# Patient Record
Sex: FEMALE | Race: BLACK OR AFRICAN AMERICAN | Employment: UNEMPLOYED | URBAN - METROPOLITAN AREA
[De-identification: names, ages, dates, MRNs, and addresses within clinical notes are randomized per-mention and may not be internally consistent; named-entity substitution may affect disease eponyms.]

---

## 2017-03-27 PROBLEM — J45.20 MILD INTERMITTENT ASTHMA WITHOUT COMPLICATION: Status: ACTIVE | Noted: 2017-03-27

## 2017-03-27 PROBLEM — I10 ESSENTIAL HYPERTENSION: Status: ACTIVE | Noted: 2017-03-27

## 2017-03-27 PROBLEM — F41.9 ANXIETY: Status: ACTIVE | Noted: 2017-03-27

## 2017-03-27 PROBLEM — F32.9 REACTIVE DEPRESSION: Status: ACTIVE | Noted: 2017-03-27

## 2017-12-30 PROBLEM — N12 PYELONEPHRITIS: Status: ACTIVE | Noted: 2017-12-30

## 2018-01-14 PROBLEM — N17.9 AKI (ACUTE KIDNEY INJURY) (HCC): Status: ACTIVE | Noted: 2018-01-14

## 2018-01-14 PROBLEM — I10 ACCELERATED HYPERTENSION: Status: ACTIVE | Noted: 2018-01-14

## 2018-01-14 PROBLEM — R65.10 SIRS (SYSTEMIC INFLAMMATORY RESPONSE SYNDROME) (HCC): Status: ACTIVE | Noted: 2018-01-14

## 2018-01-14 PROBLEM — E66.01 MORBID OBESITY WITH BMI OF 60.0-69.9, ADULT (HCC): Status: ACTIVE | Noted: 2018-01-14

## 2018-01-14 PROBLEM — F17.200 TOBACCO DEPENDENCE: Status: ACTIVE | Noted: 2018-01-14

## 2018-01-14 PROBLEM — N20.1 URETERIC STONE: Status: ACTIVE | Noted: 2018-01-14

## 2018-02-04 PROBLEM — Z87.09 HISTORY OF ASTHMA: Status: ACTIVE | Noted: 2018-02-04

## 2018-02-04 PROBLEM — N13.2 HYDRONEPHROSIS WITH URINARY OBSTRUCTION DUE TO RENAL CALCULUS: Status: ACTIVE | Noted: 2018-02-04

## 2018-02-04 PROBLEM — K86.2 PANCREATIC CYST: Status: ACTIVE | Noted: 2018-02-04

## 2018-03-16 ENCOUNTER — OFFICE VISIT (OUTPATIENT)
Dept: FAMILY MEDICINE CLINIC | Age: 43
End: 2018-03-16

## 2018-03-16 VITALS
HEART RATE: 55 BPM | WEIGHT: 293 LBS | OXYGEN SATURATION: 98 % | TEMPERATURE: 97.8 F | SYSTOLIC BLOOD PRESSURE: 158 MMHG | HEIGHT: 69 IN | BODY MASS INDEX: 43.4 KG/M2 | DIASTOLIC BLOOD PRESSURE: 84 MMHG

## 2018-03-16 DIAGNOSIS — M54.40 ACUTE RIGHT-SIDED LOW BACK PAIN WITH SCIATICA, SCIATICA LATERALITY UNSPECIFIED: Primary | ICD-10-CM

## 2018-03-16 DIAGNOSIS — R10.9 ABDOMINAL CRAMPS: ICD-10-CM

## 2018-03-16 DIAGNOSIS — M54.50 ACUTE BILATERAL LOW BACK PAIN WITHOUT SCIATICA: ICD-10-CM

## 2018-03-16 LAB
BILIRUBIN, POC: NORMAL
BLOOD URINE, POC: NORMAL
CLARITY, POC: NORMAL
COLOR, POC: NORMAL
CONTROL: NORMAL
GLUCOSE URINE, POC: NORMAL
KETONES, POC: NORMAL
LEUKOCYTE EST, POC: NORMAL
NITRITE, POC: NORMAL
PH, POC: 6.5
PREGNANCY TEST URINE, POC: NORMAL
PROTEIN, POC: NORMAL
SPECIFIC GRAVITY, POC: 1.02
UROBILINOGEN, POC: 0.2

## 2018-03-16 PROCEDURE — 99213 OFFICE O/P EST LOW 20 MIN: CPT | Performed by: PHYSICIAN ASSISTANT

## 2018-03-16 PROCEDURE — 81002 URINALYSIS NONAUTO W/O SCOPE: CPT | Performed by: PHYSICIAN ASSISTANT

## 2018-03-16 PROCEDURE — 96372 THER/PROPH/DIAG INJ SC/IM: CPT | Performed by: PHYSICIAN ASSISTANT

## 2018-03-16 PROCEDURE — 81025 URINE PREGNANCY TEST: CPT | Performed by: PHYSICIAN ASSISTANT

## 2018-03-16 RX ORDER — TIZANIDINE 4 MG/1
4 TABLET ORAL 3 TIMES DAILY
Qty: 30 TABLET | Refills: 1 | Status: SHIPPED | OUTPATIENT
Start: 2018-03-16 | End: 2018-04-25 | Stop reason: SDUPTHER

## 2018-03-16 RX ORDER — DICLOFENAC SODIUM 75 MG/1
75 TABLET, DELAYED RELEASE ORAL 3 TIMES DAILY
Qty: 30 TABLET | Refills: 1 | Status: SHIPPED | OUTPATIENT
Start: 2018-03-16 | End: 2018-07-02 | Stop reason: SDUPTHER

## 2018-03-16 RX ORDER — DEXAMETHASONE SODIUM PHOSPHATE 4 MG/ML
4 INJECTION, SOLUTION INTRA-ARTICULAR; INTRALESIONAL; INTRAMUSCULAR; INTRAVENOUS; SOFT TISSUE ONCE
Status: COMPLETED | OUTPATIENT
Start: 2018-03-16 | End: 2018-03-16

## 2018-03-16 RX ADMIN — DEXAMETHASONE SODIUM PHOSPHATE 4 MG: 4 INJECTION, SOLUTION INTRA-ARTICULAR; INTRALESIONAL; INTRAMUSCULAR; INTRAVENOUS; SOFT TISSUE at 09:36

## 2018-03-16 NOTE — PROGRESS NOTES
[employer]  [address] [facility], [address] [place] [address] N   [phone]      Nevin Chicas  1975  3/16/18      HPI:  Patient presents for abdominal and back pain. She believes she will be starting menses and this is what the cramping is. Her cycles have been irregular. She did not f/u with urology bc she forgot. She has mild hematuria. No dsyuria. Pain is low back, above her hips.      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[facility])         Past Surgical History:   Procedure Laterality Date    CHOLECYSTECTOMY  2009    CYSTOSCOPY Left 01/14/2018    left stent placement    LITHOTRIPSY Right 02/15/2018    Cystoscopy;Stent Removal       Current Outpatient Prescriptions   Medication Sig Dispense Refill    diclofenac (VOLTAREN) 75 MG EC tablet Take 1 tablet by mouth 3 times daily 30 tablet 1    tiZANidine (ZANAFLEX) 4 MG tablet Take 1 tablet by mouth 3 times daily 30 tablet 1    albuterol sulfate HFA (PROAIR HFA) 108 (90 Base) MCG/ACT inhaler Inhale 2 puffs into the lungs every 6 hours as needed for Wheezing 1 Inhaler 3    lisinopril-hydrochlorothiazide (PRINZIDE;ZESTORETIC) 10-12.5 MG per tablet Take 1 tablet by mouth daily 30 tablet 0    Blood Pressure Monitoring (B-D ASSURE BPM/AUTO ARM CUFF) MISC 1 Device by Does not apply route 2 times daily Check BP daily, keep log  Dx: fluctuating BP 1 each 0    amLODIPine (NORVASC) 10 MG tablet Take 1 tablet by mouth daily (Patient taking differently: Take 10 mg by mouth daily Take morning of surgery with a sip of water) 30 tablet 3    ondansetron (ZOFRAN) 4 MG tablet Take 4 mg by mouth every 8 hours as needed for Nausea or Vomiting Take morning of surgery with a sip of water if needed      fluticasone (FLONASE) 50 MCG/ACT nasal spray 1 spray by Nasal route daily 1 Bottle 3    omeprazole (PRILOSEC) 20 MG delayed release capsule Take 1 capsule unspecified  -     POCT Urinalysis no Micro    Abdominal cramps  -     POCT Urinalysis no Micro  -     POCT urine pregnancy    Acute bilateral low back pain without sciatica  -     ketorolac (TORADOL) injection 60 mg; Inject 2 mLs into the muscle once  -     dexamethasone (DECADRON) injection 4 mg; Inject 1 mL into the muscle once  -     diclofenac (VOLTAREN) 75 MG EC tablet; Take 1 tablet by mouth 3 times daily  -     tiZANidine (ZANAFLEX) 4 MG tablet; Take 1 tablet by mouth 3 times daily        Return in about 4 weeks (around 4/13/2018) for schedule pap. needs to f/u with urology as they had suggested. She has had multiple rx for opiate pain meds, we will avoid that now.                               GREGORIO Paniagua

## 2018-03-20 ENCOUNTER — APPOINTMENT (OUTPATIENT)
Dept: GENERAL RADIOLOGY | Age: 43
DRG: 305 | End: 2018-03-20

## 2018-03-20 ENCOUNTER — HOSPITAL ENCOUNTER (INPATIENT)
Age: 43
LOS: 2 days | Discharge: HOME | DRG: 305 | End: 2018-03-22
Attending: EMERGENCY MEDICINE | Admitting: FAMILY MEDICINE

## 2018-03-20 DIAGNOSIS — R06.02 SOB (SHORTNESS OF BREATH): ICD-10-CM

## 2018-03-20 DIAGNOSIS — M79.10 MYALGIA: ICD-10-CM

## 2018-03-20 DIAGNOSIS — I16.0 HYPERTENSIVE URGENCY: Primary | ICD-10-CM

## 2018-03-20 DIAGNOSIS — I50.9 ACUTE CONGESTIVE HEART FAILURE, UNSPECIFIED CONGESTIVE HEART FAILURE TYPE: ICD-10-CM

## 2018-03-20 PROBLEM — E66.01 MORBID OBESITY WITH BMI OF 50.0-59.9, ADULT (HCC): Status: ACTIVE | Noted: 2018-03-20

## 2018-03-20 PROBLEM — I49.3 FREQUENT PVCS: Status: ACTIVE | Noted: 2018-03-20

## 2018-03-20 PROBLEM — M25.552 PAIN OF BOTH HIP JOINTS: Status: ACTIVE | Noted: 2018-03-20

## 2018-03-20 PROBLEM — I49.9 IRREGULAR HEARTBEAT: Status: ACTIVE | Noted: 2018-03-20

## 2018-03-20 PROBLEM — E87.5 HYPERKALEMIA: Status: ACTIVE | Noted: 2018-03-20

## 2018-03-20 PROBLEM — J45.901 ASTHMA EXACERBATION: Status: ACTIVE | Noted: 2018-03-20

## 2018-03-20 PROBLEM — J81.0 ACUTE PULMONARY EDEMA (HCC): Status: ACTIVE | Noted: 2018-03-20

## 2018-03-20 PROBLEM — M25.551 PAIN OF BOTH HIP JOINTS: Status: ACTIVE | Noted: 2018-03-20

## 2018-03-20 LAB
ALBUMIN SERPL-MCNC: 3.3 G/DL (ref 3.5–5.2)
ALP BLD-CCNC: 88 U/L (ref 35–104)
ALT SERPL-CCNC: 25 U/L (ref 0–32)
ANION GAP SERPL CALCULATED.3IONS-SCNC: 12 MMOL/L (ref 7–16)
AST SERPL-CCNC: 38 U/L (ref 0–31)
BASOPHILS ABSOLUTE: 0.05 E9/L (ref 0–0.2)
BASOPHILS RELATIVE PERCENT: 0.5 % (ref 0–2)
BILIRUB SERPL-MCNC: 0.4 MG/DL (ref 0–1.2)
BUN BLDV-MCNC: 11 MG/DL (ref 6–20)
CALCIUM SERPL-MCNC: 9.5 MG/DL (ref 8.6–10.2)
CHLORIDE BLD-SCNC: 96 MMOL/L (ref 98–107)
CO2: 27 MMOL/L (ref 22–29)
CREAT SERPL-MCNC: 0.7 MG/DL (ref 0.5–1)
EOSINOPHILS ABSOLUTE: 0.43 E9/L (ref 0.05–0.5)
EOSINOPHILS RELATIVE PERCENT: 4.3 % (ref 0–6)
GFR AFRICAN AMERICAN: >60
GFR NON-AFRICAN AMERICAN: >60 ML/MIN/1.73
GLUCOSE BLD-MCNC: 95 MG/DL (ref 74–109)
HCT VFR BLD CALC: 43.3 % (ref 34–48)
HEMOGLOBIN: 13.9 G/DL (ref 11.5–15.5)
IMMATURE GRANULOCYTES #: 0.03 E9/L
IMMATURE GRANULOCYTES %: 0.3 % (ref 0–5)
INFLUENZA A BY PCR: NOT DETECTED
INFLUENZA B BY PCR: NOT DETECTED
LACTIC ACID: 1 MMOL/L (ref 0.5–2.2)
LYMPHOCYTES ABSOLUTE: 2.6 E9/L (ref 1.5–4)
LYMPHOCYTES RELATIVE PERCENT: 25.8 % (ref 20–42)
MAGNESIUM: 1.9 MG/DL (ref 1.6–2.6)
MCH RBC QN AUTO: 28.2 PG (ref 26–35)
MCHC RBC AUTO-ENTMCNC: 32.1 % (ref 32–34.5)
MCV RBC AUTO: 87.8 FL (ref 80–99.9)
MONOCYTES ABSOLUTE: 0.47 E9/L (ref 0.1–0.95)
MONOCYTES RELATIVE PERCENT: 4.7 % (ref 2–12)
NEUTROPHILS ABSOLUTE: 6.49 E9/L (ref 1.8–7.3)
NEUTROPHILS RELATIVE PERCENT: 64.4 % (ref 43–80)
PDW BLD-RTO: 16.7 FL (ref 11.5–15)
PLATELET # BLD: 329 E9/L (ref 130–450)
PMV BLD AUTO: 10.7 FL (ref 7–12)
POTASSIUM SERPL-SCNC: 5.7 MMOL/L (ref 3.5–5)
PRO-BNP: 871 PG/ML (ref 0–125)
RBC # BLD: 4.93 E12/L (ref 3.5–5.5)
SODIUM BLD-SCNC: 135 MMOL/L (ref 132–146)
TOTAL PROTEIN: 7.9 G/DL (ref 6.4–8.3)
WBC # BLD: 10.1 E9/L (ref 4.5–11.5)

## 2018-03-20 PROCEDURE — 6370000000 HC RX 637 (ALT 250 FOR IP): Performed by: EMERGENCY MEDICINE

## 2018-03-20 PROCEDURE — 84484 ASSAY OF TROPONIN QUANT: CPT

## 2018-03-20 PROCEDURE — 6370000000 HC RX 637 (ALT 250 FOR IP): Performed by: PHYSICIAN ASSISTANT

## 2018-03-20 PROCEDURE — 83605 ASSAY OF LACTIC ACID: CPT

## 2018-03-20 PROCEDURE — 94640 AIRWAY INHALATION TREATMENT: CPT

## 2018-03-20 PROCEDURE — 94664 DEMO&/EVAL PT USE INHALER: CPT

## 2018-03-20 PROCEDURE — 6360000002 HC RX W HCPCS: Performed by: FAMILY MEDICINE

## 2018-03-20 PROCEDURE — 6360000002 HC RX W HCPCS: Performed by: EMERGENCY MEDICINE

## 2018-03-20 PROCEDURE — 84439 ASSAY OF FREE THYROXINE: CPT

## 2018-03-20 PROCEDURE — 96374 THER/PROPH/DIAG INJ IV PUSH: CPT

## 2018-03-20 PROCEDURE — 99291 CRITICAL CARE FIRST HOUR: CPT

## 2018-03-20 PROCEDURE — 83880 ASSAY OF NATRIURETIC PEPTIDE: CPT

## 2018-03-20 PROCEDURE — 85025 COMPLETE CBC W/AUTO DIFF WBC: CPT

## 2018-03-20 PROCEDURE — 87502 INFLUENZA DNA AMP PROBE: CPT

## 2018-03-20 PROCEDURE — 71046 X-RAY EXAM CHEST 2 VIEWS: CPT

## 2018-03-20 PROCEDURE — 6370000000 HC RX 637 (ALT 250 FOR IP): Performed by: FAMILY MEDICINE

## 2018-03-20 PROCEDURE — 36415 COLL VENOUS BLD VENIPUNCTURE: CPT

## 2018-03-20 PROCEDURE — 80053 COMPREHEN METABOLIC PANEL: CPT

## 2018-03-20 PROCEDURE — 2140000000 HC CCU INTERMEDIATE R&B

## 2018-03-20 PROCEDURE — 83735 ASSAY OF MAGNESIUM: CPT

## 2018-03-20 PROCEDURE — 80048 BASIC METABOLIC PNL TOTAL CA: CPT

## 2018-03-20 PROCEDURE — 85378 FIBRIN DEGRADE SEMIQUANT: CPT

## 2018-03-20 PROCEDURE — 84443 ASSAY THYROID STIM HORMONE: CPT

## 2018-03-20 PROCEDURE — 2580000003 HC RX 258: Performed by: FAMILY MEDICINE

## 2018-03-20 RX ORDER — HYDRALAZINE HYDROCHLORIDE 20 MG/ML
5 INJECTION INTRAMUSCULAR; INTRAVENOUS EVERY 6 HOURS PRN
Status: DISCONTINUED | OUTPATIENT
Start: 2018-03-20 | End: 2018-03-22 | Stop reason: HOSPADM

## 2018-03-20 RX ORDER — HYDROCODONE BITARTRATE AND ACETAMINOPHEN 5; 325 MG/1; MG/1
1 TABLET ORAL EVERY 8 HOURS PRN
Status: DISCONTINUED | OUTPATIENT
Start: 2018-03-20 | End: 2018-03-21

## 2018-03-20 RX ORDER — GUAIFENESIN/DEXTROMETHORPHAN 100-10MG/5
5 SYRUP ORAL EVERY 4 HOURS PRN
Status: DISCONTINUED | OUTPATIENT
Start: 2018-03-20 | End: 2018-03-22 | Stop reason: HOSPADM

## 2018-03-20 RX ORDER — LISINOPRIL AND HYDROCHLOROTHIAZIDE 12.5; 1 MG/1; MG/1
1 TABLET ORAL DAILY
Status: DISCONTINUED | OUTPATIENT
Start: 2018-03-20 | End: 2018-03-20 | Stop reason: CLARIF

## 2018-03-20 RX ORDER — IPRATROPIUM BROMIDE AND ALBUTEROL SULFATE 2.5; .5 MG/3ML; MG/3ML
1 SOLUTION RESPIRATORY (INHALATION) ONCE
Status: COMPLETED | OUTPATIENT
Start: 2018-03-20 | End: 2018-03-20

## 2018-03-20 RX ORDER — IPRATROPIUM BROMIDE AND ALBUTEROL SULFATE 2.5; .5 MG/3ML; MG/3ML
1 SOLUTION RESPIRATORY (INHALATION)
Status: DISCONTINUED | OUTPATIENT
Start: 2018-03-21 | End: 2018-03-22 | Stop reason: HOSPADM

## 2018-03-20 RX ORDER — IPRATROPIUM BROMIDE AND ALBUTEROL SULFATE 2.5; .5 MG/3ML; MG/3ML
1 SOLUTION RESPIRATORY (INHALATION) EVERY 4 HOURS PRN
Status: DISCONTINUED | OUTPATIENT
Start: 2018-03-20 | End: 2018-03-22 | Stop reason: HOSPADM

## 2018-03-20 RX ORDER — PANTOPRAZOLE SODIUM 20 MG/1
20 TABLET, DELAYED RELEASE ORAL
Status: DISCONTINUED | OUTPATIENT
Start: 2018-03-21 | End: 2018-03-22 | Stop reason: HOSPADM

## 2018-03-20 RX ORDER — ACETAMINOPHEN 325 MG/1
650 TABLET ORAL EVERY 4 HOURS PRN
Status: DISCONTINUED | OUTPATIENT
Start: 2018-03-20 | End: 2018-03-22 | Stop reason: HOSPADM

## 2018-03-20 RX ORDER — AMLODIPINE BESYLATE 10 MG/1
10 TABLET ORAL DAILY
Status: DISCONTINUED | OUTPATIENT
Start: 2018-03-20 | End: 2018-03-22 | Stop reason: HOSPADM

## 2018-03-20 RX ORDER — TIZANIDINE 4 MG/1
4 TABLET ORAL 3 TIMES DAILY
Status: DISCONTINUED | OUTPATIENT
Start: 2018-03-20 | End: 2018-03-22 | Stop reason: HOSPADM

## 2018-03-20 RX ORDER — FLUTICASONE PROPIONATE 50 MCG
1 SPRAY, SUSPENSION (ML) NASAL DAILY
Status: DISCONTINUED | OUTPATIENT
Start: 2018-03-21 | End: 2018-03-22 | Stop reason: HOSPADM

## 2018-03-20 RX ORDER — HYDROCHLOROTHIAZIDE 12.5 MG/1
12.5 TABLET ORAL DAILY
Status: DISCONTINUED | OUTPATIENT
Start: 2018-03-21 | End: 2018-03-22 | Stop reason: HOSPADM

## 2018-03-20 RX ORDER — METHYLPREDNISOLONE SODIUM SUCCINATE 40 MG/ML
40 INJECTION, POWDER, LYOPHILIZED, FOR SOLUTION INTRAMUSCULAR; INTRAVENOUS EVERY 12 HOURS
Status: DISCONTINUED | OUTPATIENT
Start: 2018-03-21 | End: 2018-03-21

## 2018-03-20 RX ORDER — ONDANSETRON 2 MG/ML
4 INJECTION INTRAMUSCULAR; INTRAVENOUS EVERY 6 HOURS PRN
Status: DISCONTINUED | OUTPATIENT
Start: 2018-03-20 | End: 2018-03-22 | Stop reason: HOSPADM

## 2018-03-20 RX ORDER — FUROSEMIDE 10 MG/ML
40 INJECTION INTRAMUSCULAR; INTRAVENOUS DAILY
Status: DISCONTINUED | OUTPATIENT
Start: 2018-03-21 | End: 2018-03-22 | Stop reason: HOSPADM

## 2018-03-20 RX ORDER — HYDRALAZINE HYDROCHLORIDE 20 MG/ML
5 INJECTION INTRAMUSCULAR; INTRAVENOUS ONCE
Status: COMPLETED | OUTPATIENT
Start: 2018-03-20 | End: 2018-03-20

## 2018-03-20 RX ORDER — NITROGLYCERIN 0.4 MG/1
0.4 TABLET SUBLINGUAL EVERY 5 MIN PRN
Status: DISCONTINUED | OUTPATIENT
Start: 2018-03-20 | End: 2018-03-22 | Stop reason: HOSPADM

## 2018-03-20 RX ORDER — IPRATROPIUM BROMIDE AND ALBUTEROL SULFATE 2.5; .5 MG/3ML; MG/3ML
1 SOLUTION RESPIRATORY (INHALATION)
Status: DISCONTINUED | OUTPATIENT
Start: 2018-03-20 | End: 2018-03-22 | Stop reason: HOSPADM

## 2018-03-20 RX ORDER — SODIUM CHLORIDE 0.9 % (FLUSH) 0.9 %
10 SYRINGE (ML) INJECTION PRN
Status: DISCONTINUED | OUTPATIENT
Start: 2018-03-20 | End: 2018-03-22 | Stop reason: HOSPADM

## 2018-03-20 RX ORDER — OXYCODONE HYDROCHLORIDE AND ACETAMINOPHEN 5; 325 MG/1; MG/1
2 TABLET ORAL ONCE
Status: COMPLETED | OUTPATIENT
Start: 2018-03-20 | End: 2018-03-20

## 2018-03-20 RX ORDER — LISINOPRIL 10 MG/1
10 TABLET ORAL DAILY
Status: DISCONTINUED | OUTPATIENT
Start: 2018-03-21 | End: 2018-03-22 | Stop reason: HOSPADM

## 2018-03-20 RX ORDER — SODIUM CHLORIDE 0.9 % (FLUSH) 0.9 %
10 SYRINGE (ML) INJECTION EVERY 12 HOURS SCHEDULED
Status: DISCONTINUED | OUTPATIENT
Start: 2018-03-20 | End: 2018-03-22 | Stop reason: HOSPADM

## 2018-03-20 RX ORDER — HYDROCODONE BITARTRATE AND ACETAMINOPHEN 5; 325 MG/1; MG/1
1 TABLET ORAL EVERY 8 HOURS PRN
Status: ON HOLD | COMMUNITY
End: 2018-03-22 | Stop reason: HOSPADM

## 2018-03-20 RX ORDER — FUROSEMIDE 10 MG/ML
20 INJECTION INTRAMUSCULAR; INTRAVENOUS ONCE
Status: COMPLETED | OUTPATIENT
Start: 2018-03-20 | End: 2018-03-20

## 2018-03-20 RX ORDER — IBUPROFEN 800 MG/1
800 TABLET ORAL ONCE
Status: COMPLETED | OUTPATIENT
Start: 2018-03-20 | End: 2018-03-20

## 2018-03-20 RX ORDER — CETIRIZINE HYDROCHLORIDE 10 MG/1
10 TABLET ORAL DAILY
Status: DISCONTINUED | OUTPATIENT
Start: 2018-03-21 | End: 2018-03-22 | Stop reason: HOSPADM

## 2018-03-20 RX ORDER — IPRATROPIUM BROMIDE AND ALBUTEROL SULFATE 2.5; .5 MG/3ML; MG/3ML
1 SOLUTION RESPIRATORY (INHALATION)
Status: COMPLETED | OUTPATIENT
Start: 2018-03-20 | End: 2018-03-20

## 2018-03-20 RX ADMIN — TIZANIDINE 4 MG: 4 TABLET ORAL at 23:49

## 2018-03-20 RX ADMIN — IPRATROPIUM BROMIDE AND ALBUTEROL SULFATE 1 AMPULE: 2.5; .5 SOLUTION RESPIRATORY (INHALATION) at 15:25

## 2018-03-20 RX ADMIN — Medication 10 ML: at 23:50

## 2018-03-20 RX ADMIN — OXYCODONE HYDROCHLORIDE AND ACETAMINOPHEN 2 TABLET: 5; 325 TABLET ORAL at 19:00

## 2018-03-20 RX ADMIN — IBUPROFEN 800 MG: 800 TABLET, FILM COATED ORAL at 16:44

## 2018-03-20 RX ADMIN — HYDRALAZINE HYDROCHLORIDE 5 MG: 20 INJECTION INTRAMUSCULAR; INTRAVENOUS at 19:41

## 2018-03-20 RX ADMIN — IPRATROPIUM BROMIDE AND ALBUTEROL SULFATE 1 AMPULE: 2.5; .5 SOLUTION RESPIRATORY (INHALATION) at 15:30

## 2018-03-20 RX ADMIN — IPRATROPIUM BROMIDE AND ALBUTEROL SULFATE 1 AMPULE: 2.5; .5 SOLUTION RESPIRATORY (INHALATION) at 16:45

## 2018-03-20 RX ADMIN — HYDRALAZINE HYDROCHLORIDE 5 MG: 20 INJECTION INTRAMUSCULAR; INTRAVENOUS at 23:49

## 2018-03-20 RX ADMIN — IPRATROPIUM BROMIDE AND ALBUTEROL SULFATE 1 AMPULE: 2.5; .5 SOLUTION RESPIRATORY (INHALATION) at 20:30

## 2018-03-20 RX ADMIN — HYDROCODONE BITARTRATE AND ACETAMINOPHEN 1 TABLET: 5; 325 TABLET ORAL at 23:59

## 2018-03-20 RX ADMIN — IPRATROPIUM BROMIDE AND ALBUTEROL SULFATE 1 AMPULE: 2.5; .5 SOLUTION RESPIRATORY (INHALATION) at 15:15

## 2018-03-20 RX ADMIN — METHYLPREDNISOLONE SODIUM SUCCINATE 40 MG: 40 INJECTION, POWDER, FOR SOLUTION INTRAMUSCULAR; INTRAVENOUS at 23:49

## 2018-03-20 RX ADMIN — AMLODIPINE BESYLATE 10 MG: 10 TABLET ORAL at 23:49

## 2018-03-20 RX ADMIN — FUROSEMIDE 20 MG: 10 INJECTION, SOLUTION INTRAVENOUS at 19:00

## 2018-03-20 ASSESSMENT — PAIN DESCRIPTION - PROGRESSION: CLINICAL_PROGRESSION: NOT CHANGED

## 2018-03-20 ASSESSMENT — PAIN DESCRIPTION - DESCRIPTORS: DESCRIPTORS: ACHING;DISCOMFORT

## 2018-03-20 ASSESSMENT — PAIN SCALES - GENERAL
PAINLEVEL_OUTOF10: 9
PAINLEVEL_OUTOF10: 7

## 2018-03-20 ASSESSMENT — PAIN DESCRIPTION - LOCATION
LOCATION: HEAD;HIP
LOCATION: GENERALIZED
LOCATION: GENERALIZED

## 2018-03-20 ASSESSMENT — PAIN DESCRIPTION - FREQUENCY: FREQUENCY: INTERMITTENT

## 2018-03-20 ASSESSMENT — PAIN DESCRIPTION - PAIN TYPE
TYPE: ACUTE PAIN;CHRONIC PAIN
TYPE: ACUTE PAIN

## 2018-03-20 ASSESSMENT — PAIN DESCRIPTION - ONSET: ONSET: ON-GOING

## 2018-03-20 ASSESSMENT — PAIN DESCRIPTION - ORIENTATION: ORIENTATION: RIGHT;LEFT

## 2018-03-20 NOTE — ED PROVIDER NOTES
(FLONASE) 50 MCG/ACT nasal spray 1 spray (not administered)   HYDROcodone-acetaminophen (NORCO) 5-325 MG per tablet 1 tablet (1 tablet Oral Given 3/20/18 2359)   cetirizine (ZYRTEC) tablet 10 mg (not administered)   magnesium hydroxide (MILK OF MAGNESIA) 400 MG/5ML suspension 30 mL (not administered)   pantoprazole (PROTONIX) tablet 20 mg (not administered)   tiZANidine (ZANAFLEX) tablet 4 mg (4 mg Oral Given 3/20/18 2349)   sodium chloride flush 0.9 % injection 10 mL (10 mLs Intravenous Given 3/20/18 2350)   sodium chloride flush 0.9 % injection 10 mL (not administered)   ondansetron (ZOFRAN) injection 4 mg (not administered)   enoxaparin (LOVENOX) injection 40 mg (not administered)   furosemide (LASIX) injection 40 mg (not administered)   HYDROmorphone (DILAUDID) injection 0.5 mg (not administered)   nitroGLYCERIN (NITROSTAT) SL tablet 0.4 mg (not administered)   ipratropium-albuterol (DUONEB) nebulizer solution 1 ampule (not administered)   ipratropium-albuterol (DUONEB) nebulizer solution 1 ampule (not administered)   methylPREDNISolone sodium (SOLU-MEDROL) injection 40 mg (40 mg Intravenous Given 3/20/18 2349)   guaiFENesin-dextromethorphan (ROBITUSSIN DM) 100-10 MG/5ML syrup 5 mL (not administered)   hydrALAZINE (APRESOLINE) injection 5 mg (5 mg Intravenous Given 3/20/18 2349)   lisinopril (PRINIVIL;ZESTRIL) tablet 10 mg (not administered)     And   hydrochlorothiazide (HYDRODIURIL) tablet 12.5 mg (not administered)   ipratropium-albuterol (DUONEB) nebulizer solution 1 ampule (1 ampule Inhalation Given 3/20/18 1530)   ipratropium-albuterol (DUONEB) nebulizer solution 1 ampule (1 ampule Inhalation Given 3/20/18 1645)   ibuprofen (ADVIL;MOTRIN) tablet 800 mg (800 mg Oral Given 3/20/18 1644)   furosemide (LASIX) injection 20 mg (20 mg Intravenous Given 3/20/18 1900)   oxyCODONE-acetaminophen (PERCOCET) 5-325 MG per tablet 2 tablet (2 tablets Oral Given 3/20/18 1900)   hydrALAZINE (APRESOLINE) injection 5 mg (5 mg case, including pertinent history (medical, surgical, family and social) and exam findings with the Midlevel and the Nurse assigned to [employer]. I have personally performed and/or participated in the history, exam, medical decision making, and procedures and agree with all pertinent clinical information. Please note that the withdrawal or failure to initiate urgent interventions for this patient would likely result in a life threatening deterioration or permanent disability. Accordingly this patient received 30 minutes of critical care time, excluding separately billable procedures. I have reviewed my findings and recommendations with [employer] and members of family present at the time of disposition. My findings/plan: The primary encounter diagnosis was Hypertensive urgency. Diagnoses of Acute congestive heart failure, unspecified congestive heart failure type ([address] SOB (shortness of breath), and Myalgia were also pertinent to this visit.   Current Discharge Medication List        DO Alex Hays DO  03/21/18 2859

## 2018-03-21 ENCOUNTER — APPOINTMENT (OUTPATIENT)
Dept: CT IMAGING | Age: 43
DRG: 305 | End: 2018-03-21

## 2018-03-21 ENCOUNTER — APPOINTMENT (OUTPATIENT)
Dept: GENERAL RADIOLOGY | Age: 43
DRG: 305 | End: 2018-03-21

## 2018-03-21 LAB
ANION GAP SERPL CALCULATED.3IONS-SCNC: 15 MMOL/L (ref 7–16)
ANION GAP SERPL CALCULATED.3IONS-SCNC: 16 MMOL/L (ref 7–16)
BASOPHILS ABSOLUTE: 0.04 E9/L (ref 0–0.2)
BASOPHILS RELATIVE PERCENT: 0.4 % (ref 0–2)
BUN BLDV-MCNC: 14 MG/DL (ref 6–20)
BUN BLDV-MCNC: 15 MG/DL (ref 6–20)
CALCIUM SERPL-MCNC: 8.9 MG/DL (ref 8.6–10.2)
CALCIUM SERPL-MCNC: 9.5 MG/DL (ref 8.6–10.2)
CHLORIDE BLD-SCNC: 100 MMOL/L (ref 98–107)
CHLORIDE BLD-SCNC: 99 MMOL/L (ref 98–107)
CHOLESTEROL, TOTAL: 200 MG/DL (ref 0–199)
CO2: 25 MMOL/L (ref 22–29)
CO2: 26 MMOL/L (ref 22–29)
CREAT SERPL-MCNC: 0.9 MG/DL (ref 0.5–1)
CREAT SERPL-MCNC: 1 MG/DL (ref 0.5–1)
D DIMER: 364 NG/ML DDU
EKG ATRIAL RATE: 94 BPM
EKG P AXIS: 59 DEGREES
EKG P-R INTERVAL: 154 MS
EKG Q-T INTERVAL: 436 MS
EKG QRS DURATION: 100 MS
EKG QTC CALCULATION (BAZETT): 545 MS
EKG R AXIS: 35 DEGREES
EKG T AXIS: 76 DEGREES
EKG VENTRICULAR RATE: 94 BPM
EOSINOPHILS ABSOLUTE: 0.07 E9/L (ref 0.05–0.5)
EOSINOPHILS RELATIVE PERCENT: 0.7 % (ref 0–6)
FILM ARRAY ADENOVIRUS: ABNORMAL
FILM ARRAY BORDETELLA PERTUSSIS: ABNORMAL
FILM ARRAY CHLAMYDOPHILIA PNEUMONIAE: ABNORMAL
FILM ARRAY CORONAVIRUS 229E: ABNORMAL
FILM ARRAY CORONAVIRUS HKU1: ABNORMAL
FILM ARRAY CORONAVIRUS NL63: ABNORMAL
FILM ARRAY CORONAVIRUS OC43: ABNORMAL
FILM ARRAY INFLUENZA A VIRUS 09H1: ABNORMAL
FILM ARRAY INFLUENZA A VIRUS H1: ABNORMAL
FILM ARRAY INFLUENZA A VIRUS H3: ABNORMAL
FILM ARRAY INFLUENZA A VIRUS: ABNORMAL
FILM ARRAY INFLUENZA B: ABNORMAL
FILM ARRAY METAPNEUMOVIRUS: ABNORMAL
FILM ARRAY MYCOPLASMA PNEUMONIAE: ABNORMAL
FILM ARRAY PARAINFLUENZA VIRUS 1: ABNORMAL
FILM ARRAY PARAINFLUENZA VIRUS 2: ABNORMAL
FILM ARRAY PARAINFLUENZA VIRUS 3: ABNORMAL
FILM ARRAY PARAINFLUENZA VIRUS 4: ABNORMAL
FILM ARRAY RESPIRATORY SYNCITIAL VIRUS: ABNORMAL
GFR AFRICAN AMERICAN: >60
GFR AFRICAN AMERICAN: >60
GFR NON-AFRICAN AMERICAN: >60 ML/MIN/1.73
GFR NON-AFRICAN AMERICAN: >60 ML/MIN/1.73
GLUCOSE BLD-MCNC: 141 MG/DL (ref 74–109)
GLUCOSE BLD-MCNC: 143 MG/DL (ref 74–109)
HCT VFR BLD CALC: 46.3 % (ref 34–48)
HDLC SERPL-MCNC: 54 MG/DL
HEMOGLOBIN: 14.9 G/DL (ref 11.5–15.5)
IMMATURE GRANULOCYTES #: 0.15 E9/L
IMMATURE GRANULOCYTES %: 1.5 % (ref 0–5)
LDL CHOLESTEROL CALCULATED: 127 MG/DL (ref 0–99)
LV EF: 53 %
LVEF MODALITY: NORMAL
LYMPHOCYTES ABSOLUTE: 1.29 E9/L (ref 1.5–4)
LYMPHOCYTES RELATIVE PERCENT: 13.1 % (ref 20–42)
MCH RBC QN AUTO: 28.4 PG (ref 26–35)
MCHC RBC AUTO-ENTMCNC: 32.2 % (ref 32–34.5)
MCV RBC AUTO: 88.2 FL (ref 80–99.9)
MONOCYTES ABSOLUTE: 0.22 E9/L (ref 0.1–0.95)
MONOCYTES RELATIVE PERCENT: 2.2 % (ref 2–12)
NEUTROPHILS ABSOLUTE: 8.11 E9/L (ref 1.8–7.3)
NEUTROPHILS RELATIVE PERCENT: 82.1 % (ref 43–80)
ORGANISM: ABNORMAL
PDW BLD-RTO: 17 FL (ref 11.5–15)
PLATELET # BLD: 348 E9/L (ref 130–450)
PMV BLD AUTO: 11.3 FL (ref 7–12)
POTASSIUM SERPL-SCNC: 3.2 MMOL/L (ref 3.5–5)
POTASSIUM SERPL-SCNC: 4 MMOL/L (ref 3.5–5)
RBC # BLD: 5.25 E12/L (ref 3.5–5.5)
SODIUM BLD-SCNC: 140 MMOL/L (ref 132–146)
SODIUM BLD-SCNC: 141 MMOL/L (ref 132–146)
T4 FREE: 1.04 NG/DL (ref 0.93–1.7)
TRIGL SERPL-MCNC: 95 MG/DL (ref 0–149)
TROPONIN: 0.01 NG/ML (ref 0–0.03)
TROPONIN: 0.02 NG/ML (ref 0–0.03)
TSH SERPL DL<=0.05 MIU/L-ACNC: 0.86 UIU/ML (ref 0.27–4.2)
VLDLC SERPL CALC-MCNC: 19 MG/DL
WBC # BLD: 9.9 E9/L (ref 4.5–11.5)

## 2018-03-21 PROCEDURE — 94760 N-INVAS EAR/PLS OXIMETRY 1: CPT

## 2018-03-21 PROCEDURE — 87501 INFLUENZA DNA AMP PROB 1+: CPT

## 2018-03-21 PROCEDURE — 6360000002 HC RX W HCPCS: Performed by: FAMILY MEDICINE

## 2018-03-21 PROCEDURE — 6360000004 HC RX CONTRAST MEDICATION: Performed by: RADIOLOGY

## 2018-03-21 PROCEDURE — 6370000000 HC RX 637 (ALT 250 FOR IP): Performed by: INTERNAL MEDICINE

## 2018-03-21 PROCEDURE — 87486 CHLMYD PNEUM DNA AMP PROBE: CPT

## 2018-03-21 PROCEDURE — 80061 LIPID PANEL: CPT

## 2018-03-21 PROCEDURE — 93010 ELECTROCARDIOGRAM REPORT: CPT | Performed by: INTERNAL MEDICINE

## 2018-03-21 PROCEDURE — 71275 CT ANGIOGRAPHY CHEST: CPT

## 2018-03-21 PROCEDURE — 72100 X-RAY EXAM L-S SPINE 2/3 VWS: CPT

## 2018-03-21 PROCEDURE — 87581 M.PNEUMON DNA AMP PROBE: CPT

## 2018-03-21 PROCEDURE — 6370000000 HC RX 637 (ALT 250 FOR IP): Performed by: FAMILY MEDICINE

## 2018-03-21 PROCEDURE — 73521 X-RAY EXAM HIPS BI 2 VIEWS: CPT

## 2018-03-21 PROCEDURE — 2140000000 HC CCU INTERMEDIATE R&B

## 2018-03-21 PROCEDURE — 36415 COLL VENOUS BLD VENIPUNCTURE: CPT

## 2018-03-21 PROCEDURE — 2580000003 HC RX 258: Performed by: FAMILY MEDICINE

## 2018-03-21 PROCEDURE — 80048 BASIC METABOLIC PNL TOTAL CA: CPT

## 2018-03-21 PROCEDURE — 93005 ELECTROCARDIOGRAM TRACING: CPT | Performed by: FAMILY MEDICINE

## 2018-03-21 PROCEDURE — 94640 AIRWAY INHALATION TREATMENT: CPT

## 2018-03-21 PROCEDURE — 87798 DETECT AGENT NOS DNA AMP: CPT

## 2018-03-21 PROCEDURE — 85025 COMPLETE CBC W/AUTO DIFF WBC: CPT

## 2018-03-21 PROCEDURE — 87503 INFLUENZA DNA AMP PROB ADDL: CPT

## 2018-03-21 PROCEDURE — 93306 TTE W/DOPPLER COMPLETE: CPT

## 2018-03-21 PROCEDURE — 84484 ASSAY OF TROPONIN QUANT: CPT

## 2018-03-21 RX ORDER — HYDROCHLOROTHIAZIDE 12.5 MG/1
12.5 TABLET ORAL ONCE
Status: COMPLETED | OUTPATIENT
Start: 2018-03-21 | End: 2018-03-21

## 2018-03-21 RX ORDER — POTASSIUM CHLORIDE 20 MEQ/1
20 TABLET, EXTENDED RELEASE ORAL ONCE
Status: DISCONTINUED | OUTPATIENT
Start: 2018-03-21 | End: 2018-03-21

## 2018-03-21 RX ORDER — HYDRALAZINE HYDROCHLORIDE 25 MG/1
25 TABLET, FILM COATED ORAL EVERY 8 HOURS SCHEDULED
Status: DISCONTINUED | OUTPATIENT
Start: 2018-03-21 | End: 2018-03-22 | Stop reason: HOSPADM

## 2018-03-21 RX ORDER — POTASSIUM CHLORIDE 20 MEQ/1
20 TABLET, EXTENDED RELEASE ORAL
Status: DISCONTINUED | OUTPATIENT
Start: 2018-03-21 | End: 2018-03-22 | Stop reason: HOSPADM

## 2018-03-21 RX ORDER — OXYCODONE HYDROCHLORIDE AND ACETAMINOPHEN 5; 325 MG/1; MG/1
1 TABLET ORAL EVERY 4 HOURS PRN
Status: DISCONTINUED | OUTPATIENT
Start: 2018-03-21 | End: 2018-03-22 | Stop reason: HOSPADM

## 2018-03-21 RX ORDER — LISINOPRIL 10 MG/1
10 TABLET ORAL ONCE
Status: COMPLETED | OUTPATIENT
Start: 2018-03-21 | End: 2018-03-21

## 2018-03-21 RX ADMIN — FUROSEMIDE 40 MG: 10 INJECTION, SOLUTION INTRAMUSCULAR; INTRAVENOUS at 09:01

## 2018-03-21 RX ADMIN — HYDROCODONE BITARTRATE AND ACETAMINOPHEN 1 TABLET: 5; 325 TABLET ORAL at 07:36

## 2018-03-21 RX ADMIN — LISINOPRIL 10 MG: 10 TABLET ORAL at 03:05

## 2018-03-21 RX ADMIN — IOPAMIDOL 60 ML: 755 INJECTION, SOLUTION INTRAVENOUS at 15:08

## 2018-03-21 RX ADMIN — POTASSIUM CHLORIDE 20 MEQ: 20 TABLET, EXTENDED RELEASE ORAL at 09:00

## 2018-03-21 RX ADMIN — TIZANIDINE 4 MG: 4 TABLET ORAL at 13:23

## 2018-03-21 RX ADMIN — CETIRIZINE HYDROCHLORIDE 10 MG: 10 TABLET, FILM COATED ORAL at 09:01

## 2018-03-21 RX ADMIN — HYDRALAZINE HYDROCHLORIDE 5 MG: 20 INJECTION INTRAMUSCULAR; INTRAVENOUS at 07:36

## 2018-03-21 RX ADMIN — OXYCODONE HYDROCHLORIDE AND ACETAMINOPHEN 1 TABLET: 5; 325 TABLET ORAL at 17:07

## 2018-03-21 RX ADMIN — METHYLPREDNISOLONE SODIUM SUCCINATE 40 MG: 40 INJECTION, POWDER, FOR SOLUTION INTRAMUSCULAR; INTRAVENOUS at 11:26

## 2018-03-21 RX ADMIN — Medication 10 ML: at 20:31

## 2018-03-21 RX ADMIN — IPRATROPIUM BROMIDE AND ALBUTEROL SULFATE 1 AMPULE: 2.5; .5 SOLUTION RESPIRATORY (INHALATION) at 22:05

## 2018-03-21 RX ADMIN — IPRATROPIUM BROMIDE AND ALBUTEROL SULFATE 1 AMPULE: 2.5; .5 SOLUTION RESPIRATORY (INHALATION) at 13:09

## 2018-03-21 RX ADMIN — HYDROCHLOROTHIAZIDE 12.5 MG: 12.5 TABLET ORAL at 09:01

## 2018-03-21 RX ADMIN — OXYCODONE HYDROCHLORIDE AND ACETAMINOPHEN 1 TABLET: 5; 325 TABLET ORAL at 12:02

## 2018-03-21 RX ADMIN — OXYCODONE HYDROCHLORIDE AND ACETAMINOPHEN 1 TABLET: 5; 325 TABLET ORAL at 21:08

## 2018-03-21 RX ADMIN — TIZANIDINE 4 MG: 4 TABLET ORAL at 20:31

## 2018-03-21 RX ADMIN — AMLODIPINE BESYLATE 10 MG: 10 TABLET ORAL at 09:01

## 2018-03-21 RX ADMIN — LISINOPRIL 10 MG: 10 TABLET ORAL at 09:00

## 2018-03-21 RX ADMIN — POTASSIUM CHLORIDE 20 MEQ: 20 TABLET, EXTENDED RELEASE ORAL at 03:05

## 2018-03-21 RX ADMIN — HYDROCHLOROTHIAZIDE 12.5 MG: 12.5 TABLET ORAL at 03:05

## 2018-03-21 RX ADMIN — HYDRALAZINE HYDROCHLORIDE 25 MG: 25 TABLET, FILM COATED ORAL at 20:31

## 2018-03-21 RX ADMIN — IPRATROPIUM BROMIDE AND ALBUTEROL SULFATE 1 AMPULE: 2.5; .5 SOLUTION RESPIRATORY (INHALATION) at 09:08

## 2018-03-21 RX ADMIN — ENOXAPARIN SODIUM 40 MG: 40 INJECTION SUBCUTANEOUS at 09:01

## 2018-03-21 RX ADMIN — PANTOPRAZOLE SODIUM 20 MG: 20 TABLET, DELAYED RELEASE ORAL at 07:36

## 2018-03-21 RX ADMIN — Medication 10 ML: at 09:01

## 2018-03-21 RX ADMIN — TIZANIDINE 4 MG: 4 TABLET ORAL at 09:01

## 2018-03-21 ASSESSMENT — PAIN SCALES - GENERAL
PAINLEVEL_OUTOF10: 9
PAINLEVEL_OUTOF10: 5
PAINLEVEL_OUTOF10: 6
PAINLEVEL_OUTOF10: 0
PAINLEVEL_OUTOF10: 0
PAINLEVEL_OUTOF10: 10
PAINLEVEL_OUTOF10: 9

## 2018-03-21 ASSESSMENT — PAIN DESCRIPTION - ONSET
ONSET: ON-GOING

## 2018-03-21 ASSESSMENT — PAIN DESCRIPTION - ORIENTATION
ORIENTATION: RIGHT;LEFT

## 2018-03-21 ASSESSMENT — PAIN DESCRIPTION - PROGRESSION
CLINICAL_PROGRESSION: NOT CHANGED

## 2018-03-21 ASSESSMENT — PAIN DESCRIPTION - LOCATION
LOCATION: HIP
LOCATION: HIP
LOCATION: BACK;HIP

## 2018-03-21 ASSESSMENT — PAIN DESCRIPTION - PAIN TYPE
TYPE: CHRONIC PAIN

## 2018-03-21 ASSESSMENT — PAIN DESCRIPTION - DESCRIPTORS
DESCRIPTORS: ACHING
DESCRIPTORS: ACHING;DISCOMFORT;SORE
DESCRIPTORS: ACHING;DISCOMFORT

## 2018-03-21 ASSESSMENT — PAIN DESCRIPTION - FREQUENCY
FREQUENCY: INTERMITTENT
FREQUENCY: CONTINUOUS
FREQUENCY: CONTINUOUS

## 2018-03-21 NOTE — H&P
Hospital Medicine History & Physical      PCP: [place]    Date of Admission: 3/20/2018    Date of Service: Pt seen/examined on 3/20/18 and Admitted to Inpatient with expected LOS greater than two midnights due to medical therapy. Chief Complaint:  Shortness of breath      History Of Present Illness:      38 y.o. female who presented to [facility] with past medical history of asthma, kidney stone, hypertension, tobacco dependence and obesity. Patient has not been feeling well for the past week. She has been having cough and cold symptoms in addition to chest congestion and nausea. She started having shortness of breath 2 days ago. This has been constant and getting worse since then. She has a cough productive of yellowish sputum and wheezing. She has been having leg swelling and describes symptoms of PND. No chest pain. She complains of pain in both hips which is dull, constant pain that is worse with sitting, nonradiating and severe in intensity. She received steroid shots at the PCP's office few days ago which initially relieved her pain but pain is back now. She continues to have abdominal pain and has been passing kidney stones at home. No fever. No dysuria or frequency or urgency. Vitals notable for blood pressure of 159/123 initially, was as high as 180/120 in the ER. Labs revealed potassium of 5.7 in a hemolyzed sample. , normal CBC and negative influenza screen. Chest x-ray shows cardiomegaly and vascular congestion. I do not see any EKG obtained. she states that she has had stress test several years ago. No recent cardiac workup. She is being admitted for further management.     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 Viral URI/bronchitis. PLAN:  #1. Acute pulmonary edema possibly precipitated by hypertensive urgency. Control blood pressure, diurese patient with Lasix, monitor I's and O's and daily weights. With new heart failure, obtain 2-D echo, patient would need ischemic workup. At this time, she has reactive airways. Stress test should be obtained when the lungs are better prior to discharge or soon outpatient. HOLD NSAIDs. STAT EKG. #2. Asthma exacerbation, breathing treatments and steroids. Monitor volume status especially with steroids on board. #3. Pain in both hips. No improvement with steroid shots. Multilevel DJD on recent CT scan of the abdomen and pelvis. Obtain x-rays of the hips and lumbar spine. Consider ortho or neurosurgery consult if pain becomes persistent. Pain control. Physical therapy as tolerated. #4. Irregular heartbeat. Patient with frequent PVCs. Continue his current monitoring, magnesium level is normal. Check thyroid function. 5. Hyperkalemia in a hemolyzed sample, recheck and treat as indicated. #6. Hypoxia with new oxygen requirement possibly secondary to problem #1. Check d-dimer. Consider PE workup. #7. Morbid obesity, lifestyle modification. #8. Tobacco dependence, smoking cessation. Nicotine patch.  9. . Viral URI/bronchitis. Supportive care, respiratory film array. DVT Prophylaxis: Lovenox   Diet:  cardiac, nothing by mouth after midnight  Code Status: Prior full    PT/OT Eval Status: Evaluation and treatment    Dispo - inpatient telemetry       Mily Rockwell MD    Thank you [facility] for the opportunity to be involved in this patient's care.

## 2018-03-21 NOTE — PROGRESS NOTES
Nutrition Education    Type and Reason for Visit: Consult, Patient Education    · Verbally reviewed following information with patient. · Written educational materials provided low sodium diet and Slim Down weight management program, 6 week program.  · Contact name and number provided. · Refer to Patient Education activity for more details.     Electronically signed by Dread Peterson RD, LD on 3/21/18 at 4:03 PM    Contact Number:  Ext [phone]

## 2018-03-21 NOTE — PROGRESS NOTES
auscultation, bilaterally without Rales/Wheezes/Rhonchi. Cardiovascular: Regular rate and rhythm with normal S1/S2 without murmurs, rubs or gallops. Abdomen: Soft, non-tender, non-distended with normal bowel sounds. Musculoskeletal: No clubbing, cyanosis or edema bilaterally. Full range of motion without deformity. Skin: Skin color, texture, turgor normal.  No rashes or lesions. Neurologic:  Neurovascularly intact without any focal sensory/motor deficits. Cranial nerves: II-XII intact, grossly non-focal.  Psychiatric: Alert and oriented, thought content appropriate, normal insight    Labs:   Recent Labs      03/20/18 1655 03/21/18   0420   WBC  10.1  9.9   HGB  13.9  14.9   HCT  43.3  46.3   PLT  329  348     Recent Labs      03/20/18   1655 03/20/18   2352  03/21/18   0420   NA  135  141  140   K  5.7*  3.2*  4.0   CL  96*  100  99   CO2  27  26  25   BUN  11  14  15   CREATININE  0.7  1.0  0.9   CALCIUM  9.5  8.9  9.5     Recent Labs      03/20/18   1655   AST  38*   ALT  25   BILITOT  0.4   ALKPHOS  88     No results for input(s): INR in the last 72 hours.   Recent Labs      03/20/18 2352 03/21/18   0420   TROPONINI  0.02  0.01       Assessment/Plan:    Active Hospital Problems    Diagnosis Date Noted    Hypertensive urgency [I16.0] 03/20/2018    [facility] [J81.0] 03/20/2018    Pain of both hip joints [M25.551, M25.552] 03/20/2018    Irregular heartbeat [I49.9] 03/20/2018    Frequent PVCs [I49.3] 03/20/2018    Asthma exacerbation [J45.901] 03/20/2018    Morbid obesity with BMI of 50.0-59.9, adult ([facility] [E66.01, Z68.43] 03/20/2018    Hyperkalemia [E87.5] 03/20/2018    Tobacco dependence [F17.200] 01/14/2018   RHINO/enterovirus URI    ECHO pending, stress test negative; continue diuresis  Symptomatic management for URI  MRI abdomen in am; will likely need open MRI due to abdominal girth   Added hydralazine to antihypertensive regimen    DVT Prophylaxis: lovenox  Diet: DIET CARDIAC;  Code Status: Full Code    PT/OT Eval Status: na    Dispo - inpatient     Chelsey Uribe MD

## 2018-03-21 NOTE — PROGRESS NOTES
Message sent via [employer] serve regarding patient request to have the percocet rather than the norco as it seemed to manage her pain better.

## 2018-03-21 NOTE — PROGRESS NOTES
Telephone report received from [facility]. Will await verification on telemetry. Sylvia Wilson RN

## 2018-03-22 ENCOUNTER — HOSPITAL ENCOUNTER (OUTPATIENT)
Age: 43
Discharge: HOME | End: 2018-03-22
Payer: COMMERCIAL

## 2018-03-22 ENCOUNTER — APPOINTMENT (OUTPATIENT)
Dept: MRI IMAGING | Age: 43
DRG: 305 | End: 2018-03-22

## 2018-03-22 VITALS
BODY MASS INDEX: 43.4 KG/M2 | SYSTOLIC BLOOD PRESSURE: 190 MMHG | RESPIRATION RATE: 20 BRPM | HEIGHT: 69 IN | WEIGHT: 293 LBS | OXYGEN SATURATION: 94 % | DIASTOLIC BLOOD PRESSURE: 104 MMHG | TEMPERATURE: 98.7 F | HEART RATE: 80 BPM

## 2018-03-22 LAB
ANION GAP SERPL CALCULATED.3IONS-SCNC: 17 MMOL/L (ref 7–16)
BUN BLDV-MCNC: 20 MG/DL (ref 6–20)
CALCIUM SERPL-MCNC: 9.4 MG/DL (ref 8.6–10.2)
CHLORIDE BLD-SCNC: 96 MMOL/L (ref 98–107)
CO2: 24 MMOL/L (ref 22–29)
CREAT SERPL-MCNC: 0.8 MG/DL (ref 0.5–1)
GFR AFRICAN AMERICAN: >60
GFR NON-AFRICAN AMERICAN: >60 ML/MIN/1.73
GLUCOSE BLD-MCNC: 114 MG/DL (ref 74–109)
POTASSIUM SERPL-SCNC: 4 MMOL/L (ref 3.5–5)
SODIUM BLD-SCNC: 137 MMOL/L (ref 132–146)

## 2018-03-22 PROCEDURE — 2700000000 HC OXYGEN THERAPY PER DAY

## 2018-03-22 PROCEDURE — 80048 BASIC METABOLIC PNL TOTAL CA: CPT

## 2018-03-22 PROCEDURE — 6360000004 HC RX CONTRAST MEDICATION: Performed by: RADIOLOGY

## 2018-03-22 PROCEDURE — 94640 AIRWAY INHALATION TREATMENT: CPT

## 2018-03-22 PROCEDURE — 6370000000 HC RX 637 (ALT 250 FOR IP): Performed by: INTERNAL MEDICINE

## 2018-03-22 PROCEDURE — A0425 GROUND MILEAGE: HCPCS

## 2018-03-22 PROCEDURE — A9579 GAD-BASE MR CONTRAST NOS,1ML: HCPCS | Performed by: RADIOLOGY

## 2018-03-22 PROCEDURE — 74182 MRI ABDOMEN W/CONTRAST: CPT

## 2018-03-22 PROCEDURE — 36415 COLL VENOUS BLD VENIPUNCTURE: CPT

## 2018-03-22 PROCEDURE — 6360000002 HC RX W HCPCS: Performed by: FAMILY MEDICINE

## 2018-03-22 PROCEDURE — A0428 BLS: HCPCS

## 2018-03-22 PROCEDURE — 6370000000 HC RX 637 (ALT 250 FOR IP): Performed by: FAMILY MEDICINE

## 2018-03-22 PROCEDURE — 2580000003 HC RX 258: Performed by: FAMILY MEDICINE

## 2018-03-22 RX ORDER — FUROSEMIDE 40 MG/1
40 TABLET ORAL DAILY
Qty: 60 TABLET | Refills: 3 | Status: SHIPPED | OUTPATIENT
Start: 2018-03-22 | End: 2018-10-05 | Stop reason: SDUPTHER

## 2018-03-22 RX ORDER — OXYCODONE HYDROCHLORIDE AND ACETAMINOPHEN 5; 325 MG/1; MG/1
1 TABLET ORAL EVERY 4 HOURS PRN
Qty: 10 TABLET | Refills: 0 | Status: SHIPPED | OUTPATIENT
Start: 2018-03-22 | End: 2018-03-29

## 2018-03-22 RX ORDER — POTASSIUM CHLORIDE 20 MEQ/1
20 TABLET, EXTENDED RELEASE ORAL
Qty: 60 TABLET | Refills: 3 | Status: SHIPPED | OUTPATIENT
Start: 2018-03-23 | End: 2019-12-30 | Stop reason: SDUPTHER

## 2018-03-22 RX ORDER — HYDRALAZINE HYDROCHLORIDE 25 MG/1
25 TABLET, FILM COATED ORAL EVERY 8 HOURS SCHEDULED
Qty: 90 TABLET | Refills: 3 | Status: SHIPPED | OUTPATIENT
Start: 2018-03-22 | End: 2019-09-17 | Stop reason: SDUPTHER

## 2018-03-22 RX ADMIN — OXYCODONE HYDROCHLORIDE AND ACETAMINOPHEN 1 TABLET: 5; 325 TABLET ORAL at 10:29

## 2018-03-22 RX ADMIN — IPRATROPIUM BROMIDE AND ALBUTEROL SULFATE 1 AMPULE: 2.5; .5 SOLUTION RESPIRATORY (INHALATION) at 13:42

## 2018-03-22 RX ADMIN — LISINOPRIL 10 MG: 10 TABLET ORAL at 08:09

## 2018-03-22 RX ADMIN — AMLODIPINE BESYLATE 10 MG: 10 TABLET ORAL at 08:09

## 2018-03-22 RX ADMIN — FLUTICASONE PROPIONATE 1 SPRAY: 50 SPRAY, METERED NASAL at 08:10

## 2018-03-22 RX ADMIN — ACETAMINOPHEN 650 MG: 325 TABLET, FILM COATED ORAL at 13:14

## 2018-03-22 RX ADMIN — OXYCODONE HYDROCHLORIDE AND ACETAMINOPHEN 1 TABLET: 5; 325 TABLET ORAL at 05:42

## 2018-03-22 RX ADMIN — ACETAMINOPHEN 650 MG: 325 TABLET, FILM COATED ORAL at 08:09

## 2018-03-22 RX ADMIN — PANTOPRAZOLE SODIUM 20 MG: 20 TABLET, DELAYED RELEASE ORAL at 06:21

## 2018-03-22 RX ADMIN — Medication 10 ML: at 08:10

## 2018-03-22 RX ADMIN — OXYCODONE HYDROCHLORIDE AND ACETAMINOPHEN 1 TABLET: 5; 325 TABLET ORAL at 14:40

## 2018-03-22 RX ADMIN — HYDROCHLOROTHIAZIDE 12.5 MG: 12.5 TABLET ORAL at 08:09

## 2018-03-22 RX ADMIN — TIZANIDINE 4 MG: 4 TABLET ORAL at 08:09

## 2018-03-22 RX ADMIN — POTASSIUM CHLORIDE 20 MEQ: 20 TABLET, EXTENDED RELEASE ORAL at 08:09

## 2018-03-22 RX ADMIN — ENOXAPARIN SODIUM 40 MG: 40 INJECTION SUBCUTANEOUS at 08:09

## 2018-03-22 RX ADMIN — GADOVERSETAMIDE 15 ML: 0.5 INJECTION, SOLUTION INTRAVENOUS at 15:28

## 2018-03-22 RX ADMIN — CETIRIZINE HYDROCHLORIDE 10 MG: 10 TABLET, FILM COATED ORAL at 08:09

## 2018-03-22 RX ADMIN — FUROSEMIDE 40 MG: 10 INJECTION, SOLUTION INTRAMUSCULAR; INTRAVENOUS at 08:09

## 2018-03-22 RX ADMIN — HYDRALAZINE HYDROCHLORIDE 25 MG: 25 TABLET, FILM COATED ORAL at 06:21

## 2018-03-22 RX ADMIN — HYDRALAZINE HYDROCHLORIDE 25 MG: 25 TABLET, FILM COATED ORAL at 13:14

## 2018-03-22 RX ADMIN — TIZANIDINE 4 MG: 4 TABLET ORAL at 13:14

## 2018-03-22 RX ADMIN — OXYCODONE HYDROCHLORIDE AND ACETAMINOPHEN 1 TABLET: 5; 325 TABLET ORAL at 01:08

## 2018-03-22 ASSESSMENT — PAIN DESCRIPTION - FREQUENCY
FREQUENCY: INTERMITTENT
FREQUENCY: INTERMITTENT

## 2018-03-22 ASSESSMENT — PAIN DESCRIPTION - PROGRESSION
CLINICAL_PROGRESSION: NOT CHANGED
CLINICAL_PROGRESSION: NOT CHANGED

## 2018-03-22 ASSESSMENT — PAIN DESCRIPTION - PAIN TYPE
TYPE: CHRONIC PAIN
TYPE: CHRONIC PAIN

## 2018-03-22 ASSESSMENT — PAIN SCALES - GENERAL
PAINLEVEL_OUTOF10: 10
PAINLEVEL_OUTOF10: 7
PAINLEVEL_OUTOF10: 10
PAINLEVEL_OUTOF10: 6
PAINLEVEL_OUTOF10: 10
PAINLEVEL_OUTOF10: 9

## 2018-03-22 ASSESSMENT — PAIN DESCRIPTION - ONSET
ONSET: ON-GOING
ONSET: ON-GOING

## 2018-03-22 ASSESSMENT — PAIN DESCRIPTION - LOCATION
LOCATION: BACK;HIP
LOCATION: BACK;HIP

## 2018-03-22 ASSESSMENT — PAIN DESCRIPTION - ORIENTATION
ORIENTATION: RIGHT;LEFT
ORIENTATION: RIGHT;LEFT

## 2018-03-22 ASSESSMENT — PAIN DESCRIPTION - DESCRIPTORS
DESCRIPTORS: ACHING;DISCOMFORT
DESCRIPTORS: ACHING;DISCOMFORT;SORE

## 2018-03-22 NOTE — PLAN OF CARE
Problem: Pain:  Goal: Control of acute pain  Control of acute pain   Outcome: Met This Shift
Home Instructions in her discharge instructions. Dave MCGRAWN, RN, CHFN      CONGESTIVE HEART FAILURE (CHF) GUIDELINES  (Must be completed for Primary Diagnosis CHF or History of CHF)    Type of CHF:    [] Acute   [] Chronic     [] Acute on Chronic     Ventricular Function Assessment (check):             [] HFpEF  [] HFpEF, borderline  [] HFpEF, improved  [] HFrEF  Ejection Fraction (%):       Echo Pending                                                                 Angiotensin-Converting-Enzyme (ACE) inhibitor ordered:  [] Yes  [] No (specify contraindication):  [] Renal Insufficiency  [] Cough  [] Hypotension  [] Allergy/angioedema  [] No left ventricular systolic dysfunction (LVSD)  [] Hyperkalemia  [] Moderate to severe aortic stenosis  [] Other (Specify): Angiotensin II receptor blockers (ARB) ordered:  [] Yes  [] No (specify contraindication):  [] Renal Insufficiency  [] Hypotension  [] Allergy/angioedema  [] No LVSD  [] Hyperkalemia  [] Moderate to severe aortic stenosis  [] Other (Specify):      ARNI - Angiotensin Receptor Neprilysin Inhibitor ordered:  [] Yes  [] No      ACC/AHA Guidelines Beta Blocker (Carvedilol, Metoprolol Succinate, or Bisoprolol) ordered:    [] Yes  [] No (specify contraindication):  [] Bradycardia  [] Hypotension  [] LVD  [] 2nd or 3rd degree heart block  [] Bronchospastic airway disease  [] Decompensated CHF  [] Other (Specify):      Reminder: Discharge Instructions: activity, daily weights, diet, S/S, discharge medications, follow-up visit & when to call the doctor.

## 2018-03-22 NOTE — PROGRESS NOTES
auscultation, bilaterally without Rales/Wheezes/Rhonchi. Cardiovascular: Regular rate and rhythm with normal S1/S2 without murmurs, rubs or gallops. Abdomen: Soft, non-tender, non-distended with normal bowel sounds. Musculoskeletal: No clubbing, cyanosis or edema bilaterally. Full range of motion without deformity. Skin: Skin color, texture, turgor normal.  No rashes or lesions. Neurologic:  Neurovascularly intact without any focal sensory/motor deficits. Cranial nerves: II-XII intact, grossly non-focal.  Psychiatric: Alert and oriented, thought content appropriate, normal insight    Labs:   Recent Labs      03/20/18   1655  03/21/18   0420   WBC  10.1  9.9   HGB  13.9  14.9   HCT  43.3  46.3   PLT  329  348     Recent Labs      03/20/18   2352  03/21/18   0420  03/22/18   0623   NA  141  140  137   K  3.2*  4.0  4.0   CL  100  99  96*   CO2  26  25  24   BUN  14  15  20   CREATININE  1.0  0.9  0.8   CALCIUM  8.9  9.5  9.4     Recent Labs      03/20/18   1655   AST  38*   ALT  25   BILITOT  0.4   ALKPHOS  88     No results for input(s): INR in the last 72 hours.   Recent Labs      03/20/18   2352  03/21/18   0420   TROPONINI  0.02  0.01       Assessment/Plan:    Active Hospital Problems    Diagnosis Date Noted    Hypertensive urgency [I16.0] 03/20/2018    [facility] [J81.0] 03/20/2018    Pain of both hip joints [M25.551, M25.552] 03/20/2018    Irregular heartbeat [I49.9] 03/20/2018    Frequent PVCs [I49.3] 03/20/2018    Asthma exacerbation [J45.901] 03/20/2018    Morbid obesity with BMI of 50.0-59.9, adult ([facility] [E66.01, Z68.43] 03/20/2018    Hyperkalemia [E87.5] 03/20/2018    Tobacco dependence [F17.200] 01/14/2018   RHINO/enterovirus URI    ECHO pending, stress test negative; continue diuresis  Symptomatic management for URI  MRI abdomen in am; will likely need open MRI due to abdominal girth; if reassuring, pt may discharge home today   Added hydralazine to antihypertensive

## 2018-03-22 NOTE — DISCHARGE SUMMARY
appearance: No apparent distress, appears stated age and cooperative. Obese. Appears older than stated age   [de-identified]: Pupils equal, round, and reactive to light. Conjunctivae/corneas clear. Neck: Supple, with full range of motion. No jugular venous distention. Trachea midline. Respiratory:  Normal respiratory effort. Clear to auscultation, bilaterally without Rales/Wheezes/Rhonchi. Cardiovascular: Regular rate and rhythm with normal S1/S2 without murmurs, rubs or gallops. Abdomen: Soft, non-tender, non-distended with normal bowel sounds. Musculoskeletal: No clubbing, cyanosis or edema bilaterally. Full range of motion without deformity. Skin: Skin color, texture, turgor normal.  No rashes or lesions. Neurologic:  Neurovascularly intact without any focal sensory/motor deficits. Cranial nerves: II-XII intact, grossly non-focal.  Psychiatric: Alert and oriented, thought content appropriate, normal insight      Consults:     IP CONSULT TO INTERNAL MEDICINE  IP CONSULT TO HEART FAILURE NURSE/COORDINATOR  IP CONSULT TO DIETITIAN    Significant Diagnostic Studies:     ECHo:   Normal left ventricle size and systolic function.   Ejection fraction is visually estimated at 50-55%. Frequent may affect the   evaluation of LV systolic function.   Mild concentric left ventricular hypertrophy.   Indeterminate diastolic function.   Mildly dilated right ventricle.   Right ventricle global systolic function is normal .   No significant valvular abnormalities.   Unable to estimate PA systolic pressure.   No evidence for hemodynamically significant pericardial effusion.   No previous echo for comparison. Stress test: negative      Disposition:  home     Discharge Instructions/Follow-up:  Keep scheduled follow up appointments. Take medications as prescribed     Code Status:  Full Code     Activity: activity as tolerated    Diet: cardiac diet    Labs:  For convenience and continuity at follow-up the following most recent labs are provided:      CBC:    Lab Results   Component Value Date    WBC 9.9 03/21/2018    HGB 14.9 03/21/2018    HCT 46.3 03/21/2018     03/21/2018       Renal:    Lab Results   Component Value Date     03/22/2018    K 4.0 03/22/2018    K 4.4 01/14/2018    CL 96 03/22/2018    CO2 24 03/22/2018    BUN 20 03/22/2018    CREATININE 0.8 03/22/2018    CALCIUM 9.4 03/22/2018       Discharge Medications:     Current Discharge Medication List           Details   oxyCODONE-acetaminophen (PERCOCET) 5-325 MG per tablet Take 1 tablet by mouth every 4 hours as needed for Pain for up to 7 days.   Qty: 10 tablet, Refills: 0    Associated Diagnoses: Myalgia      hydrALAZINE (APRESOLINE) 25 MG tablet Take 1 tablet by mouth every 8 hours  Qty: 90 tablet, Refills: 3      potassium chloride (KLOR-CON M) 20 MEQ extended release tablet Take 1 tablet by mouth daily (with breakfast)  Qty: 60 tablet, Refills: 3      furosemide (LASIX) 40 MG tablet Take 1 tablet by mouth daily  Qty: 60 tablet, Refills: 3              Details   diclofenac (VOLTAREN) 75 MG EC tablet Take 1 tablet by mouth 3 times daily  Qty: 30 tablet, Refills: 1    Associated Diagnoses: Acute bilateral low back pain without sciatica      albuterol sulfate HFA (PROAIR HFA) 108 (90 Base) MCG/ACT inhaler Inhale 2 puffs into the lungs every 6 hours as needed for Wheezing  Qty: 1 Inhaler, Refills: 3    Associated Diagnoses: Mild intermittent asthma without complication      lisinopril-hydrochlorothiazide (PRINZIDE;ZESTORETIC) 10-12.5 MG per tablet Take 1 tablet by mouth daily  Qty: 30 tablet, Refills: 0    Associated Diagnoses: Essential hypertension      amLODIPine (NORVASC) 10 MG tablet Take 1 tablet by mouth daily  Qty: 30 tablet, Refills: 3      ondansetron (ZOFRAN) 4 MG tablet Take 4 mg by mouth every 8 hours as needed for Nausea or Vomiting Take morning of surgery with a sip of water if needed      loratadine (CLARITIN) 10 MG capsule Take 1 capsule by mouth

## 2018-03-27 ENCOUNTER — OFFICE VISIT (OUTPATIENT)
Dept: FAMILY MEDICINE CLINIC | Age: 43
End: 2018-03-27

## 2018-03-27 ENCOUNTER — HOSPITAL ENCOUNTER (OUTPATIENT)
Age: 43
Discharge: HOME | End: 2018-03-29

## 2018-03-27 VITALS
SYSTOLIC BLOOD PRESSURE: 138 MMHG | TEMPERATURE: 99.2 F | DIASTOLIC BLOOD PRESSURE: 76 MMHG | RESPIRATION RATE: 18 BRPM | WEIGHT: 293 LBS | BODY MASS INDEX: 54.54 KG/M2 | HEART RATE: 108 BPM | OXYGEN SATURATION: 95 %

## 2018-03-27 DIAGNOSIS — M16.0 PRIMARY OSTEOARTHRITIS OF BOTH HIPS: ICD-10-CM

## 2018-03-27 DIAGNOSIS — J81.0 ACUTE PULMONARY EDEMA (HCC): Primary | ICD-10-CM

## 2018-03-27 DIAGNOSIS — J81.0 ACUTE PULMONARY EDEMA (HCC): ICD-10-CM

## 2018-03-27 DIAGNOSIS — I10 ESSENTIAL HYPERTENSION: ICD-10-CM

## 2018-03-27 DIAGNOSIS — J45.20 MILD INTERMITTENT ASTHMA WITHOUT COMPLICATION: ICD-10-CM

## 2018-03-27 DIAGNOSIS — M51.9 LUMBAR DISC DISEASE: ICD-10-CM

## 2018-03-27 PROBLEM — I16.0 HYPERTENSIVE URGENCY: Status: RESOLVED | Noted: 2018-03-20 | Resolved: 2018-03-27

## 2018-03-27 PROCEDURE — 1111F DSCHRG MED/CURRENT MED MERGE: CPT | Performed by: PHYSICIAN ASSISTANT

## 2018-03-27 PROCEDURE — 80053 COMPREHEN METABOLIC PANEL: CPT

## 2018-03-27 PROCEDURE — 99214 OFFICE O/P EST MOD 30 MIN: CPT | Performed by: PHYSICIAN ASSISTANT

## 2018-03-27 RX ORDER — GABAPENTIN 300 MG/1
300 CAPSULE ORAL 3 TIMES DAILY
Qty: 90 CAPSULE | Refills: 1 | Status: SHIPPED | OUTPATIENT
Start: 2018-03-27 | End: 2018-04-24

## 2018-03-27 NOTE — PROGRESS NOTES
99.2 °F (37.3 °C)   TempSrc: Oral   SpO2: 95%   Weight: (!) 364 lb (165.1 kg)     Body mass index is 54.54 kg/m². Wt Readings from Last 3 Encounters:   03/27/18 (!) 364 lb (165.1 kg)   03/22/18 (!) 359 lb 8 oz (163.1 kg)   03/16/18 (!) 371 lb 6.4 oz (168.5 kg)     BP Readings from Last 3 Encounters:   03/27/18 138/76   03/22/18 (!) 190/104   03/16/18 (!) 158/84        Inpatient course: Discharge summary reviewed- see chart. Chief Complaint   Patient presents with    Follow-Up from Hospital       Pt presents for hospital f/u for Acute pulmonary edema precipitated by hypertensive urgency. bp control was obtained with the addition of hydralazine and lasix. She also was having a asthma exacerbation tx with corticosteroid and nebs. As she continues to complain of back and hip pain, xrays were done revealing disc space narrowing and OA of hips. She was given percocet. She continues to have some dyspnea, but much improved. Her albuterol helps. Review of Systems   All other systems reviewed and are negative. Non face to face  following discharge, date last encounter closed (first attempt may have been earlier): *No documented post hospital discharge outreach found in the last 14 days *No documented post hospital discharge outreach found in the last 14 days    Call initiated 2 business days of discharge: *No response recorded in the last 14 days         Physical Exam   Constitutional: She is oriented to person, place, and time. She appears well-developed and well-nourished. No distress. HENT:   Head: Normocephalic and atraumatic. Right Ear: External ear normal.   Left Ear: External ear normal.   Nose: Nose normal.   Mouth/Throat: Oropharynx is clear and moist.   Eyes: Conjunctivae and EOM are normal. Pupils are equal, round, and reactive to light. No scleral icterus. Neck: Normal range of motion. Neck supple. No thyromegaly present.    Cardiovascular: Normal rate, regular rhythm, normal heart sounds

## 2018-03-28 LAB
ALBUMIN SERPL-MCNC: 3.7 G/DL (ref 3.5–5.2)
ALP BLD-CCNC: 86 U/L (ref 35–104)
ALT SERPL-CCNC: 21 U/L (ref 0–32)
ANION GAP SERPL CALCULATED.3IONS-SCNC: 18 MMOL/L (ref 7–16)
AST SERPL-CCNC: 27 U/L (ref 0–31)
BILIRUB SERPL-MCNC: 0.2 MG/DL (ref 0–1.2)
BUN BLDV-MCNC: 14 MG/DL (ref 6–20)
CALCIUM SERPL-MCNC: 9.7 MG/DL (ref 8.6–10.2)
CHLORIDE BLD-SCNC: 96 MMOL/L (ref 98–107)
CO2: 26 MMOL/L (ref 22–29)
CREAT SERPL-MCNC: 0.8 MG/DL (ref 0.5–1)
GFR AFRICAN AMERICAN: >60
GFR NON-AFRICAN AMERICAN: >60 ML/MIN/1.73
GLUCOSE BLD-MCNC: 91 MG/DL (ref 74–109)
POTASSIUM SERPL-SCNC: 4.3 MMOL/L (ref 3.5–5)
SODIUM BLD-SCNC: 140 MMOL/L (ref 132–146)
TOTAL PROTEIN: 7.7 G/DL (ref 6.4–8.3)

## 2018-04-03 ENCOUNTER — OFFICE VISIT (OUTPATIENT)
Dept: FAMILY MEDICINE CLINIC | Age: 43
End: 2018-04-03
Payer: MEDICARE

## 2018-04-03 VITALS
DIASTOLIC BLOOD PRESSURE: 84 MMHG | HEIGHT: 69 IN | HEART RATE: 93 BPM | SYSTOLIC BLOOD PRESSURE: 134 MMHG | WEIGHT: 293 LBS | TEMPERATURE: 98.5 F | OXYGEN SATURATION: 97 % | BODY MASS INDEX: 43.4 KG/M2

## 2018-04-03 DIAGNOSIS — G89.29 CHRONIC BILATERAL LOW BACK PAIN WITHOUT SCIATICA: ICD-10-CM

## 2018-04-03 DIAGNOSIS — M54.50 CHRONIC BILATERAL LOW BACK PAIN WITHOUT SCIATICA: ICD-10-CM

## 2018-04-03 DIAGNOSIS — I10 ESSENTIAL HYPERTENSION: ICD-10-CM

## 2018-04-03 DIAGNOSIS — M25.552 PAIN OF BOTH HIP JOINTS: ICD-10-CM

## 2018-04-03 DIAGNOSIS — M25.551 PAIN OF BOTH HIP JOINTS: ICD-10-CM

## 2018-04-03 DIAGNOSIS — J45.20 MILD INTERMITTENT ASTHMA WITHOUT COMPLICATION: Primary | ICD-10-CM

## 2018-04-03 DIAGNOSIS — Z23 NEED FOR PNEUMOCOCCAL VACCINATION: ICD-10-CM

## 2018-04-03 PROCEDURE — 99214 OFFICE O/P EST MOD 30 MIN: CPT | Performed by: PHYSICIAN ASSISTANT

## 2018-04-03 PROCEDURE — 90471 IMMUNIZATION ADMIN: CPT | Performed by: PHYSICIAN ASSISTANT

## 2018-04-03 PROCEDURE — 90732 PPSV23 VACC 2 YRS+ SUBQ/IM: CPT | Performed by: PHYSICIAN ASSISTANT

## 2018-04-03 RX ORDER — CLINDAMYCIN HYDROCHLORIDE 300 MG/1
1 CAPSULE ORAL DAILY
Refills: 0 | COMMUNITY
Start: 2018-03-31 | End: 2018-04-10

## 2018-04-03 RX ORDER — ACETAMINOPHEN AND CODEINE PHOSPHATE 300; 30 MG/1; MG/1
1 TABLET ORAL 3 TIMES DAILY PRN
Refills: 0 | COMMUNITY
Start: 2018-03-31 | End: 2018-06-13 | Stop reason: ALTCHOICE

## 2018-04-12 ENCOUNTER — HOSPITAL ENCOUNTER (OUTPATIENT)
Dept: MRI IMAGING | Age: 43
Discharge: HOME | End: 2018-04-14
Payer: MEDICARE

## 2018-04-12 DIAGNOSIS — M51.9 LUMBAR DISC DISEASE: ICD-10-CM

## 2018-04-12 PROCEDURE — 72148 MRI LUMBAR SPINE W/O DYE: CPT

## 2018-04-13 ENCOUNTER — HOSPITAL ENCOUNTER (OUTPATIENT)
Dept: PHYSICAL THERAPY | Age: 43
Setting detail: THERAPIES SERIES
Discharge: HOME | End: 2018-04-13
Payer: MEDICARE

## 2018-04-13 DIAGNOSIS — G83.4 CAUDA EQUINA COMPRESSION (HCC): ICD-10-CM

## 2018-04-13 DIAGNOSIS — M51.9 LUMBAR DISC DISEASE: Primary | ICD-10-CM

## 2018-04-13 DIAGNOSIS — M16.0 PRIMARY OSTEOARTHRITIS OF BOTH HIPS: ICD-10-CM

## 2018-04-13 DIAGNOSIS — M48.061 SPINAL STENOSIS OF LUMBAR REGION AT MULTIPLE LEVELS: Primary | ICD-10-CM

## 2018-04-16 ENCOUNTER — TELEPHONE (OUTPATIENT)
Dept: ADMINISTRATIVE | Age: 43
End: 2018-04-16

## 2018-04-23 ENCOUNTER — TELEPHONE (OUTPATIENT)
Dept: FAMILY MEDICINE CLINIC | Age: 43
End: 2018-04-23

## 2018-04-24 ENCOUNTER — HOSPITAL ENCOUNTER (OUTPATIENT)
Age: 43
Discharge: HOME | End: 2018-04-26
Payer: MEDICARE

## 2018-04-24 ENCOUNTER — OFFICE VISIT (OUTPATIENT)
Dept: ORTHOPEDIC SURGERY | Age: 43
End: 2018-04-24
Payer: MEDICARE

## 2018-04-24 ENCOUNTER — OFFICE VISIT (OUTPATIENT)
Dept: FAMILY MEDICINE CLINIC | Age: 43
End: 2018-04-24
Payer: MEDICARE

## 2018-04-24 VITALS
BODY MASS INDEX: 43.4 KG/M2 | RESPIRATION RATE: 20 BRPM | HEIGHT: 69 IN | TEMPERATURE: 97.6 F | HEART RATE: 90 BPM | SYSTOLIC BLOOD PRESSURE: 152 MMHG | DIASTOLIC BLOOD PRESSURE: 93 MMHG | WEIGHT: 293 LBS

## 2018-04-24 VITALS
SYSTOLIC BLOOD PRESSURE: 130 MMHG | RESPIRATION RATE: 16 BRPM | BODY MASS INDEX: 57.09 KG/M2 | OXYGEN SATURATION: 96 % | HEART RATE: 103 BPM | WEIGHT: 293 LBS | DIASTOLIC BLOOD PRESSURE: 80 MMHG | TEMPERATURE: 98 F

## 2018-04-24 DIAGNOSIS — G89.4 CHRONIC PAIN SYNDROME: ICD-10-CM

## 2018-04-24 DIAGNOSIS — M48.061 SPINAL STENOSIS OF LUMBAR REGION AT MULTIPLE LEVELS: ICD-10-CM

## 2018-04-24 DIAGNOSIS — M48.061 SPINAL STENOSIS OF LUMBAR REGION AT MULTIPLE LEVELS: Primary | ICD-10-CM

## 2018-04-24 DIAGNOSIS — M25.552 PAIN OF BOTH HIP JOINTS: ICD-10-CM

## 2018-04-24 DIAGNOSIS — E66.01 MORBID OBESITY WITH BMI OF 50.0-59.9, ADULT (HCC): Primary | ICD-10-CM

## 2018-04-24 DIAGNOSIS — M16.0 PRIMARY OSTEOARTHRITIS OF BOTH HIPS: ICD-10-CM

## 2018-04-24 DIAGNOSIS — M25.551 PAIN OF BOTH HIP JOINTS: ICD-10-CM

## 2018-04-24 LAB
AMPHETAMINE SCREEN, URINE: NOT DETECTED
BARBITURATE SCREEN URINE: NOT DETECTED
BENZODIAZEPINE SCREEN, URINE: NOT DETECTED
CANNABINOID SCREEN URINE: NOT DETECTED
COCAINE METABOLITE SCREEN URINE: NOT DETECTED
METHADONE SCREEN, URINE: NOT DETECTED
OPIATE SCREEN URINE: POSITIVE
PHENCYCLIDINE SCREEN URINE: NOT DETECTED
PROPOXYPHENE SCREEN: NOT DETECTED

## 2018-04-24 PROCEDURE — 4004F PT TOBACCO SCREEN RCVD TLK: CPT | Performed by: PHYSICIAN ASSISTANT

## 2018-04-24 PROCEDURE — 99203 OFFICE O/P NEW LOW 30 MIN: CPT

## 2018-04-24 PROCEDURE — 4004F PT TOBACCO SCREEN RCVD TLK: CPT | Performed by: ORTHOPAEDIC SURGERY

## 2018-04-24 PROCEDURE — G8427 DOCREV CUR MEDS BY ELIG CLIN: HCPCS | Performed by: ORTHOPAEDIC SURGERY

## 2018-04-24 PROCEDURE — G8427 DOCREV CUR MEDS BY ELIG CLIN: HCPCS | Performed by: PHYSICIAN ASSISTANT

## 2018-04-24 PROCEDURE — G0480 DRUG TEST DEF 1-7 CLASSES: HCPCS

## 2018-04-24 PROCEDURE — 99214 OFFICE O/P EST MOD 30 MIN: CPT | Performed by: PHYSICIAN ASSISTANT

## 2018-04-24 PROCEDURE — G8417 CALC BMI ABV UP PARAM F/U: HCPCS | Performed by: PHYSICIAN ASSISTANT

## 2018-04-24 PROCEDURE — G8417 CALC BMI ABV UP PARAM F/U: HCPCS | Performed by: ORTHOPAEDIC SURGERY

## 2018-04-24 PROCEDURE — 80307 DRUG TEST PRSMV CHEM ANLYZR: CPT

## 2018-04-24 PROCEDURE — 99203 OFFICE O/P NEW LOW 30 MIN: CPT | Performed by: ORTHOPAEDIC SURGERY

## 2018-04-24 RX ORDER — HYDROCODONE BITARTRATE AND ACETAMINOPHEN 7.5; 325 MG/1; MG/1
1 TABLET ORAL 2 TIMES DAILY
Qty: 60 TABLET | Refills: 0 | Status: SHIPPED | OUTPATIENT
Start: 2018-04-24 | End: 2018-05-07 | Stop reason: SDUPTHER

## 2018-04-24 RX ORDER — GABAPENTIN 600 MG/1
600 TABLET ORAL 3 TIMES DAILY
Qty: 90 TABLET | Refills: 3 | Status: SHIPPED | OUTPATIENT
Start: 2018-04-24 | End: 2018-05-07 | Stop reason: SDUPTHER

## 2018-04-25 DIAGNOSIS — M54.50 ACUTE BILATERAL LOW BACK PAIN WITHOUT SCIATICA: ICD-10-CM

## 2018-04-25 DIAGNOSIS — J30.2 ACUTE SEASONAL ALLERGIC RHINITIS, UNSPECIFIED TRIGGER: ICD-10-CM

## 2018-04-25 RX ORDER — LORATADINE 10 MG/1
10 CAPSULE, LIQUID FILLED ORAL DAILY
Qty: 30 CAPSULE | Refills: 3 | Status: SHIPPED | OUTPATIENT
Start: 2018-04-25 | End: 2018-07-02 | Stop reason: SDUPTHER

## 2018-04-25 RX ORDER — TIZANIDINE 4 MG/1
4 TABLET ORAL 3 TIMES DAILY
Qty: 30 TABLET | Refills: 1 | Status: ON HOLD | OUTPATIENT
Start: 2018-04-25 | End: 2019-02-17 | Stop reason: HOSPADM

## 2018-04-27 LAB
6AM URINE: <10 NG/ML
CODEINE, URINE: >4000 NG/ML
HYDROCODONE, URINE: 41 NG/ML
HYDROMORPHONE, URINE: <20 NG/ML
MORPHINE URINE: 67 NG/ML
NORHYDROCODONE, URINE: <20 NG/ML
NOROXYCODONE, URINE: <20 NG/ML
NOROXYMORPHONE, URINE: <20 NG/ML
OXYCODONE, URINE CONFIRMATION: <20 NG/ML
OXYMORPHONE, URINE: <20 NG/ML

## 2018-04-30 ENCOUNTER — HOSPITAL ENCOUNTER (OUTPATIENT)
Age: 43
Discharge: HOME | End: 2018-05-02
Payer: MEDICARE

## 2018-04-30 ENCOUNTER — OFFICE VISIT (OUTPATIENT)
Dept: FAMILY MEDICINE CLINIC | Age: 43
End: 2018-04-30
Payer: MEDICARE

## 2018-04-30 VITALS
TEMPERATURE: 97.8 F | OXYGEN SATURATION: 97 % | BODY MASS INDEX: 43.4 KG/M2 | DIASTOLIC BLOOD PRESSURE: 88 MMHG | WEIGHT: 293 LBS | HEART RATE: 91 BPM | HEIGHT: 69 IN | SYSTOLIC BLOOD PRESSURE: 136 MMHG

## 2018-04-30 DIAGNOSIS — Z12.4 CERVICAL CANCER SCREENING: Primary | ICD-10-CM

## 2018-04-30 DIAGNOSIS — Z12.31 ENCOUNTER FOR MAMMOGRAM TO ESTABLISH BASELINE MAMMOGRAM: ICD-10-CM

## 2018-04-30 DIAGNOSIS — L02.91 ABSCESS: ICD-10-CM

## 2018-04-30 PROCEDURE — 99213 OFFICE O/P EST LOW 20 MIN: CPT | Performed by: PHYSICIAN ASSISTANT

## 2018-04-30 PROCEDURE — 88175 CYTOPATH C/V AUTO FLUID REDO: CPT

## 2018-04-30 PROCEDURE — G8417 CALC BMI ABV UP PARAM F/U: HCPCS | Performed by: PHYSICIAN ASSISTANT

## 2018-04-30 PROCEDURE — 87070 CULTURE OTHR SPECIMN AEROBIC: CPT

## 2018-04-30 PROCEDURE — 4004F PT TOBACCO SCREEN RCVD TLK: CPT | Performed by: PHYSICIAN ASSISTANT

## 2018-04-30 PROCEDURE — G8427 DOCREV CUR MEDS BY ELIG CLIN: HCPCS | Performed by: PHYSICIAN ASSISTANT

## 2018-04-30 RX ORDER — SULFAMETHOXAZOLE AND TRIMETHOPRIM 800; 160 MG/1; MG/1
1 TABLET ORAL 2 TIMES DAILY
Qty: 20 TABLET | Refills: 0 | Status: SHIPPED | OUTPATIENT
Start: 2018-04-30 | End: 2018-05-10

## 2018-05-02 LAB
ORGANISM: ABNORMAL
ORGANISM: ABNORMAL
WOUND/ABSCESS: ABNORMAL

## 2018-05-09 ENCOUNTER — TELEPHONE (OUTPATIENT)
Dept: FAMILY MEDICINE CLINIC | Age: 43
End: 2018-05-09

## 2018-05-09 ENCOUNTER — HOSPITAL ENCOUNTER (OUTPATIENT)
Dept: GENERAL RADIOLOGY | Age: 43
Discharge: HOME | End: 2018-05-11
Payer: MEDICARE

## 2018-05-09 DIAGNOSIS — Z12.31 ENCOUNTER FOR MAMMOGRAM TO ESTABLISH BASELINE MAMMOGRAM: ICD-10-CM

## 2018-05-09 PROCEDURE — 77063 BREAST TOMOSYNTHESIS BI: CPT

## 2018-05-15 ENCOUNTER — HOSPITAL ENCOUNTER (OUTPATIENT)
Dept: NEUROLOGY | Age: 43
Discharge: HOME | End: 2018-05-15
Payer: MEDICARE

## 2018-05-15 PROCEDURE — 95911 NRV CNDJ TEST 9-10 STUDIES: CPT

## 2018-05-15 PROCEDURE — 95886 MUSC TEST DONE W/N TEST COMP: CPT

## 2018-06-13 ENCOUNTER — INITIAL CONSULT (OUTPATIENT)
Dept: BARIATRICS/WEIGHT MGMT | Age: 43
End: 2018-06-13
Payer: MEDICARE

## 2018-06-13 VITALS
HEIGHT: 69 IN | SYSTOLIC BLOOD PRESSURE: 146 MMHG | HEART RATE: 70 BPM | BODY MASS INDEX: 43.4 KG/M2 | DIASTOLIC BLOOD PRESSURE: 60 MMHG | TEMPERATURE: 97.1 F | RESPIRATION RATE: 20 BRPM | WEIGHT: 293 LBS

## 2018-06-13 DIAGNOSIS — K21.9 GASTROESOPHAGEAL REFLUX DISEASE WITHOUT ESOPHAGITIS: ICD-10-CM

## 2018-06-13 DIAGNOSIS — E66.01 MORBID OBESITY DUE TO EXCESS CALORIES (HCC): ICD-10-CM

## 2018-06-13 DIAGNOSIS — E46 MALNUTRITION, UNSPECIFIED TYPE (HCC): Primary | ICD-10-CM

## 2018-06-13 PROCEDURE — G8427 DOCREV CUR MEDS BY ELIG CLIN: HCPCS | Performed by: SURGERY

## 2018-06-13 PROCEDURE — 99203 OFFICE O/P NEW LOW 30 MIN: CPT

## 2018-06-13 PROCEDURE — G8417 CALC BMI ABV UP PARAM F/U: HCPCS | Performed by: SURGERY

## 2018-06-13 PROCEDURE — 99204 OFFICE O/P NEW MOD 45 MIN: CPT | Performed by: SURGERY

## 2018-06-13 PROCEDURE — 4004F PT TOBACCO SCREEN RCVD TLK: CPT | Performed by: SURGERY

## 2018-06-20 ENCOUNTER — PREP FOR PROCEDURE (OUTPATIENT)
Dept: SURGERY | Age: 43
End: 2018-06-20

## 2018-06-20 RX ORDER — SODIUM CHLORIDE, SODIUM LACTATE, POTASSIUM CHLORIDE, CALCIUM CHLORIDE 600; 310; 30; 20 MG/100ML; MG/100ML; MG/100ML; MG/100ML
INJECTION, SOLUTION INTRAVENOUS CONTINUOUS
Status: CANCELLED | OUTPATIENT
Start: 2018-06-20

## 2018-06-21 ENCOUNTER — ANESTHESIA EVENT (OUTPATIENT)
Dept: ENDOSCOPY | Age: 43
End: 2018-06-21
Payer: MEDICARE

## 2018-06-22 ENCOUNTER — ANESTHESIA (OUTPATIENT)
Dept: ENDOSCOPY | Age: 43
End: 2018-06-22
Payer: MEDICARE

## 2018-06-22 ENCOUNTER — HOSPITAL ENCOUNTER (OUTPATIENT)
Age: 43
Setting detail: OUTPATIENT SURGERY
Discharge: HOME | End: 2018-06-22
Attending: SURGERY | Admitting: SURGERY
Payer: MEDICARE

## 2018-06-22 VITALS
HEART RATE: 95 BPM | BODY MASS INDEX: 57.52 KG/M2 | TEMPERATURE: 98 F | RESPIRATION RATE: 16 BRPM | HEIGHT: 60 IN | SYSTOLIC BLOOD PRESSURE: 175 MMHG | WEIGHT: 293 LBS | OXYGEN SATURATION: 93 % | DIASTOLIC BLOOD PRESSURE: 80 MMHG

## 2018-06-22 VITALS — DIASTOLIC BLOOD PRESSURE: 85 MMHG | OXYGEN SATURATION: 99 % | SYSTOLIC BLOOD PRESSURE: 154 MMHG

## 2018-06-22 LAB
ALBUMIN SERPL-MCNC: 3.5 G/DL (ref 3.5–5.2)
ALP BLD-CCNC: 97 U/L (ref 35–104)
ALT SERPL-CCNC: 21 U/L (ref 0–32)
ANION GAP SERPL CALCULATED.3IONS-SCNC: 12 MMOL/L (ref 7–16)
AST SERPL-CCNC: 14 U/L (ref 0–31)
BILIRUB SERPL-MCNC: 0.2 MG/DL (ref 0–1.2)
BUN BLDV-MCNC: 18 MG/DL (ref 6–20)
CALCIUM SERPL-MCNC: 9.2 MG/DL (ref 8.6–10.2)
CHLORIDE BLD-SCNC: 104 MMOL/L (ref 98–107)
CHOLESTEROL, TOTAL: 213 MG/DL (ref 0–199)
CO2: 27 MMOL/L (ref 22–29)
CREAT SERPL-MCNC: 0.9 MG/DL (ref 0.5–1)
FERRITIN: 71 NG/ML
FOLATE: 11.1 NG/ML (ref 4.8–24.2)
GFR AFRICAN AMERICAN: >60
GFR NON-AFRICAN AMERICAN: >60 ML/MIN/1.73
GLUCOSE FASTING: 108 MG/DL (ref 74–109)
HBA1C MFR BLD: 6.5 % (ref 4.8–5.9)
HCG(URINE) PREGNANCY TEST: NEGATIVE
HCT VFR BLD CALC: 42.4 % (ref 34–48)
HEMOGLOBIN: 13.7 G/DL (ref 11.5–15.5)
MCH RBC QN AUTO: 29.7 PG (ref 26–35)
MCHC RBC AUTO-ENTMCNC: 32.3 % (ref 32–34.5)
MCV RBC AUTO: 92 FL (ref 80–99.9)
PDW BLD-RTO: 15.2 FL (ref 11.5–15)
PLATELET # BLD: 310 E9/L (ref 130–450)
PMV BLD AUTO: 10.8 FL (ref 7–12)
POTASSIUM SERPL-SCNC: 4 MMOL/L (ref 3.5–5)
RBC # BLD: 4.61 E12/L (ref 3.5–5.5)
SODIUM BLD-SCNC: 143 MMOL/L (ref 132–146)
TOTAL PROTEIN: 7.6 G/DL (ref 6.4–8.3)
TRIGL SERPL-MCNC: 152 MG/DL (ref 0–149)
VITAMIN B-12: 1501 PG/ML (ref 211–946)
VITAMIN D 25-HYDROXY: 11 NG/ML (ref 30–100)
WBC # BLD: 12.1 E9/L (ref 4.5–11.5)

## 2018-06-22 PROCEDURE — 99024 POSTOP FOLLOW-UP VISIT: CPT | Performed by: SURGERY

## 2018-06-22 PROCEDURE — 82746 ASSAY OF FOLIC ACID SERUM: CPT

## 2018-06-22 PROCEDURE — 82728 ASSAY OF FERRITIN: CPT

## 2018-06-22 PROCEDURE — 83036 HEMOGLOBIN GLYCOSYLATED A1C: CPT

## 2018-06-22 PROCEDURE — 80053 COMPREHEN METABOLIC PANEL: CPT

## 2018-06-22 PROCEDURE — 88305 TISSUE EXAM BY PATHOLOGIST: CPT

## 2018-06-22 PROCEDURE — 43239 EGD BIOPSY SINGLE/MULTIPLE: CPT | Performed by: SURGERY

## 2018-06-22 PROCEDURE — 84630 ASSAY OF ZINC: CPT

## 2018-06-22 PROCEDURE — 7100000010 HC PHASE II RECOVERY - FIRST 15 MIN: Performed by: SURGERY

## 2018-06-22 PROCEDURE — 84425 ASSAY OF VITAMIN B-1: CPT

## 2018-06-22 PROCEDURE — 88342 IMHCHEM/IMCYTCHM 1ST ANTB: CPT

## 2018-06-22 PROCEDURE — 82607 VITAMIN B-12: CPT

## 2018-06-22 PROCEDURE — 82465 ASSAY BLD/SERUM CHOLESTEROL: CPT

## 2018-06-22 PROCEDURE — 85027 COMPLETE CBC AUTOMATED: CPT

## 2018-06-22 PROCEDURE — 84478 ASSAY OF TRIGLYCERIDES: CPT

## 2018-06-22 PROCEDURE — 3609012400 HC EGD TRANSORAL BIOPSY SINGLE/MULTIPLE: Performed by: SURGERY

## 2018-06-22 PROCEDURE — 2709999900 HC NON-CHARGEABLE SUPPLY: Performed by: SURGERY

## 2018-06-22 PROCEDURE — 81025 URINE PREGNANCY TEST: CPT

## 2018-06-22 PROCEDURE — 6360000002 HC RX W HCPCS: Performed by: NURSE ANESTHETIST, CERTIFIED REGISTERED

## 2018-06-22 PROCEDURE — 82306 VITAMIN D 25 HYDROXY: CPT

## 2018-06-22 PROCEDURE — C1773 RET DEV, INSERTABLE: HCPCS | Performed by: SURGERY

## 2018-06-22 PROCEDURE — 7100000011 HC PHASE II RECOVERY - ADDTL 15 MIN: Performed by: SURGERY

## 2018-06-22 PROCEDURE — 2580000003 HC RX 258: Performed by: NURSE ANESTHETIST, CERTIFIED REGISTERED

## 2018-06-22 PROCEDURE — 3700000000 HC ANESTHESIA ATTENDED CARE: Performed by: SURGERY

## 2018-06-22 PROCEDURE — 36415 COLL VENOUS BLD VENIPUNCTURE: CPT

## 2018-06-22 PROCEDURE — 3700000001 HC ADD 15 MINUTES (ANESTHESIA): Performed by: SURGERY

## 2018-06-22 RX ORDER — PROPOFOL 10 MG/ML
INJECTION, EMULSION INTRAVENOUS PRN
Status: DISCONTINUED | OUTPATIENT
Start: 2018-06-22 | End: 2018-06-22 | Stop reason: SDUPTHER

## 2018-06-22 RX ORDER — SODIUM CHLORIDE 9 MG/ML
INJECTION, SOLUTION INTRAVENOUS CONTINUOUS PRN
Status: DISCONTINUED | OUTPATIENT
Start: 2018-06-22 | End: 2018-06-22 | Stop reason: SDUPTHER

## 2018-06-22 RX ADMIN — SODIUM CHLORIDE: 9 INJECTION, SOLUTION INTRAVENOUS at 13:04

## 2018-06-22 RX ADMIN — PROPOFOL 200 MG: 10 INJECTION, EMULSION INTRAVENOUS at 13:15

## 2018-06-22 ASSESSMENT — PAIN DESCRIPTION - DESCRIPTORS
DESCRIPTORS: DULL;RADIATING

## 2018-06-22 ASSESSMENT — PAIN DESCRIPTION - LOCATION
LOCATION: BACK;HIP
LOCATION: BACK;HIP

## 2018-06-22 ASSESSMENT — PAIN DESCRIPTION - FREQUENCY
FREQUENCY: CONTINUOUS
FREQUENCY: CONTINUOUS

## 2018-06-22 ASSESSMENT — PAIN DESCRIPTION - PAIN TYPE
TYPE: CHRONIC PAIN
TYPE: CHRONIC PAIN

## 2018-06-22 ASSESSMENT — PAIN - FUNCTIONAL ASSESSMENT: PAIN_FUNCTIONAL_ASSESSMENT: 0-10

## 2018-06-22 ASSESSMENT — PAIN SCALES - GENERAL
PAINLEVEL_OUTOF10: 7
PAINLEVEL_OUTOF10: 7

## 2018-06-26 LAB — ZINC: 60 UG/DL (ref 60–120)

## 2018-06-28 LAB — VITAMIN B1 WHOLE BLOOD: 162 NMOL/L (ref 70–180)

## 2018-07-02 ENCOUNTER — OFFICE VISIT (OUTPATIENT)
Dept: FAMILY MEDICINE CLINIC | Age: 43
End: 2018-07-02
Payer: MEDICARE

## 2018-07-02 VITALS
DIASTOLIC BLOOD PRESSURE: 86 MMHG | TEMPERATURE: 98.9 F | HEART RATE: 94 BPM | WEIGHT: 293 LBS | RESPIRATION RATE: 16 BRPM | BODY MASS INDEX: 76.56 KG/M2 | OXYGEN SATURATION: 99 % | SYSTOLIC BLOOD PRESSURE: 138 MMHG

## 2018-07-02 DIAGNOSIS — I10 ESSENTIAL HYPERTENSION: Primary | ICD-10-CM

## 2018-07-02 DIAGNOSIS — M54.50 ACUTE BILATERAL LOW BACK PAIN WITHOUT SCIATICA: ICD-10-CM

## 2018-07-02 DIAGNOSIS — M48.061 SPINAL STENOSIS OF LUMBAR REGION AT MULTIPLE LEVELS: ICD-10-CM

## 2018-07-02 DIAGNOSIS — J30.2 ACUTE SEASONAL ALLERGIC RHINITIS, UNSPECIFIED TRIGGER: ICD-10-CM

## 2018-07-02 DIAGNOSIS — E11.9 TYPE 2 DIABETES MELLITUS WITHOUT COMPLICATION, WITHOUT LONG-TERM CURRENT USE OF INSULIN (HCC): ICD-10-CM

## 2018-07-02 DIAGNOSIS — E55.9 VITAMIN D DEFICIENCY: ICD-10-CM

## 2018-07-02 DIAGNOSIS — R53.83 FATIGUE, UNSPECIFIED TYPE: ICD-10-CM

## 2018-07-02 DIAGNOSIS — F17.200 SMOKER: ICD-10-CM

## 2018-07-02 DIAGNOSIS — E11.69 HYPERLIPIDEMIA ASSOCIATED WITH TYPE 2 DIABETES MELLITUS (HCC): ICD-10-CM

## 2018-07-02 DIAGNOSIS — F17.200 TOBACCO DEPENDENCE: ICD-10-CM

## 2018-07-02 DIAGNOSIS — R06.83 SNORING: ICD-10-CM

## 2018-07-02 DIAGNOSIS — E78.5 HYPERLIPIDEMIA ASSOCIATED WITH TYPE 2 DIABETES MELLITUS (HCC): ICD-10-CM

## 2018-07-02 PROBLEM — I49.9 IRREGULAR HEARTBEAT: Status: RESOLVED | Noted: 2018-03-20 | Resolved: 2018-07-02

## 2018-07-02 PROBLEM — Z87.09 HISTORY OF ASTHMA: Status: RESOLVED | Noted: 2018-02-04 | Resolved: 2018-07-02

## 2018-07-02 PROBLEM — N17.9 AKI (ACUTE KIDNEY INJURY) (HCC): Status: RESOLVED | Noted: 2018-01-14 | Resolved: 2018-07-02

## 2018-07-02 PROBLEM — E66.01 MORBID OBESITY WITH BMI OF 50.0-59.9, ADULT (HCC): Status: RESOLVED | Noted: 2018-03-20 | Resolved: 2018-07-02

## 2018-07-02 PROBLEM — J81.0 ACUTE PULMONARY EDEMA (HCC): Status: RESOLVED | Noted: 2018-03-20 | Resolved: 2018-07-02

## 2018-07-02 PROBLEM — E66.01 MORBID OBESITY WITH BMI OF 60.0-69.9, ADULT (HCC): Status: RESOLVED | Noted: 2018-01-14 | Resolved: 2018-07-02

## 2018-07-02 PROBLEM — R65.10 SIRS (SYSTEMIC INFLAMMATORY RESPONSE SYNDROME) (HCC): Status: RESOLVED | Noted: 2018-01-14 | Resolved: 2018-07-02

## 2018-07-02 LAB
CREATININE URINE POCT: NORMAL
MICROALBUMIN/CREAT 24H UR: NORMAL MG/G{CREAT}
MICROALBUMIN/CREAT UR-RTO: NORMAL

## 2018-07-02 PROCEDURE — G8427 DOCREV CUR MEDS BY ELIG CLIN: HCPCS | Performed by: PHYSICIAN ASSISTANT

## 2018-07-02 PROCEDURE — 2022F DILAT RTA XM EVC RTNOPTHY: CPT | Performed by: PHYSICIAN ASSISTANT

## 2018-07-02 PROCEDURE — 3044F HG A1C LEVEL LT 7.0%: CPT | Performed by: PHYSICIAN ASSISTANT

## 2018-07-02 PROCEDURE — G8417 CALC BMI ABV UP PARAM F/U: HCPCS | Performed by: PHYSICIAN ASSISTANT

## 2018-07-02 PROCEDURE — 99214 OFFICE O/P EST MOD 30 MIN: CPT | Performed by: PHYSICIAN ASSISTANT

## 2018-07-02 PROCEDURE — 82044 UR ALBUMIN SEMIQUANTITATIVE: CPT | Performed by: PHYSICIAN ASSISTANT

## 2018-07-02 PROCEDURE — 4004F PT TOBACCO SCREEN RCVD TLK: CPT | Performed by: PHYSICIAN ASSISTANT

## 2018-07-02 RX ORDER — VARENICLINE TARTRATE 25 MG
KIT ORAL
Qty: 153 EACH | Refills: 0 | Status: SHIPPED | OUTPATIENT
Start: 2018-07-02 | End: 2018-07-24 | Stop reason: SDUPTHER

## 2018-07-02 RX ORDER — SIMVASTATIN 20 MG
20 TABLET ORAL NIGHTLY
Qty: 30 TABLET | Refills: 2 | Status: SHIPPED | OUTPATIENT
Start: 2018-07-02 | End: 2018-11-07 | Stop reason: SDUPTHER

## 2018-07-02 RX ORDER — BLOOD-GLUCOSE METER
KIT MISCELLANEOUS
Qty: 1 KIT | Refills: 0 | Status: SHIPPED | OUTPATIENT
Start: 2018-07-02 | End: 2018-10-22

## 2018-07-02 RX ORDER — ERGOCALCIFEROL 1.25 MG/1
50000 CAPSULE ORAL WEEKLY
Qty: 4 CAPSULE | Refills: 2 | Status: SHIPPED | OUTPATIENT
Start: 2018-07-02 | End: 2018-10-05 | Stop reason: SDUPTHER

## 2018-07-02 RX ORDER — LORATADINE 10 MG/1
10 CAPSULE, LIQUID FILLED ORAL DAILY
Qty: 30 CAPSULE | Refills: 3 | Status: SHIPPED | OUTPATIENT
Start: 2018-07-02 | End: 2018-12-10 | Stop reason: SDUPTHER

## 2018-07-02 RX ORDER — DICLOFENAC SODIUM 75 MG/1
75 TABLET, DELAYED RELEASE ORAL 2 TIMES DAILY
Qty: 60 TABLET | Refills: 1 | Status: SHIPPED | OUTPATIENT
Start: 2018-07-02 | End: 2019-01-25 | Stop reason: SDUPTHER

## 2018-07-02 RX ORDER — LISINOPRIL AND HYDROCHLOROTHIAZIDE 12.5; 1 MG/1; MG/1
1 TABLET ORAL DAILY
Qty: 30 TABLET | Refills: 5 | Status: SHIPPED | OUTPATIENT
Start: 2018-07-02 | End: 2019-02-26 | Stop reason: SDUPTHER

## 2018-07-02 RX ORDER — LANCETS 30 GAUGE
EACH MISCELLANEOUS
Qty: 100 EACH | Refills: 5 | Status: SHIPPED | OUTPATIENT
Start: 2018-07-02 | End: 2018-10-22

## 2018-07-02 NOTE — PROGRESS NOTES
albuterol (PROVENTIL) (2.5 MG/3ML) 0.083% nebulizer solution Take 3 mLs by nebulization every 6 hours as needed for Wheezing (Patient taking differently: Take 2.5 mg by nebulization every 6 hours as needed for Wheezing Instructed to take am of procedure if needed) 120 each 3    Blood Pressure Monitoring (B-D ASSURE BPM/AUTO ARM CUFF) MISC 1 Device by Does not apply route 2 times daily Check BP daily, keep log  Dx: fluctuating BP 1 each 0     No current facility-administered medications for this visit. Allergies   Allergen Reactions    Fish-Derived Products     Pcn [Penicillins]     Cashews [Macadamia Nut Oil] Nausea And Vomiting       Family History   Problem Relation Age of Onset   26 [facility] Arthritis Mother     Hypertension Mother     Asthma Mother     Cancer Father     Hypertension Father     Heart Attack Father     Asthma Father        Social History     Social History    Marital status: Legally      Spouse name: N/A    Number of children: N/A    Years of education: N/A     Occupational History    Not on file.      Social History Main Topics    Smoking status: Current Some Day Smoker     Packs/day: 0.25     Years: 17.00     Types: Cigarettes     Start date: 3/27/2001    Smokeless tobacco: Never Used    Alcohol use No    Drug use: No    Sexual activity: No     Other Topics Concern    Not on file     Social History Narrative    No narrative on file       Review of Systems :  Constitutional: negative for - chills, fever, weight loss, or fatigue  Psychological: negative for - anxiety, +depression, bc of pain or suicidal ideation  HEENT: negative for - vision changes, nasal congestion, ear pain or pharyngitis   Respiratory: negative for -  Chest heaviness, cough, shortness of breath, or pleuritic pain  Cardiovascular: negative for - diaphoresis, chest pain, palpitations, or edema   Gastrointestinal: negative for -abdominal pain, change in bowel habits, constipation, diarrhea, or

## 2018-07-02 NOTE — PATIENT INSTRUCTIONS
Patient Education   Patient Education        Learning About Diabetes Food Guidelines  Your Care Instructions    Meal planning is important to manage diabetes. It helps keep your blood sugar at a target level (which you set with your doctor). You don't have to eat special foods. You can eat what your family eats, including sweets once in a while. But you do have to pay attention to how often you eat and how much you eat of certain foods. You may want to work with a dietitian or a certified diabetes educator (CDE) to help you plan meals and snacks. A dietitian or CDE can also help you lose weight if that is one of your goals. What should you know about eating carbs? Managing the amount of carbohydrate (carbs) you eat is an important part of healthy meals when you have diabetes. Carbohydrate is found in many foods. · Learn which foods have carbs. And learn the amounts of carbs in different foods. ¨ Bread, cereal, pasta, and rice have about 15 grams of carbs in a serving. A serving is 1 slice of bread (1 ounce), ½ cup of cooked cereal, or 1/3 cup of cooked pasta or rice. ¨ Fruits have 15 grams of carbs in a serving. A serving is 1 small fresh fruit, such as an apple or orange; ½ of a banana; ½ cup of cooked or canned fruit; ½ cup of fruit juice; 1 cup of melon or raspberries; or 2 tablespoons of dried fruit. ¨ Milk and no-sugar-added yogurt have 15 grams of carbs in a serving. A serving is 1 cup of milk or 2/3 cup of no-sugar-added yogurt. ¨ Starchy vegetables have 15 grams of carbs in a serving. A serving is ½ cup of mashed potatoes or sweet potato; 1 cup winter squash; ½ of a small baked potato; ½ cup of cooked beans; or ½ cup cooked corn or green peas. · Learn how much carbs to eat each day and at each meal. A dietitian or CDE can teach you how to keep track of the amount of carbs you eat. This is called carbohydrate counting.   · If you are not sure how to count carbohydrate grams, use the Plate Method to

## 2018-07-06 RX ORDER — SULFAMETHOXAZOLE AND TRIMETHOPRIM 800; 160 MG/1; MG/1
1 TABLET ORAL 2 TIMES DAILY
Qty: 20 TABLET | Refills: 0 | Status: SHIPPED | OUTPATIENT
Start: 2018-07-06 | End: 2018-07-16

## 2018-07-11 ENCOUNTER — OFFICE VISIT (OUTPATIENT)
Dept: BARIATRICS/WEIGHT MGMT | Age: 43
End: 2018-07-11
Payer: MEDICARE

## 2018-07-11 VITALS
SYSTOLIC BLOOD PRESSURE: 136 MMHG | HEART RATE: 74 BPM | HEIGHT: 69 IN | TEMPERATURE: 96.1 F | RESPIRATION RATE: 20 BRPM | WEIGHT: 293 LBS | BODY MASS INDEX: 43.4 KG/M2 | DIASTOLIC BLOOD PRESSURE: 70 MMHG

## 2018-07-11 DIAGNOSIS — E55.9 VITAMIN D DEFICIENCY: Primary | ICD-10-CM

## 2018-07-11 DIAGNOSIS — R10.13 EPIGASTRIC PAIN: ICD-10-CM

## 2018-07-11 PROCEDURE — 99212 OFFICE O/P EST SF 10 MIN: CPT

## 2018-07-11 PROCEDURE — G8427 DOCREV CUR MEDS BY ELIG CLIN: HCPCS | Performed by: SURGERY

## 2018-07-11 PROCEDURE — G8417 CALC BMI ABV UP PARAM F/U: HCPCS | Performed by: SURGERY

## 2018-07-11 PROCEDURE — 99214 OFFICE O/P EST MOD 30 MIN: CPT | Performed by: SURGERY

## 2018-07-11 PROCEDURE — 4004F PT TOBACCO SCREEN RCVD TLK: CPT | Performed by: SURGERY

## 2018-07-11 NOTE — PATIENT INSTRUCTIONS
What is the next step to proceed with weight loss surgery? Please be aware that any co-pays or deductibles may be requested prior to testing and / or procedures. You will need to schedule a psychological evaluation for weight loss surgery. Patients will be required to complete all psychological testing as required by the psychologist. Patients must follow all of the psychologist's recommendations before weight loss surgery can be scheduled. The evaluation must be done a standard way for weight loss surgery. We strongly recommend that you contact one of our preferred providers listed below to arrange this:    Dr. Chika Alicea, PhD [employer] , [email or web address]        [phone]      Dr. Sheyla Soto, PhD   [facility]. [place] [address] [employer] [facility]         [phone]    You will also need to plan on attending a 2 hour nutrition class at [facility] prior to your surgery. We will schedule this for you when we schedule your surgery. Please remember to have your labs drawn prior to your scheduled appointment to review the results of your testing. Please remember, that while we will submit your case to insurance for surgery authorization, it is your responsibility to know if your plan covers weight loss surgery and keep up-to-date with changes to your insurance coverage. We will do everything possible to help you get approved for weight loss surgery, but cannot guarantee an approval.     Please note that you will not be submitted to your insurance company until all   pre-operative testing requirements are met.

## 2018-07-12 ENCOUNTER — HOSPITAL ENCOUNTER (OUTPATIENT)
Dept: SLEEP CENTER | Age: 43
Discharge: HOME | End: 2018-07-12
Payer: MEDICARE

## 2018-07-12 PROCEDURE — 95811 POLYSOM 6/>YRS CPAP 4/> PARM: CPT

## 2018-07-13 VITALS
DIASTOLIC BLOOD PRESSURE: 84 MMHG | WEIGHT: 293 LBS | HEART RATE: 85 BPM | OXYGEN SATURATION: 99 % | RESPIRATION RATE: 16 BRPM | HEIGHT: 69 IN | BODY MASS INDEX: 43.4 KG/M2 | SYSTOLIC BLOOD PRESSURE: 142 MMHG

## 2018-07-13 ASSESSMENT — SLEEP AND FATIGUE QUESTIONNAIRES
HOW LIKELY ARE YOU TO NOD OFF OR FALL ASLEEP WHILE SITTING INACTIVE IN A PUBLIC PLACE: 1
HOW LIKELY ARE YOU TO NOD OFF OR FALL ASLEEP WHEN YOU ARE A PASSENGER IN A CAR FOR AN HOUR WITHOUT A BREAK: 2
ESS TOTAL SCORE: 15
HOW LIKELY ARE YOU TO NOD OFF OR FALL ASLEEP WHILE SITTING AND READING: 2
HOW LIKELY ARE YOU TO NOD OFF OR FALL ASLEEP WHILE WATCHING TV: 2
HOW LIKELY ARE YOU TO NOD OFF OR FALL ASLEEP WHILE SITTING AND TALKING TO SOMEONE: 2
HOW LIKELY ARE YOU TO NOD OFF OR FALL ASLEEP WHILE LYING DOWN TO REST IN THE AFTERNOON WHEN CIRCUMSTANCES PERMIT: 3
HOW LIKELY ARE YOU TO NOD OFF OR FALL ASLEEP IN A CAR, WHILE STOPPED FOR A FEW MINUTES IN TRAFFIC: 0
HOW LIKELY ARE YOU TO NOD OFF OR FALL ASLEEP WHILE SITTING QUIETLY AFTER LUNCH WITHOUT ALCOHOL: 3

## 2018-07-15 NOTE — PROGRESS NOTES
[address] [address]                                SLEEP STUDY REPORT    PATIENT NAME: Albaro Forbes                    :        1975  MED REC NO:   [id number]                            ROOM:  ACCOUNT NO:   [de-identified]                           ADMIT DATE: 2018  PROVIDER:     Amelia Lehman MD    DATE OF STUDY:  2018    REFERRING PROVIDER:  Patient of GREGORIO Manriquez.    STUDY PERFORMED:  Polysomnogram.    REASON FOR POLYSOMNOGRAPHY:  Witnessed apnea, excessive napping, wakes  gasping, loud snoring, restless sleep, frequent waking to urinate. CURRENT MEDICATIONS:  Albuterol, amlodipine, Voltaren, Flonase, Lasix,  Neurontin, Apresoline, hydrocodone - acetaminophen. INTERPRETATION:  SLEEP SUMMARY:  This patient had a total time in bed of 366 minutes. Total  sleep time was 325 minutes. Sleep efficiency was 89%. Sleep latency was  15 minutes. REM latency was 52 minutes. During sleep, there were 37  arousals and 8 awakenings. The sleep disruption index was normal.    SLEEP STAGING:  The patient was awake 7% of the sleep time. Stage N1 was  6% and N2 was 59% of the sleep time. Slow wave sleep was less than 1% of  the sleep time and stage REM sleep was 27% of the sleep time. RESPIRATION SUMMARY:  APNEA:  Prior to the titration of CPAP, this patient had a total sleep time  of 129 minutes, which included 14 minutes of REM stage sleep. During that  time, there was one apneic event, which was obstructive. This event  occurred in the non-supine position during REM stage sleep and lasted 15  seconds. Lowest oxygen saturation was 72%. HYPOPNEA:  During the same time frame, there were 248 hypopneic events. All occurred in the non-supine position and 24 occurred during REM stage  sleep. Maximum duration was 45 seconds and lowest oxygen saturation was  64%.     APNEA/HYPOPNEA INDEX:  Prior to the titration of CPAP, the apnea/hypopnea  index was 116. TITRATION OF CPAP/BILEVEL:  At the midpoint of the study, it was noted that  the apnea/hypopnea index was greater than 40 and therefore, CPAP was  initiated at a level of 6 cm of water pressure and gradually increased to  14 before changing to bilevel. On a bilevel of 15/11 cm of water pressure,  the patient slept for 113 minutes including 22 minutes of REM stage sleep. The apnea/hypopnea index was less than 4. OXYGEN SATURATION:  Baseline oxygen saturation was 97%. Lowest saturation  was 62%. The patient spent 35% of the time with saturation at or greater  than 90%. 58% of the time, saturation ranged from 80% to 89% and 8% of the  time, saturation was less than 80%. EKG SUMMARY:  Baseline heart rate was 89 beats per minute. Average heart  rate was 87 beats per minute. Lowest heart rate was 70 beats per minute. There were approximately 2600 PACs present. Some were associated with  respiratory events. MISCELLANEOUS:  [place] Sleepiness Scale score is 15/24. Snoring was loud. This was graded as 8 on a 1 to 10 scale. There was no bruxism. Leg jerks  occurred independent of respiratory events. The limb movement index was  23. IMPRESSION:  1. Very severe obstructive sleep apnea. 2.  Good titration with bilevel. 3.  Loud snoring, which was eliminated with bilevel. 4.  Very frequent PACs, some associated with respiratory events. DISCUSSION:  Prior to the titration of CPAP, this patient had an  apnea/hypopnea index of 116. Normal is less than 5 and mild is 5 to 15.   15 to 30 is considered moderate and greater than 30 is considered severe  leaving this patient in the very severe category. With titration of  positive airway pressure, which the patient tolerated well, good results  were achieved with normalization of the apnea/hypopnea index and very  significant improvement of oxygen saturation.   The lowest oxygen saturation  on

## 2018-07-17 DIAGNOSIS — R06.83 SNORING: ICD-10-CM

## 2018-07-17 DIAGNOSIS — R53.83 FATIGUE, UNSPECIFIED TYPE: ICD-10-CM

## 2018-07-24 ENCOUNTER — INITIAL CONSULT (OUTPATIENT)
Dept: BARIATRICS/WEIGHT MGMT | Age: 43
End: 2018-07-24
Payer: MEDICARE

## 2018-07-24 VITALS — BODY MASS INDEX: 43.4 KG/M2 | WEIGHT: 293 LBS | HEIGHT: 69 IN

## 2018-07-24 DIAGNOSIS — Z71.3 DIETARY COUNSELING: Primary | ICD-10-CM

## 2018-07-24 PROCEDURE — 99999 PR OFFICE/OUTPT VISIT,PROCEDURE ONLY: CPT | Performed by: SURGERY

## 2018-07-24 RX ORDER — VARENICLINE TARTRATE
KIT
Qty: 53 TABLET | Refills: 0 | Status: SHIPPED | OUTPATIENT
Start: 2018-07-24 | End: 2018-08-30

## 2018-07-24 NOTE — PATIENT INSTRUCTIONS
[facility] and [facility]  Dietary Initial Appointment Patient Instructions    By: Rose Scherer RD / KONG  [phone]      At your initial dietary appointment you have received the following information packets:    Please be aware at each visit you have been instructed that in order for your insurance company to approve your surgery you must show a consistent weight loss of 2 lbs or greater at each visit. We can not guarantee an approval by your insurance company we can only provide the information given to us it is up to you the patient to show compliancy to your insurance company. If you do gain weight during your supervised weight loss counseling sessions insurance companies starting in 2018 are denying patients for not showing consistent weight loss results when part of a supervised weight loss counseling program. Pt was instructed on 7/24/18. Pt verbalized understanding. · Low-Fat Diet  - Please be sure to begin your low-fat diet from today's date until your scheduled surgery date. If you are not at goal weight by your history and physical appointment with your surgeon your surgery will be cancelled. · Goal Weight: 361#  (BMI 54.1)  · Low-Fat Diet Contract - Patient Acknowledge and Signed  · Supplement List - Please be sure to be saving to purchase your 3 month supply of supplements. If you do not bring supplements to your history and physical appointment your surgery will be cancelled. · Supplement Contract - Patient Acknowledge and Signed  · Please be sure to take a daily Multi-vitamin from now until surgery to reduce your incidence of infection. · If your insurance company requires additional dietary counseling you will be scheduled to see the dietitian the following month. ·  If your psychological evaluation is completed and all your dietary requirements for your individual insurance company have been completed you will be submitted to insurance.  Please make sure to check with us to see if we have received your psychological evaluation. · Please remember you need to schedule to attend one Support Group meeting held at the [facility] before surgery. This one meeting is mandatory. You can attend as many support group meetings before and after surgery. Support group meetings are held at the [facility] from 6:00 - 8:00pm 1st, and 3rd Tuesday of every month. See dates listed below. · Each individual person has his / her own medical requirements that need fulfilled before being able to schedule bariatric surgery . Please finalize these requirements by contacting our Bariatric Nurse at [phone]. High Vitamin D Foods:  Written By: Erika Guerra RD/KONG    What role does Vitamin D play in the body? Vitamin D is known as the sunshine vitamin. Vitamin D is actually a hormone that is produced in our bodies by ultraviolet B rays. These light rays trigger the production of vitamin D by our skin cells. The hormone that is produced is called calcitriol and once it is made it travels to the intestines to help with the absorption of dietary calcium, as well as fluoride. How does Vitamin D work? Vitamin D and Calcium work together to promote the growth of bones and development of healthy teeth. The amount of vitamin D you produce effects the amount of calcium that can be absorbed by the body. When calcium from foods you eat reaches the intestines, it waits for the presence of the vitamin D hormone, calcitriol, to be able to cross the intestinal membranes and to be transported to your bones. Without sufficient vitamin D, calcium levels in the bones and teeth will decrease leading to weak, brittle bones and increasing the possibility of osteoporosis. WHY? Calcium is primarily found in the blood and needs to remain balanced at a constant level.   The calcium that is in the blood is there to be used for quick transport to muscles and nerves when required. When calcium is sent to required organs, the calcium levels in the blood become low. Calcium is then pulled out of the bones and goes into the blood to replace what was sent to the muscles or nerves. This is the normal cycle and is perfect if you have a continuous supply of calcium from the diet to replace calcium from bones and teeth to keep blood levels steady. If dietary calcium levels are depleted, the blood will keep pulling calcium away from the bones and teeth and the structures will be weak. Where can Vitamin D be found? Vitamin D can be self-synthesized with sunlight. Vitamin D is also found in fortified milk, fortified margarine, egg yolks, liver and fatty fish. What occurs when you are deficient in Vitamin D? Bones and Teeth:  Raenelle Kuster can occur with Vitamin D deficiency, which is known as softening of the bones and teeth. Due to the softening effect we can see deformities of the limbs, spine, thorax and pelvis. Also seen is pain in the pelvis, lower back and legs, including increase risk to bone fractures. Blood:  Vitamin D deficiency can cause decreased calcium and/or phosphorus in the blood and increased alkaline phosphatase. Nervous/Muscular Systems:  Vitamin D deficiency can cause lax muscles resulting in protrusion of the abdomen (Pot Belly) and muscle spasms. Some patients complain of involuntary twitching and  muscle spasms. Excretory System:  Vitamin D deficiency can cause increased calcium in the stool and decreased calcium in the urine. Other:  Vitamin D deficiency can cause abnormally high secretions of parathormone, which is why we run lab work at 1 year to make sure the calcium supplements you are taking are being absorbed correctly. Vitamin D in Commonly Eaten Foods Ranked Richest to [employer]:      Food Serving Size IU of   Vitamin D----------        Herring, fresh, raw,  1 oz. 255 IU   [place] 1 oz.  142 IU   Milk,Cows Fortified 1 Cup 100 IU   Sardines, canned 1 oz. 85 IU   Chicken-Liver, Cooked 3oz. 45 IU   Shrimp, Canned 1oz. 30 IU   Egg Yolk 1 25 IU   Calf-Liver, Cooked 3oz. 12 IU   Cream, Light 1 T 8 IU   Cheddar Cheese 1 oz. 3 IU   Oysters 4 3 IU   Butter  1 tsp 1.4 IU            Unfortunately after RYGB surgery you will not be able to increase your Vitamin D with diet alone. A Vitamin D supplement will be needed in order to improve Vitamin D lab values.

## 2018-08-03 ENCOUNTER — OFFICE VISIT (OUTPATIENT)
Dept: FAMILY MEDICINE CLINIC | Age: 43
End: 2018-08-03
Payer: MEDICARE

## 2018-08-03 VITALS
SYSTOLIC BLOOD PRESSURE: 134 MMHG | WEIGHT: 293 LBS | TEMPERATURE: 97 F | RESPIRATION RATE: 16 BRPM | DIASTOLIC BLOOD PRESSURE: 80 MMHG | OXYGEN SATURATION: 96 % | BODY MASS INDEX: 59.19 KG/M2 | HEART RATE: 91 BPM

## 2018-08-03 DIAGNOSIS — I10 ESSENTIAL HYPERTENSION: ICD-10-CM

## 2018-08-03 DIAGNOSIS — E11.9 TYPE 2 DIABETES MELLITUS WITHOUT COMPLICATION, WITHOUT LONG-TERM CURRENT USE OF INSULIN (HCC): Primary | ICD-10-CM

## 2018-08-03 DIAGNOSIS — G47.33 OSA (OBSTRUCTIVE SLEEP APNEA): ICD-10-CM

## 2018-08-03 DIAGNOSIS — K08.89 PAIN, DENTAL: ICD-10-CM

## 2018-08-03 DIAGNOSIS — K21.9 GASTROESOPHAGEAL REFLUX DISEASE WITHOUT ESOPHAGITIS: ICD-10-CM

## 2018-08-03 PROCEDURE — G8427 DOCREV CUR MEDS BY ELIG CLIN: HCPCS | Performed by: PHYSICIAN ASSISTANT

## 2018-08-03 PROCEDURE — 3044F HG A1C LEVEL LT 7.0%: CPT | Performed by: PHYSICIAN ASSISTANT

## 2018-08-03 PROCEDURE — 2022F DILAT RTA XM EVC RTNOPTHY: CPT | Performed by: PHYSICIAN ASSISTANT

## 2018-08-03 PROCEDURE — 99214 OFFICE O/P EST MOD 30 MIN: CPT | Performed by: PHYSICIAN ASSISTANT

## 2018-08-03 PROCEDURE — 4004F PT TOBACCO SCREEN RCVD TLK: CPT | Performed by: PHYSICIAN ASSISTANT

## 2018-08-03 PROCEDURE — G8417 CALC BMI ABV UP PARAM F/U: HCPCS | Performed by: PHYSICIAN ASSISTANT

## 2018-08-03 RX ORDER — CLINDAMYCIN HYDROCHLORIDE 150 MG/1
150 CAPSULE ORAL 3 TIMES DAILY
Qty: 21 CAPSULE | Refills: 0 | Status: SHIPPED | OUTPATIENT
Start: 2018-08-03 | End: 2018-08-10

## 2018-08-03 RX ORDER — FLUCONAZOLE 150 MG/1
150 TABLET ORAL ONCE
Qty: 2 TABLET | Refills: 0 | Status: SHIPPED | OUTPATIENT
Start: 2018-08-03 | End: 2018-08-03

## 2018-08-03 RX ORDER — ONDANSETRON 4 MG/1
4 TABLET, FILM COATED ORAL EVERY 8 HOURS PRN
Qty: 30 TABLET | Refills: 1 | Status: SHIPPED | OUTPATIENT
Start: 2018-08-03 | End: 2018-10-05 | Stop reason: SDUPTHER

## 2018-08-03 NOTE — PROGRESS NOTES
[place]  [address] [facility], [address] [place] [address] [address]   [phone]      [facility]  1975  8/3/18      HPI:  Patient presents for f/u for Dm2, weight management, and to discuss sleep study. She has been skipping her metformin bc of upset stomach. She is seeing the dietician and has a goal of at least 2 lbs per month. She admits she has been cheating alittle with [employer] at night. She plans to restart water only. She has a psych apt and an apt for cardiac clearance. Her back pain is stable. She is using a Tens unit, which helps. She is awaiting delivery of her cpap to start tx of JODI after abnormal sleep study. She has a dental fracture and increased pain on top left side of her jaw. She had a dentist apt, but had to cancel bc of a mandatory work meeting.      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[facility])         Past Surgical History:   Procedure Laterality Date    CHOLECYSTECTOMY  2009    CYSTOSCOPY Left 01/14/2018    left stent placement    LITHOTRIPSY Right 02/15/2018    Cystoscopy;Stent Removal    UPPER GASTROINTESTINAL ENDOSCOPY N/A 6/22/2018    EGD BIOPSY performed by Vicente Rothman MD at [place] [id number]       Current Outpatient Prescriptions   Medication Sig Dispense Refill    ondansetron (ZOFRAN) 4 MG tablet Take 1 tablet by mouth every 8 hours as needed for Nausea or Vomiting Take morning of surgery with a sip of water if needed 30 tablet 1    metFORMIN (GLUCOPHAGE) 500 MG tablet Take 1 tablet by mouth daily (with breakfast) 60 tablet 3    clindamycin (CLEOCIN) 150 MG capsule Take 1 capsule by mouth 3 times daily for 7 days 21 capsule 0    fluconazole (DIFLUCAN) 150 MG tablet Take 1 tablet by mouth once for 1 dose May repeat in taking differently: Take 2.5 mg by nebulization every 6 hours as needed for Wheezing Instructed to take am of procedure if needed) 120 each 3    CHANTIX STARTING MONTH RUBY 0.5 MG X 11 & 1 MG X 42 tablet TAKE AS DIRECTED 53 tablet 0    blood glucose test strips (ASCENSIA AUTODISC VI;ONE TOUCH ULTRA TEST VI) strip Test qd 100 strip 5    glucose monitoring kit (FREESTYLE) monitoring kit Use once. 1 kit 0    Lancets MISC Test glu qd 100 each 5    Blood Pressure Monitoring (B-D ASSURE BPM/AUTO ARM CUFF) MISC 1 Device by Does not apply route 2 times daily Check BP daily, keep log  Dx: fluctuating BP 1 each 0    magnesium hydroxide (MILK OF MAGNESIA) 400 MG/5ML suspension Take 30 mLs by mouth daily as needed for Constipation       No current facility-administered medications for this visit. Allergies   Allergen Reactions    Food Anaphylaxis     Food allergy - Fish and tree nuts  Food Intolerance - lactose    Fish-Derived Products     Pcn [Penicillins]     Cashews [Macadamia Nut Oil] Nausea And Vomiting       Family History   Problem Relation Age of Onset    Arthritis Mother     Hypertension Mother     Asthma Mother     Cancer Father     Hypertension Father     Heart Attack Father     Asthma Father        Social History     Social History    Marital status: Legally      Spouse name: N/A    Number of children: N/A    Years of education: N/A     Occupational History    Not on file.      Social History Main Topics    Smoking status: Current Some Day Smoker     Packs/day: 0.25     Years: 17.00     Types: Cigarettes     Start date: 3/27/2001    Smokeless tobacco: Never Used    Alcohol use No    Drug use: No    Sexual activity: No     Other Topics Concern    Not on file     Social History Narrative    No narrative on file       Review of Systems :  Constitutional: negative for - chills, fever, weight loss, or fatigue  Psychological: negative for - anxiety, depression or suicidal

## 2018-08-28 ENCOUNTER — INITIAL CONSULT (OUTPATIENT)
Dept: BARIATRICS/WEIGHT MGMT | Age: 43
End: 2018-08-28
Payer: MEDICARE

## 2018-08-28 DIAGNOSIS — Z71.3 NUTRITIONAL COUNSELING: Primary | ICD-10-CM

## 2018-08-28 PROCEDURE — 99999 PR OFFICE/OUTPT VISIT,PROCEDURE ONLY: CPT | Performed by: SURGERY

## 2018-08-29 VITALS — BODY MASS INDEX: 58.06 KG/M2 | WEIGHT: 293 LBS

## 2018-08-29 NOTE — PROGRESS NOTES
WEIGHT:  Weight: (!) 387 lb 8 oz (175.8 kg)      Pt was in th office for his/her 2nd weight Loss appointment. Pt is aware he/she must meet his/her pre-op weight loss goal in order to proceed with weight loss surgery. Gokul / Bret faxed a copy of what was reviewed with the pt to her PCP. Pt verbalized understanding. Rd// Ld enc the following at today's visit for successful pre/post surgical weight loss. Education:  Pt has been educated on the importance of weighing and measuring food for adequate portion control and to avoid extra calorie consumption from eating large portions. Gokul / Ld instructed the pt on the use and the importance of using a scale and measuring cups in order to achieve adequate measurements of common portion sizes both pre-op and post-op. Gokul Queen used actual food model replica's to show what an actual portion and serving size would look like 3 month's post-surgical after weight loss sx. Gokul Queen completed an exercise in which they had to weigh and measure foods for adequate portion control. Gokul / Ld also reviewed the use of a portion controlled plate after weight loss surgery. 1. Weigh yourself daily and record it. 2. Keep documented food records daily. 3. Just be more active in day to day routine. 4. Higher protein intake and a higher fiber intake. Not a high protein or a high fiber diet     just a higher intake. 5.Eliminate empty calorie consumption. Gokul Queen addressed the following with the pt:  - Gokul Queen encouraged pt to comply with nutrition recommendations.  - Gokul / Bret encouraged participation in support group meetings.  - Gokul Queen encouraged pt to go back to maintenance of food records and weight monitoring   records. - Gokul Queen reviewed the importance of adequate sleep and stress management.  - Gokul Queen reviewed non-food strategies to cope with emotions and stress.  - Gokul Queen encouraged pt to practice the following: Mindful eating: Eating slowly:  Focusing     on the eating experience

## 2018-08-30 ENCOUNTER — OFFICE VISIT (OUTPATIENT)
Dept: CARDIOLOGY CLINIC | Age: 43
End: 2018-08-30
Payer: MEDICARE

## 2018-08-30 VITALS
HEART RATE: 97 BPM | WEIGHT: 293 LBS | DIASTOLIC BLOOD PRESSURE: 68 MMHG | SYSTOLIC BLOOD PRESSURE: 124 MMHG | BODY MASS INDEX: 44.41 KG/M2 | RESPIRATION RATE: 16 BRPM | HEIGHT: 68 IN

## 2018-08-30 DIAGNOSIS — I10 ESSENTIAL HYPERTENSION: Primary | ICD-10-CM

## 2018-08-30 PROCEDURE — 99242 OFF/OP CONSLTJ NEW/EST SF 20: CPT | Performed by: INTERNAL MEDICINE

## 2018-08-30 PROCEDURE — 93000 ELECTROCARDIOGRAM COMPLETE: CPT | Performed by: INTERNAL MEDICINE

## 2018-08-30 PROCEDURE — G8417 CALC BMI ABV UP PARAM F/U: HCPCS | Performed by: INTERNAL MEDICINE

## 2018-08-30 PROCEDURE — G8427 DOCREV CUR MEDS BY ELIG CLIN: HCPCS | Performed by: INTERNAL MEDICINE

## 2018-08-30 RX ORDER — BLOOD-GLUCOSE METER
EACH MISCELLANEOUS
Refills: 0 | COMMUNITY
Start: 2018-07-02 | End: 2018-10-22

## 2018-08-30 NOTE — PROGRESS NOTES
chloride (KLOR-CON M) 20 MEQ extended release tablet, Take 1 tablet by mouth daily (with breakfast), Disp: 60 tablet, Rfl: 3    furosemide (LASIX) 40 MG tablet, Take 1 tablet by mouth daily, Disp: 60 tablet, Rfl: 3    albuterol sulfate HFA (PROAIR HFA) 108 (90 Base) MCG/ACT inhaler, Inhale 2 puffs into the lungs every 6 hours as needed for Wheezing, Disp: 1 Inhaler, Rfl: 3    Blood Pressure Monitoring (B-D ASSURE BPM/AUTO ARM CUFF) MISC, 1 Device by Does not apply route 2 times daily Check BP daily, keep log Dx: fluctuating BP, Disp: 1 each, Rfl: 0    amLODIPine (NORVASC) 10 MG tablet, Take 1 tablet by mouth daily (Patient taking differently: Take 10 mg by mouth daily Take morning of surgery with a sip of water), Disp: 30 tablet, Rfl: 3    fluticasone (FLONASE) 50 MCG/ACT nasal spray, 1 spray by Nasal route daily, Disp: 1 Bottle, Rfl: 3    magnesium hydroxide (MILK OF MAGNESIA) 400 MG/5ML suspension, Take 30 mLs by mouth daily as needed for Constipation, Disp: , Rfl:     albuterol (PROVENTIL) (2.5 MG/3ML) 0.083% nebulizer solution, Take 3 mLs by nebulization every 6 hours as needed for Wheezing (Patient taking differently: Take 2.5 mg by nebulization every 6 hours as needed for Wheezing Instructed to take am of procedure if needed), Disp: 120 each, Rfl: 3    gabapentin (NEURONTIN) 600 MG tablet, Take 1 tablet by mouth 3 times daily for 30 days. ., Disp: 90 tablet, Rfl: 2    Cosme Johnson MD    cc: Samantha Francis MD

## 2018-09-11 ENCOUNTER — INITIAL CONSULT (OUTPATIENT)
Dept: BARIATRICS/WEIGHT MGMT | Age: 43
End: 2018-09-11
Payer: MEDICARE

## 2018-09-11 DIAGNOSIS — Z71.3 NUTRITIONAL COUNSELING: Primary | ICD-10-CM

## 2018-09-11 PROCEDURE — 99999 PR OFFICE/OUTPT VISIT,PROCEDURE ONLY: CPT | Performed by: SURGERY

## 2018-09-17 VITALS — BODY MASS INDEX: 58.39 KG/M2 | WEIGHT: 293 LBS

## 2018-09-17 NOTE — PROGRESS NOTES
contains calories      Laparoscopic Sleeve Gastectomy patients will be required to take Vitamin B12 500 mcgs 1 tablet daily in addition to the supplements listed above      Suggested Post-Operative Vitamin Supplementation Information:   A high-potency vitamin containing at least 200% of the daily value for at least 2/3 of the nutrients.  Begin with chewable or liquid vitamins for the first three months.  Caution liquid vitamins containing iron can stain teeth.  Avoid time-released supplements.  Avoid gel capsules.  Avoid enteric coating.  Choose a complete formula with at least 18 mg of iron, 234GJ of folic acid, and contains selenium and zinc in each serving.  Avoid childrens formulas that are incomplete.  Taking vitamins close to food intake may improve gastrointestinal tolerance.  Do separate your dosage of vitamins when taking throughout the day.  Do not mix multivitamin containing iron with calcium supplements, take at least 2 hours apart. Suggested Post-Operative Calcium Supplementation Information:  Calcium supplements are needed due to the great amount of malabsorption that occurs after surgery which, can lead to developing osteoporosis or osteopenia within the first 2 years post-op. A calcium supplement will help maintain bone strength, help your heart pump correctly and aid in the repair of soft tissue. Calcium supplementation is needed lifelong. It is best to always take one dose right before bedtime because calcium is absorbed better when at rest, and another dose around 10:00am and another does around 3:00pm.     ? If you have a predisposition to kidney stones, please talk with your dietitian. Calcium Citrate should block kidney stone formation so this should not be a problem after surgery. If you do not take the correct form of calcium or take the wrong calcium you are more likely to develop kidney stones.     ? Make sure your calcium supplement contains Vitamin D3 at least 800 -1000 iu daily. Spilt calcium into 500-600 mg doses; be mindful of serving size on supplement label. Space doses evenly throughout the day. ? If you are post menopausal and on hormone replacement therapy, please see your dietitian for calcium recommendations. ? If you currently have osteoporosis or osteopenia and are being treated medically (e.g. Actonel, Fosamax), please see your dietitian for calcium recommendations. ? Do not take any of your Calcium within 2 hours of other medications or iron containing multi-vitamins because it may interfere with absorption of the medication. ? Calcium supplements are not a replacement for calcium found within your food. We also recommend in addition to your calcium supplement at least 3 servings of low-fat dairy total daily to meet your calcium requirements. Combined calcium from food and calcium from calcium supplements intake should be greater than 1700 mg. total daily to prevent bone loss during rapid weight loss phase. ? Stay away from Calcium Carbonate. I can not stress this enough after surgery. It is Calcium Citrate we want all patients taking. Do not let supplement shops talk you into something that we do not recommend. It is important in your surgical outcome and overall medical care. ? We Do Not recommend the new Calcium Citrate Crystals  - These are not absorbed well after surgery because they are mainly Calcium Lactate-Gluconate. They are not Calcium Citrate. The company does market them as Calcium Citrate because Calcium Lactate- Gluconate has the same bioavailability as Calcium Citrate but can be made into a drink form. Store Location:  [employer], [employer], [employer], [employer] or by calling 1-503.900.3795 or on-line at www[employer]. Bariatric Advantage   7-581.562.7077 or on-line at www.bariatricadvantage. "[employer]  Bariatric Fusion   3-299.577.8894 or on-line at lifestyle changes. (All serving sizes are 1 scoop, 1 can or 1 packet)  Product Source Cost  (approx.) Calories Fat  (grams) Protein  (grams) Sugars  (grams)              Boris Esparza  Whey Protein         [employer] $45.00 110 0 25 0   Anca Cid  Egg Protein [employer] $25.00 120 0 24 0   Iso Pure Powder [employer] / [employer]  [employer] $ 40.00 100-105 0 25 0   Nectar Protein [employer] $ 30.00 80 0 23 0   [employer] [address] $ 42.00     case of 6 25 0 6 0   ProCel and Up[employer] D  Plus Phone order  [phone] $ 50.00  case of 6 28 0 5 0-1     Bariatric Advantage [phone]   $50.00 150 1.5  2.0 27 .5   Bariatric Fusion [phone] $35.00 138 0 27 <1   WheyBolic Extreme 60  [employer] $ 45.00 90 0.5 20 0.5   [employer] [place]. com $35.00 138 0 27 <1   When choosing a supplement: Do not consume Ensure, Glycerna, Boost or any other high-calorie nutrition supplement on the market. We recommend that the fat content of your protein drink be less than 2 grams of fat, less than 2 grams of sugar and that it be 200 or less calories per serving. We also recommend that you are able to consume enough of your supplement to get your daily protein intake of 60-80 grams. If you are having difficulty getting your protein or are not able to find a supplement, please call your dietitian at [phone].

## 2018-09-28 RX ORDER — AMLODIPINE BESYLATE 10 MG/1
10 TABLET ORAL DAILY
Qty: 30 TABLET | Refills: 3 | Status: SHIPPED | OUTPATIENT
Start: 2018-09-28 | End: 2018-10-05 | Stop reason: SDUPTHER

## 2018-09-28 NOTE — TELEPHONE ENCOUNTER
From: Modesto Govea  Sent: 9/28/2018 10:15 AM EDT  Subject: Medication Renewal Request    Modesto Govea would like a refill of the following medications:     amLODIPine (NORVASC) 10 MG tablet GREGORIO Varela]    Preferred pharmacy: [facility] [address] [place] [address] [address] [facility] [address] - P [phone] - F [phone]

## 2018-10-02 ENCOUNTER — APPOINTMENT (OUTPATIENT)
Dept: GENERAL RADIOLOGY | Age: 43
End: 2018-10-02
Payer: MEDICARE

## 2018-10-02 ENCOUNTER — HOSPITAL ENCOUNTER (EMERGENCY)
Age: 43
Discharge: HOME | End: 2018-10-02
Payer: MEDICARE

## 2018-10-02 VITALS
OXYGEN SATURATION: 97 % | WEIGHT: 293 LBS | RESPIRATION RATE: 16 BRPM | HEIGHT: 68 IN | SYSTOLIC BLOOD PRESSURE: 156 MMHG | HEART RATE: 72 BPM | TEMPERATURE: 99.1 F | BODY MASS INDEX: 44.41 KG/M2 | DIASTOLIC BLOOD PRESSURE: 80 MMHG

## 2018-10-02 DIAGNOSIS — S80.02XA CONTUSION OF LEFT KNEE, INITIAL ENCOUNTER: ICD-10-CM
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
## 2018-10-02 PROCEDURE — 73562 X-RAY EXAM OF KNEE 3: CPT

## 2018-10-02 PROCEDURE — 99284 EMERGENCY DEPT VISIT MOD MDM: CPT

## 2018-10-02 PROCEDURE — 96372 THER/PROPH/DIAG INJ SC/IM: CPT

## 2018-10-02 PROCEDURE — 6360000002 HC RX W HCPCS: Performed by: NURSE PRACTITIONER

## 2018-10-02 RX ORDER — KETOROLAC TROMETHAMINE 30 MG/ML
60 INJECTION, SOLUTION INTRAMUSCULAR; INTRAVENOUS ONCE
Status: COMPLETED | OUTPATIENT
Start: 2018-10-02 | End: 2018-10-02

## 2018-10-02 RX ORDER — NAPROXEN 500 MG/1
500 TABLET ORAL 2 TIMES DAILY PRN
Qty: 28 TABLET | Refills: 0 | Status: SHIPPED | OUTPATIENT
Start: 2018-10-02 | End: 2018-11-28

## 2018-10-02 RX ADMIN — KETOROLAC TROMETHAMINE 60 MG: 30 INJECTION, SOLUTION INTRAMUSCULAR at 20:01

## 2018-10-02 ASSESSMENT — PAIN SCALES - GENERAL
PAINLEVEL_OUTOF10: 10
PAINLEVEL_OUTOF10: 10

## 2018-10-02 ASSESSMENT — PAIN DESCRIPTION - PAIN TYPE: TYPE: ACUTE PAIN

## 2018-10-02 ASSESSMENT — PAIN DESCRIPTION - FREQUENCY: FREQUENCY: CONTINUOUS

## 2018-10-02 ASSESSMENT — PAIN DESCRIPTION - DESCRIPTORS: DESCRIPTORS: ACHING

## 2018-10-02 ASSESSMENT — PAIN DESCRIPTION - LOCATION: LOCATION: BACK;KNEE

## 2018-10-02 ASSESSMENT — PAIN DESCRIPTION - ORIENTATION: ORIENTATION: LEFT

## 2018-10-02 NOTE — ED PROVIDER NOTES
-------------------------------------------------  All laboratory and radiology results have been personally reviewed by myself   LABS:  No results found for this visit on 10/02/18. RADIOLOGY:  Interpreted by Radiologist.  XR KNEE LEFT (3 VIEWS)    (Results Pending)   This X-Ray is independently viewed and interpreted by ep:   - Study: left knee   - Number of Views: 3   - Findings: no acute abnormality noted, some chronic degenerative changes      ------------------------- NURSING NOTES AND VITALS REVIEWED ---------------------------   The nursing notes within the ED encounter and vital signs as below have been reviewed. BP (!) 156/80   Pulse 72   Temp 99.1 °F (37.3 °C) (Oral)   Resp 16   Ht 5' 8\" (1.727 m)   Wt (!) 384 lb (174.2 kg)   SpO2 97%   BMI 58.39 kg/m²   Oxygen Saturation Interpretation: Normal      ---------------------------------------------------PHYSICAL EXAM--------------------------------------      Constitutional/General: Alert and oriented x3, well appearing, non toxic in NAD  Head: NC/AT  Eyes: PERRL, EOMI  Mouth: Oropharynx clear, handling secretions, no trismus  Neck: Supple, full ROM, no meningeal signs  Pulmonary: Lungs clear to auscultation bilaterally, no wheezes, rales, or rhonchi. Not in respiratory distress  Cardiovascular:  Regular rate and rhythm, no murmurs, gallops, or rubs. 2+ distal pulses  Abdomen: Soft, non tender, non distended,   Extremities: Moves all extremities x 4. Warm and well perfused, full range of motion of left knee. No crepitus on exam. No swelling or deformity. Pedal pulses are palpable 2+ capillary refill less than 2 seconds. No joint laxity on exam.  Skin: warm and dry without rash  Neurologic: GCS 15,  Psych: Normal Affect      ------------------------------ ED COURSE/MEDICAL DECISION MAKING----------------------  Medications   ketorolac (TORADOL) injection 60 mg (60 mg Intramuscular Given 10/2/18 2001)         Medical Decision Making:     Ace bandage applied informed of negative x-ray results advised to follow-up with the primary care physician. Counseling: The emergency provider has spoken with the patient and discussed todays results, in addition to providing specific details for the plan of care and counseling regarding the diagnosis and prognosis. Questions are answered at this time and they are agreeable with the plan.      --------------------------------- IMPRESSION AND DISPOSITION ---------------------------------    IMPRESSION  1. Fall, initial encounter    2.  Contusion of left knee, initial encounter        DISPOSITION  Disposition: Discharge to home  Patient condition is good                 SANJUANA Nix CNP  10/02/18 3180

## 2018-10-05 ENCOUNTER — OFFICE VISIT (OUTPATIENT)
Dept: FAMILY MEDICINE CLINIC | Age: 43
End: 2018-10-05
Payer: MEDICARE

## 2018-10-05 VITALS
SYSTOLIC BLOOD PRESSURE: 138 MMHG | HEART RATE: 94 BPM | WEIGHT: 293 LBS | DIASTOLIC BLOOD PRESSURE: 80 MMHG | BODY MASS INDEX: 59.76 KG/M2 | OXYGEN SATURATION: 91 %

## 2018-10-05 DIAGNOSIS — K21.9 GASTROESOPHAGEAL REFLUX DISEASE WITHOUT ESOPHAGITIS: ICD-10-CM

## 2018-10-05 DIAGNOSIS — J30.2 SEASONAL ALLERGIES: ICD-10-CM

## 2018-10-05 DIAGNOSIS — E11.9 TYPE 2 DIABETES MELLITUS WITHOUT COMPLICATION, WITHOUT LONG-TERM CURRENT USE OF INSULIN (HCC): Primary | ICD-10-CM

## 2018-10-05 DIAGNOSIS — K59.00 CONSTIPATION, UNSPECIFIED CONSTIPATION TYPE: ICD-10-CM

## 2018-10-05 DIAGNOSIS — E78.5 HYPERLIPIDEMIA ASSOCIATED WITH TYPE 2 DIABETES MELLITUS (HCC): ICD-10-CM

## 2018-10-05 DIAGNOSIS — E11.69 HYPERLIPIDEMIA ASSOCIATED WITH TYPE 2 DIABETES MELLITUS (HCC): ICD-10-CM

## 2018-10-05 DIAGNOSIS — E55.9 VITAMIN D DEFICIENCY: ICD-10-CM

## 2018-10-05 DIAGNOSIS — I10 ESSENTIAL HYPERTENSION: ICD-10-CM

## 2018-10-05 PROBLEM — M25.552 PAIN OF BOTH HIP JOINTS: Status: RESOLVED | Noted: 2018-03-20 | Resolved: 2018-10-05

## 2018-10-05 PROBLEM — M25.551 PAIN OF BOTH HIP JOINTS: Status: RESOLVED | Noted: 2018-03-20 | Resolved: 2018-10-05

## 2018-10-05 LAB — HBA1C MFR BLD: 6.3 %

## 2018-10-05 PROCEDURE — 3044F HG A1C LEVEL LT 7.0%: CPT | Performed by: PHYSICIAN ASSISTANT

## 2018-10-05 PROCEDURE — G8484 FLU IMMUNIZE NO ADMIN: HCPCS | Performed by: PHYSICIAN ASSISTANT

## 2018-10-05 PROCEDURE — 99214 OFFICE O/P EST MOD 30 MIN: CPT | Performed by: PHYSICIAN ASSISTANT

## 2018-10-05 PROCEDURE — G8427 DOCREV CUR MEDS BY ELIG CLIN: HCPCS | Performed by: PHYSICIAN ASSISTANT

## 2018-10-05 PROCEDURE — 83036 HEMOGLOBIN GLYCOSYLATED A1C: CPT | Performed by: PHYSICIAN ASSISTANT

## 2018-10-05 PROCEDURE — 1036F TOBACCO NON-USER: CPT | Performed by: PHYSICIAN ASSISTANT

## 2018-10-05 PROCEDURE — 2022F DILAT RTA XM EVC RTNOPTHY: CPT | Performed by: PHYSICIAN ASSISTANT

## 2018-10-05 PROCEDURE — G8417 CALC BMI ABV UP PARAM F/U: HCPCS | Performed by: PHYSICIAN ASSISTANT

## 2018-10-05 RX ORDER — MAGNESIUM CARB/ALUMINUM HYDROX 105-160MG
TABLET,CHEWABLE ORAL
Qty: 296 ML | Refills: 0 | Status: SHIPPED | OUTPATIENT
Start: 2018-10-05 | End: 2018-10-22 | Stop reason: ALTCHOICE

## 2018-10-05 RX ORDER — ERGOCALCIFEROL 1.25 MG/1
50000 CAPSULE ORAL WEEKLY
Qty: 4 CAPSULE | Refills: 3 | Status: SHIPPED | OUTPATIENT
Start: 2018-10-05 | End: 2019-02-01 | Stop reason: SDUPTHER

## 2018-10-05 RX ORDER — ONDANSETRON 4 MG/1
4 TABLET, FILM COATED ORAL EVERY 8 HOURS PRN
Qty: 30 TABLET | Refills: 1 | Status: ON HOLD | OUTPATIENT
Start: 2018-10-05 | End: 2019-02-17 | Stop reason: HOSPADM

## 2018-10-05 RX ORDER — FLUTICASONE PROPIONATE 50 MCG
1 SPRAY, SUSPENSION (ML) NASAL DAILY
Qty: 1 BOTTLE | Refills: 3 | Status: SHIPPED | OUTPATIENT
Start: 2018-10-05 | End: 2019-02-26 | Stop reason: SDUPTHER

## 2018-10-05 RX ORDER — PANTOPRAZOLE SODIUM 20 MG/1
20 TABLET, DELAYED RELEASE ORAL DAILY
Qty: 30 TABLET | Refills: 3 | Status: SHIPPED | OUTPATIENT
Start: 2018-10-05 | End: 2019-02-26 | Stop reason: SDUPTHER

## 2018-10-05 RX ORDER — AMLODIPINE BESYLATE 10 MG/1
10 TABLET ORAL DAILY
Qty: 30 TABLET | Refills: 3 | Status: SHIPPED | OUTPATIENT
Start: 2018-10-05 | End: 2019-10-14 | Stop reason: SDUPTHER

## 2018-10-05 RX ORDER — FUROSEMIDE 40 MG/1
40 TABLET ORAL DAILY
Qty: 60 TABLET | Refills: 3 | Status: SHIPPED | OUTPATIENT
Start: 2018-10-05 | End: 2020-01-14 | Stop reason: SDUPTHER

## 2018-10-05 NOTE — PROGRESS NOTES
in bowel habits, +constipation, diarrhea, or nausea/vomiting  Genito-Urinary: negative for - dysuria, frequency, or nocturia  Neurological: negative for - bowel and bladder control changes, dizziness, or headaches   Dermatological: negative for - dry skin, rash, hair or nail symptoms    Physical Exam:   Vitals:    10/05/18 1324   BP: 138/80   Site: Right Upper Arm   Position: Sitting   Cuff Size: Large Adult   Pulse: 94   SpO2: 91%   Weight: (!) 393 lb (178.3 kg)     Physical Exam   Constitutional: She is oriented to person, place, and time. She appears well-developed and well-nourished. No distress. HENT:   Head: Normocephalic and atraumatic. Right Ear: External ear normal.   Left Ear: External ear normal.   Nose: Nose normal.   Mouth/Throat: Oropharynx is clear and moist.   Eyes: Pupils are equal, round, and reactive to light. Conjunctivae and EOM are normal. No scleral icterus. Neck: Normal range of motion. Neck supple. No thyromegaly present. Cardiovascular: Normal rate, regular rhythm, normal heart sounds and intact distal pulses. No murmur heard. Pulmonary/Chest: Effort normal and breath sounds normal. No accessory muscle usage. No respiratory distress. She has no wheezes. Abdominal: Soft. Bowel sounds are normal. She exhibits no distension. There is no tenderness. Musculoskeletal: Normal range of motion. Neurological: She is alert and oriented to person, place, and time. Skin: Skin is warm and dry. No rash noted. Psychiatric: She has a normal mood and affect. Her speech is normal and behavior is normal.         Assessment/Plan:     Yue Mcnally was seen today for diabetes. Diagnoses and all orders for this visit:    Type 2 diabetes mellitus without complication, without long-term current use of insulin (HCC)  -     POCT glycosylated hemoglobin (Hb A1C)    Hyperlipidemia associated with type 2 diabetes mellitus (HCC)  -     vitamin D (ERGOCALCIFEROL) 73733 units CAPS capsule;  Take 1 capsule

## 2018-10-09 ENCOUNTER — INITIAL CONSULT (OUTPATIENT)
Dept: BARIATRICS/WEIGHT MGMT | Age: 43
End: 2018-10-09
Payer: MEDICARE

## 2018-10-09 DIAGNOSIS — Z71.3 NUTRITIONAL COUNSELING: Primary | ICD-10-CM

## 2018-10-09 PROCEDURE — 99999 PR OFFICE/OUTPT VISIT,PROCEDURE ONLY: CPT

## 2018-10-11 VITALS — WEIGHT: 293 LBS | HEIGHT: 69 IN | BODY MASS INDEX: 43.4 KG/M2

## 2018-10-29 ENCOUNTER — TELEPHONE (OUTPATIENT)
Dept: BARIATRICS/WEIGHT MGMT | Age: 43
End: 2018-10-29

## 2018-10-29 NOTE — TELEPHONE ENCOUNTER
Tried to [facility] for patient pertaining to fax received from Dr Lynch Screen office. Appt set for pulmonary clearance for 11/5/18 a 10am. I will send letter to address on file.

## 2018-11-02 ENCOUNTER — TELEPHONE (OUTPATIENT)
Dept: BARIATRICS/WEIGHT MGMT | Age: 43
End: 2018-11-02

## 2018-11-02 NOTE — TELEPHONE ENCOUNTER
Pt called in stating dr Fredi Mendez office has a referral for her but pt is already seeing Dr. Aleks Byrnes for pulmonary. We stated to pt that she can just continue with Dr. Aleks Byrnes and we will cancel Dr. Fredi Mendez. Pt understood and states she will call Fredi Mendez and cancel the appt.  Then call Dr Brenda Reyes and ask them since she is already seeing her to do her pulmonary clearance

## 2018-11-13 ENCOUNTER — INITIAL CONSULT (OUTPATIENT)
Dept: BARIATRICS/WEIGHT MGMT | Age: 43
End: 2018-11-13
Payer: MEDICARE

## 2018-11-13 DIAGNOSIS — Z71.3 NUTRITIONAL COUNSELING: Primary | ICD-10-CM

## 2018-11-13 PROCEDURE — 99999 PR OFFICE/OUTPT VISIT,PROCEDURE ONLY: CPT

## 2018-11-14 VITALS — WEIGHT: 293 LBS | BODY MASS INDEX: 57.69 KG/M2

## 2018-11-14 NOTE — PROGRESS NOTES
to hunger and fullness  - Rd / Ld enc meal planning  - Rd / Ld  Enc pt to chose nutrient dense whole foods instead of soft, high calorie foods  - Rd / Ld enc not dr Johnson Necessary large amounts of fluids with or immediately after meals    Portion control ,meal planning and avoiding empty calorie consumption. Rd / Ld reviewed insurance company weight loss requirement on 11/13/18. Pt verbalized understanding. Please be aware at each visit you have been instructed that in order for your insurance company to approve your surgery you must show a consistent weight loss of 2 lbs or greater at each visit. We can not guarantee an approval by your insurance company we can only provide the information given to us it is up to you the patient to show compliancy to your insurance company.   If you do gain weight during your supervised weight loss counseling sessions insurance companies starting in 2018 are denying patients for not showing consistent weight loss results when part of a supervised weight loss counseling program.

## 2018-12-04 ENCOUNTER — INITIAL CONSULT (OUTPATIENT)
Dept: BARIATRICS/WEIGHT MGMT | Age: 43
End: 2018-12-04
Payer: MEDICARE

## 2018-12-04 ENCOUNTER — TELEPHONE (OUTPATIENT)
Dept: BARIATRICS/WEIGHT MGMT | Age: 43
End: 2018-12-04

## 2018-12-04 VITALS — BODY MASS INDEX: 43.4 KG/M2 | WEIGHT: 293 LBS | HEIGHT: 69 IN

## 2018-12-04 DIAGNOSIS — Z71.6 ENCOUNTER FOR SMOKING CESSATION COUNSELING: ICD-10-CM

## 2018-12-04 DIAGNOSIS — Z71.3 DIETARY COUNSELING: Primary | ICD-10-CM

## 2018-12-04 DIAGNOSIS — E55.9 VITAMIN D DEFICIENCY: ICD-10-CM

## 2018-12-04 PROCEDURE — 99999 PR OFFICE/OUTPT VISIT,PROCEDURE ONLY: CPT | Performed by: SURGERY

## 2018-12-07 DIAGNOSIS — E11.9 TYPE 2 DIABETES MELLITUS WITHOUT COMPLICATION, WITHOUT LONG-TERM CURRENT USE OF INSULIN (HCC): ICD-10-CM

## 2018-12-10 ENCOUNTER — OFFICE VISIT (OUTPATIENT)
Dept: FAMILY MEDICINE CLINIC | Age: 43
End: 2018-12-10
Payer: MEDICARE

## 2018-12-10 VITALS
WEIGHT: 293 LBS | BODY MASS INDEX: 43.4 KG/M2 | HEIGHT: 69 IN | SYSTOLIC BLOOD PRESSURE: 126 MMHG | HEART RATE: 50 BPM | DIASTOLIC BLOOD PRESSURE: 86 MMHG | RESPIRATION RATE: 16 BRPM | TEMPERATURE: 98.2 F

## 2018-12-10 DIAGNOSIS — R05.9 COUGH: ICD-10-CM

## 2018-12-10 DIAGNOSIS — I10 ESSENTIAL HYPERTENSION: ICD-10-CM

## 2018-12-10 DIAGNOSIS — B37.31 YEAST VAGINITIS: ICD-10-CM

## 2018-12-10 DIAGNOSIS — L85.3 DRY SKIN DERMATITIS: ICD-10-CM

## 2018-12-10 DIAGNOSIS — E11.9 TYPE 2 DIABETES MELLITUS WITHOUT COMPLICATION, WITHOUT LONG-TERM CURRENT USE OF INSULIN (HCC): ICD-10-CM

## 2018-12-10 DIAGNOSIS — J45.20 MILD INTERMITTENT ASTHMA WITHOUT COMPLICATION: Primary | ICD-10-CM

## 2018-12-10 PROCEDURE — 1036F TOBACCO NON-USER: CPT | Performed by: PHYSICIAN ASSISTANT

## 2018-12-10 PROCEDURE — G8427 DOCREV CUR MEDS BY ELIG CLIN: HCPCS | Performed by: PHYSICIAN ASSISTANT

## 2018-12-10 PROCEDURE — G8417 CALC BMI ABV UP PARAM F/U: HCPCS | Performed by: PHYSICIAN ASSISTANT

## 2018-12-10 PROCEDURE — G8484 FLU IMMUNIZE NO ADMIN: HCPCS | Performed by: PHYSICIAN ASSISTANT

## 2018-12-10 PROCEDURE — 2022F DILAT RTA XM EVC RTNOPTHY: CPT | Performed by: PHYSICIAN ASSISTANT

## 2018-12-10 PROCEDURE — 3044F HG A1C LEVEL LT 7.0%: CPT | Performed by: PHYSICIAN ASSISTANT

## 2018-12-10 PROCEDURE — 99214 OFFICE O/P EST MOD 30 MIN: CPT | Performed by: PHYSICIAN ASSISTANT

## 2018-12-10 RX ORDER — FLUCONAZOLE 150 MG/1
150 TABLET ORAL ONCE
Qty: 2 TABLET | Refills: 0 | Status: SHIPPED | OUTPATIENT
Start: 2018-12-10 | End: 2018-12-10

## 2018-12-10 RX ORDER — LORATADINE 10 MG/1
10 CAPSULE, LIQUID FILLED ORAL DAILY
Qty: 30 CAPSULE | Refills: 3 | Status: SHIPPED | OUTPATIENT
Start: 2018-12-10 | End: 2019-06-07

## 2018-12-10 RX ORDER — GUAIFENESIN 400 MG/1
400 TABLET ORAL 4 TIMES DAILY PRN
Qty: 30 TABLET | Refills: 1 | Status: SHIPPED | OUTPATIENT
Start: 2018-12-10 | End: 2019-02-22

## 2018-12-10 NOTE — PROGRESS NOTES
nausea/vomiting  Genito-Urinary: negative for - dysuria, frequency, or nocturia  Musculoskeletal: negative for - gait disturbance, joint pain, muscle pain or weakness  Neurological: negative for - bowel and bladder control changes, dizziness, or headaches     Physical Exam:   Vitals:    12/10/18 0822 12/10/18 0831   BP: (!) 158/102 126/86   Pulse: 50    Resp: 16    Temp: 98.2 °F (36.8 °C)    TempSrc: Oral    Weight: (!) 405 lb (183.7 kg)    Height: 5' 8.5\" (1.74 m)      Physical Exam   Constitutional: She is oriented to person, place, and time. She appears well-developed and well-nourished. No distress. HENT:   Head: Normocephalic and atraumatic. Right Ear: External ear normal.   Left Ear: External ear normal.   Nose: Nose normal.   Mouth/Throat: Oropharynx is clear and moist.   Eyes: Pupils are equal, round, and reactive to light. Conjunctivae and EOM are normal. No scleral icterus. Neck: Normal range of motion. Neck supple. No thyromegaly present. Cardiovascular: Normal rate, regular rhythm, normal heart sounds and intact distal pulses. No murmur heard. Pulmonary/Chest: Effort normal and breath sounds normal. No accessory muscle usage. No respiratory distress. She has no wheezes. Musculoskeletal: Normal range of motion. Neurological: She is alert and oriented to person, place, and time. She has normal reflexes. Skin: Skin is warm and dry. No rash noted. Psychiatric: She has a normal mood and affect. Her speech is normal and behavior is normal.         Assessment/Plan:     Jolene Shanks was seen today for annual exam.    Diagnoses and all orders for this visit:    Mild intermittent asthma without complication  -     mometasone-formoterol (DULERA) 100-5 MCG/ACT inhaler; Inhale 2 puffs into the lungs 2 times daily Rinse mouth after using.  -     loratadine (CLARITIN) 10 MG capsule;  Take 1 capsule by mouth daily    Type 2 diabetes mellitus without complication, without long-term current use of insulin

## 2018-12-13 ENCOUNTER — HOSPITAL ENCOUNTER (OUTPATIENT)
Age: 43
Discharge: HOME | End: 2018-12-13
Payer: MEDICARE

## 2018-12-13 DIAGNOSIS — Z71.6 ENCOUNTER FOR SMOKING CESSATION COUNSELING: ICD-10-CM

## 2018-12-13 DIAGNOSIS — Z71.3 DIETARY COUNSELING: ICD-10-CM

## 2018-12-13 DIAGNOSIS — E55.9 VITAMIN D DEFICIENCY: ICD-10-CM

## 2018-12-13 LAB — VITAMIN D 25-HYDROXY: 16 NG/ML (ref 30–100)

## 2018-12-13 PROCEDURE — 82306 VITAMIN D 25 HYDROXY: CPT

## 2018-12-13 PROCEDURE — 36415 COLL VENOUS BLD VENIPUNCTURE: CPT

## 2018-12-13 PROCEDURE — G0480 DRUG TEST DEF 1-7 CLASSES: HCPCS

## 2018-12-14 ENCOUNTER — HOSPITAL ENCOUNTER (EMERGENCY)
Age: 43
Discharge: HOME | End: 2018-12-15
Attending: EMERGENCY MEDICINE
Payer: MEDICARE

## 2018-12-14 ENCOUNTER — APPOINTMENT (OUTPATIENT)
Dept: GENERAL RADIOLOGY | Age: 43
End: 2018-12-14
Payer: MEDICARE

## 2018-12-14 DIAGNOSIS — J45.901 MILD ASTHMA WITH ACUTE EXACERBATION, UNSPECIFIED WHETHER PERSISTENT: ICD-10-CM

## 2018-12-14 DIAGNOSIS — L02.92 BOIL: ICD-10-CM

## 2018-12-14 DIAGNOSIS — N39.0 URINARY TRACT INFECTION WITHOUT HEMATURIA, SITE UNSPECIFIED: Primary | ICD-10-CM

## 2018-12-14 LAB
BASOPHILS ABSOLUTE: 0.05 E9/L (ref 0–0.2)
BASOPHILS RELATIVE PERCENT: 0.6 % (ref 0–2)
EOSINOPHILS ABSOLUTE: 0.33 E9/L (ref 0.05–0.5)
EOSINOPHILS RELATIVE PERCENT: 4.2 % (ref 0–6)
HCT VFR BLD CALC: 39.8 % (ref 34–48)
HEMOGLOBIN: 12.7 G/DL (ref 11.5–15.5)
IMMATURE GRANULOCYTES #: 0.05 E9/L
IMMATURE GRANULOCYTES %: 0.6 % (ref 0–5)
LYMPHOCYTES ABSOLUTE: 2.62 E9/L (ref 1.5–4)
LYMPHOCYTES RELATIVE PERCENT: 33.1 % (ref 20–42)
MCH RBC QN AUTO: 29.1 PG (ref 26–35)
MCHC RBC AUTO-ENTMCNC: 31.9 % (ref 32–34.5)
MCV RBC AUTO: 91.1 FL (ref 80–99.9)
MONOCYTES ABSOLUTE: 0.89 E9/L (ref 0.1–0.95)
MONOCYTES RELATIVE PERCENT: 11.2 % (ref 2–12)
NEUTROPHILS ABSOLUTE: 3.98 E9/L (ref 1.8–7.3)
NEUTROPHILS RELATIVE PERCENT: 50.3 % (ref 43–80)
PDW BLD-RTO: 16.1 FL (ref 11.5–15)
PLATELET # BLD: 310 E9/L (ref 130–450)
PMV BLD AUTO: 10.9 FL (ref 7–12)
RBC # BLD: 4.37 E12/L (ref 3.5–5.5)
WBC # BLD: 7.9 E9/L (ref 4.5–11.5)

## 2018-12-14 PROCEDURE — 81001 URINALYSIS AUTO W/SCOPE: CPT

## 2018-12-14 PROCEDURE — 6370000000 HC RX 637 (ALT 250 FOR IP): Performed by: NURSE PRACTITIONER

## 2018-12-14 PROCEDURE — 94664 DEMO&/EVAL PT USE INHALER: CPT

## 2018-12-14 PROCEDURE — 36415 COLL VENOUS BLD VENIPUNCTURE: CPT

## 2018-12-14 PROCEDURE — 71046 X-RAY EXAM CHEST 2 VIEWS: CPT

## 2018-12-14 PROCEDURE — 80048 BASIC METABOLIC PNL TOTAL CA: CPT

## 2018-12-14 PROCEDURE — 83605 ASSAY OF LACTIC ACID: CPT

## 2018-12-14 PROCEDURE — 99284 EMERGENCY DEPT VISIT MOD MDM: CPT

## 2018-12-14 PROCEDURE — 85025 COMPLETE CBC W/AUTO DIFF WBC: CPT

## 2018-12-14 RX ORDER — IPRATROPIUM BROMIDE AND ALBUTEROL SULFATE 2.5; .5 MG/3ML; MG/3ML
1 SOLUTION RESPIRATORY (INHALATION) ONCE
Status: COMPLETED | OUTPATIENT
Start: 2018-12-14 | End: 2018-12-14

## 2018-12-14 RX ORDER — PREDNISONE 20 MG/1
60 TABLET ORAL ONCE
Status: COMPLETED | OUTPATIENT
Start: 2018-12-14 | End: 2018-12-14

## 2018-12-14 RX ADMIN — PREDNISONE 60 MG: 20 TABLET ORAL at 23:53

## 2018-12-14 RX ADMIN — IPRATROPIUM BROMIDE AND ALBUTEROL SULFATE 1 AMPULE: .5; 3 SOLUTION RESPIRATORY (INHALATION) at 23:36

## 2018-12-15 VITALS
TEMPERATURE: 98.2 F | WEIGHT: 293 LBS | HEIGHT: 69 IN | SYSTOLIC BLOOD PRESSURE: 181 MMHG | BODY MASS INDEX: 43.4 KG/M2 | HEART RATE: 88 BPM | DIASTOLIC BLOOD PRESSURE: 100 MMHG | RESPIRATION RATE: 18 BRPM | OXYGEN SATURATION: 96 %

## 2018-12-15 LAB
ANION GAP SERPL CALCULATED.3IONS-SCNC: 13 MMOL/L (ref 7–16)
BACTERIA: ABNORMAL /HPF
BILIRUBIN URINE: NEGATIVE
BLOOD, URINE: ABNORMAL
BUN BLDV-MCNC: 12 MG/DL (ref 6–20)
CALCIUM SERPL-MCNC: 8.8 MG/DL (ref 8.6–10.2)
CHLORIDE BLD-SCNC: 103 MMOL/L (ref 98–107)
CHP ED QC CHECK: NORMAL
CLARITY: ABNORMAL
CO2: 25 MMOL/L (ref 22–29)
COLOR: YELLOW
CREAT SERPL-MCNC: 0.9 MG/DL (ref 0.5–1)
EPITHELIAL CELLS, UA: ABNORMAL /HPF
GFR AFRICAN AMERICAN: >60
GFR NON-AFRICAN AMERICAN: >60 ML/MIN/1.73
GLUCOSE BLD-MCNC: 131 MG/DL (ref 74–99)
GLUCOSE URINE: NEGATIVE MG/DL
KETONES, URINE: NEGATIVE MG/DL
LACTIC ACID: 1.9 MMOL/L (ref 0.5–2.2)
LEUKOCYTE ESTERASE, URINE: ABNORMAL
NITRITE, URINE: NEGATIVE
PH UA: 7 (ref 5–9)
POTASSIUM REFLEX MAGNESIUM: 4 MMOL/L (ref 3.5–5)
PREGNANCY TEST URINE, POC: NORMAL
PROTEIN UA: 30 MG/DL
RBC UA: ABNORMAL /HPF (ref 0–2)
SODIUM BLD-SCNC: 141 MMOL/L (ref 132–146)
SPECIFIC GRAVITY UA: 1.02 (ref 1–1.03)
TRICHOMONAS: ABNORMAL /HPF
UROBILINOGEN, URINE: 0.2 E.U./DL
WBC UA: >20 /HPF (ref 0–5)

## 2018-12-15 PROCEDURE — 94640 AIRWAY INHALATION TREATMENT: CPT

## 2018-12-15 PROCEDURE — 6370000000 HC RX 637 (ALT 250 FOR IP): Performed by: EMERGENCY MEDICINE

## 2018-12-15 RX ORDER — IPRATROPIUM BROMIDE AND ALBUTEROL SULFATE 2.5; .5 MG/3ML; MG/3ML
1 SOLUTION RESPIRATORY (INHALATION) EVERY 4 HOURS PRN
Qty: 24 VIAL | Refills: 1 | Status: SHIPPED | OUTPATIENT
Start: 2018-12-15 | End: 2020-04-28 | Stop reason: SDUPTHER

## 2018-12-15 RX ORDER — CEPHALEXIN 500 MG/1
500 CAPSULE ORAL 4 TIMES DAILY
Qty: 40 CAPSULE | Refills: 0 | Status: SHIPPED | OUTPATIENT
Start: 2018-12-15 | End: 2018-12-25

## 2018-12-15 RX ORDER — IPRATROPIUM BROMIDE AND ALBUTEROL SULFATE 2.5; .5 MG/3ML; MG/3ML
1 SOLUTION RESPIRATORY (INHALATION) ONCE
Status: COMPLETED | OUTPATIENT
Start: 2018-12-15 | End: 2018-12-15

## 2018-12-15 RX ORDER — SULFAMETHOXAZOLE AND TRIMETHOPRIM 800; 160 MG/1; MG/1
2 TABLET ORAL 2 TIMES DAILY
Qty: 20 TABLET | Refills: 0 | Status: SHIPPED | OUTPATIENT
Start: 2018-12-15 | End: 2018-12-25

## 2018-12-15 RX ADMIN — IPRATROPIUM BROMIDE AND ALBUTEROL SULFATE 1 AMPULE: .5; 3 SOLUTION RESPIRATORY (INHALATION) at 00:40

## 2018-12-15 ASSESSMENT — ENCOUNTER SYMPTOMS
DIARRHEA: 0
ABDOMINAL PAIN: 0
SHORTNESS OF BREATH: 1
HEMOPTYSIS: 0
BLOOD IN STOOL: 0
NAUSEA: 0
BACK PAIN: 0
COUGH: 1
VOMITING: 0
WHEEZING: 1
SPUTUM PRODUCTION: 0
CONSTIPATION: 0

## 2018-12-15 NOTE — ED NOTES
FIRST PROVIDER CONTACT ASSESSMENT NOTE      Department of Emergency Medicine   12/14/18  10:17 PM    Chief Complaint: Asthma (\"acting up\" x1 week) and Abscess (right axilla x2 weeks)      History of Present Illness:    Shelia Walton is a 41 y.o. female who presents to the ED by private car for Having a possible abscess under her right arm multiples ×2 weeks when has open and busted but there are others that are bothering her. She is also having worsening of her asthma with wheezing never spiked a home breathing treatment. Focused Screening Exam:  Constitutional:  Alert, appears stated age and is in no distress. *ALLERGIES*     Food; Fish-derived products; Pcn [penicillins];  Ultram [tramadol hcl]; and Cashews [macadamia nut oil]     Vitals:    12/14/18 2216   BP: (!) 181/89   Pulse: 93   Resp: 18   Temp: 98.6 °F (37 °C)   TempSrc: Oral   SpO2: 96%   Weight: (!) 405 lb (183.7 kg)   Height: 5' 8.5\" (1.74 m)            Initial Plan of Care:  Initiate Treatment-Testing, Proceed toTreatment Area When Bed Available for ED Attending/MLP to Continue Care    -----------------END OF FIRST PROVIDER CONTACT ASSESSMENT NOTE--------------  Electronically signed by SANJUANA Guillermo CNP   DD: 12/14/18       SANJUANA Guillermo CNP  12/14/18 5567

## 2018-12-18 LAB
3-OH-COTININE: <2 NG/ML
COTININE: 6 NG/ML
NICOTINE: <2 NG/ML

## 2018-12-19 ENCOUNTER — INITIAL CONSULT (OUTPATIENT)
Dept: BARIATRICS/WEIGHT MGMT | Age: 43
End: 2018-12-19
Payer: MEDICARE

## 2018-12-19 VITALS — BODY MASS INDEX: 58.14 KG/M2 | WEIGHT: 293 LBS

## 2018-12-19 DIAGNOSIS — Z71.3 DIETARY COUNSELING: Primary | ICD-10-CM

## 2018-12-19 PROCEDURE — 99999 PR OFFICE/OUTPT VISIT,PROCEDURE ONLY: CPT | Performed by: SURGERY

## 2018-12-21 ENCOUNTER — OFFICE VISIT (OUTPATIENT)
Dept: BARIATRICS/WEIGHT MGMT | Age: 43
End: 2018-12-21
Payer: MEDICARE

## 2018-12-21 ENCOUNTER — TELEPHONE (OUTPATIENT)
Dept: BARIATRICS/WEIGHT MGMT | Age: 43
End: 2018-12-21

## 2018-12-21 DIAGNOSIS — E55.9 VITAMIN D DEFICIENCY: Primary | ICD-10-CM

## 2018-12-21 PROCEDURE — G8428 CUR MEDS NOT DOCUMENT: HCPCS | Performed by: INTERNAL MEDICINE

## 2018-12-21 PROCEDURE — G8417 CALC BMI ABV UP PARAM F/U: HCPCS | Performed by: INTERNAL MEDICINE

## 2018-12-21 PROCEDURE — 99211 OFF/OP EST MAY X REQ PHY/QHP: CPT

## 2018-12-21 PROCEDURE — 99201 PR OFFICE OUTPATIENT NEW 10 MINUTES: CPT | Performed by: INTERNAL MEDICINE

## 2018-12-21 PROCEDURE — 1036F TOBACCO NON-USER: CPT | Performed by: INTERNAL MEDICINE

## 2018-12-21 PROCEDURE — G8484 FLU IMMUNIZE NO ADMIN: HCPCS | Performed by: INTERNAL MEDICINE

## 2018-12-21 RX ORDER — CHOLECALCIFEROL (VITAMIN D3) 1250 MCG
CAPSULE ORAL
Qty: 12 CAPSULE | Refills: 0 | Status: SHIPPED | OUTPATIENT
Start: 2018-12-21 | End: 2019-03-19

## 2018-12-21 NOTE — PATIENT INSTRUCTIONS
Begin taking Vitamin D3 50,000 IU, one capsule 3 each week for 4 weeks. Return to see Dr. Germán Currie in 5 weeks  Repeat Vit D 25OH level 2-3 days prior to appointment with Dr. Germán Currie  Do not have the above lab drawn on the same day as you take one of the vitamin D capsules.

## 2019-01-02 ENCOUNTER — TELEPHONE (OUTPATIENT)
Dept: BARIATRICS/WEIGHT MGMT | Age: 44
End: 2019-01-02

## 2019-01-09 ENCOUNTER — PREP FOR PROCEDURE (OUTPATIENT)
Dept: SURGERY | Age: 44
End: 2019-01-09

## 2019-01-09 RX ORDER — SODIUM CHLORIDE 0.9 % (FLUSH) 0.9 %
10 SYRINGE (ML) INJECTION PRN
Status: CANCELLED | OUTPATIENT
Start: 2019-01-09

## 2019-01-09 RX ORDER — SODIUM CHLORIDE, SODIUM LACTATE, POTASSIUM CHLORIDE, CALCIUM CHLORIDE 600; 310; 30; 20 MG/100ML; MG/100ML; MG/100ML; MG/100ML
INJECTION, SOLUTION INTRAVENOUS CONTINUOUS
Status: CANCELLED | OUTPATIENT
Start: 2019-01-09

## 2019-01-09 RX ORDER — ONDANSETRON 2 MG/ML
4 INJECTION INTRAMUSCULAR; INTRAVENOUS ONCE
Status: CANCELLED | OUTPATIENT
Start: 2019-01-09 | End: 2019-01-09

## 2019-01-09 RX ORDER — SODIUM CHLORIDE 0.9 % (FLUSH) 0.9 %
10 SYRINGE (ML) INJECTION EVERY 12 HOURS SCHEDULED
Status: CANCELLED | OUTPATIENT
Start: 2019-01-09

## 2019-01-09 RX ORDER — SCOLOPAMINE TRANSDERMAL SYSTEM 1 MG/1
1 PATCH, EXTENDED RELEASE TRANSDERMAL
Status: CANCELLED | OUTPATIENT
Start: 2019-01-09 | End: 2019-01-12

## 2019-01-14 ENCOUNTER — INITIAL CONSULT (OUTPATIENT)
Dept: BARIATRICS/WEIGHT MGMT | Age: 44
End: 2019-01-14
Payer: MEDICARE

## 2019-01-14 VITALS — WEIGHT: 293 LBS | BODY MASS INDEX: 43.4 KG/M2 | HEIGHT: 69 IN

## 2019-01-14 DIAGNOSIS — Z71.3 NUTRITIONAL COUNSELING: Primary | ICD-10-CM

## 2019-01-14 PROCEDURE — 99999 PR OFFICE/OUTPT VISIT,PROCEDURE ONLY: CPT

## 2019-01-25 DIAGNOSIS — M54.50 ACUTE BILATERAL LOW BACK PAIN WITHOUT SCIATICA: ICD-10-CM

## 2019-01-25 RX ORDER — VARENICLINE TARTRATE
KIT
Qty: 53 TABLET | Refills: 0 | Status: SHIPPED | OUTPATIENT
Start: 2019-01-25 | End: 2019-02-22

## 2019-01-25 RX ORDER — DICLOFENAC SODIUM 75 MG/1
TABLET, DELAYED RELEASE ORAL
Qty: 60 TABLET | Refills: 0 | Status: SHIPPED | OUTPATIENT
Start: 2019-01-25 | End: 2019-02-07

## 2019-01-28 ENCOUNTER — HOSPITAL ENCOUNTER (OUTPATIENT)
Age: 44
Discharge: HOME | End: 2019-01-28
Payer: MEDICARE

## 2019-01-28 DIAGNOSIS — E55.9 VITAMIN D DEFICIENCY: ICD-10-CM

## 2019-01-28 LAB — VITAMIN D 25-HYDROXY: 32 NG/ML (ref 30–100)

## 2019-01-28 PROCEDURE — 82306 VITAMIN D 25 HYDROXY: CPT

## 2019-01-28 PROCEDURE — 36415 COLL VENOUS BLD VENIPUNCTURE: CPT

## 2019-01-29 ENCOUNTER — OFFICE VISIT (OUTPATIENT)
Dept: FAMILY MEDICINE CLINIC | Age: 44
End: 2019-01-29
Payer: MEDICARE

## 2019-01-29 VITALS
BODY MASS INDEX: 44.41 KG/M2 | WEIGHT: 293 LBS | RESPIRATION RATE: 16 BRPM | TEMPERATURE: 97.9 F | DIASTOLIC BLOOD PRESSURE: 92 MMHG | HEIGHT: 68 IN | SYSTOLIC BLOOD PRESSURE: 168 MMHG | HEART RATE: 52 BPM

## 2019-01-29 DIAGNOSIS — I10 ESSENTIAL HYPERTENSION: ICD-10-CM

## 2019-01-29 DIAGNOSIS — E11.9 TYPE 2 DIABETES MELLITUS WITHOUT COMPLICATION, WITHOUT LONG-TERM CURRENT USE OF INSULIN (HCC): ICD-10-CM

## 2019-01-29 DIAGNOSIS — G47.33 OSA (OBSTRUCTIVE SLEEP APNEA): ICD-10-CM

## 2019-01-29 DIAGNOSIS — E11.69 HYPERLIPIDEMIA ASSOCIATED WITH TYPE 2 DIABETES MELLITUS (HCC): ICD-10-CM

## 2019-01-29 DIAGNOSIS — E78.5 HYPERLIPIDEMIA ASSOCIATED WITH TYPE 2 DIABETES MELLITUS (HCC): ICD-10-CM

## 2019-01-29 DIAGNOSIS — E55.9 VITAMIN D DEFICIENCY: ICD-10-CM

## 2019-01-29 DIAGNOSIS — N76.0 ACUTE VAGINITIS: ICD-10-CM

## 2019-01-29 DIAGNOSIS — Z01.818 PRE-OP EXAM: Primary | ICD-10-CM

## 2019-01-29 PROCEDURE — 93000 ELECTROCARDIOGRAM COMPLETE: CPT | Performed by: PHYSICIAN ASSISTANT

## 2019-01-29 PROCEDURE — G8484 FLU IMMUNIZE NO ADMIN: HCPCS | Performed by: PHYSICIAN ASSISTANT

## 2019-01-29 PROCEDURE — G8417 CALC BMI ABV UP PARAM F/U: HCPCS | Performed by: PHYSICIAN ASSISTANT

## 2019-01-29 PROCEDURE — G8427 DOCREV CUR MEDS BY ELIG CLIN: HCPCS | Performed by: PHYSICIAN ASSISTANT

## 2019-01-29 PROCEDURE — 99214 OFFICE O/P EST MOD 30 MIN: CPT | Performed by: PHYSICIAN ASSISTANT

## 2019-01-29 PROCEDURE — 2022F DILAT RTA XM EVC RTNOPTHY: CPT | Performed by: PHYSICIAN ASSISTANT

## 2019-01-29 PROCEDURE — 1036F TOBACCO NON-USER: CPT | Performed by: PHYSICIAN ASSISTANT

## 2019-01-29 PROCEDURE — 3046F HEMOGLOBIN A1C LEVEL >9.0%: CPT | Performed by: PHYSICIAN ASSISTANT

## 2019-01-29 RX ORDER — FLUCONAZOLE 150 MG/1
150 TABLET ORAL ONCE
Qty: 2 TABLET | Refills: 0 | Status: SHIPPED | OUTPATIENT
Start: 2019-01-29 | End: 2019-01-29

## 2019-01-29 RX ORDER — MUPIROCIN CALCIUM 20 MG/G
CREAM TOPICAL
Qty: 30 G | Refills: 0 | Status: ON HOLD | OUTPATIENT
Start: 2019-01-29 | End: 2019-02-17 | Stop reason: HOSPADM

## 2019-01-29 ASSESSMENT — PATIENT HEALTH QUESTIONNAIRE - PHQ9
SUM OF ALL RESPONSES TO PHQ9 QUESTIONS 1 & 2: 0
1. LITTLE INTEREST OR PLEASURE IN DOING THINGS: 0
SUM OF ALL RESPONSES TO PHQ QUESTIONS 1-9: 0
2. FEELING DOWN, DEPRESSED OR HOPELESS: 0
SUM OF ALL RESPONSES TO PHQ QUESTIONS 1-9: 0

## 2019-01-30 ENCOUNTER — TELEPHONE (OUTPATIENT)
Dept: FAMILY MEDICINE CLINIC | Age: 44
End: 2019-01-30

## 2019-02-01 ENCOUNTER — OFFICE VISIT (OUTPATIENT)
Dept: BARIATRICS/WEIGHT MGMT | Age: 44
End: 2019-02-01
Payer: MEDICARE

## 2019-02-01 DIAGNOSIS — E11.69 HYPERLIPIDEMIA ASSOCIATED WITH TYPE 2 DIABETES MELLITUS (HCC): ICD-10-CM

## 2019-02-01 DIAGNOSIS — E55.9 VITAMIN D DEFICIENCY: Primary | ICD-10-CM

## 2019-02-01 DIAGNOSIS — E78.5 HYPERLIPIDEMIA ASSOCIATED WITH TYPE 2 DIABETES MELLITUS (HCC): ICD-10-CM

## 2019-02-01 PROCEDURE — G8417 CALC BMI ABV UP PARAM F/U: HCPCS | Performed by: INTERNAL MEDICINE

## 2019-02-01 PROCEDURE — 1036F TOBACCO NON-USER: CPT | Performed by: INTERNAL MEDICINE

## 2019-02-01 PROCEDURE — G8428 CUR MEDS NOT DOCUMENT: HCPCS | Performed by: INTERNAL MEDICINE

## 2019-02-01 PROCEDURE — 3046F HEMOGLOBIN A1C LEVEL >9.0%: CPT | Performed by: INTERNAL MEDICINE

## 2019-02-01 PROCEDURE — 99213 OFFICE O/P EST LOW 20 MIN: CPT | Performed by: INTERNAL MEDICINE

## 2019-02-01 PROCEDURE — 2022F DILAT RTA XM EVC RTNOPTHY: CPT | Performed by: INTERNAL MEDICINE

## 2019-02-01 PROCEDURE — 99211 OFF/OP EST MAY X REQ PHY/QHP: CPT

## 2019-02-01 PROCEDURE — G8484 FLU IMMUNIZE NO ADMIN: HCPCS | Performed by: INTERNAL MEDICINE

## 2019-02-01 RX ORDER — ERGOCALCIFEROL 1.25 MG/1
CAPSULE ORAL
Qty: 9 CAPSULE | Refills: 0 | Status: ON HOLD | OUTPATIENT
Start: 2019-02-01 | End: 2019-02-17 | Stop reason: HOSPADM

## 2019-02-07 ENCOUNTER — APPOINTMENT (OUTPATIENT)
Dept: GENERAL RADIOLOGY | Age: 44
End: 2019-02-07
Payer: MEDICARE

## 2019-02-07 ENCOUNTER — TELEPHONE (OUTPATIENT)
Dept: FAMILY MEDICINE CLINIC | Age: 44
End: 2019-02-07

## 2019-02-07 ENCOUNTER — HOSPITAL ENCOUNTER (EMERGENCY)
Age: 44
Discharge: HOME | End: 2019-02-07
Attending: EMERGENCY MEDICINE
Payer: MEDICARE

## 2019-02-07 VITALS
OXYGEN SATURATION: 96 % | BODY MASS INDEX: 44.41 KG/M2 | DIASTOLIC BLOOD PRESSURE: 90 MMHG | HEIGHT: 68 IN | RESPIRATION RATE: 16 BRPM | HEART RATE: 83 BPM | WEIGHT: 293 LBS | SYSTOLIC BLOOD PRESSURE: 143 MMHG | TEMPERATURE: 98.7 F

## 2019-02-07 DIAGNOSIS — B34.9 VIRAL ILLNESS: Primary | ICD-10-CM

## 2019-02-07 DIAGNOSIS — R52 GENERALIZED BODY ACHES: ICD-10-CM

## 2019-02-07 LAB
ALBUMIN SERPL-MCNC: 3.7 G/DL (ref 3.5–5.2)
ALP BLD-CCNC: 81 U/L (ref 35–104)
ALT SERPL-CCNC: 15 U/L (ref 0–32)
ANION GAP SERPL CALCULATED.3IONS-SCNC: 12 MMOL/L (ref 7–16)
AST SERPL-CCNC: 16 U/L (ref 0–31)
BASOPHILS ABSOLUTE: 0.05 E9/L (ref 0–0.2)
BASOPHILS RELATIVE PERCENT: 0.5 % (ref 0–2)
BILIRUB SERPL-MCNC: 0.3 MG/DL (ref 0–1.2)
BILIRUBIN URINE: NEGATIVE
BLOOD, URINE: NEGATIVE
BUN BLDV-MCNC: 13 MG/DL (ref 6–20)
CALCIUM SERPL-MCNC: 9.3 MG/DL (ref 8.6–10.2)
CHLORIDE BLD-SCNC: 105 MMOL/L (ref 98–107)
CLARITY: CLEAR
CO2: 25 MMOL/L (ref 22–29)
COLOR: YELLOW
CREAT SERPL-MCNC: 0.9 MG/DL (ref 0.5–1)
EOSINOPHILS ABSOLUTE: 0.28 E9/L (ref 0.05–0.5)
EOSINOPHILS RELATIVE PERCENT: 2.6 % (ref 0–6)
GFR AFRICAN AMERICAN: >60
GFR NON-AFRICAN AMERICAN: >60 ML/MIN/1.73
GLUCOSE BLD-MCNC: 124 MG/DL (ref 74–99)
GLUCOSE URINE: NEGATIVE MG/DL
HCG, URINE, POC: NEGATIVE
HCT VFR BLD CALC: 43.2 % (ref 34–48)
HEMOGLOBIN: 13.4 G/DL (ref 11.5–15.5)
IMMATURE GRANULOCYTES #: 0.05 E9/L
IMMATURE GRANULOCYTES %: 0.5 % (ref 0–5)
INFLUENZA A BY PCR: NOT DETECTED
INFLUENZA B BY PCR: NOT DETECTED
KETONES, URINE: NEGATIVE MG/DL
LEUKOCYTE ESTERASE, URINE: NEGATIVE
LYMPHOCYTES ABSOLUTE: 3.59 E9/L (ref 1.5–4)
LYMPHOCYTES RELATIVE PERCENT: 32.9 % (ref 20–42)
Lab: NORMAL
MCH RBC QN AUTO: 28.1 PG (ref 26–35)
MCHC RBC AUTO-ENTMCNC: 31 % (ref 32–34.5)
MCV RBC AUTO: 90.6 FL (ref 80–99.9)
MONOCYTES ABSOLUTE: 0.54 E9/L (ref 0.1–0.95)
MONOCYTES RELATIVE PERCENT: 4.9 % (ref 2–12)
NEGATIVE QC PASS/FAIL: NORMAL
NEUTROPHILS ABSOLUTE: 6.41 E9/L (ref 1.8–7.3)
NEUTROPHILS RELATIVE PERCENT: 58.6 % (ref 43–80)
NITRITE, URINE: NEGATIVE
PDW BLD-RTO: 16.2 FL (ref 11.5–15)
PH UA: 6 (ref 5–9)
PLATELET # BLD: 299 E9/L (ref 130–450)
PMV BLD AUTO: 11.2 FL (ref 7–12)
POSITIVE QC PASS/FAIL: NORMAL
POTASSIUM SERPL-SCNC: 3.8 MMOL/L (ref 3.5–5)
PROTEIN UA: NEGATIVE MG/DL
RBC # BLD: 4.77 E12/L (ref 3.5–5.5)
SODIUM BLD-SCNC: 142 MMOL/L (ref 132–146)
SPECIFIC GRAVITY UA: 1.02 (ref 1–1.03)
TOTAL PROTEIN: 7.5 G/DL (ref 6.4–8.3)
UROBILINOGEN, URINE: 0.2 E.U./DL
WBC # BLD: 10.9 E9/L (ref 4.5–11.5)

## 2019-02-07 PROCEDURE — 96374 THER/PROPH/DIAG INJ IV PUSH: CPT

## 2019-02-07 PROCEDURE — 71046 X-RAY EXAM CHEST 2 VIEWS: CPT

## 2019-02-07 PROCEDURE — 87088 URINE BACTERIA CULTURE: CPT

## 2019-02-07 PROCEDURE — 87502 INFLUENZA DNA AMP PROBE: CPT

## 2019-02-07 PROCEDURE — 36415 COLL VENOUS BLD VENIPUNCTURE: CPT

## 2019-02-07 PROCEDURE — 80053 COMPREHEN METABOLIC PANEL: CPT

## 2019-02-07 PROCEDURE — 85025 COMPLETE CBC W/AUTO DIFF WBC: CPT

## 2019-02-07 PROCEDURE — 6360000002 HC RX W HCPCS: Performed by: PHYSICIAN ASSISTANT

## 2019-02-07 PROCEDURE — 99284 EMERGENCY DEPT VISIT MOD MDM: CPT

## 2019-02-07 PROCEDURE — 6370000000 HC RX 637 (ALT 250 FOR IP): Performed by: PHYSICIAN ASSISTANT

## 2019-02-07 PROCEDURE — 81003 URINALYSIS AUTO W/O SCOPE: CPT

## 2019-02-07 PROCEDURE — 2580000003 HC RX 258: Performed by: PHYSICIAN ASSISTANT

## 2019-02-07 RX ORDER — PROMETHAZINE HYDROCHLORIDE 25 MG/ML
12.5 INJECTION, SOLUTION INTRAMUSCULAR; INTRAVENOUS ONCE
Status: COMPLETED | OUTPATIENT
Start: 2019-02-07 | End: 2019-02-07

## 2019-02-07 RX ORDER — MELOXICAM 7.5 MG/1
15 TABLET ORAL DAILY
Qty: 10 TABLET | Refills: 0 | Status: ON HOLD | OUTPATIENT
Start: 2019-02-07 | End: 2019-02-17 | Stop reason: HOSPADM

## 2019-02-07 RX ORDER — METHYLPREDNISOLONE 4 MG/1
TABLET ORAL
Qty: 1 KIT | Refills: 0 | Status: ON HOLD | OUTPATIENT
Start: 2019-02-07 | End: 2019-02-17 | Stop reason: HOSPADM

## 2019-02-07 RX ORDER — DEXTROMETHORPHAN HYDROBROMIDE AND PROMETHAZINE HYDROCHLORIDE 15; 6.25 MG/5ML; MG/5ML
5 SYRUP ORAL 4 TIMES DAILY PRN
Qty: 273 ML | Refills: 0 | Status: SHIPPED | OUTPATIENT
Start: 2019-02-07 | End: 2019-02-14

## 2019-02-07 RX ORDER — OSELTAMIVIR PHOSPHATE 75 MG/1
75 CAPSULE ORAL 2 TIMES DAILY
Qty: 10 CAPSULE | Refills: 0 | Status: ON HOLD | OUTPATIENT
Start: 2019-02-07 | End: 2019-02-16 | Stop reason: HOSPADM

## 2019-02-07 RX ORDER — 0.9 % SODIUM CHLORIDE 0.9 %
1000 INTRAVENOUS SOLUTION INTRAVENOUS ONCE
Status: COMPLETED | OUTPATIENT
Start: 2019-02-07 | End: 2019-02-07

## 2019-02-07 RX ORDER — OXYCODONE HYDROCHLORIDE AND ACETAMINOPHEN 5; 325 MG/1; MG/1
1 TABLET ORAL ONCE
Status: COMPLETED | OUTPATIENT
Start: 2019-02-07 | End: 2019-02-07

## 2019-02-07 RX ADMIN — OXYCODONE AND ACETAMINOPHEN 1 TABLET: 5; 325 TABLET ORAL at 16:21

## 2019-02-07 RX ADMIN — SODIUM CHLORIDE 1000 ML: 9 INJECTION, SOLUTION INTRAVENOUS at 16:21

## 2019-02-07 RX ADMIN — PROMETHAZINE HYDROCHLORIDE 12.5 MG: 25 INJECTION INTRAMUSCULAR; INTRAVENOUS at 16:21

## 2019-02-07 ASSESSMENT — PAIN DESCRIPTION - FREQUENCY: FREQUENCY: CONTINUOUS

## 2019-02-07 ASSESSMENT — PAIN DESCRIPTION - PAIN TYPE: TYPE: ACUTE PAIN

## 2019-02-07 ASSESSMENT — PAIN DESCRIPTION - DESCRIPTORS: DESCRIPTORS: ACHING

## 2019-02-07 ASSESSMENT — PAIN DESCRIPTION - LOCATION: LOCATION: GENERALIZED

## 2019-02-07 ASSESSMENT — PAIN SCALES - GENERAL: PAINLEVEL_OUTOF10: 10

## 2019-02-09 LAB — URINE CULTURE, ROUTINE: NORMAL

## 2019-02-11 ENCOUNTER — HOSPITAL ENCOUNTER (INPATIENT)
Age: 44
LOS: 5 days | Discharge: HOME | DRG: 720 | End: 2019-02-17
Attending: INTERNAL MEDICINE | Admitting: INTERNAL MEDICINE
Payer: MEDICARE

## 2019-02-11 ENCOUNTER — APPOINTMENT (OUTPATIENT)
Dept: GENERAL RADIOLOGY | Age: 44
DRG: 720 | End: 2019-02-11
Payer: MEDICARE

## 2019-02-11 DIAGNOSIS — J18.9 COMMUNITY ACQUIRED PNEUMONIA OF RIGHT MIDDLE LOBE OF LUNG: Primary | ICD-10-CM

## 2019-02-11 DIAGNOSIS — R09.02 HYPOXEMIA: ICD-10-CM

## 2019-02-11 DIAGNOSIS — J45.20 MILD INTERMITTENT ASTHMA WITHOUT COMPLICATION: ICD-10-CM

## 2019-02-11 LAB
ALBUMIN SERPL-MCNC: 3.8 G/DL (ref 3.5–5.2)
ALP BLD-CCNC: 82 U/L (ref 35–104)
ALT SERPL-CCNC: 16 U/L (ref 0–32)
ANION GAP SERPL CALCULATED.3IONS-SCNC: 12 MMOL/L (ref 7–16)
AST SERPL-CCNC: 13 U/L (ref 0–31)
BACTERIA: ABNORMAL /HPF
BASOPHILS ABSOLUTE: 0.02 E9/L (ref 0–0.2)
BASOPHILS RELATIVE PERCENT: 0.2 % (ref 0–2)
BILIRUB SERPL-MCNC: 0.7 MG/DL (ref 0–1.2)
BILIRUBIN URINE: NEGATIVE
BLOOD, URINE: ABNORMAL
BUN BLDV-MCNC: 9 MG/DL (ref 6–20)
CALCIUM SERPL-MCNC: 8.7 MG/DL (ref 8.6–10.2)
CHLORIDE BLD-SCNC: 100 MMOL/L (ref 98–107)
CLARITY: CLEAR
CO2: 24 MMOL/L (ref 22–29)
COLOR: YELLOW
CREAT SERPL-MCNC: 1 MG/DL (ref 0.5–1)
EOSINOPHILS ABSOLUTE: 0.01 E9/L (ref 0.05–0.5)
EOSINOPHILS RELATIVE PERCENT: 0.1 % (ref 0–6)
EPITHELIAL CELLS, UA: ABNORMAL /HPF
GFR AFRICAN AMERICAN: >60
GFR NON-AFRICAN AMERICAN: >60 ML/MIN/1.73
GLUCOSE BLD-MCNC: 124 MG/DL (ref 74–99)
GLUCOSE URINE: NEGATIVE MG/DL
HCG, URINE, POC: NEGATIVE
HCT VFR BLD CALC: 39.8 % (ref 34–48)
HEMOGLOBIN: 12.7 G/DL (ref 11.5–15.5)
IMMATURE GRANULOCYTES #: 0.05 E9/L
IMMATURE GRANULOCYTES %: 0.5 % (ref 0–5)
INFLUENZA A BY PCR: NOT DETECTED
INFLUENZA B BY PCR: NOT DETECTED
KETONES, URINE: NEGATIVE MG/DL
LACTIC ACID: 1 MMOL/L (ref 0.5–2.2)
LEUKOCYTE ESTERASE, URINE: ABNORMAL
LIPASE: 9 U/L (ref 13–60)
LYMPHOCYTES ABSOLUTE: 1.25 E9/L (ref 1.5–4)
LYMPHOCYTES RELATIVE PERCENT: 11.8 % (ref 20–42)
Lab: NORMAL
MCH RBC QN AUTO: 28.9 PG (ref 26–35)
MCHC RBC AUTO-ENTMCNC: 31.9 % (ref 32–34.5)
MCV RBC AUTO: 90.5 FL (ref 80–99.9)
MONO TEST: NEGATIVE
MONOCYTES ABSOLUTE: 0.46 E9/L (ref 0.1–0.95)
MONOCYTES RELATIVE PERCENT: 4.4 % (ref 2–12)
NEGATIVE QC PASS/FAIL: NORMAL
NEUTROPHILS ABSOLUTE: 8.76 E9/L (ref 1.8–7.3)
NEUTROPHILS RELATIVE PERCENT: 83 % (ref 43–80)
NITRITE, URINE: NEGATIVE
PDW BLD-RTO: 16.3 FL (ref 11.5–15)
PH UA: 6.5 (ref 5–9)
PLATELET # BLD: 256 E9/L (ref 130–450)
PMV BLD AUTO: 11 FL (ref 7–12)
POSITIVE QC PASS/FAIL: NORMAL
POTASSIUM SERPL-SCNC: 3.8 MMOL/L (ref 3.5–5)
PROTEIN UA: ABNORMAL MG/DL
RBC # BLD: 4.4 E12/L (ref 3.5–5.5)
RBC UA: ABNORMAL /HPF (ref 0–2)
SODIUM BLD-SCNC: 136 MMOL/L (ref 132–146)
SPECIFIC GRAVITY UA: <=1.005 (ref 1–1.03)
STREP GRP A PCR: NEGATIVE
TOTAL PROTEIN: 7.6 G/DL (ref 6.4–8.3)
TROPONIN: 0.02 NG/ML (ref 0–0.03)
UROBILINOGEN, URINE: 0.2 E.U./DL
WBC # BLD: 10.6 E9/L (ref 4.5–11.5)
WBC UA: ABNORMAL /HPF (ref 0–5)

## 2019-02-11 PROCEDURE — 87088 URINE BACTERIA CULTURE: CPT

## 2019-02-11 PROCEDURE — 96375 TX/PRO/DX INJ NEW DRUG ADDON: CPT

## 2019-02-11 PROCEDURE — 36415 COLL VENOUS BLD VENIPUNCTURE: CPT

## 2019-02-11 PROCEDURE — 93005 ELECTROCARDIOGRAM TRACING: CPT | Performed by: NURSE PRACTITIONER

## 2019-02-11 PROCEDURE — 80053 COMPREHEN METABOLIC PANEL: CPT

## 2019-02-11 PROCEDURE — 87880 STREP A ASSAY W/OPTIC: CPT

## 2019-02-11 PROCEDURE — 71045 X-RAY EXAM CHEST 1 VIEW: CPT

## 2019-02-11 PROCEDURE — 87633 RESP VIRUS 12-25 TARGETS: CPT

## 2019-02-11 PROCEDURE — 84145 PROCALCITONIN (PCT): CPT

## 2019-02-11 PROCEDURE — 83690 ASSAY OF LIPASE: CPT

## 2019-02-11 PROCEDURE — 84484 ASSAY OF TROPONIN QUANT: CPT

## 2019-02-11 PROCEDURE — 87502 INFLUENZA DNA AMP PROBE: CPT

## 2019-02-11 PROCEDURE — 87581 M.PNEUMON DNA AMP PROBE: CPT

## 2019-02-11 PROCEDURE — 96374 THER/PROPH/DIAG INJ IV PUSH: CPT

## 2019-02-11 PROCEDURE — 83605 ASSAY OF LACTIC ACID: CPT

## 2019-02-11 PROCEDURE — 87486 CHLMYD PNEUM DNA AMP PROBE: CPT

## 2019-02-11 PROCEDURE — 74018 RADEX ABDOMEN 1 VIEW: CPT

## 2019-02-11 PROCEDURE — 99285 EMERGENCY DEPT VISIT HI MDM: CPT

## 2019-02-11 PROCEDURE — 6360000002 HC RX W HCPCS: Performed by: NURSE PRACTITIONER

## 2019-02-11 PROCEDURE — 87450 HC DIRECT STREP B ANTIGEN: CPT

## 2019-02-11 PROCEDURE — 87040 BLOOD CULTURE FOR BACTERIA: CPT

## 2019-02-11 PROCEDURE — 85025 COMPLETE CBC W/AUTO DIFF WBC: CPT

## 2019-02-11 PROCEDURE — 86308 HETEROPHILE ANTIBODY SCREEN: CPT

## 2019-02-11 PROCEDURE — 2580000003 HC RX 258: Performed by: NURSE PRACTITIONER

## 2019-02-11 PROCEDURE — 87798 DETECT AGENT NOS DNA AMP: CPT

## 2019-02-11 PROCEDURE — 6370000000 HC RX 637 (ALT 250 FOR IP): Performed by: NURSE PRACTITIONER

## 2019-02-11 PROCEDURE — 81001 URINALYSIS AUTO W/SCOPE: CPT

## 2019-02-11 RX ORDER — IPRATROPIUM BROMIDE AND ALBUTEROL SULFATE 2.5; .5 MG/3ML; MG/3ML
1 SOLUTION RESPIRATORY (INHALATION) ONCE
Status: COMPLETED | OUTPATIENT
Start: 2019-02-11 | End: 2019-02-11

## 2019-02-11 RX ORDER — ONDANSETRON 2 MG/ML
4 INJECTION INTRAMUSCULAR; INTRAVENOUS ONCE
Status: COMPLETED | OUTPATIENT
Start: 2019-02-11 | End: 2019-02-11

## 2019-02-11 RX ORDER — DOXYCYCLINE HYCLATE 100 MG/1
100 CAPSULE ORAL ONCE
Status: COMPLETED | OUTPATIENT
Start: 2019-02-11 | End: 2019-02-12

## 2019-02-11 RX ORDER — METHYLPREDNISOLONE SODIUM SUCCINATE 125 MG/2ML
125 INJECTION, POWDER, LYOPHILIZED, FOR SOLUTION INTRAMUSCULAR; INTRAVENOUS ONCE
Status: COMPLETED | OUTPATIENT
Start: 2019-02-11 | End: 2019-02-11

## 2019-02-11 RX ORDER — KETOROLAC TROMETHAMINE 30 MG/ML
30 INJECTION, SOLUTION INTRAMUSCULAR; INTRAVENOUS ONCE
Status: COMPLETED | OUTPATIENT
Start: 2019-02-11 | End: 2019-02-11

## 2019-02-11 RX ORDER — 0.9 % SODIUM CHLORIDE 0.9 %
1000 INTRAVENOUS SOLUTION INTRAVENOUS ONCE
Status: COMPLETED | OUTPATIENT
Start: 2019-02-11 | End: 2019-02-12

## 2019-02-11 RX ORDER — ACETAMINOPHEN 500 MG
1000 TABLET ORAL ONCE
Status: COMPLETED | OUTPATIENT
Start: 2019-02-11 | End: 2019-02-11

## 2019-02-11 RX ADMIN — KETOROLAC TROMETHAMINE 30 MG: 30 INJECTION, SOLUTION INTRAMUSCULAR; INTRAVENOUS at 23:10

## 2019-02-11 RX ADMIN — METHYLPREDNISOLONE SODIUM SUCCINATE 125 MG: 125 INJECTION, POWDER, FOR SOLUTION INTRAMUSCULAR; INTRAVENOUS at 23:10

## 2019-02-11 RX ADMIN — SODIUM CHLORIDE 1000 ML: 9 INJECTION, SOLUTION INTRAVENOUS at 23:10

## 2019-02-11 RX ADMIN — ACETAMINOPHEN 1000 MG: 500 TABLET ORAL at 22:22

## 2019-02-11 RX ADMIN — IPRATROPIUM BROMIDE AND ALBUTEROL SULFATE 1 AMPULE: .5; 3 SOLUTION RESPIRATORY (INHALATION) at 22:22

## 2019-02-11 RX ADMIN — ONDANSETRON 4 MG: 2 INJECTION INTRAMUSCULAR; INTRAVENOUS at 23:10

## 2019-02-11 ASSESSMENT — PAIN SCALES - GENERAL: PAINLEVEL_OUTOF10: 10

## 2019-02-11 ASSESSMENT — PAIN DESCRIPTION - PAIN TYPE: TYPE: ACUTE PAIN

## 2019-02-11 ASSESSMENT — PAIN DESCRIPTION - FREQUENCY: FREQUENCY: CONTINUOUS

## 2019-02-11 ASSESSMENT — PAIN DESCRIPTION - DESCRIPTORS: DESCRIPTORS: ACHING

## 2019-02-11 ASSESSMENT — PAIN DESCRIPTION - LOCATION: LOCATION: GENERALIZED

## 2019-02-12 ENCOUNTER — APPOINTMENT (OUTPATIENT)
Dept: CT IMAGING | Age: 44
DRG: 720 | End: 2019-02-12
Payer: MEDICARE

## 2019-02-12 ENCOUNTER — TELEPHONE (OUTPATIENT)
Dept: BARIATRICS/WEIGHT MGMT | Age: 44
End: 2019-02-12

## 2019-02-12 PROBLEM — J18.9 PNEUMONIA: Status: ACTIVE | Noted: 2019-02-12

## 2019-02-12 LAB
FILM ARRAY ADENOVIRUS: ABNORMAL
FILM ARRAY BORDETELLA PERTUSSIS: ABNORMAL
FILM ARRAY CHLAMYDOPHILIA PNEUMONIAE: ABNORMAL
FILM ARRAY CORONAVIRUS 229E: ABNORMAL
FILM ARRAY CORONAVIRUS HKU1: ABNORMAL
FILM ARRAY CORONAVIRUS NL63: ABNORMAL
FILM ARRAY CORONAVIRUS OC43: ABNORMAL
FILM ARRAY INFLUENZA A VIRUS 09H1: ABNORMAL
FILM ARRAY INFLUENZA A VIRUS H1: ABNORMAL
FILM ARRAY INFLUENZA A VIRUS H3: ABNORMAL
FILM ARRAY INFLUENZA A VIRUS: ABNORMAL
FILM ARRAY INFLUENZA B: ABNORMAL
FILM ARRAY MYCOPLASMA PNEUMONIAE: ABNORMAL
FILM ARRAY PARAINFLUENZA VIRUS 1: ABNORMAL
FILM ARRAY PARAINFLUENZA VIRUS 2: ABNORMAL
FILM ARRAY PARAINFLUENZA VIRUS 3: ABNORMAL
FILM ARRAY PARAINFLUENZA VIRUS 4: ABNORMAL
FILM ARRAY RESPIRATORY SYNCITIAL VIRUS: ABNORMAL
FILM ARRAY RHINOVIRUS/ENTEROVIRUS: ABNORMAL
L. PNEUMOPHILA SEROGP 1 UR AG: NORMAL
METER GLUCOSE: 160 MG/DL (ref 74–99)
METER GLUCOSE: 181 MG/DL (ref 74–99)
METER GLUCOSE: 286 MG/DL (ref 74–99)
ORGANISM: ABNORMAL
PROCALCITONIN: 0.17 NG/ML (ref 0–0.08)
STREP PNEUMONIAE ANTIGEN, URINE: NORMAL

## 2019-02-12 PROCEDURE — 2580000003 HC RX 258: Performed by: NURSE PRACTITIONER

## 2019-02-12 PROCEDURE — 2580000003 HC RX 258: Performed by: INTERNAL MEDICINE

## 2019-02-12 PROCEDURE — 94664 DEMO&/EVAL PT USE INHALER: CPT

## 2019-02-12 PROCEDURE — 94660 CPAP INITIATION&MGMT: CPT

## 2019-02-12 PROCEDURE — 6370000000 HC RX 637 (ALT 250 FOR IP): Performed by: NURSE PRACTITIONER

## 2019-02-12 PROCEDURE — 99220 PR INITIAL OBSERVATION CARE/DAY 70 MINUTES: CPT | Performed by: INTERNAL MEDICINE

## 2019-02-12 PROCEDURE — 6370000000 HC RX 637 (ALT 250 FOR IP): Performed by: INTERNAL MEDICINE

## 2019-02-12 PROCEDURE — 1200000000 HC SEMI PRIVATE

## 2019-02-12 PROCEDURE — 6360000002 HC RX W HCPCS: Performed by: INTERNAL MEDICINE

## 2019-02-12 PROCEDURE — 2700000000 HC OXYGEN THERAPY PER DAY

## 2019-02-12 PROCEDURE — 94640 AIRWAY INHALATION TREATMENT: CPT

## 2019-02-12 PROCEDURE — 82962 GLUCOSE BLOOD TEST: CPT

## 2019-02-12 PROCEDURE — 2500000003 HC RX 250 WO HCPCS: Performed by: INTERNAL MEDICINE

## 2019-02-12 PROCEDURE — 71275 CT ANGIOGRAPHY CHEST: CPT

## 2019-02-12 PROCEDURE — 6360000004 HC RX CONTRAST MEDICATION: Performed by: INTERNAL MEDICINE

## 2019-02-12 RX ORDER — ACETAMINOPHEN 500 MG
500 TABLET ORAL EVERY 6 HOURS PRN
Status: DISCONTINUED | OUTPATIENT
Start: 2019-02-12 | End: 2019-02-17 | Stop reason: HOSPADM

## 2019-02-12 RX ORDER — SODIUM CHLORIDE 0.9 % (FLUSH) 0.9 %
10 SYRINGE (ML) INJECTION EVERY 12 HOURS SCHEDULED
Status: DISCONTINUED | OUTPATIENT
Start: 2019-02-12 | End: 2019-02-12 | Stop reason: SDUPTHER

## 2019-02-12 RX ORDER — HYDROCHLOROTHIAZIDE 12.5 MG/1
12.5 TABLET ORAL DAILY
Status: DISCONTINUED | OUTPATIENT
Start: 2019-02-12 | End: 2019-02-17 | Stop reason: HOSPADM

## 2019-02-12 RX ORDER — TIZANIDINE 4 MG/1
4 TABLET ORAL EVERY 8 HOURS PRN
Status: DISCONTINUED | OUTPATIENT
Start: 2019-02-12 | End: 2019-02-17 | Stop reason: HOSPADM

## 2019-02-12 RX ORDER — SODIUM CHLORIDE 0.9 % (FLUSH) 0.9 %
10 SYRINGE (ML) INJECTION EVERY 12 HOURS SCHEDULED
Status: DISCONTINUED | OUTPATIENT
Start: 2019-02-12 | End: 2019-02-17 | Stop reason: HOSPADM

## 2019-02-12 RX ORDER — LISINOPRIL AND HYDROCHLOROTHIAZIDE 12.5; 1 MG/1; MG/1
1 TABLET ORAL DAILY
Status: DISCONTINUED | OUTPATIENT
Start: 2019-02-12 | End: 2019-02-12 | Stop reason: CLARIF

## 2019-02-12 RX ORDER — 0.9 % SODIUM CHLORIDE 0.9 %
1000 INTRAVENOUS SOLUTION INTRAVENOUS ONCE
Status: COMPLETED | OUTPATIENT
Start: 2019-02-12 | End: 2019-02-12

## 2019-02-12 RX ORDER — ACETAMINOPHEN 325 MG/1
650 TABLET ORAL EVERY 4 HOURS PRN
Status: DISCONTINUED | OUTPATIENT
Start: 2019-02-12 | End: 2019-02-12 | Stop reason: SDUPTHER

## 2019-02-12 RX ORDER — CETIRIZINE HYDROCHLORIDE 10 MG/1
5 TABLET ORAL DAILY
Status: DISCONTINUED | OUTPATIENT
Start: 2019-02-12 | End: 2019-02-17 | Stop reason: HOSPADM

## 2019-02-12 RX ORDER — DOXYCYCLINE HYCLATE 100 MG/1
100 CAPSULE ORAL EVERY 12 HOURS SCHEDULED
Status: DISCONTINUED | OUTPATIENT
Start: 2019-02-12 | End: 2019-02-12

## 2019-02-12 RX ORDER — LISINOPRIL 10 MG/1
10 TABLET ORAL DAILY
Status: DISCONTINUED | OUTPATIENT
Start: 2019-02-12 | End: 2019-02-17 | Stop reason: HOSPADM

## 2019-02-12 RX ORDER — PANTOPRAZOLE SODIUM 20 MG/1
20 TABLET, DELAYED RELEASE ORAL DAILY
Status: DISCONTINUED | OUTPATIENT
Start: 2019-02-12 | End: 2019-02-17 | Stop reason: HOSPADM

## 2019-02-12 RX ORDER — ACETAMINOPHEN 325 MG/1
650 TABLET ORAL EVERY 4 HOURS PRN
Status: DISCONTINUED | OUTPATIENT
Start: 2019-02-12 | End: 2019-02-17 | Stop reason: HOSPADM

## 2019-02-12 RX ORDER — SODIUM CHLORIDE 0.9 % (FLUSH) 0.9 %
10 SYRINGE (ML) INJECTION PRN
Status: DISCONTINUED | OUTPATIENT
Start: 2019-02-12 | End: 2019-02-17 | Stop reason: HOSPADM

## 2019-02-12 RX ORDER — KETOROLAC TROMETHAMINE 30 MG/ML
15 INJECTION, SOLUTION INTRAMUSCULAR; INTRAVENOUS EVERY 6 HOURS
Status: COMPLETED | OUTPATIENT
Start: 2019-02-12 | End: 2019-02-12

## 2019-02-12 RX ORDER — DEXTROSE MONOHYDRATE 25 G/50ML
12.5 INJECTION, SOLUTION INTRAVENOUS PRN
Status: DISCONTINUED | OUTPATIENT
Start: 2019-02-12 | End: 2019-02-17 | Stop reason: HOSPADM

## 2019-02-12 RX ORDER — SIMVASTATIN 10 MG
10 TABLET ORAL NIGHTLY
Status: DISCONTINUED | OUTPATIENT
Start: 2019-02-12 | End: 2019-02-17 | Stop reason: HOSPADM

## 2019-02-12 RX ORDER — DEXTROMETHORPHAN HYDROBROMIDE AND PROMETHAZINE HYDROCHLORIDE 15; 6.25 MG/5ML; MG/5ML
5 SYRUP ORAL 4 TIMES DAILY PRN
Status: DISCONTINUED | OUTPATIENT
Start: 2019-02-12 | End: 2019-02-17 | Stop reason: HOSPADM

## 2019-02-12 RX ORDER — HYDRALAZINE HYDROCHLORIDE 25 MG/1
25 TABLET, FILM COATED ORAL EVERY 8 HOURS SCHEDULED
Status: DISCONTINUED | OUTPATIENT
Start: 2019-02-12 | End: 2019-02-15

## 2019-02-12 RX ORDER — AMLODIPINE BESYLATE 10 MG/1
10 TABLET ORAL DAILY
Status: DISCONTINUED | OUTPATIENT
Start: 2019-02-12 | End: 2019-02-17 | Stop reason: HOSPADM

## 2019-02-12 RX ORDER — HYDROCODONE BITARTRATE AND ACETAMINOPHEN 7.5; 325 MG/1; MG/1
1 TABLET ORAL EVERY 8 HOURS PRN
Status: DISCONTINUED | OUTPATIENT
Start: 2019-02-12 | End: 2019-02-17 | Stop reason: HOSPADM

## 2019-02-12 RX ORDER — IPRATROPIUM BROMIDE AND ALBUTEROL SULFATE 2.5; .5 MG/3ML; MG/3ML
1 SOLUTION RESPIRATORY (INHALATION) EVERY 4 HOURS PRN
Status: DISCONTINUED | OUTPATIENT
Start: 2019-02-12 | End: 2019-02-17 | Stop reason: HOSPADM

## 2019-02-12 RX ORDER — METHYLPREDNISOLONE SODIUM SUCCINATE 40 MG/ML
40 INJECTION, POWDER, LYOPHILIZED, FOR SOLUTION INTRAMUSCULAR; INTRAVENOUS DAILY
Status: DISCONTINUED | OUTPATIENT
Start: 2019-02-12 | End: 2019-02-14

## 2019-02-12 RX ORDER — DEXTROSE MONOHYDRATE 50 MG/ML
100 INJECTION, SOLUTION INTRAVENOUS PRN
Status: DISCONTINUED | OUTPATIENT
Start: 2019-02-12 | End: 2019-02-17 | Stop reason: HOSPADM

## 2019-02-12 RX ORDER — ONDANSETRON 4 MG/1
4 TABLET, FILM COATED ORAL EVERY 8 HOURS PRN
Status: DISCONTINUED | OUTPATIENT
Start: 2019-02-12 | End: 2019-02-12

## 2019-02-12 RX ORDER — SODIUM CHLORIDE 0.9 % (FLUSH) 0.9 %
10 SYRINGE (ML) INJECTION 2 TIMES DAILY
Status: DISCONTINUED | OUTPATIENT
Start: 2019-02-12 | End: 2019-02-12 | Stop reason: SDUPTHER

## 2019-02-12 RX ORDER — LEVOFLOXACIN 5 MG/ML
750 INJECTION, SOLUTION INTRAVENOUS EVERY 24 HOURS
Status: DISCONTINUED | OUTPATIENT
Start: 2019-02-12 | End: 2019-02-12

## 2019-02-12 RX ORDER — NICOTINE 21 MG/24HR
1 PATCH, TRANSDERMAL 24 HOURS TRANSDERMAL DAILY
Status: DISCONTINUED | OUTPATIENT
Start: 2019-02-12 | End: 2019-02-17 | Stop reason: HOSPADM

## 2019-02-12 RX ORDER — NICOTINE POLACRILEX 4 MG
15 LOZENGE BUCCAL PRN
Status: DISCONTINUED | OUTPATIENT
Start: 2019-02-12 | End: 2019-02-17 | Stop reason: HOSPADM

## 2019-02-12 RX ORDER — SODIUM CHLORIDE 0.9 % (FLUSH) 0.9 %
10 SYRINGE (ML) INJECTION PRN
Status: DISCONTINUED | OUTPATIENT
Start: 2019-02-12 | End: 2019-02-12 | Stop reason: SDUPTHER

## 2019-02-12 RX ORDER — FLUTICASONE PROPIONATE 50 MCG
1 SPRAY, SUSPENSION (ML) NASAL DAILY
Status: DISCONTINUED | OUTPATIENT
Start: 2019-02-12 | End: 2019-02-17 | Stop reason: HOSPADM

## 2019-02-12 RX ORDER — SENNA AND DOCUSATE SODIUM 50; 8.6 MG/1; MG/1
2 TABLET, FILM COATED ORAL 2 TIMES DAILY
Status: DISCONTINUED | OUTPATIENT
Start: 2019-02-12 | End: 2019-02-17 | Stop reason: HOSPADM

## 2019-02-12 RX ADMIN — FLUTICASONE PROPIONATE 1 SPRAY: 50 SPRAY, METERED NASAL at 09:03

## 2019-02-12 RX ADMIN — KETOROLAC TROMETHAMINE 15 MG: 30 INJECTION, SOLUTION INTRAMUSCULAR; INTRAVENOUS at 13:40

## 2019-02-12 RX ADMIN — SENNOSIDES AND DOCUSATE SODIUM 2 TABLET: 8.6; 5 TABLET ORAL at 09:06

## 2019-02-12 RX ADMIN — INSULIN LISPRO 1 UNITS: 100 INJECTION, SOLUTION INTRAVENOUS; SUBCUTANEOUS at 22:00

## 2019-02-12 RX ADMIN — SODIUM CHLORIDE 1000 ML: 9 INJECTION, SOLUTION INTRAVENOUS at 03:11

## 2019-02-12 RX ADMIN — CEFTRIAXONE 1 G: 1 INJECTION, POWDER, FOR SOLUTION INTRAMUSCULAR; INTRAVENOUS at 12:22

## 2019-02-12 RX ADMIN — LISINOPRIL 10 MG: 10 TABLET ORAL at 09:05

## 2019-02-12 RX ADMIN — SODIUM CHLORIDE 1000 ML: 9 INJECTION, SOLUTION INTRAVENOUS at 01:06

## 2019-02-12 RX ADMIN — DOXYCYCLINE HYCLATE 100 MG: 100 CAPSULE ORAL at 00:22

## 2019-02-12 RX ADMIN — HYDRALAZINE HYDROCHLORIDE 25 MG: 25 TABLET, FILM COATED ORAL at 13:41

## 2019-02-12 RX ADMIN — HYDROCODONE BITARTRATE AND ACETAMINOPHEN 1 TABLET: 7.5; 325 TABLET ORAL at 09:04

## 2019-02-12 RX ADMIN — DOXYCYCLINE 100 MG: 100 INJECTION, POWDER, LYOPHILIZED, FOR SOLUTION INTRAVENOUS at 21:40

## 2019-02-12 RX ADMIN — INSULIN LISPRO 3 UNITS: 100 INJECTION, SOLUTION INTRAVENOUS; SUBCUTANEOUS at 12:23

## 2019-02-12 RX ADMIN — Medication 10 ML: at 02:06

## 2019-02-12 RX ADMIN — MAGNESIUM CITRATE 296 ML: 1.75 LIQUID ORAL at 13:40

## 2019-02-12 RX ADMIN — MOMETASONE FUROATE AND FORMOTEROL FUMARATE DIHYDRATE 2 PUFF: 100; 5 AEROSOL RESPIRATORY (INHALATION) at 19:58

## 2019-02-12 RX ADMIN — Medication 10 ML: at 12:22

## 2019-02-12 RX ADMIN — HYDRALAZINE HYDROCHLORIDE 25 MG: 25 TABLET, FILM COATED ORAL at 21:40

## 2019-02-12 RX ADMIN — KETOROLAC TROMETHAMINE 15 MG: 30 INJECTION, SOLUTION INTRAMUSCULAR; INTRAVENOUS at 09:06

## 2019-02-12 RX ADMIN — HYDRALAZINE HYDROCHLORIDE 25 MG: 25 TABLET, FILM COATED ORAL at 09:05

## 2019-02-12 RX ADMIN — HYDROCHLOROTHIAZIDE 12.5 MG: 12.5 TABLET ORAL at 09:04

## 2019-02-12 RX ADMIN — KETOROLAC TROMETHAMINE 15 MG: 30 INJECTION, SOLUTION INTRAMUSCULAR; INTRAVENOUS at 22:00

## 2019-02-12 RX ADMIN — ENOXAPARIN SODIUM 40 MG: 40 INJECTION SUBCUTANEOUS at 09:04

## 2019-02-12 RX ADMIN — DOXYCYCLINE HYCLATE 100 MG: 100 CAPSULE ORAL at 09:05

## 2019-02-12 RX ADMIN — AMLODIPINE BESYLATE 10 MG: 10 TABLET ORAL at 09:05

## 2019-02-12 RX ADMIN — IPRATROPIUM BROMIDE AND ALBUTEROL SULFATE 1 AMPULE: .5; 3 SOLUTION RESPIRATORY (INHALATION) at 03:16

## 2019-02-12 RX ADMIN — PANTOPRAZOLE SODIUM 20 MG: 20 TABLET, DELAYED RELEASE ORAL at 10:33

## 2019-02-12 RX ADMIN — INSULIN LISPRO 1 UNITS: 100 INJECTION, SOLUTION INTRAVENOUS; SUBCUTANEOUS at 17:32

## 2019-02-12 RX ADMIN — CETIRIZINE HYDROCHLORIDE 5 MG: 10 TABLET, FILM COATED ORAL at 09:05

## 2019-02-12 RX ADMIN — IOPAMIDOL 100 ML: 755 INJECTION, SOLUTION INTRAVENOUS at 02:14

## 2019-02-12 RX ADMIN — METHYLPREDNISOLONE SODIUM SUCCINATE 40 MG: 40 INJECTION, POWDER, LYOPHILIZED, FOR SOLUTION INTRAMUSCULAR; INTRAVENOUS at 09:06

## 2019-02-12 RX ADMIN — Medication 10 ML: at 13:41

## 2019-02-12 RX ADMIN — SENNOSIDES AND DOCUSATE SODIUM 2 TABLET: 8.6; 5 TABLET ORAL at 21:40

## 2019-02-12 RX ADMIN — MOMETASONE FUROATE AND FORMOTEROL FUMARATE DIHYDRATE 2 PUFF: 100; 5 AEROSOL RESPIRATORY (INHALATION) at 07:58

## 2019-02-12 RX ADMIN — SIMVASTATIN 10 MG: 10 TABLET, FILM COATED ORAL at 21:39

## 2019-02-12 RX ADMIN — HYDROCODONE BITARTRATE AND ACETAMINOPHEN 1 TABLET: 7.5; 325 TABLET ORAL at 17:31

## 2019-02-12 RX ADMIN — Medication 10 ML: at 09:07

## 2019-02-12 ASSESSMENT — PAIN DESCRIPTION - FREQUENCY
FREQUENCY: CONTINUOUS

## 2019-02-12 ASSESSMENT — PAIN DESCRIPTION - DESCRIPTORS
DESCRIPTORS: ACHING;DISCOMFORT

## 2019-02-12 ASSESSMENT — PAIN DESCRIPTION - LOCATION
LOCATION: GENERALIZED
LOCATION: BACK
LOCATION: GENERALIZED
LOCATION: GENERALIZED

## 2019-02-12 ASSESSMENT — PAIN SCALES - GENERAL
PAINLEVEL_OUTOF10: 10
PAINLEVEL_OUTOF10: 0
PAINLEVEL_OUTOF10: 0
PAINLEVEL_OUTOF10: 10
PAINLEVEL_OUTOF10: 9
PAINLEVEL_OUTOF10: 10
PAINLEVEL_OUTOF10: 0
PAINLEVEL_OUTOF10: 6

## 2019-02-12 ASSESSMENT — PAIN DESCRIPTION - PAIN TYPE
TYPE: CHRONIC PAIN

## 2019-02-12 ASSESSMENT — PAIN - FUNCTIONAL ASSESSMENT
PAIN_FUNCTIONAL_ASSESSMENT: ACTIVITIES ARE NOT PREVENTED

## 2019-02-13 LAB
EKG ATRIAL RATE: 116 BPM
EKG P AXIS: 59 DEGREES
EKG P-R INTERVAL: 136 MS
EKG Q-T INTERVAL: 372 MS
EKG QRS DURATION: 156 MS
EKG QTC CALCULATION (BAZETT): 517 MS
EKG R AXIS: -3 DEGREES
EKG T AXIS: 45 DEGREES
EKG VENTRICULAR RATE: 116 BPM
METER GLUCOSE: 117 MG/DL (ref 74–99)
METER GLUCOSE: 119 MG/DL (ref 74–99)
METER GLUCOSE: 124 MG/DL (ref 74–99)
METER GLUCOSE: 135 MG/DL (ref 74–99)
URINE CULTURE, ROUTINE: NORMAL

## 2019-02-13 PROCEDURE — 6360000002 HC RX W HCPCS: Performed by: INTERNAL MEDICINE

## 2019-02-13 PROCEDURE — 1200000000 HC SEMI PRIVATE

## 2019-02-13 PROCEDURE — 94660 CPAP INITIATION&MGMT: CPT

## 2019-02-13 PROCEDURE — 2580000003 HC RX 258: Performed by: INTERNAL MEDICINE

## 2019-02-13 PROCEDURE — 6370000000 HC RX 637 (ALT 250 FOR IP): Performed by: NURSE PRACTITIONER

## 2019-02-13 PROCEDURE — 2500000003 HC RX 250 WO HCPCS: Performed by: INTERNAL MEDICINE

## 2019-02-13 PROCEDURE — 6370000000 HC RX 637 (ALT 250 FOR IP): Performed by: INTERNAL MEDICINE

## 2019-02-13 PROCEDURE — 94640 AIRWAY INHALATION TREATMENT: CPT

## 2019-02-13 PROCEDURE — 2700000000 HC OXYGEN THERAPY PER DAY

## 2019-02-13 PROCEDURE — 99233 SBSQ HOSP IP/OBS HIGH 50: CPT | Performed by: INTERNAL MEDICINE

## 2019-02-13 PROCEDURE — 82962 GLUCOSE BLOOD TEST: CPT

## 2019-02-13 RX ORDER — BUDESONIDE 0.5 MG/2ML
500 INHALANT ORAL 2 TIMES DAILY
Status: DISCONTINUED | OUTPATIENT
Start: 2019-02-13 | End: 2019-02-17 | Stop reason: HOSPADM

## 2019-02-13 RX ORDER — BENZONATATE 100 MG/1
100 CAPSULE ORAL 3 TIMES DAILY
Status: DISCONTINUED | OUTPATIENT
Start: 2019-02-13 | End: 2019-02-17 | Stop reason: HOSPADM

## 2019-02-13 RX ORDER — FORMOTEROL FUMARATE 20 UG/2ML
20 SOLUTION RESPIRATORY (INHALATION) EVERY 12 HOURS
Status: DISCONTINUED | OUTPATIENT
Start: 2019-02-13 | End: 2019-02-17 | Stop reason: HOSPADM

## 2019-02-13 RX ORDER — GABAPENTIN 300 MG/1
600 CAPSULE ORAL 3 TIMES DAILY
Status: DISCONTINUED | OUTPATIENT
Start: 2019-02-13 | End: 2019-02-17 | Stop reason: HOSPADM

## 2019-02-13 RX ADMIN — Medication 10 ML: at 09:52

## 2019-02-13 RX ADMIN — SENNOSIDES AND DOCUSATE SODIUM 2 TABLET: 8.6; 5 TABLET ORAL at 21:29

## 2019-02-13 RX ADMIN — HYDRALAZINE HYDROCHLORIDE 25 MG: 25 TABLET, FILM COATED ORAL at 14:16

## 2019-02-13 RX ADMIN — FLUTICASONE PROPIONATE 1 SPRAY: 50 SPRAY, METERED NASAL at 10:11

## 2019-02-13 RX ADMIN — FORMOTEROL FUMARATE DIHYDRATE 20 MCG: 20 SOLUTION RESPIRATORY (INHALATION) at 19:34

## 2019-02-13 RX ADMIN — HYDROCODONE BITARTRATE AND ACETAMINOPHEN 1 TABLET: 7.5; 325 TABLET ORAL at 14:16

## 2019-02-13 RX ADMIN — CEFTRIAXONE 1 G: 1 INJECTION, POWDER, FOR SOLUTION INTRAMUSCULAR; INTRAVENOUS at 12:28

## 2019-02-13 RX ADMIN — PANTOPRAZOLE SODIUM 20 MG: 20 TABLET, DELAYED RELEASE ORAL at 09:50

## 2019-02-13 RX ADMIN — METHYLPREDNISOLONE SODIUM SUCCINATE 40 MG: 40 INJECTION, POWDER, LYOPHILIZED, FOR SOLUTION INTRAMUSCULAR; INTRAVENOUS at 09:51

## 2019-02-13 RX ADMIN — DOXYCYCLINE 100 MG: 100 INJECTION, POWDER, LYOPHILIZED, FOR SOLUTION INTRAVENOUS at 21:28

## 2019-02-13 RX ADMIN — ENOXAPARIN SODIUM 40 MG: 40 INJECTION SUBCUTANEOUS at 09:50

## 2019-02-13 RX ADMIN — IPRATROPIUM BROMIDE AND ALBUTEROL SULFATE 1 AMPULE: .5; 3 SOLUTION RESPIRATORY (INHALATION) at 06:50

## 2019-02-13 RX ADMIN — TIZANIDINE 4 MG: 4 TABLET ORAL at 10:21

## 2019-02-13 RX ADMIN — CETIRIZINE HYDROCHLORIDE 5 MG: 10 TABLET, FILM COATED ORAL at 09:51

## 2019-02-13 RX ADMIN — HYDROCHLOROTHIAZIDE 12.5 MG: 12.5 TABLET ORAL at 09:50

## 2019-02-13 RX ADMIN — BUDESONIDE 500 MCG: 0.5 SUSPENSION RESPIRATORY (INHALATION) at 19:34

## 2019-02-13 RX ADMIN — DEXTROMETHORPHAN HYDROBROMIDE AND PROMETHAZINE HYDROCHLORIDE 5 ML: 15; 6.25 SOLUTION ORAL at 10:13

## 2019-02-13 RX ADMIN — GABAPENTIN 600 MG: 300 CAPSULE ORAL at 21:29

## 2019-02-13 RX ADMIN — GABAPENTIN 600 MG: 300 CAPSULE ORAL at 17:03

## 2019-02-13 RX ADMIN — SENNOSIDES AND DOCUSATE SODIUM 2 TABLET: 8.6; 5 TABLET ORAL at 09:51

## 2019-02-13 RX ADMIN — DOXYCYCLINE 100 MG: 100 INJECTION, POWDER, LYOPHILIZED, FOR SOLUTION INTRAVENOUS at 09:50

## 2019-02-13 RX ADMIN — HYDRALAZINE HYDROCHLORIDE 25 MG: 25 TABLET, FILM COATED ORAL at 06:16

## 2019-02-13 RX ADMIN — IPRATROPIUM BROMIDE AND ALBUTEROL SULFATE 1 AMPULE: .5; 3 SOLUTION RESPIRATORY (INHALATION) at 14:32

## 2019-02-13 RX ADMIN — HYDRALAZINE HYDROCHLORIDE 25 MG: 25 TABLET, FILM COATED ORAL at 21:29

## 2019-02-13 RX ADMIN — MAGNESIUM HYDROXIDE 30 ML: 400 SUSPENSION ORAL at 12:28

## 2019-02-13 RX ADMIN — BENZONATATE 100 MG: 100 CAPSULE ORAL at 14:16

## 2019-02-13 RX ADMIN — SIMVASTATIN 10 MG: 10 TABLET, FILM COATED ORAL at 21:29

## 2019-02-13 RX ADMIN — AMLODIPINE BESYLATE 10 MG: 10 TABLET ORAL at 09:51

## 2019-02-13 RX ADMIN — MOMETASONE FUROATE AND FORMOTEROL FUMARATE DIHYDRATE 2 PUFF: 100; 5 AEROSOL RESPIRATORY (INHALATION) at 07:34

## 2019-02-13 RX ADMIN — HYDROCODONE BITARTRATE AND ACETAMINOPHEN 1 TABLET: 7.5; 325 TABLET ORAL at 06:16

## 2019-02-13 RX ADMIN — LISINOPRIL 10 MG: 10 TABLET ORAL at 09:51

## 2019-02-13 RX ADMIN — BENZONATATE 100 MG: 100 CAPSULE ORAL at 21:29

## 2019-02-13 ASSESSMENT — PAIN DESCRIPTION - PROGRESSION: CLINICAL_PROGRESSION: GRADUALLY IMPROVING

## 2019-02-13 ASSESSMENT — PAIN SCALES - GENERAL
PAINLEVEL_OUTOF10: 10
PAINLEVEL_OUTOF10: 10
PAINLEVEL_OUTOF10: 0
PAINLEVEL_OUTOF10: 3

## 2019-02-13 ASSESSMENT — PAIN - FUNCTIONAL ASSESSMENT: PAIN_FUNCTIONAL_ASSESSMENT: ACTIVITIES ARE NOT PREVENTED

## 2019-02-13 ASSESSMENT — PAIN DESCRIPTION - FREQUENCY: FREQUENCY: CONTINUOUS

## 2019-02-13 ASSESSMENT — PAIN DESCRIPTION - PAIN TYPE: TYPE: ACUTE PAIN

## 2019-02-13 ASSESSMENT — PAIN DESCRIPTION - LOCATION: LOCATION: GENERALIZED

## 2019-02-13 ASSESSMENT — PAIN DESCRIPTION - ONSET: ONSET: ON-GOING

## 2019-02-13 ASSESSMENT — PAIN DESCRIPTION - DESCRIPTORS: DESCRIPTORS: ACHING;CONSTANT;DISCOMFORT

## 2019-02-14 ENCOUNTER — APPOINTMENT (OUTPATIENT)
Dept: GENERAL RADIOLOGY | Age: 44
DRG: 720 | End: 2019-02-14
Payer: MEDICARE

## 2019-02-14 LAB
ALBUMIN SERPL-MCNC: 3.7 G/DL (ref 3.5–5.2)
ALP BLD-CCNC: 77 U/L (ref 35–104)
ALT SERPL-CCNC: 35 U/L (ref 0–32)
ANION GAP SERPL CALCULATED.3IONS-SCNC: 11 MMOL/L (ref 7–16)
AST SERPL-CCNC: 45 U/L (ref 0–31)
BASOPHILS ABSOLUTE: 0.02 E9/L (ref 0–0.2)
BASOPHILS RELATIVE PERCENT: 0.2 % (ref 0–2)
BILIRUB SERPL-MCNC: 0.4 MG/DL (ref 0–1.2)
BUN BLDV-MCNC: 19 MG/DL (ref 6–20)
CALCIUM SERPL-MCNC: 8.7 MG/DL (ref 8.6–10.2)
CHLORIDE BLD-SCNC: 99 MMOL/L (ref 98–107)
CO2: 26 MMOL/L (ref 22–29)
CREAT SERPL-MCNC: 1 MG/DL (ref 0.5–1)
EOSINOPHILS ABSOLUTE: 0 E9/L (ref 0.05–0.5)
EOSINOPHILS RELATIVE PERCENT: 0 % (ref 0–6)
GFR AFRICAN AMERICAN: >60
GFR NON-AFRICAN AMERICAN: >60 ML/MIN/1.73
GLUCOSE BLD-MCNC: 132 MG/DL (ref 74–99)
HCT VFR BLD CALC: 40.2 % (ref 34–48)
HEMOGLOBIN: 12.7 G/DL (ref 11.5–15.5)
IMMATURE GRANULOCYTES #: 0.04 E9/L
IMMATURE GRANULOCYTES %: 0.4 % (ref 0–5)
LACTIC ACID: 0.9 MMOL/L (ref 0.5–2.2)
LYMPHOCYTES ABSOLUTE: 0.98 E9/L (ref 1.5–4)
LYMPHOCYTES RELATIVE PERCENT: 9.2 % (ref 20–42)
MCH RBC QN AUTO: 28.7 PG (ref 26–35)
MCHC RBC AUTO-ENTMCNC: 31.6 % (ref 32–34.5)
MCV RBC AUTO: 91 FL (ref 80–99.9)
METER GLUCOSE: 107 MG/DL (ref 74–99)
METER GLUCOSE: 136 MG/DL (ref 74–99)
METER GLUCOSE: 152 MG/DL (ref 74–99)
METER GLUCOSE: 165 MG/DL (ref 74–99)
MONOCYTES ABSOLUTE: 0.4 E9/L (ref 0.1–0.95)
MONOCYTES RELATIVE PERCENT: 3.7 % (ref 2–12)
NEUTROPHILS ABSOLUTE: 9.24 E9/L (ref 1.8–7.3)
NEUTROPHILS RELATIVE PERCENT: 86.5 % (ref 43–80)
PDW BLD-RTO: 16.3 FL (ref 11.5–15)
PLATELET # BLD: 287 E9/L (ref 130–450)
PMV BLD AUTO: 12 FL (ref 7–12)
POTASSIUM SERPL-SCNC: 4 MMOL/L (ref 3.5–5)
RBC # BLD: 4.42 E12/L (ref 3.5–5.5)
SEDIMENTATION RATE, ERYTHROCYTE: 49 MM/HR (ref 0–20)
SODIUM BLD-SCNC: 136 MMOL/L (ref 132–146)
TOTAL PROTEIN: 7.5 G/DL (ref 6.4–8.3)
WBC # BLD: 10.7 E9/L (ref 4.5–11.5)

## 2019-02-14 PROCEDURE — 85651 RBC SED RATE NONAUTOMATED: CPT

## 2019-02-14 PROCEDURE — 6370000000 HC RX 637 (ALT 250 FOR IP): Performed by: INTERNAL MEDICINE

## 2019-02-14 PROCEDURE — APPSS45 APP SPLIT SHARED TIME 31-45 MINUTES: Performed by: NURSE PRACTITIONER

## 2019-02-14 PROCEDURE — 2500000003 HC RX 250 WO HCPCS: Performed by: INTERNAL MEDICINE

## 2019-02-14 PROCEDURE — 85025 COMPLETE CBC W/AUTO DIFF WBC: CPT

## 2019-02-14 PROCEDURE — 82962 GLUCOSE BLOOD TEST: CPT

## 2019-02-14 PROCEDURE — 2060000000 HC ICU INTERMEDIATE R&B

## 2019-02-14 PROCEDURE — 6360000002 HC RX W HCPCS: Performed by: INTERNAL MEDICINE

## 2019-02-14 PROCEDURE — 87070 CULTURE OTHR SPECIMN AEROBIC: CPT

## 2019-02-14 PROCEDURE — 2580000003 HC RX 258: Performed by: NURSE PRACTITIONER

## 2019-02-14 PROCEDURE — 2580000003 HC RX 258: Performed by: INTERNAL MEDICINE

## 2019-02-14 PROCEDURE — 99233 SBSQ HOSP IP/OBS HIGH 50: CPT | Performed by: INTERNAL MEDICINE

## 2019-02-14 PROCEDURE — 87088 URINE BACTERIA CULTURE: CPT

## 2019-02-14 PROCEDURE — 94660 CPAP INITIATION&MGMT: CPT

## 2019-02-14 PROCEDURE — 6370000000 HC RX 637 (ALT 250 FOR IP): Performed by: NURSE PRACTITIONER

## 2019-02-14 PROCEDURE — 80053 COMPREHEN METABOLIC PANEL: CPT

## 2019-02-14 PROCEDURE — 2700000000 HC OXYGEN THERAPY PER DAY

## 2019-02-14 PROCEDURE — 94640 AIRWAY INHALATION TREATMENT: CPT

## 2019-02-14 PROCEDURE — 87450 HC DIRECT STREP B ANTIGEN: CPT

## 2019-02-14 PROCEDURE — 71045 X-RAY EXAM CHEST 1 VIEW: CPT

## 2019-02-14 PROCEDURE — 83605 ASSAY OF LACTIC ACID: CPT

## 2019-02-14 PROCEDURE — 87040 BLOOD CULTURE FOR BACTERIA: CPT

## 2019-02-14 PROCEDURE — 87206 SMEAR FLUORESCENT/ACID STAI: CPT

## 2019-02-14 PROCEDURE — 36415 COLL VENOUS BLD VENIPUNCTURE: CPT

## 2019-02-14 RX ORDER — METHYLPREDNISOLONE SODIUM SUCCINATE 40 MG/ML
40 INJECTION, POWDER, LYOPHILIZED, FOR SOLUTION INTRAMUSCULAR; INTRAVENOUS EVERY 8 HOURS
Status: DISCONTINUED | OUTPATIENT
Start: 2019-02-14 | End: 2019-02-15

## 2019-02-14 RX ORDER — SODIUM CHLORIDE 9 MG/ML
INJECTION, SOLUTION INTRAVENOUS CONTINUOUS
Status: DISCONTINUED | OUTPATIENT
Start: 2019-02-14 | End: 2019-02-15

## 2019-02-14 RX ADMIN — HYDROCODONE BITARTRATE AND ACETAMINOPHEN 1 TABLET: 7.5; 325 TABLET ORAL at 06:57

## 2019-02-14 RX ADMIN — HYDRALAZINE HYDROCHLORIDE 25 MG: 25 TABLET, FILM COATED ORAL at 21:36

## 2019-02-14 RX ADMIN — BUDESONIDE 500 MCG: 0.5 SUSPENSION RESPIRATORY (INHALATION) at 12:07

## 2019-02-14 RX ADMIN — FLUTICASONE PROPIONATE 1 SPRAY: 50 SPRAY, METERED NASAL at 08:20

## 2019-02-14 RX ADMIN — HYDRALAZINE HYDROCHLORIDE 25 MG: 25 TABLET, FILM COATED ORAL at 13:13

## 2019-02-14 RX ADMIN — BENZONATATE 100 MG: 100 CAPSULE ORAL at 08:19

## 2019-02-14 RX ADMIN — METHYLPREDNISOLONE SODIUM SUCCINATE 40 MG: 40 INJECTION, POWDER, LYOPHILIZED, FOR SOLUTION INTRAMUSCULAR; INTRAVENOUS at 08:20

## 2019-02-14 RX ADMIN — Medication 10 ML: at 21:37

## 2019-02-14 RX ADMIN — FORMOTEROL FUMARATE DIHYDRATE 20 MCG: 20 SOLUTION RESPIRATORY (INHALATION) at 12:08

## 2019-02-14 RX ADMIN — SIMVASTATIN 10 MG: 10 TABLET, FILM COATED ORAL at 21:38

## 2019-02-14 RX ADMIN — DOXYCYCLINE 100 MG: 100 INJECTION, POWDER, LYOPHILIZED, FOR SOLUTION INTRAVENOUS at 08:18

## 2019-02-14 RX ADMIN — CETIRIZINE HYDROCHLORIDE 5 MG: 10 TABLET, FILM COATED ORAL at 08:19

## 2019-02-14 RX ADMIN — BUDESONIDE 500 MCG: 0.5 SUSPENSION RESPIRATORY (INHALATION) at 20:44

## 2019-02-14 RX ADMIN — AMLODIPINE BESYLATE 10 MG: 10 TABLET ORAL at 08:19

## 2019-02-14 RX ADMIN — Medication 10 ML: at 08:20

## 2019-02-14 RX ADMIN — PANTOPRAZOLE SODIUM 20 MG: 20 TABLET, DELAYED RELEASE ORAL at 08:19

## 2019-02-14 RX ADMIN — SODIUM CHLORIDE: 9 INJECTION, SOLUTION INTRAVENOUS at 12:49

## 2019-02-14 RX ADMIN — LISINOPRIL 10 MG: 10 TABLET ORAL at 08:19

## 2019-02-14 RX ADMIN — HYDROCHLOROTHIAZIDE 12.5 MG: 12.5 TABLET ORAL at 08:18

## 2019-02-14 RX ADMIN — BENZONATATE 100 MG: 100 CAPSULE ORAL at 21:36

## 2019-02-14 RX ADMIN — ENOXAPARIN SODIUM 40 MG: 40 INJECTION SUBCUTANEOUS at 08:18

## 2019-02-14 RX ADMIN — INSULIN LISPRO 1 UNITS: 100 INJECTION, SOLUTION INTRAVENOUS; SUBCUTANEOUS at 21:42

## 2019-02-14 RX ADMIN — HYDRALAZINE HYDROCHLORIDE 25 MG: 25 TABLET, FILM COATED ORAL at 06:39

## 2019-02-14 RX ADMIN — GABAPENTIN 600 MG: 300 CAPSULE ORAL at 21:36

## 2019-02-14 RX ADMIN — SENNOSIDES AND DOCUSATE SODIUM 2 TABLET: 8.6; 5 TABLET ORAL at 08:18

## 2019-02-14 RX ADMIN — FORMOTEROL FUMARATE DIHYDRATE 20 MCG: 20 SOLUTION RESPIRATORY (INHALATION) at 20:44

## 2019-02-14 RX ADMIN — GABAPENTIN 600 MG: 300 CAPSULE ORAL at 08:18

## 2019-02-14 RX ADMIN — ACETAMINOPHEN 650 MG: 325 TABLET ORAL at 08:19

## 2019-02-14 RX ADMIN — HYDROCODONE BITARTRATE AND ACETAMINOPHEN 1 TABLET: 7.5; 325 TABLET ORAL at 21:36

## 2019-02-14 RX ADMIN — IPRATROPIUM BROMIDE AND ALBUTEROL SULFATE 1 AMPULE: .5; 3 SOLUTION RESPIRATORY (INHALATION) at 06:44

## 2019-02-14 RX ADMIN — SENNOSIDES AND DOCUSATE SODIUM 2 TABLET: 8.6; 5 TABLET ORAL at 21:36

## 2019-02-14 RX ADMIN — CEFTRIAXONE 1 G: 1 INJECTION, POWDER, FOR SOLUTION INTRAMUSCULAR; INTRAVENOUS at 11:17

## 2019-02-14 RX ADMIN — INSULIN LISPRO 1 UNITS: 100 INJECTION, SOLUTION INTRAVENOUS; SUBCUTANEOUS at 16:08

## 2019-02-14 RX ADMIN — BENZONATATE 100 MG: 100 CAPSULE ORAL at 13:13

## 2019-02-14 RX ADMIN — METHYLPREDNISOLONE SODIUM SUCCINATE 40 MG: 40 INJECTION, POWDER, FOR SOLUTION INTRAMUSCULAR; INTRAVENOUS at 16:09

## 2019-02-14 RX ADMIN — GABAPENTIN 600 MG: 300 CAPSULE ORAL at 13:13

## 2019-02-14 ASSESSMENT — PAIN DESCRIPTION - LOCATION
LOCATION: BACK;HIP
LOCATION: BACK;HIP

## 2019-02-14 ASSESSMENT — PAIN DESCRIPTION - PROGRESSION
CLINICAL_PROGRESSION: NOT CHANGED
CLINICAL_PROGRESSION: NOT CHANGED

## 2019-02-14 ASSESSMENT — PAIN SCALES - GENERAL
PAINLEVEL_OUTOF10: 10
PAINLEVEL_OUTOF10: 0
PAINLEVEL_OUTOF10: 10
PAINLEVEL_OUTOF10: 8
PAINLEVEL_OUTOF10: 7
PAINLEVEL_OUTOF10: 7

## 2019-02-14 ASSESSMENT — PAIN DESCRIPTION - DESCRIPTORS
DESCRIPTORS: ACHING
DESCRIPTORS: ACHING

## 2019-02-14 ASSESSMENT — PAIN DESCRIPTION - ORIENTATION
ORIENTATION: RIGHT;LEFT
ORIENTATION: RIGHT;LEFT

## 2019-02-14 ASSESSMENT — PAIN DESCRIPTION - PAIN TYPE
TYPE: CHRONIC PAIN
TYPE: CHRONIC PAIN

## 2019-02-14 ASSESSMENT — PAIN DESCRIPTION - FREQUENCY
FREQUENCY: CONTINUOUS
FREQUENCY: CONTINUOUS

## 2019-02-14 ASSESSMENT — PAIN - FUNCTIONAL ASSESSMENT
PAIN_FUNCTIONAL_ASSESSMENT: ACTIVITIES ARE NOT PREVENTED
PAIN_FUNCTIONAL_ASSESSMENT: ACTIVITIES ARE NOT PREVENTED

## 2019-02-14 ASSESSMENT — PAIN DESCRIPTION - ONSET
ONSET: ON-GOING
ONSET: ON-GOING

## 2019-02-15 ENCOUNTER — TELEPHONE (OUTPATIENT)
Dept: BARIATRICS/WEIGHT MGMT | Age: 44
End: 2019-02-15

## 2019-02-15 LAB
ANION GAP SERPL CALCULATED.3IONS-SCNC: 10 MMOL/L (ref 7–16)
BUN BLDV-MCNC: 16 MG/DL (ref 6–20)
CALCIUM SERPL-MCNC: 9 MG/DL (ref 8.6–10.2)
CHLORIDE BLD-SCNC: 102 MMOL/L (ref 98–107)
CO2: 28 MMOL/L (ref 22–29)
CREAT SERPL-MCNC: 0.7 MG/DL (ref 0.5–1)
GFR AFRICAN AMERICAN: >60
GFR NON-AFRICAN AMERICAN: >60 ML/MIN/1.73
GLUCOSE BLD-MCNC: 141 MG/DL (ref 74–99)
HCT VFR BLD CALC: 40.5 % (ref 34–48)
HEMOGLOBIN: 12.7 G/DL (ref 11.5–15.5)
L. PNEUMOPHILA SEROGP 1 UR AG: NORMAL
MCH RBC QN AUTO: 28.3 PG (ref 26–35)
MCHC RBC AUTO-ENTMCNC: 31.4 % (ref 32–34.5)
MCV RBC AUTO: 90.4 FL (ref 80–99.9)
METER GLUCOSE: 134 MG/DL (ref 74–99)
METER GLUCOSE: 136 MG/DL (ref 74–99)
METER GLUCOSE: 141 MG/DL (ref 74–99)
METER GLUCOSE: 160 MG/DL (ref 74–99)
PDW BLD-RTO: 16.1 FL (ref 11.5–15)
PLATELET # BLD: 262 E9/L (ref 130–450)
PMV BLD AUTO: 11.7 FL (ref 7–12)
POTASSIUM SERPL-SCNC: 4.1 MMOL/L (ref 3.5–5)
RBC # BLD: 4.48 E12/L (ref 3.5–5.5)
SODIUM BLD-SCNC: 140 MMOL/L (ref 132–146)
STREP PNEUMONIAE ANTIGEN, URINE: NORMAL
WBC # BLD: 7.7 E9/L (ref 4.5–11.5)

## 2019-02-15 PROCEDURE — 82962 GLUCOSE BLOOD TEST: CPT

## 2019-02-15 PROCEDURE — 6360000002 HC RX W HCPCS: Performed by: INTERNAL MEDICINE

## 2019-02-15 PROCEDURE — 2580000003 HC RX 258: Performed by: INTERNAL MEDICINE

## 2019-02-15 PROCEDURE — 6370000000 HC RX 637 (ALT 250 FOR IP): Performed by: INTERNAL MEDICINE

## 2019-02-15 PROCEDURE — 99232 SBSQ HOSP IP/OBS MODERATE 35: CPT | Performed by: INTERNAL MEDICINE

## 2019-02-15 PROCEDURE — 36415 COLL VENOUS BLD VENIPUNCTURE: CPT

## 2019-02-15 PROCEDURE — 6370000000 HC RX 637 (ALT 250 FOR IP): Performed by: NURSE PRACTITIONER

## 2019-02-15 PROCEDURE — 2700000000 HC OXYGEN THERAPY PER DAY

## 2019-02-15 PROCEDURE — 2060000000 HC ICU INTERMEDIATE R&B

## 2019-02-15 PROCEDURE — 2580000003 HC RX 258: Performed by: NURSE PRACTITIONER

## 2019-02-15 PROCEDURE — 94660 CPAP INITIATION&MGMT: CPT

## 2019-02-15 PROCEDURE — 85027 COMPLETE CBC AUTOMATED: CPT

## 2019-02-15 PROCEDURE — 80048 BASIC METABOLIC PNL TOTAL CA: CPT

## 2019-02-15 PROCEDURE — APPSS30 APP SPLIT SHARED TIME 16-30 MINUTES: Performed by: PHYSICIAN ASSISTANT

## 2019-02-15 PROCEDURE — 94640 AIRWAY INHALATION TREATMENT: CPT

## 2019-02-15 RX ORDER — HYDRALAZINE HYDROCHLORIDE 50 MG/1
50 TABLET, FILM COATED ORAL EVERY 8 HOURS SCHEDULED
Status: DISCONTINUED | OUTPATIENT
Start: 2019-02-15 | End: 2019-02-17 | Stop reason: HOSPADM

## 2019-02-15 RX ORDER — METHYLPREDNISOLONE SODIUM SUCCINATE 40 MG/ML
40 INJECTION, POWDER, LYOPHILIZED, FOR SOLUTION INTRAMUSCULAR; INTRAVENOUS EVERY 12 HOURS
Status: DISCONTINUED | OUTPATIENT
Start: 2019-02-15 | End: 2019-02-17

## 2019-02-15 RX ADMIN — HYDROCODONE BITARTRATE AND ACETAMINOPHEN 1 TABLET: 7.5; 325 TABLET ORAL at 06:00

## 2019-02-15 RX ADMIN — BENZONATATE 100 MG: 100 CAPSULE ORAL at 13:44

## 2019-02-15 RX ADMIN — FLUTICASONE PROPIONATE 1 SPRAY: 50 SPRAY, METERED NASAL at 08:30

## 2019-02-15 RX ADMIN — SODIUM CHLORIDE: 9 INJECTION, SOLUTION INTRAVENOUS at 14:33

## 2019-02-15 RX ADMIN — GABAPENTIN 600 MG: 300 CAPSULE ORAL at 08:26

## 2019-02-15 RX ADMIN — BUDESONIDE 500 MCG: 0.5 SUSPENSION RESPIRATORY (INHALATION) at 20:23

## 2019-02-15 RX ADMIN — SIMVASTATIN 10 MG: 10 TABLET, FILM COATED ORAL at 21:58

## 2019-02-15 RX ADMIN — FORMOTEROL FUMARATE DIHYDRATE 20 MCG: 20 SOLUTION RESPIRATORY (INHALATION) at 20:23

## 2019-02-15 RX ADMIN — HYDRALAZINE HYDROCHLORIDE 50 MG: 50 TABLET ORAL at 21:57

## 2019-02-15 RX ADMIN — GABAPENTIN 600 MG: 300 CAPSULE ORAL at 21:57

## 2019-02-15 RX ADMIN — LISINOPRIL 10 MG: 10 TABLET ORAL at 08:25

## 2019-02-15 RX ADMIN — BENZONATATE 100 MG: 100 CAPSULE ORAL at 21:57

## 2019-02-15 RX ADMIN — HYDROCODONE BITARTRATE AND ACETAMINOPHEN 1 TABLET: 7.5; 325 TABLET ORAL at 22:46

## 2019-02-15 RX ADMIN — HYDROCHLOROTHIAZIDE 12.5 MG: 12.5 TABLET ORAL at 08:25

## 2019-02-15 RX ADMIN — CEFTRIAXONE 1 G: 1 INJECTION, POWDER, FOR SOLUTION INTRAMUSCULAR; INTRAVENOUS at 12:08

## 2019-02-15 RX ADMIN — GABAPENTIN 600 MG: 300 CAPSULE ORAL at 13:44

## 2019-02-15 RX ADMIN — SENNOSIDES AND DOCUSATE SODIUM 2 TABLET: 8.6; 5 TABLET ORAL at 21:57

## 2019-02-15 RX ADMIN — FORMOTEROL FUMARATE DIHYDRATE 20 MCG: 20 SOLUTION RESPIRATORY (INHALATION) at 07:49

## 2019-02-15 RX ADMIN — HYDRALAZINE HYDROCHLORIDE 25 MG: 25 TABLET, FILM COATED ORAL at 05:56

## 2019-02-15 RX ADMIN — HYDROCODONE BITARTRATE AND ACETAMINOPHEN 1 TABLET: 7.5; 325 TABLET ORAL at 14:33

## 2019-02-15 RX ADMIN — HYDRALAZINE HYDROCHLORIDE 25 MG: 25 TABLET, FILM COATED ORAL at 13:44

## 2019-02-15 RX ADMIN — CETIRIZINE HYDROCHLORIDE 5 MG: 10 TABLET, FILM COATED ORAL at 08:26

## 2019-02-15 RX ADMIN — METHYLPREDNISOLONE SODIUM SUCCINATE 40 MG: 40 INJECTION, POWDER, FOR SOLUTION INTRAMUSCULAR; INTRAVENOUS at 21:58

## 2019-02-15 RX ADMIN — Medication 10 ML: at 08:27

## 2019-02-15 RX ADMIN — METHYLPREDNISOLONE SODIUM SUCCINATE 40 MG: 40 INJECTION, POWDER, FOR SOLUTION INTRAMUSCULAR; INTRAVENOUS at 08:26

## 2019-02-15 RX ADMIN — BENZONATATE 100 MG: 100 CAPSULE ORAL at 08:30

## 2019-02-15 RX ADMIN — INSULIN LISPRO 1 UNITS: 100 INJECTION, SOLUTION INTRAVENOUS; SUBCUTANEOUS at 12:09

## 2019-02-15 RX ADMIN — Medication 10 ML: at 21:58

## 2019-02-15 RX ADMIN — PANTOPRAZOLE SODIUM 20 MG: 20 TABLET, DELAYED RELEASE ORAL at 08:30

## 2019-02-15 RX ADMIN — BUDESONIDE 500 MCG: 0.5 SUSPENSION RESPIRATORY (INHALATION) at 07:49

## 2019-02-15 RX ADMIN — AMLODIPINE BESYLATE 10 MG: 10 TABLET ORAL at 08:26

## 2019-02-15 RX ADMIN — ENOXAPARIN SODIUM 40 MG: 40 INJECTION SUBCUTANEOUS at 08:27

## 2019-02-15 RX ADMIN — METHYLPREDNISOLONE SODIUM SUCCINATE 40 MG: 40 INJECTION, POWDER, FOR SOLUTION INTRAMUSCULAR; INTRAVENOUS at 00:21

## 2019-02-15 ASSESSMENT — PAIN DESCRIPTION - LOCATION
LOCATION: BACK
LOCATION: BACK

## 2019-02-15 ASSESSMENT — PAIN DESCRIPTION - ONSET: ONSET: ON-GOING

## 2019-02-15 ASSESSMENT — PAIN - FUNCTIONAL ASSESSMENT
PAIN_FUNCTIONAL_ASSESSMENT: PREVENTS OR INTERFERES SOME ACTIVE ACTIVITIES AND ADLS
PAIN_FUNCTIONAL_ASSESSMENT: PREVENTS OR INTERFERES SOME ACTIVE ACTIVITIES AND ADLS

## 2019-02-15 ASSESSMENT — PAIN DESCRIPTION - FREQUENCY
FREQUENCY: CONTINUOUS
FREQUENCY: CONTINUOUS

## 2019-02-15 ASSESSMENT — PAIN SCALES - GENERAL
PAINLEVEL_OUTOF10: 0
PAINLEVEL_OUTOF10: 8
PAINLEVEL_OUTOF10: 10
PAINLEVEL_OUTOF10: 0
PAINLEVEL_OUTOF10: 10
PAINLEVEL_OUTOF10: 2
PAINLEVEL_OUTOF10: 10

## 2019-02-15 ASSESSMENT — PAIN DESCRIPTION - ORIENTATION
ORIENTATION: LOWER
ORIENTATION: LOWER

## 2019-02-15 ASSESSMENT — PAIN DESCRIPTION - PAIN TYPE
TYPE: CHRONIC PAIN
TYPE: CHRONIC PAIN

## 2019-02-15 ASSESSMENT — PAIN DESCRIPTION - DESCRIPTORS
DESCRIPTORS: ACHING;DISCOMFORT
DESCRIPTORS: ACHING;DISCOMFORT

## 2019-02-16 LAB
ANION GAP SERPL CALCULATED.3IONS-SCNC: 9 MMOL/L (ref 7–16)
BUN BLDV-MCNC: 20 MG/DL (ref 6–20)
CALCIUM SERPL-MCNC: 9.2 MG/DL (ref 8.6–10.2)
CHLORIDE BLD-SCNC: 96 MMOL/L (ref 98–107)
CO2: 30 MMOL/L (ref 22–29)
CREAT SERPL-MCNC: 0.7 MG/DL (ref 0.5–1)
CULTURE, RESPIRATORY: NORMAL
GFR AFRICAN AMERICAN: >60
GFR NON-AFRICAN AMERICAN: >60 ML/MIN/1.73
GLUCOSE BLD-MCNC: 160 MG/DL (ref 74–99)
METER GLUCOSE: 126 MG/DL (ref 74–99)
METER GLUCOSE: 146 MG/DL (ref 74–99)
METER GLUCOSE: 184 MG/DL (ref 74–99)
METER GLUCOSE: 195 MG/DL (ref 74–99)
POTASSIUM SERPL-SCNC: 4.2 MMOL/L (ref 3.5–5)
SMEAR, RESPIRATORY: NORMAL
SODIUM BLD-SCNC: 135 MMOL/L (ref 132–146)
URINE CULTURE, ROUTINE: NORMAL

## 2019-02-16 PROCEDURE — 94640 AIRWAY INHALATION TREATMENT: CPT

## 2019-02-16 PROCEDURE — 6370000000 HC RX 637 (ALT 250 FOR IP): Performed by: NURSE PRACTITIONER

## 2019-02-16 PROCEDURE — 6370000000 HC RX 637 (ALT 250 FOR IP): Performed by: INTERNAL MEDICINE

## 2019-02-16 PROCEDURE — 6360000002 HC RX W HCPCS: Performed by: INTERNAL MEDICINE

## 2019-02-16 PROCEDURE — 36415 COLL VENOUS BLD VENIPUNCTURE: CPT

## 2019-02-16 PROCEDURE — APPSS30 APP SPLIT SHARED TIME 16-30 MINUTES: Performed by: PHYSICIAN ASSISTANT

## 2019-02-16 PROCEDURE — 94660 CPAP INITIATION&MGMT: CPT

## 2019-02-16 PROCEDURE — 2580000003 HC RX 258: Performed by: INTERNAL MEDICINE

## 2019-02-16 PROCEDURE — 6370000000 HC RX 637 (ALT 250 FOR IP): Performed by: CLINICAL NURSE SPECIALIST

## 2019-02-16 PROCEDURE — 2060000000 HC ICU INTERMEDIATE R&B

## 2019-02-16 PROCEDURE — 80048 BASIC METABOLIC PNL TOTAL CA: CPT

## 2019-02-16 PROCEDURE — 82962 GLUCOSE BLOOD TEST: CPT

## 2019-02-16 PROCEDURE — 2700000000 HC OXYGEN THERAPY PER DAY

## 2019-02-16 RX ORDER — LEVOFLOXACIN 750 MG/1
750 TABLET ORAL DAILY
Qty: 10 TABLET | Refills: 0 | Status: SHIPPED | OUTPATIENT
Start: 2019-02-16 | End: 2019-02-26

## 2019-02-16 RX ADMIN — BUDESONIDE 500 MCG: 0.5 SUSPENSION RESPIRATORY (INHALATION) at 08:09

## 2019-02-16 RX ADMIN — FLUTICASONE PROPIONATE 1 SPRAY: 50 SPRAY, METERED NASAL at 09:50

## 2019-02-16 RX ADMIN — BENZONATATE 100 MG: 100 CAPSULE ORAL at 13:53

## 2019-02-16 RX ADMIN — METHYLPREDNISOLONE SODIUM SUCCINATE 40 MG: 40 INJECTION, POWDER, FOR SOLUTION INTRAMUSCULAR; INTRAVENOUS at 21:17

## 2019-02-16 RX ADMIN — AMLODIPINE BESYLATE 10 MG: 10 TABLET ORAL at 09:50

## 2019-02-16 RX ADMIN — HYDRALAZINE HYDROCHLORIDE 50 MG: 50 TABLET ORAL at 06:13

## 2019-02-16 RX ADMIN — BENZONATATE 100 MG: 100 CAPSULE ORAL at 21:17

## 2019-02-16 RX ADMIN — INSULIN LISPRO 1 UNITS: 100 INJECTION, SOLUTION INTRAVENOUS; SUBCUTANEOUS at 12:10

## 2019-02-16 RX ADMIN — SENNOSIDES AND DOCUSATE SODIUM 2 TABLET: 8.6; 5 TABLET ORAL at 21:17

## 2019-02-16 RX ADMIN — CETIRIZINE HYDROCHLORIDE 5 MG: 10 TABLET, FILM COATED ORAL at 09:50

## 2019-02-16 RX ADMIN — PANTOPRAZOLE SODIUM 20 MG: 20 TABLET, DELAYED RELEASE ORAL at 09:50

## 2019-02-16 RX ADMIN — LISINOPRIL 10 MG: 10 TABLET ORAL at 09:50

## 2019-02-16 RX ADMIN — HYDRALAZINE HYDROCHLORIDE 50 MG: 50 TABLET ORAL at 13:53

## 2019-02-16 RX ADMIN — ACETAMINOPHEN 650 MG: 325 TABLET ORAL at 21:17

## 2019-02-16 RX ADMIN — ACETAMINOPHEN 650 MG: 325 TABLET ORAL at 06:13

## 2019-02-16 RX ADMIN — LEVOFLOXACIN 750 MG: 500 TABLET, FILM COATED ORAL at 13:53

## 2019-02-16 RX ADMIN — FORMOTEROL FUMARATE DIHYDRATE 20 MCG: 20 SOLUTION RESPIRATORY (INHALATION) at 20:25

## 2019-02-16 RX ADMIN — Medication 10 ML: at 09:49

## 2019-02-16 RX ADMIN — ENOXAPARIN SODIUM 40 MG: 40 INJECTION SUBCUTANEOUS at 09:49

## 2019-02-16 RX ADMIN — HYDROCODONE BITARTRATE AND ACETAMINOPHEN 1 TABLET: 7.5; 325 TABLET ORAL at 16:46

## 2019-02-16 RX ADMIN — SIMVASTATIN 10 MG: 10 TABLET, FILM COATED ORAL at 21:17

## 2019-02-16 RX ADMIN — HYDROCHLOROTHIAZIDE 12.5 MG: 12.5 TABLET ORAL at 09:50

## 2019-02-16 RX ADMIN — BENZONATATE 100 MG: 100 CAPSULE ORAL at 09:50

## 2019-02-16 RX ADMIN — HYDROCODONE BITARTRATE AND ACETAMINOPHEN 1 TABLET: 7.5; 325 TABLET ORAL at 06:40

## 2019-02-16 RX ADMIN — Medication 10 ML: at 21:17

## 2019-02-16 RX ADMIN — GABAPENTIN 600 MG: 300 CAPSULE ORAL at 13:53

## 2019-02-16 RX ADMIN — CEFTRIAXONE 1 G: 1 INJECTION, POWDER, FOR SOLUTION INTRAMUSCULAR; INTRAVENOUS at 12:09

## 2019-02-16 RX ADMIN — HYDRALAZINE HYDROCHLORIDE 50 MG: 50 TABLET ORAL at 21:17

## 2019-02-16 RX ADMIN — FORMOTEROL FUMARATE DIHYDRATE 20 MCG: 20 SOLUTION RESPIRATORY (INHALATION) at 08:09

## 2019-02-16 RX ADMIN — METHYLPREDNISOLONE SODIUM SUCCINATE 40 MG: 40 INJECTION, POWDER, FOR SOLUTION INTRAMUSCULAR; INTRAVENOUS at 09:49

## 2019-02-16 RX ADMIN — GABAPENTIN 600 MG: 300 CAPSULE ORAL at 21:17

## 2019-02-16 RX ADMIN — GABAPENTIN 600 MG: 300 CAPSULE ORAL at 09:49

## 2019-02-16 RX ADMIN — BUDESONIDE 500 MCG: 0.5 SUSPENSION RESPIRATORY (INHALATION) at 20:25

## 2019-02-16 RX ADMIN — INSULIN LISPRO 1 UNITS: 100 INJECTION, SOLUTION INTRAVENOUS; SUBCUTANEOUS at 16:46

## 2019-02-16 ASSESSMENT — PAIN SCALES - GENERAL
PAINLEVEL_OUTOF10: 10
PAINLEVEL_OUTOF10: 0
PAINLEVEL_OUTOF10: 0
PAINLEVEL_OUTOF10: 10
PAINLEVEL_OUTOF10: 4
PAINLEVEL_OUTOF10: 2
PAINLEVEL_OUTOF10: 10

## 2019-02-16 ASSESSMENT — PAIN DESCRIPTION - FREQUENCY: FREQUENCY: INTERMITTENT

## 2019-02-16 ASSESSMENT — PAIN DESCRIPTION - DESCRIPTORS
DESCRIPTORS: HEADACHE
DESCRIPTORS: HEADACHE

## 2019-02-16 ASSESSMENT — PAIN DESCRIPTION - LOCATION
LOCATION: HEAD
LOCATION: HEAD

## 2019-02-16 ASSESSMENT — PAIN - FUNCTIONAL ASSESSMENT: PAIN_FUNCTIONAL_ASSESSMENT: PREVENTS OR INTERFERES SOME ACTIVE ACTIVITIES AND ADLS

## 2019-02-16 ASSESSMENT — PAIN DESCRIPTION - PAIN TYPE
TYPE: ACUTE PAIN
TYPE: ACUTE PAIN

## 2019-02-17 VITALS
RESPIRATION RATE: 20 BRPM | HEART RATE: 98 BPM | BODY MASS INDEX: 44.41 KG/M2 | OXYGEN SATURATION: 97 % | TEMPERATURE: 98.4 F | HEIGHT: 68 IN | SYSTOLIC BLOOD PRESSURE: 124 MMHG | WEIGHT: 293 LBS | DIASTOLIC BLOOD PRESSURE: 66 MMHG

## 2019-02-17 PROBLEM — J12.3 PNEUMONIA DUE TO HUMAN METAPNEUMOVIRUS: Status: ACTIVE | Noted: 2019-02-17

## 2019-02-17 LAB
ANION GAP SERPL CALCULATED.3IONS-SCNC: 11 MMOL/L (ref 7–16)
BLOOD CULTURE, ROUTINE: NORMAL
BUN BLDV-MCNC: 19 MG/DL (ref 6–20)
CALCIUM SERPL-MCNC: 9 MG/DL (ref 8.6–10.2)
CHLORIDE BLD-SCNC: 96 MMOL/L (ref 98–107)
CO2: 29 MMOL/L (ref 22–29)
CREAT SERPL-MCNC: 0.7 MG/DL (ref 0.5–1)
CULTURE, BLOOD 2: NORMAL
GFR AFRICAN AMERICAN: >60
GFR NON-AFRICAN AMERICAN: >60 ML/MIN/1.73
GLUCOSE BLD-MCNC: 238 MG/DL (ref 74–99)
METER GLUCOSE: 101 MG/DL (ref 74–99)
METER GLUCOSE: 94 MG/DL (ref 74–99)
POTASSIUM SERPL-SCNC: 4.3 MMOL/L (ref 3.5–5)
SODIUM BLD-SCNC: 136 MMOL/L (ref 132–146)

## 2019-02-17 PROCEDURE — 99238 HOSP IP/OBS DSCHRG MGMT 30/<: CPT | Performed by: INTERNAL MEDICINE

## 2019-02-17 PROCEDURE — 6370000000 HC RX 637 (ALT 250 FOR IP): Performed by: INTERNAL MEDICINE

## 2019-02-17 PROCEDURE — 80048 BASIC METABOLIC PNL TOTAL CA: CPT

## 2019-02-17 PROCEDURE — 36415 COLL VENOUS BLD VENIPUNCTURE: CPT

## 2019-02-17 PROCEDURE — 6360000002 HC RX W HCPCS: Performed by: INTERNAL MEDICINE

## 2019-02-17 PROCEDURE — 82962 GLUCOSE BLOOD TEST: CPT

## 2019-02-17 PROCEDURE — 2580000003 HC RX 258: Performed by: INTERNAL MEDICINE

## 2019-02-17 PROCEDURE — 94660 CPAP INITIATION&MGMT: CPT

## 2019-02-17 PROCEDURE — 94640 AIRWAY INHALATION TREATMENT: CPT

## 2019-02-17 PROCEDURE — 6370000000 HC RX 637 (ALT 250 FOR IP): Performed by: NURSE PRACTITIONER

## 2019-02-17 PROCEDURE — 2700000000 HC OXYGEN THERAPY PER DAY

## 2019-02-17 RX ORDER — PREDNISONE 10 MG/1
40 TABLET ORAL DAILY
Qty: 16 TABLET | Refills: 0 | Status: SHIPPED | OUTPATIENT
Start: 2019-02-18 | End: 2019-02-22

## 2019-02-17 RX ORDER — ALBUTEROL SULFATE 90 UG/1
2 AEROSOL, METERED RESPIRATORY (INHALATION) EVERY 4 HOURS PRN
Qty: 1 INHALER | Refills: 3 | Status: SHIPPED | OUTPATIENT
Start: 2019-02-17 | End: 2020-03-16 | Stop reason: SDUPTHER

## 2019-02-17 RX ORDER — PREDNISONE 20 MG/1
40 TABLET ORAL DAILY
Status: DISCONTINUED | OUTPATIENT
Start: 2019-02-17 | End: 2019-02-17 | Stop reason: HOSPADM

## 2019-02-17 RX ORDER — BENZONATATE 100 MG/1
100 CAPSULE ORAL 3 TIMES DAILY
Qty: 21 CAPSULE | Refills: 0 | Status: SHIPPED | OUTPATIENT
Start: 2019-02-17 | End: 2019-02-24

## 2019-02-17 RX ADMIN — GABAPENTIN 600 MG: 300 CAPSULE ORAL at 10:29

## 2019-02-17 RX ADMIN — HYDRALAZINE HYDROCHLORIDE 50 MG: 50 TABLET ORAL at 06:44

## 2019-02-17 RX ADMIN — PREDNISONE 40 MG: 20 TABLET ORAL at 10:29

## 2019-02-17 RX ADMIN — FORMOTEROL FUMARATE DIHYDRATE 20 MCG: 20 SOLUTION RESPIRATORY (INHALATION) at 07:54

## 2019-02-17 RX ADMIN — LISINOPRIL 10 MG: 10 TABLET ORAL at 10:29

## 2019-02-17 RX ADMIN — SENNOSIDES AND DOCUSATE SODIUM 2 TABLET: 8.6; 5 TABLET ORAL at 10:29

## 2019-02-17 RX ADMIN — HYDROCODONE BITARTRATE AND ACETAMINOPHEN 1 TABLET: 7.5; 325 TABLET ORAL at 06:44

## 2019-02-17 RX ADMIN — BUDESONIDE 500 MCG: 0.5 SUSPENSION RESPIRATORY (INHALATION) at 07:54

## 2019-02-17 RX ADMIN — HYDROCHLOROTHIAZIDE 12.5 MG: 12.5 TABLET ORAL at 10:30

## 2019-02-17 RX ADMIN — Medication 10 ML: at 10:31

## 2019-02-17 RX ADMIN — HYDRALAZINE HYDROCHLORIDE 50 MG: 50 TABLET ORAL at 14:11

## 2019-02-17 RX ADMIN — AMLODIPINE BESYLATE 10 MG: 10 TABLET ORAL at 10:29

## 2019-02-17 RX ADMIN — FLUTICASONE PROPIONATE 1 SPRAY: 50 SPRAY, METERED NASAL at 10:31

## 2019-02-17 RX ADMIN — PANTOPRAZOLE SODIUM 20 MG: 20 TABLET, DELAYED RELEASE ORAL at 10:29

## 2019-02-17 RX ADMIN — CETIRIZINE HYDROCHLORIDE 5 MG: 10 TABLET, FILM COATED ORAL at 10:30

## 2019-02-17 RX ADMIN — ENOXAPARIN SODIUM 40 MG: 40 INJECTION SUBCUTANEOUS at 10:30

## 2019-02-17 RX ADMIN — GABAPENTIN 600 MG: 300 CAPSULE ORAL at 14:11

## 2019-02-17 RX ADMIN — BENZONATATE 100 MG: 100 CAPSULE ORAL at 10:30

## 2019-02-17 ASSESSMENT — PAIN SCALES - GENERAL
PAINLEVEL_OUTOF10: 0
PAINLEVEL_OUTOF10: 10

## 2019-02-18 ENCOUNTER — TELEPHONE (OUTPATIENT)
Dept: BARIATRICS/WEIGHT MGMT | Age: 44
End: 2019-02-18

## 2019-02-18 ENCOUNTER — TELEPHONE (OUTPATIENT)
Dept: ADMINISTRATIVE | Age: 44
End: 2019-02-18

## 2019-02-19 LAB
BLOOD CULTURE, ROUTINE: NORMAL
CULTURE, BLOOD 2: NORMAL

## 2019-02-22 ENCOUNTER — OFFICE VISIT (OUTPATIENT)
Dept: FAMILY MEDICINE CLINIC | Age: 44
End: 2019-02-22
Payer: MEDICARE

## 2019-02-22 ENCOUNTER — HOSPITAL ENCOUNTER (OUTPATIENT)
Age: 44
Discharge: HOME | End: 2019-02-24
Payer: MEDICARE

## 2019-02-22 VITALS
HEIGHT: 69 IN | BODY MASS INDEX: 43.4 KG/M2 | HEART RATE: 56 BPM | RESPIRATION RATE: 18 BRPM | WEIGHT: 293 LBS | SYSTOLIC BLOOD PRESSURE: 134 MMHG | DIASTOLIC BLOOD PRESSURE: 80 MMHG | OXYGEN SATURATION: 97 %

## 2019-02-22 DIAGNOSIS — J12.3 PNEUMONIA DUE TO HUMAN METAPNEUMOVIRUS: Primary | ICD-10-CM

## 2019-02-22 DIAGNOSIS — J12.3 PNEUMONIA DUE TO HUMAN METAPNEUMOVIRUS: ICD-10-CM

## 2019-02-22 DIAGNOSIS — E66.01 MORBID OBESITY WITH BMI OF 50.0-59.9, ADULT (HCC): ICD-10-CM

## 2019-02-22 DIAGNOSIS — L85.3 DRY SKIN DERMATITIS: ICD-10-CM

## 2019-02-22 LAB
ANION GAP SERPL CALCULATED.3IONS-SCNC: 14 MMOL/L (ref 7–16)
BUN BLDV-MCNC: 24 MG/DL (ref 6–20)
CALCIUM SERPL-MCNC: 8.9 MG/DL (ref 8.6–10.2)
CHLORIDE BLD-SCNC: 101 MMOL/L (ref 98–107)
CO2: 25 MMOL/L (ref 22–29)
CREAT SERPL-MCNC: 0.8 MG/DL (ref 0.5–1)
GFR AFRICAN AMERICAN: >60
GFR NON-AFRICAN AMERICAN: >60 ML/MIN/1.73
GLUCOSE BLD-MCNC: 93 MG/DL (ref 74–99)
HCT VFR BLD CALC: 46.8 % (ref 34–48)
HEMOGLOBIN: 14.4 G/DL (ref 11.5–15.5)
MCH RBC QN AUTO: 27.9 PG (ref 26–35)
MCHC RBC AUTO-ENTMCNC: 30.8 % (ref 32–34.5)
MCV RBC AUTO: 90.5 FL (ref 80–99.9)
PDW BLD-RTO: 15.9 FL (ref 11.5–15)
PLATELET # BLD: 343 E9/L (ref 130–450)
PMV BLD AUTO: 11.8 FL (ref 7–12)
POTASSIUM SERPL-SCNC: 4.1 MMOL/L (ref 3.5–5)
RBC # BLD: 5.17 E12/L (ref 3.5–5.5)
SODIUM BLD-SCNC: 140 MMOL/L (ref 132–146)
WBC # BLD: 15.8 E9/L (ref 4.5–11.5)

## 2019-02-22 PROCEDURE — G8427 DOCREV CUR MEDS BY ELIG CLIN: HCPCS | Performed by: PHYSICIAN ASSISTANT

## 2019-02-22 PROCEDURE — 99213 OFFICE O/P EST LOW 20 MIN: CPT | Performed by: PHYSICIAN ASSISTANT

## 2019-02-22 PROCEDURE — G8484 FLU IMMUNIZE NO ADMIN: HCPCS | Performed by: PHYSICIAN ASSISTANT

## 2019-02-22 PROCEDURE — G8417 CALC BMI ABV UP PARAM F/U: HCPCS | Performed by: PHYSICIAN ASSISTANT

## 2019-02-22 PROCEDURE — 80048 BASIC METABOLIC PNL TOTAL CA: CPT

## 2019-02-22 PROCEDURE — 85027 COMPLETE CBC AUTOMATED: CPT

## 2019-02-22 PROCEDURE — 1036F TOBACCO NON-USER: CPT | Performed by: PHYSICIAN ASSISTANT

## 2019-02-22 RX ORDER — M-VIT,TX,IRON,MINS/CALC/FOLIC 27MG-0.4MG
1 TABLET ORAL DAILY
Qty: 90 TABLET | Refills: 5 | Status: SHIPPED
Start: 2019-02-22 | End: 2020-06-26

## 2019-02-22 RX ORDER — PREDNISONE 10 MG/1
40 TABLET ORAL DAILY
Qty: 16 TABLET | Refills: 0 | Status: CANCELLED | OUTPATIENT
Start: 2019-02-22 | End: 2019-02-26

## 2019-02-26 DIAGNOSIS — J30.2 SEASONAL ALLERGIES: ICD-10-CM

## 2019-02-26 DIAGNOSIS — M54.50 ACUTE BILATERAL LOW BACK PAIN WITHOUT SCIATICA: ICD-10-CM

## 2019-02-26 DIAGNOSIS — I10 ESSENTIAL HYPERTENSION: ICD-10-CM

## 2019-02-26 DIAGNOSIS — K21.9 GASTROESOPHAGEAL REFLUX DISEASE WITHOUT ESOPHAGITIS: ICD-10-CM

## 2019-02-26 RX ORDER — FLUTICASONE PROPIONATE 50 MCG
SPRAY, SUSPENSION (ML) NASAL
Qty: 1 BOTTLE | Refills: 3 | Status: SHIPPED | OUTPATIENT
Start: 2019-02-26 | End: 2019-09-17 | Stop reason: SDUPTHER

## 2019-02-26 RX ORDER — DICLOFENAC SODIUM 75 MG/1
TABLET, DELAYED RELEASE ORAL
Qty: 60 TABLET | Refills: 3 | Status: SHIPPED | OUTPATIENT
Start: 2019-02-26 | End: 2019-07-24 | Stop reason: ALTCHOICE

## 2019-02-26 RX ORDER — PANTOPRAZOLE SODIUM 20 MG/1
20 TABLET, DELAYED RELEASE ORAL DAILY
Qty: 30 TABLET | Refills: 3 | Status: SHIPPED
Start: 2019-02-26 | End: 2021-07-19 | Stop reason: SDUPTHER

## 2019-02-26 RX ORDER — LISINOPRIL AND HYDROCHLOROTHIAZIDE 12.5; 1 MG/1; MG/1
1 TABLET ORAL DAILY
Qty: 30 TABLET | Refills: 3 | Status: ON HOLD
Start: 2019-02-26 | End: 2020-05-01 | Stop reason: HOSPADM

## 2019-03-01 ENCOUNTER — HOSPITAL ENCOUNTER (OUTPATIENT)
Age: 44
Discharge: HOME | End: 2019-03-03
Payer: MEDICARE

## 2019-03-01 ENCOUNTER — HOSPITAL ENCOUNTER (OUTPATIENT)
Dept: GENERAL RADIOLOGY | Age: 44
Discharge: HOME | End: 2019-03-03
Payer: MEDICARE

## 2019-03-01 DIAGNOSIS — J12.3 PNEUMONIA DUE TO HUMAN METAPNEUMOVIRUS: ICD-10-CM

## 2019-03-01 PROCEDURE — 71046 X-RAY EXAM CHEST 2 VIEWS: CPT

## 2019-03-05 ENCOUNTER — OFFICE VISIT (OUTPATIENT)
Dept: FAMILY MEDICINE CLINIC | Age: 44
End: 2019-03-05
Payer: MEDICARE

## 2019-03-05 VITALS
SYSTOLIC BLOOD PRESSURE: 140 MMHG | HEIGHT: 69 IN | HEART RATE: 78 BPM | OXYGEN SATURATION: 98 % | WEIGHT: 293 LBS | BODY MASS INDEX: 43.4 KG/M2 | TEMPERATURE: 98.2 F | DIASTOLIC BLOOD PRESSURE: 90 MMHG

## 2019-03-05 DIAGNOSIS — I10 ESSENTIAL HYPERTENSION: ICD-10-CM

## 2019-03-05 DIAGNOSIS — Z01.818 PRE-OP EXAM: Primary | ICD-10-CM

## 2019-03-05 DIAGNOSIS — E66.01 MORBID OBESITY WITH BMI OF 50.0-59.9, ADULT (HCC): ICD-10-CM

## 2019-03-05 DIAGNOSIS — J18.9 COMMUNITY ACQUIRED PNEUMONIA OF RIGHT MIDDLE LOBE OF LUNG: ICD-10-CM

## 2019-03-05 DIAGNOSIS — E11.9 TYPE 2 DIABETES MELLITUS WITHOUT COMPLICATION, WITHOUT LONG-TERM CURRENT USE OF INSULIN (HCC): ICD-10-CM

## 2019-03-05 DIAGNOSIS — G89.4 CHRONIC PAIN SYNDROME: ICD-10-CM

## 2019-03-05 DIAGNOSIS — R30.0 DYSURIA: ICD-10-CM

## 2019-03-05 LAB
BILIRUBIN, POC: NEGATIVE
BLOOD URINE, POC: NORMAL
CLARITY, POC: CLEAR
COLOR, POC: YELLOW
CONTROL: PRESENT
GLUCOSE URINE, POC: NEGATIVE
KETONES, POC: NEGATIVE
LEUKOCYTE EST, POC: NORMAL
NITRITE, POC: NEGATIVE
PH, POC: 7
PREGNANCY TEST URINE, POC: NEGATIVE
PROTEIN, POC: NORMAL
SPECIFIC GRAVITY, POC: 1.01
UROBILINOGEN, POC: NORMAL

## 2019-03-05 PROCEDURE — 1036F TOBACCO NON-USER: CPT | Performed by: PHYSICIAN ASSISTANT

## 2019-03-05 PROCEDURE — 81025 URINE PREGNANCY TEST: CPT | Performed by: PHYSICIAN ASSISTANT

## 2019-03-05 PROCEDURE — 99214 OFFICE O/P EST MOD 30 MIN: CPT | Performed by: PHYSICIAN ASSISTANT

## 2019-03-05 PROCEDURE — G8417 CALC BMI ABV UP PARAM F/U: HCPCS | Performed by: PHYSICIAN ASSISTANT

## 2019-03-05 PROCEDURE — 3046F HEMOGLOBIN A1C LEVEL >9.0%: CPT | Performed by: PHYSICIAN ASSISTANT

## 2019-03-05 PROCEDURE — G8427 DOCREV CUR MEDS BY ELIG CLIN: HCPCS | Performed by: PHYSICIAN ASSISTANT

## 2019-03-05 PROCEDURE — 2022F DILAT RTA XM EVC RTNOPTHY: CPT | Performed by: PHYSICIAN ASSISTANT

## 2019-03-05 PROCEDURE — 1111F DSCHRG MED/CURRENT MED MERGE: CPT | Performed by: PHYSICIAN ASSISTANT

## 2019-03-05 PROCEDURE — G8484 FLU IMMUNIZE NO ADMIN: HCPCS | Performed by: PHYSICIAN ASSISTANT

## 2019-03-05 RX ORDER — KETOROLAC TROMETHAMINE 30 MG/ML
60 INJECTION, SOLUTION INTRAMUSCULAR; INTRAVENOUS ONCE
Status: COMPLETED | OUTPATIENT
Start: 2019-03-05 | End: 2019-03-05

## 2019-03-05 RX ORDER — NITROFURANTOIN 25; 75 MG/1; MG/1
100 CAPSULE ORAL 2 TIMES DAILY
Qty: 14 CAPSULE | Refills: 0 | Status: SHIPPED | OUTPATIENT
Start: 2019-03-05 | End: 2019-03-12

## 2019-03-05 RX ORDER — DEXAMETHASONE SODIUM PHOSPHATE 4 MG/ML
4 INJECTION, SOLUTION INTRA-ARTICULAR; INTRALESIONAL; INTRAMUSCULAR; INTRAVENOUS; SOFT TISSUE ONCE
Status: COMPLETED | OUTPATIENT
Start: 2019-03-05 | End: 2019-03-05

## 2019-03-05 RX ADMIN — KETOROLAC TROMETHAMINE 60 MG: 30 INJECTION, SOLUTION INTRAMUSCULAR; INTRAVENOUS at 09:58

## 2019-03-05 RX ADMIN — DEXAMETHASONE SODIUM PHOSPHATE 4 MG: 4 INJECTION, SOLUTION INTRA-ARTICULAR; INTRALESIONAL; INTRAMUSCULAR; INTRAVENOUS; SOFT TISSUE at 09:59

## 2019-03-06 ENCOUNTER — TELEPHONE (OUTPATIENT)
Dept: BARIATRICS/WEIGHT MGMT | Age: 44
End: 2019-03-06

## 2019-03-08 ENCOUNTER — TELEPHONE (OUTPATIENT)
Dept: FAMILY MEDICINE CLINIC | Age: 44
End: 2019-03-08

## 2019-03-11 ENCOUNTER — TELEPHONE (OUTPATIENT)
Dept: FAMILY MEDICINE CLINIC | Age: 44
End: 2019-03-11

## 2019-03-15 ENCOUNTER — PREP FOR PROCEDURE (OUTPATIENT)
Dept: SURGERY | Age: 44
End: 2019-03-15

## 2019-03-15 ENCOUNTER — TELEPHONE (OUTPATIENT)
Dept: BARIATRICS/WEIGHT MGMT | Age: 44
End: 2019-03-15

## 2019-03-15 ENCOUNTER — TELEPHONE (OUTPATIENT)
Dept: FAMILY MEDICINE CLINIC | Age: 44
End: 2019-03-15

## 2019-03-15 RX ORDER — SODIUM CHLORIDE 0.9 % (FLUSH) 0.9 %
10 SYRINGE (ML) INJECTION EVERY 12 HOURS SCHEDULED
Status: CANCELLED | OUTPATIENT
Start: 2019-03-15

## 2019-03-15 RX ORDER — ONDANSETRON 2 MG/ML
4 INJECTION INTRAMUSCULAR; INTRAVENOUS ONCE
Status: CANCELLED | OUTPATIENT
Start: 2019-03-15 | End: 2019-03-15

## 2019-03-15 RX ORDER — SODIUM CHLORIDE 0.9 % (FLUSH) 0.9 %
10 SYRINGE (ML) INJECTION PRN
Status: CANCELLED | OUTPATIENT
Start: 2019-03-15

## 2019-03-15 RX ORDER — SODIUM CHLORIDE, SODIUM LACTATE, POTASSIUM CHLORIDE, CALCIUM CHLORIDE 600; 310; 30; 20 MG/100ML; MG/100ML; MG/100ML; MG/100ML
INJECTION, SOLUTION INTRAVENOUS CONTINUOUS
Status: CANCELLED | OUTPATIENT
Start: 2019-03-15

## 2019-03-15 RX ORDER — SCOLOPAMINE TRANSDERMAL SYSTEM 1 MG/1
1 PATCH, EXTENDED RELEASE TRANSDERMAL
Status: CANCELLED | OUTPATIENT
Start: 2019-03-15 | End: 2019-03-18

## 2019-03-20 ENCOUNTER — HOSPITAL ENCOUNTER (OUTPATIENT)
Dept: PREADMISSION TESTING | Age: 44
Discharge: HOME | End: 2019-03-20
Payer: MEDICARE

## 2019-03-20 ENCOUNTER — OFFICE VISIT (OUTPATIENT)
Dept: BARIATRICS/WEIGHT MGMT | Age: 44
End: 2019-03-20
Payer: MEDICARE

## 2019-03-20 ENCOUNTER — INITIAL CONSULT (OUTPATIENT)
Dept: BARIATRICS/WEIGHT MGMT | Age: 44
End: 2019-03-20
Payer: MEDICARE

## 2019-03-20 VITALS
SYSTOLIC BLOOD PRESSURE: 157 MMHG | BODY MASS INDEX: 43.4 KG/M2 | OXYGEN SATURATION: 94 % | RESPIRATION RATE: 20 BRPM | HEIGHT: 69 IN | WEIGHT: 293 LBS | DIASTOLIC BLOOD PRESSURE: 96 MMHG | HEART RATE: 91 BPM | TEMPERATURE: 98.3 F

## 2019-03-20 VITALS
HEART RATE: 46 BPM | DIASTOLIC BLOOD PRESSURE: 70 MMHG | WEIGHT: 293 LBS | SYSTOLIC BLOOD PRESSURE: 151 MMHG | TEMPERATURE: 97.2 F | HEIGHT: 69 IN | BODY MASS INDEX: 43.4 KG/M2 | RESPIRATION RATE: 18 BRPM

## 2019-03-20 VITALS — BODY MASS INDEX: 43.4 KG/M2 | HEIGHT: 69 IN | WEIGHT: 293 LBS

## 2019-03-20 DIAGNOSIS — Z98.890 PONV (POSTOPERATIVE NAUSEA AND VOMITING): ICD-10-CM

## 2019-03-20 DIAGNOSIS — K91.2 MALNUTRITION FOLLOWING GASTROINTESTINAL SURGERY: Primary | ICD-10-CM

## 2019-03-20 DIAGNOSIS — Z71.3 DIETARY COUNSELING: Primary | ICD-10-CM

## 2019-03-20 DIAGNOSIS — R11.2 PONV (POSTOPERATIVE NAUSEA AND VOMITING): ICD-10-CM

## 2019-03-20 DIAGNOSIS — E66.01 MORBID OBESITY WITH BMI OF 50.0-59.9, ADULT (HCC): Primary | ICD-10-CM

## 2019-03-20 DIAGNOSIS — K21.9 GASTROESOPHAGEAL REFLUX DISEASE WITHOUT ESOPHAGITIS: ICD-10-CM

## 2019-03-20 LAB
ALBUMIN SERPL-MCNC: 3.7 G/DL (ref 3.5–5.2)
ALP BLD-CCNC: 89 U/L (ref 35–104)
ALT SERPL-CCNC: 14 U/L (ref 0–32)
ANION GAP SERPL CALCULATED.3IONS-SCNC: 11 MMOL/L (ref 7–16)
AST SERPL-CCNC: 15 U/L (ref 0–31)
BILIRUB SERPL-MCNC: 0.4 MG/DL (ref 0–1.2)
BUN BLDV-MCNC: 11 MG/DL (ref 6–20)
CALCIUM SERPL-MCNC: 9.5 MG/DL (ref 8.6–10.2)
CHLORIDE BLD-SCNC: 103 MMOL/L (ref 98–107)
CO2: 29 MMOL/L (ref 22–29)
CREAT SERPL-MCNC: 0.8 MG/DL (ref 0.5–1)
GFR AFRICAN AMERICAN: >60
GFR NON-AFRICAN AMERICAN: >60 ML/MIN/1.73
GLUCOSE BLD-MCNC: 117 MG/DL (ref 74–99)
HCT VFR BLD CALC: 43.7 % (ref 34–48)
HEMOGLOBIN: 13.8 G/DL (ref 11.5–15.5)
MCH RBC QN AUTO: 28.6 PG (ref 26–35)
MCHC RBC AUTO-ENTMCNC: 31.6 % (ref 32–34.5)
MCV RBC AUTO: 90.7 FL (ref 80–99.9)
PDW BLD-RTO: 16.5 FL (ref 11.5–15)
PLATELET # BLD: 342 E9/L (ref 130–450)
PMV BLD AUTO: 11.1 FL (ref 7–12)
POTASSIUM REFLEX MAGNESIUM: 4.2 MMOL/L (ref 3.5–5)
RBC # BLD: 4.82 E12/L (ref 3.5–5.5)
SODIUM BLD-SCNC: 143 MMOL/L (ref 132–146)
TOTAL PROTEIN: 8 G/DL (ref 6.4–8.3)
WBC # BLD: 10.7 E9/L (ref 4.5–11.5)

## 2019-03-20 PROCEDURE — G8417 CALC BMI ABV UP PARAM F/U: HCPCS | Performed by: SURGERY

## 2019-03-20 PROCEDURE — G8484 FLU IMMUNIZE NO ADMIN: HCPCS | Performed by: SURGERY

## 2019-03-20 PROCEDURE — 99999 PR OFFICE/OUTPT VISIT,PROCEDURE ONLY: CPT

## 2019-03-20 PROCEDURE — 85027 COMPLETE CBC AUTOMATED: CPT

## 2019-03-20 PROCEDURE — 99214 OFFICE O/P EST MOD 30 MIN: CPT | Performed by: SURGERY

## 2019-03-20 PROCEDURE — 99213 OFFICE O/P EST LOW 20 MIN: CPT

## 2019-03-20 PROCEDURE — 80053 COMPREHEN METABOLIC PANEL: CPT

## 2019-03-20 PROCEDURE — 1036F TOBACCO NON-USER: CPT | Performed by: SURGERY

## 2019-03-20 PROCEDURE — G8427 DOCREV CUR MEDS BY ELIG CLIN: HCPCS | Performed by: SURGERY

## 2019-03-20 PROCEDURE — 36415 COLL VENOUS BLD VENIPUNCTURE: CPT

## 2019-03-20 RX ORDER — ONDANSETRON 4 MG/1
4 TABLET, ORALLY DISINTEGRATING ORAL EVERY 8 HOURS PRN
Qty: 10 TABLET | Refills: 0 | Status: SHIPPED | OUTPATIENT
Start: 2019-03-20 | End: 2019-04-01 | Stop reason: SDUPTHER

## 2019-03-20 RX ORDER — OMEPRAZOLE 20 MG/1
20 CAPSULE, DELAYED RELEASE ORAL DAILY
Qty: 30 CAPSULE | Refills: 12 | Status: ON HOLD
Start: 2019-03-20 | End: 2020-05-01 | Stop reason: HOSPADM

## 2019-03-20 RX ORDER — GABAPENTIN 600 MG/1
600 TABLET ORAL 3 TIMES DAILY
COMMUNITY
End: 2019-04-03 | Stop reason: SDUPTHER

## 2019-03-20 ASSESSMENT — PAIN DESCRIPTION - ORIENTATION: ORIENTATION: RIGHT;LEFT

## 2019-03-20 ASSESSMENT — PAIN DESCRIPTION - ONSET: ONSET: ON-GOING

## 2019-03-20 ASSESSMENT — PAIN DESCRIPTION - LOCATION: LOCATION: BACK;HIP;SHOULDER

## 2019-03-20 ASSESSMENT — PAIN DESCRIPTION - PAIN TYPE: TYPE: CHRONIC PAIN

## 2019-03-20 ASSESSMENT — PAIN SCALES - GENERAL: PAINLEVEL_OUTOF10: 10

## 2019-03-20 ASSESSMENT — PAIN DESCRIPTION - FREQUENCY: FREQUENCY: CONTINUOUS

## 2019-03-25 ENCOUNTER — TELEPHONE (OUTPATIENT)
Dept: BARIATRICS/WEIGHT MGMT | Age: 44
End: 2019-03-25

## 2019-03-25 ENCOUNTER — INITIAL CONSULT (OUTPATIENT)
Dept: BARIATRICS/WEIGHT MGMT | Age: 44
End: 2019-03-25
Payer: MEDICARE

## 2019-03-25 VITALS — WEIGHT: 293 LBS | HEIGHT: 69 IN | BODY MASS INDEX: 43.4 KG/M2

## 2019-03-25 DIAGNOSIS — Z71.3 DIETARY COUNSELING: Primary | ICD-10-CM

## 2019-03-25 PROCEDURE — 99999 PR OFFICE/OUTPT VISIT,PROCEDURE ONLY: CPT

## 2019-03-26 ENCOUNTER — TELEPHONE (OUTPATIENT)
Dept: BARIATRICS/WEIGHT MGMT | Age: 44
End: 2019-03-26

## 2019-03-27 ENCOUNTER — HOSPITAL ENCOUNTER (EMERGENCY)
Age: 44
Discharge: HOME | End: 2019-03-27
Payer: MEDICARE

## 2019-03-27 ENCOUNTER — APPOINTMENT (OUTPATIENT)
Dept: CT IMAGING | Age: 44
End: 2019-03-27
Payer: MEDICARE

## 2019-03-27 ENCOUNTER — APPOINTMENT (OUTPATIENT)
Dept: GENERAL RADIOLOGY | Age: 44
End: 2019-03-27
Payer: MEDICARE

## 2019-03-27 VITALS
HEART RATE: 96 BPM | WEIGHT: 293 LBS | HEIGHT: 68 IN | TEMPERATURE: 98.3 F | RESPIRATION RATE: 16 BRPM | SYSTOLIC BLOOD PRESSURE: 184 MMHG | OXYGEN SATURATION: 96 % | DIASTOLIC BLOOD PRESSURE: 110 MMHG | BODY MASS INDEX: 44.41 KG/M2

## 2019-03-27 DIAGNOSIS — G89.29 CHRONIC LOW BACK PAIN WITH SCIATICA, SCIATICA LATERALITY UNSPECIFIED, UNSPECIFIED BACK PAIN LATERALITY: Primary | ICD-10-CM

## 2019-03-27 DIAGNOSIS — M54.40 CHRONIC LOW BACK PAIN WITH SCIATICA, SCIATICA LATERALITY UNSPECIFIED, UNSPECIFIED BACK PAIN LATERALITY: Primary | ICD-10-CM

## 2019-03-27 LAB
BILIRUBIN URINE: NEGATIVE
BLOOD, URINE: NEGATIVE
CLARITY: CLEAR
COLOR: YELLOW
GLUCOSE URINE: NEGATIVE MG/DL
KETONES, URINE: NEGATIVE MG/DL
LEUKOCYTE ESTERASE, URINE: NEGATIVE
NITRITE, URINE: NEGATIVE
PH UA: 6 (ref 5–9)
PROTEIN UA: NEGATIVE MG/DL
SPECIFIC GRAVITY UA: 1.02 (ref 1–1.03)
UROBILINOGEN, URINE: 0.2 E.U./DL

## 2019-03-27 PROCEDURE — 99284 EMERGENCY DEPT VISIT MOD MDM: CPT

## 2019-03-27 PROCEDURE — 72131 CT LUMBAR SPINE W/O DYE: CPT

## 2019-03-27 PROCEDURE — 72170 X-RAY EXAM OF PELVIS: CPT

## 2019-03-27 PROCEDURE — 96372 THER/PROPH/DIAG INJ SC/IM: CPT

## 2019-03-27 PROCEDURE — 81003 URINALYSIS AUTO W/O SCOPE: CPT

## 2019-03-27 PROCEDURE — 6360000002 HC RX W HCPCS: Performed by: PHYSICIAN ASSISTANT

## 2019-03-27 RX ORDER — FENTANYL CITRATE 50 UG/ML
100 INJECTION, SOLUTION INTRAMUSCULAR; INTRAVENOUS ONCE
Status: COMPLETED | OUTPATIENT
Start: 2019-03-27 | End: 2019-03-27

## 2019-03-27 RX ADMIN — FENTANYL CITRATE 100 MCG: 50 INJECTION, SOLUTION INTRAMUSCULAR; INTRAVENOUS at 18:41

## 2019-03-27 ASSESSMENT — PAIN SCALES - GENERAL: PAINLEVEL_OUTOF10: 7

## 2019-04-01 ENCOUNTER — TELEPHONE (OUTPATIENT)
Dept: BARIATRICS/WEIGHT MGMT | Age: 44
End: 2019-04-01

## 2019-04-01 ENCOUNTER — INITIAL CONSULT (OUTPATIENT)
Dept: BARIATRICS/WEIGHT MGMT | Age: 44
End: 2019-04-01
Payer: MEDICARE

## 2019-04-01 ENCOUNTER — OFFICE VISIT (OUTPATIENT)
Dept: FAMILY MEDICINE CLINIC | Age: 44
End: 2019-04-01
Payer: MEDICARE

## 2019-04-01 VITALS
SYSTOLIC BLOOD PRESSURE: 162 MMHG | TEMPERATURE: 97.8 F | BODY MASS INDEX: 43.4 KG/M2 | HEART RATE: 76 BPM | HEIGHT: 69 IN | RESPIRATION RATE: 16 BRPM | DIASTOLIC BLOOD PRESSURE: 92 MMHG | OXYGEN SATURATION: 93 % | WEIGHT: 293 LBS

## 2019-04-01 VITALS — HEIGHT: 69 IN | BODY MASS INDEX: 43.4 KG/M2 | WEIGHT: 293 LBS

## 2019-04-01 DIAGNOSIS — G89.4 CHRONIC PAIN SYNDROME: ICD-10-CM

## 2019-04-01 DIAGNOSIS — Z71.3 DIETARY COUNSELING: Primary | ICD-10-CM

## 2019-04-01 DIAGNOSIS — E66.01 MORBID OBESITY WITH BMI OF 50.0-59.9, ADULT (HCC): ICD-10-CM

## 2019-04-01 DIAGNOSIS — R60.9 EDEMA, UNSPECIFIED TYPE: ICD-10-CM

## 2019-04-01 DIAGNOSIS — L85.3 DRY SKIN DERMATITIS: ICD-10-CM

## 2019-04-01 DIAGNOSIS — R30.0 DYSURIA: ICD-10-CM

## 2019-04-01 DIAGNOSIS — I10 ESSENTIAL HYPERTENSION: Primary | ICD-10-CM

## 2019-04-01 LAB
BILIRUBIN, POC: NORMAL
BLOOD URINE, POC: NORMAL
CLARITY, POC: CLEAR
COLOR, POC: YELLOW
GLUCOSE URINE, POC: NORMAL
KETONES, POC: NORMAL
LEUKOCYTE EST, POC: NORMAL
NITRITE, POC: NEGATIVE
PH, POC: 6
PROTEIN, POC: NORMAL
SPECIFIC GRAVITY, POC: 1.02
UROBILINOGEN, POC: NORMAL

## 2019-04-01 PROCEDURE — G8417 CALC BMI ABV UP PARAM F/U: HCPCS | Performed by: PHYSICIAN ASSISTANT

## 2019-04-01 PROCEDURE — 99999 PR OFFICE/OUTPT VISIT,PROCEDURE ONLY: CPT

## 2019-04-01 PROCEDURE — G8427 DOCREV CUR MEDS BY ELIG CLIN: HCPCS | Performed by: PHYSICIAN ASSISTANT

## 2019-04-01 PROCEDURE — 1036F TOBACCO NON-USER: CPT | Performed by: PHYSICIAN ASSISTANT

## 2019-04-01 PROCEDURE — 99214 OFFICE O/P EST MOD 30 MIN: CPT | Performed by: PHYSICIAN ASSISTANT

## 2019-04-01 RX ORDER — NITROFURANTOIN 25; 75 MG/1; MG/1
100 CAPSULE ORAL 2 TIMES DAILY
Qty: 14 CAPSULE | Refills: 0 | Status: SHIPPED | OUTPATIENT
Start: 2019-04-01 | End: 2019-04-08

## 2019-04-01 RX ORDER — ONDANSETRON 4 MG/1
4 TABLET, ORALLY DISINTEGRATING ORAL EVERY 8 HOURS PRN
Qty: 10 TABLET | Refills: 0 | Status: SHIPPED | OUTPATIENT
Start: 2019-04-01 | End: 2019-10-02 | Stop reason: SDUPTHER

## 2019-04-01 RX ORDER — FUROSEMIDE 20 MG/1
20 TABLET ORAL DAILY PRN
Qty: 30 TABLET | Refills: 0 | Status: SHIPPED | OUTPATIENT
Start: 2019-04-01 | End: 2019-04-30 | Stop reason: SDUPTHER

## 2019-04-01 NOTE — PROGRESS NOTES
Pt was in office today for weight check. Per Dr Juan Luis Mcclure, pt must lose 10 lb before proceeding with weight loss surgery. Goal weight is 371 lb. Today in office pt weighed 384 lb, which is a gain of 3 pounds in past week so pt not able to be scheduled for surgery at this time. Pt was upset, says she is carefully following the liquid protein diet of 4 protein shakes daily and also a low calorie salad with lettuce and vegetables daily. Pt also reports that she is pushing fluids, drinking a lot of water daily. Pt has appointment with her PCP today, this morning. BILL Bunch spoke with pt and assured her that the issue is likely fluid retention and that pt needs to explain current issue to her doctor and that the fluid issue can be resolved. HERON, KONG encouraged pt and and told her to continue current diet plan and continue high amount of water intake daily and to work with her doctor to resolve fluid retention issues. Appointment made for one week from today for weight check on Monday, 4/8/19 at 9:00 am.  Pt told to call [facility] and speak with RN or RD regarding medication changes per her PCP today.

## 2019-04-01 NOTE — PROGRESS NOTES
(ZOFRAN ODT) 4 MG disintegrating tablet Take 1 tablet by mouth every 8 hours as needed for Nausea or Vomiting 10 tablet 0    furosemide (LASIX) 20 MG tablet Take 1 tablet by mouth daily as needed (swelling) 30 tablet 0    mineral oil-hydrophilic petrolatum (AQUAPHOR) ointment Apply topically as needed. 99 g 5    nitrofurantoin, macrocrystal-monohydrate, (MACROBID) 100 MG capsule Take 1 capsule by mouth 2 times daily for 7 days 14 capsule 0    Misc. Devices (WALKER) MISC Use walker when ambulating. 1 each 0    Misc. Devices (CANE) MISC Use cane to aid in ambulation 1 each 0    omeprazole (PRILOSEC) 20 MG delayed release capsule Take 1 capsule by mouth Daily 30 capsule 12    gabapentin (NEURONTIN) 600 MG tablet Take 600 mg by mouth 3 times daily.  lisinopril-hydrochlorothiazide (PRINZIDE;ZESTORETIC) 10-12.5 MG per tablet TAKE 1 TABLET BY MOUTH DAILY 30 tablet 3    fluticasone (FLONASE) 50 MCG/ACT nasal spray SHAKE LIQUID AND USE 1 SPRAY IN EACH NOSTRIL DAILY 1 Bottle 3    diclofenac (VOLTAREN) 75 MG EC tablet TAKE 1 TABLET BY MOUTH TWICE DAILY 60 tablet 3    pantoprazole (PROTONIX) 20 MG tablet TAKE 1 TABLET BY MOUTH DAILY (Patient taking differently: Take 20 mg by mouth as needed ) 30 tablet 3    Multiple Vitamins-Minerals (THERAPEUTIC MULTIVITAMIN-MINERALS) tablet Take 1 tablet by mouth daily 90 tablet 5    Handicap Placard MISC by Does not apply route Patient cannot walk 200 ft without stopping to rest.    Expiration 5 yrs 1 each 0    albuterol sulfate HFA (PROAIR HFA) 108 (90 Base) MCG/ACT inhaler Inhale 2 puffs into the lungs every 4 hours as needed for Wheezing or Shortness of Breath 1 Inhaler 3    metFORMIN (GLUCOPHAGE) 500 MG tablet TAKE 1 TABLET BY MOUTH TWICE DAILY WITH MEALS 60 tablet 2    HYDROcodone-acetaminophen (NORCO) 7.5-325 MG per tablet Take 1 tablet by mouth every 8 hours as needed for Pain for up to 30 days. Intended supply: 30 days.  Earliest Fill Date: 3/5/19 90 tablet 0    ipratropium-albuterol (DUONEB) 0.5-2.5 (3) MG/3ML SOLN nebulizer solution Take 3 mLs by nebulization every 4 hours as needed for Shortness of Breath 24 vial 1    mometasone-formoterol (DULERA) 100-5 MCG/ACT inhaler Inhale 2 puffs into the lungs 2 times daily Rinse mouth after using. 1 Inhaler 2    loratadine (CLARITIN) 10 MG capsule Take 1 capsule by mouth daily 30 capsule 3    amLODIPine (NORVASC) 10 MG tablet Take 1 tablet by mouth daily 30 tablet 3    furosemide (LASIX) 40 MG tablet Take 1 tablet by mouth daily 60 tablet 3    hydrALAZINE (APRESOLINE) 25 MG tablet Take 1 tablet by mouth every 8 hours 90 tablet 3    potassium chloride (KLOR-CON M) 20 MEQ extended release tablet Take 1 tablet by mouth daily (with breakfast) 60 tablet 3    simvastatin (ZOCOR) 20 MG tablet TAKE 1 TABLET BY MOUTH EVERY NIGHT 30 tablet 5     No current facility-administered medications for this visit.         Allergies   Allergen Reactions    Food Anaphylaxis     Food allergy - Fish and tree nuts    Fish-Derived Products     Nuts [Peanut-Containing Drug Products]     Pcn [Penicillins]     Ultram [Tramadol Hcl]      Per pt had a seizure    Cashews [Macadamia Nut Oil] Nausea And Vomiting       Family History   Problem Relation Age of Onset    Arthritis Mother     Hypertension Mother     Asthma Mother     Cancer Father     Hypertension Father     Heart Attack Father     Asthma Father        Social History     Socioeconomic History    Marital status: Legally      Spouse name: Not on file    Number of children: Not on file    Years of education: Not on file    Highest education level: Not on file   Occupational History    Occupation: direct care staff/counselor   Social Needs    Financial resource strain: Not on file    Food insecurity:     Worry: Not on file     Inability: Not on file    Transportation needs:     Medical: Not on file     Non-medical: Not on file   Tobacco Use    Smoking status: Former Smoker     Packs/day: 1.00     Years: 17.00     Pack years: 17.00     Types: Cigarettes     Start date: 3/27/2001     Last attempt to quit: 10/5/2018     Years since quittin.4    Smokeless tobacco: Never Used   Substance and Sexual Activity    Alcohol use: No    Drug use: No    Sexual activity: Yes   Lifestyle    Physical activity:     Days per week: Not on file     Minutes per session: Not on file    Stress: Not on file   Relationships    Social connections:     Talks on phone: Not on file     Gets together: Not on file     Attends Yazidism service: Not on file     Active member of club or organization: Not on file     Attends meetings of clubs or organizations: Not on file     Relationship status: Not on file    Intimate partner violence:     Fear of current or ex partner: Not on file     Emotionally abused: Not on file     Physically abused: Not on file     Forced sexual activity: Not on file   Other Topics Concern    Not on file   Social History Narrative    Not on file       Review of Systems :  Constitutional: negative for - chills, fever, weight loss, or fatigue  Psychological: negative for - anxiety, depression or suicidal ideation  HEENT: negative for - vision changes, nasal congestion, ear pain or pharyngitis   Respiratory: negative for -  Chest heaviness, cough, shortness of breath, or pleuritic pain  Cardiovascular: negative for - diaphoresis, chest pain, palpitations, or edema   Gastrointestinal: negative for -abdominal pain, change in bowel habits, constipation, diarrhea, or nausea/vomiting  Genito-Urinary: negative for - dysuria, frequency, or nocturia    Neurological: negative for - bowel and bladder control changes, dizziness, or headaches   Dermatological: negative for - dry skin, rash, hair or nail symptoms    Physical Exam:   Vitals:    19 1023 19 1031   BP: (!) 139/99 (!) 162/92   Pulse: 76    Resp: 16    Temp: 97.8 °F (36.6 °C)    TempSrc: Oral    SpO2: 93%    Weight: (!) 391 lb (177.4 kg)    Height: 5' 8.5\" (1.74 m)      Physical Exam   Constitutional: She is oriented to person, place, and time. She appears well-developed and well-nourished. No distress. HENT:   Head: Normocephalic and atraumatic. Right Ear: External ear normal.   Left Ear: External ear normal.   Nose: Nose normal.   Mouth/Throat: Oropharynx is clear and moist.   Eyes: Pupils are equal, round, and reactive to light. Conjunctivae and EOM are normal. No scleral icterus. Neck: Normal range of motion. Neck supple. No thyromegaly present. Cardiovascular: Normal rate, regular rhythm, normal heart sounds and intact distal pulses. No murmur heard. Trace pedal edema   Pulmonary/Chest: Effort normal and breath sounds normal. No accessory muscle usage. No respiratory distress. She has no wheezes. Musculoskeletal: Normal range of motion. Neurological: She is alert and oriented to person, place, and time. She has normal reflexes. Skin: Skin is warm and dry. No rash noted. Psychiatric: She has a normal mood and affect. Her speech is normal and behavior is normal.         Assessment/Plan:     Lalito Flowers was seen today for other and lower back pain. Diagnoses and all orders for this visit:    Essential hypertension    Edema, unspecified type  -     furosemide (LASIX) 20 MG tablet; Take 1 tablet by mouth daily as needed (swelling)    Chronic pain syndrome    Dysuria  -     POCT Urinalysis no Micro  -     nitrofurantoin, macrocrystal-monohydrate, (MACROBID) 100 MG capsule; Take 1 capsule by mouth 2 times daily for 7 days    Dry skin dermatitis  -     mineral oil-hydrophilic petrolatum (AQUAPHOR) ointment; Apply topically as needed. Morbid obesity with BMI of 50.0-59.9, adult ([address]    Other orders  -     ondansetron (ZOFRAN ODT) 4 MG disintegrating tablet; Take 1 tablet by mouth every 8 hours as needed for Nausea or Vomiting        htn is stable. unfortunately she has been unable to meet weight loss goals.  We discussed diet in detail. We can make some small changes that will help her. I dont believe this is \"water weight. \" will add a second dose of lasix for a short time. Continue potassium. Discuss increased pain with Dr Erica Talley. Reviewed ct from ED.     GREGORIO Dillon

## 2019-04-01 NOTE — TELEPHONE ENCOUNTER
Pt was in office today for weight check. Per Dr Rosa Shannon, pt must lose 10 lb before proceeding with weight loss surgery. Goal weight is 371 lb. Today in office pt weighed 384 lb, which is a gain of 3 pounds in past week so pt not able to be scheduled for surgery at this time. Pt was upset, says she is carefully following the liquid protein diet of 4 protein shakes daily and also a low calorie salad with lettuce and vegetables daily. Pt also reports that she is pushing fluids, drinking a lot of water daily. Pt has appointment with her PCP today, this morning. BILL Bunch spoke with pt and assured her that the issue is likely fluid retention and that pt needs to explain current issue to her doctor and that the fluid issue can be resolved. HERON, KONG encouraged pt and and told her to continue current diet plan and continue high amount of water intake daily and to work with her doctor to resolve fluid retention issues. Appointment made for one week from today for weight check on Monday, 4/8/19 at 9:00 am.  Pt told to call [facility] and speak with RN or RD regarding medication changes per her PCP today.

## 2019-04-08 ENCOUNTER — INITIAL CONSULT (OUTPATIENT)
Dept: BARIATRICS/WEIGHT MGMT | Age: 44
End: 2019-04-08
Payer: MEDICARE

## 2019-04-08 VITALS — HEIGHT: 69 IN | BODY MASS INDEX: 43.4 KG/M2 | WEIGHT: 293 LBS

## 2019-04-08 DIAGNOSIS — Z71.3 DIETARY COUNSELING: Primary | ICD-10-CM

## 2019-04-08 PROCEDURE — 99999 PR OFFICE/OUTPT VISIT,PROCEDURE ONLY: CPT

## 2019-04-08 NOTE — PROGRESS NOTES
Pt was in office today for weight check.  Per Dr Emanuel Al, pt must lose weight with a goal weight of 371 lb before proceeding with weight loss surgery.  Today in office pt weighed 386 lb, which is a gain of 2 pounds in past week so pt not able to be scheduled for surgery at this time. Pt reports that she is following the liquid protein diet. She does not tolerated slimfast due to the fact that it contains dairy, so she is using Nectar shakes made with water and 2 scoops of nectar and she says she is taking it 3 times on most days. She is also eating salads and eating small serving of non-starchy vegetables. Told pt to use 1.5 scoops each time she has a shake and to have four every day. Pt says she will not have the salad, though she knows she is allowed to have unlimited iceberg lettuce with vinegar and/or lemon juice for dressing. Pt says her PCP adjusted to increase her Lasix by 20 daily. Pt felt that her ankles had less swelling. Pt very discouraged that weight is actually up. Encourage pt to continue on and that she will get to her goal weight. All questions answered, pt has appointment for weight check on Friday, 4/12/19 at [facility].

## 2019-04-15 ENCOUNTER — INITIAL CONSULT (OUTPATIENT)
Dept: BARIATRICS/WEIGHT MGMT | Age: 44
End: 2019-04-15
Payer: MEDICARE

## 2019-04-15 VITALS — WEIGHT: 293 LBS | BODY MASS INDEX: 58.59 KG/M2

## 2019-04-15 DIAGNOSIS — Z71.3 DIETARY COUNSELING: Primary | ICD-10-CM

## 2019-04-15 PROCEDURE — 99999 PR OFFICE/OUTPT VISIT,PROCEDURE ONLY: CPT

## 2019-04-15 NOTE — PROGRESS NOTES
Pt was in office today for weight check.  Per Dr Joe Chow, pt must lose weight with a goal weight of 371 lb before proceeding with weight loss surgery.  Today in office pt weighed 391 lb, which is a gain of 10 pounds in 3 weeks so pt not able to be scheduled for surgery at this time. Discussed in detail the diet pt is following. She admitted to eating Jaime's cream of mushroom soup, 2 cans a day for several days. She also admitted to eating a chicken salad at a restaurant and a turkey sandwich at home. Pt is clearly not compliant with the liquid protein diet as she was instructed and agreed to follow. Reviewed the actual required diet with pt and pt is to follow the diet plan carefully, with nothing but what is allowed (4 protein shakes daily and head lettuce with only vinegar or lemon juice). She will come in to [facility] in 1 week for weight check. All questions answered, pt to call RD at [facility] at [phone] as needed for questions or diet assistance.

## 2019-04-25 ENCOUNTER — TELEPHONE (OUTPATIENT)
Dept: BARIATRICS/WEIGHT MGMT | Age: 44
End: 2019-04-25

## 2019-04-25 NOTE — TELEPHONE ENCOUNTER
Called pt due to pt being a no show for weight check today. Pt said that she had called and rescheduled for Monday. Pt was still on today's schedule. Rescheduled for Monday, 4/29/19 at 9:00 a.m.

## 2019-04-29 ENCOUNTER — INITIAL CONSULT (OUTPATIENT)
Dept: BARIATRICS/WEIGHT MGMT | Age: 44
End: 2019-04-29
Payer: MEDICARE

## 2019-04-29 VITALS — WEIGHT: 293 LBS | HEIGHT: 69 IN | BODY MASS INDEX: 43.4 KG/M2

## 2019-04-29 DIAGNOSIS — Z71.3 DIETARY COUNSELING: Primary | ICD-10-CM

## 2019-04-29 PROCEDURE — 99999 PR OFFICE/OUTPT VISIT,PROCEDURE ONLY: CPT

## 2019-05-06 ENCOUNTER — INITIAL CONSULT (OUTPATIENT)
Dept: BARIATRICS/WEIGHT MGMT | Age: 44
End: 2019-05-06
Payer: MEDICARE

## 2019-05-06 VITALS — HEIGHT: 69 IN | WEIGHT: 293 LBS | BODY MASS INDEX: 43.4 KG/M2

## 2019-05-06 DIAGNOSIS — Z71.3 DIETARY COUNSELING: Primary | ICD-10-CM

## 2019-05-06 PROCEDURE — 99999 PR OFFICE/OUTPT VISIT,PROCEDURE ONLY: CPT

## 2019-05-06 NOTE — PROGRESS NOTES
Pt was in the office today for her pre-surgical weight Loss appointment. Weight is 384 lb and is up 3 lbs since Her last appointment (4/29/19). Pt has lost 0 % of her EBW. Pt is down 0 lbs since starting. Rd / Ld discussed current daily food intake with pt and pt is taking her protein shakes - 3 daily, made with 1.5 scoops of Nectar protein mixed with water (pt does not tolerate milk). She has been eating a lot of fruit. She did not know how much fruit but says she does eat 2-3 apples a day. She eats other fruits including oranges and bananas. She is eating lettuce and vegetable salads with a vinagrete dressing. She admitted to eating a bagel. She is avoiding canned soup, which she had eaten recently. Pt is aware She must meet Her pre-op weight loss goal of 371 lb, per Dr Alyx Bolden, in order to proceed with weight loss surgery. RD told pt to strictly follow the liquid protein diet including Nectar protein shakes made with 1 scoop Nectar into water 4 times daily, and then she can eat unlimited lettuce with vinegar and lemon juice. The importance of compliance was stressed to pt. Pt verbalized understanding. Appointment made for pt to return to [facility] on 5/13/19 for her next weight check.

## 2019-05-13 ENCOUNTER — HOSPITAL ENCOUNTER (EMERGENCY)
Age: 44
Discharge: HOME | End: 2019-05-13
Attending: EMERGENCY MEDICINE
Payer: MEDICARE

## 2019-05-13 ENCOUNTER — TELEPHONE (OUTPATIENT)
Dept: BARIATRICS/WEIGHT MGMT | Age: 44
End: 2019-05-13

## 2019-05-13 VITALS
HEART RATE: 87 BPM | DIASTOLIC BLOOD PRESSURE: 102 MMHG | RESPIRATION RATE: 20 BRPM | OXYGEN SATURATION: 97 % | BODY MASS INDEX: 43.4 KG/M2 | WEIGHT: 293 LBS | TEMPERATURE: 98.7 F | SYSTOLIC BLOOD PRESSURE: 178 MMHG | HEIGHT: 69 IN

## 2019-05-13 DIAGNOSIS — R03.0 ELEVATED BLOOD PRESSURE READING: ICD-10-CM

## 2019-05-13 DIAGNOSIS — G89.29 OTHER CHRONIC PAIN: Primary | ICD-10-CM

## 2019-05-13 LAB
AMPHETAMINE SCREEN, URINE: NOT DETECTED
ANION GAP SERPL CALCULATED.3IONS-SCNC: 9 MMOL/L (ref 7–16)
BARBITURATE SCREEN URINE: NOT DETECTED
BASOPHILS ABSOLUTE: 0.03 E9/L (ref 0–0.2)
BASOPHILS RELATIVE PERCENT: 0.3 % (ref 0–2)
BENZODIAZEPINE SCREEN, URINE: NOT DETECTED
BUN BLDV-MCNC: 15 MG/DL (ref 6–20)
CALCIUM SERPL-MCNC: 9 MG/DL (ref 8.6–10.2)
CANNABINOID SCREEN URINE: NOT DETECTED
CHLORIDE BLD-SCNC: 104 MMOL/L (ref 98–107)
CO2: 27 MMOL/L (ref 22–29)
COCAINE METABOLITE SCREEN URINE: NOT DETECTED
CREAT SERPL-MCNC: 0.8 MG/DL (ref 0.5–1)
EOSINOPHILS ABSOLUTE: 0.41 E9/L (ref 0.05–0.5)
EOSINOPHILS RELATIVE PERCENT: 4.2 % (ref 0–6)
GFR AFRICAN AMERICAN: >60
GFR NON-AFRICAN AMERICAN: >60 ML/MIN/1.73
GLUCOSE BLD-MCNC: 103 MG/DL (ref 74–99)
HCT VFR BLD CALC: 38.2 % (ref 34–48)
HEMOGLOBIN: 12.1 G/DL (ref 11.5–15.5)
IMMATURE GRANULOCYTES #: 0.04 E9/L
IMMATURE GRANULOCYTES %: 0.4 % (ref 0–5)
LYMPHOCYTES ABSOLUTE: 3.56 E9/L (ref 1.5–4)
LYMPHOCYTES RELATIVE PERCENT: 36.5 % (ref 20–42)
MCH RBC QN AUTO: 28.9 PG (ref 26–35)
MCHC RBC AUTO-ENTMCNC: 31.7 % (ref 32–34.5)
MCV RBC AUTO: 91.4 FL (ref 80–99.9)
METHADONE SCREEN, URINE: NOT DETECTED
MONOCYTES ABSOLUTE: 0.6 E9/L (ref 0.1–0.95)
MONOCYTES RELATIVE PERCENT: 6.2 % (ref 2–12)
NEUTROPHILS ABSOLUTE: 5.11 E9/L (ref 1.8–7.3)
NEUTROPHILS RELATIVE PERCENT: 52.4 % (ref 43–80)
OPIATE SCREEN URINE: NOT DETECTED
PDW BLD-RTO: 16.1 FL (ref 11.5–15)
PHENCYCLIDINE SCREEN URINE: NOT DETECTED
PLATELET # BLD: 284 E9/L (ref 130–450)
PMV BLD AUTO: 11.2 FL (ref 7–12)
POTASSIUM SERPL-SCNC: 3.8 MMOL/L (ref 3.5–5)
PROPOXYPHENE SCREEN: NOT DETECTED
RBC # BLD: 4.18 E12/L (ref 3.5–5.5)
SEDIMENTATION RATE, ERYTHROCYTE: 38 MM/HR (ref 0–20)
SODIUM BLD-SCNC: 140 MMOL/L (ref 132–146)
TOTAL CK: 480 U/L (ref 20–180)
WBC # BLD: 9.8 E9/L (ref 4.5–11.5)

## 2019-05-13 PROCEDURE — 96374 THER/PROPH/DIAG INJ IV PUSH: CPT

## 2019-05-13 PROCEDURE — 82550 ASSAY OF CK (CPK): CPT

## 2019-05-13 PROCEDURE — 85025 COMPLETE CBC W/AUTO DIFF WBC: CPT

## 2019-05-13 PROCEDURE — 80048 BASIC METABOLIC PNL TOTAL CA: CPT

## 2019-05-13 PROCEDURE — 94761 N-INVAS EAR/PLS OXIMETRY MLT: CPT

## 2019-05-13 PROCEDURE — 99283 EMERGENCY DEPT VISIT LOW MDM: CPT

## 2019-05-13 PROCEDURE — 85651 RBC SED RATE NONAUTOMATED: CPT

## 2019-05-13 PROCEDURE — 80307 DRUG TEST PRSMV CHEM ANLYZR: CPT

## 2019-05-13 PROCEDURE — 6360000002 HC RX W HCPCS: Performed by: EMERGENCY MEDICINE

## 2019-05-13 RX ORDER — MORPHINE SULFATE 2 MG/ML
8 INJECTION, SOLUTION INTRAMUSCULAR; INTRAVENOUS ONCE
Status: COMPLETED | OUTPATIENT
Start: 2019-05-13 | End: 2019-05-13

## 2019-05-13 RX ADMIN — MORPHINE SULFATE 8 MG: 2 INJECTION, SOLUTION INTRAMUSCULAR; INTRAVENOUS at 19:30

## 2019-05-13 ASSESSMENT — PAIN DESCRIPTION - FREQUENCY: FREQUENCY: CONTINUOUS

## 2019-05-13 ASSESSMENT — PAIN - FUNCTIONAL ASSESSMENT: PAIN_FUNCTIONAL_ASSESSMENT: PREVENTS OR INTERFERES WITH ALL ACTIVE AND SOME PASSIVE ACTIVITIES

## 2019-05-13 ASSESSMENT — PAIN DESCRIPTION - PROGRESSION
CLINICAL_PROGRESSION: GRADUALLY WORSENING
CLINICAL_PROGRESSION: GRADUALLY IMPROVING

## 2019-05-13 ASSESSMENT — PAIN SCALES - GENERAL
PAINLEVEL_OUTOF10: 10
PAINLEVEL_OUTOF10: 10
PAINLEVEL_OUTOF10: 8

## 2019-05-13 ASSESSMENT — PAIN DESCRIPTION - LOCATION: LOCATION: GENERALIZED

## 2019-05-13 ASSESSMENT — PAIN DESCRIPTION - PAIN TYPE: TYPE: CHRONIC PAIN

## 2019-05-13 ASSESSMENT — PAIN DESCRIPTION - DESCRIPTORS: DESCRIPTORS: DISCOMFORT

## 2019-05-13 NOTE — ED PROVIDER NOTES
Patient is a 45year old female presenting for generalized chronic body pain which she states has been present for the past several years. Denies any new or worsening symptoms, notes that her pain medications prescribed by pain management have not been working well recently. No recent falls or trauma, recent infections, travel. Further denies depression symptoms. Symptoms reportedly moderate to severe in severity, worsened by nothing and improved by nothing. Is on norco chronically for her pain. Review of Systems   Constitutional: Negative for chills and fever. Respiratory: Negative for cough, chest tightness and shortness of breath. Cardiovascular: Negative for chest pain, palpitations and leg swelling. Gastrointestinal: Negative for abdominal pain, blood in stool, diarrhea, nausea and vomiting. Genitourinary: Negative for dysuria, flank pain, frequency, menstrual problem, vaginal bleeding and vaginal discharge. Musculoskeletal: Positive for arthralgias and myalgias. Negative for back pain and neck pain. Skin: Negative for color change, rash and wound. Neurological: Negative for dizziness, syncope, weakness and light-headedness. Psychiatric/Behavioral: Negative for self-injury and suicidal ideas. The patient is not nervous/anxious. Physical Exam   Constitutional: She is oriented to person, place, and time. She appears well-developed and well-nourished. No distress. Morbidly obese female in no apparent distress. HENT:   Head: Normocephalic and atraumatic. Mouth/Throat: Oropharynx is clear and moist.   Eyes: Pupils are equal, round, and reactive to light. EOM are normal. No scleral icterus. Neck: Normal range of motion. Neck supple. No JVD present. Cardiovascular: Normal rate, regular rhythm, S1 normal, S2 normal and normal heart sounds. No murmur heard. Pulmonary/Chest: Effort normal and breath sounds normal. No accessory muscle usage. No respiratory distress.  She has no wheezes. She has no rhonchi. She has no rales. Abdominal: Soft. Normal appearance and bowel sounds are normal. She exhibits no distension. There is no tenderness. Musculoskeletal: Normal range of motion. She exhibits no edema. Lymphadenopathy:     She has no cervical adenopathy. Neurological: She is alert and oriented to person, place, and time. Skin: Skin is warm and dry. No rash noted. She is not diaphoretic. No pallor. Nursing note and vitals reviewed. Procedures    MDM    ED Course as of May 15 2111   Mon May 13, 2019   2058 Patient reassessed, improving symptoms. No CP, SOB. Has ride home. Agreeable to plan for discharge and follow up with PCP and pain management. [RU]      ED Course User Index  [RU] Breanna Clayton DO       --------------------------------------------- PAST HISTORY ---------------------------------------------  Past Medical History:  has a past medical history of Asthma, Bell palsy, DDD (degenerative disc disease), cervical, Diabetes mellitus ([address] Diverticulitis, DJD (degenerative joint disease), GERD without esophagitis, HTN (hypertension), Hyperlipidemia, Meningitis, Morbid obesity due to excess calories ([address] Neuropathy, Pancreas cyst, and 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[address] Past Surgical History:  has a past surgical history that includes Cystocopy (Left, 01/14/2018); Cholecystectomy (2009); Lithotripsy (Right, 02/15/2018); Upper gastrointestinal endoscopy (N/A, 6/22/2018); and Dilation and curettage of uterus. Social History:  reports that she quit smoking about 7 months ago. Her smoking use included cigarettes. She started smoking about 18 years ago. She has a 17.00 pack-year smoking history. She has never used smokeless tobacco. She reports that she does not drink alcohol or use drugs.     Family History: family history includes Arthritis in her mother; Asthma in her father and mother; Cancer in her father; Heart Attack in her father; Hypertension in her father Emergency Department for generalized pain. After thorough history, physical, labs reviewd, presentation is most consistent with chronic pain, elevated blood pressure. Differential diagnoses include, but are not limited to, myalgia, myositis, CPRS, depression, electrolyte disturbance. Patient's condition imprvoed following treatment which included morphine. Was given a single dose of pain medication which improved her symptoms as well as blood pressure. Patient subsequently felt stable for discharge at this time. Given concerning signs and symptoms for which to return to the emergency department. Additional verbal orders provided. 9:15 PM  I have spoken with the patient and discussed todays results, in addition to providing specific details for the plan of care and counseling regarding the diagnosis and prognosis. Their questions are answered at this time and they are agreeable with the plan. I discussed at length with them reasons for immediate return here for re evaluation. They will followup with their primary care physician by calling their office tomorrow. --------------------------------- ADDITIONAL PROVIDER NOTES ---------------------------------  At this time the patient is without objective evidence of an acute process requiring hospitalization or inpatient management. They have remained hemodynamically stable throughout their entire ED visit and are stable for discharge with outpatient follow-up. The plan has been discussed in detail and they are aware of the specific conditions for emergent return, as well as the importance of follow-up. Discharge Medication List as of 5/13/2019  8:52 PM          Diagnosis:  1. Other chronic pain    2. Elevated blood pressure reading        Disposition:  Patient's disposition: Discharge to home  Patient's condition is stable.            Derick Mann DO  Resident  05/15/19 2263

## 2019-05-13 NOTE — TELEPHONE ENCOUNTER
Pt called [address] this am and is unable to come in for weight check. Called pt and left message on voice mail for pt to call RD at [phone] to reschedule weight check. Will await pt call.

## 2019-05-15 ASSESSMENT — ENCOUNTER SYMPTOMS
SHORTNESS OF BREATH: 0
VOMITING: 0
DIARRHEA: 0
COUGH: 0
ABDOMINAL PAIN: 0
CHEST TIGHTNESS: 0
BACK PAIN: 0
COLOR CHANGE: 0
BLOOD IN STOOL: 0
NAUSEA: 0

## 2019-05-17 ENCOUNTER — TELEPHONE (OUTPATIENT)
Dept: BARIATRICS/WEIGHT MGMT | Age: 44
End: 2019-05-17

## 2019-05-21 ENCOUNTER — TELEPHONE (OUTPATIENT)
Dept: BARIATRICS/WEIGHT MGMT | Age: 44
End: 2019-05-21

## 2019-05-21 NOTE — TELEPHONE ENCOUNTER
Called pt to return her call requesting her wt check be rescheduled for later this week. Pt not available, left message and told her that her appt she can come in Thurs or Friday at 8:30 am and we will do the weight check on the day she chooses.

## 2019-05-24 ENCOUNTER — INITIAL CONSULT (OUTPATIENT)
Dept: BARIATRICS/WEIGHT MGMT | Age: 44
End: 2019-05-24
Payer: MEDICARE

## 2019-05-24 VITALS — WEIGHT: 293 LBS | HEIGHT: 69 IN | BODY MASS INDEX: 43.4 KG/M2

## 2019-05-24 DIAGNOSIS — Z71.3 DIETARY COUNSELING: Primary | ICD-10-CM

## 2019-05-24 PROCEDURE — 99999 PR OFFICE/OUTPT VISIT,PROCEDURE ONLY: CPT

## 2019-05-30 ENCOUNTER — TELEPHONE (OUTPATIENT)
Dept: BARIATRICS/WEIGHT MGMT | Age: 44
End: 2019-05-30

## 2019-06-03 ENCOUNTER — TELEPHONE (OUTPATIENT)
Dept: BARIATRICS/WEIGHT MGMT | Age: 44
End: 2019-06-03

## 2019-06-03 NOTE — TELEPHONE ENCOUNTER
Pt left message on Sunday, 6/2/19 that she wished to reschedule her appointment for wt check. Called pt back and she rescheduled for Wednesday, 6/12/19. Pt says she has had some fluid retention, but may come in for a non scheduled weight check on Friday 6/7/19 if her weight is coming down.

## 2019-06-05 ENCOUNTER — TELEPHONE (OUTPATIENT)
Dept: FAMILY MEDICINE CLINIC | Age: 44
End: 2019-06-05

## 2019-06-05 NOTE — TELEPHONE ENCOUNTER
4/1/2019 6/7/2019  Patient needs order bariatric bedside commode bariatric and shower chair order   [facility]   Fax:[phone]  Phone:[phone]

## 2019-06-07 ENCOUNTER — OFFICE VISIT (OUTPATIENT)
Dept: FAMILY MEDICINE CLINIC | Age: 44
End: 2019-06-07
Payer: MEDICARE

## 2019-06-07 ENCOUNTER — HOSPITAL ENCOUNTER (OUTPATIENT)
Age: 44
Discharge: HOME | End: 2019-06-09
Payer: MEDICARE

## 2019-06-07 VITALS
BODY MASS INDEX: 44.41 KG/M2 | SYSTOLIC BLOOD PRESSURE: 138 MMHG | RESPIRATION RATE: 18 BRPM | DIASTOLIC BLOOD PRESSURE: 68 MMHG | TEMPERATURE: 98.3 F | HEIGHT: 68 IN | OXYGEN SATURATION: 93 % | WEIGHT: 293 LBS | HEART RATE: 30 BPM

## 2019-06-07 DIAGNOSIS — I49.3 FREQUENT PVCS: ICD-10-CM

## 2019-06-07 DIAGNOSIS — I49.9 IRREGULAR HEART RATE: ICD-10-CM

## 2019-06-07 DIAGNOSIS — M48.061 SPINAL STENOSIS OF LUMBAR REGION AT MULTIPLE LEVELS: Primary | ICD-10-CM

## 2019-06-07 DIAGNOSIS — I10 ESSENTIAL HYPERTENSION: ICD-10-CM

## 2019-06-07 DIAGNOSIS — L85.3 DRY SKIN DERMATITIS: ICD-10-CM

## 2019-06-07 LAB
ALBUMIN SERPL-MCNC: 4.1 G/DL (ref 3.5–5.2)
ALP BLD-CCNC: 85 U/L (ref 35–104)
ALT SERPL-CCNC: 16 U/L (ref 0–32)
ANION GAP SERPL CALCULATED.3IONS-SCNC: 18 MMOL/L (ref 7–16)
AST SERPL-CCNC: 21 U/L (ref 0–31)
BASOPHILS ABSOLUTE: 0.05 E9/L (ref 0–0.2)
BASOPHILS RELATIVE PERCENT: 0.6 % (ref 0–2)
BILIRUB SERPL-MCNC: 0.3 MG/DL (ref 0–1.2)
BUN BLDV-MCNC: 11 MG/DL (ref 6–20)
CALCIUM SERPL-MCNC: 9.6 MG/DL (ref 8.6–10.2)
CHLORIDE BLD-SCNC: 99 MMOL/L (ref 98–107)
CO2: 25 MMOL/L (ref 22–29)
CREAT SERPL-MCNC: 0.7 MG/DL (ref 0.5–1)
EOSINOPHILS ABSOLUTE: 0.22 E9/L (ref 0.05–0.5)
EOSINOPHILS RELATIVE PERCENT: 2.6 % (ref 0–6)
GFR AFRICAN AMERICAN: >60
GFR NON-AFRICAN AMERICAN: >60 ML/MIN/1.73
GLUCOSE BLD-MCNC: 105 MG/DL (ref 74–99)
HCT VFR BLD CALC: 43.3 % (ref 34–48)
HEMOGLOBIN: 13.2 G/DL (ref 11.5–15.5)
IMMATURE GRANULOCYTES #: 0.03 E9/L
IMMATURE GRANULOCYTES %: 0.4 % (ref 0–5)
LYMPHOCYTES ABSOLUTE: 2.7 E9/L (ref 1.5–4)
LYMPHOCYTES RELATIVE PERCENT: 32.1 % (ref 20–42)
MAGNESIUM: 2.1 MG/DL (ref 1.6–2.6)
MCH RBC QN AUTO: 28.8 PG (ref 26–35)
MCHC RBC AUTO-ENTMCNC: 30.5 % (ref 32–34.5)
MCV RBC AUTO: 94.3 FL (ref 80–99.9)
MONOCYTES ABSOLUTE: 0.45 E9/L (ref 0.1–0.95)
MONOCYTES RELATIVE PERCENT: 5.3 % (ref 2–12)
NEUTROPHILS ABSOLUTE: 4.97 E9/L (ref 1.8–7.3)
NEUTROPHILS RELATIVE PERCENT: 59 % (ref 43–80)
PDW BLD-RTO: 15.8 FL (ref 11.5–15)
PLATELET # BLD: 314 E9/L (ref 130–450)
PMV BLD AUTO: 11.6 FL (ref 7–12)
POTASSIUM SERPL-SCNC: 4.2 MMOL/L (ref 3.5–5)
RBC # BLD: 4.59 E12/L (ref 3.5–5.5)
SODIUM BLD-SCNC: 142 MMOL/L (ref 132–146)
T4 FREE: 1.04 NG/DL (ref 0.93–1.7)
TOTAL PROTEIN: 7.9 G/DL (ref 6.4–8.3)
TSH SERPL DL<=0.05 MIU/L-ACNC: 0.91 UIU/ML (ref 0.27–4.2)
WBC # BLD: 8.4 E9/L (ref 4.5–11.5)

## 2019-06-07 PROCEDURE — 83735 ASSAY OF MAGNESIUM: CPT

## 2019-06-07 PROCEDURE — 93000 ELECTROCARDIOGRAM COMPLETE: CPT | Performed by: PHYSICIAN ASSISTANT

## 2019-06-07 PROCEDURE — 84443 ASSAY THYROID STIM HORMONE: CPT

## 2019-06-07 PROCEDURE — 99214 OFFICE O/P EST MOD 30 MIN: CPT | Performed by: PHYSICIAN ASSISTANT

## 2019-06-07 PROCEDURE — G8427 DOCREV CUR MEDS BY ELIG CLIN: HCPCS | Performed by: PHYSICIAN ASSISTANT

## 2019-06-07 PROCEDURE — G8417 CALC BMI ABV UP PARAM F/U: HCPCS | Performed by: PHYSICIAN ASSISTANT

## 2019-06-07 PROCEDURE — 1036F TOBACCO NON-USER: CPT | Performed by: PHYSICIAN ASSISTANT

## 2019-06-07 PROCEDURE — 85025 COMPLETE CBC W/AUTO DIFF WBC: CPT

## 2019-06-07 PROCEDURE — 84439 ASSAY OF FREE THYROXINE: CPT

## 2019-06-07 PROCEDURE — 80053 COMPREHEN METABOLIC PANEL: CPT

## 2019-06-07 RX ORDER — LORATADINE 10 MG/1
10 TABLET ORAL DAILY
Refills: 3 | COMMUNITY
Start: 2019-04-09 | End: 2019-06-07 | Stop reason: SDUPTHER

## 2019-06-07 RX ORDER — AMMONIUM LACTATE 12 G/100G
LOTION TOPICAL
Qty: 225 G | Refills: 1 | Status: ON HOLD
Start: 2019-06-07 | End: 2020-04-29

## 2019-06-07 RX ORDER — MULTIVITAMIN WITH FOLIC ACID 400 MCG
TABLET ORAL DAILY
Refills: 5 | COMMUNITY
Start: 2019-04-09 | End: 2020-05-20 | Stop reason: SDUPTHER

## 2019-06-07 RX ORDER — CETIRIZINE HYDROCHLORIDE 10 MG/1
10 TABLET ORAL DAILY
Qty: 30 TABLET | Refills: 1 | Status: SHIPPED | OUTPATIENT
Start: 2019-06-07 | End: 2019-07-07

## 2019-06-07 NOTE — PROGRESS NOTES
[employer] 360  2056 [facility], [address] [place] [address] N   5900 [facility]  1975 6/7/19      HPI:  Patient presents for f/u for spinal stenosis and dry skin. The skin on her face has been very dry. She needs a bedside commode and shower chair rx. She is having a hard time mentally bc she isnt working and bc she hasnt been able to lose enough weight to have her gastric bypass surgery. She didn't sleep last night bc of this. Today she had a couple dizzy episodes. No palpitations, cp or sob. No weakness. She believes it is bc she is so tired. 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)         Past Surgical History:   Procedure Laterality Date    CHOLECYSTECTOMY  2009    CYSTOSCOPY Left 01/14/2018    left stent placement    DILATION AND CURETTAGE OF UTERUS      younger age   Lissett Specter LITHOTRIPSY Right 02/15/2018    Cystoscopy;Stent Removal    UPPER GASTROINTESTINAL ENDOSCOPY N/A 6/22/2018    EGD BIOPSY performed by Mckinley Chowdary MD at [facility] ENDOSCOPY       Current Outpatient Medications   Medication Sig Dispense Refill    mineral oil-hydrophilic petrolatum (AQUAPHOR) ointment Apply topically as needed. 99 g 5    cetirizine (ZYRTEC) 10 MG tablet Take 1 tablet by mouth daily 30 tablet 1    ammonium lactate (LAC-HYDRIN) 12 % lotion Apply topically daily.  225 g 1    ONE TOUCH ULTRA TEST strip 1 strip by Does not apply route daily  5    Multiple Vitamin (DAILY-DOUGLAS) TABS Take by mouth daily  5    ONETOUCH DELICA LANCETS FINE MISC 60 Devices daily  5    furosemide (LASIX) 20 MG tablet TAKE 1 TABLET BY MOUTH EVERY DAY AS NEEDED FOR SWELLING 30 tablet 2    Misc. Devices (CANE) MISC Use cane to aid in ambulation 1 each 0    gabapentin (NEURONTIN) 600 MG tablet Take 1 tablet by mouth 3 times daily for 90 days. 90 tablet 2    HYDROcodone-acetaminophen (NORCO) 7.5-325 MG per tablet Take 1 tablet by mouth every 8 hours as needed for Pain for up to 30 days. Intended supply: 30 days 90 tablet 0    ondansetron (ZOFRAN ODT) 4 MG disintegrating tablet Take 1 tablet by mouth every 8 hours as needed for Nausea or Vomiting 10 tablet 0    Misc. Devices (WALKER) MISC Use walker when ambulating.  1 each 0    omeprazole (PRILOSEC) 20 MG delayed release capsule Take 1 capsule by mouth Daily 30 capsule 12    lisinopril-hydrochlorothiazide (PRINZIDE;ZESTORETIC) 10-12.5 MG per tablet TAKE 1 TABLET BY MOUTH DAILY 30 tablet 3    fluticasone (FLONASE) 50 MCG/ACT nasal spray SHAKE LIQUID AND USE 1 SPRAY IN EACH NOSTRIL DAILY 1 Bottle 3    diclofenac (VOLTAREN) 75 MG EC tablet TAKE 1 TABLET BY MOUTH TWICE DAILY 60 tablet 3    pantoprazole (PROTONIX) 20 MG tablet TAKE 1 TABLET BY MOUTH DAILY (Patient taking differently: Take 20 mg by mouth as needed ) 30 tablet 3    Multiple Vitamins-Minerals (THERAPEUTIC MULTIVITAMIN-MINERALS) tablet Take 1 tablet by mouth daily 90 tablet 5    Handicap Placard MISC by Does not apply route Patient cannot walk 200 ft without stopping to rest.    Expiration 5 yrs 1 each 0    albuterol sulfate HFA (PROAIR HFA) 108 (90 Base) MCG/ACT inhaler Inhale 2 puffs into the lungs every 4 hours as needed for Wheezing or Shortness of Breath 1 Inhaler 3    metFORMIN (GLUCOPHAGE) 500 MG tablet TAKE 1 TABLET BY MOUTH TWICE DAILY WITH MEALS 60 tablet 2    ipratropium-albuterol (DUONEB) 0.5-2.5 (3) MG/3ML SOLN nebulizer solution Take 3 mLs by nebulization every 4 hours as needed for Shortness of Breath 24 vial 1    mometasone-formoterol (DULERA) 100-5 MCG/ACT inhaler Inhale 2 puffs into the lungs 2 times daily Rinse mouth after using. 1 Inhaler 2    simvastatin (ZOCOR) 20 MG tablet TAKE 1 TABLET BY MOUTH EVERY NIGHT 30 tablet 5    amLODIPine (NORVASC) 10 MG tablet Take 1 tablet by mouth daily 30 tablet 3    furosemide (LASIX) 40 MG tablet Take 1 tablet by mouth daily 60 tablet 3    hydrALAZINE (APRESOLINE) 25 MG tablet Take 1 tablet by mouth every 8 hours 90 tablet 3    potassium chloride (KLOR-CON M) 20 MEQ extended release tablet Take 1 tablet by mouth daily (with breakfast) 60 tablet 3     No current facility-administered medications for this visit.         Allergies   Allergen Reactions    Food Anaphylaxis     Food allergy - Fish and tree nuts    Fish-Derived Products     Nuts [Peanut-Containing Drug Products]     Pcn [Penicillins]     Ultram [Tramadol Hcl]      Per pt had a seizure    Cashews [Macadamia Nut Oil] Nausea And Vomiting       Family History   Problem Relation Age of Onset    Arthritis Mother     Hypertension Mother     Asthma Mother     Cancer Father     Hypertension Father     Heart Attack Father     Asthma Father        Social History     Socioeconomic History    Marital status: Legally      Spouse name: Not on file    Number of children: Not on file    Years of education: Not on file    Highest education level: Not on file   Occupational History    Occupation: direct care staff/counselor   Social Needs    Financial resource strain: Not on file    Food insecurity:     Worry: Not on file     Inability: Not on file    Transportation needs:     Medical: Not on file     Non-medical: Not on file   Tobacco Use    Smoking status: Former Smoker     Packs/day: 1.00     Years: 17.00     Pack years: 17.00     Types: Cigarettes     Start date: 3/27/2001     Last attempt to quit: 10/5/2018     Years since quittin.6    Smokeless tobacco: Never Used   Substance and Sexual Activity    Alcohol use: No    Drug use: No    Sexual activity: Yes   Lifestyle    Head: Normocephalic and atraumatic. Right Ear: External ear normal.   Left Ear: External ear normal.   Nose: Nose normal.   Mouth/Throat: Oropharynx is clear and moist.   Eyes: Pupils are equal, round, and reactive to light. Conjunctivae and EOM are normal. No scleral icterus. Neck: Normal range of motion. Neck supple. No thyromegaly present. Cardiovascular: Normal rate, regular rhythm and intact distal pulses. Frequent extrasystoles are present. Exam reveals distant heart sounds. No murmur heard. Pulmonary/Chest: Effort normal and breath sounds normal. No accessory muscle usage. No respiratory distress. She has no wheezes. Musculoskeletal: Normal range of motion. Neurological: She is alert and oriented to person, place, and time. She has normal reflexes. Skin: Skin is warm and dry. No rash noted. Psychiatric: She has a normal mood and affect. Her speech is normal and behavior is normal.         Assessment/Plan:     Savi Orellana was seen today for orders, medication refill, rash and neck pain. Diagnoses and all orders for this visit:    Spinal stenosis of lumbar region at multiple levels  -     DME Order for Bedside Commode as OP  -     Shower chair    Dry skin dermatitis  -     mineral oil-hydrophilic petrolatum (AQUAPHOR) ointment; Apply topically as needed. -     cetirizine (ZYRTEC) 10 MG tablet; Take 1 tablet by mouth daily  -     ammonium lactate (LAC-HYDRIN) 12 % lotion; Apply topically daily. Irregular heart rate  -     EKG 12 Lead  -     COMPREHENSIVE METABOLIC PANEL; Future  -     CBC Auto Differential; Future  -     TSH; Future  -     T4, FREE; Future  -     MAGNESIUM; Future    Essential hypertension    Frequent PVCs        Return in about 3 years (around 6/7/2022). patient is having trigeminy. This is common for her and generally asymptomatic. Not likely the source of her dizziness, but if this worsens or progresses, I advised she go to the ED.  Will check electrolytes, possible

## 2019-06-10 ENCOUNTER — OFFICE VISIT (OUTPATIENT)
Dept: FAMILY MEDICINE CLINIC | Age: 44
End: 2019-06-10
Payer: MEDICARE

## 2019-06-10 VITALS
RESPIRATION RATE: 18 BRPM | HEIGHT: 69 IN | DIASTOLIC BLOOD PRESSURE: 86 MMHG | OXYGEN SATURATION: 95 % | BODY MASS INDEX: 43.4 KG/M2 | TEMPERATURE: 98.4 F | HEART RATE: 111 BPM | WEIGHT: 293 LBS | SYSTOLIC BLOOD PRESSURE: 139 MMHG

## 2019-06-10 DIAGNOSIS — I49.3 FREQUENT PVCS: Primary | ICD-10-CM

## 2019-06-10 DIAGNOSIS — I10 ESSENTIAL HYPERTENSION: ICD-10-CM

## 2019-06-10 DIAGNOSIS — M16.0 PRIMARY OSTEOARTHRITIS OF BOTH HIPS: ICD-10-CM

## 2019-06-10 DIAGNOSIS — J30.2 SEASONAL ALLERGIES: ICD-10-CM

## 2019-06-10 DIAGNOSIS — M48.061 SPINAL STENOSIS OF LUMBAR REGION AT MULTIPLE LEVELS: ICD-10-CM

## 2019-06-10 DIAGNOSIS — R42 DIZZY SPELLS: ICD-10-CM

## 2019-06-10 DIAGNOSIS — E11.9 TYPE 2 DIABETES MELLITUS WITHOUT COMPLICATION, WITHOUT LONG-TERM CURRENT USE OF INSULIN (HCC): ICD-10-CM

## 2019-06-10 DIAGNOSIS — F51.01 PRIMARY INSOMNIA: ICD-10-CM

## 2019-06-10 LAB
BILIRUBIN, POC: NORMAL
BLOOD URINE, POC: NORMAL
CLARITY, POC: NORMAL
COLOR, POC: NORMAL
GLUCOSE URINE, POC: NORMAL
HBA1C MFR BLD: 6.3 %
KETONES, POC: NORMAL
LEUKOCYTE EST, POC: NORMAL
NITRITE, POC: NORMAL
PH, POC: 7
PROTEIN, POC: NORMAL
SPECIFIC GRAVITY, POC: 1.01
UROBILINOGEN, POC: 0.2

## 2019-06-10 PROCEDURE — G8417 CALC BMI ABV UP PARAM F/U: HCPCS | Performed by: PHYSICIAN ASSISTANT

## 2019-06-10 PROCEDURE — 1036F TOBACCO NON-USER: CPT | Performed by: PHYSICIAN ASSISTANT

## 2019-06-10 PROCEDURE — 81003 URINALYSIS AUTO W/O SCOPE: CPT | Performed by: PHYSICIAN ASSISTANT

## 2019-06-10 PROCEDURE — 99214 OFFICE O/P EST MOD 30 MIN: CPT | Performed by: PHYSICIAN ASSISTANT

## 2019-06-10 PROCEDURE — G8427 DOCREV CUR MEDS BY ELIG CLIN: HCPCS | Performed by: PHYSICIAN ASSISTANT

## 2019-06-10 PROCEDURE — 83036 HEMOGLOBIN GLYCOSYLATED A1C: CPT | Performed by: PHYSICIAN ASSISTANT

## 2019-06-10 PROCEDURE — 3044F HG A1C LEVEL LT 7.0%: CPT | Performed by: PHYSICIAN ASSISTANT

## 2019-06-10 PROCEDURE — 2022F DILAT RTA XM EVC RTNOPTHY: CPT | Performed by: PHYSICIAN ASSISTANT

## 2019-06-10 RX ORDER — LANOLIN ALCOHOL/MO/W.PET/CERES
3 CREAM (GRAM) TOPICAL DAILY
Qty: 30 TABLET | Refills: 3 | Status: SHIPPED | OUTPATIENT
Start: 2019-06-10 | End: 2019-12-30 | Stop reason: SDUPTHER

## 2019-06-10 NOTE — PROGRESS NOTES
[facility]  [address] [facility], [address] [place] [address]   5900 [facility]  1975  6/10/19      HPI:  Patient presents for f/u for dizziness and frequent pvcs. She feels much better then she did last week. The dizziness improved a great deal. She was able to get better sleep, although she never sleeps very well. She uses her cpap. She thinks that changing to the zyrtec helped. She is using the flonase as well. She is concerned she is dehydrated, although she is drinking predominantly water for her diet and weight loss plan. We reviewed her labs, she has no electrolyte abnormalities. She does not feel the palpitations. She has no cp or sob. Her a1c is improved to 6.3%. She wants to discuss pain mgnt. She currently sees Dr Arin Baker. She states he is \"very hard to talk to. \" Her Zachery Hakim does not help much, and she was trying to talk about other options. His suggestion was \"dont take it,\" but there was no other options offered. She was seeing Dr Eliu Madrid as well, but has not in some time. She has always been very compliant for me. She always comes to her apts and listens to instructions. She takes her meds as directed. She has been working toward weight loss for bariatric surgery, and this has been the hardest for her in terms of dietary compliance.      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[facility])         Past Surgical History:   Procedure Laterality Date    CHOLECYSTECTOMY  2009    CYSTOSCOPY Left 01/14/2018    left stent placement    DILATION AND CURETTAGE OF UTERUS      younger age   [facility] LITHOTRIPSY Right 02/15/2018    Cystoscopy;Stent Removal    UPPER GASTROINTESTINAL ENDOSCOPY N/A 6/22/2018    EGD BIOPSY performed [facility] Patricia [facility]       Current Outpatient Medications   Medication Sig Dispense Refill    melatonin (RA MELATONIN) 3 MG TABS tablet Take 1 tablet by mouth daily 30 tablet 3    ONE TOUCH ULTRA TEST strip 1 strip by Does not apply route daily  5    Multiple Vitamin (DAILY-DOUGLAS) TABS Take by mouth daily  5    ONETOUCH DELICA LANCETS FINE MISC 60 Devices daily  5    mineral oil-hydrophilic petrolatum (AQUAPHOR) ointment Apply topically as needed. 99 g 5    cetirizine (ZYRTEC) 10 MG tablet Take 1 tablet by mouth daily 30 tablet 1    ammonium lactate (LAC-HYDRIN) 12 % lotion Apply topically daily. 225 g 1    furosemide (LASIX) 20 MG tablet TAKE 1 TABLET BY MOUTH EVERY DAY AS NEEDED FOR SWELLING 30 tablet 2    Misc. Devices (CANE) MISC Use cane to aid in ambulation 1 each 0    gabapentin (NEURONTIN) 600 MG tablet Take 1 tablet by mouth 3 times daily for 90 days. 90 tablet 2    HYDROcodone-acetaminophen (NORCO) 7.5-325 MG per tablet Take 1 tablet by mouth every 8 hours as needed for Pain for up to 30 days. Intended supply: 30 days 90 tablet 0    ondansetron (ZOFRAN ODT) 4 MG disintegrating tablet Take 1 tablet by mouth every 8 hours as needed for Nausea or Vomiting 10 tablet 0    Misc. Devices (WALKER) MISC Use walker when ambulating.  1 each 0    omeprazole (PRILOSEC) 20 MG delayed release capsule Take 1 capsule by mouth Daily 30 capsule 12    lisinopril-hydrochlorothiazide (PRINZIDE;ZESTORETIC) 10-12.5 MG per tablet TAKE 1 TABLET BY MOUTH DAILY 30 tablet 3    fluticasone (FLONASE) 50 MCG/ACT nasal spray SHAKE LIQUID AND USE 1 SPRAY IN EACH NOSTRIL DAILY 1 Bottle 3    diclofenac (VOLTAREN) 75 MG EC tablet TAKE 1 TABLET BY MOUTH TWICE DAILY 60 tablet 3    pantoprazole (PROTONIX) 20 MG tablet TAKE 1 TABLET BY MOUTH DAILY (Patient taking differently: Take 20 mg by mouth as needed ) 30 tablet 3    Multiple Vitamins-Minerals (THERAPEUTIC MULTIVITAMIN-MINERALS) tablet Take 1 tablet by mouth daily 90 tablet 5    Handicap Placard MISC by Does not apply route Patient cannot walk 200 ft without stopping to rest.    Expiration 5 yrs 1 each 0    albuterol sulfate HFA (PROAIR HFA) 108 (90 Base) MCG/ACT inhaler Inhale 2 puffs into the lungs every 4 hours as needed for Wheezing or Shortness of Breath 1 Inhaler 3    metFORMIN (GLUCOPHAGE) 500 MG tablet TAKE 1 TABLET BY MOUTH TWICE DAILY WITH MEALS 60 tablet 2    ipratropium-albuterol (DUONEB) 0.5-2.5 (3) MG/3ML SOLN nebulizer solution Take 3 mLs by nebulization every 4 hours as needed for Shortness of Breath 24 vial 1    mometasone-formoterol (DULERA) 100-5 MCG/ACT inhaler Inhale 2 puffs into the lungs 2 times daily Rinse mouth after using. 1 Inhaler 2    simvastatin (ZOCOR) 20 MG tablet TAKE 1 TABLET BY MOUTH EVERY NIGHT 30 tablet 5    amLODIPine (NORVASC) 10 MG tablet Take 1 tablet by mouth daily 30 tablet 3    furosemide (LASIX) 40 MG tablet Take 1 tablet by mouth daily 60 tablet 3    hydrALAZINE (APRESOLINE) 25 MG tablet Take 1 tablet by mouth every 8 hours 90 tablet 3    potassium chloride (KLOR-CON M) 20 MEQ extended release tablet Take 1 tablet by mouth daily (with breakfast) 60 tablet 3     No current facility-administered medications for this visit.         Allergies   Allergen Reactions    Food Anaphylaxis     Food allergy - Fish and tree nuts    Fish-Derived Products     Nuts [Peanut-Containing Drug Products]     Pcn [Penicillins]     Ultram [Tramadol Hcl]      Per pt had a seizure    Cashews [Macadamia Nut Oil] Nausea And Vomiting       Family History   Problem Relation Age of Onset   [facility] Arthritis Mother     Hypertension Mother     Asthma Mother     Cancer Father     Hypertension Father     Heart Attack Father     Asthma Father        Social History     Socioeconomic History    Marital status: Legally      Spouse name: Not on file    Number of children: Not on file    Years of education: Not on file    Highest education level: Not on file   Occupational History    Occupation: direct care staff/counselor   Social Needs    Financial resource strain: Not on file    Food insecurity:     Worry: Not on file     Inability: Not on file    Transportation needs:     Medical: Not on file     Non-medical: Not on file   Tobacco Use    Smoking status: Former Smoker     Packs/day: 1.00     Years: 17.00     Pack years: 17.00     Types: Cigarettes     Start date: 3/27/2001     Last attempt to quit: 10/5/2018     Years since quittin.6    Smokeless tobacco: Never Used   Substance and Sexual Activity    Alcohol use: No    Drug use: No    Sexual activity: Yes   Lifestyle    Physical activity:     Days per week: Not on file     Minutes per session: Not on file    Stress: Not on file   Relationships    Social connections:     Talks on phone: Not on file     Gets together: Not on file     Attends Zoroastrianism service: Not on file     Active member of club or organization: Not on file     Attends meetings of clubs or organizations: Not on file     Relationship status: Not on file    Intimate partner violence:     Fear of current or ex partner: Not on file     Emotionally abused: Not on file     Physically abused: Not on file     Forced sexual activity: Not on file   Other Topics Concern    Not on file   Social History Narrative    Not on file       Review of Systems :  Constitutional: negative for - chills, fever, weight loss, or fatigue  Psychological: negative for - anxiety, depression or suicidal ideation  HEENT: negative for - vision changes, nasal congestion, ear pain or pharyngitis   Respiratory: negative for -  Chest heaviness, cough, shortness of breath, or pleuritic pain  Cardiovascular: negative for - diaphoresis, chest pain, palpitations, or edema   Gastrointestinal: negative for -abdominal pain, change in bowel habits, constipation, diarrhea, or nausea/vomiting  Genito-Urinary: negative for - dysuria, frequency, or nocturia  Musculoskeletal: negative for - gait disturbance, joint pain, muscle pain or weakness  Neurological: negative for - bowel and bladder control changes, dizziness, or headaches   Dermatological: negative for - dry skin, rash, hair or nail symptoms    Physical Exam:   Vitals:    06/10/19 1119   BP: 139/86   Site: Left Lower Arm   Position: Sitting   Cuff Size: Large Adult   Pulse: 111   Resp: 18   Temp: 98.4 °F (36.9 °C)   TempSrc: Oral   SpO2: 95%   Weight: (!) 394 lb (178.7 kg)   Height: 5' 8.5\" (1.74 m)     Physical Exam   Constitutional: She is oriented to person, place, and time. She appears well-developed and well-nourished. No distress. HENT:   Head: Normocephalic and atraumatic. Right Ear: External ear normal.   Left Ear: External ear normal.   Nose: Nose normal.   Mouth/Throat: Oropharynx is clear and moist.   Eyes: Pupils are equal, round, and reactive to light. Conjunctivae and EOM are normal. No scleral icterus. Neck: Normal range of motion. Neck supple. No thyromegaly present. Cardiovascular: Normal rate, regular rhythm, normal heart sounds and intact distal pulses. No murmur heard. Pulmonary/Chest: Effort normal and breath sounds normal. No accessory muscle usage. No respiratory distress. She has no wheezes. Musculoskeletal: Normal range of motion. Neurological: She is alert and oriented to person, place, and time. She has normal reflexes. Skin: Skin is warm and dry. No rash noted. Psychiatric: She has a normal mood and affect. Her speech is normal and behavior is normal.         Assessment/Plan:     Mauricio Bills was seen today for dizziness, other and orders.     Diagnoses and all orders for this visit:    Frequent PVCs    Dizzy spells  -     POCT Urinalysis no Micro    Essential hypertension    Type 2 diabetes mellitus without complication, without long-term current use of insulin (HCC)  -     POCT glycosylated hemoglobin (Hb A1C)    Primary osteoarthritis of both hips  -     Jamie Oswald DO, Pain Medicine, [place]    Spinal stenosis of lumbar region at multiple levels  -     Jamie Oswald DO, Pain Medicine, [place]    Primary insomnia  -     melatonin (RA MELATONIN) 3 MG TABS tablet; Take 1 tablet by mouth daily    Seasonal allergies        F/u 1 month. Dizziness improved. Her heart rate and rhythm are very regular today. No electrolyte abnormalities. htn is stable. She would like to try to establish with a new pain mgnt. She feels her current Dr is \"very hard to talk to. \" her pain is not controlled. I can say, that she is very compliant and I never felt she was searching for opiates. She tried very hard to continue to work. She is currently working toward weight loss for bariatric surgery.      GREGORIO Sahu

## 2019-06-13 ENCOUNTER — TELEPHONE (OUTPATIENT)
Dept: BARIATRICS/WEIGHT MGMT | Age: 44
End: 2019-06-13

## 2019-06-13 NOTE — TELEPHONE ENCOUNTER
Called and spoke with pt and scheduled her to to see Dr Grace Nunn and RD on 6/19/19. Pt still struggling with weight with limited success. Pt reports that she missed her wt check appt on 6/12/19 due to being sick lately and has seen her PCP. She is not ready for weight due to being \"bloated\" lately. Offered encouragement to pt and told her to call RD at [phone] if any questions or for any dietary assistance needed.

## 2019-06-19 ENCOUNTER — OFFICE VISIT (OUTPATIENT)
Dept: BARIATRICS/WEIGHT MGMT | Age: 44
End: 2019-06-19
Payer: MEDICARE

## 2019-06-19 ENCOUNTER — INITIAL CONSULT (OUTPATIENT)
Dept: BARIATRICS/WEIGHT MGMT | Age: 44
End: 2019-06-19
Payer: MEDICARE

## 2019-06-19 VITALS
RESPIRATION RATE: 18 BRPM | SYSTOLIC BLOOD PRESSURE: 147 MMHG | HEART RATE: 81 BPM | DIASTOLIC BLOOD PRESSURE: 106 MMHG | WEIGHT: 293 LBS | HEIGHT: 69 IN | BODY MASS INDEX: 43.4 KG/M2 | TEMPERATURE: 96.8 F

## 2019-06-19 VITALS — HEIGHT: 69 IN | WEIGHT: 293 LBS | BODY MASS INDEX: 43.4 KG/M2

## 2019-06-19 DIAGNOSIS — R10.13 EPIGASTRIC PAIN: Primary | ICD-10-CM

## 2019-06-19 DIAGNOSIS — Z71.3 DIETARY COUNSELING: Primary | ICD-10-CM

## 2019-06-19 PROCEDURE — 99212 OFFICE O/P EST SF 10 MIN: CPT

## 2019-06-19 PROCEDURE — 1036F TOBACCO NON-USER: CPT | Performed by: SURGERY

## 2019-06-19 PROCEDURE — 99999 PR OFFICE/OUTPT VISIT,PROCEDURE ONLY: CPT

## 2019-06-19 PROCEDURE — G8427 DOCREV CUR MEDS BY ELIG CLIN: HCPCS | Performed by: SURGERY

## 2019-06-19 PROCEDURE — 99213 OFFICE O/P EST LOW 20 MIN: CPT | Performed by: SURGERY

## 2019-06-19 PROCEDURE — G8417 CALC BMI ABV UP PARAM F/U: HCPCS | Performed by: SURGERY

## 2019-06-19 NOTE — PROGRESS NOTES
Pt weighed 388 lb today. She weighed 381 lb at last visit to [facility] office on 5/24/19, for a gain of 7 lb. Pt had weight goal adjusted today, per Dr Emanuel Al, and is now 375 lb and pt is to follow a liquid protein diet per Dr Emanuel Al to lose the required weight. Rd discussed this in detail with pt and provided pt with a written copy of list of acceptable items on this plan. Pt unable to tolerate Slimfast, so, pt to have Nectar shakes, 1 to 1.5 scoops into 6-8 oz of water, and she can have this 4-5 times daily. Nectar is lower in calories than slimfast and this amount will a small number of added calories. It will provide adequate protein for the necessary weight loss. Pt also can eat unlimited iceberg (head)lettuce with only lemon juice or vinegar for flavor. The only other food allowed is no more than two egg whites from hard boiled eggs per day and this should not be every day. Pt knows the importance of reaching the above weight goal to get to surgery. Pt has not had success in past few months and in talking with pt it was revealed that she was not compliant with liquid diet plans in the past.  RD encouraged pt to comply with the benefit of getting to surgery if she can follow the liquid plan successfully and get her weight down to 375 lb. All questions answered.   Pt is scheduled for a weight check in two weeks - 7/3/19 at 8:30 am.

## 2019-06-19 NOTE — PROGRESS NOTES
metapneumovirus     Past Surgical History:   Procedure Laterality Date    CHOLECYSTECTOMY  2009    CYSTOSCOPY Left 01/14/2018    left stent placement    DILATION AND CURETTAGE OF UTERUS      younger age   [facility] LITHOTRIPSY Right 02/15/2018    Cystoscopy;Stent Removal    UPPER GASTROINTESTINAL ENDOSCOPY N/A 6/22/2018    EGD BIOPSY performed by Lynn Rodriguez MD at [address]; Fish-derived products; Nuts [peanut-containing drug products]; Pcn [penicillins]; Ultram [tramadol hcl]; and Cashews [macadamia nut oil]     Medications:  Current Outpatient Medications   Medication Sig Dispense Refill    melatonin (RA MELATONIN) 3 MG TABS tablet Take 1 tablet by mouth daily 30 tablet 3    ONE TOUCH ULTRA TEST strip 1 strip by Does not apply route daily  5    Multiple Vitamin (DAILY-DOUGLAS) TABS Take by mouth daily  5    ONETOUCH DELICA LANCETS FINE MISC 60 Devices daily  5    mineral oil-hydrophilic petrolatum (AQUAPHOR) ointment Apply topically as needed. 99 g 5    cetirizine (ZYRTEC) 10 MG tablet Take 1 tablet by mouth daily 30 tablet 1    ammonium lactate (LAC-HYDRIN) 12 % lotion Apply topically daily. 225 g 1    furosemide (LASIX) 20 MG tablet TAKE 1 TABLET BY MOUTH EVERY DAY AS NEEDED FOR SWELLING 30 tablet 2    Misc. Devices (CANE) MISC Use cane to aid in ambulation 1 each 0    gabapentin (NEURONTIN) 600 MG tablet Take 1 tablet by mouth 3 times daily for 90 days. 90 tablet 2    HYDROcodone-acetaminophen (NORCO) 7.5-325 MG per tablet Take 1 tablet by mouth every 8 hours as needed for Pain for up to 30 days. Intended supply: 30 days 90 tablet 0    Misc. Devices (WALKER) MISC Use walker when ambulating.  1 each 0    omeprazole (PRILOSEC) 20 MG delayed release capsule Take 1 capsule by mouth Daily 30 capsule 12    lisinopril-hydrochlorothiazide (PRINZIDE;ZESTORETIC) 10-12.5 MG per tablet TAKE 1 TABLET BY MOUTH DAILY 30 tablet 3    fluticasone (FLONASE) 50 MCG/ACT nasal spray SHAKE LIQUID AND USE 1 SPRAY IN EACH NOSTRIL DAILY 1 Bottle 3    diclofenac (VOLTAREN) 75 MG EC tablet TAKE 1 TABLET BY MOUTH TWICE DAILY 60 tablet 3    pantoprazole (PROTONIX) 20 MG tablet TAKE 1 TABLET BY MOUTH DAILY (Patient taking differently: Take 20 mg by mouth as needed ) 30 tablet 3    Multiple Vitamins-Minerals (THERAPEUTIC MULTIVITAMIN-MINERALS) tablet Take 1 tablet by mouth daily 90 tablet 5    Handicap Placard MISC by Does not apply route Patient cannot walk 200 ft without stopping to rest.    Expiration 5 yrs 1 each 0    albuterol sulfate HFA (PROAIR HFA) 108 (90 Base) MCG/ACT inhaler Inhale 2 puffs into the lungs every 4 hours as needed for Wheezing or Shortness of Breath 1 Inhaler 3    metFORMIN (GLUCOPHAGE) 500 MG tablet TAKE 1 TABLET BY MOUTH TWICE DAILY WITH MEALS 60 tablet 2    ipratropium-albuterol (DUONEB) 0.5-2.5 (3) MG/3ML SOLN nebulizer solution Take 3 mLs by nebulization every 4 hours as needed for Shortness of Breath 24 vial 1    mometasone-formoterol (DULERA) 100-5 MCG/ACT inhaler Inhale 2 puffs into the lungs 2 times daily Rinse mouth after using. 1 Inhaler 2    simvastatin (ZOCOR) 20 MG tablet TAKE 1 TABLET BY MOUTH EVERY NIGHT 30 tablet 5    amLODIPine (NORVASC) 10 MG tablet Take 1 tablet by mouth daily 30 tablet 3    furosemide (LASIX) 40 MG tablet Take 1 tablet by mouth daily 60 tablet 3    hydrALAZINE (APRESOLINE) 25 MG tablet Take 1 tablet by mouth every 8 hours 90 tablet 3    potassium chloride (KLOR-CON M) 20 MEQ extended release tablet Take 1 tablet by mouth daily (with breakfast) 60 tablet 3    ondansetron (ZOFRAN ODT) 4 MG disintegrating tablet Take 1 tablet by mouth every 8 hours as needed for Nausea or Vomiting 10 tablet 0     No current facility-administered medications for this visit.          Social History     Tobacco Use    Smoking status: Former Smoker     Packs/day: 1.00     Years: 17.00     Pack years: 17.00     Types: Cigarettes     Start date: 3/27/2001     Last attempt to quit: 10/5/2018     Years since quittin.7    Smokeless tobacco: Never Used   Substance Use Topics    Alcohol use: No      Review of Systems    Review of Systems - General ROS: negative for - chills, fatigue or malaise  ENT ROS: negative for - hearing change, nasal congestion or nasal discharge  Allergy and Immunology ROS: negative for - hives, itchy/watery eyes or nasal congestion  Hematological and Lymphatic ROS: negative for - blood clots, blood transfusions, bruising or fatigue  Endocrine ROS: negative for - malaise/lethargy, mood swings, palpitations or polydipsia/polyuria  Breast ROS: negative for - new or changing breast lumps or nipple changes  Respiratory ROS: negative for - sputum changes, stridor, tachypnea or wheezing  Cardiovascular ROS: negative for - irregular heartbeat, loss of consciousness, murmur or orthopnea  Gastrointestinal ROS: negative for - constipation, diarrhea, gas/bloating, heartburn or hematemesis  Genito-Urinary ROS: negative for - erectile dysfunction, genital discharge, genital ulcers or hematuria  Musculoskeletal ROS: negative for - gait disturbance, muscle pain or muscular weakness      Physical Exam:   Vitals: BP (!) 147/106 (Site: Left Lower Arm, Position: Sitting, Cuff Size: Medium Adult)   Pulse 81   Temp 96.8 °F (36 °C) (Temporal)   Resp 18   Ht 5' 8.5\" (1.74 m)   Wt (!) 388 lb (176 kg)   LMP 2019 (Exact Date)   BMI 58.14 kg/m²     General appearance: alert, appears stated age and cooperative, does not ambulate easily.   Head: Normocephalic, without obvious abnormality, atraumatic  Neck: no adenopathy, no carotid bruit, no JVD, supple, symmetrical, trachea midline and thyroid not enlarged,   Lungs: clear to auscultation bilaterally  Heart: regular rate and rhythm  Abdomen: soft, non-tender; bowel sounds normal; no masses,  no organomegaly  Extremities: extremities normal, atraumatic, no cyanosis or edema    Assessment:  Morbid obesity with failure

## 2019-06-28 ENCOUNTER — TELEPHONE (OUTPATIENT)
Dept: FAMILY MEDICINE CLINIC | Age: 44
End: 2019-06-28

## 2019-07-01 ENCOUNTER — TELEPHONE (OUTPATIENT)
Dept: BARIATRICS/WEIGHT MGMT | Age: 44
End: 2019-07-01

## 2019-07-08 ENCOUNTER — TELEPHONE (OUTPATIENT)
Dept: BARIATRICS/WEIGHT MGMT | Age: 44
End: 2019-07-08

## 2019-07-08 NOTE — TELEPHONE ENCOUNTER
Pt left message on RD's voice mail regarding change of appointment to later in week. Cancelled today's wt check appt and rescheduled for Friday, 7/12/19 at 9:00 a.m.

## 2019-07-09 ENCOUNTER — HOSPITAL ENCOUNTER (EMERGENCY)
Age: 44
Discharge: HOME | End: 2019-07-09
Payer: MEDICARE

## 2019-07-09 VITALS
RESPIRATION RATE: 18 BRPM | HEIGHT: 68 IN | TEMPERATURE: 98.2 F | SYSTOLIC BLOOD PRESSURE: 173 MMHG | DIASTOLIC BLOOD PRESSURE: 102 MMHG | BODY MASS INDEX: 44.41 KG/M2 | WEIGHT: 293 LBS | HEART RATE: 93 BPM | OXYGEN SATURATION: 92 %

## 2019-07-09 DIAGNOSIS — G89.29 ACUTE EXACERBATION OF CHRONIC LOW BACK PAIN: ICD-10-CM

## 2019-07-09 DIAGNOSIS — M54.50 ACUTE EXACERBATION OF CHRONIC LOW BACK PAIN: ICD-10-CM

## 2019-07-09 DIAGNOSIS — M54.32 SCIATICA OF LEFT SIDE: Primary | ICD-10-CM

## 2019-07-09 DIAGNOSIS — K08.89 PAIN, DENTAL: ICD-10-CM

## 2019-07-09 PROCEDURE — 96372 THER/PROPH/DIAG INJ SC/IM: CPT

## 2019-07-09 PROCEDURE — 6360000002 HC RX W HCPCS: Performed by: PHYSICIAN ASSISTANT

## 2019-07-09 PROCEDURE — 99282 EMERGENCY DEPT VISIT SF MDM: CPT

## 2019-07-09 RX ORDER — METRONIDAZOLE 500 MG/1
500 TABLET ORAL 4 TIMES DAILY
Qty: 40 TABLET | Refills: 0 | Status: SHIPPED | OUTPATIENT
Start: 2019-07-09 | End: 2019-07-19

## 2019-07-09 RX ORDER — DEXAMETHASONE SODIUM PHOSPHATE 10 MG/ML
10 INJECTION INTRAMUSCULAR; INTRAVENOUS ONCE
Status: COMPLETED | OUTPATIENT
Start: 2019-07-09 | End: 2019-07-09

## 2019-07-09 RX ORDER — MORPHINE SULFATE 4 MG/ML
6 INJECTION, SOLUTION INTRAMUSCULAR; INTRAVENOUS ONCE
Status: COMPLETED | OUTPATIENT
Start: 2019-07-09 | End: 2019-07-09

## 2019-07-09 RX ADMIN — DEXAMETHASONE SODIUM PHOSPHATE 10 MG: 10 INJECTION INTRAMUSCULAR; INTRAVENOUS at 20:08

## 2019-07-09 RX ADMIN — MORPHINE SULFATE 6 MG: 4 INJECTION, SOLUTION INTRAMUSCULAR; INTRAVENOUS at 20:09

## 2019-07-09 ASSESSMENT — PAIN SCALES - GENERAL
PAINLEVEL_OUTOF10: 10
PAINLEVEL_OUTOF10: 10

## 2019-07-09 ASSESSMENT — PAIN DESCRIPTION - PAIN TYPE: TYPE: ACUTE PAIN

## 2019-07-10 NOTE — ED PROVIDER NOTES
plan.      ------------------------ IMPRESSION AND DISPOSITION -------------------------------    IMPRESSION  1. Sciatica of left side    2. Acute exacerbation of chronic low back pain    3.  Pain, dental        DISPOSITION  Disposition: Discharge to home  Patient condition is stable                   Marko Cervantes PA-C  07/10/19 3179

## 2019-07-10 NOTE — ED NOTES
Pt states her pain has decreased slightly to 8/10 at this time, discharge instructions given with instructions for medications as directed and follow up as instructed, pt verbalizes understanding, discharged with steady gait with family     [facility]  07/09/19 2036

## 2019-07-12 ENCOUNTER — TELEPHONE (OUTPATIENT)
Dept: BARIATRICS/WEIGHT MGMT | Age: 44
End: 2019-07-12

## 2019-07-23 NOTE — PROGRESS NOTES
Severe left and   right neural foraminal narrowing. Abutment/impingement exiting   bilateral L3 spinal nerve roots. Moderately severe thecal sac neural   foraminal narrowing and spinal canal stenosis secondary to a   combination of the facet osteoarthropathy and posterior epidural   hematoma stenosis with crowding and compression of cauda equina nerve   roots.       L4-L5: Moderately large circumferential disc bulge. Severe bilateral   facet osteoarthropathy. Severe left and moderately severe right neural   foraminal narrowing with abutment/impingement of exiting bilateral L4   spinal nerve roots. Extensive facet osteoarthropathy on the left   obliterates the neural foramen. No marco spinal canal stenosis is   seen.       L5-S1: Severe bilateral facet osteoarthropathy. Moderate   circumferential disc bulge. Severe left and right neural foraminal   narrowing. Abutment/impingement exiting bilateral L5 spinal nerve   roots. No marco spinal canal stenosis. 1. Multilevel degenerative disc disease and facet osteoarthropathy   T11-T12 and L1-S1. Abutment/impingement exiting right L1, exiting left   L2, exiting bilateral L3, exiting bilateral L4 and exiting bilateral   L5 spinal nerve roots as described above. Severe bilateral neural   foraminal narrowing at L3-L4 and L5-S1 with severe left and moderately   severe right neural foraminal narrowing at L4-L5. Moderate spinal   canal stenosis at L3-L4 and narrowing of the thecal sac at L2-L3. 2. There is a questionable abnormal periods of spinal nerve roots at   the L2-L3 motion segment levels of indeterminate etiology and   significance, clinical correlation for any for infectious parameters   recommended. Consider further evaluation with pre and postcontrast   lumbar spine for further investigation with consideration given to   patient's renal status and any potential safety or allergenic   concerns.    3. Crowding/compression of cauda equina nerve roots greatest at L3-L4   but to a lesser degree at L1-L2. Previous treatments: Physical Therapy and medications. 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)        Past Surgical History:   Procedure Laterality Date    CHOLECYSTECTOMY  2009    CYSTOSCOPY Left 01/14/2018    left stent placement    DILATION AND CURETTAGE OF UTERUS      younger age   [place] LITHOTRIPSY Right 02/15/2018    Cystoscopy;Stent Removal    UPPER GASTROINTESTINAL ENDOSCOPY N/A 6/22/2018    EGD BIOPSY performed by Mera Barber MD at [place] [id number]       Prior to Admission medications    Medication Sig Start Date End Date Taking? Authorizing Provider   gabapentin (NEURONTIN) 600 MG tablet Take 1 tablet by mouth 3 times daily for 90 days. 7/1/19 9/29/19 Yes Monik Whyte MD   HYDROcodone-acetaminophen (NORCO) 7.5-325 MG per tablet Take 1 tablet by mouth every 8 hours as needed for Pain for up to 30 days. Intended supply: 30 days 8/30/19 9/29/19 Yes Fercho Doyle MD   melatonin (RA MELATONIN) 3 MG TABS tablet Take 1 tablet by mouth daily 6/10/19  Yes GREGORIO Dimas   ONE TOUCH ULTRA TEST strip 1 strip by Does not apply route daily 5/24/19  Yes Historical Provider, MD   Multiple Vitamin (DAILY-DOUGLAS) TABS Take by mouth daily 4/9/19  Yes Historical Provider, MD   Brooke Gell LANCETS FINE MISC 60 Devices daily 5/24/19  Yes Historical Provider, MD   mineral oil-hydrophilic petrolatum (AQUAPHOR) ointment Apply topically as needed. 6/7/19  Yes GREGORIO Dimas   ammonium lactate (LAC-HYDRIN) 12 % lotion Apply topically daily.  6/7/19  Yes GREGORIO Dimas furosemide (LASIX) 20 MG tablet TAKE 1 TABLET BY MOUTH EVERY DAY AS NEEDED FOR SWELLING 4/30/19  Yes GREGORIO Dimas   Misc. Devices (CANE) MISC Use cane to aid in ambulation 4/3/19  Yes Fercho Doyle MD   ondansetron (ZOFRAN ODT) 4 MG disintegrating tablet Take 1 tablet by mouth every 8 hours as needed for Nausea or Vomiting 4/1/19  Yes Johnson Dimas [address] Devices [facility]) MISC Use walker when ambulating. 3/27/19  Yes Isabelle Jean PA-C   omeprazole (PRILOSEC) 20 MG delayed release capsule Take 1 capsule by mouth Daily 3/20/19 3/19/20 Yes Mera Barber MD   lisinopril-hydrochlorothiazide (PRINZIDE;ZESTORETIC) 10-12.5 MG per tablet TAKE 1 TABLET BY MOUTH DAILY 2/26/19  Yes GREGORIO Dimas   fluticasone [facility]) 50 MCG/ACT nasal spray SHAKE LIQUID AND USE 1 SPRAY IN [facility] NOSTRIL DAILY 2/26/19  Yes GREGORIO Dimas   pantoprazole (PROTONIX) 20 MG tablet TAKE 1 TABLET BY MOUTH DAILY  Patient taking differently: Take 20 mg by mouth as needed  2/26/19  Yes GREGORIO Dimas   Multiple Vitamins-Minerals (THERAPEUTIC MULTIVITAMIN-MINERALS) tablet Take 1 tablet by mouth daily 2/22/19  Yes GREGORIO Dimas   Handicap Placard MISC by Does not apply route Patient cannot walk 200 ft without stopping to rest.    Expiration 5 yrs 2/22/19  Yes GREGORIO Dimas   albuterol sulfate HFA (PROAIR HFA) 108 (90 Base) MCG/ACT inhaler Inhale 2 puffs into the lungs every 4 hours as needed for Wheezing or Shortness of Breath 2/17/19  Yes Unique Lubin MD   metFORMIN (GLUCOPHAGE) 500 MG tablet TAKE 1 TABLET BY MOUTH TWICE DAILY WITH MEALS 1/25/19  Yes GREGORIO Dimas   ipratropium-albuterol (DUONEB) 0.5-2.5 (3) MG/3ML SOLN nebulizer solution Take 3 mLs by nebulization every 4 hours as needed for Shortness of Breath 12/15/18  Yes Maria Elena Sarah, DO   mometasone-formoterol (DULERA) 100-5 MCG/ACT inhaler Inhale 2 puffs into the lungs 2 times daily Rinse mouth after using.  12/10/18  Yes GREGORIO Dimas   amLODIPine ([address]) 10 MG tablet Take 1 tablet by mouth daily 10/5/18  Yes GREGORIO Flanagan   furosemide (LASIX) 40 MG tablet Take 1 tablet by mouth daily 10/5/18  Yes GREGORIO Flanagan   hydrALAZINE (APRESOLINE) 25 MG tablet Take 1 tablet by mouth every 8 hours 3/22/18  Yes Tamera Osullivan MD   potassium chloride (KLOR-CON M) 20 MEQ extended release tablet Take 1 tablet by mouth daily (with breakfast) 3/23/18  Yes Molly De La O MD       Allergies   Allergen Reactions    Food Anaphylaxis     Food allergy - Fish and tree nuts    Fish-Derived Products     Nuts [Peanut-Containing Drug Products]     Pcn [Penicillins]     Ultram [Tramadol Hcl]      Per pt had a seizure    Cashews [Macadamia Nut Oil] Nausea And Vomiting       Social History     Socioeconomic History    Marital status: Legally      Spouse name: Not on file    Number of children: Not on file    Years of education: Not on file    Highest education level: Not on file   Occupational History    Occupation: direct care staff/counselor   Social Needs    Financial resource strain: Not on file    Food insecurity:     Worry: Not on file     Inability: Not on file    Transportation needs:     Medical: Not on file     Non-medical: Not on file   Tobacco Use    Smoking status: Former Smoker     Packs/day: 1.00     Years: 17.00     Pack years: 17.00     Types: Cigarettes     Start date: 3/27/2001     Last attempt to quit: 10/5/2018     Years since quittin.8    Smokeless tobacco: Never Used   Substance and Sexual Activity    Alcohol use: No    Drug use: No    Sexual activity: Yes   Lifestyle    Physical activity:     Days per week: Not on file     Minutes per session: Not on file    Stress: Not on file   Relationships    Social connections:     Talks on phone: Not on file     Gets together: Not on file     Attends Hindu service: Not on file     Active member of club or organization: Not on file     Attends meetings of clubs or organizations: Not on file

## 2019-07-24 ENCOUNTER — OFFICE VISIT (OUTPATIENT)
Dept: PAIN MANAGEMENT | Age: 44
End: 2019-07-24
Payer: MEDICARE

## 2019-07-24 ENCOUNTER — HOSPITAL ENCOUNTER (OUTPATIENT)
Age: 44
Discharge: HOME | End: 2019-07-26
Payer: MEDICARE

## 2019-07-24 VITALS
WEIGHT: 293 LBS | SYSTOLIC BLOOD PRESSURE: 124 MMHG | HEIGHT: 69 IN | TEMPERATURE: 97.6 F | BODY MASS INDEX: 43.4 KG/M2 | DIASTOLIC BLOOD PRESSURE: 78 MMHG | OXYGEN SATURATION: 98 % | RESPIRATION RATE: 16 BRPM | HEART RATE: 64 BPM

## 2019-07-24 DIAGNOSIS — M54.41 CHRONIC BILATERAL LOW BACK PAIN WITH BILATERAL SCIATICA: ICD-10-CM

## 2019-07-24 DIAGNOSIS — M54.42 CHRONIC BILATERAL LOW BACK PAIN WITH BILATERAL SCIATICA: ICD-10-CM

## 2019-07-24 DIAGNOSIS — G89.4 CHRONIC PAIN SYNDROME: Primary | ICD-10-CM

## 2019-07-24 DIAGNOSIS — M48.062 SPINAL STENOSIS OF LUMBAR REGION WITH NEUROGENIC CLAUDICATION: ICD-10-CM

## 2019-07-24 DIAGNOSIS — G89.29 CHRONIC BILATERAL LOW BACK PAIN WITH BILATERAL SCIATICA: ICD-10-CM

## 2019-07-24 DIAGNOSIS — M54.16 LUMBAR RADICULOPATHY: ICD-10-CM

## 2019-07-24 LAB — SPECIFIC GRAVITY UA: 1.01 (ref 1–1.03)

## 2019-07-24 PROCEDURE — 99204 OFFICE O/P NEW MOD 45 MIN: CPT | Performed by: PAIN MEDICINE

## 2019-07-24 PROCEDURE — 1036F TOBACCO NON-USER: CPT | Performed by: PAIN MEDICINE

## 2019-07-24 PROCEDURE — G8427 DOCREV CUR MEDS BY ELIG CLIN: HCPCS | Performed by: PAIN MEDICINE

## 2019-07-24 PROCEDURE — G0480 DRUG TEST DEF 1-7 CLASSES: HCPCS

## 2019-07-24 PROCEDURE — G8417 CALC BMI ABV UP PARAM F/U: HCPCS | Performed by: PAIN MEDICINE

## 2019-07-24 PROCEDURE — 80307 DRUG TEST PRSMV CHEM ANLYZR: CPT

## 2019-07-24 NOTE — PROGRESS NOTES
Iesha Beatty presents to the [facility] on 7/24/2019. Leland Joel is complaining of pain back/hips/legs/feet/hands. The pain is constant. The pain is described as aching, throbbing, shooting and stabbing. Pain is rated on her best day at a 8, on her worst day at a 10, and on average at a 10 on the VAS scale. She took her last dose of Norco this am    Any procedures since your last visit: No,     Pacemaker or defibrilator: No managed by . She has not been on anticoagulation medications to include none and is managed by      /78   Pulse 64   Temp 97.6 °F (36.4 °C) (Oral)   Resp 16   Ht 5' 8.5\" (1.74 m)   Wt (!) 388 lb (176 kg)   SpO2 98%   BMI 58.14 kg/m²      No LMP recorded.

## 2019-07-28 LAB
6AM URINE: <10 NG/ML
7-AMINOCLONAZEPAM, URINE: <5 NG/ML
ALPHA-HYDROXYALPRAZOLAM, URINE: <5 NG/ML
ALPHA-HYDROXYMIDAZOLAM, URINE: <20 NG/ML
ALPRAZOLAM, URINE: <5 NG/ML
CHLORDIAZEPOXIDE, URINE: <20 NG/ML
CLONAZEPAM, URINE: <5 NG/ML
CODEINE, URINE: <20 NG/ML
DIAZEPAM, URINE: <20 NG/ML
HYDROCODONE, URINE: 1356 NG/ML
HYDROMORPHONE, URINE: 25 NG/ML
LORAZEPAM, URINE: <20 NG/ML
MIDAZOLAM, URINE: <20 NG/ML
MORPHINE URINE: <20 NG/ML
NORDIAZEPAM, URINE: <20 NG/ML
NORHYDROCODONE, URINE: 837 NG/ML
NOROXYCODONE, URINE: <20 NG/ML
NOROXYMORPHONE, URINE: <20 NG/ML
OXAZEPAM, URINE: <20 NG/ML
OXYCODONE, URINE CONFIRMATION: <20 NG/ML
OXYMORPHONE, URINE: <20 NG/ML
TEMAZEPAM, URINE: <20 NG/ML

## 2019-07-31 LAB
Lab: NORMAL
REPORT: NORMAL
THIS TEST SENT TO: NORMAL

## 2019-08-23 ENCOUNTER — TELEPHONE (OUTPATIENT)
Dept: BARIATRICS/WEIGHT MGMT | Age: 44
End: 2019-08-23

## 2019-09-09 ENCOUNTER — TELEPHONE (OUTPATIENT)
Dept: FAMILY MEDICINE CLINIC | Age: 44
End: 2019-09-09

## 2019-09-17 ENCOUNTER — TELEPHONE (OUTPATIENT)
Dept: FAMILY MEDICINE CLINIC | Age: 44
End: 2019-09-17

## 2019-09-17 ENCOUNTER — OFFICE VISIT (OUTPATIENT)
Dept: FAMILY MEDICINE CLINIC | Age: 44
End: 2019-09-17
Payer: MEDICARE

## 2019-09-17 VITALS
TEMPERATURE: 98.4 F | SYSTOLIC BLOOD PRESSURE: 132 MMHG | WEIGHT: 293 LBS | OXYGEN SATURATION: 98 % | HEART RATE: 99 BPM | BODY MASS INDEX: 43.4 KG/M2 | DIASTOLIC BLOOD PRESSURE: 78 MMHG | HEIGHT: 69 IN | RESPIRATION RATE: 16 BRPM

## 2019-09-17 DIAGNOSIS — E11.9 TYPE 2 DIABETES MELLITUS WITHOUT COMPLICATION, WITHOUT LONG-TERM CURRENT USE OF INSULIN (HCC): ICD-10-CM

## 2019-09-17 DIAGNOSIS — J30.2 SEASONAL ALLERGIES: ICD-10-CM

## 2019-09-17 DIAGNOSIS — F32.1 CURRENT MODERATE EPISODE OF MAJOR DEPRESSIVE DISORDER WITHOUT PRIOR EPISODE (HCC): Primary | ICD-10-CM

## 2019-09-17 DIAGNOSIS — M48.061 SPINAL STENOSIS OF LUMBAR REGION AT MULTIPLE LEVELS: ICD-10-CM

## 2019-09-17 DIAGNOSIS — E66.01 MORBID OBESITY WITH BMI OF 50.0-59.9, ADULT (HCC): Primary | ICD-10-CM

## 2019-09-17 DIAGNOSIS — L85.3 DRY SKIN DERMATITIS: ICD-10-CM

## 2019-09-17 DIAGNOSIS — E66.01 MORBID OBESITY WITH BMI OF 50.0-59.9, ADULT (HCC): ICD-10-CM

## 2019-09-17 DIAGNOSIS — E11.69 HYPERLIPIDEMIA ASSOCIATED WITH TYPE 2 DIABETES MELLITUS (HCC): ICD-10-CM

## 2019-09-17 DIAGNOSIS — E78.5 HYPERLIPIDEMIA ASSOCIATED WITH TYPE 2 DIABETES MELLITUS (HCC): ICD-10-CM

## 2019-09-17 DIAGNOSIS — I10 ESSENTIAL HYPERTENSION: ICD-10-CM

## 2019-09-17 DIAGNOSIS — B37.31 YEAST VAGINITIS: ICD-10-CM

## 2019-09-17 LAB
CREATININE URINE POCT: 100
MICROALBUMIN/CREAT 24H UR: 80 MG/G{CREAT}
MICROALBUMIN/CREAT UR-RTO: <30

## 2019-09-17 PROCEDURE — 82044 UR ALBUMIN SEMIQUANTITATIVE: CPT | Performed by: PHYSICIAN ASSISTANT

## 2019-09-17 PROCEDURE — 99214 OFFICE O/P EST MOD 30 MIN: CPT | Performed by: PHYSICIAN ASSISTANT

## 2019-09-17 PROCEDURE — 1036F TOBACCO NON-USER: CPT | Performed by: PHYSICIAN ASSISTANT

## 2019-09-17 PROCEDURE — G8427 DOCREV CUR MEDS BY ELIG CLIN: HCPCS | Performed by: PHYSICIAN ASSISTANT

## 2019-09-17 PROCEDURE — G8417 CALC BMI ABV UP PARAM F/U: HCPCS | Performed by: PHYSICIAN ASSISTANT

## 2019-09-17 PROCEDURE — 2022F DILAT RTA XM EVC RTNOPTHY: CPT | Performed by: PHYSICIAN ASSISTANT

## 2019-09-17 PROCEDURE — 3044F HG A1C LEVEL LT 7.0%: CPT | Performed by: PHYSICIAN ASSISTANT

## 2019-09-17 RX ORDER — FLUCONAZOLE 150 MG/1
150 TABLET ORAL ONCE
Qty: 2 TABLET | Refills: 0 | Status: SHIPPED | OUTPATIENT
Start: 2019-09-17 | End: 2019-09-17

## 2019-09-17 RX ORDER — CITALOPRAM 10 MG/1
TABLET ORAL
Qty: 60 TABLET | Refills: 1 | Status: SHIPPED | OUTPATIENT
Start: 2019-09-17 | End: 2019-09-17

## 2019-09-17 RX ORDER — IBUPROFEN 600 MG/1
600 TABLET ORAL 4 TIMES DAILY PRN
Qty: 90 TABLET | Refills: 1 | Status: ON HOLD
Start: 2019-09-17 | End: 2020-05-04 | Stop reason: HOSPADM

## 2019-09-17 RX ORDER — HYDRALAZINE HYDROCHLORIDE 25 MG/1
25 TABLET, FILM COATED ORAL EVERY 8 HOURS SCHEDULED
Qty: 90 TABLET | Refills: 3 | Status: SHIPPED
Start: 2019-09-17 | End: 2020-04-20

## 2019-09-17 RX ORDER — KETOROLAC TROMETHAMINE 30 MG/ML
60 INJECTION, SOLUTION INTRAMUSCULAR; INTRAVENOUS ONCE
Status: COMPLETED | OUTPATIENT
Start: 2019-09-17 | End: 2019-09-17

## 2019-09-17 RX ORDER — FLUTICASONE PROPIONATE 50 MCG
SPRAY, SUSPENSION (ML) NASAL
Qty: 1 BOTTLE | Refills: 3 | Status: SHIPPED
Start: 2019-09-17 | End: 2020-04-20

## 2019-09-17 RX ADMIN — KETOROLAC TROMETHAMINE 60 MG: 30 INJECTION, SOLUTION INTRAMUSCULAR; INTRAVENOUS at 09:48

## 2019-09-17 NOTE — PROGRESS NOTES
differently: Take 20 mg by mouth as needed ) 30 tablet 3    Multiple Vitamins-Minerals (THERAPEUTIC MULTIVITAMIN-MINERALS) tablet Take 1 tablet by mouth daily 90 tablet 5    Handicap Placard MISC by Does not apply route Patient cannot walk 200 ft without stopping to rest.    Expiration 5 yrs 1 each 0    albuterol sulfate HFA (PROAIR HFA) 108 (90 Base) MCG/ACT inhaler Inhale 2 puffs into the lungs every 4 hours as needed for Wheezing or Shortness of Breath 1 Inhaler 3    metFORMIN (GLUCOPHAGE) 500 MG tablet TAKE 1 TABLET BY MOUTH TWICE DAILY WITH MEALS 60 tablet 2    ipratropium-albuterol (DUONEB) 0.5-2.5 (3) MG/3ML SOLN nebulizer solution Take 3 mLs by nebulization every 4 hours as needed for Shortness of Breath 24 vial 1    mometasone-formoterol (DULERA) 100-5 MCG/ACT inhaler Inhale 2 puffs into the lungs 2 times daily Rinse mouth after using. 1 Inhaler 2    amLODIPine (NORVASC) 10 MG tablet Take 1 tablet by mouth daily 30 tablet 3    furosemide (LASIX) 40 MG tablet Take 1 tablet by mouth daily 60 tablet 3    potassium chloride (KLOR-CON M) 20 MEQ extended release tablet Take 1 tablet by mouth daily (with breakfast) 60 tablet 3    melatonin (RA MELATONIN) 3 MG TABS tablet Take 1 tablet by mouth daily (Patient not taking: Reported on 9/17/2019) 30 tablet 3     No current facility-administered medications for this visit.         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 Type 2 diabetes mellitus without complication, without long-term current use of insulin ([facility])  -     ONE TOUCH ULTRA TEST strip; 1 each by Does not apply route daily  -     Diabetic Foot Exam    Hyperlipidemia associated with type 2 diabetes mellitus ([facility])    Seasonal allergies  -     fluticasone (FLONASE) 50 MCG/ACT nasal spray; SHAKE LIQUID AND USE 1 SPRAY IN EACH NOSTRIL DAILY    Dry skin dermatitis  -     mineral oil-hydrophilic petrolatum (AQUAPHOR) ointment; Apply topically as needed. Essential hypertension  -     hydrALAZINE (APRESOLINE) 25 MG tablet; Take 1 tablet by mouth every 8 hours    Yeast vaginitis  -     IA BATH/SHOWER CHAIR    Spinal stenosis of lumbar region at multiple levels  -     POCT Microalbumin  -     ketorolac (TORADOL) injection 60 mg    Morbid obesity with BMI of 50.0-59.9, adult ([facility])  -     naltrexone-bupropion (CONTRAVE) 8-90 MG per extended release tablet; 1 tab daily for 7 days, then 1 tab bid for 7 days, then 2 tabs am and 1 tab pm for 7 days, then 2 tabs po BID        Return in about 1 week (around 9/24/2019). we will send Bre Pompa to psych for evaluation. Initially we were going to use celexa for tx, until we decided to try the Contrave, which has buproprion. I discussed with Dr Lupe Blair, and called Dr Gage Aparicio to make sure he is agreeable with this and it will not hinder the potential of surgery. Will keep a close eye on her BP. She was advised not to miss doses or take late doses. She sees Dr Karri Montes regarding pain mgnt in 2 weeks.  She will discuss adding his note to her disability appeal. She is not opposed to trying to work, but does not

## 2019-10-02 ENCOUNTER — TELEPHONE (OUTPATIENT)
Dept: FAMILY MEDICINE CLINIC | Age: 44
End: 2019-10-02

## 2019-10-02 ENCOUNTER — OFFICE VISIT (OUTPATIENT)
Dept: FAMILY MEDICINE CLINIC | Age: 44
End: 2019-10-02
Payer: MEDICARE

## 2019-10-02 ENCOUNTER — HOSPITAL ENCOUNTER (OUTPATIENT)
Age: 44
Discharge: HOME | End: 2019-10-04
Payer: MEDICARE

## 2019-10-02 VITALS
OXYGEN SATURATION: 99 % | HEART RATE: 74 BPM | WEIGHT: 293 LBS | SYSTOLIC BLOOD PRESSURE: 122 MMHG | RESPIRATION RATE: 18 BRPM | HEIGHT: 68 IN | TEMPERATURE: 97.6 F | BODY MASS INDEX: 44.41 KG/M2 | DIASTOLIC BLOOD PRESSURE: 74 MMHG

## 2019-10-02 DIAGNOSIS — R31.9 URINARY TRACT INFECTION WITH HEMATURIA, SITE UNSPECIFIED: ICD-10-CM

## 2019-10-02 DIAGNOSIS — E66.01 MORBID OBESITY WITH BMI OF 50.0-59.9, ADULT (HCC): ICD-10-CM

## 2019-10-02 DIAGNOSIS — R35.0 URINARY FREQUENCY: Primary | ICD-10-CM

## 2019-10-02 DIAGNOSIS — N39.0 URINARY TRACT INFECTION WITH HEMATURIA, SITE UNSPECIFIED: ICD-10-CM

## 2019-10-02 DIAGNOSIS — R35.0 URINARY FREQUENCY: ICD-10-CM

## 2019-10-02 LAB
BILIRUBIN, POC: NEGATIVE
BLOOD URINE, POC: NORMAL
CLARITY, POC: NORMAL
COLOR, POC: YELLOW
GLUCOSE URINE, POC: NEGATIVE
KETONES, POC: NEGATIVE
LEUKOCYTE EST, POC: NORMAL
NITRITE, POC: NEGATIVE
PH, POC: 6
PROTEIN, POC: NEGATIVE
SPECIFIC GRAVITY, POC: 1.01
UROBILINOGEN, POC: NORMAL

## 2019-10-02 PROCEDURE — G8427 DOCREV CUR MEDS BY ELIG CLIN: HCPCS | Performed by: FAMILY MEDICINE

## 2019-10-02 PROCEDURE — G8417 CALC BMI ABV UP PARAM F/U: HCPCS | Performed by: FAMILY MEDICINE

## 2019-10-02 PROCEDURE — 87186 SC STD MICRODIL/AGAR DIL: CPT

## 2019-10-02 PROCEDURE — G8484 FLU IMMUNIZE NO ADMIN: HCPCS | Performed by: FAMILY MEDICINE

## 2019-10-02 PROCEDURE — 1036F TOBACCO NON-USER: CPT | Performed by: FAMILY MEDICINE

## 2019-10-02 PROCEDURE — 99213 OFFICE O/P EST LOW 20 MIN: CPT | Performed by: FAMILY MEDICINE

## 2019-10-02 PROCEDURE — 87088 URINE BACTERIA CULTURE: CPT

## 2019-10-02 RX ORDER — NITROFURANTOIN 25; 75 MG/1; MG/1
100 CAPSULE ORAL 2 TIMES DAILY
Qty: 14 CAPSULE | Refills: 0 | Status: SHIPPED | OUTPATIENT
Start: 2019-10-02 | End: 2019-10-09

## 2019-10-02 RX ORDER — ONDANSETRON 4 MG/1
4 TABLET, ORALLY DISINTEGRATING ORAL EVERY 8 HOURS PRN
Qty: 10 TABLET | Refills: 0 | Status: SHIPPED
Start: 2019-10-02 | End: 2020-03-01 | Stop reason: ALTCHOICE

## 2019-10-02 ASSESSMENT — ENCOUNTER SYMPTOMS
SHORTNESS OF BREATH: 0
RHINORRHEA: 0
SINUS PRESSURE: 0
NAUSEA: 0
DIARRHEA: 0
ABDOMINAL PAIN: 0
BACK PAIN: 0
SORE THROAT: 0
VOMITING: 0
COUGH: 0
WHEEZING: 0
CONSTIPATION: 0

## 2019-10-04 LAB
ORGANISM: ABNORMAL
URINE CULTURE, ROUTINE: ABNORMAL

## 2019-10-08 DIAGNOSIS — E11.9 TYPE 2 DIABETES MELLITUS WITHOUT COMPLICATION, WITHOUT LONG-TERM CURRENT USE OF INSULIN (HCC): Primary | ICD-10-CM

## 2019-10-10 ENCOUNTER — TELEPHONE (OUTPATIENT)
Dept: FAMILY MEDICINE CLINIC | Age: 44
End: 2019-10-10

## 2019-10-10 DIAGNOSIS — I10 ESSENTIAL HYPERTENSION: Primary | ICD-10-CM

## 2019-10-10 RX ORDER — ADHESIVE BANDAGE 3/4"
BANDAGE TOPICAL
Qty: 1 EACH | Refills: 0 | Status: SHIPPED | OUTPATIENT
Start: 2019-10-10 | End: 2020-07-28

## 2019-10-11 ENCOUNTER — TELEPHONE (OUTPATIENT)
Dept: BARIATRICS/WEIGHT MGMT | Age: 44
End: 2019-10-11

## 2019-10-14 ENCOUNTER — TELEPHONE (OUTPATIENT)
Dept: FAMILY MEDICINE CLINIC | Age: 44
End: 2019-10-14

## 2019-10-15 ENCOUNTER — TELEPHONE (OUTPATIENT)
Dept: FAMILY MEDICINE CLINIC | Age: 44
End: 2019-10-15

## 2019-10-17 ENCOUNTER — TELEPHONE (OUTPATIENT)
Dept: BARIATRICS/WEIGHT MGMT | Age: 44
End: 2019-10-17

## 2019-10-18 ENCOUNTER — INITIAL CONSULT (OUTPATIENT)
Dept: BARIATRICS/WEIGHT MGMT | Age: 44
End: 2019-10-18
Payer: MEDICARE

## 2019-10-18 VITALS — WEIGHT: 293 LBS | HEIGHT: 69 IN | BODY MASS INDEX: 43.4 KG/M2

## 2019-10-18 DIAGNOSIS — Z71.3 DIETARY COUNSELING: Primary | ICD-10-CM

## 2019-10-18 PROCEDURE — 99999 PR OFFICE/OUTPT VISIT,PROCEDURE ONLY: CPT

## 2019-12-02 ENCOUNTER — TELEPHONE (OUTPATIENT)
Dept: BARIATRICS/WEIGHT MGMT | Age: 44
End: 2019-12-02

## 2019-12-30 ENCOUNTER — OFFICE VISIT (OUTPATIENT)
Dept: FAMILY MEDICINE CLINIC | Age: 44
End: 2019-12-30
Payer: MEDICARE

## 2019-12-30 VITALS
TEMPERATURE: 98.2 F | HEIGHT: 68 IN | WEIGHT: 293 LBS | RESPIRATION RATE: 20 BRPM | OXYGEN SATURATION: 96 % | DIASTOLIC BLOOD PRESSURE: 86 MMHG | BODY MASS INDEX: 44.41 KG/M2 | SYSTOLIC BLOOD PRESSURE: 138 MMHG | HEART RATE: 119 BPM

## 2019-12-30 DIAGNOSIS — L73.2 HIDRADENITIS SUPPURATIVA: ICD-10-CM

## 2019-12-30 DIAGNOSIS — E66.01 MORBID OBESITY WITH BMI OF 50.0-59.9, ADULT (HCC): ICD-10-CM

## 2019-12-30 DIAGNOSIS — F51.01 PRIMARY INSOMNIA: ICD-10-CM

## 2019-12-30 DIAGNOSIS — E11.9 TYPE 2 DIABETES MELLITUS WITHOUT COMPLICATION, WITHOUT LONG-TERM CURRENT USE OF INSULIN (HCC): Primary | ICD-10-CM

## 2019-12-30 DIAGNOSIS — M48.061 SPINAL STENOSIS OF LUMBAR REGION AT MULTIPLE LEVELS: ICD-10-CM

## 2019-12-30 LAB — HBA1C MFR BLD: 6.2 %

## 2019-12-30 PROCEDURE — G8417 CALC BMI ABV UP PARAM F/U: HCPCS | Performed by: PHYSICIAN ASSISTANT

## 2019-12-30 PROCEDURE — 3044F HG A1C LEVEL LT 7.0%: CPT | Performed by: PHYSICIAN ASSISTANT

## 2019-12-30 PROCEDURE — G8428 CUR MEDS NOT DOCUMENT: HCPCS | Performed by: PHYSICIAN ASSISTANT

## 2019-12-30 PROCEDURE — 83036 HEMOGLOBIN GLYCOSYLATED A1C: CPT | Performed by: PHYSICIAN ASSISTANT

## 2019-12-30 PROCEDURE — G8484 FLU IMMUNIZE NO ADMIN: HCPCS | Performed by: PHYSICIAN ASSISTANT

## 2019-12-30 PROCEDURE — 1036F TOBACCO NON-USER: CPT | Performed by: PHYSICIAN ASSISTANT

## 2019-12-30 PROCEDURE — 99214 OFFICE O/P EST MOD 30 MIN: CPT | Performed by: PHYSICIAN ASSISTANT

## 2019-12-30 PROCEDURE — 2022F DILAT RTA XM EVC RTNOPTHY: CPT | Performed by: PHYSICIAN ASSISTANT

## 2019-12-30 RX ORDER — LANOLIN ALCOHOL/MO/W.PET/CERES
3 CREAM (GRAM) TOPICAL DAILY
Qty: 30 TABLET | Refills: 3 | Status: SHIPPED
Start: 2019-12-30 | End: 2020-05-20

## 2019-12-30 RX ORDER — POTASSIUM CHLORIDE 20 MEQ/1
20 TABLET, EXTENDED RELEASE ORAL
Qty: 60 TABLET | Refills: 3 | Status: SHIPPED
Start: 2019-12-30 | End: 2021-07-19

## 2020-01-14 RX ORDER — FUROSEMIDE 40 MG/1
40 TABLET ORAL DAILY
Qty: 60 TABLET | Refills: 3 | Status: ON HOLD
Start: 2020-01-14 | End: 2020-05-01 | Stop reason: SDUPTHER

## 2020-01-14 NOTE — TELEPHONE ENCOUNTER
Patient is requesting medication zyrtec for allergies. patient was at urgent care and requesting cyclobenzaprine 10mg  To help with chronic pain and muscle spasms. Pt. Given this med at urgent care.

## 2020-01-15 ENCOUNTER — TELEPHONE (OUTPATIENT)
Dept: FAMILY MEDICINE CLINIC | Age: 45
End: 2020-01-15

## 2020-01-15 RX ORDER — CETIRIZINE HYDROCHLORIDE 10 MG/1
10 TABLET ORAL DAILY
Qty: 30 TABLET | Refills: 0 | Status: SHIPPED | OUTPATIENT
Start: 2020-01-15 | End: 2020-02-14

## 2020-01-15 NOTE — TELEPHONE ENCOUNTER
Patient called addressing this again         1/14/20 10:10 AM      Alea Kamini contacted ALLYSON Faulkner LPN           [birth year] 10:14 AM   Note      Patient is requesting medication zyrtec for allergies. patient was at urgent care and requesting cyclobenzaprine 10mg  To help with chronic pain and muscle spasms.   Pt. Given this med at urgent care.                 1/14/20 10:14 AM   Comfort Rosenberg LPN routed this conversation to Ham Patel              1/14/20 12:09 PM   GREGORIO Patel routed this conversation to Comfort Rosenberg LPN

## 2020-01-16 ENCOUNTER — TELEPHONE (OUTPATIENT)
Dept: BARIATRICS/WEIGHT MGMT | Age: 45
End: 2020-01-16

## 2020-01-16 ENCOUNTER — HOSPITAL ENCOUNTER (EMERGENCY)
Age: 45
Discharge: HOME | End: 2020-01-16
Payer: MEDICARE

## 2020-01-16 ENCOUNTER — INITIAL CONSULT (OUTPATIENT)
Dept: NEUROSURGERY | Age: 45
End: 2020-01-16
Payer: MEDICARE

## 2020-01-16 ENCOUNTER — APPOINTMENT (OUTPATIENT)
Dept: CT IMAGING | Age: 45
End: 2020-01-16
Payer: MEDICARE

## 2020-01-16 VITALS
OXYGEN SATURATION: 97 % | TEMPERATURE: 98.4 F | WEIGHT: 293 LBS | DIASTOLIC BLOOD PRESSURE: 92 MMHG | HEIGHT: 68 IN | HEART RATE: 100 BPM | BODY MASS INDEX: 44.41 KG/M2 | SYSTOLIC BLOOD PRESSURE: 167 MMHG | RESPIRATION RATE: 16 BRPM

## 2020-01-16 VITALS
HEART RATE: 108 BPM | SYSTOLIC BLOOD PRESSURE: 177 MMHG | BODY MASS INDEX: 44.41 KG/M2 | DIASTOLIC BLOOD PRESSURE: 116 MMHG | HEIGHT: 68 IN | WEIGHT: 293 LBS

## 2020-01-16 LAB
BILIRUBIN URINE: NEGATIVE
BLOOD, URINE: NEGATIVE
CLARITY: CLEAR
COLOR: YELLOW
GLUCOSE URINE: NEGATIVE MG/DL
HCG, URINE, POC: NEGATIVE
INFLUENZA A BY PCR: NOT DETECTED
INFLUENZA B BY PCR: NOT DETECTED
KETONES, URINE: NEGATIVE MG/DL
LEUKOCYTE ESTERASE, URINE: NEGATIVE
Lab: NORMAL
NEGATIVE QC PASS/FAIL: NORMAL
NITRITE, URINE: NEGATIVE
PH UA: 6.5 (ref 5–9)
POSITIVE QC PASS/FAIL: NORMAL
PROTEIN UA: NEGATIVE MG/DL
SPECIFIC GRAVITY UA: 1.02 (ref 1–1.03)
UROBILINOGEN, URINE: 0.2 E.U./DL

## 2020-01-16 PROCEDURE — 6360000002 HC RX W HCPCS: Performed by: PHYSICIAN ASSISTANT

## 2020-01-16 PROCEDURE — 1036F TOBACCO NON-USER: CPT | Performed by: PHYSICIAN ASSISTANT

## 2020-01-16 PROCEDURE — G8417 CALC BMI ABV UP PARAM F/U: HCPCS | Performed by: PHYSICIAN ASSISTANT

## 2020-01-16 PROCEDURE — 72131 CT LUMBAR SPINE W/O DYE: CPT

## 2020-01-16 PROCEDURE — 6370000000 HC RX 637 (ALT 250 FOR IP): Performed by: PHYSICIAN ASSISTANT

## 2020-01-16 PROCEDURE — 96374 THER/PROPH/DIAG INJ IV PUSH: CPT

## 2020-01-16 PROCEDURE — 96372 THER/PROPH/DIAG INJ SC/IM: CPT

## 2020-01-16 PROCEDURE — 81003 URINALYSIS AUTO W/O SCOPE: CPT

## 2020-01-16 PROCEDURE — 99284 EMERGENCY DEPT VISIT MOD MDM: CPT

## 2020-01-16 PROCEDURE — 99213 OFFICE O/P EST LOW 20 MIN: CPT | Performed by: PHYSICIAN ASSISTANT

## 2020-01-16 PROCEDURE — G8427 DOCREV CUR MEDS BY ELIG CLIN: HCPCS | Performed by: PHYSICIAN ASSISTANT

## 2020-01-16 PROCEDURE — 87502 INFLUENZA DNA AMP PROBE: CPT

## 2020-01-16 PROCEDURE — G8484 FLU IMMUNIZE NO ADMIN: HCPCS | Performed by: PHYSICIAN ASSISTANT

## 2020-01-16 RX ORDER — CYCLOBENZAPRINE HCL 5 MG
5 TABLET ORAL 2 TIMES DAILY PRN
Qty: 20 TABLET | Refills: 0 | Status: SHIPPED | OUTPATIENT
Start: 2020-01-16 | End: 2020-02-10 | Stop reason: SDUPTHER

## 2020-01-16 RX ORDER — ONDANSETRON 4 MG/1
4 TABLET, ORALLY DISINTEGRATING ORAL ONCE
Status: COMPLETED | OUTPATIENT
Start: 2020-01-16 | End: 2020-01-16

## 2020-01-16 RX ORDER — FENTANYL CITRATE 50 UG/ML
100 INJECTION, SOLUTION INTRAMUSCULAR; INTRAVENOUS ONCE
Status: COMPLETED | OUTPATIENT
Start: 2020-01-16 | End: 2020-01-16

## 2020-01-16 RX ORDER — MORPHINE SULFATE 4 MG/ML
8 INJECTION, SOLUTION INTRAMUSCULAR; INTRAVENOUS ONCE
Status: COMPLETED | OUTPATIENT
Start: 2020-01-16 | End: 2020-01-16

## 2020-01-16 RX ADMIN — MORPHINE SULFATE 8 MG: 4 INJECTION, SOLUTION INTRAMUSCULAR; INTRAVENOUS at 21:21

## 2020-01-16 RX ADMIN — FENTANYL CITRATE 100 MCG: 50 INJECTION, SOLUTION INTRAMUSCULAR; INTRAVENOUS at 20:20

## 2020-01-16 RX ADMIN — ONDANSETRON 4 MG: 4 TABLET, ORALLY DISINTEGRATING ORAL at 21:21

## 2020-01-16 ASSESSMENT — PAIN DESCRIPTION - LOCATION
LOCATION: BACK
LOCATION: BACK;HIP

## 2020-01-16 ASSESSMENT — PAIN DESCRIPTION - PAIN TYPE
TYPE: ACUTE PAIN
TYPE: ACUTE PAIN

## 2020-01-16 ASSESSMENT — PAIN DESCRIPTION - ORIENTATION: ORIENTATION: RIGHT;LEFT

## 2020-01-16 ASSESSMENT — PAIN SCALES - GENERAL
PAINLEVEL_OUTOF10: 10
PAINLEVEL_OUTOF10: 6
PAINLEVEL_OUTOF10: 10

## 2020-01-16 ASSESSMENT — ENCOUNTER SYMPTOMS
EYES NEGATIVE: 1
GASTROINTESTINAL NEGATIVE: 1
RESPIRATORY NEGATIVE: 1
ALLERGIC/IMMUNOLOGIC NEGATIVE: 1
BACK PAIN: 1

## 2020-01-16 ASSESSMENT — PAIN DESCRIPTION - FREQUENCY: FREQUENCY: CONTINUOUS

## 2020-01-16 ASSESSMENT — PAIN DESCRIPTION - DIRECTION: RADIATING_TOWARDS: GROIN

## 2020-01-16 ASSESSMENT — PAIN - FUNCTIONAL ASSESSMENT: PAIN_FUNCTIONAL_ASSESSMENT: PREVENTS OR INTERFERES WITH MANY ACTIVE NOT PASSIVE ACTIVITIES

## 2020-01-16 ASSESSMENT — PAIN DESCRIPTION - DESCRIPTORS: DESCRIPTORS: SHOOTING;SHARP

## 2020-01-16 NOTE — PATIENT INSTRUCTIONS

## 2020-01-16 NOTE — ED PROVIDER NOTES
and lower lumbar  midline which is her chronic pain. .             Tenderness: Moderate. Swelling: no.              Range of Motion: full range with pain. CVA Tenderness: No.            Straight leg raising:  Bilateral negative. Skin:  no erythema, rash or swelling noted. Distal Function:              Motor deficit: none. Sensory deficit: none. Pulse deficit: none. Calf Tenderness:  No Bilateral.               Edema:  none Both lower extremity(s). Reflexes: Bilateral patella +1 normal.  Gait:  Limp  Integument:  Normal turgor. Warm, dry, without visible rash. Lymphatics: No lymphangitis or adenopathy noted. Neurological:  Oriented. Motor functions intact. Lab / Imaging Results   (All laboratory and radiology results have been personally reviewed by myself)  Labs:  Results for orders placed or performed during the hospital encounter of 01/16/20   Rapid influenza A/B antigens   Result Value Ref Range    Influenza A by PCR Not Detected Not Detected    Influenza B by PCR Not Detected Not Detected   Urinalysis   Result Value Ref Range    Color, UA Yellow Straw/Yellow    Clarity, UA Clear Clear    Glucose, Ur Negative Negative mg/dL    Bilirubin Urine Negative Negative    Ketones, Urine Negative Negative mg/dL    Specific Gravity, UA 1.020 1.005 - 1.030    Blood, Urine Negative Negative    pH, UA 6.5 5.0 - 9.0    Protein, UA Negative Negative mg/dL    Urobilinogen, Urine 0.2 <2.0 E.U./dL    Nitrite, Urine Negative Negative    Leukocyte Esterase, Urine Negative Negative   POC Pregnancy Urine Qual   Result Value Ref Range    HCG, Urine, POC Negative Negative    Lot Number [id number]     Positive QC Pass/Fail Pass     Negative QC Pass/Fail Pass        Imaging: All Radiology results interpreted by Radiologist unless otherwise noted.   CT Lumbar Spine WO Contrast   Final Result      No acute fracture or spondylolisthesis is identified on CT of cervical   spine. Diffuse degenerative disc and facet disease result in multilevel   central and foraminal stenosis. Atherosclerotic vascular disease. ED Course / Medical Decision Making     Medications   fentaNYL (SUBLIMAZE) injection 100 mcg (100 mcg Intramuscular Given 1/16/20 2020)   morphine (PF) injection 8 mg (8 mg Intravenous Given 1/16/20 2121)   ondansetron (ZOFRAN-ODT) disintegrating tablet 4 mg (4 mg Oral Given 1/16/20 2121)        Re-examination:  1/16/20       Time: no pain relief with fentanyl    Patients symptoms pt will receive one dose of morphine and discharge. .    Consult(s):   None    Procedure(s):   none    Medical Decision Making:    Films were obtained based on family demanding imaging. At this time the patient is without objective evidence of an acute process requiring inpatient management. Pt wanted to be admitted to hospital. Advised pt that chronic back pain is not admission criteria. She is ambulatory in ED. No REGULO symptoms. Counseling: The emergency provider has spoken with the patient and discussed todays results, in addition to providing specific details for the plan of care and counseling regarding the diagnosis and prognosis. Questions are answered at this time and they are agreeable with the plan. Assessment      1. Chronic low back pain with sciatica, sciatica laterality unspecified, unspecified back pain laterality    2. Multiple falls    3. Low back strain, initial encounter      Plan   Discharge to home  Patient condition is stable    New Medications     Discharge Medication List as of 1/16/2020  9:49 PM      START taking these medications    Details   cyclobenzaprine (FLEXERIL) 5 MG tablet Take 1 tablet by mouth 2 times daily as needed for Muscle spasms, Disp-20 tablet, R-0Print           Electronically signed by Ab Campos PA-C   DD: 1/16/20  **This report was transcribed using voice recognition software.  Every effort was made to

## 2020-01-16 NOTE — LETTER
[facility]  [address]  Phone: [phone]  Fax: [phone]    [place], [place]        January 16, 2020       Patient: Edi Castaneda   MR Number: [id number]   YOB: 1975   Date of Visit: 1/16/2020       Dear Dr. Dequan Soler: Thank you for the request for consultation for Edi Castaneda to me for the evaluation of back pain. Below are the relevant portions of my assessment and plan of care. Assessment:  45year old female with severe low back and bilateral leg pain/numbness for over 10 years. Lumbar CT shows severe degenerative changes and scoliosis. She does have severe multilevel foraminal stenosis. Plan: We will order PT, pain management, and lumbar x-rays. She may continue with NSAIDS and gabapentin if beneficial.  She will work on weight loss and stop smoking. If you have questions, please do not hesitate to call me. I look forward to following Alma Billingsley along with you.     Sincerely,        GREGORIO Salazar    CC providers:  Yulissa Camejo, [facility] [facility] [address]  VIA In Basket

## 2020-01-16 NOTE — PROGRESS NOTES
Mood normal.         Assessment:      45year old female with severe low back and bilateral leg pain/numbness for over 10 years. Lumbar CT shows severe degenerative changes and scoliosis. She does have severe multilevel foraminal stenosis. Plan: We will order PT, pain management, and lumbar x-rays. She may continue with NSAIDS and gabapentin if beneficial.  She will work on weight loss and stop smoking.           GREGORIO Perkins

## 2020-01-16 NOTE — TELEPHONE ENCOUNTER
Called pt and asked her to call Rd at [phone] to schedule a weight check and to discuss her requirements for getting to surgery. Pt had bee doing regular weight checks but has not been in to the [facility] office since 10/18/19. Pt has not met weight goal which was 375 lb per Dr Danielle Bernstein. Will await pt's call.

## 2020-01-28 PROBLEM — E66.01 MORBID OBESITY WITH BMI OF 50.0-59.9, ADULT (HCC): Status: RESOLVED | Noted: 2018-03-20 | Resolved: 2020-01-28

## 2020-02-10 ENCOUNTER — OFFICE VISIT (OUTPATIENT)
Dept: FAMILY MEDICINE CLINIC | Age: 45
End: 2020-02-10
Payer: MEDICARE

## 2020-02-10 VITALS
BODY MASS INDEX: 44.41 KG/M2 | RESPIRATION RATE: 18 BRPM | TEMPERATURE: 98.1 F | WEIGHT: 293 LBS | OXYGEN SATURATION: 99 % | SYSTOLIC BLOOD PRESSURE: 138 MMHG | DIASTOLIC BLOOD PRESSURE: 92 MMHG | HEIGHT: 68 IN | HEART RATE: 113 BPM

## 2020-02-10 LAB
BILIRUBIN, POC: NEGATIVE
BLOOD URINE, POC: NORMAL
CLARITY, POC: NORMAL
COLOR, POC: NORMAL
CONTROL: PRESENT
GLUCOSE URINE, POC: NEGATIVE
KETONES, POC: NEGATIVE
LEUKOCYTE EST, POC: NEGATIVE
NITRITE, POC: NEGATIVE
PH, POC: 6.5
PREGNANCY TEST URINE, POC: NEGATIVE
PROTEIN, POC: NORMAL
SPECIFIC GRAVITY, POC: 1.02
UROBILINOGEN, POC: NORMAL

## 2020-02-10 PROCEDURE — G8484 FLU IMMUNIZE NO ADMIN: HCPCS | Performed by: PHYSICIAN ASSISTANT

## 2020-02-10 PROCEDURE — G8427 DOCREV CUR MEDS BY ELIG CLIN: HCPCS | Performed by: PHYSICIAN ASSISTANT

## 2020-02-10 PROCEDURE — G8417 CALC BMI ABV UP PARAM F/U: HCPCS | Performed by: PHYSICIAN ASSISTANT

## 2020-02-10 PROCEDURE — 81025 URINE PREGNANCY TEST: CPT | Performed by: PHYSICIAN ASSISTANT

## 2020-02-10 PROCEDURE — 99214 OFFICE O/P EST MOD 30 MIN: CPT | Performed by: PHYSICIAN ASSISTANT

## 2020-02-10 PROCEDURE — 1036F TOBACCO NON-USER: CPT | Performed by: PHYSICIAN ASSISTANT

## 2020-02-10 RX ORDER — CYCLOBENZAPRINE HCL 10 MG
10 TABLET ORAL 2 TIMES DAILY PRN
Qty: 40 TABLET | Refills: 1 | Status: SHIPPED | OUTPATIENT
Start: 2020-02-10 | End: 2020-02-20

## 2020-02-10 RX ORDER — KETOROLAC TROMETHAMINE 30 MG/ML
60 INJECTION, SOLUTION INTRAMUSCULAR; INTRAVENOUS ONCE
Status: COMPLETED | OUTPATIENT
Start: 2020-02-10 | End: 2020-02-10

## 2020-02-10 RX ADMIN — KETOROLAC TROMETHAMINE 60 MG: 30 INJECTION, SOLUTION INTRAMUSCULAR; INTRAVENOUS at 10:59

## 2020-02-12 ENCOUNTER — TELEPHONE (OUTPATIENT)
Dept: BARIATRICS/WEIGHT MGMT | Age: 45
End: 2020-02-12

## 2020-02-12 NOTE — TELEPHONE ENCOUNTER
Pt called in and spoke with KONG SHELBY today. Pt wanted to let [facility] know that she is still interested in getting surgery, however, she was not ready to schedule an appointment yet. She says she is starting a new medication with her doctor that may help her lose weight and she wants to see how it works. Pt reports that she had some other issues in her life that are better now. I told pt that since it has been such a long period of time since she completed her requirements, she will have certain things that will need to be completed again. Pt voiced understanding and said she still wants to do what is required to eventually get to surgery. Told pt to please call [facility] when ready to proceed and she agreed to do so.

## 2020-02-12 NOTE — PROGRESS NOTES
2/28/2020] HYDROcodone-acetaminophen (NORCO) 5-325 MG per tablet Take 1 tablet by mouth every 8 hours as needed for Pain for up to 30 days. Intended supply: 3 days. Take lowest dose possible to manage pain 90 tablet 0    melatonin (RA MELATONIN) 3 MG TABS tablet Take 1 tablet by mouth daily 30 tablet 3    Liraglutide (VICTOZA) 18 MG/3ML SOPN SC injection Inject 1.2 mg into the skin daily 2 pen 3    amLODIPine (NORVASC) 10 MG tablet TAKE 1 TABLET BY MOUTH DAILY 30 tablet 5    Insulin Pen Needle ([employer] PEN NEEDLES) 31G X 6 MM MISC 1 each by Does not apply route daily 100 each 3    Blood Pressure Monitoring (BLOOD PRESSURE CUFF) MISC Dx:  Hypertension with labile blood pressure 1 each 0    ondansetron (ZOFRAN ODT) 4 MG disintegrating tablet Take 1 tablet by mouth every 8 hours as needed for Nausea or Vomiting 10 tablet 0    liraglutide-weight management 18 MG/3ML SOPN Inject 0.6 mg into the skin daily 30 pen 0    hydrALAZINE (APRESOLINE) 25 MG tablet Take 1 tablet by mouth every 8 hours 90 tablet 3    fluticasone (FLONASE) 50 MCG/ACT nasal spray SHAKE LIQUID AND USE 1 SPRAY IN EACH NOSTRIL DAILY 1 Bottle 3    mineral oil-hydrophilic petrolatum (AQUAPHOR) ointment Apply topically as needed. 99 g 5    ONE TOUCH ULTRA TEST strip 1 each by Does not apply route daily 120 each 5    ibuprofen (ADVIL;MOTRIN) 600 MG tablet Take 1 tablet by mouth 4 times daily as needed for Pain 90 tablet 1    Multiple Vitamin (DAILY-DOUGLAS) TABS Take by mouth daily  5    ONETOUCH DELICA LANCETS FINE MISC 60 Devices daily  5    ammonium lactate (LAC-HYDRIN) 12 % lotion Apply topically daily. 225 g 1    furosemide (LASIX) 20 MG tablet TAKE 1 TABLET BY MOUTH EVERY DAY AS NEEDED FOR SWELLING 30 tablet 2    Misc. Devices (CANE) MISC Use cane to aid in ambulation 1 each 0    Misc. Devices (WALKER) MISC Use walker when ambulating.  1 each 0    omeprazole (PRILOSEC) 20 MG delayed release capsule Take 1 capsule by mouth Daily 30 capsule 12    lisinopril-hydrochlorothiazide (PRINZIDE;ZESTORETIC) 10-12.5 MG per tablet TAKE 1 TABLET BY MOUTH DAILY 30 tablet 3    pantoprazole (PROTONIX) 20 MG tablet TAKE 1 TABLET BY MOUTH DAILY (Patient taking differently: Take 20 mg by mouth as needed ) 30 tablet 3    Multiple Vitamins-Minerals (THERAPEUTIC MULTIVITAMIN-MINERALS) tablet Take 1 tablet by mouth daily 90 tablet 5    Handicap Placard MISC by Does not apply route Patient cannot walk 200 ft without stopping to rest.    Expiration 5 yrs 1 each 0    albuterol sulfate HFA (PROAIR HFA) 108 (90 Base) MCG/ACT inhaler Inhale 2 puffs into the lungs every 4 hours as needed for Wheezing or Shortness of Breath 1 Inhaler 3    metFORMIN (GLUCOPHAGE) 500 MG tablet TAKE 1 TABLET BY MOUTH TWICE DAILY WITH MEALS 60 tablet 2    ipratropium-albuterol (DUONEB) 0.5-2.5 (3) MG/3ML SOLN nebulizer solution Take 3 mLs by nebulization every 4 hours as needed for Shortness of Breath 24 vial 1    mometasone-formoterol (DULERA) 100-5 MCG/ACT inhaler Inhale 2 puffs into the lungs 2 times daily Rinse mouth after using. 1 Inhaler 2    potassium chloride (KLOR-CON M) 20 MEQ extended release tablet Take 1 tablet by mouth daily (with breakfast) 60 tablet 3     No current facility-administered medications for this visit.         Allergies   Allergen Reactions    Food Anaphylaxis     Food allergy - Fish and tree nuts    Fish-Derived Products     Nuts [Peanut-Containing Drug Products]     Pcn [Penicillins]     Ultram [Tramadol Hcl]      Per pt had a seizure    Cashews [Macadamia Nut Oil] Nausea And Vomiting       Family History   Problem Relation Age of Onset    Arthritis Mother     Hypertension Mother     Asthma Mother     Cancer Father     Hypertension Father     Heart Attack Father     Asthma Father        Social History     Socioeconomic History    Marital status: Legally      Spouse name: Not on file    Number of children: Not on file    Years of education:

## 2020-03-01 ENCOUNTER — APPOINTMENT (OUTPATIENT)
Dept: GENERAL RADIOLOGY | Age: 45
End: 2020-03-01
Payer: MEDICARE

## 2020-03-01 ENCOUNTER — HOSPITAL ENCOUNTER (EMERGENCY)
Age: 45
Discharge: HOME | End: 2020-03-01
Attending: EMERGENCY MEDICINE
Payer: MEDICARE

## 2020-03-01 VITALS
DIASTOLIC BLOOD PRESSURE: 83 MMHG | OXYGEN SATURATION: 95 % | SYSTOLIC BLOOD PRESSURE: 153 MMHG | HEART RATE: 89 BPM | BODY MASS INDEX: 44.41 KG/M2 | RESPIRATION RATE: 16 BRPM | WEIGHT: 293 LBS | HEIGHT: 68 IN | TEMPERATURE: 98.4 F

## 2020-03-01 LAB
ALBUMIN SERPL-MCNC: 3.6 G/DL (ref 3.5–5.2)
ALP BLD-CCNC: 92 U/L (ref 35–104)
ALT SERPL-CCNC: 11 U/L (ref 0–32)
ANION GAP SERPL CALCULATED.3IONS-SCNC: 13 MMOL/L (ref 7–16)
AST SERPL-CCNC: 18 U/L (ref 0–31)
BACTERIA: ABNORMAL /HPF
BASOPHILS ABSOLUTE: 0.02 E9/L (ref 0–0.2)
BASOPHILS RELATIVE PERCENT: 0.2 % (ref 0–2)
BILIRUB SERPL-MCNC: 0.4 MG/DL (ref 0–1.2)
BILIRUBIN URINE: NEGATIVE
BLOOD, URINE: NEGATIVE
BUN BLDV-MCNC: 9 MG/DL (ref 6–20)
CALCIUM SERPL-MCNC: 9 MG/DL (ref 8.6–10.2)
CHLORIDE BLD-SCNC: 102 MMOL/L (ref 98–107)
CLARITY: CLEAR
CO2: 23 MMOL/L (ref 22–29)
COLOR: ABNORMAL
CREAT SERPL-MCNC: 0.8 MG/DL (ref 0.5–1)
EOSINOPHILS ABSOLUTE: 0.23 E9/L (ref 0.05–0.5)
EOSINOPHILS RELATIVE PERCENT: 2.3 % (ref 0–6)
GFR AFRICAN AMERICAN: >60
GFR NON-AFRICAN AMERICAN: >60 ML/MIN/1.73
GLUCOSE BLD-MCNC: 103 MG/DL (ref 74–99)
GLUCOSE URINE: NEGATIVE MG/DL
HCG, URINE, POC: NEGATIVE
HCT VFR BLD CALC: 46.5 % (ref 34–48)
HEMOGLOBIN: 14.7 G/DL (ref 11.5–15.5)
IMMATURE GRANULOCYTES #: 0.05 E9/L
IMMATURE GRANULOCYTES %: 0.5 % (ref 0–5)
INFLUENZA A BY PCR: NOT DETECTED
INFLUENZA B BY PCR: NOT DETECTED
KETONES, URINE: NEGATIVE MG/DL
LEUKOCYTE ESTERASE, URINE: NEGATIVE
LYMPHOCYTES ABSOLUTE: 2.75 E9/L (ref 1.5–4)
LYMPHOCYTES RELATIVE PERCENT: 28.1 % (ref 20–42)
Lab: NORMAL
MCH RBC QN AUTO: 28.8 PG (ref 26–35)
MCHC RBC AUTO-ENTMCNC: 31.6 % (ref 32–34.5)
MCV RBC AUTO: 91.2 FL (ref 80–99.9)
MONOCYTES ABSOLUTE: 0.59 E9/L (ref 0.1–0.95)
MONOCYTES RELATIVE PERCENT: 6 % (ref 2–12)
NEGATIVE QC PASS/FAIL: NORMAL
NEUTROPHILS ABSOLUTE: 6.15 E9/L (ref 1.8–7.3)
NEUTROPHILS RELATIVE PERCENT: 62.9 % (ref 43–80)
NITRITE, URINE: NEGATIVE
PDW BLD-RTO: 17 FL (ref 11.5–15)
PH UA: 6.5 (ref 5–9)
PLATELET # BLD: 361 E9/L (ref 130–450)
PMV BLD AUTO: 10.7 FL (ref 7–12)
POSITIVE QC PASS/FAIL: NORMAL
POTASSIUM SERPL-SCNC: 4.3 MMOL/L (ref 3.5–5)
PROTEIN UA: NEGATIVE MG/DL
RBC # BLD: 5.1 E12/L (ref 3.5–5.5)
RBC UA: ABNORMAL /HPF (ref 0–2)
SODIUM BLD-SCNC: 138 MMOL/L (ref 132–146)
SPECIFIC GRAVITY UA: 1.01 (ref 1–1.03)
TOTAL PROTEIN: 7.7 G/DL (ref 6.4–8.3)
TROPONIN: 0.02 NG/ML (ref 0–0.03)
UROBILINOGEN, URINE: 0.2 E.U./DL
WBC # BLD: 9.8 E9/L (ref 4.5–11.5)
WBC UA: ABNORMAL /HPF (ref 0–5)

## 2020-03-01 PROCEDURE — 94640 AIRWAY INHALATION TREATMENT: CPT

## 2020-03-01 PROCEDURE — 84484 ASSAY OF TROPONIN QUANT: CPT

## 2020-03-01 PROCEDURE — 93005 ELECTROCARDIOGRAM TRACING: CPT | Performed by: PHYSICIAN ASSISTANT

## 2020-03-01 PROCEDURE — 2580000003 HC RX 258: Performed by: PHYSICIAN ASSISTANT

## 2020-03-01 PROCEDURE — 6360000002 HC RX W HCPCS: Performed by: PHYSICIAN ASSISTANT

## 2020-03-01 PROCEDURE — 6370000000 HC RX 637 (ALT 250 FOR IP): Performed by: EMERGENCY MEDICINE

## 2020-03-01 PROCEDURE — 99285 EMERGENCY DEPT VISIT HI MDM: CPT

## 2020-03-01 PROCEDURE — 85025 COMPLETE CBC W/AUTO DIFF WBC: CPT

## 2020-03-01 PROCEDURE — 96375 TX/PRO/DX INJ NEW DRUG ADDON: CPT

## 2020-03-01 PROCEDURE — 94664 DEMO&/EVAL PT USE INHALER: CPT

## 2020-03-01 PROCEDURE — 87502 INFLUENZA DNA AMP PROBE: CPT

## 2020-03-01 PROCEDURE — 81001 URINALYSIS AUTO W/SCOPE: CPT

## 2020-03-01 PROCEDURE — 71046 X-RAY EXAM CHEST 2 VIEWS: CPT

## 2020-03-01 PROCEDURE — 96374 THER/PROPH/DIAG INJ IV PUSH: CPT

## 2020-03-01 PROCEDURE — 80053 COMPREHEN METABOLIC PANEL: CPT

## 2020-03-01 RX ORDER — 0.9 % SODIUM CHLORIDE 0.9 %
1000 INTRAVENOUS SOLUTION INTRAVENOUS ONCE
Status: COMPLETED | OUTPATIENT
Start: 2020-03-01 | End: 2020-03-01

## 2020-03-01 RX ORDER — ONDANSETRON 4 MG/1
4 TABLET, ORALLY DISINTEGRATING ORAL EVERY 8 HOURS PRN
Qty: 10 TABLET | Refills: 0 | Status: SHIPPED | OUTPATIENT
Start: 2020-03-01 | End: 2020-08-18

## 2020-03-01 RX ORDER — DICYCLOMINE HYDROCHLORIDE 10 MG/1
20 CAPSULE ORAL EVERY 8 HOURS PRN
Qty: 20 CAPSULE | Refills: 0 | Status: SHIPPED | OUTPATIENT
Start: 2020-03-01 | End: 2021-07-08 | Stop reason: CLARIF

## 2020-03-01 RX ORDER — IPRATROPIUM BROMIDE AND ALBUTEROL SULFATE 2.5; .5 MG/3ML; MG/3ML
1 SOLUTION RESPIRATORY (INHALATION)
Status: COMPLETED | OUTPATIENT
Start: 2020-03-01 | End: 2020-03-01

## 2020-03-01 RX ORDER — ONDANSETRON 2 MG/ML
4 INJECTION INTRAMUSCULAR; INTRAVENOUS ONCE
Status: COMPLETED | OUTPATIENT
Start: 2020-03-01 | End: 2020-03-01

## 2020-03-01 RX ORDER — KETOROLAC TROMETHAMINE 30 MG/ML
15 INJECTION, SOLUTION INTRAMUSCULAR; INTRAVENOUS ONCE
Status: COMPLETED | OUTPATIENT
Start: 2020-03-01 | End: 2020-03-01

## 2020-03-01 RX ADMIN — IPRATROPIUM BROMIDE AND ALBUTEROL SULFATE 1 AMPULE: .5; 3 SOLUTION RESPIRATORY (INHALATION) at 16:26

## 2020-03-01 RX ADMIN — SODIUM CHLORIDE 1000 ML: 9 INJECTION, SOLUTION INTRAVENOUS at 15:27

## 2020-03-01 RX ADMIN — ONDANSETRON 4 MG: 2 INJECTION INTRAMUSCULAR; INTRAVENOUS at 15:27

## 2020-03-01 RX ADMIN — IPRATROPIUM BROMIDE AND ALBUTEROL SULFATE 1 AMPULE: .5; 3 SOLUTION RESPIRATORY (INHALATION) at 16:25

## 2020-03-01 RX ADMIN — KETOROLAC TROMETHAMINE 15 MG: 30 INJECTION, SOLUTION INTRAMUSCULAR; INTRAVENOUS at 15:50

## 2020-03-01 RX ADMIN — IPRATROPIUM BROMIDE AND ALBUTEROL SULFATE 1 AMPULE: .5; 3 SOLUTION RESPIRATORY (INHALATION) at 16:24

## 2020-03-01 ASSESSMENT — PAIN DESCRIPTION - LOCATION: LOCATION: ABDOMEN;CHEST

## 2020-03-01 ASSESSMENT — PAIN SCALES - GENERAL
PAINLEVEL_OUTOF10: 10
PAINLEVEL_OUTOF10: 6

## 2020-03-01 ASSESSMENT — PAIN DESCRIPTION - ORIENTATION: ORIENTATION: LEFT

## 2020-03-01 NOTE — ED PROVIDER NOTES
ED Attending  CC: Janice         Department of Emergency Medicine   ED  Provider Note  Admit Date/RoomTime: 3/1/2020  1:27 PM  ED Room: [place] [address]  MRN: [id number]  Chief Complaint:   Chest Pain (lt sided chest \"pulling\" sensation intermittently x1 week with occasional heaviness) and Abdominal Pain (diarrhea)       History of Present Illness   Source of history provided by:  patient. History/Exam Limitations: none. Ree Dubois is a 45 y.o. female who has a past medical history of:   Past Medical History:   Diagnosis Date    Asthma     Bell palsy     DDD (degenerative disc disease), cervical     with spinal stenosis    Diabetes mellitus ([address]     Diverticulitis     DJD (degenerative joint disease)     both hips    GERD without esophagitis     HTN (hypertension)     Hyperlipidemia     Meningitis 2009    Morbid obesity due to excess calories ([address]     Neuropathy     Pancreas cyst     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[address]     presents to the ED by private vehicle for diarrhea and nausea, productive cough x 1 week. Pt also c/o an episode of left sided chest tightness today which was worse when cough. Denies fever/chills, HA, change, dizziness, hemoptysis, dyspnea, vomiting, abdominal pain, urinary symptoms, numbness/weakness. ROS   Pertinent positives and negatives are stated within HPI, all other systems reviewed and are negative. Past Surgical History:   Procedure Laterality Date    CHOLECYSTECTOMY  2009    CYSTOSCOPY Left 01/14/2018    left stent placement    DILATION AND CURETTAGE OF UTERUS      younger age   [facility] LITHOTRIPSY Right 02/15/2018    Cystoscopy;Stent Removal    UPPER GASTROINTESTINAL ENDOSCOPY N/A 6/22/2018    EGD BIOPSY performed by Jsoelyn Harman MD at [address] [place] [address] History:  reports that she quit smoking about 16 months ago. Her smoking use included cigarettes. She started smoking about 18 years ago. She has a 17.00 pack-year smoking history.  She has never used Detected Not Detected   CBC auto differential   Result Value Ref Range    WBC 9.8 4.5 - 11.5 E9/L    RBC 5.10 3.50 - 5.50 E12/L    Hemoglobin 14.7 11.5 - 15.5 g/dL    Hematocrit 46.5 34.0 - 48.0 %    MCV 91.2 80.0 - 99.9 fL    MCH 28.8 26.0 - 35.0 pg    MCHC 31.6 (L) 32.0 - 34.5 %    RDW 17.0 (H) 11.5 - 15.0 fL    Platelets 906 419 - 330 E9/L    MPV 10.7 7.0 - 12.0 fL    Neutrophils % 62.9 43.0 - 80.0 %    Immature Granulocytes % 0.5 0.0 - 5.0 %    Lymphocytes % 28.1 20.0 - 42.0 %    Monocytes % 6.0 2.0 - 12.0 %    Eosinophils % 2.3 0.0 - 6.0 %    Basophils % 0.2 0.0 - 2.0 %    Neutrophils Absolute 6.15 1.80 - 7.30 E9/L    Immature Granulocytes # 0.05 E9/L    Lymphocytes Absolute 2.75 1.50 - 4.00 E9/L    Monocytes Absolute 0.59 0.10 - 0.95 E9/L    Eosinophils Absolute 0.23 0.05 - 0.50 E9/L    Basophils Absolute 0.02 0.00 - 0.20 E9/L   Comprehensive Metabolic Panel   Result Value Ref Range    Sodium 138 132 - 146 mmol/L    Potassium 4.3 3.5 - 5.0 mmol/L    Chloride 102 98 - 107 mmol/L    CO2 23 22 - 29 mmol/L    Anion Gap 13 7 - 16 mmol/L    Glucose 103 (H) 74 - 99 mg/dL    BUN 9 6 - 20 mg/dL    CREATININE 0.8 0.5 - 1.0 mg/dL    GFR Non-African American >60 >=60 mL/min/1.73    GFR African American >60     Calcium 9.0 8.6 - 10.2 mg/dL    Total Protein 7.7 6.4 - 8.3 g/dL    Alb 3.6 3.5 - 5.2 g/dL    Total Bilirubin 0.4 0.0 - 1.2 mg/dL    Alkaline Phosphatase 92 35 - 104 U/L    ALT 11 0 - 32 U/L    AST 18 0 - 31 U/L   Troponin   Result Value Ref Range    Troponin 0.02 0.00 - 0.03 ng/mL   Urinalysis with Microscopic   Result Value Ref Range    Color, UA DARK YELLOW (A) Straw/Yellow    Clarity, UA Clear Clear    Glucose, Ur Negative Negative mg/dL    Bilirubin Urine Negative Negative    Ketones, Urine Negative Negative mg/dL    Specific Gravity, UA 1.015 1.005 - 1.030    Blood, Urine Negative Negative    pH, UA 6.5 5.0 - 9.0    Protein, UA Negative Negative mg/dL    Urobilinogen, Urine 0.2 <2.0 E.U./dL    Nitrite, Urine Negative Negative    Leukocyte Esterase, Urine Negative Negative    WBC, UA 0-1 0 - 5 /HPF    RBC, UA NONE 0 - 2 /HPF    Bacteria, UA RARE (A) None Seen /HPF   POC Pregnancy Urine Qual   Result Value Ref Range    HCG, Urine, POC Negative Negative    Lot Number [id number]     Positive QC Pass/Fail Pass     Negative QC Pass/Fail Pass    EKG 12 Lead   Result Value Ref Range    Ventricular Rate 113 BPM    Atrial Rate 113 BPM    P-R Interval 146 ms    QRS Duration 166 ms    Q-T Interval 376 ms    QTc Calculation (Bazett) 515 ms    P Axis 75 degrees    R Axis -94 degrees    T Axis 57 degrees     Imaging: All Radiology results interpreted by Radiologist unless otherwise noted. XR CHEST STANDARD (2 VW)   Final Result      No airspace opacities or pleural effusion. EKG #1:  Interpreted by emergency department physician unless otherwise noted. Time:  1255    Rate: 113  Rhythm: Sinus. Interpretation: no st segment elevation or depression       ED Course / Medical Decision Making     Medications   0.9 % sodium chloride bolus (0 mLs Intravenous Stopped 3/1/20 1752)   ondansetron (ZOFRAN) injection 4 mg (4 mg Intravenous Given 3/1/20 1527)   ipratropium-albuterol (DUONEB) nebulizer solution 1 ampule (1 ampule Inhalation Given 3/1/20 1626)   ketorolac (TORADOL) injection 15 mg (15 mg Intravenous Given 3/1/20 1550)        Re-Evaluations:  3/1/20      Time: 4975 patient feeling better    Consultations:             None    Procedures:   none    MDM: Patient presenting with diarrhea and nausea, productive cough x1 week. Patient also had an episode of left-sided chest tightness today. Patient is in no acute distress and nontoxic in appearance. Patient's labs and imaging are stable. Patient improved after medications given here. Recommended patient follow-up with PCP for resolution of symptoms. Recommended patient return to the ED with new or worsening of symptoms.     Counseling:   I have spoken with the patient and discussed todays results, in addition to providing specific details for the plan of care and counseling regarding the diagnosis and prognosis and are agreeable with the plan. Assessment      1. Diarrhea, unspecified type    2. Chest pain, unspecified type      This patient's ED course included: a personal history and physicial examination  This patient has remained hemodynamically stable during their ED course. Plan   Discharge to home. Patient condition is stable. New Medications     Discharge Medication List as of 3/1/2020  5:56 PM      START taking these medications    Details   ondansetron (ZOFRAN ODT) 4 MG disintegrating tablet Take 1 tablet by mouth every 8 hours as needed for Nausea or Vomiting, Disp-10 tablet, R-0Print      dicyclomine (BENTYL) 10 MG capsule Take 2 capsules by mouth every 8 hours as needed (diarrhea), Disp-20 capsule, R-0Print      hydrocortisone (ANUSOL-HC) 2.5 % rectal cream Place rectally 2 times daily. , Disp-28 Tube, R-0, Print           Electronically signed by Desirae Fonseca PA-C   DD: 3/1/20  **This report was transcribed using voice recognition software. Every effort was made to ensure accuracy; however, inadvertent computerized transcription errors may be present.   END OF PROVIDER NOTE     Desirae Fonseca PA-C  03/01/20 0151

## 2020-03-02 LAB
EKG ATRIAL RATE: 113 BPM
EKG P AXIS: 75 DEGREES
EKG P-R INTERVAL: 146 MS
EKG Q-T INTERVAL: 376 MS
EKG QRS DURATION: 166 MS
EKG QTC CALCULATION (BAZETT): 515 MS
EKG R AXIS: -94 DEGREES
EKG T AXIS: 57 DEGREES
EKG VENTRICULAR RATE: 113 BPM

## 2020-03-16 ENCOUNTER — OFFICE VISIT (OUTPATIENT)
Dept: FAMILY MEDICINE CLINIC | Age: 45
End: 2020-03-16
Payer: MEDICARE

## 2020-03-16 ENCOUNTER — HOSPITAL ENCOUNTER (OUTPATIENT)
Age: 45
Discharge: HOME | End: 2020-03-18
Payer: MEDICARE

## 2020-03-16 VITALS
HEART RATE: 119 BPM | SYSTOLIC BLOOD PRESSURE: 145 MMHG | BODY MASS INDEX: 43.4 KG/M2 | WEIGHT: 293 LBS | RESPIRATION RATE: 18 BRPM | TEMPERATURE: 98.3 F | DIASTOLIC BLOOD PRESSURE: 89 MMHG | OXYGEN SATURATION: 95 % | HEIGHT: 69 IN

## 2020-03-16 LAB
CHOLESTEROL, TOTAL: 210 MG/DL (ref 0–199)
HDLC SERPL-MCNC: 43 MG/DL
LDL CHOLESTEROL CALCULATED: 140 MG/DL (ref 0–99)
TRIGL SERPL-MCNC: 135 MG/DL (ref 0–149)
VLDLC SERPL CALC-MCNC: 27 MG/DL

## 2020-03-16 PROCEDURE — G8427 DOCREV CUR MEDS BY ELIG CLIN: HCPCS | Performed by: PHYSICIAN ASSISTANT

## 2020-03-16 PROCEDURE — 80061 LIPID PANEL: CPT

## 2020-03-16 PROCEDURE — G8417 CALC BMI ABV UP PARAM F/U: HCPCS | Performed by: PHYSICIAN ASSISTANT

## 2020-03-16 PROCEDURE — 2022F DILAT RTA XM EVC RTNOPTHY: CPT | Performed by: PHYSICIAN ASSISTANT

## 2020-03-16 PROCEDURE — 3046F HEMOGLOBIN A1C LEVEL >9.0%: CPT | Performed by: PHYSICIAN ASSISTANT

## 2020-03-16 PROCEDURE — 99214 OFFICE O/P EST MOD 30 MIN: CPT | Performed by: PHYSICIAN ASSISTANT

## 2020-03-16 PROCEDURE — 1036F TOBACCO NON-USER: CPT | Performed by: PHYSICIAN ASSISTANT

## 2020-03-16 PROCEDURE — G8484 FLU IMMUNIZE NO ADMIN: HCPCS | Performed by: PHYSICIAN ASSISTANT

## 2020-03-16 RX ORDER — ALBUTEROL SULFATE 90 UG/1
2 AEROSOL, METERED RESPIRATORY (INHALATION) EVERY 4 HOURS PRN
Qty: 1 INHALER | Refills: 3 | Status: SHIPPED
Start: 2020-03-16 | End: 2020-05-29 | Stop reason: SDUPTHER

## 2020-03-16 RX ORDER — CETIRIZINE HYDROCHLORIDE 10 MG/1
10 TABLET ORAL DAILY
Qty: 30 TABLET | Refills: 3 | Status: SHIPPED
Start: 2020-03-16 | End: 2020-07-02 | Stop reason: SDUPTHER

## 2020-03-16 RX ORDER — CETIRIZINE HYDROCHLORIDE 10 MG/1
10 TABLET ORAL DAILY
COMMUNITY
End: 2020-03-16 | Stop reason: SDUPTHER

## 2020-03-16 RX ORDER — DEXTROMETHORPHAN HYDROBROMIDE AND PROMETHAZINE HYDROCHLORIDE 15; 6.25 MG/5ML; MG/5ML
5 SYRUP ORAL 4 TIMES DAILY PRN
Qty: 180 ML | Refills: 1 | Status: SHIPPED | OUTPATIENT
Start: 2020-03-16 | End: 2020-03-23

## 2020-03-20 NOTE — TELEPHONE ENCOUNTER
Patient called in requesting RX for something else besides cough syrup stated she is having all the mucus drain into chest please advise

## 2020-04-20 RX ORDER — FLUTICASONE PROPIONATE 50 MCG
SPRAY, SUSPENSION (ML) NASAL
Qty: 16 G | Refills: 0 | Status: SHIPPED
Start: 2020-04-20 | End: 2021-03-30

## 2020-04-20 RX ORDER — HYDRALAZINE HYDROCHLORIDE 25 MG/1
25 TABLET, FILM COATED ORAL EVERY 8 HOURS SCHEDULED
Qty: 90 TABLET | Refills: 3 | Status: SHIPPED
Start: 2020-04-20 | End: 2020-08-26 | Stop reason: SDUPTHER

## 2020-04-28 ENCOUNTER — TELEPHONE (OUTPATIENT)
Dept: ADMINISTRATIVE | Age: 45
End: 2020-04-28

## 2020-04-28 ENCOUNTER — APPOINTMENT (OUTPATIENT)
Dept: GENERAL RADIOLOGY | Age: 45
DRG: 192 | End: 2020-04-28
Payer: MEDICARE

## 2020-04-28 ENCOUNTER — HOSPITAL ENCOUNTER (INPATIENT)
Age: 45
LOS: 5 days | Discharge: HOME | DRG: 192 | End: 2020-05-05
Attending: EMERGENCY MEDICINE | Admitting: INTERNAL MEDICINE
Payer: MEDICARE

## 2020-04-28 LAB
ALBUMIN SERPL-MCNC: 3.7 G/DL (ref 3.5–5.2)
ALP BLD-CCNC: 84 U/L (ref 35–104)
ALT SERPL-CCNC: 12 U/L (ref 0–32)
ANION GAP SERPL CALCULATED.3IONS-SCNC: 15 MMOL/L (ref 7–16)
AST SERPL-CCNC: 13 U/L (ref 0–31)
BASOPHILS ABSOLUTE: 0.05 E9/L (ref 0–0.2)
BASOPHILS RELATIVE PERCENT: 0.6 % (ref 0–2)
BILIRUB SERPL-MCNC: 0.4 MG/DL (ref 0–1.2)
BUN BLDV-MCNC: 12 MG/DL (ref 6–20)
CALCIUM SERPL-MCNC: 9.3 MG/DL (ref 8.6–10.2)
CHLORIDE BLD-SCNC: 103 MMOL/L (ref 98–107)
CO2: 24 MMOL/L (ref 22–29)
CREAT SERPL-MCNC: 1 MG/DL (ref 0.5–1)
EOSINOPHILS ABSOLUTE: 0.24 E9/L (ref 0.05–0.5)
EOSINOPHILS RELATIVE PERCENT: 2.8 % (ref 0–6)
FERRITIN: 81 NG/ML
GFR AFRICAN AMERICAN: >60
GFR NON-AFRICAN AMERICAN: >60 ML/MIN/1.73
GLUCOSE BLD-MCNC: 125 MG/DL (ref 74–99)
HCT VFR BLD CALC: 40.6 % (ref 34–48)
HEMOGLOBIN: 12.5 G/DL (ref 11.5–15.5)
IMMATURE GRANULOCYTES #: 0.05 E9/L
IMMATURE GRANULOCYTES %: 0.6 % (ref 0–5)
INFLUENZA A BY PCR: NOT DETECTED
INFLUENZA B BY PCR: NOT DETECTED
LACTATE DEHYDROGENASE: 202 U/L (ref 135–214)
LACTIC ACID: 2 MMOL/L (ref 0.5–2.2)
LYMPHOCYTES ABSOLUTE: 2.58 E9/L (ref 1.5–4)
LYMPHOCYTES RELATIVE PERCENT: 29.7 % (ref 20–42)
MAGNESIUM: 2.1 MG/DL (ref 1.6–2.6)
MCH RBC QN AUTO: 28.1 PG (ref 26–35)
MCHC RBC AUTO-ENTMCNC: 30.8 % (ref 32–34.5)
MCV RBC AUTO: 91.2 FL (ref 80–99.9)
MONOCYTES ABSOLUTE: 0.47 E9/L (ref 0.1–0.95)
MONOCYTES RELATIVE PERCENT: 5.4 % (ref 2–12)
NEUTROPHILS ABSOLUTE: 5.29 E9/L (ref 1.8–7.3)
NEUTROPHILS RELATIVE PERCENT: 60.9 % (ref 43–80)
PDW BLD-RTO: 16.6 FL (ref 11.5–15)
PLATELET # BLD: 387 E9/L (ref 130–450)
PMV BLD AUTO: 11.1 FL (ref 7–12)
POTASSIUM SERPL-SCNC: 3.7 MMOL/L (ref 3.5–5)
PRO-BNP: 1508 PG/ML (ref 0–125)
PROCALCITONIN: 0.06 NG/ML (ref 0–0.08)
RBC # BLD: 4.45 E12/L (ref 3.5–5.5)
SARS-COV-2, NAAT: NOT DETECTED
SODIUM BLD-SCNC: 142 MMOL/L (ref 132–146)
TOTAL PROTEIN: 7.6 G/DL (ref 6.4–8.3)
TROPONIN: 0.03 NG/ML (ref 0–0.03)
WBC # BLD: 8.7 E9/L (ref 4.5–11.5)

## 2020-04-28 PROCEDURE — 85025 COMPLETE CBC W/AUTO DIFF WBC: CPT

## 2020-04-28 PROCEDURE — 84484 ASSAY OF TROPONIN QUANT: CPT

## 2020-04-28 PROCEDURE — 87502 INFLUENZA DNA AMP PROBE: CPT

## 2020-04-28 PROCEDURE — 83605 ASSAY OF LACTIC ACID: CPT

## 2020-04-28 PROCEDURE — 84145 PROCALCITONIN (PCT): CPT

## 2020-04-28 PROCEDURE — 80053 COMPREHEN METABOLIC PANEL: CPT

## 2020-04-28 PROCEDURE — 6370000000 HC RX 637 (ALT 250 FOR IP): Performed by: NURSE PRACTITIONER

## 2020-04-28 PROCEDURE — 2580000003 HC RX 258: Performed by: NURSE PRACTITIONER

## 2020-04-28 PROCEDURE — 83880 ASSAY OF NATRIURETIC PEPTIDE: CPT

## 2020-04-28 PROCEDURE — 83735 ASSAY OF MAGNESIUM: CPT

## 2020-04-28 PROCEDURE — 83615 LACTATE (LD) (LDH) ENZYME: CPT

## 2020-04-28 PROCEDURE — 87040 BLOOD CULTURE FOR BACTERIA: CPT

## 2020-04-28 PROCEDURE — 94664 DEMO&/EVAL PT USE INHALER: CPT

## 2020-04-28 PROCEDURE — 71045 X-RAY EXAM CHEST 1 VIEW: CPT

## 2020-04-28 PROCEDURE — 93005 ELECTROCARDIOGRAM TRACING: CPT | Performed by: NURSE PRACTITIONER

## 2020-04-28 PROCEDURE — U0002 COVID-19 LAB TEST NON-CDC: HCPCS

## 2020-04-28 PROCEDURE — 94761 N-INVAS EAR/PLS OXIMETRY MLT: CPT

## 2020-04-28 PROCEDURE — 82728 ASSAY OF FERRITIN: CPT

## 2020-04-28 PROCEDURE — 99285 EMERGENCY DEPT VISIT HI MDM: CPT

## 2020-04-28 RX ORDER — HYDROCODONE BITARTRATE AND ACETAMINOPHEN 5; 325 MG/1; MG/1
1 TABLET ORAL ONCE
Status: COMPLETED | OUTPATIENT
Start: 2020-04-28 | End: 2020-04-29

## 2020-04-28 RX ORDER — METHYLPREDNISOLONE SODIUM SUCCINATE 125 MG/2ML
125 INJECTION, POWDER, LYOPHILIZED, FOR SOLUTION INTRAMUSCULAR; INTRAVENOUS ONCE
Status: COMPLETED | OUTPATIENT
Start: 2020-04-28 | End: 2020-04-29

## 2020-04-28 RX ORDER — GABAPENTIN 300 MG/1
600 CAPSULE ORAL ONCE
Status: COMPLETED | OUTPATIENT
Start: 2020-04-28 | End: 2020-04-29

## 2020-04-28 RX ORDER — 0.9 % SODIUM CHLORIDE 0.9 %
500 INTRAVENOUS SOLUTION INTRAVENOUS ONCE
Status: COMPLETED | OUTPATIENT
Start: 2020-04-28 | End: 2020-04-28

## 2020-04-28 RX ORDER — IPRATROPIUM BROMIDE AND ALBUTEROL SULFATE 2.5; .5 MG/3ML; MG/3ML
1 SOLUTION RESPIRATORY (INHALATION) ONCE
Status: COMPLETED | OUTPATIENT
Start: 2020-04-28 | End: 2020-04-28

## 2020-04-28 RX ORDER — IPRATROPIUM BROMIDE AND ALBUTEROL SULFATE 2.5; .5 MG/3ML; MG/3ML
1 SOLUTION RESPIRATORY (INHALATION) EVERY 4 HOURS PRN
Qty: 24 VIAL | Refills: 1 | Status: ON HOLD
Start: 2020-04-28 | End: 2020-05-01 | Stop reason: SDUPTHER

## 2020-04-28 RX ORDER — FUROSEMIDE 10 MG/ML
40 INJECTION INTRAMUSCULAR; INTRAVENOUS ONCE
Status: COMPLETED | OUTPATIENT
Start: 2020-04-28 | End: 2020-04-29

## 2020-04-28 RX ADMIN — IPRATROPIUM BROMIDE AND ALBUTEROL SULFATE 1 AMPULE: .5; 3 SOLUTION RESPIRATORY (INHALATION) at 23:11

## 2020-04-28 RX ADMIN — SODIUM CHLORIDE 500 ML: 9 INJECTION, SOLUTION INTRAVENOUS at 21:39

## 2020-04-29 ENCOUNTER — APPOINTMENT (OUTPATIENT)
Dept: GENERAL RADIOLOGY | Age: 45
DRG: 192 | End: 2020-04-29
Payer: MEDICARE

## 2020-04-29 ENCOUNTER — APPOINTMENT (OUTPATIENT)
Dept: CT IMAGING | Age: 45
DRG: 192 | End: 2020-04-29
Payer: MEDICARE

## 2020-04-29 PROBLEM — I50.31 ACUTE DIASTOLIC CHF (CONGESTIVE HEART FAILURE) (HCC): Status: ACTIVE | Noted: 2020-04-29

## 2020-04-29 PROBLEM — E66.01 MORBID OBESITY (HCC): Status: ACTIVE | Noted: 2017-03-27

## 2020-04-29 PROBLEM — J18.9 COMMUNITY ACQUIRED PNEUMONIA, BILATERAL: Status: ACTIVE | Noted: 2020-04-29

## 2020-04-29 PROBLEM — L03.311 CELLULITIS OF ABDOMINAL WALL: Status: ACTIVE | Noted: 2020-04-29

## 2020-04-29 LAB
ALBUMIN SERPL-MCNC: 4 G/DL (ref 3.5–5.2)
ALP BLD-CCNC: 88 U/L (ref 35–104)
ALT SERPL-CCNC: 12 U/L (ref 0–32)
ANION GAP SERPL CALCULATED.3IONS-SCNC: 14 MMOL/L (ref 7–16)
AST SERPL-CCNC: 14 U/L (ref 0–31)
BASOPHILS ABSOLUTE: 0.03 E9/L (ref 0–0.2)
BASOPHILS RELATIVE PERCENT: 0.3 % (ref 0–2)
BILIRUB SERPL-MCNC: 0.5 MG/DL (ref 0–1.2)
BILIRUBIN DIRECT: <0.2 MG/DL (ref 0–0.3)
BILIRUBIN URINE: NEGATIVE
BILIRUBIN, INDIRECT: NORMAL MG/DL (ref 0–1)
BLOOD, URINE: NEGATIVE
BUN BLDV-MCNC: 12 MG/DL (ref 6–20)
CALCIUM SERPL-MCNC: 9.1 MG/DL (ref 8.6–10.2)
CHLORIDE BLD-SCNC: 102 MMOL/L (ref 98–107)
CLARITY: CLEAR
CO2: 25 MMOL/L (ref 22–29)
COLOR: YELLOW
CREAT SERPL-MCNC: 0.9 MG/DL (ref 0.5–1)
EKG ATRIAL RATE: 112 BPM
EKG P AXIS: 74 DEGREES
EKG P-R INTERVAL: 146 MS
EKG Q-T INTERVAL: 470 MS
EKG QRS DURATION: 164 MS
EKG QTC CALCULATION (BAZETT): 641 MS
EKG R AXIS: -55 DEGREES
EKG T AXIS: 69 DEGREES
EKG VENTRICULAR RATE: 112 BPM
EOSINOPHILS ABSOLUTE: 0.01 E9/L (ref 0.05–0.5)
EOSINOPHILS RELATIVE PERCENT: 0.1 % (ref 0–6)
GFR AFRICAN AMERICAN: >60
GFR NON-AFRICAN AMERICAN: >60 ML/MIN/1.73
GLUCOSE BLD-MCNC: 186 MG/DL (ref 74–99)
GLUCOSE URINE: NEGATIVE MG/DL
HBA1C MFR BLD: 6 % (ref 4–5.6)
HCG, URINE, POC: NEGATIVE
HCT VFR BLD CALC: 41.7 % (ref 34–48)
HEMOGLOBIN: 12.5 G/DL (ref 11.5–15.5)
IMMATURE GRANULOCYTES #: 0.08 E9/L
IMMATURE GRANULOCYTES %: 0.7 % (ref 0–5)
KETONES, URINE: NEGATIVE MG/DL
LACTIC ACID: 2 MMOL/L (ref 0.5–2.2)
LEUKOCYTE ESTERASE, URINE: NEGATIVE
LYMPHOCYTES ABSOLUTE: 0.87 E9/L (ref 1.5–4)
LYMPHOCYTES RELATIVE PERCENT: 7.7 % (ref 20–42)
Lab: NORMAL
MCH RBC QN AUTO: 27.6 PG (ref 26–35)
MCHC RBC AUTO-ENTMCNC: 30 % (ref 32–34.5)
MCV RBC AUTO: 92.1 FL (ref 80–99.9)
METER GLUCOSE: 167 MG/DL (ref 74–99)
METER GLUCOSE: 177 MG/DL (ref 74–99)
METER GLUCOSE: 205 MG/DL (ref 74–99)
METER GLUCOSE: 276 MG/DL (ref 74–99)
MONOCYTES ABSOLUTE: 0.1 E9/L (ref 0.1–0.95)
MONOCYTES RELATIVE PERCENT: 0.9 % (ref 2–12)
NEGATIVE QC PASS/FAIL: NORMAL
NEUTROPHILS ABSOLUTE: 10.27 E9/L (ref 1.8–7.3)
NEUTROPHILS RELATIVE PERCENT: 90.3 % (ref 43–80)
NITRITE, URINE: NEGATIVE
PDW BLD-RTO: 16.4 FL (ref 11.5–15)
PH UA: 6 (ref 5–9)
PLATELET # BLD: 332 E9/L (ref 130–450)
PMV BLD AUTO: 10.9 FL (ref 7–12)
POSITIVE QC PASS/FAIL: NORMAL
POTASSIUM REFLEX MAGNESIUM: 4.5 MMOL/L (ref 3.5–5)
PROTEIN UA: NEGATIVE MG/DL
RBC # BLD: 4.53 E12/L (ref 3.5–5.5)
SARS-COV-2, NAAT: NOT DETECTED
SODIUM BLD-SCNC: 141 MMOL/L (ref 132–146)
SPECIFIC GRAVITY UA: 1.02 (ref 1–1.03)
TOTAL PROTEIN: 8.1 G/DL (ref 6.4–8.3)
TROPONIN: 0.02 NG/ML (ref 0–0.03)
TROPONIN: 0.03 NG/ML (ref 0–0.03)
UROBILINOGEN, URINE: 0.2 E.U./DL
WBC # BLD: 11.4 E9/L (ref 4.5–11.5)

## 2020-04-29 PROCEDURE — 80048 BASIC METABOLIC PNL TOTAL CA: CPT

## 2020-04-29 PROCEDURE — 96372 THER/PROPH/DIAG INJ SC/IM: CPT

## 2020-04-29 PROCEDURE — 74177 CT ABD & PELVIS W/CONTRAST: CPT

## 2020-04-29 PROCEDURE — 6360000002 HC RX W HCPCS: Performed by: NURSE PRACTITIONER

## 2020-04-29 PROCEDURE — 6370000000 HC RX 637 (ALT 250 FOR IP): Performed by: NURSE PRACTITIONER

## 2020-04-29 PROCEDURE — 71045 X-RAY EXAM CHEST 1 VIEW: CPT

## 2020-04-29 PROCEDURE — 96375 TX/PRO/DX INJ NEW DRUG ADDON: CPT

## 2020-04-29 PROCEDURE — 84484 ASSAY OF TROPONIN QUANT: CPT

## 2020-04-29 PROCEDURE — G0378 HOSPITAL OBSERVATION PER HR: HCPCS | Performed by: INTERNAL MEDICINE

## 2020-04-29 PROCEDURE — 2580000003 HC RX 258: Performed by: NURSE PRACTITIONER

## 2020-04-29 PROCEDURE — 85025 COMPLETE CBC W/AUTO DIFF WBC: CPT

## 2020-04-29 PROCEDURE — 82962 GLUCOSE BLOOD TEST: CPT

## 2020-04-29 PROCEDURE — G0378 HOSPITAL OBSERVATION PER HR: HCPCS

## 2020-04-29 PROCEDURE — 36415 COLL VENOUS BLD VENIPUNCTURE: CPT

## 2020-04-29 PROCEDURE — U0002 COVID-19 LAB TEST NON-CDC: HCPCS

## 2020-04-29 PROCEDURE — 2500000003 HC RX 250 WO HCPCS: Performed by: NURSE PRACTITIONER

## 2020-04-29 PROCEDURE — 96365 THER/PROPH/DIAG IV INF INIT: CPT

## 2020-04-29 PROCEDURE — 83036 HEMOGLOBIN GLYCOSYLATED A1C: CPT

## 2020-04-29 PROCEDURE — 93005 ELECTROCARDIOGRAM TRACING: CPT | Performed by: NURSE PRACTITIONER

## 2020-04-29 PROCEDURE — 6360000002 HC RX W HCPCS: Performed by: INTERNAL MEDICINE

## 2020-04-29 PROCEDURE — 81003 URINALYSIS AUTO W/O SCOPE: CPT

## 2020-04-29 PROCEDURE — 96366 THER/PROPH/DIAG IV INF ADDON: CPT

## 2020-04-29 PROCEDURE — 96376 TX/PRO/DX INJ SAME DRUG ADON: CPT

## 2020-04-29 PROCEDURE — 94640 AIRWAY INHALATION TREATMENT: CPT

## 2020-04-29 PROCEDURE — 96374 THER/PROPH/DIAG INJ IV PUSH: CPT

## 2020-04-29 PROCEDURE — 94660 CPAP INITIATION&MGMT: CPT

## 2020-04-29 PROCEDURE — 83605 ASSAY OF LACTIC ACID: CPT

## 2020-04-29 PROCEDURE — 2700000000 HC OXYGEN THERAPY PER DAY

## 2020-04-29 PROCEDURE — 6360000004 HC RX CONTRAST MEDICATION: Performed by: RADIOLOGY

## 2020-04-29 PROCEDURE — 80076 HEPATIC FUNCTION PANEL: CPT

## 2020-04-29 PROCEDURE — 71275 CT ANGIOGRAPHY CHEST: CPT

## 2020-04-29 RX ORDER — SODIUM CHLORIDE 0.9 % (FLUSH) 0.9 %
10 SYRINGE (ML) INJECTION EVERY 12 HOURS SCHEDULED
Status: DISCONTINUED | OUTPATIENT
Start: 2020-04-29 | End: 2020-05-03 | Stop reason: SDUPTHER

## 2020-04-29 RX ORDER — LISINOPRIL AND HYDROCHLOROTHIAZIDE 12.5; 1 MG/1; MG/1
1 TABLET ORAL DAILY
Status: DISCONTINUED | OUTPATIENT
Start: 2020-04-29 | End: 2020-04-29 | Stop reason: CLARIF

## 2020-04-29 RX ORDER — HYDROCORTISONE 25 MG/G
CREAM TOPICAL 2 TIMES DAILY
Status: DISCONTINUED | OUTPATIENT
Start: 2020-04-29 | End: 2020-05-05 | Stop reason: HOSPADM

## 2020-04-29 RX ORDER — GABAPENTIN 300 MG/1
600 CAPSULE ORAL 3 TIMES DAILY
Status: DISCONTINUED | OUTPATIENT
Start: 2020-04-29 | End: 2020-05-05 | Stop reason: HOSPADM

## 2020-04-29 RX ORDER — POTASSIUM CHLORIDE 20 MEQ/1
20 TABLET, EXTENDED RELEASE ORAL
Status: DISCONTINUED | OUTPATIENT
Start: 2020-04-29 | End: 2020-05-05 | Stop reason: HOSPADM

## 2020-04-29 RX ORDER — SODIUM CHLORIDE 0.9 % (FLUSH) 0.9 %
10 SYRINGE (ML) INJECTION PRN
Status: DISCONTINUED | OUTPATIENT
Start: 2020-04-29 | End: 2020-04-29 | Stop reason: SDUPTHER

## 2020-04-29 RX ORDER — POLYETHYLENE GLYCOL 3350 17 G/17G
17 POWDER, FOR SOLUTION ORAL DAILY PRN
Status: DISCONTINUED | OUTPATIENT
Start: 2020-04-29 | End: 2020-05-05 | Stop reason: HOSPADM

## 2020-04-29 RX ORDER — METHYLPREDNISOLONE SODIUM SUCCINATE 40 MG/ML
40 INJECTION, POWDER, LYOPHILIZED, FOR SOLUTION INTRAMUSCULAR; INTRAVENOUS DAILY
Status: DISCONTINUED | OUTPATIENT
Start: 2020-04-29 | End: 2020-05-01

## 2020-04-29 RX ORDER — ACETAMINOPHEN 325 MG/1
650 TABLET ORAL EVERY 6 HOURS PRN
Status: DISCONTINUED | OUTPATIENT
Start: 2020-04-29 | End: 2020-05-05 | Stop reason: HOSPADM

## 2020-04-29 RX ORDER — FUROSEMIDE 10 MG/ML
40 INJECTION INTRAMUSCULAR; INTRAVENOUS DAILY
Status: COMPLETED | OUTPATIENT
Start: 2020-04-29 | End: 2020-04-29

## 2020-04-29 RX ORDER — M-VIT,TX,IRON,MINS/CALC/FOLIC 27MG-0.4MG
1 TABLET ORAL DAILY
Status: DISCONTINUED | OUTPATIENT
Start: 2020-04-29 | End: 2020-05-05 | Stop reason: HOSPADM

## 2020-04-29 RX ORDER — ONDANSETRON 2 MG/ML
4 INJECTION INTRAMUSCULAR; INTRAVENOUS EVERY 6 HOURS PRN
Status: DISCONTINUED | OUTPATIENT
Start: 2020-04-29 | End: 2020-04-29

## 2020-04-29 RX ORDER — AMLODIPINE BESYLATE 10 MG/1
10 TABLET ORAL DAILY
Status: DISCONTINUED | OUTPATIENT
Start: 2020-04-29 | End: 2020-05-05

## 2020-04-29 RX ORDER — PROMETHAZINE HYDROCHLORIDE 25 MG/1
12.5 TABLET ORAL EVERY 6 HOURS PRN
Status: DISCONTINUED | OUTPATIENT
Start: 2020-04-29 | End: 2020-04-29

## 2020-04-29 RX ORDER — FENTANYL CITRATE 50 UG/ML
50 INJECTION, SOLUTION INTRAMUSCULAR; INTRAVENOUS ONCE
Status: COMPLETED | OUTPATIENT
Start: 2020-04-29 | End: 2020-04-29

## 2020-04-29 RX ORDER — GABAPENTIN 600 MG/1
600 TABLET ORAL 3 TIMES DAILY
Status: DISCONTINUED | OUTPATIENT
Start: 2020-04-29 | End: 2020-04-29 | Stop reason: CLARIF

## 2020-04-29 RX ORDER — SODIUM CHLORIDE 0.9 % (FLUSH) 0.9 %
10 SYRINGE (ML) INJECTION PRN
Status: DISCONTINUED | OUTPATIENT
Start: 2020-04-29 | End: 2020-05-03 | Stop reason: SDUPTHER

## 2020-04-29 RX ORDER — HYDROCHLOROTHIAZIDE 12.5 MG/1
12.5 TABLET ORAL DAILY
Status: DISCONTINUED | OUTPATIENT
Start: 2020-04-29 | End: 2020-05-01

## 2020-04-29 RX ORDER — IPRATROPIUM BROMIDE AND ALBUTEROL SULFATE 2.5; .5 MG/3ML; MG/3ML
1 SOLUTION RESPIRATORY (INHALATION)
Status: DISCONTINUED | OUTPATIENT
Start: 2020-04-29 | End: 2020-05-05 | Stop reason: HOSPADM

## 2020-04-29 RX ORDER — ONDANSETRON 2 MG/ML
4 INJECTION INTRAMUSCULAR; INTRAVENOUS ONCE
Status: COMPLETED | OUTPATIENT
Start: 2020-04-29 | End: 2020-04-29

## 2020-04-29 RX ORDER — NICOTINE POLACRILEX 4 MG
15 LOZENGE BUCCAL PRN
Status: DISCONTINUED | OUTPATIENT
Start: 2020-04-29 | End: 2020-05-05 | Stop reason: HOSPADM

## 2020-04-29 RX ORDER — DEXTROSE MONOHYDRATE 25 G/50ML
12.5 INJECTION, SOLUTION INTRAVENOUS PRN
Status: DISCONTINUED | OUTPATIENT
Start: 2020-04-29 | End: 2020-05-05 | Stop reason: HOSPADM

## 2020-04-29 RX ORDER — IBUPROFEN 200 MG
600 TABLET ORAL 4 TIMES DAILY PRN
Status: DISCONTINUED | OUTPATIENT
Start: 2020-04-29 | End: 2020-05-01

## 2020-04-29 RX ORDER — LANOLIN ALCOHOL/MO/W.PET/CERES
3 CREAM (GRAM) TOPICAL NIGHTLY
Status: DISCONTINUED | OUTPATIENT
Start: 2020-04-29 | End: 2020-05-05 | Stop reason: HOSPADM

## 2020-04-29 RX ORDER — CETIRIZINE HYDROCHLORIDE 10 MG/1
10 TABLET ORAL DAILY
Status: DISCONTINUED | OUTPATIENT
Start: 2020-04-29 | End: 2020-05-05 | Stop reason: HOSPADM

## 2020-04-29 RX ORDER — HYDRALAZINE HYDROCHLORIDE 25 MG/1
25 TABLET, FILM COATED ORAL EVERY 8 HOURS SCHEDULED
Status: DISCONTINUED | OUTPATIENT
Start: 2020-04-29 | End: 2020-05-02

## 2020-04-29 RX ORDER — FUROSEMIDE 10 MG/ML
60 INJECTION INTRAMUSCULAR; INTRAVENOUS 2 TIMES DAILY
Status: COMPLETED | OUTPATIENT
Start: 2020-04-29 | End: 2020-04-29

## 2020-04-29 RX ORDER — DEXTROSE MONOHYDRATE 50 MG/ML
100 INJECTION, SOLUTION INTRAVENOUS PRN
Status: DISCONTINUED | OUTPATIENT
Start: 2020-04-29 | End: 2020-05-05 | Stop reason: HOSPADM

## 2020-04-29 RX ORDER — DICYCLOMINE HYDROCHLORIDE 10 MG/1
20 CAPSULE ORAL EVERY 8 HOURS PRN
Status: DISCONTINUED | OUTPATIENT
Start: 2020-04-29 | End: 2020-05-05 | Stop reason: HOSPADM

## 2020-04-29 RX ORDER — ACETAMINOPHEN 650 MG/1
650 SUPPOSITORY RECTAL EVERY 6 HOURS PRN
Status: DISCONTINUED | OUTPATIENT
Start: 2020-04-29 | End: 2020-05-05 | Stop reason: HOSPADM

## 2020-04-29 RX ORDER — ACETAMINOPHEN 325 MG/1
650 TABLET ORAL EVERY 4 HOURS PRN
Status: DISCONTINUED | OUTPATIENT
Start: 2020-04-29 | End: 2020-04-29 | Stop reason: SDUPTHER

## 2020-04-29 RX ORDER — HYDROCODONE BITARTRATE AND ACETAMINOPHEN 5; 325 MG/1; MG/1
1 TABLET ORAL EVERY 8 HOURS PRN
Status: DISCONTINUED | OUTPATIENT
Start: 2020-04-29 | End: 2020-05-05 | Stop reason: HOSPADM

## 2020-04-29 RX ORDER — AMMONIUM LACTATE 12 G/100G
LOTION TOPICAL PRN
Status: DISCONTINUED | OUTPATIENT
Start: 2020-04-29 | End: 2020-05-05 | Stop reason: HOSPADM

## 2020-04-29 RX ORDER — SODIUM CHLORIDE 0.9 % (FLUSH) 0.9 %
10 SYRINGE (ML) INJECTION EVERY 12 HOURS SCHEDULED
Status: DISCONTINUED | OUTPATIENT
Start: 2020-04-29 | End: 2020-04-29 | Stop reason: SDUPTHER

## 2020-04-29 RX ORDER — FLUTICASONE PROPIONATE 50 MCG
1 SPRAY, SUSPENSION (ML) NASAL DAILY
Status: DISCONTINUED | OUTPATIENT
Start: 2020-04-29 | End: 2020-05-05 | Stop reason: HOSPADM

## 2020-04-29 RX ORDER — BUDESONIDE 0.5 MG/2ML
0.25 INHALANT ORAL 2 TIMES DAILY
Status: DISCONTINUED | OUTPATIENT
Start: 2020-04-29 | End: 2020-05-05 | Stop reason: HOSPADM

## 2020-04-29 RX ORDER — PANTOPRAZOLE SODIUM 40 MG/1
40 TABLET, DELAYED RELEASE ORAL
Status: DISCONTINUED | OUTPATIENT
Start: 2020-04-29 | End: 2020-05-05 | Stop reason: HOSPADM

## 2020-04-29 RX ORDER — LISINOPRIL 10 MG/1
10 TABLET ORAL DAILY
Status: DISCONTINUED | OUTPATIENT
Start: 2020-04-29 | End: 2020-05-01

## 2020-04-29 RX ORDER — ARFORMOTEROL TARTRATE 15 UG/2ML
15 SOLUTION RESPIRATORY (INHALATION) 2 TIMES DAILY
Status: DISCONTINUED | OUTPATIENT
Start: 2020-04-29 | End: 2020-05-05 | Stop reason: HOSPADM

## 2020-04-29 RX ADMIN — DOXYCYCLINE 100 MG: 100 INJECTION, POWDER, LYOPHILIZED, FOR SOLUTION INTRAVENOUS at 16:11

## 2020-04-29 RX ADMIN — MULTIPLE VITAMINS W/ MINERALS TAB 1 TABLET: TAB at 14:37

## 2020-04-29 RX ADMIN — ACETAMINOPHEN 650 MG: 325 TABLET, FILM COATED ORAL at 15:17

## 2020-04-29 RX ADMIN — INSULIN LISPRO 1 UNITS: 100 INJECTION, SOLUTION INTRAVENOUS; SUBCUTANEOUS at 13:00

## 2020-04-29 RX ADMIN — HYDRALAZINE HYDROCHLORIDE 25 MG: 25 TABLET, FILM COATED ORAL at 21:47

## 2020-04-29 RX ADMIN — ONDANSETRON 4 MG: 2 INJECTION INTRAMUSCULAR; INTRAVENOUS at 01:51

## 2020-04-29 RX ADMIN — CEFTRIAXONE SODIUM 1 G: 1 INJECTION, POWDER, FOR SOLUTION INTRAMUSCULAR; INTRAVENOUS at 02:49

## 2020-04-29 RX ADMIN — GABAPENTIN 600 MG: 300 CAPSULE ORAL at 00:03

## 2020-04-29 RX ADMIN — FENTANYL CITRATE 50 MCG: 50 INJECTION INTRAMUSCULAR; INTRAVENOUS at 01:51

## 2020-04-29 RX ADMIN — METHYLPREDNISOLONE SODIUM SUCCINATE 125 MG: 125 INJECTION, POWDER, FOR SOLUTION INTRAMUSCULAR; INTRAVENOUS at 00:03

## 2020-04-29 RX ADMIN — INSULIN LISPRO 1 UNITS: 100 INJECTION, SOLUTION INTRAVENOUS; SUBCUTANEOUS at 21:49

## 2020-04-29 RX ADMIN — BUDESONIDE 250 MCG: 0.5 SUSPENSION RESPIRATORY (INHALATION) at 20:26

## 2020-04-29 RX ADMIN — METHYLPREDNISOLONE SODIUM SUCCINATE 40 MG: 40 INJECTION, POWDER, FOR SOLUTION INTRAMUSCULAR; INTRAVENOUS at 11:07

## 2020-04-29 RX ADMIN — HYDROCHLOROTHIAZIDE 12.5 MG: 12.5 TABLET ORAL at 14:38

## 2020-04-29 RX ADMIN — HYDRALAZINE HYDROCHLORIDE 25 MG: 25 TABLET, FILM COATED ORAL at 15:16

## 2020-04-29 RX ADMIN — HYDROCODONE BITARTRATE AND ACETAMINOPHEN 1 TABLET: 5; 325 TABLET ORAL at 18:35

## 2020-04-29 RX ADMIN — GABAPENTIN 600 MG: 300 CAPSULE ORAL at 21:47

## 2020-04-29 RX ADMIN — GABAPENTIN 600 MG: 300 CAPSULE ORAL at 11:10

## 2020-04-29 RX ADMIN — IBUPROFEN 600 MG: 200 TABLET, FILM COATED ORAL at 21:52

## 2020-04-29 RX ADMIN — IPRATROPIUM BROMIDE AND ALBUTEROL SULFATE 1 AMPULE: 2.5; .5 SOLUTION RESPIRATORY (INHALATION) at 08:12

## 2020-04-29 RX ADMIN — AMLODIPINE BESYLATE 10 MG: 10 TABLET ORAL at 13:23

## 2020-04-29 RX ADMIN — BUDESONIDE 250 MCG: 0.5 SUSPENSION RESPIRATORY (INHALATION) at 08:12

## 2020-04-29 RX ADMIN — LISINOPRIL 10 MG: 10 TABLET ORAL at 14:37

## 2020-04-29 RX ADMIN — FUROSEMIDE 40 MG: 10 INJECTION, SOLUTION INTRAMUSCULAR; INTRAVENOUS at 11:07

## 2020-04-29 RX ADMIN — CETIRIZINE HYDROCHLORIDE 10 MG: 10 TABLET, FILM COATED ORAL at 13:23

## 2020-04-29 RX ADMIN — FUROSEMIDE 40 MG: 10 INJECTION, SOLUTION INTRAMUSCULAR; INTRAVENOUS at 00:03

## 2020-04-29 RX ADMIN — ENOXAPARIN SODIUM 40 MG: 100 INJECTION SUBCUTANEOUS at 11:12

## 2020-04-29 RX ADMIN — HYDROCODONE BITARTRATE AND ACETAMINOPHEN 1 TABLET: 5; 325 TABLET ORAL at 10:10

## 2020-04-29 RX ADMIN — HYDROCODONE BITARTRATE AND ACETAMINOPHEN 1 TABLET: 5; 325 TABLET ORAL at 00:03

## 2020-04-29 RX ADMIN — POLYETHYLENE GLYCOL 3350 17 G: 17 POWDER, FOR SOLUTION ORAL at 11:17

## 2020-04-29 RX ADMIN — IPRATROPIUM BROMIDE AND ALBUTEROL SULFATE 1 AMPULE: 2.5; .5 SOLUTION RESPIRATORY (INHALATION) at 20:24

## 2020-04-29 RX ADMIN — HYDRALAZINE HYDROCHLORIDE 25 MG: 25 TABLET, FILM COATED ORAL at 05:52

## 2020-04-29 RX ADMIN — PANTOPRAZOLE SODIUM 40 MG: 40 TABLET, DELAYED RELEASE ORAL at 05:52

## 2020-04-29 RX ADMIN — IOPAMIDOL 110 ML: 755 INJECTION, SOLUTION INTRAVENOUS at 00:29

## 2020-04-29 RX ADMIN — IPRATROPIUM BROMIDE AND ALBUTEROL SULFATE 1 AMPULE: 2.5; .5 SOLUTION RESPIRATORY (INHALATION) at 15:57

## 2020-04-29 RX ADMIN — ARFORMOTEROL TARTRATE 15 MCG: 15 SOLUTION RESPIRATORY (INHALATION) at 08:12

## 2020-04-29 RX ADMIN — Medication 10 ML: at 22:03

## 2020-04-29 RX ADMIN — FLUTICASONE PROPIONATE 1 SPRAY: 50 SPRAY, METERED NASAL at 11:13

## 2020-04-29 RX ADMIN — INSULIN LISPRO 3 UNITS: 100 INJECTION, SOLUTION INTRAVENOUS; SUBCUTANEOUS at 16:11

## 2020-04-29 RX ADMIN — POTASSIUM CHLORIDE 20 MEQ: 20 TABLET, EXTENDED RELEASE ORAL at 10:12

## 2020-04-29 RX ADMIN — FUROSEMIDE 60 MG: 10 INJECTION, SOLUTION INTRAMUSCULAR; INTRAVENOUS at 15:15

## 2020-04-29 RX ADMIN — MELATONIN 3 MG ORAL TABLET 3 MG: 3 TABLET ORAL at 21:46

## 2020-04-29 RX ADMIN — ARFORMOTEROL TARTRATE 15 MCG: 15 SOLUTION RESPIRATORY (INHALATION) at 20:24

## 2020-04-29 RX ADMIN — IPRATROPIUM BROMIDE AND ALBUTEROL SULFATE 1 AMPULE: 2.5; .5 SOLUTION RESPIRATORY (INHALATION) at 13:14

## 2020-04-29 RX ADMIN — NITROGLYCERIN 1 INCH: 20 OINTMENT TOPICAL at 02:49

## 2020-04-29 RX ADMIN — HYDROCORTISONE: 25 CREAM TOPICAL at 21:46

## 2020-04-29 RX ADMIN — DOXYCYCLINE 100 MG: 100 INJECTION, POWDER, LYOPHILIZED, FOR SOLUTION INTRAVENOUS at 04:04

## 2020-04-29 ASSESSMENT — PAIN SCALES - GENERAL
PAINLEVEL_OUTOF10: 8
PAINLEVEL_OUTOF10: 10
PAINLEVEL_OUTOF10: 10
PAINLEVEL_OUTOF10: 6
PAINLEVEL_OUTOF10: 6
PAINLEVEL_OUTOF10: 10
PAINLEVEL_OUTOF10: 6
PAINLEVEL_OUTOF10: 10

## 2020-04-29 ASSESSMENT — PAIN DESCRIPTION - PAIN TYPE
TYPE: CHRONIC PAIN

## 2020-04-29 ASSESSMENT — PAIN DESCRIPTION - PROGRESSION: CLINICAL_PROGRESSION: GRADUALLY IMPROVING

## 2020-04-29 ASSESSMENT — PAIN DESCRIPTION - LOCATION
LOCATION: BACK;HIP;FOOT
LOCATION: BACK;FOOT;HIP

## 2020-04-29 NOTE — ED PROVIDER NOTES
([facility] Neuropathy, Pancreas cyst, and 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[facility] Past Surgical History:  has a past surgical history that includes Cystocopy (Left, 01/14/2018); Cholecystectomy (2009); Lithotripsy (Right, 02/15/2018); Upper gastrointestinal endoscopy (N/A, 6/22/2018); and Dilation and curettage of uterus. Social History:  reports that she has been smoking cigarettes. She started smoking about 19 years ago. She has a 17.00 pack-year smoking history. She has never used smokeless tobacco. She reports that she does not drink alcohol or use drugs. Family History: family history includes Arthritis in her mother; Asthma in her father and mother; Cancer in her father; Heart Attack in her father; Hypertension in her father and mother. The patients home medications have been reviewed. Allergies: Food; Fish-derived products; Nuts [peanut-containing drug products]; Pcn [penicillins];  Ultram [tramadol hcl]; and Cashews [macadamia nut oil]    -------------------------------------------------- RESULTS -------------------------------------------------  All laboratory and radiology results have been personally reviewed by myself   LABS:  Results for orders placed or performed during the hospital encounter of 04/28/20   Rapid influenza A/B antigens   Result Value Ref Range    Influenza A by PCR Not Detected Not Detected    Influenza B by PCR Not Detected Not Detected   Troponin   Result Value Ref Range    Troponin 0.03 0.00 - 0.03 ng/mL   CBC Auto Differential   Result Value Ref Range    WBC 8.7 4.5 - 11.5 E9/L    RBC 4.45 3.50 - 5.50 E12/L    Hemoglobin 12.5 11.5 - 15.5 g/dL    Hematocrit 40.6 34.0 - 48.0 %    MCV 91.2 80.0 - 99.9 fL    MCH 28.1 26.0 - 35.0 pg    MCHC 30.8 (L) 32.0 - 34.5 %    RDW 16.6 (H) 11.5 - 15.0 fL    Platelets 199 615 - 833 E9/L    MPV 11.1 7.0 - 12.0 fL    Neutrophils % 60.9 43.0 - 80.0 %    Immature Granulocytes % 0.6 0.0 - 5.0 %    Lymphocytes % 29.7 20.0 - 42.0 %    Monocytes % 5.4 2.0 - Pregnancy Urine   Result Value Ref Range    HCG, Urine, POC Negative Negative    Lot Number [id number]     Positive QC Pass/Fail Pass     Negative QC Pass/Fail Pass    EKG 12 Lead   Result Value Ref Range    Ventricular Rate 112 BPM    Atrial Rate 112 BPM    P-R Interval 146 ms    QRS Duration 164 ms    Q-T Interval 470 ms    QTc Calculation (Bazett) 641 ms    P Axis 74 degrees    R Axis -55 degrees    T Axis 69 degrees       RADIOLOGY:  Interpreted by Radiologist.  CTA CHEST W CONTRAST   Final Result   Markedly limited study. Patchy bilateral infiltrates with underlying pleural effusions, greatest at the    lung bases. This report has been electronically signed by Leoncio Gilbert MD.      [facility] Additional Contrast? None   Final Result   Addendum 1 of 1   A prior study dated 02/3/2018 was made available for comparison. This addendum has been electronically signed by Leoncio Gilbert MD.      Final      XR CHEST PORTABLE   Final Result   Congestive heart failure.                ------------------------- NURSING NOTES AND VITALS REVIEWED ---------------------------   The nursing notes within the ED encounter and vital signs as below have been reviewed. BP (!) 205/125   Pulse 113   Temp 96.1 °F (35.6 °C) (Tympanic)   Resp 21   Ht 5' 8\" (1.727 m)   Wt (!) 385 lb (174.6 kg)   SpO2 100%   BMI 58.54 kg/m²   Oxygen Saturation Interpretation: Normal      ---------------------------------------------------PHYSICAL EXAM--------------------------------------      Constitutional/General: Alert and oriented x3, well appearing, non toxic in NAD  Head: Normocephalic and atraumatic  Eyes: PERRL, EOMI  Mouth: Oropharynx clear, handling secretions, no trismus  Neck: Supple, full ROM,   Pulmonary: Lungs with scattered expiratory wheezing noted posteriorly with rales to right lung fields and bases bilaterally. Cardiovascular:  Regular rate and rhythm, no murmurs, gallops, or rubs.  2+ distal pulses  Abdomen: Soft, tender, non distended, tissue with firmness around umbilical region but it is not erythematous, no drainage noted  Extremities: Moves all extremities x 4. Warm and well perfused, 1+ generalized swelling bilaterally. 2+ pulses. Skin: warm and dry without rash  Neurologic: GCS 15, cranial nerves II through XII grossly intact. No acute neurovascular deficit noted. Speech clear and coherent strength strong and equal bilaterally  Psych: Normal Affect      ------------------------------ ED COURSE/MEDICAL DECISION MAKING----------------------  Medications   cefTRIAXone (ROCEPHIN) 1 g in sterile water 10 mL IV syringe (has no administration in time range)   doxycycline (VIBRAMYCIN) 100 mg in dextrose 5 % 100 mL IVPB (has no administration in time range)   nitroglycerin (NITRO-BID) 2 % ointment 1 inch (has no administration in time range)   0.9 % sodium chloride bolus (0 mLs Intravenous Stopped 4/28/20 2224)   ipratropium-albuterol (DUONEB) nebulizer solution 1 ampule (1 ampule Inhalation Given 4/28/20 2311)   methylPREDNISolone sodium (SOLU-MEDROL) injection 125 mg (125 mg Intravenous Given 4/29/20 0003)   HYDROcodone-acetaminophen (NORCO) 5-325 MG per tablet 1 tablet (1 tablet Oral Given 4/29/20 0003)   gabapentin (NEURONTIN) capsule 600 mg (600 mg Oral Given 4/29/20 0003)   furosemide (LASIX) injection 40 mg (40 mg Intravenous Given 4/29/20 0003)   iopamidol (ISOVUE-370) 76 % injection 110 mL (110 mLs Intravenous Given 4/29/20 0029)   fentaNYL (SUBLIMAZE) injection 50 mcg (50 mcg Intravenous Given 4/29/20 0151)   ondansetron (ZOFRAN) injection 4 mg (4 mg Intravenous Given 4/29/20 0151)         ED COURSE:       Medical Decision Making: Plan be for labs will also obtain imaging will rule out infectious etiology viral versus bacterial as well as an exacerbation of asthma.   Lactic acid level negative, respiratory influenza negative,  COVID negative, troponin negative at 0.03, CBC to telemetry. Counseling: The emergency provider has spoken with the patient and discussed todays results, in addition to providing specific details for the plan of care and counseling regarding the diagnosis and prognosis. Questions are answered at this time and they are agreeable with the plan.      --------------------------------- IMPRESSION AND DISPOSITION ---------------------------------    IMPRESSION  1. Congestive heart failure, unspecified HF chronicity, unspecified heart failure type ([address]    2. Moderate asthma with exacerbation, unspecified whether persistent    3. Acute respiratory failure with hypoxia (HCC)    4. Community acquired pneumonia, unspecified laterality    5. Abdominal wall cellulitis        DISPOSITION  Disposition: Admit to telemetry  Patient condition is good      NOTE: This report was transcribed using voice recognition software.  Every effort was made to ensure accuracy; however, inadvertent computerized transcription errors may be present     SANJUANA Scott - CNP  04/29/20 7083

## 2020-04-29 NOTE — ADT AUTH CERT
Patient Demographics     Name Patient ID SSN Gender Identity Birth Date   Sana Smoke [id number]  Female 79 (45 yrs)   Address Phone Email Employer    [address]  SHAILESH [address]anos. [phone] (L)  [phone] (M) [email or web address] [id number] [employer] Race Occupation Emp Status    [employer] - Not Employed    Reg Status PCP Date Last Verified Next Review Date    Verified Governor Bouchra  [phone] 20    Admission Date Discharge Date Admitting Provider     20  Dominick Bolaños MD     Marital Status [employer]      Legally  [facility]      Emergency Contact 1 Emergency Contact 2 Emergency Contact 3   Jaret Cox [phone] Amy Cord) Lindsay Pardoov Ø)  [place]  [phone] (V)  [phone] Molina Copas) Kelli [place] (6)  not known  [place]  [phone] (H)   Marilynn Sinclair  [de-identified]   CVG Subscriber Name/Sex/Relation Subscriber  Subscriber Address/Phone Subscriber Emp/Emp Phone   1. [place] SOSA  [id number] [facility] GEORGE - Female  (Self) 1975 [address], [place]  [address] [phone](H)              Covid-19 by Jc Fisher RN         Review Status Review Entered   In Primary 2020 09:06       Criteria Review   The illness suspected to be related to the Coronavirus (COVID-19)? Yes  Has the member been tested for the COVID-19? Yes   If Yes, what are the results of the COVID-19?               SARS-CoV-2, NAAT Not Detected  Not Detected Final 2020 10:13 PM      What is the severity of the members condition (i.e. Isolation, Ventilator use)?

## 2020-04-29 NOTE — H&P
Breath  hydrALAZINE (APRESOLINE) 25 MG tablet, TAKE 1 TABLET BY MOUTH EVERY 8 HOURS  fluticasone (FLONASE) 50 MCG/ACT nasal spray, SHAKE LIQUID AND USE 1 SPRAY IN EACH NOSTRIL DAILY  gabapentin (NEURONTIN) 600 MG tablet, Take 1 tablet by mouth 3 times daily for 90 days. [START ON 5/22/2020] HYDROcodone-acetaminophen (NORCO) 5-325 MG per tablet, Take 1 tablet by mouth every 8 hours as needed for Pain for up to 30 days. Intended supply: 3 days. Take lowest dose possible to manage pain  albuterol sulfate HFA (PROAIR HFA) 108 (90 Base) MCG/ACT inhaler, Inhale 2 puffs into the lungs every 4 hours as needed for Wheezing or Shortness of Breath  cetirizine (ZYRTEC) 10 MG tablet, Take 1 tablet by mouth daily  ondansetron (ZOFRAN ODT) 4 MG disintegrating tablet, Take 1 tablet by mouth every 8 hours as needed for Nausea or Vomiting  dicyclomine (BENTYL) 10 MG capsule, Take 2 capsules by mouth every 8 hours as needed (diarrhea)  furosemide (LASIX) 40 MG tablet, Take 1 tablet by mouth daily  melatonin (RA MELATONIN) 3 MG TABS tablet, Take 1 tablet by mouth daily  potassium chloride (KLOR-CON M) 20 MEQ extended release tablet, Take 1 tablet by mouth daily (with breakfast)  amLODIPine (NORVASC) 10 MG tablet, TAKE 1 TABLET BY MOUTH DAILY  mineral oil-hydrophilic petrolatum (AQUAPHOR) ointment, Apply topically as needed.   ibuprofen (ADVIL;MOTRIN) 600 MG tablet, Take 1 tablet by mouth 4 times daily as needed for Pain  Multiple Vitamin (DAILY-DOUGLAS) TABS, Take by mouth daily  furosemide (LASIX) 20 MG tablet, TAKE 1 TABLET BY MOUTH EVERY DAY AS NEEDED FOR SWELLING  lisinopril-hydrochlorothiazide (PRINZIDE;ZESTORETIC) 10-12.5 MG per tablet, TAKE 1 TABLET BY MOUTH DAILY  pantoprazole (PROTONIX) 20 MG tablet, TAKE 1 TABLET BY MOUTH DAILY (Patient taking differently: Take 20 mg by mouth as needed )  Multiple Vitamins-Minerals (THERAPEUTIC MULTIVITAMIN-MINERALS) tablet, Take 1 tablet by mouth daily  metFORMIN (GLUCOPHAGE) 500 MG tablet, exacerbation    Gastroesophageal reflux disease without esophagitis    Type 2 diabetes mellitus without complication, without long-term current use of insulin (HCC)    Hyperlipidemia associated with type 2 diabetes mellitus (HCC)    JODI (obstructive sleep apnea)    Respiratory failure ([facility]    Community acquired pneumonia, bilateral    Cellulitis of abdominal wall  Resolved Problems:    * No resolved hospital problems.  *      PLAN:    Admit to intermediate care  IV antibiotics  IV steroids  Nebulizer  IV lasix  Repeat COVID19 negative  Viral panel pending  Medications for other co morbidities cont as appropriate w dosage adjustments as necessary  PT/OT  DVT PPx  DC planning         Electronically signed by Juan R Juarez MD on 4/29/2020 at 9:24 AM

## 2020-04-29 NOTE — ED NOTES
This nurse called to CT. Stating pt cannot tolerate being supine for purpose of CT. This nurse and NP to CT to assess pt. Pt states she has difficulty laying flat, even while at home. This nurse to return to ER with pt.        Shira Tran RN  04/29/20 8871

## 2020-04-29 NOTE — ED NOTES
Bed: 14A-14  Expected date:   Expected time:   Means of arrival:   Comments:  ems     Jim Molina RN  04/28/20 2113

## 2020-04-30 LAB
METER GLUCOSE: 127 MG/DL (ref 74–99)
METER GLUCOSE: 163 MG/DL (ref 74–99)
METER GLUCOSE: 182 MG/DL (ref 74–99)
METER GLUCOSE: 214 MG/DL (ref 74–99)

## 2020-04-30 PROCEDURE — 2580000003 HC RX 258: Performed by: NURSE PRACTITIONER

## 2020-04-30 PROCEDURE — 6370000000 HC RX 637 (ALT 250 FOR IP): Performed by: NURSE PRACTITIONER

## 2020-04-30 PROCEDURE — 94660 CPAP INITIATION&MGMT: CPT

## 2020-04-30 PROCEDURE — 82962 GLUCOSE BLOOD TEST: CPT

## 2020-04-30 PROCEDURE — 6360000002 HC RX W HCPCS: Performed by: INTERNAL MEDICINE

## 2020-04-30 PROCEDURE — 6370000000 HC RX 637 (ALT 250 FOR IP): Performed by: INTERNAL MEDICINE

## 2020-04-30 PROCEDURE — 96376 TX/PRO/DX INJ SAME DRUG ADON: CPT

## 2020-04-30 PROCEDURE — 2500000003 HC RX 250 WO HCPCS: Performed by: NURSE PRACTITIONER

## 2020-04-30 PROCEDURE — 2700000000 HC OXYGEN THERAPY PER DAY

## 2020-04-30 PROCEDURE — 6360000002 HC RX W HCPCS: Performed by: NURSE PRACTITIONER

## 2020-04-30 PROCEDURE — 94640 AIRWAY INHALATION TREATMENT: CPT

## 2020-04-30 PROCEDURE — 96366 THER/PROPH/DIAG IV INF ADDON: CPT

## 2020-04-30 PROCEDURE — 96372 THER/PROPH/DIAG INJ SC/IM: CPT

## 2020-04-30 PROCEDURE — 2060000000 HC ICU INTERMEDIATE R&B

## 2020-04-30 RX ADMIN — AMLODIPINE BESYLATE 10 MG: 10 TABLET ORAL at 09:12

## 2020-04-30 RX ADMIN — PETROLATUM: 42 OINTMENT TOPICAL at 09:16

## 2020-04-30 RX ADMIN — ARFORMOTEROL TARTRATE 15 MCG: 15 SOLUTION RESPIRATORY (INHALATION) at 07:59

## 2020-04-30 RX ADMIN — IPRATROPIUM BROMIDE AND ALBUTEROL SULFATE 1 AMPULE: 2.5; .5 SOLUTION RESPIRATORY (INHALATION) at 07:59

## 2020-04-30 RX ADMIN — GABAPENTIN 600 MG: 300 CAPSULE ORAL at 14:43

## 2020-04-30 RX ADMIN — DOXYCYCLINE 100 MG: 100 INJECTION, POWDER, LYOPHILIZED, FOR SOLUTION INTRAVENOUS at 03:28

## 2020-04-30 RX ADMIN — ENOXAPARIN SODIUM 40 MG: 100 INJECTION SUBCUTANEOUS at 09:12

## 2020-04-30 RX ADMIN — HYDROCODONE BITARTRATE AND ACETAMINOPHEN 1 TABLET: 5; 325 TABLET ORAL at 12:04

## 2020-04-30 RX ADMIN — HYDROCODONE BITARTRATE AND ACETAMINOPHEN 1 TABLET: 5; 325 TABLET ORAL at 03:38

## 2020-04-30 RX ADMIN — Medication 10 ML: at 22:11

## 2020-04-30 RX ADMIN — GABAPENTIN 600 MG: 300 CAPSULE ORAL at 09:13

## 2020-04-30 RX ADMIN — MAGNESIUM CITRATE 296 ML: 1.75 LIQUID ORAL at 12:53

## 2020-04-30 RX ADMIN — IPRATROPIUM BROMIDE AND ALBUTEROL SULFATE 1 AMPULE: 2.5; .5 SOLUTION RESPIRATORY (INHALATION) at 15:53

## 2020-04-30 RX ADMIN — BUDESONIDE 250 MCG: 0.5 SUSPENSION RESPIRATORY (INHALATION) at 07:58

## 2020-04-30 RX ADMIN — LISINOPRIL 10 MG: 10 TABLET ORAL at 09:12

## 2020-04-30 RX ADMIN — MELATONIN 3 MG ORAL TABLET 3 MG: 3 TABLET ORAL at 22:10

## 2020-04-30 RX ADMIN — POTASSIUM CHLORIDE 20 MEQ: 20 TABLET, EXTENDED RELEASE ORAL at 09:13

## 2020-04-30 RX ADMIN — INSULIN LISPRO 1 UNITS: 100 INJECTION, SOLUTION INTRAVENOUS; SUBCUTANEOUS at 22:10

## 2020-04-30 RX ADMIN — HYDRALAZINE HYDROCHLORIDE 25 MG: 25 TABLET, FILM COATED ORAL at 22:11

## 2020-04-30 RX ADMIN — GABAPENTIN 600 MG: 300 CAPSULE ORAL at 22:10

## 2020-04-30 RX ADMIN — HYDRALAZINE HYDROCHLORIDE 25 MG: 25 TABLET, FILM COATED ORAL at 06:41

## 2020-04-30 RX ADMIN — CETIRIZINE HYDROCHLORIDE 10 MG: 10 TABLET, FILM COATED ORAL at 09:12

## 2020-04-30 RX ADMIN — METHYLPREDNISOLONE SODIUM SUCCINATE 40 MG: 40 INJECTION, POWDER, FOR SOLUTION INTRAMUSCULAR; INTRAVENOUS at 09:19

## 2020-04-30 RX ADMIN — HYDROCHLOROTHIAZIDE 12.5 MG: 12.5 TABLET ORAL at 09:12

## 2020-04-30 RX ADMIN — POLYETHYLENE GLYCOL 3350 17 G: 17 POWDER, FOR SOLUTION ORAL at 09:13

## 2020-04-30 RX ADMIN — Medication 10 ML: at 09:19

## 2020-04-30 RX ADMIN — INSULIN LISPRO 2 UNITS: 100 INJECTION, SOLUTION INTRAVENOUS; SUBCUTANEOUS at 16:58

## 2020-04-30 RX ADMIN — MULTIPLE VITAMINS W/ MINERALS TAB 1 TABLET: TAB at 09:12

## 2020-04-30 RX ADMIN — HYDRALAZINE HYDROCHLORIDE 25 MG: 25 TABLET, FILM COATED ORAL at 14:43

## 2020-04-30 RX ADMIN — CEFTRIAXONE SODIUM 1 G: 1 INJECTION, POWDER, FOR SOLUTION INTRAMUSCULAR; INTRAVENOUS at 03:28

## 2020-04-30 RX ADMIN — HYDROCODONE BITARTRATE AND ACETAMINOPHEN 1 TABLET: 5; 325 TABLET ORAL at 22:11

## 2020-04-30 RX ADMIN — PANTOPRAZOLE SODIUM 40 MG: 40 TABLET, DELAYED RELEASE ORAL at 06:41

## 2020-04-30 RX ADMIN — INSULIN LISPRO 1 UNITS: 100 INJECTION, SOLUTION INTRAVENOUS; SUBCUTANEOUS at 11:58

## 2020-04-30 RX ADMIN — FLUTICASONE PROPIONATE 1 SPRAY: 50 SPRAY, METERED NASAL at 09:13

## 2020-04-30 ASSESSMENT — PAIN SCALES - GENERAL
PAINLEVEL_OUTOF10: 8
PAINLEVEL_OUTOF10: 5
PAINLEVEL_OUTOF10: 10
PAINLEVEL_OUTOF10: 10
PAINLEVEL_OUTOF10: 8
PAINLEVEL_OUTOF10: 10
PAINLEVEL_OUTOF10: 8
PAINLEVEL_OUTOF10: 6

## 2020-04-30 NOTE — CARE COORDINATION
Verified discharge plan at bedside with pt, home with no needs, does have a nebulizer, and verified she will contact one of her family members for transportation when discharged (cell phone with contacts at pt's bedside).

## 2020-04-30 NOTE — PLAN OF CARE
Problem: Pain:  Goal: Pain level will decrease  Description: Pain level will decrease  Outcome: Met This Shift     Problem: Falls - Risk of:  Goal: Will remain free from falls  Description: Will remain free from falls  Outcome: Met This Shift  Goal: Absence of physical injury  Description: Absence of physical injury  Outcome: Met This Shift

## 2020-05-01 PROBLEM — I50.33 ACUTE ON CHRONIC DIASTOLIC (CONGESTIVE) HEART FAILURE (HCC): Status: ACTIVE | Noted: 2020-05-01

## 2020-05-01 LAB
ANION GAP SERPL CALCULATED.3IONS-SCNC: 11 MMOL/L (ref 7–16)
BUN BLDV-MCNC: 22 MG/DL (ref 6–20)
CALCIUM SERPL-MCNC: 9 MG/DL (ref 8.6–10.2)
CHLORIDE BLD-SCNC: 95 MMOL/L (ref 98–107)
CO2: 30 MMOL/L (ref 22–29)
CREAT SERPL-MCNC: 0.9 MG/DL (ref 0.5–1)
EKG ATRIAL RATE: 100 BPM
EKG P AXIS: 76 DEGREES
EKG P-R INTERVAL: 170 MS
EKG Q-T INTERVAL: 414 MS
EKG QRS DURATION: 150 MS
EKG QTC CALCULATION (BAZETT): 534 MS
EKG R AXIS: -84 DEGREES
EKG T AXIS: 26 DEGREES
EKG VENTRICULAR RATE: 100 BPM
GFR AFRICAN AMERICAN: >60
GFR NON-AFRICAN AMERICAN: >60 ML/MIN/1.73
GLUCOSE BLD-MCNC: 105 MG/DL (ref 74–99)
HCT VFR BLD CALC: 38 % (ref 34–48)
HEMOGLOBIN: 11.4 G/DL (ref 11.5–15.5)
LV EF: 30 %
LVEF MODALITY: NORMAL
MCH RBC QN AUTO: 28.1 PG (ref 26–35)
MCHC RBC AUTO-ENTMCNC: 30 % (ref 32–34.5)
MCV RBC AUTO: 93.6 FL (ref 80–99.9)
METER GLUCOSE: 123 MG/DL (ref 74–99)
METER GLUCOSE: 208 MG/DL (ref 74–99)
METER GLUCOSE: 230 MG/DL (ref 74–99)
METER GLUCOSE: 255 MG/DL (ref 74–99)
PDW BLD-RTO: 16.6 FL (ref 11.5–15)
PLATELET # BLD: 314 E9/L (ref 130–450)
PMV BLD AUTO: 11.3 FL (ref 7–12)
POTASSIUM SERPL-SCNC: 4.1 MMOL/L (ref 3.5–5)
PRO-BNP: 1316 PG/ML (ref 0–125)
PROCALCITONIN: 0.06 NG/ML (ref 0–0.08)
RBC # BLD: 4.06 E12/L (ref 3.5–5.5)
SODIUM BLD-SCNC: 136 MMOL/L (ref 132–146)
WBC # BLD: 14.7 E9/L (ref 4.5–11.5)

## 2020-05-01 PROCEDURE — 2700000000 HC OXYGEN THERAPY PER DAY

## 2020-05-01 PROCEDURE — 94640 AIRWAY INHALATION TREATMENT: CPT

## 2020-05-01 PROCEDURE — 6370000000 HC RX 637 (ALT 250 FOR IP): Performed by: NURSE PRACTITIONER

## 2020-05-01 PROCEDURE — 94660 CPAP INITIATION&MGMT: CPT

## 2020-05-01 PROCEDURE — 36415 COLL VENOUS BLD VENIPUNCTURE: CPT

## 2020-05-01 PROCEDURE — 6360000004 HC RX CONTRAST MEDICATION: Performed by: INTERNAL MEDICINE

## 2020-05-01 PROCEDURE — 2580000003 HC RX 258: Performed by: NURSE PRACTITIONER

## 2020-05-01 PROCEDURE — APPSS45 APP SPLIT SHARED TIME 31-45 MINUTES: Performed by: NURSE PRACTITIONER

## 2020-05-01 PROCEDURE — 2060000000 HC ICU INTERMEDIATE R&B

## 2020-05-01 PROCEDURE — 6370000000 HC RX 637 (ALT 250 FOR IP): Performed by: INTERNAL MEDICINE

## 2020-05-01 PROCEDURE — 85027 COMPLETE CBC AUTOMATED: CPT

## 2020-05-01 PROCEDURE — 84145 PROCALCITONIN (PCT): CPT

## 2020-05-01 PROCEDURE — 80048 BASIC METABOLIC PNL TOTAL CA: CPT

## 2020-05-01 PROCEDURE — 93306 TTE W/DOPPLER COMPLETE: CPT

## 2020-05-01 PROCEDURE — 82962 GLUCOSE BLOOD TEST: CPT

## 2020-05-01 PROCEDURE — 83880 ASSAY OF NATRIURETIC PEPTIDE: CPT

## 2020-05-01 PROCEDURE — 6360000002 HC RX W HCPCS: Performed by: NURSE PRACTITIONER

## 2020-05-01 PROCEDURE — 99255 IP/OBS CONSLTJ NEW/EST HI 80: CPT | Performed by: INTERNAL MEDICINE

## 2020-05-01 PROCEDURE — 6360000002 HC RX W HCPCS: Performed by: INTERNAL MEDICINE

## 2020-05-01 RX ORDER — METOPROLOL SUCCINATE 25 MG/1
25 TABLET, EXTENDED RELEASE ORAL 2 TIMES DAILY
Status: DISCONTINUED | OUTPATIENT
Start: 2020-05-01 | End: 2020-05-01

## 2020-05-01 RX ORDER — PREDNISONE 20 MG/1
20 TABLET ORAL DAILY
Qty: 5 TABLET | Refills: 0 | Status: SHIPPED | OUTPATIENT
Start: 2020-05-02 | End: 2020-05-07

## 2020-05-01 RX ORDER — FUROSEMIDE 40 MG/1
40 TABLET ORAL 2 TIMES DAILY
Qty: 60 TABLET | Refills: 3 | Status: SHIPPED
Start: 2020-05-01 | End: 2020-05-04 | Stop reason: HOSPADM

## 2020-05-01 RX ORDER — IPRATROPIUM BROMIDE AND ALBUTEROL SULFATE 2.5; .5 MG/3ML; MG/3ML
1 SOLUTION RESPIRATORY (INHALATION) EVERY 4 HOURS PRN
Qty: 24 VIAL | Refills: 1 | Status: SHIPPED | OUTPATIENT
Start: 2020-05-01

## 2020-05-01 RX ORDER — PREDNISONE 20 MG/1
20 TABLET ORAL DAILY
Status: DISCONTINUED | OUTPATIENT
Start: 2020-05-01 | End: 2020-05-05 | Stop reason: HOSPADM

## 2020-05-01 RX ORDER — LISINOPRIL 10 MG/1
10 TABLET ORAL DAILY
Qty: 30 TABLET | Refills: 3 | Status: SHIPPED
Start: 2020-05-01 | End: 2020-05-04 | Stop reason: HOSPADM

## 2020-05-01 RX ORDER — METOPROLOL SUCCINATE 50 MG/1
50 TABLET, EXTENDED RELEASE ORAL 2 TIMES DAILY
Status: DISCONTINUED | OUTPATIENT
Start: 2020-05-02 | End: 2020-05-05

## 2020-05-01 RX ORDER — BUMETANIDE 1 MG/1
1 TABLET ORAL 2 TIMES DAILY
Status: DISCONTINUED | OUTPATIENT
Start: 2020-05-01 | End: 2020-05-04

## 2020-05-01 RX ADMIN — PANTOPRAZOLE SODIUM 40 MG: 40 TABLET, DELAYED RELEASE ORAL at 06:24

## 2020-05-01 RX ADMIN — IPRATROPIUM BROMIDE AND ALBUTEROL SULFATE 1 AMPULE: 2.5; .5 SOLUTION RESPIRATORY (INHALATION) at 16:23

## 2020-05-01 RX ADMIN — BUDESONIDE 250 MCG: 0.5 SUSPENSION RESPIRATORY (INHALATION) at 20:24

## 2020-05-01 RX ADMIN — ARFORMOTEROL TARTRATE 15 MCG: 15 SOLUTION RESPIRATORY (INHALATION) at 20:23

## 2020-05-01 RX ADMIN — GABAPENTIN 600 MG: 300 CAPSULE ORAL at 08:48

## 2020-05-01 RX ADMIN — HYDRALAZINE HYDROCHLORIDE 25 MG: 25 TABLET, FILM COATED ORAL at 22:06

## 2020-05-01 RX ADMIN — POTASSIUM CHLORIDE 20 MEQ: 20 TABLET, EXTENDED RELEASE ORAL at 08:48

## 2020-05-01 RX ADMIN — METOPROLOL SUCCINATE 25 MG: 25 TABLET, EXTENDED RELEASE ORAL at 14:03

## 2020-05-01 RX ADMIN — MULTIPLE VITAMINS W/ MINERALS TAB 1 TABLET: TAB at 08:48

## 2020-05-01 RX ADMIN — INSULIN LISPRO 2 UNITS: 100 INJECTION, SOLUTION INTRAVENOUS; SUBCUTANEOUS at 18:39

## 2020-05-01 RX ADMIN — AMLODIPINE BESYLATE 10 MG: 10 TABLET ORAL at 08:48

## 2020-05-01 RX ADMIN — HYDRALAZINE HYDROCHLORIDE 25 MG: 25 TABLET, FILM COATED ORAL at 06:24

## 2020-05-01 RX ADMIN — BUMETANIDE 1 MG: 1 TABLET ORAL at 12:07

## 2020-05-01 RX ADMIN — HYDRALAZINE HYDROCHLORIDE 25 MG: 25 TABLET, FILM COATED ORAL at 14:03

## 2020-05-01 RX ADMIN — HYDROCODONE BITARTRATE AND ACETAMINOPHEN 1 TABLET: 5; 325 TABLET ORAL at 14:03

## 2020-05-01 RX ADMIN — GABAPENTIN 600 MG: 300 CAPSULE ORAL at 14:03

## 2020-05-01 RX ADMIN — HYDROCODONE BITARTRATE AND ACETAMINOPHEN 1 TABLET: 5; 325 TABLET ORAL at 22:07

## 2020-05-01 RX ADMIN — GABAPENTIN 600 MG: 300 CAPSULE ORAL at 20:06

## 2020-05-01 RX ADMIN — PERFLUTREN 1.65 MG: 6.52 INJECTION, SUSPENSION INTRAVENOUS at 11:34

## 2020-05-01 RX ADMIN — IPRATROPIUM BROMIDE AND ALBUTEROL SULFATE 1 AMPULE: 2.5; .5 SOLUTION RESPIRATORY (INHALATION) at 12:52

## 2020-05-01 RX ADMIN — SACUBITRIL AND VALSARTAN 1 TABLET: 24; 26 TABLET, FILM COATED ORAL at 23:22

## 2020-05-01 RX ADMIN — METOPROLOL SUCCINATE 25 MG: 25 TABLET, EXTENDED RELEASE ORAL at 20:06

## 2020-05-01 RX ADMIN — IPRATROPIUM BROMIDE AND ALBUTEROL SULFATE 1 AMPULE: 2.5; .5 SOLUTION RESPIRATORY (INHALATION) at 09:20

## 2020-05-01 RX ADMIN — BUDESONIDE 250 MCG: 0.5 SUSPENSION RESPIRATORY (INHALATION) at 09:20

## 2020-05-01 RX ADMIN — Medication 10 ML: at 20:05

## 2020-05-01 RX ADMIN — ARFORMOTEROL TARTRATE 15 MCG: 15 SOLUTION RESPIRATORY (INHALATION) at 09:20

## 2020-05-01 RX ADMIN — BUMETANIDE 1 MG: 1 TABLET ORAL at 20:05

## 2020-05-01 RX ADMIN — PREDNISONE 20 MG: 20 TABLET ORAL at 12:07

## 2020-05-01 RX ADMIN — INSULIN LISPRO 2 UNITS: 100 INJECTION, SOLUTION INTRAVENOUS; SUBCUTANEOUS at 20:14

## 2020-05-01 RX ADMIN — IPRATROPIUM BROMIDE AND ALBUTEROL SULFATE 1 AMPULE: 2.5; .5 SOLUTION RESPIRATORY (INHALATION) at 20:23

## 2020-05-01 RX ADMIN — MELATONIN 3 MG ORAL TABLET 3 MG: 3 TABLET ORAL at 22:06

## 2020-05-01 RX ADMIN — METHYLPREDNISOLONE SODIUM SUCCINATE 40 MG: 40 INJECTION, POWDER, FOR SOLUTION INTRAMUSCULAR; INTRAVENOUS at 08:48

## 2020-05-01 ASSESSMENT — PAIN SCALES - GENERAL
PAINLEVEL_OUTOF10: 10

## 2020-05-01 NOTE — CARE COORDINATION
Cardiology consult. Plan is home at discharge. Family to transport home. Currently on RA.  Referral to [facility], for possible [facility]

## 2020-05-01 NOTE — PROGRESS NOTES
Assessment    Active Problems:    Essential hypertension    Morbid obesity (HCC)    Asthma exacerbation    Gastroesophageal reflux disease without esophagitis    Type 2 diabetes mellitus without complication, without long-term current use of insulin (HCC)    Hyperlipidemia associated with type 2 diabetes mellitus (HCC)    JODI (obstructive sleep apnea)    Respiratory failure (HCC)    Acute diastolic CHF (congestive heart failure) (HCC)    Acute on chronic diastolic (congestive) heart failure ([address]  Resolved Problems:    * No resolved hospital problems.  *      Plan:  Admit to intermediate care  IV antibiotics--stop  IV steroids completed transition to oral prednisone  Nebulizer  IV lasix  Hold nephrotoxins  Monitor vitals closely  Echocardiogram  Monitor I's and O's, daily weights  Repeat COVID19 negative x2  Viral panel pending  Medications for other co morbidities cont as appropriate w dosage adjustments as necessary  PT/OT  DVT PPx  DC planning       Electronically signed by aT Valle MD on 5/1/2020 at 10:04 AM

## 2020-05-01 NOTE — CONSULTS
Inpatient Cardiology Consultation      Reason for Consult:  Cardiomyopathy    Consulting Physician: Dr. Amira Barnard    Requesting Physician: Dr. Alireza Davies    Date of Consultation: 5/1/2020    HISTORY OF PRESENT ILLNESS:     This 31-year-old female is known to [facility] cardiology and is followed by Dr. Satinder Mckinney. She has a history of cardiomyopathy and was last seen by Dr. Satinder Mckinney in August 2018 . She had severe obesity and obstructive sleep apnea and a history of CHF so she was cleared for bariatric surgery with no further testing. She was seen in the emergency room on April 28 for dyspnea. Paramedics and found her oxygen saturation to be 92% at home and she does wear CPAP. She continues to smoke and was having a cough with yellow sputum. She admits that she does not take her Lasix consistently . She had no fevers or exposure to COVID-19. She had been placed on 100% nonrebreather by paramedics and in the emergency room O2 saturation was 87% on room air. And nasal cannula was applied. She was negative for influenza and WBCs were 8.7 with a hemoglobin of 12.5. BUN and creatinine were 12 and 1.0 respectively and proBNP was 1500 . EKG showed sinus tachycardia and bifascicular block. She was negative for COVID-19 and pregnancy test was negative. CT of the chest showed patchy bilateral infiltrates with bilateral pleural effusions greatest in the bases. Chest x-ray showed CHF. Blood pressure was 205/125 and pulse was 113 and she was afebrile. She was treated with antibiotics. And given Nitro-Bid, duo nebs, Lasix. She was also found to have cellulitis of the abdominal wall as well as pneumonia. She was treated for an asthma exacerbation and CAP. A 2D echocardiogram was done which showed left ventricular systolic function and cardiology was consulted. She has been placed on Toprol and ACE inhibitor as well as hydralazine. Medical History:  1. Essential hypertension. 2. Asthma.   3. Malika Delarosa CNP on the date of service All of the assessments and recommendations are from me and I agree with all of the pertinent clinical information, assessment and treatment plan. I have reviewed and edited the note above based on my findings during my history, exam, and decision making. Please see my additional contributions to the history, physical exam, assessment, and recommendations below. Reason for Consult: Cardiomyopathy  Requesting Physician: Ingrid Thakkar MD  Chief Complaint: Shortness of breath  History Obtained From: Patient    HISTORY OF PRESENT ILLNESS:     Patient is a 39years old female with history of diastolic congestive heart failure in 2018, PVCs, was admitted to the hospital with shortness of breath and hypoxia, patient was found to have cardiomyopathy, cardiology was consulted. Patient stated for the last few weeks she has been having shortness of breath, symptoms started gradually, get progressively worse over the last few weeks, on the day of admission patient symptoms were significant enough that prompted her to seek medical attention, associated symptoms included easy fatigability, pedal edema, increased abdominal girth, denies any lightheadedness or dizziness, occasional palpitations, no chest pain, no syncope, no presyncopal episodes.       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[facility])        Past Surgical History:   Procedure Laterality Date    CHOLECYSTECTOMY  2009    CYSTOSCOPY Left 01/14/2018    left stent placement    DILATION AND CURETTAGE OF UTERUS      younger age   [place] [facility] LITHOTRIPSY (PULMICORT) nebulizer suspension 250 mcg, 0.25 mg, Nebulization, BID, 250 mcg at 20 **AND** Arformoterol Tartrate (BROVANA) nebulizer solution 15 mcg, 15 mcg, Nebulization, BID, Rahul Robbins APRN - CNP, 15 mcg at 20    gabapentin (NEURONTIN) capsule 600 mg, 600 mg, Oral, TID, Rahul Kaufmanr Rosendo APRN - CNP, 600 mg at 20    Allergies as of 2020 - Review Complete 2020   Allergen Reaction Noted    Food Anaphylaxis 2018    Fish-derived products  2018    Nuts [peanut-containing drug products]  2019    Pcn [penicillins]  2017    Ultram [tramadol hcl]  2018    Cashews [macadamia nut oil] Nausea And Vomiting 2018       Social History     Socioeconomic History    Marital status: Legally      Spouse name: Not on file    Number of children: Not on file    Years of education: Not on file    Highest education level: Not on file   Occupational History    Occupation: direct care staff/counselor   Social Needs    Financial resource strain: Not on file    Food insecurity     Worry: Not on file     Inability: Not on file    Transportation needs     Medical: Not on file     Non-medical: Not on file   Tobacco Use    Smoking status: Light Tobacco Smoker     Packs/day: 1.00     Years: 17.00     Pack years: 17.00     Types: Cigarettes     Start date: 3/27/2001     Last attempt to quit: 10/5/2018     Years since quittin.5    Smokeless tobacco: Never Used   Substance and Sexual Activity    Alcohol use: No    Drug use: No    Sexual activity: Yes   Lifestyle    Physical activity     Days per week: Not on file     Minutes per session: Not on file    Stress: Not on file   Relationships    Social connections     Talks on phone: Not on file     Gets together: Not on file     Attends [employer] service: Not on file     Active member of club or organization: Not on file     Attends meetings of clubs or organizations: Not on symmetrical, trachea midline, no adenopathy, thyroid symmetric, not enlarged and no tenderness, skin normal  HEMATOLOGIC/LYMPHATICS:  no cervical lymphadenopathy and no supraclavicular lymphadenopathy  LUNGS:  No increased work of breathing, good air exchange, clear to auscultation bilaterally, no crackles or wheezing  CARDIOVASCULAR:  Normal apical impulse, regular rate and rhythm, normal S1 and S2, no S3 or S4, and no murmur noted and no JVD, no carotid bruit, trace pedal edema, good carotid upstroke bilaterally. ABDOMEN: Obese, soft, nontender, no masses, no hepatomegaly or splenomegaly, BS+  CHEST: nontender to palpation, expands symmetrically  MUSCULOSKELETAL:  No clubbing no cyanosis. there is no redness, warmth, or swelling of the joints  full range of motion noted  NEUROLOGIC:  Alert, awake,oriented x3  SKIN:  no bruising or bleeding, normal skin color, texture, turgor and no redness, warmth, or swelling        /80   Pulse 98   Temp 97.2 °F (36.2 °C) (Temporal)   Resp 20   Ht 5' 8\" (1.727 m)   Wt (!) 418 lb (189.6 kg)   SpO2 94%   BMI 63.56 kg/m²     DATA:   I personally reviewed the admission EKG with the following interpretation: Sinus tachycardia, bifascicular block    ECHO: 5/1/2020,Summary   Ejection fraction is visually estimated at 30%. Overall ejection fraction severely decreased. Moderate left ventricular concentric hypertrophy noted. Left ventricle is moderately enlarged. Normal right ventricle structure and function. Left atrial volume index of 33 ml per meters squared BSA. Physiologic and/or trace mitral regurgitation is present. Physiologic and/or trace tricuspid regurgitation. Pulmonary hypertension is mild. RVSP is 48 mmHg.        Stress Test: Not performed to date  Angiography: Not performed to date  Cardiology Labs:   BMP:    Lab Results   Component Value Date     05/01/2020    K 4.1 05/01/2020    K 4.5 04/29/2020    CL 95 05/01/2020    CO2 30 05/01/2020

## 2020-05-01 NOTE — DISCHARGE SUMMARY
MCG/ACT inhaler 2 puffs   Inhale 2 puffs into the lungs every 4 hours as needed for Wheezing or Shortness of Breath   Quantity: 1 Inhaler Refills: 3       cetirizine (ZYRTEC) 10 MG tablet 10 mg   Take 1 tablet by mouth daily   Quantity: 30 tablet Refills: 3       ondansetron (ZOFRAN ODT) 4 MG disintegrating tablet 4 mg   Take 1 tablet by mouth every 8 hours as needed for Nausea or Vomiting   Quantity: 10 tablet Refills: 0       dicyclomine (BENTYL) 10 MG capsule 20 mg   Take 2 capsules by mouth every 8 hours as needed (diarrhea)   Quantity: 20 capsule Refills: 0       melatonin (RA MELATONIN) 3 MG TABS tablet 3 mg   Take 1 tablet by mouth daily   Quantity: 30 tablet Refills: 3       potassium chloride (KLOR-CON M) 20 MEQ extended release tablet 20 mEq   Take 1 tablet by mouth daily (with breakfast)   Quantity: 60 tablet Refills: 3       amLODIPine (NORVASC) 10 MG tablet 10 mg   TAKE 1 TABLET BY MOUTH DAILY   Quantity: 30 tablet Refills: 5       mineral oil-hydrophilic petrolatum (AQUAPHOR) ointment    Apply topically as needed. Quantity: 99 g Refills: 5       Multiple Vitamin (DAILY-DOUGLAS) TABS    Take by mouth daily   Refills: 5        pantoprazole (PROTONIX) 20 MG tablet 20 mg   TAKE 1 TABLET BY MOUTH DAILY   Quantity: 30 tablet Refills: 3       Multiple Vitamins-Minerals (THERAPEUTIC MULTIVITAMIN-MINERALS) tablet 1 tablet   Take 1 tablet by mouth daily   Quantity: 90 tablet Refills: 5       metFORMIN (GLUCOPHAGE) 500 MG tablet    TAKE 1 TABLET BY MOUTH TWICE DAILY WITH MEALS   Quantity: 60 tablet Refills: 2       mometasone-formoterol (DULERA) 100-5 MCG/ACT inhaler 2 puffs   Inhale 2 puffs into the lungs 2 times daily Rinse mouth after using.    Quantity: 1 Inhaler Refills: 2       Insulin Pen Needle ([employer] PEN NEEDLES) 31G X 6 MM MISC 1 each   1 each by Does not apply route daily   Quantity: 100 each Refills: 3       Blood Pressure Monitoring (BLOOD PRESSURE CUFF) MISC    Dx:  Hypertension with labile blood pressure   Quantity: 1 each Refills: 0       liraglutide-weight management 18 MG/3ML SOPN 0.6 mg   Inject 0.6 mg into the skin daily   Quantity: 30 pen Refills: 0       ONE TOUCH ULTRA TEST strip 1 each   1 each by Does not apply route daily   Quantity: 120 each Refills: 5       ONETOUCH DELICA LANCETS FINE MISC 60 Devices   60 Devices daily   Refills: 5        !! Misc. Devices (CANE) MISC    Use cane to aid in ambulation   Quantity: 1 each Refills: 0       !! Misc. Devices (WALKER) MISC    Use walker when ambulating. Quantity: 1 each Refills: 0       Handicap Placard MISC    by Does not apply route Patient cannot walk 200 ft without stopping to rest.     Expiration 5 yrs   Quantity: 1 each Refills: 0        !! Potential duplicate medications found. Please discuss with provider.    STOP taking these previous medications     Medication Dose Reason for Stopping Comments   (STOP TAKING) hydrocortisone (ANUSOL-HC) 2.5 % rectal cream            (STOP TAKING) Liraglutide (VICTOZA) 18 MG/3ML SOPN SC injection 1.2 mg           (STOP TAKING) ibuprofen (ADVIL;MOTRIN) 600 MG tablet 600 mg           (STOP TAKING) ammonium lactate (LAC-HYDRIN) 12 % lotion            (STOP TAKING) furosemide (LASIX) 20 MG tablet            (STOP TAKING) omeprazole (PRILOSEC) 20 MG delayed release capsule 20 mg           (STOP TAKING) lisinopril-hydrochlorothiazide (PRINZIDE;ZESTORETIC) 10-12.5 MG per tablet          Activity: activity as tolerated  Diet: regular diet diabetic/cardiac    Follow-up with 1 week PCP 2 weeks cardiology    Note that over 30 minutes was spent in preparing discharge papers, discussing discharge with patient, staff, consultants, medication review, arranging follow up, etc.    Signed:  Katy Thomas MD  5/5/2020  12:01pm

## 2020-05-02 LAB
HCG QUALITATIVE: NEGATIVE
METER GLUCOSE: 110 MG/DL (ref 74–99)
METER GLUCOSE: 130 MG/DL (ref 74–99)
METER GLUCOSE: 157 MG/DL (ref 74–99)
METER GLUCOSE: 224 MG/DL (ref 74–99)

## 2020-05-02 PROCEDURE — 6370000000 HC RX 637 (ALT 250 FOR IP): Performed by: NURSE PRACTITIONER

## 2020-05-02 PROCEDURE — 99233 SBSQ HOSP IP/OBS HIGH 50: CPT | Performed by: INTERNAL MEDICINE

## 2020-05-02 PROCEDURE — 94660 CPAP INITIATION&MGMT: CPT

## 2020-05-02 PROCEDURE — 94640 AIRWAY INHALATION TREATMENT: CPT

## 2020-05-02 PROCEDURE — 2580000003 HC RX 258: Performed by: NURSE PRACTITIONER

## 2020-05-02 PROCEDURE — 36415 COLL VENOUS BLD VENIPUNCTURE: CPT

## 2020-05-02 PROCEDURE — 2700000000 HC OXYGEN THERAPY PER DAY

## 2020-05-02 PROCEDURE — 84703 CHORIONIC GONADOTROPIN ASSAY: CPT

## 2020-05-02 PROCEDURE — 94760 N-INVAS EAR/PLS OXIMETRY 1: CPT

## 2020-05-02 PROCEDURE — 6360000002 HC RX W HCPCS: Performed by: NURSE PRACTITIONER

## 2020-05-02 PROCEDURE — 94761 N-INVAS EAR/PLS OXIMETRY MLT: CPT

## 2020-05-02 PROCEDURE — 6370000000 HC RX 637 (ALT 250 FOR IP): Performed by: INTERNAL MEDICINE

## 2020-05-02 PROCEDURE — 82962 GLUCOSE BLOOD TEST: CPT

## 2020-05-02 PROCEDURE — 2060000000 HC ICU INTERMEDIATE R&B

## 2020-05-02 RX ORDER — SPIRONOLACTONE 25 MG/1
25 TABLET ORAL DAILY
Status: DISCONTINUED | OUTPATIENT
Start: 2020-05-02 | End: 2020-05-05 | Stop reason: HOSPADM

## 2020-05-02 RX ORDER — HYDRALAZINE HYDROCHLORIDE 25 MG/1
25 TABLET, FILM COATED ORAL EVERY 8 HOURS SCHEDULED
Status: COMPLETED | OUTPATIENT
Start: 2020-05-02 | End: 2020-05-02

## 2020-05-02 RX ADMIN — AMLODIPINE BESYLATE 10 MG: 10 TABLET ORAL at 08:50

## 2020-05-02 RX ADMIN — INSULIN LISPRO 2 UNITS: 100 INJECTION, SOLUTION INTRAVENOUS; SUBCUTANEOUS at 17:10

## 2020-05-02 RX ADMIN — METOPROLOL SUCCINATE 50 MG: 50 TABLET, EXTENDED RELEASE ORAL at 22:28

## 2020-05-02 RX ADMIN — FLUTICASONE PROPIONATE 1 SPRAY: 50 SPRAY, METERED NASAL at 08:51

## 2020-05-02 RX ADMIN — GABAPENTIN 600 MG: 300 CAPSULE ORAL at 22:27

## 2020-05-02 RX ADMIN — GABAPENTIN 600 MG: 300 CAPSULE ORAL at 13:28

## 2020-05-02 RX ADMIN — CETIRIZINE HYDROCHLORIDE 10 MG: 10 TABLET, FILM COATED ORAL at 13:28

## 2020-05-02 RX ADMIN — MULTIPLE VITAMINS W/ MINERALS TAB 1 TABLET: TAB at 13:29

## 2020-05-02 RX ADMIN — ARFORMOTEROL TARTRATE 15 MCG: 15 SOLUTION RESPIRATORY (INHALATION) at 10:21

## 2020-05-02 RX ADMIN — HYDRALAZINE HYDROCHLORIDE 25 MG: 25 TABLET, FILM COATED ORAL at 06:00

## 2020-05-02 RX ADMIN — SACUBITRIL AND VALSARTAN 1 TABLET: 24; 26 TABLET, FILM COATED ORAL at 08:51

## 2020-05-02 RX ADMIN — ARFORMOTEROL TARTRATE 15 MCG: 15 SOLUTION RESPIRATORY (INHALATION) at 20:33

## 2020-05-02 RX ADMIN — Medication 10 ML: at 08:50

## 2020-05-02 RX ADMIN — BUDESONIDE 250 MCG: 0.5 SUSPENSION RESPIRATORY (INHALATION) at 20:33

## 2020-05-02 RX ADMIN — SACUBITRIL AND VALSARTAN 1 TABLET: 24; 26 TABLET, FILM COATED ORAL at 22:28

## 2020-05-02 RX ADMIN — IPRATROPIUM BROMIDE AND ALBUTEROL SULFATE 1 AMPULE: 2.5; .5 SOLUTION RESPIRATORY (INHALATION) at 16:46

## 2020-05-02 RX ADMIN — Medication 10 ML: at 22:44

## 2020-05-02 RX ADMIN — HYDRALAZINE HYDROCHLORIDE 25 MG: 25 TABLET, FILM COATED ORAL at 13:29

## 2020-05-02 RX ADMIN — ENOXAPARIN SODIUM 40 MG: 100 INJECTION SUBCUTANEOUS at 13:29

## 2020-05-02 RX ADMIN — SPIRONOLACTONE 25 MG: 25 TABLET ORAL at 17:16

## 2020-05-02 RX ADMIN — METOPROLOL SUCCINATE 50 MG: 50 TABLET, EXTENDED RELEASE ORAL at 08:50

## 2020-05-02 RX ADMIN — PANTOPRAZOLE SODIUM 40 MG: 40 TABLET, DELAYED RELEASE ORAL at 05:59

## 2020-05-02 RX ADMIN — IPRATROPIUM BROMIDE AND ALBUTEROL SULFATE 1 AMPULE: 2.5; .5 SOLUTION RESPIRATORY (INHALATION) at 10:21

## 2020-05-02 RX ADMIN — POTASSIUM CHLORIDE 20 MEQ: 20 TABLET, EXTENDED RELEASE ORAL at 13:28

## 2020-05-02 RX ADMIN — BUMETANIDE 1 MG: 1 TABLET ORAL at 22:27

## 2020-05-02 RX ADMIN — BUMETANIDE 1 MG: 1 TABLET ORAL at 13:29

## 2020-05-02 RX ADMIN — MELATONIN 3 MG ORAL TABLET 3 MG: 3 TABLET ORAL at 22:28

## 2020-05-02 RX ADMIN — IPRATROPIUM BROMIDE AND ALBUTEROL SULFATE 1 AMPULE: 2.5; .5 SOLUTION RESPIRATORY (INHALATION) at 20:32

## 2020-05-02 RX ADMIN — INSULIN LISPRO 1 UNITS: 100 INJECTION, SOLUTION INTRAVENOUS; SUBCUTANEOUS at 22:34

## 2020-05-02 RX ADMIN — HYDRALAZINE HYDROCHLORIDE 25 MG: 25 TABLET, FILM COATED ORAL at 22:28

## 2020-05-02 RX ADMIN — BUDESONIDE 250 MCG: 0.5 SUSPENSION RESPIRATORY (INHALATION) at 10:20

## 2020-05-02 RX ADMIN — HYDROCORTISONE: 25 CREAM TOPICAL at 08:50

## 2020-05-02 ASSESSMENT — PAIN SCALES - GENERAL
PAINLEVEL_OUTOF10: 0
PAINLEVEL_OUTOF10: 10
PAINLEVEL_OUTOF10: 0

## 2020-05-02 ASSESSMENT — PAIN DESCRIPTION - LOCATION: LOCATION: BACK

## 2020-05-02 ASSESSMENT — PAIN DESCRIPTION - PAIN TYPE: TYPE: CHRONIC PAIN

## 2020-05-02 NOTE — PLAN OF CARE
Problem: Pain:  Goal: Pain level will decrease  Description: Pain level will decrease  Outcome: Met This Shift  Goal: Control of acute pain  Description: Control of acute pain  Outcome: Met This Shift  Goal: Control of chronic pain  Description: Control of chronic pain  Outcome: Met This Shift     Problem: Falls - Risk of:  Goal: Will remain free from falls  Description: Will remain free from falls  5/2/2020 0918 by Dillan Irene RN  Outcome: Met This Shift  5/2/2020 0331 by Jennifer Longo RN  Outcome: Met This Shift  Goal: Absence of physical injury  Description: Absence of physical injury  5/2/2020 0918 by Dillan Irene RN  Outcome: Met This Shift  5/2/2020 0331 by Jennifer Longo RN  Outcome: Met This Shift

## 2020-05-02 NOTE — PROGRESS NOTES
continue to follow intake and output  5. Basic metabolic panel in a.m.  6. Cardiac catheterization: Indications, procedures, risks and benefits of cardiac catheterization were discussed with the patient with risks including, but not limited to, infection, vessel damage, bleeding, dye allergy, nephrotoxicity, dysrhythmia, MI, CVA and death as well as the potential need for emergent cardiac surgery. Patient verbalized understanding and is agreeable to proceed. 7. Further cardiac recommendations will be forthcoming pending her clinical course and diagnostic test findings    Discussed with patient  Discussed with the nursing staff  Electronically signed by Comfort Back MD on 5/2/2020 at 1:05 PM  NOTE: This report was transcribed using voice recognition software.  Every effort was made to ensure accuracy; however, inadvertent computerized transcription errors may be present

## 2020-05-02 NOTE — PROGRESS NOTES
Patient wanted to remove BiPAP upon respiratory entering room. Patient places BiPAP on/off herself. BiPAP was removed, but remains in standby in room. Patient placed on 2 L O2 via nasal cannula.

## 2020-05-03 PROBLEM — I50.23 ACUTE ON CHRONIC SYSTOLIC HEART FAILURE (HCC): Status: ACTIVE | Noted: 2020-05-01

## 2020-05-03 PROBLEM — I42.9 CARDIOMYOPATHY (HCC): Status: ACTIVE | Noted: 2020-05-03

## 2020-05-03 LAB
ANION GAP SERPL CALCULATED.3IONS-SCNC: 11 MMOL/L (ref 7–16)
BLOOD CULTURE, ROUTINE: NORMAL
BUN BLDV-MCNC: 21 MG/DL (ref 6–20)
CALCIUM SERPL-MCNC: 9.5 MG/DL (ref 8.6–10.2)
CHLORIDE BLD-SCNC: 100 MMOL/L (ref 98–107)
CO2: 30 MMOL/L (ref 22–29)
CREAT SERPL-MCNC: 0.9 MG/DL (ref 0.5–1)
GFR AFRICAN AMERICAN: >60
GFR NON-AFRICAN AMERICAN: >60 ML/MIN/1.73
GLUCOSE BLD-MCNC: 145 MG/DL (ref 74–99)
MAGNESIUM: 1.9 MG/DL (ref 1.6–2.6)
METER GLUCOSE: 122 MG/DL (ref 74–99)
METER GLUCOSE: 131 MG/DL (ref 74–99)
METER GLUCOSE: 149 MG/DL (ref 74–99)
METER GLUCOSE: 157 MG/DL (ref 74–99)
POTASSIUM SERPL-SCNC: 4 MMOL/L (ref 3.5–5)
SODIUM BLD-SCNC: 141 MMOL/L (ref 132–146)

## 2020-05-03 PROCEDURE — 6370000000 HC RX 637 (ALT 250 FOR IP): Performed by: INTERNAL MEDICINE

## 2020-05-03 PROCEDURE — 80048 BASIC METABOLIC PNL TOTAL CA: CPT

## 2020-05-03 PROCEDURE — 6360000002 HC RX W HCPCS: Performed by: NURSE PRACTITIONER

## 2020-05-03 PROCEDURE — 6370000000 HC RX 637 (ALT 250 FOR IP): Performed by: NURSE PRACTITIONER

## 2020-05-03 PROCEDURE — 99232 SBSQ HOSP IP/OBS MODERATE 35: CPT | Performed by: INTERNAL MEDICINE

## 2020-05-03 PROCEDURE — 2700000000 HC OXYGEN THERAPY PER DAY

## 2020-05-03 PROCEDURE — 83735 ASSAY OF MAGNESIUM: CPT

## 2020-05-03 PROCEDURE — 2060000000 HC ICU INTERMEDIATE R&B

## 2020-05-03 PROCEDURE — 94640 AIRWAY INHALATION TREATMENT: CPT

## 2020-05-03 PROCEDURE — 82962 GLUCOSE BLOOD TEST: CPT

## 2020-05-03 PROCEDURE — 2580000003 HC RX 258: Performed by: INTERNAL MEDICINE

## 2020-05-03 PROCEDURE — 94660 CPAP INITIATION&MGMT: CPT

## 2020-05-03 PROCEDURE — 36415 COLL VENOUS BLD VENIPUNCTURE: CPT

## 2020-05-03 RX ORDER — ALPRAZOLAM 0.25 MG/1
0.5 TABLET ORAL NIGHTLY PRN
Status: DISCONTINUED | OUTPATIENT
Start: 2020-05-03 | End: 2020-05-05 | Stop reason: HOSPADM

## 2020-05-03 RX ORDER — SODIUM CHLORIDE 0.9 % (FLUSH) 0.9 %
10 SYRINGE (ML) INJECTION EVERY 12 HOURS SCHEDULED
Status: DISCONTINUED | OUTPATIENT
Start: 2020-05-03 | End: 2020-05-05 | Stop reason: HOSPADM

## 2020-05-03 RX ORDER — SODIUM CHLORIDE 0.9 % (FLUSH) 0.9 %
10 SYRINGE (ML) INJECTION PRN
Status: DISCONTINUED | OUTPATIENT
Start: 2020-05-03 | End: 2020-05-05 | Stop reason: HOSPADM

## 2020-05-03 RX ADMIN — SACUBITRIL AND VALSARTAN 1 TABLET: 24; 26 TABLET, FILM COATED ORAL at 21:06

## 2020-05-03 RX ADMIN — HYDROCORTISONE: 25 CREAM TOPICAL at 09:09

## 2020-05-03 RX ADMIN — HYDROCORTISONE: 25 CREAM TOPICAL at 21:09

## 2020-05-03 RX ADMIN — SPIRONOLACTONE 25 MG: 25 TABLET ORAL at 09:08

## 2020-05-03 RX ADMIN — ARFORMOTEROL TARTRATE 15 MCG: 15 SOLUTION RESPIRATORY (INHALATION) at 08:47

## 2020-05-03 RX ADMIN — SACUBITRIL AND VALSARTAN 1 TABLET: 24; 26 TABLET, FILM COATED ORAL at 09:08

## 2020-05-03 RX ADMIN — IPRATROPIUM BROMIDE AND ALBUTEROL SULFATE 1 AMPULE: 2.5; .5 SOLUTION RESPIRATORY (INHALATION) at 21:13

## 2020-05-03 RX ADMIN — HYDROCODONE BITARTRATE AND ACETAMINOPHEN 1 TABLET: 5; 325 TABLET ORAL at 06:20

## 2020-05-03 RX ADMIN — POTASSIUM CHLORIDE 20 MEQ: 20 TABLET, EXTENDED RELEASE ORAL at 09:08

## 2020-05-03 RX ADMIN — GABAPENTIN 600 MG: 300 CAPSULE ORAL at 14:35

## 2020-05-03 RX ADMIN — PANTOPRAZOLE SODIUM 40 MG: 40 TABLET, DELAYED RELEASE ORAL at 06:17

## 2020-05-03 RX ADMIN — BUMETANIDE 1 MG: 1 TABLET ORAL at 21:06

## 2020-05-03 RX ADMIN — METOPROLOL SUCCINATE 50 MG: 50 TABLET, EXTENDED RELEASE ORAL at 09:08

## 2020-05-03 RX ADMIN — IPRATROPIUM BROMIDE AND ALBUTEROL SULFATE 1 AMPULE: 2.5; .5 SOLUTION RESPIRATORY (INHALATION) at 08:48

## 2020-05-03 RX ADMIN — GABAPENTIN 600 MG: 300 CAPSULE ORAL at 09:08

## 2020-05-03 RX ADMIN — METOPROLOL SUCCINATE 50 MG: 50 TABLET, EXTENDED RELEASE ORAL at 21:06

## 2020-05-03 RX ADMIN — MELATONIN 3 MG ORAL TABLET 3 MG: 3 TABLET ORAL at 21:06

## 2020-05-03 RX ADMIN — BUDESONIDE 250 MCG: 0.5 SUSPENSION RESPIRATORY (INHALATION) at 21:13

## 2020-05-03 RX ADMIN — BUDESONIDE 250 MCG: 0.5 SUSPENSION RESPIRATORY (INHALATION) at 08:47

## 2020-05-03 RX ADMIN — BUMETANIDE 1 MG: 1 TABLET ORAL at 09:08

## 2020-05-03 RX ADMIN — SODIUM CHLORIDE, PRESERVATIVE FREE 10 ML: 5 INJECTION INTRAVENOUS at 21:09

## 2020-05-03 RX ADMIN — IPRATROPIUM BROMIDE AND ALBUTEROL SULFATE 1 AMPULE: 2.5; .5 SOLUTION RESPIRATORY (INHALATION) at 16:22

## 2020-05-03 RX ADMIN — CETIRIZINE HYDROCHLORIDE 10 MG: 10 TABLET, FILM COATED ORAL at 09:08

## 2020-05-03 RX ADMIN — PREDNISONE 20 MG: 20 TABLET ORAL at 09:08

## 2020-05-03 RX ADMIN — INSULIN LISPRO 1 UNITS: 100 INJECTION, SOLUTION INTRAVENOUS; SUBCUTANEOUS at 12:08

## 2020-05-03 RX ADMIN — ENOXAPARIN SODIUM 40 MG: 100 INJECTION SUBCUTANEOUS at 09:08

## 2020-05-03 RX ADMIN — MULTIPLE VITAMINS W/ MINERALS TAB 1 TABLET: TAB at 09:08

## 2020-05-03 RX ADMIN — INSULIN LISPRO 1 UNITS: 100 INJECTION, SOLUTION INTRAVENOUS; SUBCUTANEOUS at 06:20

## 2020-05-03 RX ADMIN — FLUTICASONE PROPIONATE 1 SPRAY: 50 SPRAY, METERED NASAL at 09:09

## 2020-05-03 RX ADMIN — ARFORMOTEROL TARTRATE 15 MCG: 15 SOLUTION RESPIRATORY (INHALATION) at 21:13

## 2020-05-03 RX ADMIN — GABAPENTIN 600 MG: 300 CAPSULE ORAL at 21:06

## 2020-05-03 RX ADMIN — AMLODIPINE BESYLATE 10 MG: 10 TABLET ORAL at 09:08

## 2020-05-03 ASSESSMENT — PAIN SCALES - GENERAL
PAINLEVEL_OUTOF10: 0
PAINLEVEL_OUTOF10: 8
PAINLEVEL_OUTOF10: 0
PAINLEVEL_OUTOF10: 10

## 2020-05-03 NOTE — PROGRESS NOTES
Date: 5/2/2020    Time: 8:58 PM    Patient Placed On BIPAP/CPAP/ Non-Invasive Ventilation? Yes    If no must comment. Facial area red/color change? No           If YES are Blister/Lesion present? No   If yes must notify nursing staff  BIPAP/CPAP skin barrier?   No    Skin barrier type:otherpt refused Nasal barrier       Comments:        Rosmery Kendrick

## 2020-05-03 NOTE — PROGRESS NOTES
Attempts have been made by myself and a fellow nurse on the floor to gain IV access, attempts unsuccessful. Day shift nurse will be made aware of this.    Giacomo Parsons RN

## 2020-05-03 NOTE — PROGRESS NOTES
Patient is seen in follow-up for cardiomyopathy    Subjective:     Feels better today, shortness of breath is better  Laying in bed no apparent distress    ROS:  CONSTITUTIONAL:  negative for  fevers, chills  HEENT:  negative for earaches, nasal congestion and epistaxis  RESPIRATORY:  negative for  dry cough, cough with sputum,wheezing and hemoptysis  GASTROINTESTINAL:  negative for nausea, vomiting  MUSCULOSKELETAL:  negative for  myalgias, arthralgias  NEUROLOGICAL:  negative for visual disturbance, dysphagia    Medication side effects: none    Scheduled Meds:   spironolactone  25 mg Oral Daily    predniSONE  20 mg Oral Daily    bumetanide  1 mg Oral BID    metoprolol succinate  50 mg Oral BID    sacubitril-valsartan  1 tablet Oral BID    ipratropium-albuterol  1 ampule Inhalation Q4H WA    amLODIPine  10 mg Oral Daily    cetirizine  10 mg Oral Daily    fluticasone  1 spray Each Nostril Daily    hydrocortisone   Rectal BID    melatonin  3 mg Oral Nightly    therapeutic multivitamin-minerals  1 tablet Oral Daily    pantoprazole  40 mg Oral QAM AC    potassium chloride  20 mEq Oral Daily with breakfast    sodium chloride flush  10 mL Intravenous 2 times per day    enoxaparin  40 mg Subcutaneous Daily    insulin lispro  0-6 Units Subcutaneous TID WC    insulin lispro  0-3 Units Subcutaneous Nightly    budesonide  0.25 mg Nebulization BID    And    Arformoterol Tartrate  15 mcg Nebulization BID    gabapentin  600 mg Oral TID     Continuous Infusions:   dextrose       PRN Meds:regadenoson, ammonium lactate, dicyclomine, HYDROcodone-acetaminophen, mineral oil-hydrophilic petrolatum, sodium chloride flush, acetaminophen **OR** acetaminophen, polyethylene glycol, glucose, dextrose, glucagon (rDNA), dextrose      Objective:      Physical Exam:   /60   Pulse 76   Temp 97.2 °F (36.2 °C) (Temporal)   Resp 14   Ht 5' 8\" (1.727 m)   Wt (!) 409 lb 1 oz (185.5 kg)   SpO2 93%   BMI 62.20 kg/m²

## 2020-05-04 ENCOUNTER — APPOINTMENT (OUTPATIENT)
Dept: CARDIAC CATH/INVASIVE PROCEDURES | Age: 45
DRG: 192 | End: 2020-05-04
Payer: MEDICARE

## 2020-05-04 LAB
ABO/RH: NORMAL
ANION GAP SERPL CALCULATED.3IONS-SCNC: 14 MMOL/L (ref 7–16)
ANTIBODY SCREEN: NORMAL
BUN BLDV-MCNC: 21 MG/DL (ref 6–20)
CALCIUM SERPL-MCNC: 9.1 MG/DL (ref 8.6–10.2)
CHLORIDE BLD-SCNC: 98 MMOL/L (ref 98–107)
CO2: 29 MMOL/L (ref 22–29)
CREAT SERPL-MCNC: 1 MG/DL (ref 0.5–1)
GFR AFRICAN AMERICAN: >60
GFR NON-AFRICAN AMERICAN: >60 ML/MIN/1.73
GLUCOSE BLD-MCNC: 110 MG/DL (ref 74–99)
HCT VFR BLD CALC: 42 % (ref 34–48)
HEMOGLOBIN: 12.9 G/DL (ref 11.5–15.5)
MCH RBC QN AUTO: 27.7 PG (ref 26–35)
MCHC RBC AUTO-ENTMCNC: 30.7 % (ref 32–34.5)
MCV RBC AUTO: 90.3 FL (ref 80–99.9)
METER GLUCOSE: 111 MG/DL (ref 74–99)
METER GLUCOSE: 132 MG/DL (ref 74–99)
METER GLUCOSE: 159 MG/DL (ref 74–99)
PDW BLD-RTO: 16.3 FL (ref 11.5–15)
PLATELET # BLD: 315 E9/L (ref 130–450)
PMV BLD AUTO: 11.1 FL (ref 7–12)
POTASSIUM SERPL-SCNC: 3.8 MMOL/L (ref 3.5–5)
RBC # BLD: 4.65 E12/L (ref 3.5–5.5)
SODIUM BLD-SCNC: 141 MMOL/L (ref 132–146)
WBC # BLD: 13.3 E9/L (ref 4.5–11.5)

## 2020-05-04 PROCEDURE — 6360000002 HC RX W HCPCS

## 2020-05-04 PROCEDURE — 2500000003 HC RX 250 WO HCPCS

## 2020-05-04 PROCEDURE — C1769 GUIDE WIRE: HCPCS

## 2020-05-04 PROCEDURE — C1894 INTRO/SHEATH, NON-LASER: HCPCS

## 2020-05-04 PROCEDURE — 2700000000 HC OXYGEN THERAPY PER DAY

## 2020-05-04 PROCEDURE — 93454 CORONARY ARTERY ANGIO S&I: CPT | Performed by: INTERNAL MEDICINE

## 2020-05-04 PROCEDURE — 85027 COMPLETE CBC AUTOMATED: CPT

## 2020-05-04 PROCEDURE — 6370000000 HC RX 637 (ALT 250 FOR IP): Performed by: INTERNAL MEDICINE

## 2020-05-04 PROCEDURE — 94660 CPAP INITIATION&MGMT: CPT

## 2020-05-04 PROCEDURE — 2060000000 HC ICU INTERMEDIATE R&B

## 2020-05-04 PROCEDURE — C1887 CATHETER, GUIDING: HCPCS

## 2020-05-04 PROCEDURE — 86901 BLOOD TYPING SEROLOGIC RH(D): CPT

## 2020-05-04 PROCEDURE — 6360000002 HC RX W HCPCS: Performed by: INTERNAL MEDICINE

## 2020-05-04 PROCEDURE — 2580000003 HC RX 258: Performed by: INTERNAL MEDICINE

## 2020-05-04 PROCEDURE — 2500000003 HC RX 250 WO HCPCS: Performed by: INTERNAL MEDICINE

## 2020-05-04 PROCEDURE — B2111ZZ FLUOROSCOPY OF MULTIPLE CORONARY ARTERIES USING LOW OSMOLAR CONTRAST: ICD-10-PCS | Performed by: INTERNAL MEDICINE

## 2020-05-04 PROCEDURE — 99233 SBSQ HOSP IP/OBS HIGH 50: CPT | Performed by: INTERNAL MEDICINE

## 2020-05-04 PROCEDURE — 2709999900 HC NON-CHARGEABLE SUPPLY

## 2020-05-04 PROCEDURE — 93458 L HRT ARTERY/VENTRICLE ANGIO: CPT | Performed by: INTERNAL MEDICINE

## 2020-05-04 PROCEDURE — 4A023N7 MEASUREMENT OF CARDIAC SAMPLING AND PRESSURE, LEFT HEART, PERCUTANEOUS APPROACH: ICD-10-PCS | Performed by: INTERNAL MEDICINE

## 2020-05-04 PROCEDURE — 80048 BASIC METABOLIC PNL TOTAL CA: CPT

## 2020-05-04 PROCEDURE — 36415 COLL VENOUS BLD VENIPUNCTURE: CPT

## 2020-05-04 PROCEDURE — 93571 IV DOP VEL&/PRESS C FLO 1ST: CPT | Performed by: INTERNAL MEDICINE

## 2020-05-04 PROCEDURE — 6370000000 HC RX 637 (ALT 250 FOR IP): Performed by: NURSE PRACTITIONER

## 2020-05-04 PROCEDURE — 82962 GLUCOSE BLOOD TEST: CPT

## 2020-05-04 PROCEDURE — 94640 AIRWAY INHALATION TREATMENT: CPT

## 2020-05-04 PROCEDURE — 86850 RBC ANTIBODY SCREEN: CPT

## 2020-05-04 PROCEDURE — 86900 BLOOD TYPING SEROLOGIC ABO: CPT

## 2020-05-04 PROCEDURE — C1760 CLOSURE DEV, VASC: HCPCS

## 2020-05-04 RX ORDER — BUMETANIDE 1 MG/1
1 TABLET ORAL DAILY
Qty: 30 TABLET | Refills: 3 | Status: SHIPPED | OUTPATIENT
Start: 2020-05-04 | End: 2020-10-15 | Stop reason: SDUPTHER

## 2020-05-04 RX ORDER — ATORVASTATIN CALCIUM 40 MG/1
40 TABLET, FILM COATED ORAL NIGHTLY
Status: DISCONTINUED | OUTPATIENT
Start: 2020-05-04 | End: 2020-05-05 | Stop reason: HOSPADM

## 2020-05-04 RX ORDER — ATORVASTATIN CALCIUM 80 MG/1
80 TABLET, FILM COATED ORAL NIGHTLY
Qty: 30 TABLET | Refills: 3 | Status: SHIPPED | OUTPATIENT
Start: 2020-05-04 | End: 2020-07-28 | Stop reason: SDUPTHER

## 2020-05-04 RX ORDER — ASPIRIN 81 MG/1
81 TABLET, CHEWABLE ORAL DAILY
Status: DISCONTINUED | OUTPATIENT
Start: 2020-05-05 | End: 2020-05-05 | Stop reason: HOSPADM

## 2020-05-04 RX ORDER — ONDANSETRON 2 MG/ML
4 INJECTION INTRAMUSCULAR; INTRAVENOUS EVERY 6 HOURS PRN
Status: DISCONTINUED | OUTPATIENT
Start: 2020-05-04 | End: 2020-05-05 | Stop reason: ALTCHOICE

## 2020-05-04 RX ORDER — METOPROLOL SUCCINATE 50 MG/1
50 TABLET, EXTENDED RELEASE ORAL 2 TIMES DAILY
Qty: 30 TABLET | Refills: 3 | Status: SHIPPED
Start: 2020-05-04 | End: 2020-05-05 | Stop reason: HOSPADM

## 2020-05-04 RX ORDER — BUMETANIDE 0.25 MG/ML
2 INJECTION, SOLUTION INTRAMUSCULAR; INTRAVENOUS ONCE
Status: COMPLETED | OUTPATIENT
Start: 2020-05-05 | End: 2020-05-05

## 2020-05-04 RX ORDER — FAMOTIDINE 20 MG/1
20 TABLET, FILM COATED ORAL 2 TIMES DAILY
Status: DISCONTINUED | OUTPATIENT
Start: 2020-05-04 | End: 2020-05-05 | Stop reason: HOSPADM

## 2020-05-04 RX ORDER — ASPIRIN 81 MG/1
81 TABLET, CHEWABLE ORAL DAILY
Qty: 30 TABLET | Refills: 3 | Status: SHIPPED
Start: 2020-05-05 | End: 2021-07-19 | Stop reason: SDUPTHER

## 2020-05-04 RX ORDER — BUMETANIDE 1 MG/1
1 TABLET ORAL 2 TIMES DAILY
Status: DISCONTINUED | OUTPATIENT
Start: 2020-05-05 | End: 2020-05-05 | Stop reason: HOSPADM

## 2020-05-04 RX ORDER — BUMETANIDE 0.25 MG/ML
2 INJECTION, SOLUTION INTRAMUSCULAR; INTRAVENOUS ONCE
Status: COMPLETED | OUTPATIENT
Start: 2020-05-04 | End: 2020-05-04

## 2020-05-04 RX ORDER — SPIRONOLACTONE 25 MG/1
25 TABLET ORAL DAILY
Qty: 30 TABLET | Refills: 3 | Status: SHIPPED | OUTPATIENT
Start: 2020-05-04 | End: 2020-09-16 | Stop reason: SDUPTHER

## 2020-05-04 RX ADMIN — MELATONIN 3 MG ORAL TABLET 3 MG: 3 TABLET ORAL at 22:02

## 2020-05-04 RX ADMIN — FAMOTIDINE 20 MG: 20 TABLET, FILM COATED ORAL at 22:02

## 2020-05-04 RX ADMIN — SACUBITRIL AND VALSARTAN 1 TABLET: 24; 26 TABLET, FILM COATED ORAL at 22:02

## 2020-05-04 RX ADMIN — SODIUM CHLORIDE, PRESERVATIVE FREE 10 ML: 5 INJECTION INTRAVENOUS at 15:09

## 2020-05-04 RX ADMIN — GABAPENTIN 600 MG: 300 CAPSULE ORAL at 22:02

## 2020-05-04 RX ADMIN — ALPRAZOLAM 0.5 MG: 0.25 TABLET ORAL at 00:31

## 2020-05-04 RX ADMIN — BUDESONIDE 250 MCG: 0.5 SUSPENSION RESPIRATORY (INHALATION) at 20:13

## 2020-05-04 RX ADMIN — MULTIPLE VITAMINS W/ MINERALS TAB 1 TABLET: TAB at 15:10

## 2020-05-04 RX ADMIN — ARFORMOTEROL TARTRATE 15 MCG: 15 SOLUTION RESPIRATORY (INHALATION) at 20:11

## 2020-05-04 RX ADMIN — SODIUM CHLORIDE, PRESERVATIVE FREE 10 ML: 5 INJECTION INTRAVENOUS at 22:01

## 2020-05-04 RX ADMIN — PREDNISONE 20 MG: 20 TABLET ORAL at 15:10

## 2020-05-04 RX ADMIN — HYDROCODONE BITARTRATE AND ACETAMINOPHEN 1 TABLET: 5; 325 TABLET ORAL at 18:33

## 2020-05-04 RX ADMIN — METOPROLOL SUCCINATE 50 MG: 50 TABLET, EXTENDED RELEASE ORAL at 15:11

## 2020-05-04 RX ADMIN — DESMOPRESSIN ACETATE 40 MG: 0.2 TABLET ORAL at 22:01

## 2020-05-04 RX ADMIN — HYDROCORTISONE: 25 CREAM TOPICAL at 22:02

## 2020-05-04 RX ADMIN — AMLODIPINE BESYLATE 10 MG: 10 TABLET ORAL at 15:10

## 2020-05-04 RX ADMIN — BUMETANIDE 2 MG: 0.25 INJECTION INTRAMUSCULAR; INTRAVENOUS at 18:30

## 2020-05-04 RX ADMIN — GABAPENTIN 600 MG: 300 CAPSULE ORAL at 15:11

## 2020-05-04 RX ADMIN — SACUBITRIL AND VALSARTAN 1 TABLET: 24; 26 TABLET, FILM COATED ORAL at 15:10

## 2020-05-04 RX ADMIN — PANTOPRAZOLE SODIUM 40 MG: 40 TABLET, DELAYED RELEASE ORAL at 07:00

## 2020-05-04 RX ADMIN — METOPROLOL SUCCINATE 50 MG: 50 TABLET, EXTENDED RELEASE ORAL at 22:02

## 2020-05-04 RX ADMIN — IPRATROPIUM BROMIDE AND ALBUTEROL SULFATE 1 AMPULE: 2.5; .5 SOLUTION RESPIRATORY (INHALATION) at 20:11

## 2020-05-04 RX ADMIN — DICYCLOMINE HYDROCHLORIDE 20 MG: 10 CAPSULE ORAL at 15:10

## 2020-05-04 RX ADMIN — HYDROCODONE BITARTRATE AND ACETAMINOPHEN 1 TABLET: 5; 325 TABLET ORAL at 07:40

## 2020-05-04 RX ADMIN — FAMOTIDINE 20 MG: 20 TABLET, FILM COATED ORAL at 15:10

## 2020-05-04 RX ADMIN — CETIRIZINE HYDROCHLORIDE 10 MG: 10 TABLET, FILM COATED ORAL at 15:10

## 2020-05-04 RX ADMIN — POTASSIUM CHLORIDE 20 MEQ: 20 TABLET, EXTENDED RELEASE ORAL at 15:10

## 2020-05-04 RX ADMIN — SPIRONOLACTONE 25 MG: 25 TABLET ORAL at 15:10

## 2020-05-04 ASSESSMENT — PAIN SCALES - GENERAL
PAINLEVEL_OUTOF10: 10
PAINLEVEL_OUTOF10: 0
PAINLEVEL_OUTOF10: 10
PAINLEVEL_OUTOF10: 6

## 2020-05-04 ASSESSMENT — PAIN DESCRIPTION - ORIENTATION: ORIENTATION: RIGHT;LEFT

## 2020-05-04 ASSESSMENT — PAIN DESCRIPTION - PAIN TYPE
TYPE: CHRONIC PAIN
TYPE: CHRONIC PAIN

## 2020-05-04 ASSESSMENT — PAIN DESCRIPTION - LOCATION
LOCATION: HIP;BACK;FOOT
LOCATION: BACK;HIP;FOOT

## 2020-05-04 ASSESSMENT — PAIN DESCRIPTION - DESCRIPTORS: DESCRIPTORS: DISCOMFORT;CONSTANT

## 2020-05-04 ASSESSMENT — PAIN DESCRIPTION - PROGRESSION: CLINICAL_PROGRESSION: GRADUALLY IMPROVING

## 2020-05-04 NOTE — OP NOTE
Operative Note      Patient: Nando Luna  YOB: 1975  MRN: [id number]    Date of Procedure:             BRIEF OP NOTE    : Niraj Howard MD        Date of procedure: 5/4/2020      Indication:  1. Cardiomyopathy and CHF  2. AUC score 7  3. AUC indication :  93    Procedure: Left heart catheterization, coronary angiography  Anesthesia: Versed, Fentanyl  Time sedation was administered: 10:00. I was present in the room when sedation was administered. Procedure end time: 11:15  Time spent with face to face monitoring of moderate sedation: 90 minutes    Cath performed by right femoral approach under ultrasound guidance using 6F sheath. Right radial access attempted with good pulsatile flow obtained after accessing the right radial artery but the wire would not advance more than half way into the radial artery. Findings:  Left main: 0% stenosis  LAD: 20 % stenosis  Circumflex: 20 %  stenosis  RCA: Dominant. 70 % stenosis: IFR 0.98, 0.98 and 0.99. FFR 0.94  LV angio: not performed    Hemodynamics:  LV: 167 mmHg. LVEDP 43  No gradient across AV. Ao: 143/106 ( 123 ).        Complication: None   Blood loss: 10 - 20 cc  Contrast used: 115 cc    Post Op diagnosis:  Moderate RCA stenosis, not hemodynamically significant  Systemic HTN  Markedly elevated LVedp    PLAN:  Medical therapy  Aggressive risk factor modification      Electronically signed by Florence Beltre MD on 5/4/2020 at 12:06 PM

## 2020-05-04 NOTE — PROGRESS NOTES
Inpatient Cardiology Progress note     PATIENT IS BEING FOLLOWED FOR: Cardiomyopathy and CHF    Noah Aviles is a 39 y.o. female known to Dr. Kai Fry: Denies CP or SOB  OBJECTIVE: No apparent distress     ROS:  Consist: Denies fevers, chills or night sweats  Heart: Denies chest pain, palpitations, lightheadedness, dizziness or syncope  Lungs: Denies SOB, cough, wheezing, orthopnea or PND  GI: Denies abdominal pain, vomiting or diarrhea    PHYSICAL EXAM:   BP (!) 140/80   Pulse 75   Temp 97.4 °F (36.3 °C) (Temporal)   Resp 21   Ht 5' 8\" (1.727 m)   Wt (!) 401 lb 4.8 oz (182 kg)   SpO2 96%   BMI 61.02 kg/m²    B/P Range last 24 hours: Systolic (12CFJ), ZKA:403 , Min:130 , PVX:876    Diastolic (70MKN), QTV:64, Min:60, Max:90    CONST: Well developed, well nourished morbidly obese female who appears of stated age. Awake, alert and cooperative. No apparent distress  HEENT:   Head- Normocephalic, atraumatic   Eyes- Conjunctivae pink, anicteric  Throat- Oral mucosa pink and moist  Neck-  No stridor, trachea midline, no jugular venous distention. No carotid bruit  CHEST: Chest symmetrical and non-tender to palpation. No accessory muscle use or intercostal retractions  RESPIRATORY:  Lung sounds - clear throughout fields   CARDIOVASCULAR:     Heart Inspection- shows no noted pulsations  Heart Palpation- no heaves or thrills; PMI is non-displaced   Heart Ausculation- Regular rate and rhythm, no murmur. No s3, s4 or rub   PV: Trace lower extremity edema. No varicosities. Pedal pulses palpable, no clubbing or cyanosis   ABDOMEN: Soft, non-tender to light palpation. Bowel sounds present. No palpable masses no organomegaly; no abdominal bruit  MS: Good muscle strength and tone. No atrophy or abnormal movements. : Deferred  SKIN: Warm and dry no statis dermatitis or ulcers   NEURO / PSYCH: Oriented to person, place and time. Speech clear and appropriate. Follows all commands.  Pleasant affect Intake/Output Summary (Last 24 hours) at 5/4/2020 0953  Last data filed at 5/4/2020 0466  Gross per 24 hour   Intake 600 ml   Output 4600 ml   Net -4000 ml       Weight:   Wt Readings from Last 3 Encounters:   05/04/20 (!) 401 lb 4.8 oz (182 kg)   03/16/20 (!) 386 lb 12.8 oz (175.5 kg)   03/01/20 (!) 382 lb (173.3 kg)     Current Inpatient Medications:   sodium chloride flush  10 mL Intravenous 2 times per day    spironolactone  25 mg Oral Daily    predniSONE  20 mg Oral Daily    bumetanide  1 mg Oral BID    metoprolol succinate  50 mg Oral BID    sacubitril-valsartan  1 tablet Oral BID    ipratropium-albuterol  1 ampule Inhalation Q4H WA    amLODIPine  10 mg Oral Daily    cetirizine  10 mg Oral Daily    fluticasone  1 spray Each Nostril Daily    hydrocortisone   Rectal BID    melatonin  3 mg Oral Nightly    therapeutic multivitamin-minerals  1 tablet Oral Daily    pantoprazole  40 mg Oral QAM AC    potassium chloride  20 mEq Oral Daily with breakfast    enoxaparin  40 mg Subcutaneous Daily    insulin lispro  0-6 Units Subcutaneous TID WC    insulin lispro  0-3 Units Subcutaneous Nightly    budesonide  0.25 mg Nebulization BID    And    Arformoterol Tartrate  15 mcg Nebulization BID    gabapentin  600 mg Oral TID       IV Infusions (if any):   dextrose         DIAGNOSTIC/ LABORATORY DATA:  Labs:   CBC:   Recent Labs     05/04/20  0709   WBC 13.3*   HGB 12.9   HCT 42.0        BMP:   Recent Labs     05/03/20  0941 05/04/20  0709    141   K 4.0 3.8   CO2 30* 29   BUN 21* 21*   CREATININE 0.9 1.0   LABGLOM >60 >60   CALCIUM 9.5 9.1     Mag:   Recent Labs     05/03/20  0941   MG 1.9     HgA1c:   Lab Results   Component Value Date    LABA1C 6.0 (H) 04/29/2020     FASTING LIPID PANEL:  Lab Results   Component Value Date    CHOL 210 03/16/2020    HDL 43 03/16/2020    LDLCALC 140 03/16/2020    TRIG 135 03/16/2020       CXR 4/29/2020:    Moderate bilateral lung infiltrates with basilar predominance not significantly changed from 4/28/2020. Telemetry: SR with frequent PVC's    Echo 5/1/2020 ( Dr. Rama Russell ):   Summary   Ejection fraction is visually estimated at 30%. Overall ejection fraction severely decreased. Moderate left ventricular concentric hypertrophy noted. Left ventricle is moderately enlarged. Normal right ventricle structure and function. Left atrial volume index of 33 ml per meters squared BSA. Physiologic and/or trace mitral regurgitation is present. Physiologic and/or trace tricuspid regurgitation. Pulmonary hypertension is mild. RVSP is 48 mmHg. ASSESSMENT:   1. Cardiomyopathy with severe LV systolic dysfunction and Acute on chronic systolic CHF. Appears Eu volemic / compensated this am   2. Frequent PVC's   3. Morbid obesity  4. HTN  5. Hyperlipidemia  6. Type II DM  7. H/o tobacco abuse  8. Asthma  9. Obstructive sleep apnea compliant with CPAP  10. H/o anxiety / depression  11. Spinal stenosis of lumbar region at multiple levels with chronic low back pain  12. GERD  15. H/o cholecystectomy  14. H/o diverticulitis  15. H/o kidney stones        PLAN:  1. Continue current medications for now  2. Cath ( +/- PCI ) to rule out ischemic etiology for the cardiomyopathy ( weight above limit for nuclear table ). Risks,  Benefits and alternatives to the procedure explained.  She understood and consented to proceed    Electronically signed by Tye Mejia MD on 5/4/2020 at 9:53 AM

## 2020-05-04 NOTE — PROGRESS NOTES
Paged as cardiology on call doc. Patient reportedly needs  LifeVest but one is not ordered. Can order this now and have someone fit it on her as an outpatient but ZOLL limiting home visits, I assume. Further patient had an LVEDP of 43 which is severely elevated reflective of volume status. Will give IV bumetanide now. If ok w primary hold discharge one more night.   Give another IV dose in am, order lifevest and discharge tomorrow

## 2020-05-05 ENCOUNTER — TELEPHONE (OUTPATIENT)
Dept: ADMINISTRATIVE | Age: 45
End: 2020-05-05

## 2020-05-05 VITALS
OXYGEN SATURATION: 95 % | HEART RATE: 80 BPM | RESPIRATION RATE: 18 BRPM | BODY MASS INDEX: 44.41 KG/M2 | WEIGHT: 293 LBS | HEIGHT: 68 IN | SYSTOLIC BLOOD PRESSURE: 135 MMHG | TEMPERATURE: 97.6 F | DIASTOLIC BLOOD PRESSURE: 77 MMHG

## 2020-05-05 LAB
ANION GAP SERPL CALCULATED.3IONS-SCNC: 15 MMOL/L (ref 7–16)
BUN BLDV-MCNC: 21 MG/DL (ref 6–20)
CALCIUM SERPL-MCNC: 9.6 MG/DL (ref 8.6–10.2)
CHLORIDE BLD-SCNC: 96 MMOL/L (ref 98–107)
CHOLESTEROL, TOTAL: 183 MG/DL (ref 0–199)
CO2: 27 MMOL/L (ref 22–29)
CREAT SERPL-MCNC: 1 MG/DL (ref 0.5–1)
GFR AFRICAN AMERICAN: >60
GFR NON-AFRICAN AMERICAN: >60 ML/MIN/1.73
GLUCOSE BLD-MCNC: 131 MG/DL (ref 74–99)
HDLC SERPL-MCNC: 58 MG/DL
LDL CHOLESTEROL CALCULATED: 87 MG/DL (ref 0–99)
METER GLUCOSE: 122 MG/DL (ref 74–99)
METER GLUCOSE: 130 MG/DL (ref 74–99)
POTASSIUM SERPL-SCNC: 3.8 MMOL/L (ref 3.5–5)
SODIUM BLD-SCNC: 138 MMOL/L (ref 132–146)
TRIGL SERPL-MCNC: 190 MG/DL (ref 0–149)
VLDLC SERPL CALC-MCNC: 38 MG/DL

## 2020-05-05 PROCEDURE — 6370000000 HC RX 637 (ALT 250 FOR IP): Performed by: INTERNAL MEDICINE

## 2020-05-05 PROCEDURE — 80061 LIPID PANEL: CPT

## 2020-05-05 PROCEDURE — 2500000003 HC RX 250 WO HCPCS: Performed by: INTERNAL MEDICINE

## 2020-05-05 PROCEDURE — 94660 CPAP INITIATION&MGMT: CPT

## 2020-05-05 PROCEDURE — 6360000002 HC RX W HCPCS: Performed by: INTERNAL MEDICINE

## 2020-05-05 PROCEDURE — 99233 SBSQ HOSP IP/OBS HIGH 50: CPT | Performed by: INTERNAL MEDICINE

## 2020-05-05 PROCEDURE — 82962 GLUCOSE BLOOD TEST: CPT

## 2020-05-05 PROCEDURE — 80048 BASIC METABOLIC PNL TOTAL CA: CPT

## 2020-05-05 PROCEDURE — 36415 COLL VENOUS BLD VENIPUNCTURE: CPT

## 2020-05-05 PROCEDURE — 2580000003 HC RX 258: Performed by: INTERNAL MEDICINE

## 2020-05-05 RX ORDER — METOPROLOL SUCCINATE 100 MG/1
100 TABLET, EXTENDED RELEASE ORAL 2 TIMES DAILY
Qty: 30 TABLET | Refills: 3 | Status: SHIPPED | OUTPATIENT
Start: 2020-05-05 | End: 2020-08-12 | Stop reason: SDUPTHER

## 2020-05-05 RX ORDER — BUMETANIDE 0.25 MG/ML
INJECTION, SOLUTION INTRAMUSCULAR; INTRAVENOUS
Status: DISPENSED
Start: 2020-05-05 | End: 2020-05-05

## 2020-05-05 RX ORDER — AMLODIPINE BESYLATE 5 MG/1
5 TABLET ORAL DAILY
Status: DISCONTINUED | OUTPATIENT
Start: 2020-05-06 | End: 2020-05-05 | Stop reason: HOSPADM

## 2020-05-05 RX ORDER — METOPROLOL SUCCINATE 100 MG/1
100 TABLET, EXTENDED RELEASE ORAL 2 TIMES DAILY
Status: DISCONTINUED | OUTPATIENT
Start: 2020-05-05 | End: 2020-05-05 | Stop reason: HOSPADM

## 2020-05-05 RX ORDER — BENZONATATE 100 MG/1
100-200 CAPSULE ORAL 3 TIMES DAILY PRN
Qty: 42 CAPSULE | Refills: 0 | Status: SHIPPED | OUTPATIENT
Start: 2020-05-05 | End: 2020-05-12

## 2020-05-05 RX ADMIN — GABAPENTIN 600 MG: 300 CAPSULE ORAL at 09:27

## 2020-05-05 RX ADMIN — HYDROCODONE BITARTRATE AND ACETAMINOPHEN 1 TABLET: 5; 325 TABLET ORAL at 06:22

## 2020-05-05 RX ADMIN — PREDNISONE 20 MG: 20 TABLET ORAL at 09:20

## 2020-05-05 RX ADMIN — CETIRIZINE HYDROCHLORIDE 10 MG: 10 TABLET, FILM COATED ORAL at 09:20

## 2020-05-05 RX ADMIN — SPIRONOLACTONE 25 MG: 25 TABLET ORAL at 09:27

## 2020-05-05 RX ADMIN — PANTOPRAZOLE SODIUM 40 MG: 40 TABLET, DELAYED RELEASE ORAL at 06:10

## 2020-05-05 RX ADMIN — ALPRAZOLAM 0.5 MG: 0.25 TABLET ORAL at 00:29

## 2020-05-05 RX ADMIN — METOPROLOL SUCCINATE 50 MG: 50 TABLET, EXTENDED RELEASE ORAL at 09:20

## 2020-05-05 RX ADMIN — HYDROCODONE BITARTRATE AND ACETAMINOPHEN 1 TABLET: 5; 325 TABLET ORAL at 16:34

## 2020-05-05 RX ADMIN — HYDROCORTISONE: 25 CREAM TOPICAL at 09:21

## 2020-05-05 RX ADMIN — BUMETANIDE 2 MG: 0.25 INJECTION INTRAMUSCULAR; INTRAVENOUS at 06:14

## 2020-05-05 RX ADMIN — SACUBITRIL AND VALSARTAN 1 TABLET: 24; 26 TABLET, FILM COATED ORAL at 09:21

## 2020-05-05 RX ADMIN — SODIUM CHLORIDE, PRESERVATIVE FREE 10 ML: 5 INJECTION INTRAVENOUS at 09:21

## 2020-05-05 RX ADMIN — POTASSIUM CHLORIDE 20 MEQ: 20 TABLET, EXTENDED RELEASE ORAL at 09:20

## 2020-05-05 RX ADMIN — ASPIRIN 81 MG 81 MG: 81 TABLET ORAL at 09:27

## 2020-05-05 RX ADMIN — AMLODIPINE BESYLATE 10 MG: 10 TABLET ORAL at 09:20

## 2020-05-05 RX ADMIN — ENOXAPARIN SODIUM 40 MG: 100 INJECTION SUBCUTANEOUS at 09:20

## 2020-05-05 RX ADMIN — GABAPENTIN 600 MG: 300 CAPSULE ORAL at 15:09

## 2020-05-05 RX ADMIN — MULTIPLE VITAMINS W/ MINERALS TAB 1 TABLET: TAB at 09:21

## 2020-05-05 RX ADMIN — BUMETANIDE 1 MG: 1 TABLET ORAL at 09:20

## 2020-05-05 RX ADMIN — FAMOTIDINE 20 MG: 20 TABLET, FILM COATED ORAL at 09:21

## 2020-05-05 ASSESSMENT — PAIN SCALES - GENERAL
PAINLEVEL_OUTOF10: 10
PAINLEVEL_OUTOF10: 8
PAINLEVEL_OUTOF10: 0

## 2020-05-05 ASSESSMENT — PAIN DESCRIPTION - LOCATION: LOCATION: BACK;HIP

## 2020-05-05 ASSESSMENT — PAIN DESCRIPTION - ORIENTATION: ORIENTATION: RIGHT;LEFT;MID;LOWER

## 2020-05-05 ASSESSMENT — PAIN DESCRIPTION - PROGRESSION
CLINICAL_PROGRESSION: GRADUALLY IMPROVING
CLINICAL_PROGRESSION: GRADUALLY IMPROVING

## 2020-05-05 ASSESSMENT — PAIN DESCRIPTION - DESCRIPTORS: DESCRIPTORS: CONSTANT;DISCOMFORT;DULL

## 2020-05-05 ASSESSMENT — PAIN DESCRIPTION - PAIN TYPE: TYPE: CHRONIC PAIN

## 2020-05-05 NOTE — PROGRESS NOTES
heart failure. Assessment    Principal Problem:    Acute on chronic systolic heart failure (HCC)  Active Problems:    Essential hypertension    Morbid obesity (HCC)    Asthma exacerbation    Gastroesophageal reflux disease without esophagitis    Type 2 diabetes mellitus without complication, without long-term current use of insulin (HCC)    Hyperlipidemia LDL goal <70    JODI (obstructive sleep apnea)    Respiratory failure ([facility] [address]    Cardiomyopathy ([facility] [address]  Resolved Problems:    * No resolved hospital problems. *      Plan:  68-year-old female history of morbid obesity, sleep apnea, asthma admitted with dyspnea and suspected CHF found to have significantly reduced ejection fraction of 30%.   S/P left heart cath 5/4- showed LAD lesion 70% that was not hemodynamically significant per cardiology    IV antibiotics--stop  IV steroids completed transition to oral prednisone  Nebulizer  IV lasix--> Bumex oral--diuresing well  Hold nephrotoxins  Monitor vitals closely  Echocardiogram--demonstrated significantly reduced left ventricular function EF 30%  Cardiology consult--titrate GDMT--Entresto, left heart cath, LifeVest for DC  High intensity statin  Monitor I's and O's, daily weights  Repeat COVID19 negative x2  Viral panel pending  Medications for other co morbidities cont as appropriate w dosage adjustments as necessary  PT/OT  DVT PPx  DC planning home      Electronically signed by Angelica Callejas MD on 5/5/2020 at 10:09 AM

## 2020-05-05 NOTE — PROGRESS NOTES
Date    LABA1C 6.0 (H) 04/29/2020     FASTING LIPID PANEL:  Lab Results   Component Value Date    CHOL 183 05/05/2020    HDL 58 05/05/2020    LDLCALC 87 05/05/2020    TRIG 190 05/05/2020       CXR 4/29/2020: Moderate bilateral lung infiltrates with basilar predominance not significantly changed from 4/28/2020. Telemetry: SR with frequent PVC's    Echo 5/1/2020 ( Dr. Yesenia Cuadra ):   Summary   Ejection fraction is visually estimated at 30%. Overall ejection fraction severely decreased. Moderate left ventricular concentric hypertrophy noted. Left ventricle is moderately enlarged. Normal right ventricle structure and function. Left atrial volume index of 33 ml per meters squared BSA. Physiologic and/or trace mitral regurgitation is present. Physiologic and/or trace tricuspid regurgitation. Pulmonary hypertension is mild. RVSP is 48 mmHg. Cath 5?4/2020 ( Dr. Axel Felton ):  PRESSURES:  Aorta 143/106 with a mean of 123. Left ventricular systolic pressure 202. Left ventricular end-diastolic  pressure of 43. There was no gradient across the aortic valve.     CORONARY ANGIOGRAPHY:  LEFT MAIN:  The left main artery did not appear to have any significant  angiographic disease. LAD:  The left anterior descending artery is a large vessel that wraps  around the left ventricular apex. It had minor luminal irregularities  but without any significant angiographic luminal narrowing. LCX:  The left circumflex is a large but nondominant vessel. It has  luminal irregularities but with no more than 20% angiographic luminal  narrowing. RCA:  The right coronary artery is a moderate-sized dominant vessel  giving the posterior descending artery branch. It has a mid to distal  eccentric 70% angiographic luminal narrowing.     A pressure wire evaluation of the mid to distal RCA lesion was  performed. Serial IFRs were 0.98, 0.98 and 0.99, respectively.   The  patient was then given IV adenosine at 140 mcg/kg per

## 2020-05-05 NOTE — CARE COORDINATION
[facility], he has order for life vest, he is setting up for delivery today. He states around 4ish it will be delivered. Wendi MERCER

## 2020-05-05 NOTE — TELEPHONE ENCOUNTER
Starr Zapata CNP with Dr. Charlotte Weinberg calling re: M[facility] [place] apt/timing for pt with Dr. Najma Tejada dx: Cardiomyopathy - discharging with a LifeVest - will need seen within the next 1-2 weeks. Please call pt with apt. Thank you.

## 2020-05-05 NOTE — PROGRESS NOTES
Spoke to Dr. Kushal Marx, patient needs to ambulate and see how she tolerates, if okay she may go home. Can be fitted for lifevest as outpatient. Patient eating lunch and will ambulate when finished.  This RN will assist.

## 2020-05-05 NOTE — PLAN OF CARE
Problem: Pain:  Description: Pain management should include both nonpharmacologic and pharmacologic interventions.   Goal: Pain level will decrease  Description: Pain level will decrease  5/5/2020 1332 by Rich Woodard RN  Outcome: Met This Shift  5/5/2020 0322 by Stevie Garza RN  Outcome: Met This Shift     Problem: Falls - Risk of:  Goal: Will remain free from falls  Description: Will remain free from falls  5/5/2020 1332 by Rich Woodard RN  Outcome: Met This Shift  5/5/2020 0322 by Stevie Garza RN  Outcome: Met This Shift

## 2020-05-06 ENCOUNTER — CARE COORDINATION (OUTPATIENT)
Dept: CASE MANAGEMENT | Age: 45
End: 2020-05-06

## 2020-05-06 NOTE — CARE COORDINATION
[facility] Transitions Initial Follow Up Call    Call within 2 business days of discharge: Yes    Patient: Rigo Villaseñor Patient : 1975   MRN: [id number]  Reason for Admission: Acute on chronic systolic heart failure  Discharge Date: 20 RARS: Readmission Risk Score: 26      Last Discharge [facility]       Complaint Diagnosis Description Type Department Provider    20 Shortness of Breath Congestive heart failure, unspecified HF chronicity, unspecified heart failure type ([address] . .. ED to Hosp-Admission (Discharged) (ADMITTED) Yael Raman MD; [facility]. .. Spoke with: No one    Facility:     First attempt to reach the patient for Covid-19 Monitoring at risk Care Transition call post hospital discharge. Message left with CTN's contact information requesting return phone call.       Follow Up  Future Appointments   Date Time Provider Jesus Mcfarland   2020  1:00 PM Alcides Soni MD [facility]   2020  9:30 AM Fercho Rader MD [facility] None       Michelle Sheriff, RN

## 2020-05-07 ENCOUNTER — CARE COORDINATION (OUTPATIENT)
Dept: CASE MANAGEMENT | Age: 45
End: 2020-05-07

## 2020-05-07 NOTE — CARE COORDINATION
[facility] Transitions Initial Follow Up Call    Call within 2 business days of discharge: Yes    Patient: Elsa Mcardle Patient : 1975   MRN: [id number]  Reason for Admission: Acute on chronic systolic heart failure  Discharge Date: 20 RARS: Readmission Risk Score: 26      Last Discharge [facility]       Complaint Diagnosis Description Type Department Provider    20 Shortness of Breath Congestive heart failure, unspecified HF chronicity, unspecified heart failure type ([address] . .. ED to Hosp-Admission (Discharged) (ADMITTED) Ras Hernandez MD; [employer] We. .. Spoke with: Elsa Mcardle, patient    [place] [facility] [place] contacted regarding recent discharge and COVID-19 risk   Care Transition Nurse/ Ambulatory Care Manager contacted the patient by telephone to perform post discharge assessment. Verified name and  with patient as identifiers. Patient has following risk factors of: heart failure, asthma, pneumonia and diabetes. CTN/ACM reviewed discharge instructions, medical action plan and red flags related to discharge diagnosis. Reviewed and educated them on any new and changed medications related to discharge diagnosis. Advised obtaining a 90-day supply of all daily and as-needed medications. Education provided regarding infection prevention, and signs and symptoms of COVID-19 and when to seek medical attention with patient who verbalized understanding. Discussed exposure protocols and quarantine from [address] you at higher risk for severe illness  and given an opportunity for questions and concerns. The patient agrees to contact the COVID-19 hotline [phone] or PCP office for questions related to their healthcare. CTN/ACM provided contact information for future reference. From CDC: Are you at higher risk for severe illness?  Wash your hands often.    Avoid close contact (6 feet, which is about two arm lengths)

## 2020-05-13 ENCOUNTER — CARE COORDINATION (OUTPATIENT)
Dept: CASE MANAGEMENT | Age: 45
End: 2020-05-13

## 2020-05-14 ENCOUNTER — TELEPHONE (OUTPATIENT)
Dept: BARIATRICS/WEIGHT MGMT | Age: 45
End: 2020-05-14

## 2020-05-20 ENCOUNTER — OFFICE VISIT (OUTPATIENT)
Dept: CARDIOLOGY CLINIC | Age: 45
End: 2020-05-20
Payer: MEDICARE

## 2020-05-20 VITALS
HEART RATE: 92 BPM | HEIGHT: 68 IN | SYSTOLIC BLOOD PRESSURE: 120 MMHG | DIASTOLIC BLOOD PRESSURE: 82 MMHG | BODY MASS INDEX: 44.41 KG/M2 | WEIGHT: 293 LBS | RESPIRATION RATE: 24 BRPM

## 2020-05-20 PROCEDURE — G8427 DOCREV CUR MEDS BY ELIG CLIN: HCPCS | Performed by: INTERNAL MEDICINE

## 2020-05-20 PROCEDURE — 4004F PT TOBACCO SCREEN RCVD TLK: CPT | Performed by: INTERNAL MEDICINE

## 2020-05-20 PROCEDURE — 1111F DSCHRG MED/CURRENT MED MERGE: CPT | Performed by: INTERNAL MEDICINE

## 2020-05-20 PROCEDURE — 99213 OFFICE O/P EST LOW 20 MIN: CPT | Performed by: INTERNAL MEDICINE

## 2020-05-20 PROCEDURE — 93000 ELECTROCARDIOGRAM COMPLETE: CPT | Performed by: INTERNAL MEDICINE

## 2020-05-20 PROCEDURE — G8417 CALC BMI ABV UP PARAM F/U: HCPCS | Performed by: INTERNAL MEDICINE

## 2020-05-20 RX ORDER — CITALOPRAM 20 MG/1
20 TABLET ORAL PRN
COMMUNITY
Start: 2020-05-19 | End: 2021-01-08

## 2020-05-20 RX ORDER — DIAZEPAM 5 MG/1
5 TABLET ORAL 2 TIMES DAILY
COMMUNITY
End: 2021-07-08 | Stop reason: CLARIF

## 2020-05-20 RX ORDER — CYCLOBENZAPRINE HCL 10 MG
10 TABLET ORAL PRN
COMMUNITY
Start: 2020-03-16 | End: 2021-07-08 | Stop reason: CLARIF

## 2020-05-20 NOTE — PROGRESS NOTES
for Pain for up to 30 days. Intended supply: 3 days. Take lowest dose possible to manage pain, Disp: 90 tablet, Rfl: 0    albuterol sulfate HFA (PROAIR HFA) 108 (90 Base) MCG/ACT inhaler, Inhale 2 puffs into the lungs every 4 hours as needed for Wheezing or Shortness of Breath, Disp: 1 Inhaler, Rfl: 3    cetirizine (ZYRTEC) 10 MG tablet, Take 1 tablet by mouth daily, Disp: 30 tablet, Rfl: 3    ondansetron (ZOFRAN ODT) 4 MG disintegrating tablet, Take 1 tablet by mouth every 8 hours as needed for Nausea or Vomiting, Disp: 10 tablet, Rfl: 0    dicyclomine (BENTYL) 10 MG capsule, Take 2 capsules by mouth every 8 hours as needed (diarrhea), Disp: 20 capsule, Rfl: 0    potassium chloride (KLOR-CON M) 20 MEQ extended release tablet, Take 1 tablet by mouth daily (with breakfast), Disp: 60 tablet, Rfl: 3    mineral oil-hydrophilic petrolatum (AQUAPHOR) ointment, Apply topically as needed. , Disp: 99 g, Rfl: 5    pantoprazole (PROTONIX) 20 MG tablet, TAKE 1 TABLET BY MOUTH DAILY, Disp: 30 tablet, Rfl: 3    Multiple Vitamins-Minerals (THERAPEUTIC MULTIVITAMIN-MINERALS) tablet, Take 1 tablet by mouth daily, Disp: 90 tablet, Rfl: 5    metFORMIN (GLUCOPHAGE) 500 MG tablet, TAKE 1 TABLET BY MOUTH TWICE DAILY WITH MEALS, Disp: 60 tablet, Rfl: 2    Insulin Pen Needle ([employer] PEN NEEDLES) 31G X 6 MM MISC, 1 each by Does not apply route daily, Disp: 100 each, Rfl: 3    Blood Pressure Monitoring (BLOOD PRESSURE CUFF) MISC, Dx:  Hypertension with labile blood pressure, Disp: 1 each, Rfl: 0    liraglutide-weight management 18 MG/3ML SOPN, Inject 0.6 mg into the skin daily (Patient not taking: Reported on 5/7/2020), Disp: 30 pen, Rfl: 0    ONE TOUCH ULTRA TEST strip, 1 each by Does not apply route daily, Disp: 120 each, Rfl: 5    ONETOUCH DELICA LANCETS FINE MISC, 60 Devices daily, Disp: , Rfl: 5    Misc. Devices (CANE) MISC, Use cane to aid in ambulation, Disp: 1 each, Rfl: 0    Misc.  Devices (WALKER) MISC, Use walker when ambulating. (Patient not taking: Reported on 5/7/2020), Disp: 1 each, Rfl: 0    Handicap Placard MISC, by Does not apply route Patient cannot walk 200 ft without stopping to rest.   Expiration 5 yrs, Disp: 1 each, Rfl: 0    mometasone-formoterol (DULERA) 100-5 MCG/ACT inhaler, Inhale 2 puffs into the lungs 2 times daily Rinse mouth after using. (Patient not taking: Reported on 5/7/2020), Disp: 1 Inhaler, Rfl: 2      Note: This report was completed utilizing computer voice recognition software. Every effort has been made to ensure accuracy, however; inadvertent computerized transcription errors may be present.     --Alcides Soni MD on 5/20/2020 at 1:21 PM

## 2020-05-22 ENCOUNTER — INITIAL CONSULT (OUTPATIENT)
Dept: BARIATRICS/WEIGHT MGMT | Age: 45
End: 2020-05-22
Payer: MEDICARE

## 2020-05-22 VITALS — BODY MASS INDEX: 43.4 KG/M2 | HEIGHT: 69 IN | WEIGHT: 293 LBS

## 2020-05-22 PROCEDURE — 99999 PR OFFICE/OUTPT VISIT,PROCEDURE ONLY: CPT

## 2020-05-29 ENCOUNTER — VIRTUAL VISIT (OUTPATIENT)
Dept: FAMILY MEDICINE CLINIC | Age: 45
End: 2020-05-29
Payer: MEDICARE

## 2020-05-29 PROBLEM — E46 MALNUTRITION (HCC): Status: ACTIVE | Noted: 2020-05-29

## 2020-05-29 PROBLEM — F32.1 CURRENT MODERATE EPISODE OF MAJOR DEPRESSIVE DISORDER WITHOUT PRIOR EPISODE (HCC): Status: ACTIVE | Noted: 2020-05-29

## 2020-05-29 PROCEDURE — 1111F DSCHRG MED/CURRENT MED MERGE: CPT | Performed by: PHYSICIAN ASSISTANT

## 2020-05-29 PROCEDURE — 99214 OFFICE O/P EST MOD 30 MIN: CPT | Performed by: PHYSICIAN ASSISTANT

## 2020-05-29 PROCEDURE — G8427 DOCREV CUR MEDS BY ELIG CLIN: HCPCS | Performed by: PHYSICIAN ASSISTANT

## 2020-05-29 RX ORDER — ALBUTEROL SULFATE 90 UG/1
2 AEROSOL, METERED RESPIRATORY (INHALATION) EVERY 4 HOURS PRN
Qty: 1 INHALER | Refills: 3 | Status: SHIPPED
Start: 2020-05-29 | End: 2021-01-29 | Stop reason: SDUPTHER

## 2020-05-29 RX ORDER — ALBUTEROL SULFATE 2.5 MG/3ML
2.5 SOLUTION RESPIRATORY (INHALATION) EVERY 6 HOURS PRN
Qty: 120 EACH | Refills: 3 | Status: SHIPPED
Start: 2020-05-29 | End: 2021-03-23 | Stop reason: SDUPTHER

## 2020-05-29 ASSESSMENT — PATIENT HEALTH QUESTIONNAIRE - PHQ9
2. FEELING DOWN, DEPRESSED OR HOPELESS: 0
SUM OF ALL RESPONSES TO PHQ QUESTIONS 1-9: 0
SUM OF ALL RESPONSES TO PHQ QUESTIONS 1-9: 0
1. LITTLE INTEREST OR PLEASURE IN DOING THINGS: 0
SUM OF ALL RESPONSES TO PHQ9 QUESTIONS 1 & 2: 0

## 2020-05-29 NOTE — PROGRESS NOTES
Bhargavi Garcia was read the following message We want to confirm that, for purposes of billing, this is a virtual visit with your provider for which we will submit a claim for reimbursement with your insurance company. You will be responsible for any copays, coinsurance amounts or other amounts not covered by your insurance company. If you do not accept this, unfortunately we will not be able to schedule a virtual visit with the provider. Do you accept?  Faviola Diaz responded YES

## 2020-06-02 PROBLEM — I50.9 HEART FAILURE (HCC): Status: ACTIVE | Noted: 2020-06-02

## 2020-06-23 ENCOUNTER — OFFICE VISIT (OUTPATIENT)
Dept: CARDIOLOGY CLINIC | Age: 45
End: 2020-06-23
Payer: MEDICARE

## 2020-06-23 ENCOUNTER — TELEPHONE (OUTPATIENT)
Dept: CARDIOLOGY CLINIC | Age: 45
End: 2020-06-23

## 2020-06-23 VITALS
HEIGHT: 68 IN | DIASTOLIC BLOOD PRESSURE: 80 MMHG | RESPIRATION RATE: 16 BRPM | BODY MASS INDEX: 44.41 KG/M2 | WEIGHT: 293 LBS | HEART RATE: 102 BPM | SYSTOLIC BLOOD PRESSURE: 152 MMHG

## 2020-06-23 PROCEDURE — G8427 DOCREV CUR MEDS BY ELIG CLIN: HCPCS | Performed by: INTERNAL MEDICINE

## 2020-06-23 PROCEDURE — 4004F PT TOBACCO SCREEN RCVD TLK: CPT | Performed by: INTERNAL MEDICINE

## 2020-06-23 PROCEDURE — G8417 CALC BMI ABV UP PARAM F/U: HCPCS | Performed by: INTERNAL MEDICINE

## 2020-06-23 PROCEDURE — 93000 ELECTROCARDIOGRAM COMPLETE: CPT | Performed by: INTERNAL MEDICINE

## 2020-06-23 PROCEDURE — 99214 OFFICE O/P EST MOD 30 MIN: CPT | Performed by: INTERNAL MEDICINE

## 2020-06-23 NOTE — TELEPHONE ENCOUNTER
Jaelyn from [facility] is requesting last office notes and Echo results for EF%, Please fax to [phone]

## 2020-06-23 NOTE — PROGRESS NOTES
and risk of serious arrhythmia. This was done in great detail since they both said they had not been previously informed. She will have follow-up in 2 weeks. A BMP will likely be checked at that time pending course both hydralazine and/Isordil will be utilized    At completion of today's visit, medications include the following:    Current Outpatient Medications:     sacubitril-valsartan (ENTRESTO)  MG per tablet, Take 1 tablet by mouth 2 times daily, Disp: 60 tablet, Rfl: 5    gabapentin (NEURONTIN) 600 MG tablet, Take 1 tablet by mouth 3 times daily for 90 days. , Disp: 90 tablet, Rfl: 2    [START ON 8/21/2020] HYDROcodone-acetaminophen (NORCO) 5-325 MG per tablet, Take 1 tablet by mouth every 8 hours as needed for Pain for up to 30 days. Intended supply: 3 days. Take lowest dose possible to manage pain, Disp: 90 tablet, Rfl: 0    mometasone-formoterol (DULERA) 100-5 MCG/ACT inhaler, Inhale 2 puffs into the lungs 2 times daily Rinse mouth after using., Disp: 1 Inhaler, Rfl: 2    albuterol sulfate HFA (PROAIR HFA) 108 (90 Base) MCG/ACT inhaler, Inhale 2 puffs into the lungs every 4 hours as needed for Wheezing or Shortness of Breath, Disp: 1 Inhaler, Rfl: 3    albuterol (PROVENTIL) (2.5 MG/3ML) 0.083% nebulizer solution, Take 3 mLs by nebulization every 6 hours as needed for Wheezing, Disp: 120 each, Rfl: 3    mineral oil-hydrophilic petrolatum (AQUAPHOR) ointment, Apply topically as needed. , Disp: 396 g, Rfl: 1    citalopram (CELEXA) 20 MG tablet, Take 20 mg by mouth daily , Disp: , Rfl:     cyclobenzaprine (FLEXERIL) 10 MG tablet, TK 1 T PO BID PRF MUSCLE SPASMS, Disp: , Rfl:     diazePAM (VALIUM) 5 MG tablet, Take 5 mg by mouth as needed for Anxiety.  , Disp: , Rfl:     Semaglutide (OZEMPIC, 1 MG/DOSE, SC), Inject into the skin once a week, Disp: , Rfl:     metoprolol succinate (TOPROL XL) 100 MG extended release tablet, Take 1 tablet by mouth 2 times daily, Disp: 30 tablet, Rfl: 3    aspirin

## 2020-06-26 RX ORDER — MULTIVITAMIN WITH FOLIC ACID 400 MCG
TABLET ORAL
Qty: 90 TABLET | Refills: 5 | Status: SHIPPED | OUTPATIENT
Start: 2020-06-26

## 2020-07-02 ENCOUNTER — TELEMEDICINE (OUTPATIENT)
Dept: FAMILY MEDICINE CLINIC | Age: 45
End: 2020-07-02
Payer: MEDICARE

## 2020-07-02 PROBLEM — J18.9 COMMUNITY ACQUIRED PNEUMONIA OF RIGHT MIDDLE LOBE OF LUNG: Status: RESOLVED | Noted: 2019-02-12 | Resolved: 2020-07-02

## 2020-07-02 PROCEDURE — 4004F PT TOBACCO SCREEN RCVD TLK: CPT | Performed by: PHYSICIAN ASSISTANT

## 2020-07-02 PROCEDURE — G8427 DOCREV CUR MEDS BY ELIG CLIN: HCPCS | Performed by: PHYSICIAN ASSISTANT

## 2020-07-02 PROCEDURE — G8417 CALC BMI ABV UP PARAM F/U: HCPCS | Performed by: PHYSICIAN ASSISTANT

## 2020-07-02 PROCEDURE — 99214 OFFICE O/P EST MOD 30 MIN: CPT | Performed by: PHYSICIAN ASSISTANT

## 2020-07-02 RX ORDER — CETIRIZINE HYDROCHLORIDE 10 MG/1
10 TABLET ORAL DAILY
Qty: 30 TABLET | Refills: 3 | Status: SHIPPED
Start: 2020-07-02 | End: 2021-03-10

## 2020-07-02 RX ORDER — DOXYCYCLINE HYCLATE 100 MG
100 TABLET ORAL 2 TIMES DAILY WITH MEALS
Qty: 20 TABLET | Refills: 0 | Status: SHIPPED | OUTPATIENT
Start: 2020-07-02 | End: 2020-07-12

## 2020-07-02 NOTE — PROGRESS NOTES
TeleMedicine Patient Consent    This visit was performed as a virtual video visit using a synchronous, two-way, audio-video telehealth technology platform. Patient identification was verified at the start of the visit, including the patient's telephone number and physical location. I discussed with the patient the nature of our telehealth visits, that:     Due to the nature of an audio- video modality, the only components of a physical exam that could be done are the elements supported by direct observation. I would evaluate the patient and recommend diagnostics and treatments based on my assessment. If it was felt that the patient should be evaluated in clinic or an emergency room setting, then they would be directed there. Our sessions are not being recorded and that personal health information is protected. Our team would provide follow up care in person if/when the patient needs it. Patient does agree to proceed with telemedicine consultation. Patient's location: home address in [place]. Is there anyone else present for this visit: No  This visit was completed virtually using [employer]. me    Physician Location:    [facility], [address] [place] [address] Quiana Hodgson.    Time spent: Greater than Not billed by time    2020    TELEHEALTH EVALUATION -- Audio or Visual (During GMUAO-69 public health emergency)    HPI:    Fariba Van (:  1975) has requested an audio/video evaluation for the following concern(s): f/u for CM. Reviewed Dr Corina Fontana notes. She is doing well with medication adjustments. She denies cp or sob. She is to have labs done next week. She is \"in a depression after Dr Corina Fontana real talk. \" She is worried about sudden death. She is seeing Psych and was started on meds. She started a healthier diet. She asks for a walker with a seat so she can be more mobil. Her asthma is stable. She asks for allergy meds. She has boils in her left axilla.          ROS Unless otherwise specified  Review of Systems - General ROS: negative for - chills, fatigue, fever, night sweats, sleep disturbance, weight gain or weight loss  Psychological ROS: negative for - anxiety, behavioral disorder, depression, hallucinations, irritability, memory difficulties, mood swings, sleep disturbances or suicidal ideation  ENT ROS: negative for - epistaxis, headaches, hearing change, nasal congestion, nasal discharge, nasal polyps, sinus pain, tinnitus, vertigo or visual changes  Hematological and Lymphatic ROS: negative for - bleeding problems, blood clots, fatigue or swollen lymph nodes  Respiratory ROS: negative for - cough, orthopnea, shortness of breath, sputum changes, tachypnea or wheezing  Cardiovascular ROS: negative for - chest pain, dyspnea on exertion, irregular heartbeat, loss of consciousness, palpitations, paroxysmal nocturnal dyspnea or rapid heart rate  Gastrointestinal ROS: negative for - abdominal pain, blood in stools, change in bowel habits, constipation, diarrhea, gas/bloating, heartburn or nausea/vomiting  Musculoskeletal ROS: negative for - joint pain, joint stiffness, joint swelling or muscle, back pain, bowel or bladder incontinence  Neurological ROS: negative for - behavioral changes, confusion, dizziness, headaches, memory loss, numbness/tingling, seizures or speech problems, weakness  Dermatological ROS: negative for - dry skin, mole changes, nail changes, pruritus, rash or skin lesion changes    Prior to Visit Medications    Medication Sig Taking?  Authorizing Provider   doxycycline hyclate (VIBRA-TABS) 100 MG tablet Take 1 tablet by mouth 2 times daily (with meals) for 10 days Yes Tammy Combs PA-C   cetirizine (ZYRTEC) 10 MG tablet Take 1 tablet by mouth daily Yes Tammy Combs PA-C   Multiple Vitamin (DAILY-DOUGLAS) TABS TAKE 1 TABLET BY MOUTH EVERY DAY Yes Nemesio Burton DO   sacubitril-valsartan (ENTRESTO)  MG per tablet Take 1 tablet by mouth 2 times daily Yes Zach Poole Vernon Martins MD   gabapentin (NEURONTIN) 600 MG tablet Take 1 tablet by mouth 3 times daily for 90 days. Yes Rell Kitchen MD   HYDROcodone-acetaminophen (NORCO) 5-325 MG per tablet Take 1 tablet by mouth every 8 hours as needed for Pain for up to 30 days. Intended supply: 3 days. Take lowest dose possible to manage pain Yes Rell Kitchen MD   mometasone-formoterol (DULERA) 100-5 MCG/ACT inhaler Inhale 2 puffs into the lungs 2 times daily Rinse mouth after using. Yes Corie Severino PA-C   albuterol sulfate HFA (PROAIR HFA) 108 (90 Base) MCG/ACT inhaler Inhale 2 puffs into the lungs every 4 hours as needed for Wheezing or Shortness of Breath Yes Corie Severino PA-C   albuterol (PROVENTIL) (2.5 MG/3ML) 0.083% nebulizer solution Take 3 mLs by nebulization every 6 hours as needed for Wheezing Yes Corie Severino PA-C   mineral oil-hydrophilic petrolatum (AQUAPHOR) ointment Apply topically as needed. Yes Corie Severino PA-C   citalopram (CELEXA) 20 MG tablet Take 20 mg by mouth daily  Yes Historical Provider, MD   cyclobenzaprine (FLEXERIL) 10 MG tablet TK 1 T PO BID PRF MUSCLE SPASMS Yes Historical Provider, MD   diazePAM (VALIUM) 5 MG tablet Take 5 mg by mouth as needed for Anxiety.   Yes Historical Provider, MD   Semaglutide (OZEMPIC, 1 MG/DOSE, SC) Inject into the skin once a week Yes Historical Provider, MD   metoprolol succinate (TOPROL XL) 100 MG extended release tablet Take 1 tablet by mouth 2 times daily Yes Belén Vazquez MD   aspirin 81 MG chewable tablet Take 1 tablet by mouth daily Yes Belén Vazquez MD   atorvastatin (LIPITOR) 80 MG tablet Take 1 tablet by mouth nightly Yes Belén Vazquez MD   bumetanide (BUMEX) 1 MG tablet Take 1 tablet by mouth daily Yes Belén Vazquez MD   spironolactone (ALDACTONE) 25 MG tablet Take 1 tablet by mouth daily Yes Belén Vazquez MD   ipratropium-albuterol (DUONEB) 0.5-2.5 (3) MG/3ML SOLN nebulizer solution Take 3 mLs by nebulization every 4 hours as needed for Shortness of Breath Yes Mahogany Gomez MD   hydrALAZINE (APRESOLINE) 25 MG tablet TAKE 1 TABLET BY MOUTH EVERY 8 HOURS Yes GREGORIO Cartwright   fluticasone (FLONASE) 50 MCG/ACT nasal spray SHAKE LIQUID AND USE 1 SPRAY IN EACH NOSTRIL DAILY Yes GREGORIO Cartwright   ondansetron (ZOFRAN ODT) 4 MG disintegrating tablet Take 1 tablet by mouth every 8 hours as needed for Nausea or Vomiting Yes Valerie Del Valle PA-C   dicyclomine (BENTYL) 10 MG capsule Take 2 capsules by mouth every 8 hours as needed (diarrhea) Yes Valerie Del Valle PA-C   potassium chloride (KLOR-CON M) 20 MEQ extended release tablet Take 1 tablet by mouth daily (with breakfast) Yes Irina Ortez PA-C   Insulin Pen Needle ([employer] PEN NEEDLES) 31G X 6 MM MISC 1 each by Does not apply route daily Yes Irina Ortez PA-C   Blood Pressure Monitoring (BLOOD PRESSURE CUFF) MISC Dx:  Hypertension with labile blood pressure Yes Nadia Pierre DO   liraglutide-weight management 18 MG/3ML SOPN Inject 0.6 mg into the skin daily Yes Nadia Pierre DO   mineral oil-hydrophilic petrolatum (AQUAPHOR) ointment Apply topically as needed. Yes Irina Ortez PA-C   ONE TOUCH ULTRA TEST strip 1 each by Does not apply route daily Yes Irina Ortez PA-C   ONETOUCH DELICA LANCETS FINE MISC 60 Devices daily Yes Odalis Bean MD   Misc. Devices (CANE) MISC Use cane to aid in ambulation Yes Agustina Diggs MD   Misc. Devices (WALKER) MISC Use walker when ambulating.  Yes Candido Dodson PA-C   pantoprazole (PROTONIX) 20 MG tablet TAKE 1 TABLET BY MOUTH DAILY Yes Irina Ortez PA-C   Handicap Placard MISC by Does not apply route Patient cannot walk 200 ft without stopping to rest.    Expiration 5 yrs Yes Irina Ortez PA-C       Social History     Tobacco Use    Smoking status: Light Tobacco Smoker     Packs/day: 1.00     Years: 17.00     Pack years: 17.00     Types: Cigarettes     Start date: 3/27/2001     Last attempt to quit: 10/5/2018     Years since quittin.7    Smokeless tobacco: Never Used    Tobacco comment: occasionally has 1 cigarette, very stressed now   Substance Use Topics    Alcohol use: No    Drug use: No        Allergies   Allergen Reactions    Food Anaphylaxis     Food allergy - Fish and tree nuts    Fish-Derived Products     Nuts [Peanut-Containing Drug Products]     Pcn [Penicillins]     Ultram [Tramadol Hcl]      Per pt had a seizure    Cashews [Macadamia Nut Oil] Nausea And Vomiting   ,   Past Medical History:   Diagnosis Date    Asthma     Bell palsy     DDD (degenerative disc disease), cervical     with spinal stenosis    Diabetes mellitus ([facility] [address]     Diverticulitis     DJD (degenerative joint disease)     both hips    GERD without esophagitis     HTN (hypertension)     Hyperlipidemia     Meningitis     Morbid obesity due to excess calories (HCC)     Neuropathy     Pancreas cyst     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HCC)    ,   Past Surgical History:   Procedure Laterality Date    CARDIAC CATHETERIZATION  2020    Dr. Mertha Hodgkins  2009    CYSTOSCOPY Left 2018    left stent placement    DILATION AND CURETTAGE OF UTERUS      younger age   Lenetta Ni LITHOTRIPSY Right 02/15/2018    Cystoscopy;Stent Removal    UPPER GASTROINTESTINAL ENDOSCOPY N/A 2018    EGD BIOPSY performed by Emilie Guzmán MD at [place] [id number]   ,   Social History     Tobacco Use    Smoking status: Light Tobacco Smoker     Packs/day: 1.00     Years: 17.00     Pack years: 17.00     Types: Cigarettes     Start date: 3/27/2001     Last attempt to quit: 10/5/2018     Years since quittin.7    Smokeless tobacco: Never Used    Tobacco comment: occasionally has 1 cigarette, very stressed now   Substance Use Topics    Alcohol use: No    Drug use: No   ,   Family History   Problem Relation Age of Onset    Arthritis Mother     Hypertension Mother     Asthma Mother     Cancer Father     Hypertension Father     Heart Attack Father  Asthma Father    ,   Immunization History   Administered Date(s) Administered    Pneumococcal Polysaccharide ([id number]) 04/03/2018   ,   Health Maintenance   Topic Date Due    HIV screen  04/01/1992    Hepatitis B vaccine (1 of 3 - Risk 3-dose series) 04/01/1996    DTaP/Tdap/Td vaccine (1 - Tdap) 04/01/1996    Diabetic retinal exam  03/07/2018    Flu vaccine (1) 09/01/2020    Diabetic foot exam  09/17/2020    Diabetic microalbuminuria test  09/17/2020    A1C test (Diabetic or Prediabetic)  04/29/2021    Cervical cancer screen  04/30/2021    Lipid screen  05/05/2021    Potassium monitoring  05/05/2021    Creatinine monitoring  05/05/2021    Pneumococcal 0-64 years Vaccine  Completed    Hepatitis A vaccine  Aged Out    Hib vaccine  Aged Out    Meningococcal (ACWY) vaccine  Aged Out       PHYSICAL EXAMINATION:  [ INSTRUCTIONS:  \"[x]\" Indicates a positive item  \"[]\" Indicates a negative item  -- DELETE ALL ITEMS NOT EXAMINED]  Vital Signs: (As obtained by patient/caregiver or practitioner observation)    Blood pressure-  Heart rate-    Respiratory rate-    Temperature-  Pulse oximetry-     Constitutional: [x] Appears well-developed and well-nourished [] No apparent distress      [] Abnormal-   Mental status  [x] Alert and awake  [x] Oriented to person/place/time [x]Able to follow commands      Eyes:  EOM    [x]  Normal  [] Abnormal-  Sclera  [x]  Normal  [] Abnormal -         Discharge [x]  None visible  [] Abnormal -    HENT:   [x] Normocephalic, atraumatic.   [] Abnormal   [x] Mouth/Throat: Mucous membranes are moist.     External Ears [x] Normal  [] Abnormal-     Neck: [x] No visualized mass     Pulmonary/Chest: [x] Respiratory effort normal.  [x] No visualized signs of difficulty breathing or respiratory distress        [] Abnormal-      Musculoskeletal:   [] Normal gait with no signs of ataxia         [x] Normal range of motion of neck        [] Abnormal-       Neurological:        [x] No Facial Asymmetry (Cranial nerve 7 motor function) (limited exam to video visit)          [] No gaze palsy        [] Abnormal-         Skin:        [x] No significant exanthematous lesions or discoloration noted on facial skin         [] Abnormal-            Psychiatric:       [x] Normal Affect [x] No Hallucinations        [] Abnormal-       Other pertinent observable physical exam findings-     Due to this being a TeleHealth encounter, evaluation of the following organ systems is limited: Vitals/Constitutional/EENT/Resp/CV/GI//MS/Neuro/Skin/Heme-Lymph-Imm. ASSESSMENT/PLAN:  1. Essential hypertension      2. Cardiomyopathy, unspecified type ([facility] [address]      3. BMI 60.0-69.9, adult ([facility] [address]      4. Major depression  - per psych    5. Hidradenitis    - doxycycline hyclate (VIBRA-TABS) 100 MG tablet; Take 1 tablet by mouth 2 times daily (with meals) for 10 days  Dispense: 20 tablet; Refill: 0    6. Mild intermittent asthma without complication  - cetirizine (ZYRTEC) 10 MG tablet; Take 1 tablet by mouth daily  Dispense: 30 tablet; Refill: 3    7. Lumbar spinal stenosis  -walker with seat  F/u 1 month    An  electronic signature was used to authenticate this note.    --Arjun Ramirez PA-C on 7/2/2020 at 4:27 }    Pursuant to the emergency declaration under the [facility] [place][note 2][facility], On license of [facility] waiver authority and the [employer] Act, this Virtual  Visit was conducted, with patient's consent, to reduce the patient's risk of exposure to COVID-19 and provide continuity of care for an established patient. Services were provided through a video synchronous discussion virtually to substitute for in-person clinic visit.

## 2020-07-02 NOTE — PROGRESS NOTES
Enrique Chaves was read the following message We want to confirm that, for purposes of billing, this is a virtual visit with your provider for which we will submit a claim for reimbursement with your insurance company. You will be responsible for any copays, coinsurance amounts or other amounts not covered by your insurance company. If you do not accept this, unfortunately we will not be able to schedule a virtual visit with the provider. Do you accept?  Danny Tenorio responded YES

## 2020-07-07 ENCOUNTER — OFFICE VISIT (OUTPATIENT)
Dept: CARDIOLOGY CLINIC | Age: 45
End: 2020-07-07
Payer: MEDICARE

## 2020-07-07 VITALS
HEART RATE: 79 BPM | SYSTOLIC BLOOD PRESSURE: 142 MMHG | WEIGHT: 293 LBS | DIASTOLIC BLOOD PRESSURE: 80 MMHG | HEIGHT: 68 IN | BODY MASS INDEX: 44.41 KG/M2

## 2020-07-07 PROCEDURE — 93000 ELECTROCARDIOGRAM COMPLETE: CPT | Performed by: INTERNAL MEDICINE

## 2020-07-07 PROCEDURE — 4004F PT TOBACCO SCREEN RCVD TLK: CPT | Performed by: INTERNAL MEDICINE

## 2020-07-07 PROCEDURE — 99214 OFFICE O/P EST MOD 30 MIN: CPT | Performed by: INTERNAL MEDICINE

## 2020-07-07 PROCEDURE — G8427 DOCREV CUR MEDS BY ELIG CLIN: HCPCS | Performed by: INTERNAL MEDICINE

## 2020-07-07 PROCEDURE — G8417 CALC BMI ABV UP PARAM F/U: HCPCS | Performed by: INTERNAL MEDICINE

## 2020-07-07 RX ORDER — ISOSORBIDE DINITRATE 20 MG/1
20 TABLET ORAL 3 TIMES DAILY
Qty: 90 TABLET | Refills: 3 | Status: SHIPPED
Start: 2020-07-07 | End: 2021-01-08 | Stop reason: SDUPTHER

## 2020-07-07 NOTE — PROGRESS NOTES
[facility]  Kendra Jansen. Ross Carrasco M.D. [place] LEILANI Romano. [address] [place] [address], M.D. Casi Sewell M.D. Jillian Spalding, Almira Lesch, M.D. MD Brenna Armstrong   1975  VINH Cee PA-C      This 82-year-old woman is seen today for outpatient cardiac follow-up. She was hospitalized 02/11/2019 through 02/17/2019 with pneumonia due to human metapneumovirus.  Hospitalization was protracted due to hypoxemia.  She has had recurrent episodes of lower back pain.  She was in the emergency room 04/28/2020 for dyspnea.  proBNP was 1508. Sasha Reilly was admitted echo showed  a dilated left ventricle.  She had bilateral lung infiltrates.  Cardiac catheterization 05/04/2020 showed no LM stenosis.  The LAD had a 20% stenosis in the circumflex 20% stenosis.  The dominant RCA had 7% narrowing with IFR 0.98.  LVEDP was 43.   LifeVest prescribed at discharge    Medical History:  Medical History:  1. Essential hypertension. 2. Asthma. 3. Bell palsy. 4. Diverticulitis. 5. DJD. 6. GERD. 7. Meningitis. 8. Morbid obesity .  BMI 59.36 - 08/2018.  9. Pancreas cyst.  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 11. Reactive depression/anxiety. 12. Nephrolithiasis  Hx . EVERARDO Pyelonephritis. 13. Systemic inflammatory response syndrome. 14. Tobacco abuse. 15. Chronic PVCs. 16. Spinal stenosis of lumbar region at multiple levels. 17. Obstructive sleep apnea  18. CHF with normal EF March 2018.    19. Echo, 03/21/2018.  .  Ejection fraction  visually estimated at 50-55%. Mild concentric LVH. Indeterminate diastolic function. Mildly dilated right ventricle.  Right ventricle global systolic function is normal.  No significant valvular abnormalities.  Unable to estimate RVSP.  No  pericardial effusion. .  20. Hospitalized 02/11/2019 through 02/17/2019 with pneumonia due to human metapneumovirus.  Hospitalization was protracted due to hypoxemia.  He has had recurrent episodes of lower back pain. 21. Admitted from ED 04/28/2012 for dyspnea.  proBNP was 1/5/2008.   Echo estimate EF 30% with a dilatedLVV.   bilateral lung infiltrates. 22. Cath 05/04/2020: No LM stenosis. LAD  20% stenosis               Circumflex 20% stenosis.  RCA  70% stenosis with IFR 0.98.  LVEDP 43.  23. LifeVest.  hospital discharge 05/05/2020  24. Cardiac office evaluation 06/23/2020: Symptomatically stable. Entresto increased to 97/103 twice daily  25. Outpatient cardiac follow-up 07/07/2020: Dyspnea improved. Continues to have chronic back and foot pain. Continues to wear LifeVest.  Isosorbide dinitrate 20 mg p.o. 3 times daily added   :          Review of Systems:  Constitutional: negative for fever and chills  Respiratory: negative for cough and hemoptysis  Cardiovascular:   Gastrointestinal: negative for abdominal pain, diarrhea, nausea and vomiting  Genitourinary:negative for dysuria and hematuria  Derm: negative for rash and skin lesion(s)  Neurological: negative for seizures and tremors  Endocrine: negative for diabetic symptoms including polydipsia and polyuria  Musculoskeletal: negative for CTD  Psychiatric: negative for psychosis and major depression    On examination, she is an alert pleasant middle-aged black female in no distress skin is warm and dry. Respirations are unlabored. BP (!) 142/80   Pulse 79   Ht 5' 8\" (1.727 m)   Wt (!) 399 lb 8 oz (181.2 kg)   BMI 60.74 kg/m² . HEENT negative for scleral icterus. Extraocular muscles intact. No facial asymmetry or central cyanosis. Neck without masses or goiter. No bruit or JVD. Cardiac apex not displaced. Rhythm regular. Abdomen normal.  Extremities without edema lungs are clear. EKG today shows sinus rhythm. 79/min.  RAD. RBBB. She is stable from a cardiovascular standpoint.   She will have upward titration of medications with the addition of isosorbide dinitrate 20 mg TID an echocardiogram will be obtained for LVEF to assess whether the LifeVest may be discontinued. She will be seen back in the office in the next 1-2 weeks anticipated a BMP will be checked at that time and further upward titrations we made particularly of hydralazine and isosorbide. This was reviewed with her at length including the concept of ejection fraction and the importance of the medication changes. At completion of today's visit, medications include the following:    Current Outpatient Medications:     doxycycline hyclate (VIBRA-TABS) 100 MG tablet, Take 1 tablet by mouth 2 times daily (with meals) for 10 days, Disp: 20 tablet, Rfl: 0    cetirizine (ZYRTEC) 10 MG tablet, Take 1 tablet by mouth daily, Disp: 30 tablet, Rfl: 3    Multiple Vitamin (DAILY-DOUGLAS) TABS, TAKE 1 TABLET BY MOUTH EVERY DAY, Disp: 90 tablet, Rfl: 5    sacubitril-valsartan (ENTRESTO)  MG per tablet, Take 1 tablet by mouth 2 times daily, Disp: 60 tablet, Rfl: 5    gabapentin (NEURONTIN) 600 MG tablet, Take 1 tablet by mouth 3 times daily for 90 days. , Disp: 90 tablet, Rfl: 2    [START ON 8/21/2020] HYDROcodone-acetaminophen (NORCO) 5-325 MG per tablet, Take 1 tablet by mouth every 8 hours as needed for Pain for up to 30 days. Intended supply: 3 days. Take lowest dose possible to manage pain, Disp: 90 tablet, Rfl: 0    mometasone-formoterol (DULERA) 100-5 MCG/ACT inhaler, Inhale 2 puffs into the lungs 2 times daily Rinse mouth after using., Disp: 1 Inhaler, Rfl: 2    albuterol sulfate HFA (PROAIR HFA) 108 (90 Base) MCG/ACT inhaler, Inhale 2 puffs into the lungs every 4 hours as needed for Wheezing or Shortness of Breath, Disp: 1 Inhaler, Rfl: 3    albuterol (PROVENTIL) (2.5 MG/3ML) 0.083% nebulizer solution, Take 3 mLs by nebulization every 6 hours as needed for Wheezing, Disp: 120 each, Rfl: 3    mineral oil-hydrophilic petrolatum (AQUAPHOR) ointment, Apply topically as needed. , Disp: 396 g, Rfl: 1    citalopram (CELEXA) 20 MG tablet, Take 20 mg by mouth as needed , Disp: , Rfl:     cyclobenzaprine (FLEXERIL) 10 MG tablet, as needed , Disp: , Rfl:     diazePAM (VALIUM) 5 MG tablet, Take 5 mg by mouth as needed for Anxiety.  , Disp: , Rfl:     Semaglutide (OZEMPIC, 1 MG/DOSE, SC), Inject into the skin once a week, Disp: , Rfl:     metoprolol succinate (TOPROL XL) 100 MG extended release tablet, Take 1 tablet by mouth 2 times daily, Disp: 30 tablet, Rfl: 3    aspirin 81 MG chewable tablet, Take 1 tablet by mouth daily, Disp: 30 tablet, Rfl: 3    atorvastatin (LIPITOR) 80 MG tablet, Take 1 tablet by mouth nightly, Disp: 30 tablet, Rfl: 3    bumetanide (BUMEX) 1 MG tablet, Take 1 tablet by mouth daily, Disp: 30 tablet, Rfl: 3    spironolactone (ALDACTONE) 25 MG tablet, Take 1 tablet by mouth daily, Disp: 30 tablet, Rfl: 3    ipratropium-albuterol (DUONEB) 0.5-2.5 (3) MG/3ML SOLN nebulizer solution, Take 3 mLs by nebulization every 4 hours as needed for Shortness of Breath, Disp: 24 vial, Rfl: 1    hydrALAZINE (APRESOLINE) 25 MG tablet, TAKE 1 TABLET BY MOUTH EVERY 8 HOURS, Disp: 90 tablet, Rfl: 3    fluticasone (FLONASE) 50 MCG/ACT nasal spray, SHAKE LIQUID AND USE 1 SPRAY IN EACH NOSTRIL DAILY, Disp: 16 g, Rfl: 0    ondansetron (ZOFRAN ODT) 4 MG disintegrating tablet, Take 1 tablet by mouth every 8 hours as needed for Nausea or Vomiting, Disp: 10 tablet, Rfl: 0    dicyclomine (BENTYL) 10 MG capsule, Take 2 capsules by mouth every 8 hours as needed (diarrhea), Disp: 20 capsule, Rfl: 0    potassium chloride (KLOR-CON M) 20 MEQ extended release tablet, Take 1 tablet by mouth daily (with breakfast), Disp: 60 tablet, Rfl: 3    Insulin Pen Needle ([employer] PEN NEEDLES) 31G X 6 MM MISC, 1 each by Does not apply route daily, Disp: 100 each, Rfl: 3    Blood Pressure Monitoring (BLOOD PRESSURE CUFF) MISC, Dx:  Hypertension with labile blood pressure, Disp: 1 each, Rfl: 0    liraglutide-weight management 18 MG/3ML SOPN, Inject 0.6 mg into the skin daily (Patient taking differently: Inject 0.6 mg into the skin as needed ), Disp: 30 pen, Rfl: 0    mineral oil-hydrophilic petrolatum (AQUAPHOR) ointment, Apply topically as needed. , Disp: 99 g, Rfl: 5    ONE TOUCH ULTRA TEST strip, 1 each by Does not apply route daily, Disp: 120 each, Rfl: 5    ONETOUCH DELICA LANCETS FINE MISC, 60 Devices daily, Disp: , Rfl: 5    Misc. Devices (CANE) MISC, Use cane to aid in ambulation, Disp: 1 each, Rfl: 0    Misc. Devices (WALKER) MISC, Use walker when ambulating., Disp: 1 each, Rfl: 0    pantoprazole (PROTONIX) 20 MG tablet, TAKE 1 TABLET BY MOUTH DAILY (Patient taking differently: Take 20 mg by mouth as needed ), Disp: 30 tablet, Rfl: 3    Handicap Placard MISC, by Does not apply route Patient cannot walk 200 ft without stopping to rest.   Expiration 5 yrs, Disp: 1 each, Rfl: 0      Note: This report was completed utilizing computer voice recognition software. Every effort has been made to ensure accuracy, however; inadvertent computerized transcription errors may be present.     --Varsha Cote MD on 7/7/2020 at 5:08 PM

## 2020-07-08 ENCOUNTER — TELEPHONE (OUTPATIENT)
Dept: CARDIOLOGY | Age: 45
End: 2020-07-08

## 2020-07-17 ENCOUNTER — TELEPHONE (OUTPATIENT)
Dept: CARDIOLOGY | Age: 45
End: 2020-07-17

## 2020-07-27 ENCOUNTER — TELEPHONE (OUTPATIENT)
Dept: CARDIOLOGY | Age: 45
End: 2020-07-27

## 2020-07-28 ENCOUNTER — OFFICE VISIT (OUTPATIENT)
Dept: CARDIOLOGY CLINIC | Age: 45
End: 2020-07-28
Payer: MEDICARE

## 2020-07-28 ENCOUNTER — HOSPITAL ENCOUNTER (OUTPATIENT)
Dept: CARDIOLOGY | Age: 45
Discharge: HOME | End: 2020-07-28
Payer: MEDICARE

## 2020-07-28 ENCOUNTER — HOSPITAL ENCOUNTER (OUTPATIENT)
Age: 45
Discharge: HOME | End: 2020-07-30
Payer: MEDICARE

## 2020-07-28 VITALS
HEIGHT: 68 IN | WEIGHT: 293 LBS | SYSTOLIC BLOOD PRESSURE: 136 MMHG | BODY MASS INDEX: 44.41 KG/M2 | HEART RATE: 75 BPM | DIASTOLIC BLOOD PRESSURE: 86 MMHG | RESPIRATION RATE: 18 BRPM

## 2020-07-28 LAB
ANION GAP SERPL CALCULATED.3IONS-SCNC: 14 MMOL/L (ref 7–16)
BUN BLDV-MCNC: 13 MG/DL (ref 6–20)
CALCIUM SERPL-MCNC: 9.7 MG/DL (ref 8.6–10.2)
CHLORIDE BLD-SCNC: 103 MMOL/L (ref 98–107)
CO2: 25 MMOL/L (ref 22–29)
CREAT SERPL-MCNC: 1 MG/DL (ref 0.5–1)
GFR AFRICAN AMERICAN: >60
GFR NON-AFRICAN AMERICAN: >60 ML/MIN/1.73
GLUCOSE BLD-MCNC: 93 MG/DL (ref 74–99)
POTASSIUM SERPL-SCNC: 4.7 MMOL/L (ref 3.5–5)
PRO-BNP: 807 PG/ML (ref 0–125)
SODIUM BLD-SCNC: 142 MMOL/L (ref 132–146)

## 2020-07-28 PROCEDURE — 83880 ASSAY OF NATRIURETIC PEPTIDE: CPT

## 2020-07-28 PROCEDURE — 93000 ELECTROCARDIOGRAM COMPLETE: CPT | Performed by: INTERNAL MEDICINE

## 2020-07-28 PROCEDURE — G8417 CALC BMI ABV UP PARAM F/U: HCPCS | Performed by: INTERNAL MEDICINE

## 2020-07-28 PROCEDURE — 4004F PT TOBACCO SCREEN RCVD TLK: CPT | Performed by: INTERNAL MEDICINE

## 2020-07-28 PROCEDURE — 93308 TTE F-UP OR LMTD: CPT

## 2020-07-28 PROCEDURE — 99213 OFFICE O/P EST LOW 20 MIN: CPT | Performed by: INTERNAL MEDICINE

## 2020-07-28 PROCEDURE — G8427 DOCREV CUR MEDS BY ELIG CLIN: HCPCS | Performed by: INTERNAL MEDICINE

## 2020-07-28 PROCEDURE — 80048 BASIC METABOLIC PNL TOTAL CA: CPT

## 2020-07-28 PROCEDURE — 36415 COLL VENOUS BLD VENIPUNCTURE: CPT | Performed by: INTERNAL MEDICINE

## 2020-07-28 RX ORDER — ATORVASTATIN CALCIUM 80 MG/1
80 TABLET, FILM COATED ORAL NIGHTLY
Qty: 30 TABLET | Refills: 5 | Status: SHIPPED
Start: 2020-07-28 | End: 2021-01-08 | Stop reason: SDUPTHER

## 2020-07-28 NOTE — PROGRESS NOTES
[facility]  Elva Kwan. Rina Askew M.D. Andrea Bagley M.D.  Rachelle Miller. Albaro Dennis M.D. Kenyon Guardado M.D. Alicia Rapp M.D. MD Ayush Gomez   1975  VINH Membreno PA-C      This 46-year-old woman is seen today for outpatient cardiac follow-up. She is undergoing upward titration of medications for CHF and a dilated cardia myopathy. She is having good improvement in exertional dyspnea symptoms. She has recently been given hydralazine and Isordil combination and this is given her a slight headache which is improving. Today a limited echocardiogram shows a biplane EF of 44%. The ventricle remains dilated and hypokinetic. She was hospitalized 02/11/2019 through 02/17/2019 with pneumonia due to human metapneumovirus.  Hospitalization was protracted due to hypoxemia.  She has had recurrent episodes of lower back pain.  She was in the emergency room 04/28/2020 for dyspnea.  proBNP DW[facility] 7814. Jono Vazquez was admitted echo showed  a dilated left ventricle.  She had bilateral lung infiltrates.  Cardiac catheterization 05/04/2020 showed no LM stenosis.  The LAD had a 20% stenosis in the circumflex 20% stenosis.  The dominant RCA had 7% narrowing with IFR 0.98.  LVEDP was 43.   LifeVest prescribed at discharge      Medical History:  1. Essential hypertension. 2. Asthma. 3. Bell palsy. 4. Diverticulitis. 5. DJD. 6. GERD. 7. Meningitis. 8. Morbid obesity .  BMI 59.36 - 08/2018.  9. Pancreas cyst.  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 11. Reactive depression/anxiety. 12. Nephrolithiasis  Hx . EVERARDO Pyelonephritis. 13. Systemic inflammatory response syndrome. 14. Tobacco abuse. 15. Chronic PVCs. 16. Spinal stenosis of lumbar region at multiple levels. 17. Obstructive sleep apnea  18. CHF with normal EF March 2018.    19.  Echo, 03/21/2018.  .  Ejection fraction  visually estimated at 50-55%. Mild concentric LVH. Indeterminate diastolic function. Mildly dilated right ventricle.  Right ventricle global systolic function is normal.  No significant valvular abnormalities.  Unable to estimate RVSP.  No  pericardial effusion. .  20. Hospitalized 02/11/2019 through 02/17/2019 with pneumonia due to human metapneumovirus.  Hospitalization was protracted due to hypoxemia.  He has had recurrent episodes of lower back pain. 21. Admitted from ED 04/28/2012 for dyspnea.  proBNP was 1/5/2008.   Echo estimate EF 30% with a dilatedLVV.   bilateral lung infiltrates. 22. Cath 05/04/2020: No LM stenosis. LAD  20% stenosis               Circumflex 20% stenosis.  RCA  70% stenosis with IFR 0.98.  LVEDP 43.  23. LifeVest.  hospital discharge 05/05/2020  24. Cardiac office evaluation 06/23/2020: Symptomatically stable.  Entresto increased to 97/103 twice daily  25. Outpatient cardiac follow-up 07/07/2020: Dyspnea improved. Continues to have chronic back and foot pain. Continues to wear LifeVest.  Isosorbide dinitrate 20 mg p.o. 3 times daily added   :             Review of Systems:  Constitutional: negative for fever and chills  Respiratory: negative for cough and hemoptysis  Cardiovascular:   Gastrointestinal: negative for abdominal pain, diarrhea, nausea and vomiting  Genitourinary:negative for dysuria and hematuria  Derm: negative for rash and skin lesion(s)  Neurological: negative for seizures and tremors  Endocrine: negative for diabetic symptoms including polydipsia and polyuria  Musculoskeletal: negative for CTD  Psychiatric: negative for psychosis and major depression    On examination, she is an obese middle-aged -American female in no acute distress. Skin is warm and dry. Respirations are unlabored. /86   Pulse 75   Resp 18   Ht 5' 8\" (1.727 m)   Wt (!) 390 lb 4.8 oz (177 kg)   BMI 59.34 kg/m² . HEENT negative for scleral icterus. Extraocular muscles intact. No facial asymmetry or central cyanosis.    Neck without masses or goiter. No bruit or JVD. Cardiac apex not displaced. Rhythm regular. Abdomen normal.  Extremities without edema. Lungs are clear    EKG today shows normal sinus rhythm. Frequent isolated PVCs. RBBB and left axis    Today she is asked to continue nitrates and hydralazine. It is likely that the current adverse effect of these medications will dissipate. Today a BMP and proBNP is obtained and further recommendations will be made. Since she has had adequate improvement in ejection fraction the wearable cardio defibrillator will be discontinued    At completion of today's visit, medications include the following:    Current Outpatient Medications:     isosorbide dinitrate (ISORDIL) 20 MG tablet, Take 1 tablet by mouth 3 times daily, Disp: 90 tablet, Rfl: 3    cetirizine (ZYRTEC) 10 MG tablet, Take 1 tablet by mouth daily, Disp: 30 tablet, Rfl: 3    Multiple Vitamin (DAILY-DOUGLAS) TABS, TAKE 1 TABLET BY MOUTH EVERY DAY, Disp: 90 tablet, Rfl: 5    sacubitril-valsartan (ENTRESTO)  MG per tablet, Take 1 tablet by mouth 2 times daily, Disp: 60 tablet, Rfl: 5    gabapentin (NEURONTIN) 600 MG tablet, Take 1 tablet by mouth 3 times daily for 90 days. , Disp: 90 tablet, Rfl: 2    [START ON 8/21/2020] HYDROcodone-acetaminophen (NORCO) 5-325 MG per tablet, Take 1 tablet by mouth every 8 hours as needed for Pain for up to 30 days. Intended supply: 3 days.  Take lowest dose possible to manage pain, Disp: 90 tablet, Rfl: 0    mometasone-formoterol (DULERA) 100-5 MCG/ACT inhaler, Inhale 2 puffs into the lungs 2 times daily Rinse mouth after using., Disp: 1 Inhaler, Rfl: 2    albuterol sulfate HFA (PROAIR HFA) 108 (90 Base) MCG/ACT inhaler, Inhale 2 puffs into the lungs every 4 hours as needed for Wheezing or Shortness of Breath, Disp: 1 Inhaler, Rfl: 3    albuterol (PROVENTIL) (2.5 MG/3ML) 0.083% nebulizer solution, Take 3 mLs by nebulization every 6 hours as needed for Wheezing, Disp: 120 each, Rfl: 3    mineral oil-hydrophilic petrolatum (AQUAPHOR) ointment, Apply topically as needed. , Disp: 396 g, Rfl: 1    citalopram (CELEXA) 20 MG tablet, Take 20 mg by mouth as needed , Disp: , Rfl:     cyclobenzaprine (FLEXERIL) 10 MG tablet, as needed , Disp: , Rfl:     diazePAM (VALIUM) 5 MG tablet, Take 5 mg by mouth as needed for Anxiety.  , Disp: , Rfl:     Semaglutide (OZEMPIC, 1 MG/DOSE, SC), Inject into the skin once a week, Disp: , Rfl:     metoprolol succinate (TOPROL XL) 100 MG extended release tablet, Take 1 tablet by mouth 2 times daily, Disp: 30 tablet, Rfl: 3    aspirin 81 MG chewable tablet, Take 1 tablet by mouth daily, Disp: 30 tablet, Rfl: 3    atorvastatin (LIPITOR) 80 MG tablet, Take 1 tablet by mouth nightly, Disp: 30 tablet, Rfl: 3    bumetanide (BUMEX) 1 MG tablet, Take 1 tablet by mouth daily, Disp: 30 tablet, Rfl: 3    spironolactone (ALDACTONE) 25 MG tablet, Take 1 tablet by mouth daily, Disp: 30 tablet, Rfl: 3    ipratropium-albuterol (DUONEB) 0.5-2.5 (3) MG/3ML SOLN nebulizer solution, Take 3 mLs by nebulization every 4 hours as needed for Shortness of Breath, Disp: 24 vial, Rfl: 1    fluticasone (FLONASE) 50 MCG/ACT nasal spray, SHAKE LIQUID AND USE 1 SPRAY IN EACH NOSTRIL DAILY, Disp: 16 g, Rfl: 0    ondansetron (ZOFRAN ODT) 4 MG disintegrating tablet, Take 1 tablet by mouth every 8 hours as needed for Nausea or Vomiting, Disp: 10 tablet, Rfl: 0    dicyclomine (BENTYL) 10 MG capsule, Take 2 capsules by mouth every 8 hours as needed (diarrhea), Disp: 20 capsule, Rfl: 0    potassium chloride (KLOR-CON M) 20 MEQ extended release tablet, Take 1 tablet by mouth daily (with breakfast), Disp: 60 tablet, Rfl: 3    Insulin Pen Needle ([employer] PEN NEEDLES) 31G X 6 MM MISC, 1 each by Does not apply route daily, Disp: 100 each, Rfl: 3    liraglutide-weight management 18 MG/3ML SOPN, Inject 0.6 mg into the skin daily (Patient taking differently: Inject 0.6 mg into the skin as needed ), Disp: 30 pen, Rfl: 0    mineral oil-hydrophilic petrolatum (AQUAPHOR) ointment, Apply topically as needed. , Disp: 99 g, Rfl: 5    ONE TOUCH ULTRA TEST strip, 1 each by Does not apply route daily, Disp: 120 each, Rfl: 5    pantoprazole (PROTONIX) 20 MG tablet, TAKE 1 TABLET BY MOUTH DAILY (Patient taking differently: Take 20 mg by mouth as needed ), Disp: 30 tablet, Rfl: 3    Handicap Placard MISC, by Does not apply route Patient cannot walk 200 ft without stopping to rest.   Expiration 5 yrs, Disp: 1 each, Rfl: 0    hydrALAZINE (APRESOLINE) 25 MG tablet, TAKE 1 TABLET BY MOUTH EVERY 8 HOURS (Patient not taking: Reported on 7/28/2020), Disp: 90 tablet, Rfl: 3      Note: This report was completed utilizing computer voice recognition software. Every effort has been made to ensure accuracy, however; inadvertent computerized transcription errors may be present.     --Marquez Hendrickson MD on 7/28/2020 at 5:09 PM

## 2020-08-12 RX ORDER — METOPROLOL SUCCINATE 100 MG/1
100 TABLET, EXTENDED RELEASE ORAL 2 TIMES DAILY
Qty: 30 TABLET | Refills: 3 | Status: SHIPPED
Start: 2020-08-12 | End: 2021-01-08 | Stop reason: SDUPTHER

## 2020-08-17 NOTE — TELEPHONE ENCOUNTER
Last Appointment:  7/2/2020  Future Appointments   Date Time Provider Jesus Mcfarland   8/18/2020 11:30 AM Koki Santos [facility]   8/26/2020 10:40 AM Cathy Knox MD [facility] [facility]   9/14/2020  9:30 AM Ronda Srinivasan MD [facility] None

## 2020-08-18 ENCOUNTER — VIRTUAL VISIT (OUTPATIENT)
Dept: FAMILY MEDICINE CLINIC | Age: 45
End: 2020-08-18
Payer: MEDICARE

## 2020-08-18 PROCEDURE — 2022F DILAT RTA XM EVC RTNOPTHY: CPT | Performed by: PHYSICIAN ASSISTANT

## 2020-08-18 PROCEDURE — G8427 DOCREV CUR MEDS BY ELIG CLIN: HCPCS | Performed by: PHYSICIAN ASSISTANT

## 2020-08-18 PROCEDURE — 3044F HG A1C LEVEL LT 7.0%: CPT | Performed by: PHYSICIAN ASSISTANT

## 2020-08-18 PROCEDURE — 99214 OFFICE O/P EST MOD 30 MIN: CPT | Performed by: PHYSICIAN ASSISTANT

## 2020-08-18 RX ORDER — PROMETHAZINE HYDROCHLORIDE 12.5 MG/1
12.5 TABLET ORAL 3 TIMES DAILY PRN
Qty: 30 TABLET | Refills: 0 | Status: SHIPPED | OUTPATIENT
Start: 2020-08-18 | End: 2020-08-25

## 2020-08-18 RX ORDER — GUAIFENESIN 400 MG/1
400 TABLET ORAL 4 TIMES DAILY PRN
Qty: 30 TABLET | Refills: 0 | Status: SHIPPED
Start: 2020-08-18 | End: 2021-01-08

## 2020-08-18 ASSESSMENT — PATIENT HEALTH QUESTIONNAIRE - PHQ9
SUM OF ALL RESPONSES TO PHQ QUESTIONS 1-9: 0
1. LITTLE INTEREST OR PLEASURE IN DOING THINGS: 0
2. FEELING DOWN, DEPRESSED OR HOPELESS: 0
SUM OF ALL RESPONSES TO PHQ9 QUESTIONS 1 & 2: 0
SUM OF ALL RESPONSES TO PHQ QUESTIONS 1-9: 0

## 2020-08-18 NOTE — PROGRESS NOTES
TeleMedicine Patient Consent    This visit was performed as a virtual video visit using a synchronous, two-way, audio-video telehealth technology platform. Patient identification was verified at the start of the visit, including the patient's telephone number and physical location. I discussed with the patient the nature of our telehealth visits, that:     Due to the nature of an audio- video modality, the only components of a physical exam that could be done are the elements supported by direct observation. I would evaluate the patient and recommend diagnostics and treatments based on my assessment. If it was felt that the patient should be evaluated in clinic or an emergency room setting, then they would be directed there. Our sessions are not being recorded and that personal health information is protected. Our team would provide follow up care in person if/when the patient needs it. Patient does agree to proceed with telemedicine consultation. Patient's location: home address in [place]. Is there anyone else present for this visit: No  This visit was completed virtually using [employer]. me    Physician Location:    [facility], [address] [place] [address] Quiana Hodgson.    Time spent: Greater than Not billed by time    2020    TELEHEALTH EVALUATION -- Audio or Visual (During LZPOF- public health emergency)    HPI:    Fariba Van (:  1975) has requested an audio/video evaluation for the following concern(s): f/u for Dm2 which was supposed to be int he office to check an a1c. It was 6.0% in April. She asks for a Leigh monitor. She has seen Dr Michael Mccracken in July. We have reviewed his note. He wants her to continue the nitrates and hydralazine. She has had improvement in ha sx. She continues to have nausea. The zofran doesn't help. She also has sinus sx and cough. No fever. No sob. She did not receive her walker. I wrote the rx last visit.          ROS Unless otherwise specified  Review of Systems - Angie Yen PA-C   metoprolol succinate (TOPROL XL) 100 MG extended release tablet Take 1 tablet by mouth 2 times daily  Timi Ceja DO   atorvastatin (LIPITOR) 80 MG tablet Take 1 tablet by mouth nightly  Debo Brooke MD   isosorbide dinitrate (ISORDIL) 20 MG tablet Take 1 tablet by mouth 3 times daily  Debo Brooke MD   cetirizine (ZYRTEC) 10 MG tablet Take 1 tablet by mouth daily  Angie Yen PA-C   Multiple Vitamin (DAILY-DOUGLAS) TABS TAKE 1 TABLET BY MOUTH EVERY DAY  Leida Bill DO   sacubitril-valsartan (ENTRESTO)  MG per tablet Take 1 tablet by mouth 2 times daily  Debo Brooke MD   gabapentin (NEURONTIN) 600 MG tablet Take 1 tablet by mouth 3 times daily for 90 days. Geronimo Davies MD   HYDROcodone-acetaminophen (NORCO) 5-325 MG per tablet Take 1 tablet by mouth every 8 hours as needed for Pain for up to 30 days. Intended supply: 3 days. Take lowest dose possible to manage pain  Fercho Doyle MD   albuterol sulfate HFA (PROAIR HFA) 108 (90 Base) MCG/ACT inhaler Inhale 2 puffs into the lungs every 4 hours as needed for Wheezing or Shortness of Breath  Angie Yen PA-C   albuterol (PROVENTIL) (2.5 MG/3ML) 0.083% nebulizer solution Take 3 mLs by nebulization every 6 hours as needed for Wheezing  Angie Yen PA-C   mineral oil-hydrophilic petrolatum (AQUAPHOR) ointment Apply topically as needed. Angie Yen PA-C   citalopram (CELEXA) 20 MG tablet Take 20 mg by mouth as needed   Historical Provider, MD   cyclobenzaprine (FLEXERIL) 10 MG tablet as needed   Historical Provider, MD   diazePAM (VALIUM) 5 MG tablet Take 5 mg by mouth as needed for Anxiety.    Historical Provider, MD   Semaglutide (OZEMPIC, 1 MG/DOSE, SC) Inject into the skin once a week  Historical Provider, MD   aspirin 81 MG chewable tablet Take 1 tablet by mouth daily  Santos Jackson MD   bumetanide (BUMEX) 1 MG tablet Take 1 tablet by mouth daily  Santos Jackson MD   spironolactone (ALDACTONE) 25 MG tablet Take 1 tablet by mouth daily  Tiny Diehl MD   ipratropium-albuterol (DUONEB) 0.5-2.5 (3) MG/3ML SOLN nebulizer solution Take 3 mLs by nebulization every 4 hours as needed for Shortness of Breath  Tiny Diehl MD   hydrALAZINE (APRESOLINE) 25 MG tablet TAKE 1 TABLET BY MOUTH EVERY 8 HOURS  Patient not taking: Reported on 2020  GREGORIO Seo   fluticasone (FLONASE) 50 MCG/ACT nasal spray SHAKE LIQUID AND USE 1 SPRAY IN EACH NOSTRIL DAILY  GREGORIO Seo   dicyclomine (BENTYL) 10 MG capsule Take 2 capsules by mouth every 8 hours as needed (diarrhea)  Valerie Del Valle PA-C   potassium chloride (KLOR-CON M) 20 MEQ extended release tablet Take 1 tablet by mouth daily (with breakfast)  Kaylene Geronimo PA-C   Insulin Pen Needle ([employer] PEN NEEDLES) 31G X 6 MM MISC 1 each by Does not apply route daily  Kaylene Geronimo PA-C   liraglutide-weight management 18 MG/3ML SOPN Inject 0.6 mg into the skin daily  Patient taking differently: Inject 0.6 mg into the skin as needed   Yan Tineo,    mineral oil-hydrophilic petrolatum (AQUAPHOR) ointment Apply topically as needed. Kaylene Geronimo PA-C   ONE TOUCH ULTRA TEST strip 1 each by Does not apply route daily  Kaylene Geronimo PA-C   pantoprazole (PROTONIX) 20 MG tablet TAKE 1 TABLET BY MOUTH DAILY  Patient taking differently: Take 20 mg by mouth as needed   Kaylene Geronimo PA-C   Handicap Placard MISC by Does not apply route Patient cannot walk 200 ft without stopping to rest.    Expiration 5 yrs  Kaylene Geronimo PA-C       Social History     Tobacco Use    Smoking status: Light Tobacco Smoker     Packs/day: 1.00     Years: 17.00     Pack years: 17.00     Types: Cigarettes     Start date: 3/27/2001     Last attempt to quit: 10/5/2018     Years since quittin.8    Smokeless tobacco: Never Used    Tobacco comment: occasionally has 1 cigarette, very stressed now   Substance Use Topics    Alcohol use: No    Drug use:  No Allergies   Allergen Reactions    Food Anaphylaxis     Food allergy - Fish and tree nuts    Fish-Derived Products     Nuts [Peanut-Containing Drug Products]     Pcn [Penicillins]     Ultram [Tramadol Hcl]      Per pt had a seizure    Cashews [Macadamia Nut Oil] Nausea And Vomiting   ,   Past Medical History:   Diagnosis Date    Asthma     Bell palsy     DDD (degenerative disc disease), cervical     with spinal stenosis    Diabetes mellitus ([address]     Diverticulitis     DJD (degenerative joint disease)     both hips    GERD without esophagitis     HTN (hypertension)     Hyperlipidemia     Meningitis 2009    Morbid obesity due to excess calories ([address]     Neuropathy     Pancreas cyst     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HCC)    ,   Past Surgical History:   Procedure Laterality Date    CARDIAC CATHETERIZATION  2020    Dr. Héctor Fischer  2009    CYSTOSCOPY Left 2018    left stent placement    DILATION AND CURETTAGE OF UTERUS      younger age   Thatcher Croak LITHOTRIPSY Right 02/15/2018    Cystoscopy;Stent Removal    UPPER GASTROINTESTINAL ENDOSCOPY N/A 2018    EGD BIOPSY performed by Thor Fernandez MD at [place] [id number]   ,   Social History     Tobacco Use    Smoking status: Light Tobacco Smoker     Packs/day: 1.00     Years: 17.00     Pack years: 17.00     Types: Cigarettes     Start date: 3/27/2001     Last attempt to quit: 10/5/2018     Years since quittin.8    Smokeless tobacco: Never Used    Tobacco comment: occasionally has 1 cigarette, very stressed now   Substance Use Topics    Alcohol use: No    Drug use: No   ,   Family History   Problem Relation Age of Onset    Arthritis Mother     Hypertension Mother     Asthma Mother     Cancer Father     Hypertension Father     Heart Attack Father     Asthma Father    ,   Immunization History   Administered Date(s) Administered    Pneumococcal Polysaccharide ([id number]) 2018   ,   Health Maintenance   Topic Date Due lesions or discoloration noted on facial skin         [] Abnormal-            Psychiatric:       [x] Normal Affect [x] No Hallucinations        [] Abnormal-       Other pertinent observable physical exam findings-     Due to this being a TeleHealth encounter, evaluation of the following organ systems is limited: Vitals/Constitutional/EENT/Resp/CV/GI//MS/Neuro/Skin/Heme-Lymph-Imm. ASSESSMENT/PLAN:  1. Nausea    - promethazine (PHENERGAN) 12.5 MG tablet; Take 1 tablet by mouth 3 times daily as needed for Nausea  Dispense: 30 tablet; Refill: 0    2. Seasonal allergies    - guaiFENesin 400 MG tablet; Take 1 tablet by mouth 4 times daily as needed for Cough  Dispense: 30 tablet; Refill: 0    3. Type 2 diabetes mellitus without complication, without long-term current use of insulin (HCC)  - she needs to come in for labs and a1c check  - Continuous Blood Gluc Sensor (FREESTYLE MARY 2 SENSOR SYSTM) MISC; 1 Device by Does not apply route daily  Dispense: 1 each; Refill: 0    4. Morbid obesity ([address]  Discussed reducing fatty foods, sugar intake, pop and juice consumption. Eliminate fast food. Increase fruits and vegetables. Limit red meat to two times per week. Encourage 5-6 small meals per day. Avoid high calorie snacks. 5. Cardiomyopathy, unspecified type [facility])  - reviewed note from Dr Betty Salinas. Encouraged pt to continue meds. 6. Lumbar radiculopathy  - wrote another rx for walker with seat.     F/u 1 month in office    An  electronic signature was used to authenticate this note.    --Rocky Vila PA-C on 8/18/2020 at 11:07 }    Pursuant to the emergency declaration under the [facility] waiver authority and the [employer] and [employer] Act, this Virtual  Visit was conducted, with patient's consent, to reduce the patient's risk of exposure to COVID-19 and provide continuity of care for an established patient. Services were provided through a video synchronous discussion virtually to substitute for in-person clinic visit.

## 2020-08-18 NOTE — PROGRESS NOTES
Bruna Goncalves was read the following message We want to confirm that, for purposes of billing, this is a virtual visit with your provider for which we will submit a claim for reimbursement with your insurance company. You will be responsible for any copays, coinsurance amounts or other amounts not covered by your insurance company. If you do not accept this, unfortunately we will not be able to schedule a virtual visit with the provider. Do you accept?  Tri Tapia responded YES

## 2020-08-26 ENCOUNTER — OFFICE VISIT (OUTPATIENT)
Dept: CARDIOLOGY CLINIC | Age: 45
End: 2020-08-26
Payer: MEDICARE

## 2020-08-26 VITALS
BODY MASS INDEX: 43.4 KG/M2 | HEIGHT: 69 IN | SYSTOLIC BLOOD PRESSURE: 140 MMHG | HEART RATE: 84 BPM | DIASTOLIC BLOOD PRESSURE: 80 MMHG | WEIGHT: 293 LBS | RESPIRATION RATE: 20 BRPM

## 2020-08-26 PROCEDURE — 99213 OFFICE O/P EST LOW 20 MIN: CPT | Performed by: INTERNAL MEDICINE

## 2020-08-26 PROCEDURE — G8417 CALC BMI ABV UP PARAM F/U: HCPCS | Performed by: INTERNAL MEDICINE

## 2020-08-26 PROCEDURE — 93000 ELECTROCARDIOGRAM COMPLETE: CPT | Performed by: INTERNAL MEDICINE

## 2020-08-26 PROCEDURE — G8427 DOCREV CUR MEDS BY ELIG CLIN: HCPCS | Performed by: INTERNAL MEDICINE

## 2020-08-26 PROCEDURE — 4004F PT TOBACCO SCREEN RCVD TLK: CPT | Performed by: INTERNAL MEDICINE

## 2020-08-26 RX ORDER — HYDRALAZINE HYDROCHLORIDE 50 MG/1
50 TABLET, FILM COATED ORAL EVERY 8 HOURS SCHEDULED
Qty: 90 TABLET | Refills: 5 | Status: SHIPPED
Start: 2020-08-26 | End: 2021-01-29 | Stop reason: SDUPTHER

## 2020-08-26 NOTE — PROGRESS NOTES
[facility]  [facility]. LEILANI Coburn M.D.  Alli Epps. The Meet[employer]LEILANI Carline Crosby, M.D. Gracie Kauffman M.D. MD Corie Li Aliadalberto   1975  VINH Washington PA-C      This 72-year-old woman is seen today for outpatient follow-up. She reports a gradual improvement in functional capacity with titration of medications. She is now less dyspneic and having less ankle edema. She recently held off Lasix because of traveling. She is set to resume it today     She is undergoing upward titration of medications for CHF and a dilated cardia myopathy. She is having good improvement in exertional dyspnea symptoms. She has recently been given hydralazine and Isordil combination and this is given her a slight headache which is improving. On 07/28/2020  echocardiogram showed a biplane EF of 44%. The ventricle remains dilated and hypokinetic. The wearable cardio defibrillator was removed in view of the EF improvement      She was admitted 04/28/2020: Echo showed  a dilated left ventricle.  She had bilateral lung infiltrates.  Cardiac catheterization 05/04/2020 showed no LM stenosis.  The LAD had a 20% stenosis in the circumflex 20% stenosis.  The dominant RCA had 7% narrowing with IFR 0.98.  LVEDP was 43.   LifeVest prescribed at discharge          :     Medical History:  1. Essential hypertension. 2. Asthma. 3. Bell palsy. 4. Diverticulitis. 5. DJD. 6. GERD. 7. Meningitis. 8. Morbid obesity .  BMI 59.36 - 08/2018.  9. Pancreas cyst.  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 11. Reactive depression/anxiety. 12. Nephrolithiasis  Hx . EVERARDO Pyelonephritis. 13. Systemic inflammatory response syndrome. 14. Tobacco abuse. 15. Chronic PVCs. 16. Spinal stenosis of lumbar region at multiple levels. 17. Obstructive sleep apnea  18. CHF with normal EF March 2018.    19.  Echo, 03/21/2018.  .  Ejection fraction  visually estimated at 50-55%. Mild concentric LVH. Indeterminate diastolic function. Mildly dilated right ventricle.  Right ventricle global systolic function is normal.  No significant valvular abnormalities.  Unable to estimate RVSP.  No  pericardial effusion. .  20. Hospitalized 02/11/2019 through 02/17/2019 with pneumonia due to human metapneumovirus.  Hospitalization was protracted due to hypoxemia.  He has had recurrent episodes of lower back pain. 21. Admitted from ED 04/28/2012 for dyspnea.  proBNP was 1/5/2008.   Echo estimate EF 30% with a dilatedLVV.   bilateral lung infiltrates. 22. Cath 05/04/2020: No LM stenosis. LAD  20% stenosis               Circumflex 20% stenosis.  RCA  70% stenosis with IFR 0.98.  LVEDP 43.  23. LifeVest.  hospital discharge 05/05/2020  24. Cardiac office evaluation 06/23/2020: Symptomatically stable.  Entresto increased to 97/103 twice daily  25. Outpatient cardiac follow-up 07/07/2020: Dyspnea improved.  Continues to have chronic back and foot pain.  Continues to wear LifeVest.  Isosorbide dinitrate 20 mg p.o. 3 times daily added   26. OP assessment 08/26/2020: Hydralazine increased to 50mg TID   :       Review of Systems:  Constitutional: negative for fever and chills  Respiratory: negative for cough and hemoptysis  Cardiovascular:   Gastrointestinal: negative for abdominal pain, diarrhea, nausea and vomiting  Genitourinary:negative for dysuria and hematuria  Derm: negative for rash and skin lesion(s)  Neurological: negative for seizures and tremors  Endocrine: negative for diabetic symptoms including polydipsia and polyuria  Musculoskeletal: negative for CTD  Psychiatric: negative for psychosis and major depression    On examination, she is an obese -American female in no distress skin is warm and dry. Respirations are unlabored. BP (!) 140/80   Pulse 84   Resp 20   Ht 5' 8.5\" (1.74 m)   Wt (!) 394 lb (178.7 kg)   BMI 59.04 kg/m² . HEENT negative for scleral icterus. Extraocular muscles intact. No facial asymmetry or central cyanosis. Neck without masses or goiter. No bruit or JVD. Cardiac apex not displaced. Rhythm regular. Abdomen normal.  Extremities without edema. Lungs are clear    EKG today shows sinus rhythm 84/min.  RBBB. Clinically CHF is compensated today. Today hydralazine is is increased to 50 mg  TID. Medications are reviewed and otherwise unchanged. She will have cardiac follow-up in the next 2 or 3 weeks    At completion of today's visit, medications include the following:    Current Outpatient Medications:     PROMETHAZINE-DM PO, Take by mouth as needed, Disp: , Rfl:     hydrALAZINE (APRESOLINE) 50 MG tablet, Take 1 tablet by mouth every 8 hours, Disp: 90 tablet, Rfl: 5    guaiFENesin 400 MG tablet, Take 1 tablet by mouth 4 times daily as needed for Cough, Disp: 30 tablet, Rfl: 0    Continuous Blood Gluc Sensor (FREESTYLE MARY 2 SENSOR SYSTM) MISC, 1 Device by Does not apply route daily, Disp: 1 each, Rfl: 0    DULERA 100-5 MCG/ACT inhaler, INHALE 2 PUFFS BY MOUTH TWICE DAILY, RINSE MOUTH AFTER USING (Patient taking differently: Inhale into the lungs as needed ), Disp: 13 g, Rfl: 0    metoprolol succinate (TOPROL XL) 100 MG extended release tablet, Take 1 tablet by mouth 2 times daily, Disp: 30 tablet, Rfl: 3    atorvastatin (LIPITOR) 80 MG tablet, Take 1 tablet by mouth nightly, Disp: 30 tablet, Rfl: 5    isosorbide dinitrate (ISORDIL) 20 MG tablet, Take 1 tablet by mouth 3 times daily, Disp: 90 tablet, Rfl: 3    cetirizine (ZYRTEC) 10 MG tablet, Take 1 tablet by mouth daily, Disp: 30 tablet, Rfl: 3    Multiple Vitamin (DAILY-DOUGLAS) TABS, TAKE 1 TABLET BY MOUTH EVERY DAY, Disp: 90 tablet, Rfl: 5    sacubitril-valsartan (ENTRESTO)  MG per tablet, Take 1 tablet by mouth 2 times daily, Disp: 60 tablet, Rfl: 5    gabapentin (NEURONTIN) 600 MG tablet, Take 1 tablet by mouth 3 times daily for 90 days. , Disp: 90 tablet, Rfl: 2   HYDROcodone-acetaminophen (NORCO) 5-325 MG per tablet, Take 1 tablet by mouth every 8 hours as needed for Pain for up to 30 days. Intended supply: 3 days. Take lowest dose possible to manage pain, Disp: 90 tablet, Rfl: 0    albuterol sulfate HFA (PROAIR HFA) 108 (90 Base) MCG/ACT inhaler, Inhale 2 puffs into the lungs every 4 hours as needed for Wheezing or Shortness of Breath, Disp: 1 Inhaler, Rfl: 3    albuterol (PROVENTIL) (2.5 MG/3ML) 0.083% nebulizer solution, Take 3 mLs by nebulization every 6 hours as needed for Wheezing, Disp: 120 each, Rfl: 3    citalopram (CELEXA) 20 MG tablet, Take 20 mg by mouth as needed , Disp: , Rfl:     cyclobenzaprine (FLEXERIL) 10 MG tablet, Take 10 mg by mouth as needed , Disp: , Rfl:     diazePAM (VALIUM) 5 MG tablet, Take 5 mg by mouth 2 times daily.  , Disp: , Rfl:     Semaglutide (OZEMPIC, 1 MG/DOSE, SC), Inject into the skin once a week, Disp: , Rfl:     aspirin 81 MG chewable tablet, Take 1 tablet by mouth daily, Disp: 30 tablet, Rfl: 3    bumetanide (BUMEX) 1 MG tablet, Take 1 tablet by mouth daily, Disp: 30 tablet, Rfl: 3    spironolactone (ALDACTONE) 25 MG tablet, Take 1 tablet by mouth daily, Disp: 30 tablet, Rfl: 3    ipratropium-albuterol (DUONEB) 0.5-2.5 (3) MG/3ML SOLN nebulizer solution, Take 3 mLs by nebulization every 4 hours as needed for Shortness of Breath, Disp: 24 vial, Rfl: 1    fluticasone (FLONASE) 50 MCG/ACT nasal spray, SHAKE LIQUID AND USE 1 SPRAY IN EACH NOSTRIL DAILY, Disp: 16 g, Rfl: 0    dicyclomine (BENTYL) 10 MG capsule, Take 2 capsules by mouth every 8 hours as needed (diarrhea), Disp: 20 capsule, Rfl: 0    potassium chloride (KLOR-CON M) 20 MEQ extended release tablet, Take 1 tablet by mouth daily (with breakfast), Disp: 60 tablet, Rfl: 3    Insulin Pen Needle ([employer] PEN NEEDLES) 31G X 6 MM MISC, 1 each by Does not apply route daily, Disp: 100 each, Rfl: 3    liraglutide-weight management 18 MG/3ML SOPN, Inject 0.6 mg into the skin daily (Patient taking differently: Inject 0.6 mg into the skin as needed ), Disp: 30 pen, Rfl: 0    mineral oil-hydrophilic petrolatum (AQUAPHOR) ointment, Apply topically as needed. , Disp: 99 g, Rfl: 5    ONE TOUCH ULTRA TEST strip, 1 each by Does not apply route daily, Disp: 120 each, Rfl: 5    pantoprazole (PROTONIX) 20 MG tablet, TAKE 1 TABLET BY MOUTH DAILY (Patient taking differently: Take 20 mg by mouth as needed ), Disp: 30 tablet, Rfl: 3    Handicap Placard MISC, by Does not apply route Patient cannot walk 200 ft without stopping to rest.   Expiration 5 yrs, Disp: 1 each, Rfl: 0      Note: This report was completed utilizing computer voice recognition software. Every effort has been made to ensure accuracy, however; inadvertent computerized transcription errors may be present.     --Sarah Nichols MD on 8/26/2020 at 12:37 PM

## 2020-09-16 RX ORDER — SPIRONOLACTONE 25 MG/1
25 TABLET ORAL DAILY
Qty: 30 TABLET | Refills: 5 | Status: SHIPPED
Start: 2020-09-16 | End: 2021-03-22

## 2020-10-15 ENCOUNTER — OFFICE VISIT (OUTPATIENT)
Dept: CARDIOLOGY CLINIC | Age: 45
End: 2020-10-15
Payer: MEDICARE

## 2020-10-15 VITALS
RESPIRATION RATE: 16 BRPM | BODY MASS INDEX: 43.4 KG/M2 | DIASTOLIC BLOOD PRESSURE: 80 MMHG | SYSTOLIC BLOOD PRESSURE: 138 MMHG | WEIGHT: 293 LBS | HEART RATE: 79 BPM | HEIGHT: 69 IN

## 2020-10-15 PROCEDURE — 99213 OFFICE O/P EST LOW 20 MIN: CPT | Performed by: INTERNAL MEDICINE

## 2020-10-15 PROCEDURE — G8417 CALC BMI ABV UP PARAM F/U: HCPCS | Performed by: INTERNAL MEDICINE

## 2020-10-15 PROCEDURE — 4004F PT TOBACCO SCREEN RCVD TLK: CPT | Performed by: INTERNAL MEDICINE

## 2020-10-15 PROCEDURE — G8484 FLU IMMUNIZE NO ADMIN: HCPCS | Performed by: INTERNAL MEDICINE

## 2020-10-15 PROCEDURE — 93000 ELECTROCARDIOGRAM COMPLETE: CPT | Performed by: INTERNAL MEDICINE

## 2020-10-15 PROCEDURE — G8427 DOCREV CUR MEDS BY ELIG CLIN: HCPCS | Performed by: INTERNAL MEDICINE

## 2020-10-15 RX ORDER — BUMETANIDE 1 MG/1
TABLET ORAL
Qty: 60 TABLET | Refills: 3 | Status: SHIPPED
Start: 2020-10-15 | End: 2021-07-19 | Stop reason: SDUPTHER

## 2020-10-15 RX ORDER — MULTIVIT-MIN/IRON/FOLIC ACID/K 18-600-40
CAPSULE ORAL DAILY
COMMUNITY
End: 2021-10-25 | Stop reason: SDUPTHER

## 2020-10-15 NOTE — PROGRESS NOTES
[facility]  [facility]. Lou Tobias M.D. Klaudia Dodge M.D.  Lexis Horton. [employer], M.D. Dillan Draper M.D. Harvinder Adam, Rashad Isaac M.D. MD Karina Varela   1975  VINH Marx PA-C      This 82-year-old woman is seen today for outpatient cardiac follow-up. She has a history of HFrEF and nonischemic cardiomyopathy. . She has nonobstructive coronary disease. She had improvement in ejection fraction and a wearable cardio defibrillator was removed in July 2020. She has had good resolution of her edema. She continues to note some exertional dyspnea however. She has no chest pain sweats or palpitations.         Medical History:  1. Essential hypertension. 2. Asthma. 3. Bell palsy. 4. Diverticulitis. 5. DJD. 6. GERD. 7. Meningitis. 8. Morbid obesity .  BMI 59.36 - 08/2018.  9. Pancreas cyst.  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 11. Reactive depression/anxiety. 12. Nephrolithiasis  Hx . EVERARDO Pyelonephritis. 13. Systemic inflammatory response syndrome. 14. Tobacco abuse. 15. Chronic PVCs. 16. Spinal stenosis of lumbar region at multiple levels. 17. Obstructive sleep apnea  18. CHF with normal EF March 2018.    19. Echo, 03/21/2018.  .  Ejection fraction  visually estimated at 50-55%. Mild concentric LVH. Indeterminate diastolic function. Mildly dilated right ventricle.  Right ventricle global systolic function is normal.  No significant valvular abnormalities.  Unable to estimate RVSP.  No  pericardial effusion. .  20. Hospitalized 02/11/2019 through 02/17/2019 with pneumonia due to human metapneumovirus.  Hospitalization was protracted due to hypoxemia.  He has had recurrent episodes of lower back pain. 21. Admitted from ED 04/28/2012 for dyspnea.  proBNP was 1/5/2008.   Echo estimate EF 30% with a dilatedLVV.   bilateral lung infiltrates. 22. Cath 05/04/2020: No LM stenosis. LAD  20% stenosis               Circumflex 20% stenosis.  RCA  70% stenosis with IFR 0.98.  LVEDP 43.  23. LifeVest.  hospital discharge 05/05/2020  24. Cardiac office evaluation 06/23/2020: Symptomatically stable.  Entresto increased to 97/103 twice daily  25. Outpatient cardiac follow-up 07/07/2020: Dyspnea improved.  Continues chronic back and foot pain.  Continues to wear LifeVest.  Isosorbide dinitrate 20 mg p.o. 3 times daily added   26. Echo 07/28/2020: LV mildly dilated with global hypokinesis. Biplane EF 44%. ZOLL vest discontinued  27. OP assessment 08/26/2020: Hydralazine increased to 50mg TID  28. OP cardiac follow-up 10/15/2020: Bumex increased. 1 mg daily with an extra milligram alternate days      Review of Systems:  Constitutional: negative for fever and chills  Respiratory: negative for cough and hemoptysis  Cardiovascular:   Gastrointestinal: negative for abdominal pain, diarrhea, nausea and vomiting  Genitourinary:negative for dysuria and hematuria  Derm: negative for rash and skin lesion(s)  Neurological: negative for seizures and tremors  Endocrine: negative for diabetic symptoms including polydipsia and polyuria  Musculoskeletal: negative for CTD  Psychiatric: negative for psychosis and major depression    On examination, she is an obese middle-aged -American female in no distress skin is warm and dry. Respirations are unlabored. /80   Pulse 79   Resp 16   Ht 5' 8.5\" (1.74 m)   Wt (!) 391 lb (177.4 kg)   BMI 58.59 kg/m² . HEENT negative for scleral icterus. Extraocular muscles intact. No facial asymmetry or central cyanosis. Neck without masses or goiter. No bruit or JVD. Cardiac apex not displaced. Rhythm regular. Abdomen normal.  Extremities without edema. Lungs are clear    EKG today per Dr. Glenn Monteiro review normal sinus rhythm. Ventricular trigeminy. RBBB. Indeterminate axis    Based on assessment today letter and labs, the patient's Bumex will be increased by using an extra milligram on alternate days.   She does have mild headaches with isosorbide mono and therefore the dose will not be increased. In view of ASCVD and diabetes, she is a candidate for intense statin therapy. BMP and lipid panel will be obtained in approximately 4 weeks. She will have a subsequent outpatient cardiac follow-up    At completion of today's visit, medications include the following:    Current Outpatient Medications:     Cholecalciferol (VITAMIN D) 50 MCG (2000 UT) CAPS capsule, Take by mouth daily, Disp: , Rfl:     gabapentin (NEURONTIN) 600 MG tablet, Take 1 tablet by mouth 3 times daily for 90 days. , Disp: 90 tablet, Rfl: 2    [START ON 11/13/2020] oxyCODONE-acetaminophen (PERCOCET) 5-325 MG per tablet, Take 1 tablet by mouth every 8 hours as needed for Pain for up to 30 days. Intended supply: 3 days.  Take lowest dose possible to manage pain, Disp: 90 tablet, Rfl: 0    spironolactone (ALDACTONE) 25 MG tablet, Take 1 tablet by mouth daily, Disp: 30 tablet, Rfl: 5    PROMETHAZINE-DM PO, Take by mouth as needed, Disp: , Rfl:     hydrALAZINE (APRESOLINE) 50 MG tablet, Take 1 tablet by mouth every 8 hours, Disp: 90 tablet, Rfl: 5    Continuous Blood Gluc Sensor (FREESTYLE MARY 2 SENSOR SYSTM) MISC, 1 Device by Does not apply route daily, Disp: 1 each, Rfl: 0    DULERA 100-5 MCG/ACT inhaler, INHALE 2 PUFFS BY MOUTH TWICE DAILY, RINSE MOUTH AFTER USING (Patient taking differently: Inhale into the lungs as needed ), Disp: 13 g, Rfl: 0    metoprolol succinate (TOPROL XL) 100 MG extended release tablet, Take 1 tablet by mouth 2 times daily, Disp: 30 tablet, Rfl: 3    atorvastatin (LIPITOR) 80 MG tablet, Take 1 tablet by mouth nightly, Disp: 30 tablet, Rfl: 5    isosorbide dinitrate (ISORDIL) 20 MG tablet, Take 1 tablet by mouth 3 times daily, Disp: 90 tablet, Rfl: 3    cetirizine (ZYRTEC) 10 MG tablet, Take 1 tablet by mouth daily, Disp: 30 tablet, Rfl: 3    Multiple Vitamin (DAILY-DOUGLAS) TABS, TAKE 1 TABLET BY MOUTH EVERY DAY, Disp: 90 tablet, Rfl: 5    sacubitril-valsartan (ENTRESTO)  MG per tablet, Take 1 tablet by mouth 2 times daily, Disp: 60 tablet, Rfl: 5    albuterol sulfate HFA (PROAIR HFA) 108 (90 Base) MCG/ACT inhaler, Inhale 2 puffs into the lungs every 4 hours as needed for Wheezing or Shortness of Breath, Disp: 1 Inhaler, Rfl: 3    albuterol (PROVENTIL) (2.5 MG/3ML) 0.083% nebulizer solution, Take 3 mLs by nebulization every 6 hours as needed for Wheezing, Disp: 120 each, Rfl: 3    citalopram (CELEXA) 20 MG tablet, Take 20 mg by mouth as needed , Disp: , Rfl:     diazePAM (VALIUM) 5 MG tablet, Take 5 mg by mouth 2 times daily. , Disp: , Rfl:     Semaglutide (OZEMPIC, 1 MG/DOSE, SC), Inject into the skin once a week, Disp: , Rfl:     aspirin 81 MG chewable tablet, Take 1 tablet by mouth daily, Disp: 30 tablet, Rfl: 3    bumetanide (BUMEX) 1 MG tablet, Take 1 tablet by mouth daily, Disp: 30 tablet, Rfl: 3    ipratropium-albuterol (DUONEB) 0.5-2.5 (3) MG/3ML SOLN nebulizer solution, Take 3 mLs by nebulization every 4 hours as needed for Shortness of Breath, Disp: 24 vial, Rfl: 1    fluticasone (FLONASE) 50 MCG/ACT nasal spray, SHAKE LIQUID AND USE 1 SPRAY IN EACH NOSTRIL DAILY, Disp: 16 g, Rfl: 0    potassium chloride (KLOR-CON M) 20 MEQ extended release tablet, Take 1 tablet by mouth daily (with breakfast), Disp: 60 tablet, Rfl: 3    Insulin Pen Needle ([employer] PEN NEEDLES) 31G X 6 MM MISC, 1 each by Does not apply route daily, Disp: 100 each, Rfl: 3    liraglutide-weight management 18 MG/3ML SOPN, Inject 0.6 mg into the skin daily (Patient taking differently: Inject 0.6 mg into the skin as needed ), Disp: 30 pen, Rfl: 0    mineral oil-hydrophilic petrolatum (AQUAPHOR) ointment, Apply topically as needed. , Disp: 99 g, Rfl: 5    ONE TOUCH ULTRA TEST strip, 1 each by Does not apply route daily, Disp: 120 each, Rfl: 5    pantoprazole (PROTONIX) 20 MG tablet, TAKE 1 TABLET BY MOUTH DAILY (Patient taking differently: Take 20 mg by mouth as needed ), Disp: 30 tablet, Rfl: 3    Handicap Placard MISC, by Does not apply route Patient cannot walk 200 ft without stopping to rest.   Expiration 5 yrs, Disp: 1 each, Rfl: 0    guaiFENesin 400 MG tablet, Take 1 tablet by mouth 4 times daily as needed for Cough (Patient not taking: Reported on 10/15/2020), Disp: 30 tablet, Rfl: 0    cyclobenzaprine (FLEXERIL) 10 MG tablet, Take 10 mg by mouth as needed , Disp: , Rfl:     dicyclomine (BENTYL) 10 MG capsule, Take 2 capsules by mouth every 8 hours as needed (diarrhea) (Patient not taking: Reported on 10/15/2020), Disp: 20 capsule, Rfl: 0      Note: This report was completed utilizing computer voice recognition software. Every effort has been made to ensure accuracy, however; inadvertent computerized transcription errors may be present.     --Chrissy Nevarez MD on 10/15/2020 at 2:12 PM

## 2021-01-08 ENCOUNTER — OFFICE VISIT (OUTPATIENT)
Dept: FAMILY MEDICINE CLINIC | Age: 46
End: 2021-01-08
Payer: MEDICARE

## 2021-01-08 VITALS
SYSTOLIC BLOOD PRESSURE: 124 MMHG | HEART RATE: 76 BPM | WEIGHT: 293 LBS | DIASTOLIC BLOOD PRESSURE: 84 MMHG | RESPIRATION RATE: 16 BRPM | HEIGHT: 69 IN | TEMPERATURE: 96.4 F | BODY MASS INDEX: 43.4 KG/M2

## 2021-01-08 DIAGNOSIS — I42.9 CARDIOMYOPATHY, UNSPECIFIED TYPE (HCC): ICD-10-CM

## 2021-01-08 DIAGNOSIS — I10 ESSENTIAL HYPERTENSION: ICD-10-CM

## 2021-01-08 DIAGNOSIS — E78.5 HYPERLIPIDEMIA LDL GOAL <70: ICD-10-CM

## 2021-01-08 DIAGNOSIS — Z23 NEED FOR INFLUENZA VACCINATION: ICD-10-CM

## 2021-01-08 DIAGNOSIS — E11.9 TYPE 2 DIABETES MELLITUS WITHOUT COMPLICATION, WITHOUT LONG-TERM CURRENT USE OF INSULIN (HCC): Primary | ICD-10-CM

## 2021-01-08 DIAGNOSIS — J30.2 SEASONAL ALLERGIES: ICD-10-CM

## 2021-01-08 LAB
ANION GAP SERPL CALCULATED.3IONS-SCNC: 13 MMOL/L (ref 7–16)
BUN BLDV-MCNC: 13 MG/DL (ref 6–20)
CALCIUM SERPL-MCNC: 9.3 MG/DL (ref 8.6–10.2)
CHLORIDE BLD-SCNC: 103 MMOL/L (ref 98–107)
CHOLESTEROL, TOTAL: 211 MG/DL (ref 0–199)
CO2: 22 MMOL/L (ref 22–29)
CREAT SERPL-MCNC: 0.9 MG/DL (ref 0.5–1)
CREATININE URINE POCT: 100
GFR AFRICAN AMERICAN: >60
GFR NON-AFRICAN AMERICAN: >60 ML/MIN/1.73
GLUCOSE BLD-MCNC: 92 MG/DL (ref 74–99)
HBA1C MFR BLD: 6.1 %
HDLC SERPL-MCNC: 44 MG/DL
LDL CHOLESTEROL CALCULATED: 117 MG/DL (ref 0–99)
MICROALBUMIN/CREAT 24H UR: 30 MG/G{CREAT}
MICROALBUMIN/CREAT UR-RTO: <30
POTASSIUM SERPL-SCNC: 4.4 MMOL/L (ref 3.5–5)
SODIUM BLD-SCNC: 138 MMOL/L (ref 132–146)
TRIGL SERPL-MCNC: 249 MG/DL (ref 0–149)
VLDLC SERPL CALC-MCNC: 50 MG/DL

## 2021-01-08 PROCEDURE — G8427 DOCREV CUR MEDS BY ELIG CLIN: HCPCS | Performed by: PHYSICIAN ASSISTANT

## 2021-01-08 PROCEDURE — 82044 UR ALBUMIN SEMIQUANTITATIVE: CPT | Performed by: PHYSICIAN ASSISTANT

## 2021-01-08 PROCEDURE — G8417 CALC BMI ABV UP PARAM F/U: HCPCS | Performed by: PHYSICIAN ASSISTANT

## 2021-01-08 PROCEDURE — 99214 OFFICE O/P EST MOD 30 MIN: CPT | Performed by: PHYSICIAN ASSISTANT

## 2021-01-08 PROCEDURE — 90686 IIV4 VACC NO PRSV 0.5 ML IM: CPT | Performed by: PHYSICIAN ASSISTANT

## 2021-01-08 PROCEDURE — 2022F DILAT RTA XM EVC RTNOPTHY: CPT | Performed by: PHYSICIAN ASSISTANT

## 2021-01-08 PROCEDURE — 4004F PT TOBACCO SCREEN RCVD TLK: CPT | Performed by: PHYSICIAN ASSISTANT

## 2021-01-08 PROCEDURE — 90471 IMMUNIZATION ADMIN: CPT | Performed by: PHYSICIAN ASSISTANT

## 2021-01-08 PROCEDURE — G8482 FLU IMMUNIZE ORDER/ADMIN: HCPCS | Performed by: PHYSICIAN ASSISTANT

## 2021-01-08 PROCEDURE — 83036 HEMOGLOBIN GLYCOSYLATED A1C: CPT | Performed by: PHYSICIAN ASSISTANT

## 2021-01-08 PROCEDURE — 3044F HG A1C LEVEL LT 7.0%: CPT | Performed by: PHYSICIAN ASSISTANT

## 2021-01-08 RX ORDER — CITALOPRAM 40 MG/1
TABLET ORAL
COMMUNITY
Start: 2020-10-14 | End: 2021-04-07

## 2021-01-08 RX ORDER — FLUTICASONE PROPIONATE 50 MCG
2 SPRAY, SUSPENSION (ML) NASAL DAILY
Qty: 1 BOTTLE | Refills: 5 | Status: SHIPPED
Start: 2021-01-08 | End: 2022-06-17

## 2021-01-08 RX ORDER — ISOSORBIDE DINITRATE 20 MG/1
20 TABLET ORAL 3 TIMES DAILY
Qty: 90 TABLET | Refills: 3 | Status: SHIPPED
Start: 2021-01-08 | End: 2022-02-22 | Stop reason: SDUPTHER

## 2021-01-08 RX ORDER — ONDANSETRON 4 MG/1
8 TABLET, ORALLY DISINTEGRATING ORAL EVERY 8 HOURS PRN
Qty: 30 TABLET | Refills: 1 | Status: SHIPPED
Start: 2021-01-08 | End: 2021-10-25 | Stop reason: SDUPTHER

## 2021-01-08 RX ORDER — METOPROLOL SUCCINATE 100 MG/1
100 TABLET, EXTENDED RELEASE ORAL 2 TIMES DAILY
Qty: 30 TABLET | Refills: 3 | Status: SHIPPED
Start: 2021-01-08 | End: 2021-10-11

## 2021-01-08 RX ORDER — ATORVASTATIN CALCIUM 80 MG/1
80 TABLET, FILM COATED ORAL NIGHTLY
Qty: 30 TABLET | Refills: 5 | Status: SHIPPED
Start: 2021-01-08 | End: 2021-07-19

## 2021-01-08 NOTE — PROGRESS NOTES
DM2:   Patient is here to fu regarding DM2. Patient is  controlled. Taking all medications and tolerating well. Fasting sugars are running unknown. Patient is taking ASA and Ace Inhibitor/ARB. Patient is  on appropriately-dosed statin. LDL is  at goal.  BP is  controlled. No hypoglycemic episodes. Patient does not see Podiatry regularly. Patient is aware that it is necessary to see an Eye Dr yearly. Patient does not smoke. Most recent labs reviewed with patient. Patient does not have complaints or concerns today. Lab Results   Component Value Date    LABA1C 6.0 (H) 04/29/2020       Lab Results   Component Value Date    LDLCALC 87 05/05/2020        Patient's past medical, surgical, social and/or family history reviewed, updated in chart, and are non-contributory (unless otherwise stated). Medications and allergies also reviewed and updated in chart. Review of Systems:  Constitutional:  No fever, no fatigue, no chills, no headaches, no weight change  Dermatology:  No rash, no mole, no dry or sensitive skin  ENT:  No cough, no sore throat, no sinus pain, no runny nose, no ear pain  Cardiology:  No chest pain, no palpitations, no leg edema, no shortness of breath, no PND  Gastroenterology:  No dysphagia, no abdominal pain, no nausea, no vomiting, no constipation, no diarrhea, no heartburn  Musculoskeletal:  No joint pain, no leg cramps,+ back pain, no muscle aches  Respiratory:  No shortness of breath, no orthopnea, no wheezing, no QUINONEZ, no hemoptysis  Urology:  No blood in the urine, no urinary frequency, no urinary incontinence, no urinary urgency, no nocturia, no dysuria    Vitals:    01/08/21 1019   BP: 124/84   Pulse: 76   Resp: 16   Temp: 96.4 °F (35.8 °C)   Weight: (!) 397 lb 9.6 oz (180.4 kg)   Height: 5' 8.5\" (1.74 m)       Physical Exam  Constitutional:       General: She is not in acute distress. Appearance: She is well-developed. She is obese.    HENT:      Head: Normocephalic and atraumatic. Right Ear: External ear normal.      Left Ear: External ear normal.      Nose: Nose normal.   Eyes:      General: No scleral icterus. Conjunctiva/sclera: Conjunctivae normal.      Pupils: Pupils are equal, round, and reactive to light. Neck:      Musculoskeletal: Normal range of motion and neck supple. Thyroid: No thyromegaly. Cardiovascular:      Rate and Rhythm: Normal rate and regular rhythm. Heart sounds: Normal heart sounds. No murmur. Pulmonary:      Effort: Pulmonary effort is normal. No accessory muscle usage or respiratory distress. Breath sounds: Normal breath sounds. No wheezing. Musculoskeletal: Normal range of motion. Right lower leg: No edema. Left lower leg: No edema. Skin:     General: Skin is warm and dry. Findings: No rash. Neurological:      Mental Status: She is alert and oriented to person, place, and time. Deep Tendon Reflexes: Reflexes are normal and symmetric. Comments: Diabetic foot exam: No lesions seen. Pulses: intact Monofilament: distal great toe with no sensation bilat, otherwise intact   Psychiatric:         Speech: Speech normal.         Behavior: Behavior normal.         Assessment/Plan:      Carla Barron was seen today for diabetes and hypertension. Diagnoses and all orders for this visit:    Type 2 diabetes mellitus without complication, without long-term current use of insulin ([facility])  -     POCT glycosylated hemoglobin (Hb A1C)  -     POCT Microalbumin  -     Diabetic Foot Exam  -     BASIC METABOLIC PANEL; Future  -     Continuous Blood Gluc Sensor (FREESTYLE MARY 2 SENSOR SYSTM) MISC; 1 Device by Does not apply route daily    Cardiomyopathy, unspecified type ([facility])  -     metoprolol succinate (TOPROL XL) 100 MG extended release tablet; Take 1 tablet by mouth 2 times daily  -     sacubitril-valsartan (ENTRESTO)  MG per tablet;  Take 1 tablet by mouth 2 times daily  -     isosorbide dinitrate (ISORDIL) 20 MG tablet; Take 1 tablet by mouth 3 times daily    Hyperlipidemia LDL goal <70  -     LIPID PANEL; Future  -     atorvastatin (LIPITOR) 80 MG tablet; Take 1 tablet by mouth nightly    Seasonal allergies  -     fluticasone (FLONASE) 50 MCG/ACT nasal spray; 2 sprays by Nasal route daily    Need for influenza vaccination  -     INFLUENZA, QUADV, 3 YRS AND OLDER, IM PF, PREFILL SYR OR SDV, 0.5ML (AFLURIA QUADV, PF)    Other orders  -     ondansetron (ZOFRAN-ODT) 4 MG disintegrating tablet; Place 2 tablets under the tongue every 8 hours as needed for Nausea or Vomiting      As above. Call or go to ED immediately if symptoms worsen or persist.  Return in about 3 months (around 4/8/2021) for DM2., or sooner if necessary. Educational materials and/or home exercises printed for patient's review and were included in patient instructions on his/her After Visit Summary and given to patient at the end of visit. Counseled regarding above diagnosis, including possible risks and complications,  especially if left uncontrolled. Counseled regarding the possible side effects, risks, benefits and alternatives to treatment; patient and/or guardian verbalizes understanding, agrees, feels comfortable with and wishes to proceed with above treatment plan. Advised patient to call with any new medication issues, and read all Rx info from pharmacy to assure aware of all possible risks and side effects of medication before taking. Reviewed age and gender appropriate health screening exams and vaccinations. Advised patient regarding importance of keeping up with recommended health maintenance and to schedule as soon as possible if overdue, as this is important in assessing for undiagnosed pathology, especially cancer, as well as protecting against potentially harmful/life threatening disease. Patient and/or guardian verbalizes understanding and agrees with above counseling, assessment and plan.     All questions answered. Blu Oleary PA-C  1/8/2021    I have personally reviewed and updated the chief complaint, HPI, Past Medical, Family and Social History, as well as the above Review of Systems.

## 2021-01-21 ENCOUNTER — TELEPHONE (OUTPATIENT)
Dept: FAMILY MEDICINE CLINIC | Age: 46
End: 2021-01-21

## 2021-01-21 NOTE — TELEPHONE ENCOUNTER
I need a paper script for freestyle nhung 2 reader system to send to medical supply store.  It's not covered at a retail pharmacy

## 2021-01-26 ENCOUNTER — TELEPHONE (OUTPATIENT)
Dept: FAMILY MEDICINE CLINIC | Age: 46
End: 2021-01-26

## 2021-01-28 NOTE — TELEPHONE ENCOUNTER
Left message to call office back and leave detailed message of what medications and what pharmacy she would like to go to.

## 2021-01-29 ENCOUNTER — VIRTUAL VISIT (OUTPATIENT)
Dept: FAMILY MEDICINE CLINIC | Age: 46
End: 2021-01-29
Payer: MEDICARE

## 2021-01-29 DIAGNOSIS — J45.21 MILD INTERMITTENT ASTHMA WITH EXACERBATION: Primary | ICD-10-CM

## 2021-01-29 DIAGNOSIS — I10 ESSENTIAL HYPERTENSION: ICD-10-CM

## 2021-01-29 DIAGNOSIS — R05.9 COUGH: ICD-10-CM

## 2021-01-29 PROCEDURE — 99214 OFFICE O/P EST MOD 30 MIN: CPT | Performed by: PHYSICIAN ASSISTANT

## 2021-01-29 PROCEDURE — G8427 DOCREV CUR MEDS BY ELIG CLIN: HCPCS | Performed by: PHYSICIAN ASSISTANT

## 2021-01-29 RX ORDER — PREDNISONE 20 MG/1
20 TABLET ORAL 2 TIMES DAILY
Qty: 10 TABLET | Refills: 0 | Status: SHIPPED | OUTPATIENT
Start: 2021-01-29 | End: 2021-02-03

## 2021-01-29 RX ORDER — BROMPHENIRAMINE MALEATE, PSEUDOEPHEDRINE HYDROCHLORIDE, AND DEXTROMETHORPHAN HYDROBROMIDE 2; 30; 10 MG/5ML; MG/5ML; MG/5ML
5 SYRUP ORAL 4 TIMES DAILY PRN
Qty: 240 ML | Refills: 1 | Status: SHIPPED
Start: 2021-01-29 | End: 2021-07-19 | Stop reason: SDUPTHER

## 2021-01-29 RX ORDER — AZITHROMYCIN 250 MG/1
250 TABLET, FILM COATED ORAL SEE ADMIN INSTRUCTIONS
Qty: 6 TABLET | Refills: 0 | Status: SHIPPED | OUTPATIENT
Start: 2021-01-29 | End: 2021-02-03

## 2021-01-29 RX ORDER — FLUTICASONE PROPIONATE 50 MCG
2 SPRAY, SUSPENSION (ML) NASAL DAILY
Qty: 1 BOTTLE | Refills: 0 | Status: SHIPPED
Start: 2021-01-29 | End: 2021-03-30

## 2021-01-29 RX ORDER — ALBUTEROL SULFATE 90 UG/1
2 AEROSOL, METERED RESPIRATORY (INHALATION) EVERY 4 HOURS PRN
Qty: 1 INHALER | Refills: 3 | Status: SHIPPED
Start: 2021-01-29 | End: 2022-04-13 | Stop reason: SDUPTHER

## 2021-01-29 RX ORDER — HYDRALAZINE HYDROCHLORIDE 50 MG/1
50 TABLET, FILM COATED ORAL EVERY 8 HOURS SCHEDULED
Qty: 90 TABLET | Refills: 5 | Status: SHIPPED
Start: 2021-01-29 | End: 2021-07-19 | Stop reason: SDUPTHER

## 2021-01-29 ASSESSMENT — PATIENT HEALTH QUESTIONNAIRE - PHQ9
SUM OF ALL RESPONSES TO PHQ QUESTIONS 1-9: 0
SUM OF ALL RESPONSES TO PHQ9 QUESTIONS 1 & 2: 0
SUM OF ALL RESPONSES TO PHQ QUESTIONS 1-9: 0
SUM OF ALL RESPONSES TO PHQ QUESTIONS 1-9: 0

## 2021-01-29 NOTE — PROGRESS NOTES
loss  Psychological ROS: negative for - anxiety, behavioral disorder, depression, hallucinations, irritability, memory difficulties, mood swings, sleep disturbances or suicidal ideation  ENT ROS: negative for - epistaxis, headaches, hearing change, +nasal congestion, +nasal discharge, nasal polyps, +sinus pain, tinnitus, vertigo or visual changes  Hematological and Lymphatic ROS: negative for - bleeding problems, blood clots, fatigue or swollen lymph nodes  Respiratory ROS: negative for - cough, orthopnea, shortness of breath, sputum changes, tachypnea or wheezing  Cardiovascular ROS: negative for - chest pain, dyspnea on exertion, irregular heartbeat, loss of consciousness, palpitations, paroxysmal nocturnal dyspnea or rapid heart rate  Gastrointestinal ROS: negative for - abdominal pain, blood in stools, change in bowel habits, constipation, diarrhea, gas/bloating, heartburn or nausea/vomiting  Musculoskeletal ROS: negative for - joint pain, joint stiffness, joint swelling or muscle, +back pain, bowel or bladder incontinence  Neurological ROS: negative for - behavioral changes, confusion, dizziness, headaches, memory loss, numbness/tingling, seizures or speech problems, weakness  Dermatological ROS: negative for - dry skin, mole changes, nail changes, pruritus, rash or skin lesion changes    Prior to Visit Medications    Medication Sig Taking?  Authorizing Provider   predniSONE (DELTASONE) 20 MG tablet Take 1 tablet by mouth 2 times daily for 5 days Yes Mally Mott PA-C   azithromycin (ZITHROMAX) 250 MG tablet Take 1 tablet by mouth See Admin Instructions for 5 days 500mg on day 1 followed by 250mg on days 2 - 5 Yes Mally Mott PA-C   brompheniramine-pseudoephedrine-DM (BROMFED DM) 2-30-10 MG/5ML syrup Take 5 mLs by mouth 4 times daily as needed for Congestion or Cough Yes Mally Mott PA-C   fluticasone [facility]) 50 MCG/ACT nasal spray 2 sprays by Nasal route daily Yes Mally Mott PA-C   albuterol sulfate HFA (PROAIR HFA) 108 (90 Base) MCG/ACT inhaler Inhale 2 puffs into the lungs every 4 hours as needed for Wheezing or Shortness of Breath Yes Guanaco Dawkins PA-C   hydrALAZINE (APRESOLINE) 50 MG tablet Take 1 tablet by mouth every 8 hours Yes Guanaco Dawkins PA-C   citalopram (CELEXA) 40 MG tablet TK 1 T PO QHS  Historical Provider, MD   metoprolol succinate (TOPROL XL) 100 MG extended release tablet Take 1 tablet by mouth 2 times daily  Madison Amin PA-C   atorvastatin (LIPITOR) 80 MG tablet Take 1 tablet by mouth nightly  Madison Amin PA-C   sacubitril-valsartan (ENTRESTO)  MG per tablet Take 1 tablet by mouth 2 times daily  Guanaco Dawkins PA-C   isosorbide dinitrate (ISORDIL) 20 MG tablet Take 1 tablet by mouth 3 times daily  Madison Amin PA-C   fluticasone [facility]) 50 MCG/ACT nasal spray 2 sprays by Nasal route daily  Guanaco Dawkins PA-C   Continuous Blood Gluc Sensor (FREESTYLE MARY 2 SENSOR SYSTM) MISC 1 Device by Does not apply route daily  Guanaco Dawkins PA-C   ondansetron (ZOFRAN-ODT) 4 MG disintegrating tablet Place 2 tablets under the tongue every 8 hours as needed for Nausea or Vomiting  Guanaco Dawkins PA-C   gabapentin (NEURONTIN) 600 MG tablet Take 1 tablet by mouth 3 times daily for 90 days. Mookie Norwood MD   oxyCODONE-acetaminophen (PERCOCET) 5-325 MG per tablet Take 1 tablet by mouth every 8 hours as needed for Pain for up to 30 days. Intended supply: 3 days.  Take lowest dose possible to manage pain  Mookie Norwood MD   Cholecalciferol (VITAMIN D) 50 MCG (2000 UT) CAPS capsule Take by mouth daily  Historical Provider, MD   bumetanide (BUMEX) 1 MG tablet Take 1 tablet by mouth daily with 2 tablets every other day  Fatuma Espana MD   spironolactone (ALDACTONE) 25 MG tablet Take 1 tablet by mouth daily  Fatuma Espana MD   PROMETHAZINE-DM PO Take by mouth as needed  Historical Provider, MD   DULERA 100-5 MCG/ACT inhaler INHALE 2 PUFFS BY MOUTH TWICE DAILY, RINSE MOUTH AFTER USING  Patient taking differently: Inhale into the lungs as needed   Mally Mott PA-C   cetirizine (ZYRTEC) 10 MG tablet Take 1 tablet by mouth daily  Mally Mott PA-C   Multiple Vitamin (DAILY-DOUGLAS) TABS TAKE 1 TABLET BY MOUTH EVERY DAY  Leida Bill DO   albuterol (PROVENTIL) (2.5 MG/3ML) 0.083% nebulizer solution Take 3 mLs by nebulization every 6 hours as needed for Wheezing  Mally Mott PA-C   cyclobenzaprine (FLEXERIL) 10 MG tablet Take 10 mg by mouth as needed   Historical Provider, MD   diazePAM (VALIUM) 5 MG tablet Take 5 mg by mouth 2 times daily. Historical Provider, MD   Semaglutide (OZEMPIC, 1 MG/DOSE, SC) Inject into the skin once a week  Historical Provider, MD   aspirin 81 MG chewable tablet Take 1 tablet by mouth daily  Yvette Armstrong MD   ipratropium-albuterol (DUONEB) 0.5-2.5 (3) MG/3ML SOLN nebulizer solution Take 3 mLs by nebulization every 4 hours as needed for Shortness of Breath  Yvette Armstrong MD   fluticasone (FLONASE) 50 MCG/ACT nasal spray SHAKE LIQUID AND USE 1 SPRAY IN EACH NOSTRIL DAILY  GREGORIO Salas   dicyclomine (BENTYL) 10 MG capsule Take 2 capsules by mouth every 8 hours as needed (diarrhea)  Patient not taking: Reported on 10/15/2020  Valerie Del Valle PA-C   potassium chloride (KLOR-CON M) 20 MEQ extended release tablet Take 1 tablet by mouth daily (with breakfast)  Mally Mott PA-C   Insulin Pen Needle ([employer] PEN NEEDLES) 31G X 6 MM MISC 1 each by Does not apply route daily  Mally Mott PA-C   liraglutide-weight management 18 MG/3ML SOPN Inject 0.6 mg into the skin daily  Patient taking differently: Inject 0.6 mg into the skin as needed   Salud Rhodes DO   mineral oil-hydrophilic petrolatum (AQUAPHOR) ointment Apply topically as needed.   Mally Mott PA-C   ONE TOUCH ULTRA TEST strip 1 each by Does not apply route daily  Mally Mott PA-C   pantoprazole (PROTONIX) 20 MG tablet TAKE 1 TABLET BY MOUTH DAILY  Patient taking differently: Take 20 mg by mouth as needed   Mane Leal PA-C   Handicap Placard MISC by Does not apply route Patient cannot walk 200 ft without stopping to rest.    Expiration 5 yrs  Mane Leal PA-C       Social History     Tobacco Use    Smoking status: Light Tobacco Smoker     Packs/day: 0.25     Years: 17.00     Pack years: 4.25     Types: Cigarettes     Start date: 3/27/2001     Last attempt to quit: 10/5/2018     Years since quittin.3    Smokeless tobacco: Never Used    Tobacco comment: occasionally has 1 cigarette, very stressed now   Substance Use Topics    Alcohol use: No    Drug use: No        Allergies   Allergen Reactions    Food Anaphylaxis     Food allergy - Fish and tree nuts    Fish-Derived Products     Nuts [Peanut-Containing Drug Products]     Pcn [Penicillins]     Ultram [Tramadol Hcl]      Per pt had a seizure    Cashews [Macadamia Nut Oil] Nausea And Vomiting   ,   Past Medical History:   Diagnosis Date    Asthma     Bell palsy     DDD (degenerative disc disease), cervical     with spinal stenosis    Diabetes mellitus ([address]     Diverticulitis     DJD (degenerative joint disease)     both hips    GERD without esophagitis     HTN (hypertension)     Hyperlipidemia     Meningitis 2009    Morbid obesity due to excess calories (HCC)     Neuropathy     Pancreas cyst     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[address]    ,   Past Surgical History:   Procedure Laterality Date    CARDIAC CATHETERIZATION  2020    Dr. Nikole Peña  2009    CYSTOSCOPY Left 2018    left stent placement    DILATION AND CURETTAGE OF UTERUS      younger age   [facility] LITHOTRIPSY Right 02/15/2018    Cystoscopy;Stent Removal    UPPER GASTROINTESTINAL ENDOSCOPY N/A 2018    EGD BIOPSY performed by Billy Oviedo MD at [place] [id number]   ,   Social History     Tobacco Use    Smoking status: Light Tobacco Smoker     Packs/day: 0.25     Years: 17.00     Pack years: 4.25     Types: Cigarettes     Start date: 3/27/2001     Last attempt to quit: 10/5/2018     Years since quittin.3    Smokeless tobacco: Never Used    Tobacco comment: occasionally has 1 cigarette, very stressed now   Substance Use Topics    Alcohol use: No    Drug use: No   ,   Family History   Problem Relation Age of Onset    Arthritis Mother     Hypertension Mother     Asthma Mother     Cancer Father     Hypertension Father     Heart Attack Father     Asthma Father    ,   Immunization History   Administered Date(s) Administered    Influenza, Quadv, IM, PF (6 mo and older Fluzone, Flulaval, Fluarix, and 3 yrs and older Afluria) 2021    Pneumococcal Polysaccharide ([id number]) 2018   ,   Health Maintenance   Topic Date Due    Hepatitis C screen  1975    HIV screen  1992    Hepatitis B vaccine (1 of 3 - Risk 3-dose series) 1996    DTaP/Tdap/Td vaccine (1 - Tdap) 1996    Diabetic retinal exam  2018    Cervical cancer screen  2021    Diabetic foot exam  2022    A1C test (Diabetic or Prediabetic)  2022    Diabetic microalbuminuria test  2022    Lipid screen  2022    Potassium monitoring  2022    Creatinine monitoring  2022    Flu vaccine  Completed    Pneumococcal 0-64 years Vaccine  Completed    Hepatitis A vaccine  Aged Out    Hib vaccine  Aged Out    Meningococcal (ACWY) vaccine  Aged Out       PHYSICAL EXAMINATION:  [ INSTRUCTIONS:  \"[x]\" Indicates a positive item  \"[]\" Indicates a negative item  -- DELETE ALL ITEMS NOT EXAMINED]  Vital Signs: (As obtained by patient/caregiver or practitioner observation)    Blood pressure-  Heart rate-    Respiratory rate-    Temperature-  Pulse oximetry-     Constitutional: [x] Appears well-developed and well-nourished [] No apparent distress      [] Abnormal-   Mental status  [x] Alert and awake  [x] Oriented to person/place/time [x]Able to follow commands      Eyes:  EOM    [x]  Normal  [] Abnormal-  Sclera  [x]  Normal  [] Abnormal -         Discharge [x]  None visible  [] Abnormal -    HENT:   [x] Normocephalic, atraumatic. [] Abnormal   [x] Mouth/Throat: Mucous membranes are moist.     External Ears [x] Normal  [] Abnormal-     Neck: [x] No visualized mass     Pulmonary/Chest: [x] Respiratory effort normal.  [x] No visualized signs of difficulty breathing or respiratory distress        [] Abnormal-      Musculoskeletal:   [] Normal gait with no signs of ataxia         [x] Normal range of motion of neck        [] Abnormal-       Neurological:        [x] No Facial Asymmetry (Cranial nerve 7 motor function) (limited exam to video visit)          [] No gaze palsy        [] Abnormal-         Skin:        [x] No significant exanthematous lesions or discoloration noted on facial skin         [] Abnormal-            Psychiatric:       [x] Normal Affect [x] No Hallucinations        [] Abnormal-       Other pertinent observable physical exam findings-     Due to this being a TeleHealth encounter, evaluation of the following organ systems is limited: Vitals/Constitutional/EENT/Resp/CV/GI//MS/Neuro/Skin/Heme-Lymph-Imm. ASSESSMENT/PLAN:    Cayden Vizcaino was seen today for sinus problem. Diagnoses and all orders for this visit:    Mild intermittent asthma with exacerbation  -     predniSONE (DELTASONE) 20 MG tablet; Take 1 tablet by mouth 2 times daily for 5 days  -     azithromycin (ZITHROMAX) 250 MG tablet; Take 1 tablet by mouth See Admin Instructions for 5 days 500mg on day 1 followed by 250mg on days 2 - 5  -     brompheniramine-pseudoephedrine-DM (BROMFED DM) 2-30-10 MG/5ML syrup; Take 5 mLs by mouth 4 times daily as needed for Congestion or Cough  -     fluticasone (FLONASE) 50 MCG/ACT nasal spray; 2 sprays by Nasal route daily    Cough  -     azithromycin (ZITHROMAX) 250 MG tablet;  Take 1 tablet by mouth See Admin Instructions for 5 days 500mg on day 1 followed by 250mg on days 2 - 5  - brompheniramine-pseudoephedrine-DM (BROMFED DM) 2-30-10 MG/5ML syrup; Take 5 mLs by mouth 4 times daily as needed for Congestion or Cough    Essential hypertension  -     hydrALAZINE (APRESOLINE) 50 MG tablet; Take 1 tablet by mouth every 8 hours    Other orders  -     albuterol sulfate HFA (PROAIR HFA) 108 (90 Base) MCG/ACT inhaler; Inhale 2 puffs into the lungs every 4 hours as needed for Wheezing or Shortness of Breath        No follow-ups on file. An  electronic signature was used to authenticate this note.    --Era Mcduffie PA-C on 1/29/2021 at 1:44 }    Pursuant to the emergency declaration under the [facility], [facility] waiver authority and the [employer] and [employer] Act, this Virtual  Visit was conducted, with patient's consent, to reduce the patient's risk of exposure to COVID-19 and provide continuity of care for an established patient. Services were provided through a video synchronous discussion virtually to substitute for in-person clinic visit.

## 2021-01-29 NOTE — PROGRESS NOTES
Therese Pedraza was read the following message We want to confirm that, for purposes of billing, this is a virtual visit with your provider for which we will submit a claim for reimbursement with your insurance company. You will be responsible for any copays, coinsurance amounts or other amounts not covered by your insurance company. If you do not accept this, unfortunately we will not be able to schedule a virtual visit with the provider. Do you accept?  Brannon Vivas responded YES

## 2021-03-10 DIAGNOSIS — J45.20 MILD INTERMITTENT ASTHMA WITHOUT COMPLICATION: ICD-10-CM

## 2021-03-10 RX ORDER — CETIRIZINE HYDROCHLORIDE 10 MG/1
TABLET ORAL
Qty: 30 TABLET | Refills: 3 | Status: SHIPPED
Start: 2021-03-10 | End: 2022-01-03 | Stop reason: SDUPTHER

## 2021-03-15 RX ORDER — FLASH GLUCOSE SENSOR
4 KIT MISCELLANEOUS DAILY
Qty: 4 EACH | Refills: 5 | Status: SHIPPED
Start: 2021-03-15 | End: 2021-05-14 | Stop reason: SDUPTHER

## 2021-03-15 RX ORDER — FLASH GLUCOSE SENSOR
4 KIT MISCELLANEOUS DAILY
Qty: 4 DEVICE | Refills: 5 | Status: SHIPPED
Start: 2021-03-15 | End: 2021-05-14 | Stop reason: SDUPTHER

## 2021-03-16 DIAGNOSIS — L85.3 DRY SKIN DERMATITIS: ICD-10-CM

## 2021-03-22 RX ORDER — SPIRONOLACTONE 25 MG/1
TABLET ORAL
Qty: 30 TABLET | Refills: 5 | Status: SHIPPED | OUTPATIENT
Start: 2021-03-22 | End: 2022-06-13 | Stop reason: SDUPTHER

## 2021-03-23 DIAGNOSIS — J45.20 MILD INTERMITTENT ASTHMA WITHOUT COMPLICATION: ICD-10-CM

## 2021-03-23 RX ORDER — ALBUTEROL SULFATE 2.5 MG/3ML
2.5 SOLUTION RESPIRATORY (INHALATION) EVERY 6 HOURS PRN
Qty: 120 EACH | Refills: 3 | Status: SHIPPED | OUTPATIENT
Start: 2021-03-23

## 2021-03-29 NOTE — PROGRESS NOTES
[facility]  Severo Magnus. Yuly Milton M.D. Carlene Mckeon M.D.  [facility] Officer. Marcela Simmonds, M.D. Janett Bah M.D. Yuly Edwards, Alfonso Santoyo M.D. Bhargavi Infante MD                [address]   1975  Brandon Bloom PA-C        This 19-year-old man is seen today for outpatient cardiac follow-up she continues to have exertional dyspnea walking short distances on level ground. She has less ankle edema than previously. She is not having chest pain orthopnea or PND. Leandro Grayson She has a history of HFrEF and nonischemic cardiomyopathy. She has nonobstructive coronary disease. She had improvement in ejection fraction and a wearable cardio defibrillator was removed in July 2020  :       History:  1. Essential hypertension. 2. Asthma. 3. Bell palsy. 4. Diverticulitis. 5. DJD. 6. GERD. 7. Meningitis. 8. Morbid obesity .  BMI 59.79 03/2021  9. Pancreas cyst.  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 11. Reactive depression/anxiety. 12. Nephrolithiasis  Hx . EVERARDO Pyelonephritis. 13. Systemic inflammatory response syndrome. 14. Tobacco abuse. 15. Chronic PVCs. 16. Spinal stenosis of lumbar region at multiple levels. 17. Obstructive sleep apnea  18. CHF with normal EF March 2018.    19. Echo, 03/21/2018.  .  Ejection fraction  visually estimated  50-55%. Mild concentric LVH. Indeterminate diastolic function. Mildly dilated right ventricle. RV normal.  No significant valvular abnormalities.  Unable to estimate RVSP.  No  pericardial effusion. .  20. Hospitalized 02/11/2019 through 02/17/2019 with pneumonia due to human metapneumovirus.  Hospitalization was protracted due to hypoxemia.  He has had recurrent episodes of lower back pain. 21. Admitted from ED 04/28/2012 for dyspnea.  proBNP was 1/5/2008.   Echo estimate EF 30% with a dilatedLVV.   bilateral lung infiltrates. 22. Cath 05/04/2020: No LM stenosis. LAD  20% stenosis               Circumflex 20% stenosis.   RCA  70% stenosis with IFR determine by exam if she is in decompensated CHF but functionally and symptomatically she seems stable. She has had headaches with Imdur but is continued with the medication. A limited echo will be obtained for EF. Long-term her prognosis would seem poor particularly given the adverse effects of obesity and in my opinion she should be considered for gastric bypass. She was seen back in the office in about 3 months with possible recommendations prior to that based on echo. At completion of today's visit, medications include the following:    Current Outpatient Medications:     albuterol (PROVENTIL) (2.5 MG/3ML) 0.083% nebulizer solution, Take 3 mLs by nebulization every 6 hours as needed for Wheezing, Disp: 120 each, Rfl: 3    spironolactone (ALDACTONE) 25 MG tablet, TAKE 1 TABLET BY MOUTH EVERY DAY, Disp: 30 tablet, Rfl: 5    mineral oil-hydrophilic petrolatum (AQUAPHOR) ointment, Apply topically as needed. , Disp: 99 g, Rfl: 5    Continuous Blood Gluc  (FREESTYLE MARY READER) MARIJA, 4 Devices by Does not apply route daily, Disp: 4 Device, Rfl: 5    Continuous Blood Gluc Sensor ([address] [place] [address]) MISC, 4 Devices by Does not apply route daily, Disp: 4 each, Rfl: 5    DULERA 100-5 MCG/ACT inhaler, INHALE 2 PUFS INTO THE LUNGS TWICE DAILY. RINSE MOUTH AFTER USING, Disp: 13 g, Rfl: 0    cetirizine (ZYRTEC) 10 MG tablet, TAKE 1 TABLET BY MOUTH EVERY DAY, Disp: 30 tablet, Rfl: 3    gabapentin (NEURONTIN) 600 MG tablet, Take 1 tablet by mouth 3 times daily for 90 days. , Disp: 90 tablet, Rfl: 2    oxyCODONE-acetaminophen (PERCOCET) 5-325 MG per tablet, Take 1 tablet by mouth every 8 hours as needed for Pain for up to 30 days. Intended supply: 3 days.  Take lowest dose possible to manage pain, Disp: 90 tablet, Rfl: 0    albuterol sulfate HFA (PROAIR HFA) 108 (90 Base) MCG/ACT inhaler, Inhale 2 puffs into the lungs every 4 hours as needed for Wheezing or Shortness of Breath, Disp: 1 Inhaler, Rfl: 3    hydrALAZINE (APRESOLINE) 50 MG tablet, Take 1 tablet by mouth every 8 hours, Disp: 90 tablet, Rfl: 5    citalopram (CELEXA) 40 MG tablet, TK 1 T PO QHS, Disp: , Rfl:     metoprolol succinate (TOPROL XL) 100 MG extended release tablet, Take 1 tablet by mouth 2 times daily, Disp: 30 tablet, Rfl: 3    atorvastatin (LIPITOR) 80 MG tablet, Take 1 tablet by mouth nightly, Disp: 30 tablet, Rfl: 5    sacubitril-valsartan (ENTRESTO)  MG per tablet, Take 1 tablet by mouth 2 times daily, Disp: 60 tablet, Rfl: 5    isosorbide dinitrate (ISORDIL) 20 MG tablet, Take 1 tablet by mouth 3 times daily, Disp: 90 tablet, Rfl: 3    fluticasone (FLONASE) 50 MCG/ACT nasal spray, 2 sprays by Nasal route daily, Disp: 1 Bottle, Rfl: 5    Continuous Blood Gluc Sensor (FREESTYLE MARY 2 SENSOR SYSTM) MISC, 1 Device by Does not apply route daily, Disp: 1 each, Rfl: 0    ondansetron (ZOFRAN-ODT) 4 MG disintegrating tablet, Place 2 tablets under the tongue every 8 hours as needed for Nausea or Vomiting, Disp: 30 tablet, Rfl: 1    Cholecalciferol (VITAMIN D) 50 MCG (2000 UT) CAPS capsule, Take by mouth daily, Disp: , Rfl:     bumetanide (BUMEX) 1 MG tablet, Take 1 tablet by mouth daily with 2 tablets every other day, Disp: 60 tablet, Rfl: 3    PROMETHAZINE-DM PO, Take by mouth as needed, Disp: , Rfl:     Multiple Vitamin (DAILY-DOUGLAS) TABS, TAKE 1 TABLET BY MOUTH EVERY DAY, Disp: 90 tablet, Rfl: 5    cyclobenzaprine (FLEXERIL) 10 MG tablet, Take 10 mg by mouth as needed , Disp: , Rfl:     Semaglutide (OZEMPIC, 1 MG/DOSE, SC), Inject into the skin once a week, Disp: , Rfl:     aspirin 81 MG chewable tablet, Take 1 tablet by mouth daily, Disp: 30 tablet, Rfl: 3    ipratropium-albuterol (DUONEB) 0.5-2.5 (3) MG/3ML SOLN nebulizer solution, Take 3 mLs by nebulization every 4 hours as needed for Shortness of Breath, Disp: 24 vial, Rfl: 1    dicyclomine (BENTYL) 10 MG capsule, Take 2 capsules by mouth every 8 hours as needed (diarrhea), Disp: 20 capsule, Rfl: 0    potassium chloride (KLOR-CON M) 20 MEQ extended release tablet, Take 1 tablet by mouth daily (with breakfast), Disp: 60 tablet, Rfl: 3    Insulin Pen Needle ([employer] PEN NEEDLES) 31G X 6 MM MISC, 1 each by Does not apply route daily, Disp: 100 each, Rfl: 3    liraglutide-weight management 18 MG/3ML SOPN, Inject 0.6 mg into the skin daily (Patient taking differently: Inject 0.6 mg into the skin as needed ), Disp: 30 pen, Rfl: 0    ONE TOUCH ULTRA TEST strip, 1 each by Does not apply route daily, Disp: 120 each, Rfl: 5    pantoprazole (PROTONIX) 20 MG tablet, TAKE 1 TABLET BY MOUTH DAILY (Patient taking differently: Take 20 mg by mouth as needed ), Disp: 30 tablet, Rfl: 3    Handicap Placard MISC, by Does not apply route Patient cannot walk 200 ft without stopping to rest.   Expiration 5 yrs, Disp: 1 each, Rfl: 0    diazePAM (VALIUM) 5 MG tablet, Take 5 mg by mouth 2 times daily. , Disp: , Rfl:       Note: This report was completed utilizing computer voice recognition software. Every effort has been made to ensure accuracy, however; inadvertent computerized transcription errors may be present.     --Dread Martinez MD on 3/30/2021 at 3:02 PM     CC Dr Demetrio Davila

## 2021-03-30 ENCOUNTER — OFFICE VISIT (OUTPATIENT)
Dept: CARDIOLOGY CLINIC | Age: 46
End: 2021-03-30
Payer: MEDICARE

## 2021-03-30 VITALS
WEIGHT: 293 LBS | HEIGHT: 69 IN | BODY MASS INDEX: 43.4 KG/M2 | DIASTOLIC BLOOD PRESSURE: 70 MMHG | SYSTOLIC BLOOD PRESSURE: 148 MMHG | HEART RATE: 83 BPM | RESPIRATION RATE: 18 BRPM

## 2021-03-30 DIAGNOSIS — E66.9 OBESITY DETERMINED BY PHYSICAL EXAMINATION: ICD-10-CM

## 2021-03-30 DIAGNOSIS — I50.22 CHRONIC SYSTOLIC CONGESTIVE HEART FAILURE (HCC): ICD-10-CM

## 2021-03-30 DIAGNOSIS — R94.30 EJECTION FRACTION < 50%: ICD-10-CM

## 2021-03-30 DIAGNOSIS — I50.23 ACUTE ON CHRONIC SYSTOLIC HEART FAILURE (HCC): Primary | ICD-10-CM

## 2021-03-30 DIAGNOSIS — R06.02 SHORTNESS OF BREATH: ICD-10-CM

## 2021-03-30 PROCEDURE — 99213 OFFICE O/P EST LOW 20 MIN: CPT | Performed by: INTERNAL MEDICINE

## 2021-03-30 PROCEDURE — G8427 DOCREV CUR MEDS BY ELIG CLIN: HCPCS | Performed by: INTERNAL MEDICINE

## 2021-03-30 PROCEDURE — G8482 FLU IMMUNIZE ORDER/ADMIN: HCPCS | Performed by: INTERNAL MEDICINE

## 2021-03-30 PROCEDURE — G8417 CALC BMI ABV UP PARAM F/U: HCPCS | Performed by: INTERNAL MEDICINE

## 2021-03-30 PROCEDURE — 93000 ELECTROCARDIOGRAM COMPLETE: CPT | Performed by: INTERNAL MEDICINE

## 2021-03-30 PROCEDURE — 4004F PT TOBACCO SCREEN RCVD TLK: CPT | Performed by: INTERNAL MEDICINE

## 2021-04-06 ENCOUNTER — TELEPHONE (OUTPATIENT)
Dept: BARIATRICS/WEIGHT MGMT | Age: 46
End: 2021-04-06

## 2021-04-07 ENCOUNTER — OFFICE VISIT (OUTPATIENT)
Dept: FAMILY MEDICINE CLINIC | Age: 46
End: 2021-04-07
Payer: MEDICARE

## 2021-04-07 VITALS
HEIGHT: 69 IN | OXYGEN SATURATION: 95 % | SYSTOLIC BLOOD PRESSURE: 136 MMHG | WEIGHT: 293 LBS | RESPIRATION RATE: 18 BRPM | HEART RATE: 102 BPM | DIASTOLIC BLOOD PRESSURE: 86 MMHG | TEMPERATURE: 96.8 F | BODY MASS INDEX: 43.4 KG/M2

## 2021-04-07 DIAGNOSIS — I42.9 CARDIOMYOPATHY, UNSPECIFIED TYPE (HCC): ICD-10-CM

## 2021-04-07 DIAGNOSIS — E11.9 TYPE 2 DIABETES MELLITUS WITHOUT COMPLICATION, WITHOUT LONG-TERM CURRENT USE OF INSULIN (HCC): Primary | ICD-10-CM

## 2021-04-07 DIAGNOSIS — G89.4 CHRONIC PAIN SYNDROME: ICD-10-CM

## 2021-04-07 DIAGNOSIS — Z79.899 ENCOUNTER FOR MEDICATION MANAGEMENT: ICD-10-CM

## 2021-04-07 DIAGNOSIS — I10 ESSENTIAL HYPERTENSION: ICD-10-CM

## 2021-04-07 DIAGNOSIS — J30.2 SEASONAL ALLERGIES: ICD-10-CM

## 2021-04-07 DIAGNOSIS — L73.2 HIDRADENITIS SUPPURATIVA: ICD-10-CM

## 2021-04-07 DIAGNOSIS — F41.9 ANXIETY: ICD-10-CM

## 2021-04-07 LAB — HBA1C MFR BLD: 6.5 %

## 2021-04-07 PROCEDURE — G8417 CALC BMI ABV UP PARAM F/U: HCPCS | Performed by: PHYSICIAN ASSISTANT

## 2021-04-07 PROCEDURE — G8427 DOCREV CUR MEDS BY ELIG CLIN: HCPCS | Performed by: PHYSICIAN ASSISTANT

## 2021-04-07 PROCEDURE — 2022F DILAT RTA XM EVC RTNOPTHY: CPT | Performed by: PHYSICIAN ASSISTANT

## 2021-04-07 PROCEDURE — 83036 HEMOGLOBIN GLYCOSYLATED A1C: CPT | Performed by: PHYSICIAN ASSISTANT

## 2021-04-07 PROCEDURE — 99214 OFFICE O/P EST MOD 30 MIN: CPT | Performed by: PHYSICIAN ASSISTANT

## 2021-04-07 PROCEDURE — 3044F HG A1C LEVEL LT 7.0%: CPT | Performed by: PHYSICIAN ASSISTANT

## 2021-04-07 PROCEDURE — 4004F PT TOBACCO SCREEN RCVD TLK: CPT | Performed by: PHYSICIAN ASSISTANT

## 2021-04-07 RX ORDER — LORATADINE 10 MG/1
10 TABLET ORAL DAILY
Qty: 30 TABLET | Refills: 2 | Status: SHIPPED | OUTPATIENT
Start: 2021-04-07 | End: 2021-05-07

## 2021-04-07 RX ORDER — SULFAMETHOXAZOLE AND TRIMETHOPRIM 800; 160 MG/1; MG/1
1 TABLET ORAL 2 TIMES DAILY
Qty: 20 TABLET | Refills: 0 | Status: SHIPPED
Start: 2021-04-07 | End: 2021-07-19 | Stop reason: SDUPTHER

## 2021-04-07 RX ORDER — FLUCONAZOLE 150 MG/1
150 TABLET ORAL ONCE
Qty: 2 TABLET | Refills: 0 | Status: SHIPPED
Start: 2021-04-07 | End: 2021-07-19 | Stop reason: SDUPTHER

## 2021-04-07 RX ORDER — CITALOPRAM 20 MG/1
20 TABLET ORAL DAILY
Qty: 30 TABLET | Refills: 1 | Status: SHIPPED
Start: 2021-04-07 | End: 2021-06-17

## 2021-04-07 NOTE — PROGRESS NOTES
DM2:   Patient is here to fu regarding DM2. Patient is  controlled. Taking all medications and tolerating well. Fasting sugars are running 130. Patient is taking ASA and Ace Inhibitor/ARB. Patient is  on appropriately-dosed statin. LDL is not at goal.  BP is  controlled. No hypoglycemic episodes. Patient does not see Podiatry regularly. Saw an Eye Dr about a year ago. Patient is aware that it is necessary to see an Eye Dr yearly. Patient does not smoke. Most recent labs reviewed with patient. Patient does have complaints or concerns today. Lost 9 lbs. She is trying to lose weight. Still trying for bariatric surgery. Cards trying to get echo for stability. Would like medication to help sinuses. She is getting small masses under her arms. [facility] counseling going to take  Her back. She is talking to the therapist, Jose, but hasnt seen the Dr. Was on celexa and valuim. Not taking anything now. Lab Results   Component Value Date    LABA1C 6.5 04/07/2021       Lab Results   Component Value Date    LDLCALC 117 (H) 01/08/2021   Hypertension:  Patient is here for follow up chronic hypertension. This is  generally controlled on current medication regimen. Takes meds as directed and tolerates them well. Most recent labs reviewed with patient and are remarkable. No symptoms from htn standpoint per ROS. Patient is  compliant with lifestyle modifications. Patient does not smoke. Comorbid conditions include obesity and scot . Patient's past medical, surgical, social and/or family history reviewed, updated in chart, and are non-contributory (unless otherwise stated). Medications and allergies also reviewed and updated in chart.         Review of Systems:  Constitutional:  No fever, no fatigue, no chills, no headaches, no weight change  Dermatology:  No rash, no mole, no dry or sensitive skin  ENT:  No cough, no sore throat, no sinus pain, no runny nose, no ear pain  Cardiology:  No chest pain, no palpitations, no leg edema, no shortness of breath, no PND  Gastroenterology:  No dysphagia, no abdominal pain, no nausea, no vomiting, no constipation, no diarrhea, no heartburn  Musculoskeletal:  No joint pain, no leg cramps, no back pain, no muscle aches  Respiratory:  No shortness of breath, no orthopnea, no wheezing, no QUINONEZ, no hemoptysis  Urology:  No blood in the urine, no urinary frequency, no urinary incontinence, no urinary urgency, no nocturia, no dysuria    Vitals:    04/07/21 1112 04/07/21 1122   BP:  136/86   Site:  Right Upper Arm   Position:  Sitting   Cuff Size:  Large Adult   Pulse:  102   Resp: 18 18   Temp:  96.8 °F (36 °C)   TempSrc:  Infrared   SpO2:  95%   Weight:  (!) 392 lb (177.8 kg)   Height: 5' 8.5\" (1.74 m) 5' 8.5\" (1.74 m)       Physical Exam  Constitutional:       General: She is not in acute distress. Appearance: She is well-developed. She is obese. HENT:      Head: Normocephalic and atraumatic. Right Ear: External ear normal.      Left Ear: External ear normal.      Nose: Nose normal.   Eyes:      General: No scleral icterus. Conjunctiva/sclera: Conjunctivae normal.      Pupils: Pupils are equal, round, and reactive to light. Neck:      Musculoskeletal: Normal range of motion and neck supple. Thyroid: No thyromegaly. Cardiovascular:      Rate and Rhythm: Normal rate and regular rhythm. Heart sounds: Normal heart sounds. No murmur. Pulmonary:      Effort: Pulmonary effort is normal. No accessory muscle usage or respiratory distress. Breath sounds: Normal breath sounds. No wheezing. Musculoskeletal: Normal range of motion. Right lower leg: No edema. Left lower leg: No edema. Skin:     General: Skin is warm and dry. Findings: No rash. Neurological:      Mental Status: She is alert and oriented to person, place, and time. Deep Tendon Reflexes: Reflexes are normal and symmetric.    Psychiatric:         Speech: Speech normal. Behavior: Behavior normal.         Assessment/Plan:      Annmarie Villagomez was seen today for hypertension and diabetes. Diagnoses and all orders for this visit:    Type 2 diabetes mellitus without complication, without long-term current use of insulin ([facility])  -     POCT glycosylated hemoglobin (Hb A1C)    Seasonal allergies  -     loratadine (CLARITIN) 10 MG tablet; Take 1 tablet by mouth daily    Cardiomyopathy, unspecified type ([facility]    Essential hypertension    BMI 50.0-59.9, adult ([facility])    Chronic pain syndrome  -     DME Order for Bedside Commode as OP    Anxiety  -     citalopram (CELEXA) 20 MG tablet; Take 1 tablet by mouth daily    Hidradenitis suppurativa  -     sulfamethoxazole-trimethoprim (BACTRIM DS) 800-160 MG per tablet; Take 1 tablet by mouth 2 times daily for 10 days  -     fluconazole (DIFLUCAN) 150 MG tablet; Take 1 tablet by mouth once for 1 dose May repeat in 3-5 days, if symptoms persist or recur. Encounter for medication management  -     MISCELLANEOUS SENDOUT 1; Future      As above. Call or go to ED immediately if symptoms worsen or persist.  Return in about 4 weeks (around 5/5/2021). , or sooner if necessary. Educational materials and/or home exercises printed for patient's review and were included in patient instructions on his/her After Visit Summary and given to patient at the end of visit. Counseled regarding above diagnosis, including possible risks and complications,  especially if left uncontrolled. Counseled regarding the possible side effects, risks, benefits and alternatives to treatment; patient and/or guardian verbalizes understanding, agrees, feels comfortable with and wishes to proceed with above treatment plan. Advised patient to call with any new medication issues, and read all Rx info from pharmacy to assure aware of all possible risks and side effects of medication before taking.     Reviewed age and gender appropriate health screening exams and vaccinations. Advised patient regarding importance of keeping up with recommended health maintenance and to schedule as soon as possible if overdue, as this is important in assessing for undiagnosed pathology, especially cancer, as well as protecting against potentially harmful/life threatening disease. Patient and/or guardian verbalizes understanding and agrees with above counseling, assessment and plan. All questions answered. Alivia Mckeon PA-C  4/7/2021    I have personally reviewed and updated the chief complaint, HPI, Past Medical, Family and Social History, as well as the above Review of Systems.

## 2021-04-12 LAB
Lab: NORMAL
REPORT: NORMAL
THIS TEST SENT TO: NORMAL

## 2021-05-14 ENCOUNTER — OFFICE VISIT (OUTPATIENT)
Dept: FAMILY MEDICINE CLINIC | Age: 46
End: 2021-05-14
Payer: MEDICARE

## 2021-05-14 VITALS
TEMPERATURE: 98.2 F | OXYGEN SATURATION: 99 % | WEIGHT: 293 LBS | DIASTOLIC BLOOD PRESSURE: 82 MMHG | BODY MASS INDEX: 43.4 KG/M2 | RESPIRATION RATE: 18 BRPM | HEIGHT: 69 IN | SYSTOLIC BLOOD PRESSURE: 136 MMHG | HEART RATE: 76 BPM

## 2021-05-14 DIAGNOSIS — F17.200 SMOKER: ICD-10-CM

## 2021-05-14 DIAGNOSIS — J45.20 MILD INTERMITTENT ASTHMA WITHOUT COMPLICATION: ICD-10-CM

## 2021-05-14 DIAGNOSIS — G89.4 CHRONIC PAIN SYNDROME: ICD-10-CM

## 2021-05-14 DIAGNOSIS — I10 ESSENTIAL HYPERTENSION: ICD-10-CM

## 2021-05-14 DIAGNOSIS — G47.33 OSA (OBSTRUCTIVE SLEEP APNEA): ICD-10-CM

## 2021-05-14 DIAGNOSIS — I42.9 CARDIOMYOPATHY, UNSPECIFIED TYPE (HCC): ICD-10-CM

## 2021-05-14 DIAGNOSIS — B37.9 CANDIDIASIS: ICD-10-CM

## 2021-05-14 DIAGNOSIS — E11.9 TYPE 2 DIABETES MELLITUS WITHOUT COMPLICATION, WITHOUT LONG-TERM CURRENT USE OF INSULIN (HCC): Primary | ICD-10-CM

## 2021-05-14 PROCEDURE — 2022F DILAT RTA XM EVC RTNOPTHY: CPT | Performed by: PHYSICIAN ASSISTANT

## 2021-05-14 PROCEDURE — G8417 CALC BMI ABV UP PARAM F/U: HCPCS | Performed by: PHYSICIAN ASSISTANT

## 2021-05-14 PROCEDURE — G8427 DOCREV CUR MEDS BY ELIG CLIN: HCPCS | Performed by: PHYSICIAN ASSISTANT

## 2021-05-14 PROCEDURE — 4004F PT TOBACCO SCREEN RCVD TLK: CPT | Performed by: PHYSICIAN ASSISTANT

## 2021-05-14 PROCEDURE — 99214 OFFICE O/P EST MOD 30 MIN: CPT | Performed by: PHYSICIAN ASSISTANT

## 2021-05-14 PROCEDURE — 3044F HG A1C LEVEL LT 7.0%: CPT | Performed by: PHYSICIAN ASSISTANT

## 2021-05-14 RX ORDER — FLASH GLUCOSE SENSOR
4 KIT MISCELLANEOUS DAILY
Qty: 4 DEVICE | Refills: 5 | Status: SHIPPED | OUTPATIENT
Start: 2021-05-14

## 2021-05-14 RX ORDER — FLASH GLUCOSE SENSOR
4 KIT MISCELLANEOUS DAILY
Qty: 4 EACH | Refills: 5 | Status: SHIPPED | OUTPATIENT
Start: 2021-05-14

## 2021-05-14 RX ORDER — CLOTRIMAZOLE AND BETAMETHASONE DIPROPIONATE 10; .64 MG/G; MG/G
CREAM TOPICAL
Qty: 45 G | Refills: 0 | Status: SHIPPED
Start: 2021-05-14 | End: 2021-07-19 | Stop reason: SDUPTHER

## 2021-05-14 NOTE — PROGRESS NOTES
intermittent asthma without complication  -     DME Order for Nebulizer as OP      As above. Call or go to ED immediately if symptoms worsen or persist.  Return in about 8 weeks (around 7/9/2021). , or sooner if necessary. Educational materials and/or home exercises printed for patient's review and were included in patient instructions on his/her After Visit Summary and given to patient at the end of visit. Counseled regarding above diagnosis, including possible risks and complications,  especially if left uncontrolled. Counseled regarding the possible side effects, risks, benefits and alternatives to treatment; patient and/or guardian verbalizes understanding, agrees, feels comfortable with and wishes to proceed with above treatment plan. Advised patient to call with any new medication issues, and read all Rx info from pharmacy to assure aware of all possible risks and side effects of medication before taking. Reviewed age and gender appropriate health screening exams and vaccinations. Advised patient regarding importance of keeping up with recommended health maintenance and to schedule as soon as possible if overdue, as this is important in assessing for undiagnosed pathology, especially cancer, as well as protecting against potentially harmful/life threatening disease. Patient and/or guardian verbalizes understanding and agrees with above counseling, assessment and plan. All questions answered. Kenney Cary PA-C  5/14/2021    I have personally reviewed and updated the chief complaint, HPI, Past Medical, Family and Social History, as well as the above Review of Systems.

## 2021-05-28 ENCOUNTER — TELEPHONE (OUTPATIENT)
Dept: CARDIOLOGY | Age: 46
End: 2021-05-28

## 2021-06-14 DIAGNOSIS — J45.20 MILD INTERMITTENT ASTHMA WITHOUT COMPLICATION: ICD-10-CM

## 2021-06-17 DIAGNOSIS — J45.20 MILD INTERMITTENT ASTHMA WITHOUT COMPLICATION: ICD-10-CM

## 2021-06-17 RX ORDER — CITALOPRAM 40 MG/1
TABLET ORAL
COMMUNITY
Start: 2021-05-21 | End: 2021-10-25 | Stop reason: SDUPTHER

## 2021-07-02 ENCOUNTER — TELEPHONE (OUTPATIENT)
Dept: CARDIOLOGY | Age: 46
End: 2021-07-02

## 2021-07-02 NOTE — TELEPHONE ENCOUNTER
PATIENT CALLED TO CANCEL ECHO THAT WAS SCHEDULED FOR THIS MORNING. PATIENT IS HAVING CAR TROUBLE.  RESCHEDULED ECHO    Electronically signed by Madiha Vital on 7/2/2021 at 8:34 AM

## 2021-07-08 ENCOUNTER — OFFICE VISIT (OUTPATIENT)
Dept: CARDIOLOGY CLINIC | Age: 46
End: 2021-07-08
Payer: MEDICARE

## 2021-07-08 VITALS
DIASTOLIC BLOOD PRESSURE: 105 MMHG | RESPIRATION RATE: 14 BRPM | WEIGHT: 293 LBS | BODY MASS INDEX: 44.41 KG/M2 | SYSTOLIC BLOOD PRESSURE: 186 MMHG | HEIGHT: 68 IN | HEART RATE: 81 BPM

## 2021-07-08 DIAGNOSIS — I10 ESSENTIAL HYPERTENSION: Primary | ICD-10-CM

## 2021-07-08 DIAGNOSIS — I50.20 SYSTOLIC HEART FAILURE, UNSPECIFIED HF CHRONICITY (HCC): ICD-10-CM

## 2021-07-08 PROCEDURE — 99214 OFFICE O/P EST MOD 30 MIN: CPT | Performed by: INTERNAL MEDICINE

## 2021-07-08 PROCEDURE — 93000 ELECTROCARDIOGRAM COMPLETE: CPT | Performed by: INTERNAL MEDICINE

## 2021-07-08 NOTE — PROGRESS NOTES
Chief Complaint   Patient presents with    Congestive Heart Failure       Patient Active Problem List    Diagnosis Date Noted    Heart failure ([facility] 06/02/2020    Malnutrition ([facility] 05/29/2020    Current moderate episode of major depressive disorder without prior episode ([facility] 05/29/2020    Cardiomyopathy ([facility] 05/03/2020    Acute on chronic systolic heart failure ([facility] 05/01/2020    Hidradenitis suppurativa 12/30/2019    Chronic pain syndrome 07/24/2019    Lumbar radiculopathy 07/24/2019    Chronic bilateral low back pain with bilateral sciatica 07/24/2019    Pneumonia due to human metapneumovirus 02/17/2019    Respiratory failure ([facility]     JODI (obstructive sleep apnea) 01/29/2019    Vitamin D deficiency 07/02/2018    Diabetes mellitus ([facility] 07/02/2018    Hyperlipidemia LDL goal <70 07/02/2018    Gastroesophageal reflux disease without esophagitis     Primary osteoarthritis of both hips 04/24/2018    Spinal stenosis of lumbar region with neurogenic claudication 04/13/2018    Frequent PVCs 03/20/2018    Asthma exacerbation 03/20/2018    Hyperkalemia 03/20/2018    Hydronephrosis with urinary obstruction due to renal calculus 02/04/2018    Pancreatic cyst 02/04/2018    Ureteric stone 01/14/2018    Tobacco dependence 01/14/2018    Pyelonephritis 12/30/2017    Essential hypertension 03/27/2017    Mild intermittent asthma without complication 82/02/9896    Morbid obesity ([facility] 03/27/2017    Reactive depression 03/27/2017    Anxiety 03/27/2017       Current Outpatient Medications   Medication Sig Dispense Refill    citalopram (CELEXA) 40 MG tablet TAKE 1 TABLET BY MOUTH EVERY DAY      mometasone-formoterol (DULERA) 100-5 MCG/ACT inhaler Inhale 2 puffs into the lungs 2 times daily 13 g 1    gabapentin (NEURONTIN) 600 MG tablet Take 1 tablet by mouth 3 times daily for 90 days.  90 tablet 2    [START ON 7/11/2021] oxyCODONE-acetaminophen (PERCOCET) 5-325 MG per tablet Take 1 tablet by mouth every 8 hours as needed for Pain for up to 30 days. Intended supply: 3 days. Take lowest dose possible to manage pain 90 tablet 0    Continuous Blood Gluc  (FREESTYLE MARY READER) MARIJA 4 Devices by Does not apply route daily 4 Device 5    Continuous Blood Gluc Sensor (FREESTYLE MARY SENSOR SYSTEM) MISC 4 Devices by Does not apply route daily 4 each 5    clotrimazole-betamethasone (LOTRISONE) 1-0.05 % cream Apply topically 2 times daily. 45 g 0    albuterol (PROVENTIL) (2.5 MG/3ML) 0.083% nebulizer solution Take 3 mLs by nebulization every 6 hours as needed for Wheezing 120 each 3    spironolactone (ALDACTONE) 25 MG tablet TAKE 1 TABLET BY MOUTH EVERY DAY 30 tablet 5    mineral oil-hydrophilic petrolatum (AQUAPHOR) ointment Apply topically as needed.  99 g 5    cetirizine (ZYRTEC) 10 MG tablet TAKE 1 TABLET BY MOUTH EVERY DAY 30 tablet 3    albuterol sulfate HFA (PROAIR HFA) 108 (90 Base) MCG/ACT inhaler Inhale 2 puffs into the lungs every 4 hours as needed for Wheezing or Shortness of Breath 1 Inhaler 3    hydrALAZINE (APRESOLINE) 50 MG tablet Take 1 tablet by mouth every 8 hours 90 tablet 5    metoprolol succinate (TOPROL XL) 100 MG extended release tablet Take 1 tablet by mouth 2 times daily 30 tablet 3    atorvastatin (LIPITOR) 80 MG tablet Take 1 tablet by mouth nightly 30 tablet 5    sacubitril-valsartan (ENTRESTO)  MG per tablet Take 1 tablet by mouth 2 times daily 60 tablet 5    isosorbide dinitrate (ISORDIL) 20 MG tablet Take 1 tablet by mouth 3 times daily 90 tablet 3    fluticasone (FLONASE) 50 MCG/ACT nasal spray 2 sprays by Nasal route daily 1 Bottle 5    Continuous Blood Gluc Sensor (FREESTYLE MARY 2 SENSOR SYSTM) MISC 1 Device by Does not apply route daily 1 each 0    ondansetron (ZOFRAN-ODT) 4 MG disintegrating tablet Place 2 tablets under the tongue every 8 hours as needed for Nausea or Vomiting 30 tablet 1    Cholecalciferol (VITAMIN D) 50 MCG (2000 UT) CAPS capsule Take by mouth daily      bumetanide (BUMEX) 1 MG tablet Take 1 tablet by mouth daily with 2 tablets every other day 60 tablet 3    PROMETHAZINE-DM PO Take by mouth as needed      Multiple Vitamin (DAILY-DOUGLAS) TABS TAKE 1 TABLET BY MOUTH EVERY DAY 90 tablet 5    Semaglutide (OZEMPIC, 1 MG/DOSE, SC) Inject into the skin once a week      aspirin 81 MG chewable tablet Take 1 tablet by mouth daily 30 tablet 3    ipratropium-albuterol (DUONEB) 0.5-2.5 (3) MG/3ML SOLN nebulizer solution Take 3 mLs by nebulization every 4 hours as needed for Shortness of Breath 24 vial 1    potassium chloride (KLOR-CON M) 20 MEQ extended release tablet Take 1 tablet by mouth daily (with breakfast) 60 tablet 3    Insulin Pen Needle ([employer] PEN NEEDLES) 31G X 6 MM MISC 1 each by Does not apply route daily 100 each 3    liraglutide-weight management 18 MG/3ML SOPN Inject 0.6 mg into the skin daily (Patient taking differently: Inject 0.6 mg into the skin as needed ) 30 pen 0    ONE TOUCH ULTRA TEST strip 1 each by Does not apply route daily 120 each 5    pantoprazole (PROTONIX) 20 MG tablet TAKE 1 TABLET BY MOUTH DAILY (Patient taking differently: Take 20 mg by mouth as needed ) 30 tablet 3    Handicap Placard MISC by Does not apply route Patient cannot walk 200 ft without stopping to rest.    Expiration 5 yrs 1 each 0     No current facility-administered medications for this visit.         Allergies   Allergen Reactions    Food Anaphylaxis     Food allergy - Fish and tree nuts    Fish-Derived Products     Norco [Hydrocodone-Acetaminophen] Hives    Nuts [Peanut-Containing Drug Products]     Pcn [Penicillins]     Ultram [Tramadol Hcl]      Per pt had a seizure    Cashews [Macadamia Nut Oil] Nausea And Vomiting       Vitals:    07/08/21 1028 07/08/21 1029   BP: (!) 185/109 (!) 186/105   Pulse: 81    Resp: 14    Weight: (!) 409 lb (185.5 kg)    Height: 5' 8\" (1.727 m)        Social History     Socioeconomic History    Marital status: Legally      Spouse name: Not on file    Number of children: Not on file    Years of education: Not on file    Highest education level: Not on file   Occupational History    Occupation: direct care staff/counselor   Tobacco Use    Smoking status: Light Tobacco Smoker     Packs/day: 0.25     Years: 17.00     Pack years: 4.25     Types: Cigarettes     Start date: 3/27/2001     Last attempt to quit: 10/5/2018     Years since quittin.7    Smokeless tobacco: Never Used    Tobacco comment: occasionally has 1 cigarette, very stressed now   Vaping Use    Vaping Use: Former   Substance and Sexual Activity    Alcohol use: No    Drug use: No    Sexual activity: Yes   Other Topics Concern    Not on file   Social History Narrative    Not on file     Social Determinants of Health     Financial Resource Strain:     Difficulty of Paying Living Expenses:    Food Insecurity:     Worried About Running Out of Food in the Last Year:     [address] in the Last Year:    Transportation Needs:     Lack of Transportation (Medical):      Lack of Transportation (Non-Medical):    Physical Activity:     Days of Exercise per Week:     Minutes of Exercise per Session:    Stress:     Feeling of Stress :    Social Connections:     Frequency of Communication with Friends and Family:     Frequency of Social Gatherings with Friends and Family:     Attends Yazidism Services:     Active Member of Clubs or Organizations:     Attends Club or Organization Meetings:     Marital Status:    Intimate Partner Violence:     Fear of Current or Ex-Partner:     Emotionally Abused:     Physically Abused:     Sexually Abused:        Family History   Problem Relation Age of Onset    Arthritis Mother     Hypertension Mother     Asthma Mother     Cancer Father     Hypertension Father     Heart Attack Father     Asthma Father          SUBJECTIVE: Maikel Ga presents to the office today for re-evaluation of cardiac diagnoses. DR Montana Mace PATIENT. I reviewed records. Hx of morbid obesity and NICM with improved EF on very aggressive CHF regimen. Cannot tolerate high dose nitrates due to headache. She complains of no new or unstable cardiac symptoms and denies   chest pain, orthopnea and syncope. Compliant with meds.         History:  1. Essential hypertension. 2. Asthma. 3. Bell palsy. 4. Diverticulitis. 5. DJD. 6. GERD. 7. Meningitis. 8. Morbid obesity .  BMI 59.79 03/2021  9. Pancreas cyst.  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 11. Reactive depression/anxiety. 12. Nephrolithiasis  Hx . EVERARDO Pyelonephritis. 13. Systemic inflammatory response syndrome. 14. Tobacco abuse. 15. Chronic PVCs. 16. Spinal stenosis of lumbar region at multiple levels. 17. Obstructive sleep apnea  18. CHF with normal EF March 2018.    19. Echo, 03/21/2018.  .  Ejection fraction  visually estimated  50-55%. Mild concentric LVH. Indeterminate diastolic function. Mildly dilated right ventricle. RV normal.  No significant valvular abnormalities.  Unable to estimate RVSP.  No  pericardial effusion. .  20. Hospitalized 02/11/2019 through 02/17/2019 with pneumonia due to human metapneumovirus.  Hospitalization was protracted due to hypoxemia.  He has had recurrent episodes of lower back pain. 21. Admitted from ED 04/28/2012 for dyspnea.  proBNP was 1/5/2008.   Echo estimate EF 30% with a dilatedLVV.   bilateral lung infiltrates. 22. Cath 05/04/2020: No LM stenosis. LAD  20% stenosis               Circumflex 20% stenosis.  RCA  70% stenosis with IFR 0.98.  LVEDP 43.  23. LifeVest.  hospital discharge 05/05/2020  24. Cardiac office evaluation 06/23/2020: Symptomatically stable.  Entresto increased to 97/103 twice daily  25. Outpatient cardiac follow-up 07/07/2020: Dyspnea improved.  Continues chronic back and foot pain.  Continues to wear LifeVest.  Isosorbide dinitrate 20 mg p.o. 3 times daily added   26.  Echo 07/28/2020: LV mildly dilated with global hypokinesis.  Biplane EF 44%.  ZOLL vest discontinued  27. OP assessment 08/26/2020: Hydralazine increased to 50mg TID  28. OP cardiac follow-up 10/15/2020: Bumex increased.  1 mg daily with an extra milligram alternate days  29. Lipid panel 01/08/2021: Total cholesterol 211, triglycerides 249, HDL 44,       Review of Systems:   Heart: as above   Lungs: as above   Eyes: denies changes in vision or discharge. Ears: denies changes in hearing or pain. Nose: denies epistaxis or masses   Throat: denies sore throat or trouble swallowing. Neuro: denies numbness, tingling, tremors. Skin: denies rashes or itching. : denies hematuria, dysuria   GI: denies vomiting, diarrhea   Psych: denies mood changed, anxiety, depression. all others negative. Physical Exam   BP (!) 186/105   Pulse 81   Resp 14   Ht 5' 8\" (1.727 m)   Wt (!) 409 lb (185.5 kg)   BMI 62.19 kg/m²   Constitutional: Oriented to person, place, and time. Obese No distress. Head: Normocephalic and atraumatic. Eyes: EOM are normal. Pupils are equal, round, and reactive to light. Neck: Normal range of motion. Neck supple. No hepatojugular reflux and no JVD present. Carotid bruit is not present. No tracheal deviation present. No thyromegaly present. Cardiovascular: Normal rate, regular rhythm, normal heart sounds and intact distal pulses. Exam reveals no gallop and no friction rub. No murmur heard. Pulmonary/Chest: Effort normal and breath sounds normal. No respiratory distress. No wheezes. No rales. No tenderness. Abdominal: Soft. Bowel sounds are normal. No distension and no mass. No tenderness. No rebound and no guarding. Musculoskeletal: Normal range of motion. No edema and no tenderness. Neurological: Alert and oriented to person, place, and time. Skin: Skin is warm and dry. No rash noted. Not diaphoretic. No erythema. Psychiatric: Normal mood and affect.  Behavior is normal.     EKG: normal sinus rhythm, RBBB, occasional PVC noted, unifocal, unchanged from previous tracings. ASSESSMENT AND PLAN:  Patient Active Problem List   Diagnosis    Essential hypertension    Mild intermittent asthma without complication    Morbid obesity ([address]    Reactive depression    Anxiety    Pyelonephritis    Ureteric stone    Tobacco dependence    Hydronephrosis with urinary obstruction due to renal calculus    Pancreatic cyst    Frequent PVCs    Asthma exacerbation    Hyperkalemia    Spinal stenosis of lumbar region with neurogenic claudication    Primary osteoarthritis of both hips    Gastroesophageal reflux disease without esophagitis    Vitamin D deficiency    Diabetes mellitus ([address]    Hyperlipidemia LDL goal <70    JODI (obstructive sleep apnea)    Respiratory failure (HCC)    Pneumonia due to human metapneumovirus    Chronic pain syndrome    Lumbar radiculopathy    Chronic bilateral low back pain with bilateral sciatica    Hidradenitis suppurativa    Acute on chronic systolic heart failure (HCC)    Cardiomyopathy ([address]    Malnutrition ([address]    Current moderate episode of major depressive disorder without prior episode ([address]    Heart failure ([address]       Patient with morbid obesity and NICM   HFrEF, Stage C, NYHA I, euvolemic  On aggressive CHF regimen  I conveyed to patient and family member my opinion that her only home for long term survival is massive weight loss thru bariatric surgery  Had seen dr Paz Staples in past and we will contact  [facility] for surgery at low risk         The patient was educated as to the symptoms that would require a prompt return to our office. Return visit in 3 months WITH DR CARMONA.     Reva Vick M.D  [facility]

## 2021-07-09 DIAGNOSIS — E66.01 MORBID OBESITY (HCC): Primary | ICD-10-CM

## 2021-07-17 DIAGNOSIS — E78.5 HYPERLIPIDEMIA LDL GOAL <70: ICD-10-CM

## 2021-07-19 ENCOUNTER — OFFICE VISIT (OUTPATIENT)
Dept: FAMILY MEDICINE CLINIC | Age: 46
End: 2021-07-19
Payer: MEDICARE

## 2021-07-19 VITALS
DIASTOLIC BLOOD PRESSURE: 82 MMHG | RESPIRATION RATE: 22 BRPM | HEIGHT: 68 IN | TEMPERATURE: 96.5 F | WEIGHT: 293 LBS | SYSTOLIC BLOOD PRESSURE: 140 MMHG | BODY MASS INDEX: 44.41 KG/M2

## 2021-07-19 DIAGNOSIS — E66.01 MORBID OBESITY WITH BMI OF 50.0-59.9, ADULT (HCC): ICD-10-CM

## 2021-07-19 DIAGNOSIS — K59.00 CONSTIPATION, UNSPECIFIED CONSTIPATION TYPE: Primary | ICD-10-CM

## 2021-07-19 DIAGNOSIS — G89.4 CHRONIC PAIN SYNDROME: ICD-10-CM

## 2021-07-19 DIAGNOSIS — E11.9 TYPE 2 DIABETES MELLITUS WITHOUT COMPLICATION, WITHOUT LONG-TERM CURRENT USE OF INSULIN (HCC): ICD-10-CM

## 2021-07-19 DIAGNOSIS — B37.9 CANDIDIASIS: ICD-10-CM

## 2021-07-19 DIAGNOSIS — I10 ESSENTIAL HYPERTENSION: ICD-10-CM

## 2021-07-19 DIAGNOSIS — R05.9 COUGH: ICD-10-CM

## 2021-07-19 DIAGNOSIS — I42.9 CARDIOMYOPATHY, UNSPECIFIED TYPE (HCC): ICD-10-CM

## 2021-07-19 DIAGNOSIS — L73.2 HIDRADENITIS SUPPURATIVA: ICD-10-CM

## 2021-07-19 DIAGNOSIS — J45.21 MILD INTERMITTENT ASTHMA WITH EXACERBATION: ICD-10-CM

## 2021-07-19 DIAGNOSIS — K21.9 GASTROESOPHAGEAL REFLUX DISEASE WITHOUT ESOPHAGITIS: ICD-10-CM

## 2021-07-19 LAB
ANION GAP SERPL CALCULATED.3IONS-SCNC: 9 MMOL/L (ref 7–16)
BUN BLDV-MCNC: 10 MG/DL (ref 6–20)
CALCIUM SERPL-MCNC: 9.3 MG/DL (ref 8.6–10.2)
CHLORIDE BLD-SCNC: 103 MMOL/L (ref 98–107)
CHOLESTEROL, TOTAL: 171 MG/DL (ref 0–199)
CO2: 28 MMOL/L (ref 22–29)
CREAT SERPL-MCNC: 0.9 MG/DL (ref 0.5–1)
GFR AFRICAN AMERICAN: >60
GFR NON-AFRICAN AMERICAN: >60 ML/MIN/1.73
GLUCOSE BLD-MCNC: 88 MG/DL (ref 74–99)
HBA1C MFR BLD: 6.2 % (ref 4–5.6)
HDLC SERPL-MCNC: 48 MG/DL
LDL CHOLESTEROL CALCULATED: 84 MG/DL (ref 0–99)
POTASSIUM SERPL-SCNC: 4.9 MMOL/L (ref 3.5–5)
SODIUM BLD-SCNC: 140 MMOL/L (ref 132–146)
TRIGL SERPL-MCNC: 194 MG/DL (ref 0–149)
VLDLC SERPL CALC-MCNC: 39 MG/DL

## 2021-07-19 PROCEDURE — 3044F HG A1C LEVEL LT 7.0%: CPT | Performed by: PHYSICIAN ASSISTANT

## 2021-07-19 PROCEDURE — G8427 DOCREV CUR MEDS BY ELIG CLIN: HCPCS | Performed by: PHYSICIAN ASSISTANT

## 2021-07-19 PROCEDURE — 4004F PT TOBACCO SCREEN RCVD TLK: CPT | Performed by: PHYSICIAN ASSISTANT

## 2021-07-19 PROCEDURE — 2022F DILAT RTA XM EVC RTNOPTHY: CPT | Performed by: PHYSICIAN ASSISTANT

## 2021-07-19 PROCEDURE — G8417 CALC BMI ABV UP PARAM F/U: HCPCS | Performed by: PHYSICIAN ASSISTANT

## 2021-07-19 PROCEDURE — 99214 OFFICE O/P EST MOD 30 MIN: CPT | Performed by: PHYSICIAN ASSISTANT

## 2021-07-19 RX ORDER — ASPIRIN 81 MG/1
81 TABLET, CHEWABLE ORAL DAILY
Qty: 30 TABLET | Refills: 3 | Status: SHIPPED
Start: 2021-07-19 | End: 2021-10-25 | Stop reason: SDUPTHER

## 2021-07-19 RX ORDER — SULFAMETHOXAZOLE AND TRIMETHOPRIM 800; 160 MG/1; MG/1
1 TABLET ORAL 2 TIMES DAILY
Qty: 20 TABLET | Refills: 0 | Status: SHIPPED
Start: 2021-07-19 | End: 2021-07-28

## 2021-07-19 RX ORDER — DOCUSATE SODIUM 100 MG/1
100 CAPSULE, LIQUID FILLED ORAL 2 TIMES DAILY
Qty: 60 CAPSULE | Refills: 0 | Status: SHIPPED | OUTPATIENT
Start: 2021-07-19 | End: 2021-08-18

## 2021-07-19 RX ORDER — CLOTRIMAZOLE AND BETAMETHASONE DIPROPIONATE 10; .64 MG/G; MG/G
CREAM TOPICAL
Qty: 45 G | Refills: 0 | Status: SHIPPED
Start: 2021-07-19 | End: 2021-10-25 | Stop reason: SDUPTHER

## 2021-07-19 RX ORDER — PANTOPRAZOLE SODIUM 20 MG/1
20 TABLET, DELAYED RELEASE ORAL DAILY
Qty: 30 TABLET | Refills: 3 | Status: SHIPPED
Start: 2021-07-19 | End: 2022-04-13 | Stop reason: SDUPTHER

## 2021-07-19 RX ORDER — FLUCONAZOLE 150 MG/1
150 TABLET ORAL ONCE
Qty: 2 TABLET | Refills: 0 | Status: SHIPPED | OUTPATIENT
Start: 2021-07-19 | End: 2021-07-19

## 2021-07-19 RX ORDER — HYDRALAZINE HYDROCHLORIDE 50 MG/1
50 TABLET, FILM COATED ORAL EVERY 8 HOURS SCHEDULED
Qty: 90 TABLET | Refills: 3 | Status: SHIPPED
Start: 2021-07-19 | End: 2022-01-12

## 2021-07-19 RX ORDER — POTASSIUM CHLORIDE 20 MEQ/1
TABLET, EXTENDED RELEASE ORAL
Qty: 60 TABLET | Refills: 3 | Status: SHIPPED
Start: 2021-07-19 | End: 2021-07-19 | Stop reason: SDUPTHER

## 2021-07-19 RX ORDER — BUMETANIDE 1 MG/1
TABLET ORAL
Qty: 60 TABLET | Refills: 3 | Status: SHIPPED
Start: 2021-07-19 | End: 2022-04-04

## 2021-07-19 RX ORDER — ATORVASTATIN CALCIUM 80 MG/1
TABLET, FILM COATED ORAL
Qty: 30 TABLET | Refills: 5 | Status: SHIPPED
Start: 2021-07-19 | End: 2022-01-12

## 2021-07-19 RX ORDER — POTASSIUM CHLORIDE 20 MEQ/1
20 TABLET, EXTENDED RELEASE ORAL DAILY
Qty: 60 TABLET | Refills: 3 | Status: SHIPPED
Start: 2021-07-19 | End: 2022-04-28 | Stop reason: SDUPTHER

## 2021-07-19 RX ORDER — BROMPHENIRAMINE MALEATE, PSEUDOEPHEDRINE HYDROCHLORIDE, AND DEXTROMETHORPHAN HYDROBROMIDE 2; 30; 10 MG/5ML; MG/5ML; MG/5ML
5 SYRUP ORAL 4 TIMES DAILY PRN
Qty: 240 ML | Refills: 1 | Status: SHIPPED
Start: 2021-07-19 | End: 2021-10-25 | Stop reason: SDUPTHER

## 2021-07-19 SDOH — ECONOMIC STABILITY: FOOD INSECURITY: WITHIN THE PAST 12 MONTHS, THE FOOD YOU BOUGHT JUST DIDN'T LAST AND YOU DIDN'T HAVE MONEY TO GET MORE.: PATIENT DECLINED

## 2021-07-19 SDOH — ECONOMIC STABILITY: FOOD INSECURITY: WITHIN THE PAST 12 MONTHS, YOU WORRIED THAT YOUR FOOD WOULD RUN OUT BEFORE YOU GOT MONEY TO BUY MORE.: PATIENT DECLINED

## 2021-07-19 ASSESSMENT — SOCIAL DETERMINANTS OF HEALTH (SDOH): HOW HARD IS IT FOR YOU TO PAY FOR THE VERY BASICS LIKE FOOD, HOUSING, MEDICAL CARE, AND HEATING?: PATIENT DECLINED

## 2021-07-19 NOTE — PROGRESS NOTES
DM2:   Patient is here to fu regarding DM2. Patient is  controlled. Taking all medications and tolerating well. Fasting sugars are running not checking. Patient is taking ASA and Ace Inhibitor/ARB. Patient is  on appropriately-dosed statin. LDL is not at goal.  BP is  controlled. No hypoglycemic episodes. Patient does not see Podiatry regularly. Saw an Eye Dr yes. Patient is aware that it is necessary to see an Eye Dr yearly. Patient does smoke. Most recent labs reviewed with patient. Patient does have complaints or concerns today. She is constipated. She has a draining lesion in left axilla. She also has cough from post nasal gtt and allergy sx. Loratadine and zyrtec doesn't help. She established with a new Cardiologist, Dr Adam Gamble. Note reviewed. Dr Adam Gamble discussed case with Dr Monica Araiza, and the fact that she is low risk for surgery. He highly recommends bariatric surgery. She now has an apt with Dr Monica Araiza on 7/28. Echo scheduled for 7/30. Lab Results   Component Value Date    LABA1C 6.5 04/07/2021       Lab Results   Component Value Date    LDLCALC 117 (H) 01/08/2021        Patient's past medical, surgical, social and/or family history reviewed, updated in chart, and are non-contributory (unless otherwise stated). Medications and allergies also reviewed and updated in chart.       Review of Systems:  Constitutional:  No fever, no fatigue, no chills, no headaches, no weight change  Dermatology:  No rash, no mole, no dry or sensitive skin  ENT:  No cough, no sore throat, no sinus pain, no runny nose, no ear pain  Cardiology:  No chest pain, no palpitations, no leg edema, no shortness of breath, no PND  Gastroenterology:  No dysphagia, no abdominal pain, no nausea, no vomiting, no constipation, no diarrhea, no heartburn  Musculoskeletal:  No joint pain, no leg cramps, no back pain, no muscle aches  Respiratory:  No shortness of breath, no orthopnea, no wheezing, no QUINONEZ, no hemoptysis  Urology:  No blood in the urine, no urinary frequency, no urinary incontinence, no urinary urgency, no nocturia, no dysuria    Vitals:    07/19/21 1459 07/19/21 1510   BP: (!) 140/86 (!) 140/82   Resp: 22    Temp: 96.5 °F (35.8 °C)    Weight: (!) 408 lb (185.1 kg)    Height: 5' 8\" (1.727 m)        Physical Exam  Constitutional:       General: She is not in acute distress. Appearance: She is well-developed. She is obese. HENT:      Head: Normocephalic and atraumatic. Right Ear: External ear normal.      Left Ear: External ear normal.      Nose: Nose normal.   Eyes:      General: No scleral icterus. Conjunctiva/sclera: Conjunctivae normal.      Pupils: Pupils are equal, round, and reactive to light. Neck:      Thyroid: No thyromegaly. Cardiovascular:      Rate and Rhythm: Normal rate and regular rhythm. Heart sounds: Normal heart sounds. No murmur heard. Pulmonary:      Effort: Pulmonary effort is normal. No accessory muscle usage or respiratory distress. Breath sounds: Normal breath sounds. No wheezing. Musculoskeletal:         General: Normal range of motion. Cervical back: Normal range of motion and neck supple. Right lower leg: No edema. Left lower leg: No edema. Skin:     General: Skin is warm and dry. Findings: Lesion (left axilla) present. No rash. Neurological:      Mental Status: She is alert and oriented to person, place, and time. Deep Tendon Reflexes: Reflexes are normal and symmetric. Psychiatric:         Speech: Speech normal.         Behavior: Behavior normal.         Assessment/Plan:      Mesha Casey was seen today for diabetes, congestive heart failure and other. Diagnoses and all orders for this visit:    Constipation, unspecified constipation type  -     docusate sodium (COLACE) 100 MG capsule; Take 1 capsule by mouth 2 times daily    Candidiasis  -     clotrimazole-betamethasone (LOTRISONE) 1-0.05 % cream; Apply topically 2 times daily.     Essential hypertension  -     hydrALAZINE (APRESOLINE) 50 MG tablet; Take 1 tablet by mouth every 8 hours  -     potassium chloride (KLOR-CON M) 20 MEQ extended release tablet; Take 1 tablet by mouth daily    Morbid obesity with BMI of 50.0-59.9, adult ([facility])  -     liraglutide-weight management 18 MG/3ML SOPN; Inject 0.6 mg into the skin daily    Gastroesophageal reflux disease without esophagitis  -     pantoprazole (PROTONIX) 20 MG tablet; Take 1 tablet by mouth daily    Cardiomyopathy, unspecified type ([facility] [address]  -     aspirin 81 MG chewable tablet; Take 1 tablet by mouth daily  -     bumetanide (BUMEX) 1 MG tablet; Take 1 tablet by mouth daily with 2 tablets every other day    Hidradenitis suppurativa  -     sulfamethoxazole-trimethoprim (BACTRIM DS) 800-160 MG per tablet; Take 1 tablet by mouth 2 times daily for 10 days  -     fluconazole (DIFLUCAN) 150 MG tablet; Take 1 tablet by mouth once for 1 dose May repeat in 3-5 days, if symptoms persist or recur. Chronic pain syndrome    Type 2 diabetes mellitus without complication, without long-term current use of insulin ([facility])  -     HEMOGLOBIN A1C; Future  -     LIPID PANEL; Future  -     BASIC METABOLIC PANEL; Future    Mild intermittent asthma with exacerbation  -     brompheniramine-pseudoephedrine-DM (BROMFED DM) 2-30-10 MG/5ML syrup; Take 5 mLs by mouth 4 times daily as needed for Congestion or Cough  -letter written for [facility]  Cough  -     brompheniramine-pseudoephedrine-DM (BROMFED DM) 2-30-10 MG/5ML syrup; Take 5 mLs by mouth 4 times daily as needed for Congestion or Cough      As above. Call or go to ED immediately if symptoms worsen or persist.  Return in about 3 months (around 10/19/2021) for DM2., or sooner if necessary. Educational materials and/or home exercises printed for patient's review and were included in patient instructions on his/her After Visit Summary and given to patient at the end of visit.       Counseled regarding above diagnosis, including possible risks and complications,  especially if left uncontrolled. Counseled regarding the possible side effects, risks, benefits and alternatives to treatment; patient and/or guardian verbalizes understanding, agrees, feels comfortable with and wishes to proceed with above treatment plan. Advised patient to call with any new medication issues, and read all Rx info from pharmacy to assure aware of all possible risks and side effects of medication before taking. Reviewed age and gender appropriate health screening exams and vaccinations. Advised patient regarding importance of keeping up with recommended health maintenance and to schedule as soon as possible if overdue, as this is important in assessing for undiagnosed pathology, especially cancer, as well as protecting against potentially harmful/life threatening disease. Patient and/or guardian verbalizes understanding and agrees with above counseling, assessment and plan. All questions answered. Cora Garber PA-C  7/19/2021    I have personally reviewed and updated the chief complaint, HPI, Past Medical, Family and Social History, as well as the above Review of Systems.

## 2021-07-21 ENCOUNTER — TELEPHONE (OUTPATIENT)
Dept: BARIATRICS/WEIGHT MGMT | Age: 46
End: 2021-07-21

## 2021-07-21 NOTE — TELEPHONE ENCOUNTER
We received a referral from Dr. Yvonne Márquez that patient needs to be scheduled and restart. Reviewed case with Dr. Jason Ann and he would like to see her and set a new weight loss goal.   She will need to have a lot of things repeated. Call to patient and answering machine on. Message left for her to recall and set appointment with Dr. Jason Ann. She also needs set up for a initial dietary again. Please schedule with An[facility]

## 2021-07-28 ENCOUNTER — OFFICE VISIT (OUTPATIENT)
Dept: BARIATRICS/WEIGHT MGMT | Age: 46
End: 2021-07-28
Payer: MEDICARE

## 2021-07-28 VITALS
RESPIRATION RATE: 20 BRPM | TEMPERATURE: 96.7 F | WEIGHT: 293 LBS | BODY MASS INDEX: 43.4 KG/M2 | SYSTOLIC BLOOD PRESSURE: 155 MMHG | HEIGHT: 69 IN | DIASTOLIC BLOOD PRESSURE: 93 MMHG | HEART RATE: 53 BPM

## 2021-07-28 DIAGNOSIS — E66.01 MORBID OBESITY DUE TO EXCESS CALORIES (HCC): Primary | ICD-10-CM

## 2021-07-28 DIAGNOSIS — K21.9 GASTROESOPHAGEAL REFLUX DISEASE WITHOUT ESOPHAGITIS: ICD-10-CM

## 2021-07-28 PROCEDURE — G8417 CALC BMI ABV UP PARAM F/U: HCPCS | Performed by: SURGERY

## 2021-07-28 PROCEDURE — G8427 DOCREV CUR MEDS BY ELIG CLIN: HCPCS | Performed by: SURGERY

## 2021-07-28 PROCEDURE — 99214 OFFICE O/P EST MOD 30 MIN: CPT | Performed by: SURGERY

## 2021-07-28 PROCEDURE — 99212 OFFICE O/P EST SF 10 MIN: CPT

## 2021-07-28 PROCEDURE — 4004F PT TOBACCO SCREEN RCVD TLK: CPT | Performed by: SURGERY

## 2021-07-28 NOTE — PROGRESS NOTES
failure ([facility] [address]    Cardiomyopathy ([facility] [address]    Malnutrition ([facility] [address]    Current moderate episode of major depressive disorder without prior episode ([facility] [address]    Heart failure ([facility] [address]     Past Surgical History  Past Surgical History:   Procedure Laterality Date    CARDIAC CATHETERIZATION  05/04/2020    Dr. Claudette Aran  2009    CYSTOSCOPY Left 01/14/2018    left stent placement    DILATION AND CURETTAGE OF UTERUS      younger age   Jason Loser LITHOTRIPSY Right 02/15/2018    Cystoscopy;Stent Removal    UPPER GASTROINTESTINAL ENDOSCOPY N/A 6/22/2018    EGD BIOPSY performed by Merary Plummer MD at [address] 1936: Food, Fish-derived products, Norco [hydrocodone-acetaminophen], Nuts [peanut-containing drug products], Pcn [penicillins], Ultram [tramadol hcl], and Cashews [macadamia nut oil]     Home Medications  Current Outpatient Medications   Medication Sig Dispense Refill    atorvastatin (LIPITOR) 80 MG tablet TAKE 1 TABLET BY MOUTH EVERY NIGHT 30 tablet 5    aspirin 81 MG chewable tablet Take 1 tablet by mouth daily 30 tablet 3    bumetanide (BUMEX) 1 MG tablet Take 1 tablet by mouth daily with 2 tablets every other day 60 tablet 3    clotrimazole-betamethasone (LOTRISONE) 1-0.05 % cream Apply topically 2 times daily.  45 g 0    hydrALAZINE (APRESOLINE) 50 MG tablet Take 1 tablet by mouth every 8 hours 90 tablet 3    liraglutide-weight management 18 MG/3ML SOPN Inject 0.6 mg into the skin daily 30 pen 0    potassium chloride (KLOR-CON M) 20 MEQ extended release tablet Take 1 tablet by mouth daily 60 tablet 3    pantoprazole (PROTONIX) 20 MG tablet Take 1 tablet by mouth daily 30 tablet 3    docusate sodium (COLACE) 100 MG capsule Take 1 capsule by mouth 2 times daily 60 capsule 0    brompheniramine-pseudoephedrine-DM (BROMFED DM) 2-30-10 MG/5ML syrup Take 5 mLs by mouth 4 times daily as needed for Congestion or Cough 240 mL 1    citalopram (CELEXA) 40 MG tablet TAKE 1 TABLET BY MOUTH EVERY DAY  mometasone-formoterol (DULERA) 100-5 MCG/ACT inhaler Inhale 2 puffs into the lungs 2 times daily 13 g 1    gabapentin (NEURONTIN) 600 MG tablet Take 1 tablet by mouth 3 times daily for 90 days. 90 tablet 2    oxyCODONE-acetaminophen (PERCOCET) 5-325 MG per tablet Take 1 tablet by mouth every 8 hours as needed for Pain for up to 30 days. Intended supply: 3 days. Take lowest dose possible to manage pain 90 tablet 0    Continuous Blood Gluc  (FREESTYLE MARY READER) MARIJA 4 Devices by Does not apply route daily 4 Device 5    Continuous Blood Gluc Sensor ([address] [place] [address]) MIS 4 Devices by Does not apply route daily 4 each 5    albuterol (PROVENTIL) (2.5 MG/3ML) 0.083% nebulizer solution Take 3 mLs by nebulization every 6 hours as needed for Wheezing 120 each 3    spironolactone (ALDACTONE) 25 MG tablet TAKE 1 TABLET BY MOUTH EVERY DAY 30 tablet 5    mineral oil-hydrophilic petrolatum (AQUAPHOR) ointment Apply topically as needed.  99 g 5    cetirizine (ZYRTEC) 10 MG tablet TAKE 1 TABLET BY MOUTH EVERY DAY 30 tablet 3    albuterol sulfate HFA (PROAIR HFA) 108 (90 Base) MCG/ACT inhaler Inhale 2 puffs into the lungs every 4 hours as needed for Wheezing or Shortness of Breath 1 Inhaler 3    metoprolol succinate (TOPROL XL) 100 MG extended release tablet Take 1 tablet by mouth 2 times daily 30 tablet 3    sacubitril-valsartan (ENTRESTO)  MG per tablet Take 1 tablet by mouth 2 times daily 60 tablet 5    isosorbide dinitrate (ISORDIL) 20 MG tablet Take 1 tablet by mouth 3 times daily 90 tablet 3    fluticasone (FLONASE) 50 MCG/ACT nasal spray 2 sprays by Nasal route daily 1 Bottle 5    Continuous Blood Gluc Sensor (FREESTYLE MARY 2 SENSOR SYSTM) MISC 1 Device by Does not apply route daily 1 each 0    ondansetron (ZOFRAN-ODT) 4 MG disintegrating tablet Place 2 tablets under the tongue every 8 hours as needed for Nausea or Vomiting 30 tablet 1    Cholecalciferol (VITAMIN D) 50 MCG (2000 UT) CAPS capsule Take by mouth daily      Multiple Vitamin (DAILY-DOUGLAS) TABS TAKE 1 TABLET BY MOUTH EVERY DAY 90 tablet 5    Semaglutide (OZEMPIC, 1 MG/DOSE, SC) Inject into the skin once a week      ipratropium-albuterol (DUONEB) 0.5-2.5 (3) MG/3ML SOLN nebulizer solution Take 3 mLs by nebulization every 4 hours as needed for Shortness of Breath 24 vial 1    Insulin Pen Needle ([employer] PEN NEEDLES) 31G X 6 MM MISC 1 each by Does not apply route daily 100 each 3    ONE TOUCH ULTRA TEST strip 1 each by Does not apply route daily 120 each 5    Handicap Placard MISC by Does not apply route Patient cannot walk 200 ft without stopping to rest.    Expiration 5 yrs 1 each 0     No current facility-administered medications for this visit. Social History:   TOBACCO:   reports that she has been smoking cigarettes. She started smoking about 20 years ago. She has a 4.25 pack-year smoking history. She has never used smokeless tobacco.  All smokers must join the free smoking cessation program and stop smoking for 3 months before having any Bariatric surgery. ETOH:    reports no history of alcohol use. The patient has a family history that is negative for severe cardiovascular or respiratory issues, negative for reaction to anesthesia.       Review of Systems    Review of Systems - General ROS: negative for - chills, fatigue or malaise  ENT ROS: negative for - hearing change, nasal congestion or nasal discharge  Allergy and Immunology ROS: negative for - hives, itchy/watery eyes or nasal congestion  Hematological and Lymphatic ROS: negative for - blood clots, blood transfusions, bruising or fatigue  Endocrine ROS: negative for - malaise/lethargy, mood swings, palpitations or polydipsia/polyuria  Breast ROS: negative for - new or changing breast lumps or nipple changes  Respiratory ROS: negative for - sputum changes, stridor, tachypnea or wheezing  Cardiovascular ROS: negative for - irregular heartbeat, loss of consciousness, murmur or orthopnea  Gastrointestinal ROS: negative for - constipation, diarrhea, gas/bloating, heartburn or hematemesis  Genito-Urinary ROS: negative for - genital discharge, genital ulcers or hematuria  Musculoskeletal ROS: negative for - gait disturbance, muscle pain or muscular weakness}    Physical Exam:   VITALS: BP (!) 155/93 (Site: Right Lower Arm, Position: Sitting, Cuff Size: Large Adult)   Pulse 53   Temp 96.7 °F (35.9 °C) (Temporal)   Resp 20   Ht 5' 8.5\" (1.74 m)   Wt (!) 407 lb (184.6 kg)   LMP 05/20/2021   BMI 60.98 kg/m²   General appearance: alert, appears stated age and cooperative  Head: Normocephalic, without obvious abnormality, atraumatic  Neck: no adenopathy, no carotid bruit, no JVD, supple, symmetrical, trachea midline and thyroid not enlarged, symmetric, no tenderness/mass/nodules  Lungs: clear to auscultation bilaterally  Heart: regular rate and rhythm  Abdomen: soft, non-tender; bowel sounds normal; no masses,  no organomegaly  Extremities: extremities normal, atraumatic, no cyanosis or edema  : no CVA tenderness    Assessment:  Morbid obesity with inability to keep the weight off with diet and exercise. She is interested in Laparoscopic Kendall-en- Y Gastric Bypass or Sleeve Gastrectomy. We discussed that our goal is to ameliorate the medical problems and not to obtain a specific body mass index. She understands the risks and benefits, and wishes to proceed with the evaluation. Plan:  Psych evaluation, medical and cardio clearance, gallbladder is out. These patients often have vitamin deficiencies so I will do screening labs for malnutrition. I will do an upper endoscopy to check for hiatal hernias, ulcers and H. Pylori while we wait for insurance approval for the surgery.     Physician Signature: Electronically signed by Dr. Manuel Romo MD    Send copy of H&P to PCP, Calista Lugo PA-C

## 2021-07-28 NOTE — PATIENT INSTRUCTIONS
What is the next step to proceed with weight loss surgery? Please be aware that any co-pays or deductibles may be requested prior to testing and / or procedures. You will need to schedule a psychological evaluation for weight loss surgery. Patients will be required to complete all psychological testing as required by the mental health provider. Patients must also follow all of the provider's recommendations before weight loss surgery can be scheduled. The evaluation must be done a standard way for weight loss surgery. We strongly recommend that you contact one of our preferred providers listed below to arrange this:      Gail Elder, [facility]  [address] [facility], [address] [place] [address]   [phone]    [facility] and [address] [facility] [address] [facility], [address] [place] [address]   [phone]    Dr. Livier Hawkins, PhD    [facility]. [address]    [phone]      You will also need to plan on attending a 2 hour nutrition class at [facility] prior to your surgery. We will schedule this for you when we schedule your surgery. Please remember to have your labs drawn 10 days prior to your first scheduled dietary appointment. Please remember, that while we will submit your case to insurance for surgery authorization, it is your responsibility to know if your plan covers weight loss surgery and keep up-to-date with changes to your insurance coverage. We will do everything possible to help you get approved for weight loss surgery, but cannot guarantee an approval.     Please note that you will not be submitted to your insurance company until all pre-operative testing requirements are met.

## 2021-07-30 ENCOUNTER — TELEPHONE (OUTPATIENT)
Dept: FAMILY MEDICINE CLINIC | Age: 46
End: 2021-07-30

## 2021-07-30 ENCOUNTER — TELEPHONE (OUTPATIENT)
Dept: CARDIOLOGY | Age: 46
End: 2021-07-30

## 2021-07-30 DIAGNOSIS — J01.90 ACUTE SINUSITIS, RECURRENCE NOT SPECIFIED, UNSPECIFIED LOCATION: Primary | ICD-10-CM

## 2021-07-30 RX ORDER — AZITHROMYCIN 250 MG/1
250 TABLET, FILM COATED ORAL SEE ADMIN INSTRUCTIONS
Qty: 6 TABLET | Refills: 0 | Status: SHIPPED | OUTPATIENT
Start: 2021-07-30 | End: 2021-08-04

## 2021-08-18 ENCOUNTER — PREP FOR PROCEDURE (OUTPATIENT)
Dept: BARIATRICS/WEIGHT MGMT | Age: 46
End: 2021-08-18

## 2021-08-18 RX ORDER — SODIUM CHLORIDE, SODIUM LACTATE, POTASSIUM CHLORIDE, CALCIUM CHLORIDE 600; 310; 30; 20 MG/100ML; MG/100ML; MG/100ML; MG/100ML
INJECTION, SOLUTION INTRAVENOUS CONTINUOUS
Status: CANCELLED | OUTPATIENT
Start: 2021-08-18

## 2021-08-18 NOTE — PROGRESS NOTES
[address] [facility]                                                                                                                    PRE OP INSTRUCTIONS FOR  Roberto Govea        Date: 8/18/2021    Date of surgery:  8/20/2021    Arrival Time: Hospital will call you between 5pm and 7pm with your final arrival time for surgery    1. Do not eat or drink anything after  Midnight  prior to surgery. This includes no water, chewing gum, mints or ice chips. 2. Take the following medications with a small sip of water on the morning of Surgery: take isordil gabapentin citalapram hydralazine and metoprolol dos with sip water  Bring inhaler and entresto    3. Diabetics may take evening dose of insulin but none after midnight. If you feel symptomatic or low blood sugar morning of surgery drink 1-2 ounces of apple juice only. 4. Aspirin, Ibuprofen, Advil, Naproxen, Vitamin E and other Anti-inflammatory products should be stopped  before surgery  as directed by your physician. Take Tylenol only unless instructed otherwise by your surgeon. 5. Check with your Doctor regarding stopping Plavix, Coumadin, Lovenox, Eliquis, Effient, or other blood thinners. 6. Do not smoke,use illicit drugs and do not drink any alcoholic beverages 24 hours prior to surgery. 7. You may brush your teeth the morning of surgery. DO NOT SWALLOW WATER    8. You MUST make arrangements for a responsible adult to take you home after your surgery. You will not be allowed to leave alone or drive yourself home. It is strongly suggested someone stay with you the first 24 hrs. Your surgery will be cancelled if you do not have a ride home. 9. PEDIATRIC PATIENTS ONLY:  A parent/legal guardian must accompany a child scheduled for surgery and plan to stay at the hospital until the child is discharged. Please do not bring other children with you.     10. Please wear simple, loose fitting clothing to the hospital.  Do not

## 2021-08-20 ENCOUNTER — ANESTHESIA EVENT (OUTPATIENT)
Dept: ENDOSCOPY | Age: 46
End: 2021-08-20
Payer: MEDICARE

## 2021-08-20 ENCOUNTER — HOSPITAL ENCOUNTER (OUTPATIENT)
Age: 46
Setting detail: OUTPATIENT SURGERY
Discharge: HOME | End: 2021-08-20
Attending: SURGERY | Admitting: SURGERY
Payer: MEDICARE

## 2021-08-20 ENCOUNTER — ANESTHESIA (OUTPATIENT)
Dept: ENDOSCOPY | Age: 46
End: 2021-08-20
Payer: MEDICARE

## 2021-08-20 VITALS
OXYGEN SATURATION: 100 % | DIASTOLIC BLOOD PRESSURE: 80 MMHG | TEMPERATURE: 97.9 F | HEART RATE: 91 BPM | SYSTOLIC BLOOD PRESSURE: 140 MMHG | WEIGHT: 293 LBS | RESPIRATION RATE: 20 BRPM | HEIGHT: 69 IN | BODY MASS INDEX: 43.4 KG/M2

## 2021-08-20 VITALS
RESPIRATION RATE: 24 BRPM | SYSTOLIC BLOOD PRESSURE: 147 MMHG | DIASTOLIC BLOOD PRESSURE: 99 MMHG | OXYGEN SATURATION: 95 %

## 2021-08-20 DIAGNOSIS — Z01.818 PREOP TESTING: Primary | ICD-10-CM

## 2021-08-20 DIAGNOSIS — E66.01 MORBID OBESITY DUE TO EXCESS CALORIES (HCC): ICD-10-CM

## 2021-08-20 LAB
ALBUMIN SERPL-MCNC: 3.7 G/DL (ref 3.5–5.2)
ALP BLD-CCNC: 93 U/L (ref 35–104)
ALT SERPL-CCNC: 10 U/L (ref 0–32)
ANION GAP SERPL CALCULATED.3IONS-SCNC: 8 MMOL/L (ref 7–16)
AST SERPL-CCNC: 8 U/L (ref 0–31)
BILIRUB SERPL-MCNC: <0.2 MG/DL (ref 0–1.2)
BUN BLDV-MCNC: 14 MG/DL (ref 6–20)
CALCIUM SERPL-MCNC: 9.8 MG/DL (ref 8.6–10.2)
CHLORIDE BLD-SCNC: 104 MMOL/L (ref 98–107)
CHOLESTEROL, TOTAL: 194 MG/DL (ref 0–199)
CO2: 26 MMOL/L (ref 22–29)
CREAT SERPL-MCNC: 0.8 MG/DL (ref 0.5–1)
FERRITIN: 113 NG/ML
FOLATE: 9.2 NG/ML (ref 4.8–24.2)
GFR AFRICAN AMERICAN: >60
GFR NON-AFRICAN AMERICAN: >60 ML/MIN/1.73
GLUCOSE BLD-MCNC: 149 MG/DL (ref 74–99)
HBA1C MFR BLD: 6.8 % (ref 4–5.6)
HCG(URINE) PREGNANCY TEST: NEGATIVE
HCT VFR BLD CALC: 42.2 % (ref 34–48)
HEMOGLOBIN: 13.3 G/DL (ref 11.5–15.5)
MCH RBC QN AUTO: 30.7 PG (ref 26–35)
MCHC RBC AUTO-ENTMCNC: 31.5 % (ref 32–34.5)
MCV RBC AUTO: 97.5 FL (ref 80–99.9)
METER GLUCOSE: 114 MG/DL (ref 74–99)
PDW BLD-RTO: 15.4 FL (ref 11.5–15)
PLATELET # BLD: 292 E9/L (ref 130–450)
PMV BLD AUTO: 11 FL (ref 7–12)
POTASSIUM SERPL-SCNC: 4.5 MMOL/L (ref 3.5–5)
RBC # BLD: 4.33 E12/L (ref 3.5–5.5)
SODIUM BLD-SCNC: 138 MMOL/L (ref 132–146)
TOTAL PROTEIN: 7.4 G/DL (ref 6.4–8.3)
TRIGL SERPL-MCNC: 204 MG/DL (ref 0–149)
VITAMIN B-12: 329 PG/ML (ref 211–946)
WBC # BLD: 11.4 E9/L (ref 4.5–11.5)

## 2021-08-20 PROCEDURE — 84630 ASSAY OF ZINC: CPT

## 2021-08-20 PROCEDURE — 6360000002 HC RX W HCPCS: Performed by: NURSE ANESTHETIST, CERTIFIED REGISTERED

## 2021-08-20 PROCEDURE — 82607 VITAMIN B-12: CPT

## 2021-08-20 PROCEDURE — 43239 EGD BIOPSY SINGLE/MULTIPLE: CPT | Performed by: SURGERY

## 2021-08-20 PROCEDURE — 83036 HEMOGLOBIN GLYCOSYLATED A1C: CPT

## 2021-08-20 PROCEDURE — 2500000003 HC RX 250 WO HCPCS: Performed by: NURSE ANESTHETIST, CERTIFIED REGISTERED

## 2021-08-20 PROCEDURE — 80053 COMPREHEN METABOLIC PANEL: CPT

## 2021-08-20 PROCEDURE — 3700000000 HC ANESTHESIA ATTENDED CARE: Performed by: SURGERY

## 2021-08-20 PROCEDURE — 7100000011 HC PHASE II RECOVERY - ADDTL 15 MIN: Performed by: SURGERY

## 2021-08-20 PROCEDURE — 82728 ASSAY OF FERRITIN: CPT

## 2021-08-20 PROCEDURE — 2580000003 HC RX 258: Performed by: SURGERY

## 2021-08-20 PROCEDURE — 2709999900 HC NON-CHARGEABLE SUPPLY: Performed by: SURGERY

## 2021-08-20 PROCEDURE — 82962 GLUCOSE BLOOD TEST: CPT

## 2021-08-20 PROCEDURE — 82746 ASSAY OF FOLIC ACID SERUM: CPT

## 2021-08-20 PROCEDURE — 84425 ASSAY OF VITAMIN B-1: CPT

## 2021-08-20 PROCEDURE — 3609012400 HC EGD TRANSORAL BIOPSY SINGLE/MULTIPLE: Performed by: SURGERY

## 2021-08-20 PROCEDURE — 88305 TISSUE EXAM BY PATHOLOGIST: CPT

## 2021-08-20 PROCEDURE — 3700000001 HC ADD 15 MINUTES (ANESTHESIA): Performed by: SURGERY

## 2021-08-20 PROCEDURE — 85027 COMPLETE CBC AUTOMATED: CPT

## 2021-08-20 PROCEDURE — 81025 URINE PREGNANCY TEST: CPT

## 2021-08-20 PROCEDURE — 84478 ASSAY OF TRIGLYCERIDES: CPT

## 2021-08-20 PROCEDURE — 82465 ASSAY BLD/SERUM CHOLESTEROL: CPT

## 2021-08-20 PROCEDURE — 36415 COLL VENOUS BLD VENIPUNCTURE: CPT

## 2021-08-20 PROCEDURE — 7100000010 HC PHASE II RECOVERY - FIRST 15 MIN: Performed by: SURGERY

## 2021-08-20 RX ORDER — SODIUM CHLORIDE, SODIUM LACTATE, POTASSIUM CHLORIDE, CALCIUM CHLORIDE 600; 310; 30; 20 MG/100ML; MG/100ML; MG/100ML; MG/100ML
INJECTION, SOLUTION INTRAVENOUS CONTINUOUS
Status: DISCONTINUED | OUTPATIENT
Start: 2021-08-20 | End: 2021-08-20 | Stop reason: HOSPADM

## 2021-08-20 RX ORDER — SODIUM CHLORIDE 9 MG/ML
INJECTION, SOLUTION INTRAVENOUS CONTINUOUS
Status: DISCONTINUED | OUTPATIENT
Start: 2021-08-20 | End: 2021-08-20 | Stop reason: HOSPADM

## 2021-08-20 RX ORDER — GLYCOPYRROLATE 0.2 MG/ML
INJECTION INTRAMUSCULAR; INTRAVENOUS PRN
Status: DISCONTINUED | OUTPATIENT
Start: 2021-08-20 | End: 2021-08-20 | Stop reason: SDUPTHER

## 2021-08-20 RX ORDER — SODIUM CHLORIDE 0.9 % (FLUSH) 0.9 %
5-40 SYRINGE (ML) INJECTION PRN
Status: DISCONTINUED | OUTPATIENT
Start: 2021-08-20 | End: 2021-08-20 | Stop reason: HOSPADM

## 2021-08-20 RX ORDER — PROPOFOL 10 MG/ML
INJECTION, EMULSION INTRAVENOUS CONTINUOUS PRN
Status: DISCONTINUED | OUTPATIENT
Start: 2021-08-20 | End: 2021-08-20 | Stop reason: SDUPTHER

## 2021-08-20 RX ADMIN — SODIUM CHLORIDE, POTASSIUM CHLORIDE, SODIUM LACTATE AND CALCIUM CHLORIDE: 600; 310; 30; 20 INJECTION, SOLUTION INTRAVENOUS at 08:47

## 2021-08-20 RX ADMIN — GLYCOPYRROLATE 0.2 MG: 0.2 INJECTION, SOLUTION INTRAMUSCULAR; INTRAVENOUS at 10:39

## 2021-08-20 RX ADMIN — PROPOFOL 100 MCG/KG/MIN: 10 INJECTION, EMULSION INTRAVENOUS at 10:42

## 2021-08-20 RX ADMIN — SODIUM CHLORIDE, POTASSIUM CHLORIDE, SODIUM LACTATE AND CALCIUM CHLORIDE: 600; 310; 30; 20 INJECTION, SOLUTION INTRAVENOUS at 10:42

## 2021-08-20 ASSESSMENT — PAIN - FUNCTIONAL ASSESSMENT
PAIN_FUNCTIONAL_ASSESSMENT: PREVENTS OR INTERFERES SOME ACTIVE ACTIVITIES AND ADLS
PAIN_FUNCTIONAL_ASSESSMENT: 0-10

## 2021-08-20 ASSESSMENT — PAIN SCALES - GENERAL
PAINLEVEL_OUTOF10: 0
PAINLEVEL_OUTOF10: 0

## 2021-08-20 ASSESSMENT — LIFESTYLE VARIABLES: SMOKING_STATUS: 1

## 2021-08-20 ASSESSMENT — PAIN DESCRIPTION - DESCRIPTORS: DESCRIPTORS: ACHING

## 2021-08-20 NOTE — PROGRESS NOTES
8/20/21200 reviewed discharge instructions with pt and her  Chairto Loser.  Bot verbalized understanding,s igned in agreement and given copy. kmo rn

## 2021-08-20 NOTE — ANESTHESIA POSTPROCEDURE EVALUATION
Department of Anesthesiology  Postprocedure Note    Patient: Faisal Buchanan  MRN: [id number]  YOB: 1975  Date of evaluation: 8/20/2021  Time:  12:36 PM     Procedure Summary     Date: 08/20/21 Room / Location: [facility] 01 / [address] [facility]    Anesthesia Start: 3955 Anesthesia Stop: 7942    Procedure: EGD BIOPSY (N/A ) Diagnosis: (GERD)    Surgeons: Ramos Seals MD Responsible Provider: Poonam Cervantes MD    Anesthesia Type: MAC ASA Status: 3          Anesthesia Type: No value filed. Leandra Phase I: Leandra Score: 10    Leandra Phase II: Leandra Score: 10    Last vitals: Reviewed and per EMR flowsheets.        Anesthesia Post Evaluation    Patient location during evaluation: PACU  Patient participation: complete - patient participated  Level of consciousness: awake  Pain score: 0  Airway patency: patent  Nausea & Vomiting: no nausea  Complications: no  Cardiovascular status: blood pressure returned to baseline  Respiratory status: acceptable  Hydration status: euvolemic

## 2021-08-20 NOTE — ANESTHESIA PRE PROCEDURE
Department of Anesthesiology  Preprocedure Note       Name:  Patti Srinivasan   Age:  42 y.o.  :  1975                                          MRN:  [id number]         Date:  2021      Surgeon: Paula Swartz):  Cesar De Oliveira MD    Procedure: Procedure(s):  EGD BIOPSY    Medications prior to admission:   Prior to Admission medications    Medication Sig Start Date End Date Taking? Authorizing Provider   gabapentin (NEURONTIN) 600 MG tablet Take 1 tablet by mouth 3 times daily for 90 days. 21 Yes Jose Eduardo Dawkins MD   oxyCODONE-acetaminophen (PERCOCET) 5-325 MG per tablet Take 1 tablet by mouth every 8 hours as needed for Pain for up to 30 days. Intended supply: 3 days. Take lowest dose possible to manage pain 21 Yes Fercho Doyle MD   atorvastatin (LIPITOR) 80 MG tablet TAKE 1 TABLET BY MOUTH EVERY NIGHT 21  Yes Charline Celestin PA-C   aspirin 81 MG chewable tablet Take 1 tablet by mouth daily 21  Yes Charline Celestin PA-C   bumetanide (BUMEX) 1 MG tablet Take 1 tablet by mouth daily with 2 tablets every other day 21  Yes Charline Celestin PA-C   clotrimazole-betamethasone (LOTRISONE) 1-0.05 % cream Apply topically 2 times daily. Patient taking differently: Apply topically 2 times daily.  prn 21  Yes Charline Celestin PA-C   hydrALAZINE (APRESOLINE) 50 MG tablet Take 1 tablet by mouth every 8 hours 21  Yes Charline Celestin PA-C   potassium chloride (KLOR-CON M) 20 MEQ extended release tablet Take 1 tablet by mouth daily 21  Yes Charline Celestin PA-C   pantoprazole (PROTONIX) 20 MG tablet Take 1 tablet by mouth daily  Patient taking differently: Take 20 mg by mouth daily as needed  21  Yes Charline Celestin PA-C   citalopram (CELEXA) 40 MG tablet TAKE 1 TABLET BY MOUTH EVERY DAY 21  Yes Historical Provider, MD   mometasone-formoterol (DULERA) 100-5 MCG/ACT inhaler Inhale 2 puffs into the lungs 2 times daily 21  Yes Deon Peña, DO   Continuous Blood Gluc  (FREESTYLE MARY READER) MARIJA 4 Devices by Does not apply route daily 5/14/21  Yes Dona Richards PA-C   Continuous Blood Gluc Sensor ([address] [place] [address]) [facility] 4 Devices by Does not apply route daily 5/14/21  Yes Dona Richards PA-C   albuterol (PROVENTIL) (2.5 MG/3ML) 0.083% nebulizer solution Take 3 mLs by nebulization every 6 hours as needed for Wheezing 3/23/21  Yes Dona Richards PA-C   spironolactone (ALDACTONE) 25 MG tablet TAKE 1 TABLET BY MOUTH EVERY DAY 3/22/21  Yes Ramya Garza MD   mineral oil-hydrophilic petrolatum (AQUAPHOR) ointment Apply topically as needed.  3/16/21  Yes Dona Richards PA-C   albuterol sulfate HFA (PROAIR HFA) 108 (90 Base) MCG/ACT inhaler Inhale 2 puffs into the lungs every 4 hours as needed for Wheezing or Shortness of Breath 1/29/21  Yes Dona Richards PA-C   metoprolol succinate (TOPROL XL) 100 MG extended release tablet Take 1 tablet by mouth 2 times daily  Patient taking differently: Take 100 mg by mouth 2 times daily Not sure if she took it this am 1/8/21  Yes Dona Richards PA-C   sacubitril-valsartan (ENTRESTO)  MG per tablet Take 1 tablet by mouth 2 times daily 1/8/21  Yes Dona Richards PA-C   isosorbide dinitrate (ISORDIL) 20 MG tablet Take 1 tablet by mouth 3 times daily 1/8/21  Yes Dona Richards PA-C   fluticasone [facility]) 50 MCG/ACT nasal spray 2 sprays by Nasal route daily  Patient taking differently: 2 sprays by Nasal route daily as needed  1/8/21  Yes Dona Richards PA-C   Continuous Blood Gluc Sensor (FREESTYLE MARY 2 SENSOR SYSTM) MISC 1 Device by Does not apply route daily 1/8/21  Yes Dona Richards PA-C   ondansetron (ZOFRAN-ODT) 4 MG disintegrating tablet Place 2 tablets under the tongue every 8 hours as needed for Nausea or Vomiting 1/8/21  Yes Dona Richards PA-C   Multiple Vitamin (DAILY-DOUGLAS) TABS TAKE 1 TABLET BY MOUTH EVERY DAY 6/26/20  Yes Leida Bill DO   ipratropium-albuterol (DUONEB) 0.5-2.5 (3) MG/3ML SOLN nebulizer solution Take 3 mLs by nebulization every 4 hours as needed for Shortness of Breath 5/1/20  Yes Sabrina Nance MD   Insulin Pen Needle ([employer] PEN NEEDLES) 31G X 6 MM MISC 1 each by Does not apply route daily 10/14/19  Yes Hosea Roberts PA-C   Handicap Placard MISC by Does not apply route Patient cannot walk 200 ft without stopping to rest.    Expiration 5 yrs 2/22/19  Yes Hosea Roberts PA-C   cetirizine (ZYRTEC) 10 MG tablet TAKE 1 TABLET BY MOUTH EVERY DAY 3/10/21   Orville Chi DO   Cholecalciferol (VITAMIN D) 50 MCG (2000 UT) CAPS capsule Take by mouth daily    Historical Provider, MD   Semaglutide (OZEMPIC, 1 MG/DOSE, SC) Inject into the skin once a week Not using at present    Historical Provider, MD   ONE TOUCH ULTRA TEST strip 1 each by Does not apply route daily 9/17/19   Hosea Roberts PA-C       Current medications:    Current Facility-Administered Medications   Medication Dose Route Frequency Provider Last Rate Last Admin    sodium chloride flush 0.9 % injection 5-40 mL  5-40 mL Intravenous PRN Nando T Maggie, DO        0.9 % sodium chloride infusion   Intravenous Continuous Nando Serrano, DO        lactated ringers infusion   Intravenous Continuous Stewart Vance MD 75 mL/hr at 08/20/21 0847 New Bag at 08/20/21 0847       Allergies:     Allergies   Allergen Reactions    Food Anaphylaxis     Food allergy - Fish and tree nuts    Ultram [Tramadol Hcl]      Per pt had a seizure    Fish-Derived Products     Norco [Hydrocodone-Acetaminophen] Hives    Nuts [Peanut-Containing Drug Products]     Pcn [Penicillins]     Cashews [Macadamia Nut Oil] Nausea And Vomiting       Problem List:    Patient Active Problem List   Diagnosis Code    Essential hypertension I10    Mild intermittent asthma without complication Q63.15    Morbid obesity ([facility] [address] E66.01    Reactive depression F32.9    Anxiety F41.9    Pyelonephritis N12    Ureteric stone N20.1    Tobacco dependence F17.200    ENDOSCOPY       Social History:    Social History     Tobacco Use    Smoking status: Light Tobacco Smoker     Packs/day: 0.25     Years: 17.00     Pack years: 4.25     Types: Cigarettes     Start date: 3/27/2001     Last attempt to quit: 10/5/2018     Years since quittin.8    Smokeless tobacco: Never Used    Tobacco comment: occasionally has 1 cigarette, very stressed now   Substance Use Topics    Alcohol use: No                                Ready to quit: Not Answered  Counseling given: Not Answered  Comment: occasionally has 1 cigarette, very stressed now      Vital Signs (Current):   Vitals:    21 1500 21 0807   BP:  (!) 164/83   Pulse:  98   Resp:  18   Temp:  98 °F (36.7 °C)   TempSrc:  Temporal   SpO2:  97%   Weight: (!) 407 lb (184.6 kg) (!) 414 lb (187.8 kg)   Height: 5' 8.5\" (1.74 m) 5' 8.5\" (1.74 m)                                              BP Readings from Last 3 Encounters:   21 (!) 164/83   21 (!) 149/90   21 (!) 155/93       NPO Status: Time of last liquid consumption: 1                        Time of last solid consumption:                         Date of last liquid consumption: 21                        Date of last solid food consumption: 21    BMI:   Wt Readings from Last 3 Encounters:   21 (!) 414 lb (187.8 kg)   21 (!) 407 lb (184.6 kg)   21 (!) 408 lb (185.1 kg)     Body mass index is 62.03 kg/m².     CBC:   Lab Results   Component Value Date    WBC 11.4 2021    RBC 4.33 2021    HGB 13.3 2021    HCT 42.2 2021    MCV 97.5 2021    RDW 15.4 2021     2021       CMP:   Lab Results   Component Value Date     2021    K 4.5 2021    K 4.5 2020     2021    CO2 26 2021    BUN 14 2021    CREATININE 0.8 2021    GFRAA >60 2021    LABGLOM >60 2021    GLUCOSE 149 2021    GLUCOSE 92 2011    PROT 7.4 08/20/2021    CALCIUM 9.8 08/20/2021    BILITOT <0.2 08/20/2021    ALKPHOS 93 08/20/2021    AST 8 08/20/2021    ALT 10 08/20/2021       POC Tests: No results for input(s): POCGLU, POCNA, POCK, POCCL, POCBUN, POCHEMO, POCHCT in the last 72 hours. Coags: No results found for: PROTIME, INR, APTT    HCG (If Applicable):   Lab Results   Component Value Date    PREGTESTUR NEGATIVE 08/20/2021        ABGs: No results found for: PHART, PO2ART, GFR6RHQ, RMK4CMS, BEART, Q1YZTTYA     Type & Screen (If Applicable):  No results found for: LABABO, LABRH    Drug/Infectious Status (If Applicable):  No results found for: HIV, HEPCAB    COVID-19 Screening (If Applicable):   Lab Results   Component Value Date    COVID19 Not Detected 04/29/2020           Anesthesia Evaluation  Patient summary reviewed  Airway: Mallampati: I  TM distance: >3 FB   Neck ROM: full  Mouth opening: > = 3 FB Dental:          Pulmonary: breath sounds clear to auscultation  (+) pneumonia:  sleep apnea:  asthma: current smoker (0.25 PPD)                          ROS comment: Light Tobacco Abuse. Cardiovascular:    (+) hypertension:, CHF:, hyperlipidemia      ECG reviewed  Rhythm: regular  Rate: normal  Echocardiogram reviewed         Beta Blocker:  Dose within 24 Hrs      ROS comment: EKG: Sinus Rhythm 81, with Frequent ectopic beats. CATH:  1.  Moderate RCA disease which appeared angiographically to be in the range of 70%, but which was not hemodynamically significant with the pressure wire evaluation (IFR/FFR). 2.  Systemic hypertension. 3.  Markedly elevated left ventricular end-diastolic pressure. ECHO:   Mildly dilated left ventricle. LV global hypokinesis   Ejection fraction biplane =44%. Neuro/Psych:   (+) neuromuscular disease:, psychiatric history:             ROS comment: Spinal Meningocele,   H/O Meningitis. Neuropathy. DDD C/S. GI/Hepatic/Renal:   (+) GERD:, morbid obesity (BMI: 62.03.)         ROS comment: Diverticulitis  . Endo/Other:    (+) DiabetesType II DM, using insulin, : arthritis (DJD.):., .                 Abdominal:   (+) obese (BMI: 62.03.),           Vascular: negative vascular ROS. Other Findings:             Anesthesia Plan      MAC     ASA 3       Induction: intravenous. Anesthetic plan and risks discussed with patient. Plan discussed with CRNA.     Attending anesthesiologist reviewed and agrees with Winsome Kearns MD   8/20/2021

## 2021-08-20 NOTE — OP NOTE
SURGEON: Baron Weinberg M.D. PREOPERATIVE DIAGNOSES:  gerd    POSTOPERATIVE DIAGNOSES: normal EGD     OPERATION: Nfoykzxr-guueil-fyjvqmektkjk with biopsy    BLOOD LOSS: 0ML    ANESTHESIA: LMAC    COMPLICATIONS: None. OPERATIONS: The patient was placed on the table in left lateral decubitus position and sedated. Bite block was placed. A lubricated scope was easily passed into the upper esophagus which looked normal. The distal esophagus looked normal. The scope was passed into the stomach and retroflexed. There was no hiatal hernia. The scope was passed down toward the pylorus. The antral mucosa all looked normal. Biopsy was taken to check for H. pylori. The scope was then passed through the pylorus into the duodenal bulb which looked normal, then around to the distal duodenum which looked normal, and the scope was then withdrawn. The GE junction was at 44 cm from the teeth. The patient tolerated the procedure well.      Physician Signature: Electronically signed by Dr. Scarlett Santamaria

## 2021-08-20 NOTE — H&P
Initial Evaluation  Bariatric Surgery and EGD       Subjective:  CHIEF COMPLAINT: Morbid obesity, malnutrition, Hypertension, Hyperlipidemia, Obstructive Sleep Apnea, GERD and Degenerative Joint Disease (DJD)     HISTORY OF PRESENT ILLNESS: Triston Martinez is a morbidly-obese 42 y.o.  female, who weighs (!) 407 lb (184.6 kg). She is 257 pounds over her ideal body weight. The Body mass index is 60.98 kg/m². She has multiple medical problems aggravated by her obesity. She wishes to have bariatric surgery so that she can lose a large amount of weight and keep the weight off. [facility] have [facility] where we discussed the surgery in great detail and went over the risks and benefits. She has watched our informational video so she understands all of the extensive risks involved.  She states that she understands all of the risks and wishes to proceed with the evaluation.     Past Medical History      Patient Active Problem List   Diagnosis    Essential hypertension    Mild intermittent asthma without complication    Morbid obesity ([address]    Reactive depression    Anxiety    Pyelonephritis    Ureteric stone    Tobacco dependence    Hydronephrosis with urinary obstruction due to renal calculus    Pancreatic cyst    Frequent PVCs    Asthma exacerbation    Hyperkalemia    Spinal stenosis of lumbar region with neurogenic claudication    Primary osteoarthritis of both hips    Gastroesophageal reflux disease without esophagitis    Vitamin D deficiency    Diabetes mellitus ([address]    Hyperlipidemia LDL goal <70    JODI (obstructive sleep apnea)    Respiratory failure (HCC)    Pneumonia due to human metapneumovirus    Chronic pain syndrome    Lumbar radiculopathy    Chronic bilateral low back pain with bilateral sciatica    Hidradenitis suppurativa    Acute on chronic systolic heart failure ([address]    Cardiomyopathy ([address]    Malnutrition ([address]    Current moderate episode of major depressive disorder without prior episode ([facility] [address]    Heart failure [facility])      Past Surgical History  Past Surgical History         Past Surgical History:   Procedure Laterality Date    CARDIAC CATHETERIZATION   05/04/2020     Dr. Elizabeth Johnson   2009    CYSTOSCOPY Left 01/14/2018     left stent placement    DILATION AND CURETTAGE OF UTERUS         younger age   [employer] LITHOTRIPSY Right 02/15/2018     Cystoscopy;Stent Removal    UPPER GASTROINTESTINAL ENDOSCOPY N/A 6/22/2018     EGD BIOPSY performed by Chelsea Jo MD at [facility] ENDOSCOPY         Allergies: Food, Fish-derived products, Norco [hydrocodone-acetaminophen], Nuts [peanut-containing drug products], Pcn [penicillins], Ultram [tramadol hcl], and Cashews [macadamia nut oil]      Home Medications  Current Facility-Administered Medications          Current Outpatient Medications   Medication Sig Dispense Refill    atorvastatin (LIPITOR) 80 MG tablet TAKE 1 TABLET BY MOUTH EVERY NIGHT 30 tablet 5    aspirin 81 MG chewable tablet Take 1 tablet by mouth daily 30 tablet 3    bumetanide (BUMEX) 1 MG tablet Take 1 tablet by mouth daily with 2 tablets every other day 60 tablet 3    clotrimazole-betamethasone (LOTRISONE) 1-0.05 % cream Apply topically 2 times daily.  45 g 0    hydrALAZINE (APRESOLINE) 50 MG tablet Take 1 tablet by mouth every 8 hours 90 tablet 3    liraglutide-weight management 18 MG/3ML SOPN Inject 0.6 mg into the skin daily 30 pen 0    potassium chloride (KLOR-CON M) 20 MEQ extended release tablet Take 1 tablet by mouth daily 60 tablet 3    pantoprazole (PROTONIX) 20 MG tablet Take 1 tablet by mouth daily 30 tablet 3    docusate sodium (COLACE) 100 MG capsule Take 1 capsule by mouth 2 times daily 60 capsule 0    brompheniramine-pseudoephedrine-DM (BROMFED DM) 2-30-10 MG/5ML syrup Take 5 mLs by mouth 4 times daily as needed for Congestion or Cough 240 mL 1    citalopram (CELEXA) 40 MG tablet TAKE 1 TABLET BY MOUTH EVERY DAY        mometasone-formoterol (DULERA) 100-5 MCG/ACT inhaler Inhale 2 puffs into the lungs 2 times daily 13 g 1    gabapentin (NEURONTIN) 600 MG tablet Take 1 tablet by mouth 3 times daily for 90 days. 90 tablet 2    oxyCODONE-acetaminophen (PERCOCET) 5-325 MG per tablet Take 1 tablet by mouth every 8 hours as needed for Pain for up to 30 days. Intended supply: 3 days. Take lowest dose possible to manage pain 90 tablet 0    Continuous Blood Gluc  (FREESTYLE MARY READER) MARIJA 4 Devices by Does not apply route daily 4 Device 5    Continuous Blood Gluc Sensor ([address] [place] [address]) MIS 4 Devices by Does not apply route daily 4 each 5    albuterol (PROVENTIL) (2.5 MG/3ML) 0.083% nebulizer solution Take 3 mLs by nebulization every 6 hours as needed for Wheezing 120 each 3    spironolactone (ALDACTONE) 25 MG tablet TAKE 1 TABLET BY MOUTH EVERY DAY 30 tablet 5    mineral oil-hydrophilic petrolatum (AQUAPHOR) ointment Apply topically as needed.  99 g 5    cetirizine (ZYRTEC) 10 MG tablet TAKE 1 TABLET BY MOUTH EVERY DAY 30 tablet 3    albuterol sulfate HFA (PROAIR HFA) 108 (90 Base) MCG/ACT inhaler Inhale 2 puffs into the lungs every 4 hours as needed for Wheezing or Shortness of Breath 1 Inhaler 3    metoprolol succinate (TOPROL XL) 100 MG extended release tablet Take 1 tablet by mouth 2 times daily 30 tablet 3    sacubitril-valsartan (ENTRESTO)  MG per tablet Take 1 tablet by mouth 2 times daily 60 tablet 5    isosorbide dinitrate (ISORDIL) 20 MG tablet Take 1 tablet by mouth 3 times daily 90 tablet 3    fluticasone (FLONASE) 50 MCG/ACT nasal spray 2 sprays by Nasal route daily 1 Bottle 5    Continuous Blood Gluc Sensor (FREESTYLE MARY 2 SENSOR SYSTM) MISC 1 Device by Does not apply route daily 1 each 0    ondansetron (ZOFRAN-ODT) 4 MG disintegrating tablet Place 2 tablets under the tongue every 8 hours as needed for Nausea or Vomiting 30 tablet 1    Cholecalciferol (VITAMIN D) 50 MCG (2000 UT) CAPS capsule Take by mouth daily        Multiple Vitamin (DAILY-DOUGLAS) TABS TAKE 1 TABLET BY MOUTH EVERY DAY 90 tablet 5    Semaglutide (OZEMPIC, 1 MG/DOSE, SC) Inject into the skin once a week        ipratropium-albuterol (DUONEB) 0.5-2.5 (3) MG/3ML SOLN nebulizer solution Take 3 mLs by nebulization every 4 hours as needed for Shortness of Breath 24 vial 1    Insulin Pen Needle ([employer] PEN NEEDLES) 31G X 6 MM MISC 1 each by Does not apply route daily 100 each 3    ONE TOUCH ULTRA TEST strip 1 each by Does not apply route daily 120 each 5    Handicap Placard MISC by Does not apply route Patient cannot walk 200 ft without stopping to rest.    Expiration 5 yrs 1 each 0      No current facility-administered medications for this visit. Social History:   TOBACCO:   reports that she has been smoking cigarettes. She started smoking about 20 years ago. She has a 4.25 pack-year smoking history. She has never used smokeless tobacco.  All smokers must join the free smoking cessation program and stop smoking for 3 months before having any Bariatric surgery. ETOH:    reports no history of alcohol use.    The patient has a family history that is negative for severe cardiovascular or respiratory issues, negative for reaction to anesthesia.        Review of Systems     Review of Systems - General ROS: negative for - chills, fatigue or malaise  ENT ROS: negative for - hearing change, nasal congestion or nasal discharge  Allergy and Immunology ROS: negative for - hives, itchy/watery eyes or nasal congestion  Hematological and Lymphatic ROS: negative for - blood clots, blood transfusions, bruising or fatigue  Endocrine ROS: negative for - malaise/lethargy, mood swings, palpitations or polydipsia/polyuria  Breast ROS: negative for - new or changing breast lumps or nipple changes  Respiratory ROS: negative for - sputum changes, stridor, tachypnea or wheezing  Cardiovascular ROS: negative for - irregular heartbeat, loss of consciousness, murmur or orthopnea  Gastrointestinal ROS: negative for - constipation, diarrhea, gas/bloating, heartburn or hematemesis  Genito-Urinary ROS: negative for - genital discharge, genital ulcers or hematuria  Musculoskeletal ROS: negative for - gait disturbance, muscle pain or muscular weakness}     Physical Exam:   VITALS: BP (!) 155/93 (Site: Right Lower Arm, Position: Sitting, Cuff Size: Large Adult)   Pulse 53   Temp 96.7 °F (35.9 °C) (Temporal)   Resp 20   Ht 5' 8.5\" (1.74 m)   Wt (!) 407 lb (184.6 kg)   LMP 05/20/2021   BMI 60.98 kg/m²   General appearance: alert, appears stated age and cooperative  Head: Normocephalic, without obvious abnormality, atraumatic  Neck: no adenopathy, no carotid bruit, no JVD, supple, symmetrical, trachea midline and thyroid not enlarged, symmetric, no tenderness/mass/nodules  Lungs: clear to auscultation bilaterally  Heart: regular rate and rhythm  Abdomen: soft, non-tender; bowel sounds normal; no masses,  no organomegaly  Extremities: extremities normal, atraumatic, no cyanosis or edema  : no CVA tenderness     Assessment:  Morbid obesity with inability to keep the weight off with diet and exercise. She is interested in Laparoscopic Kendall-en- Y Gastric Bypass or Sleeve Gastrectomy. We discussed that our goal is to ameliorate the medical problems and not to obtain a specific body mass index. She understands the risks and benefits, and wishes to proceed with the evaluation.     Plan:  Psych evaluation, medical and cardio clearance, gallbladder is out. These patients often have vitamin deficiencies so I will do screening labs for malnutrition.  I will do an upper endoscopy to check for hiatal hernias, ulcers and H. Pylori while we wait for insurance approval for the surgery.     Physician Signature: Electronically signed by Dr. Damián Curtis MD

## 2021-08-23 ENCOUNTER — TELEPHONE (OUTPATIENT)
Dept: CARDIOLOGY | Age: 46
End: 2021-08-23

## 2021-08-23 LAB — ZINC: 66.7 UG/DL (ref 60–120)

## 2021-08-23 NOTE — TELEPHONE ENCOUNTER
RETURNING PATIENTS PHONE CALL TO SCHEDULE ECHO FOR 09-08-21   REVIEWED COVID CHECKLIST WITH PATIENT.   Electronically signed by Mauro Nunez on 8/23/2021 at 12:16 PM

## 2021-08-25 LAB — VITAMIN B1 WHOLE BLOOD: 118 NMOL/L (ref 70–180)

## 2021-09-01 ENCOUNTER — OFFICE VISIT (OUTPATIENT)
Dept: BARIATRICS/WEIGHT MGMT | Age: 46
End: 2021-09-01
Payer: MEDICARE

## 2021-09-01 VITALS
WEIGHT: 293 LBS | BODY MASS INDEX: 43.4 KG/M2 | SYSTOLIC BLOOD PRESSURE: 155 MMHG | HEIGHT: 69 IN | RESPIRATION RATE: 20 BRPM | DIASTOLIC BLOOD PRESSURE: 84 MMHG | TEMPERATURE: 98.5 F | HEART RATE: 51 BPM

## 2021-09-01 DIAGNOSIS — Z01.818 PREOPERATIVE EXAMINATION: Primary | ICD-10-CM

## 2021-09-01 DIAGNOSIS — Z01.818 PRE-OP EVALUATION: Primary | ICD-10-CM

## 2021-09-01 PROCEDURE — 99213 OFFICE O/P EST LOW 20 MIN: CPT | Performed by: SURGERY

## 2021-09-01 PROCEDURE — 4004F PT TOBACCO SCREEN RCVD TLK: CPT | Performed by: SURGERY

## 2021-09-01 PROCEDURE — 99211 OFF/OP EST MAY X REQ PHY/QHP: CPT

## 2021-09-01 PROCEDURE — G8417 CALC BMI ABV UP PARAM F/U: HCPCS | Performed by: SURGERY

## 2021-09-01 PROCEDURE — G8427 DOCREV CUR MEDS BY ELIG CLIN: HCPCS | Performed by: SURGERY

## 2021-09-01 NOTE — PROGRESS NOTES
Patti Srinivasan  9/1/2021  [address]     Post-EGD Follow-up. Subjective:   Patti Srinivasan is a 42 y.o. female post EGD done 2 weeks ago. She did not have a hiatal hernia, did not have gastritis. Biopsy did not show Helicobacter pylori. The gallbladder is out     Weight: (!) 408 lb (185.1 kg). Physical exam:    BP (!) 155/84 (Site: Left Lower Arm, Position: Sitting, Cuff Size: Large Adult)   Pulse 51   Temp 98.5 °F (36.9 °C) (Temporal)   Resp 20   Ht 5' 8.5\" (1.74 m)   Wt (!) 408 lb (185.1 kg)   LMP 06/20/2021 (Exact Date)   BMI 61.13 kg/m²   General appearance: alert, appears stated age and cooperative  Lungs: clear to auscultation bilaterally  Heart: regular rate and rhythm  Abdomen: soft, non-tender; bowel sounds normal; no masses,  no organomegaly    Assessment:    Doing well two weeks post EGD    Plan:   Stay on the high protein, low calorie diet while waiting for insurance approval. Walk as much as possible but keep your legs elevated when sitting. Continue deep breathing exercizes. Aim for 60 gm Protein and 90 oz of liquids per day. Track protein and fluid intake. Make sure the bowel move daily by taking fiber such as Metamucil. We discussed results and surgery for over 15 minutes.       Physician Signature: Electronically signed by Dr. Cesar De Oliveira MD   Cc:  Gary Silva PA-C

## 2021-09-02 ENCOUNTER — TELEPHONE (OUTPATIENT)
Dept: ADMINISTRATIVE | Age: 46
End: 2021-09-02

## 2021-09-02 NOTE — TELEPHONE ENCOUNTER
Pt was placed in [address] [facility] by Dr. Kim Sutherland for cardiac clearance for bariatric sx not yet scheduled. Established pt of Dr Iker Olivas in [facility]. Please advise regarding scheduling.

## 2021-09-03 ENCOUNTER — HOSPITAL ENCOUNTER (OUTPATIENT)
Dept: GENERAL RADIOLOGY | Age: 46
Discharge: HOME | End: 2021-09-05
Payer: MEDICARE

## 2021-09-03 ENCOUNTER — HOSPITAL ENCOUNTER (OUTPATIENT)
Age: 46
Discharge: HOME | End: 2021-09-05
Payer: MEDICARE

## 2021-09-03 DIAGNOSIS — M54.6 CHRONIC MIDLINE THORACIC BACK PAIN: ICD-10-CM

## 2021-09-03 DIAGNOSIS — M48.061 SPINAL STENOSIS OF LUMBAR REGION WITHOUT NEUROGENIC CLAUDICATION: ICD-10-CM

## 2021-09-03 DIAGNOSIS — M54.16 LUMBAR RADICULOPATHY: ICD-10-CM

## 2021-09-03 DIAGNOSIS — G89.29 CHRONIC MIDLINE THORACIC BACK PAIN: ICD-10-CM

## 2021-09-03 DIAGNOSIS — G89.4 CHRONIC PAIN SYNDROME: ICD-10-CM

## 2021-09-03 DIAGNOSIS — M25.552 HIP PAIN, ACUTE, LEFT: ICD-10-CM

## 2021-09-03 PROCEDURE — 72100 X-RAY EXAM L-S SPINE 2/3 VWS: CPT

## 2021-09-03 PROCEDURE — 73502 X-RAY EXAM HIP UNI 2-3 VIEWS: CPT

## 2021-09-03 PROCEDURE — 72070 X-RAY EXAM THORAC SPINE 2VWS: CPT

## 2021-09-08 ENCOUNTER — TELEPHONE (OUTPATIENT)
Dept: CARDIOLOGY | Age: 46
End: 2021-09-08

## 2021-09-08 NOTE — TELEPHONE ENCOUNTER
CALLED PATIENT TO RESCHEDULE ECHO THAT WAS SCHEDULED FOR THIS AFTERNOON. PATIENT WAS A NO SHOW.  RESCHEDULED FOR 10-04-21    Electronically signed by Skylar Goodwin on 9/8/2021 at 1:44 PM

## 2021-09-14 ENCOUNTER — INITIAL CONSULT (OUTPATIENT)
Dept: BARIATRICS/WEIGHT MGMT | Age: 46
End: 2021-09-14
Payer: MEDICARE

## 2021-09-14 VITALS — HEIGHT: 69 IN | BODY MASS INDEX: 43.4 KG/M2 | WEIGHT: 293 LBS

## 2021-09-14 DIAGNOSIS — Z71.3 NUTRITIONAL COUNSELING: Primary | ICD-10-CM

## 2021-09-14 DIAGNOSIS — Z00.8 NUTRITIONAL ASSESSMENT: ICD-10-CM

## 2021-09-14 PROCEDURE — 99999 PR OFFICE/OUTPT VISIT,PROCEDURE ONLY: CPT | Performed by: DIETITIAN, REGISTERED

## 2021-09-14 NOTE — PROGRESS NOTES
[facility]:  Initial Nutrition Consultation    Today's Date: 9/14/2021  Patients Name:Monique Voss     Height: 5' 8.5\" (1.74 m)   Weight: (!) 410 lb (186 kg)   IBW: 150 lbs  %IBW: 273%  Excess Weight: 260 lbs  BMI: Body mass index is 61.43 kg/m². Subjective Information:   Patient has tried multiple means of weight loss including diet and exercise in the past and has been unable to maintain a healthy weight. Pt states he / she has tried the following - Self-Imposed Salad Diet with Water, Self-Imposed One Meal A Day. Patients overall goal is to be able to lose weight with diet and exercise by changing lifestyle, habits and maintain a healthy weight for a lifetime. Are your currently Diabetic  - Pt states Pre-Diabetic. Type 2 Diabetic - Yes  Medication to Control Diabetes  - Pt states diet controlled. Last Blood Glucose Reading - 8/20/21 - Glucose 149H  Last HGBA1C - 8/20/21- HGBA1C 6.8H    Patient states the following will be the most difficult change after weight loss surgery? Pt states after losing weight dealing with the excess skin. Most successful diet in the past? Self-Imposed Salad Diet with Water  Length of time patient was on the diet listed above? Pt states only for a short amount of time - couple weeks  Weight lost on the diet listed above? 10 - 15 lbs  Patient stated he / she maintained his / her weight for the following time? Pt states the weight was up and down d/t to CHF    Scale of 1 (Least Likely) to 10 ( Most Likely  - What is your readiness to change and adhere to your new diet and lifestyle change we reviewed - 10  At a level 10 How do you foresee yourself being successful with the change - Pt states changing her eating habits after WLS. Patient takes the following vitamins, minerals and dietary supplements? Yes - I currently take a Daily Complete MVI, Vitamin D3., Vitamin B12, Zinc, Mg and K+. Pt does not know the dose.   Rd / Ld recommends the patient continue the following supplements from now until surgery. Patient consumes the following beverage daily? Water and Coffee    Pre-Op Labs - 9/20/21 - Glucose 149H, HGBA1C 6.8H, Tri 204H, Ferritin 113 WNL, H/H 13.3 / 42.2 - WNL    Patient states he / she has the following problems:  no - Eating  no - Chewing  no - Swallowing  no - Nausea  no - Vomiting  no - Diarrhea  yes - Constipation - Pt states etiology is medication. no - Hair Changes  no - Skin Changes  no - Tongue Texture    Food Allergies: yes  - Tree Nuts, Certain Seafood   Food Intolerances: no -     Yes - Past medically assisted weight loss history reviewed. Yes - Provided education regarding the basic role of nutrition in junction with Bariatric Surgery. Yes - Provided overview of required dietary and behavioral changes pre and post operatively. Yes - Stated / reinforced that changes in dietary behaviors are critical to successful, long term weight Loss. Patient is aware that in order to proceed with bariatric surgery he / she will need to follow a low-fat diet prior to surgery. Patient is aware that bariatric surgery is a lifetime change that will require the patient to follow a low-fat, low-calorie, low-sugar, portion controlled diet with at least 30 minutes of physical activity daily. Patient is aware that certain foods may not be tolerated after bariatric surgery and certain foods will need avoided all together. Marcelle Booth / KONG feels that this patient knowledge / readiness to make appropriate diet / lifestyle changes assessed to be? - Good    RD / LD feels this patients expected adherence for post surgical diet? - Good    Patient was instructed and signed consents on a low-fat diet and required supplementation at initial consult. Comments: Patient is able to verbalize diet concepts for bariatric surgery. Patient is aware diet concepts will be reinforced at 2-hour nutrition education consult.   Marcelle Booth / Arturo Angel will monitor progress.

## 2021-09-14 NOTE — PATIENT INSTRUCTIONS
[facility]  Dietary Initial Appointment Patient Instructions    By: Manpreet Archibald RD / KONG  [phone]      At your initial dietary appointment you have received the following information packets:    Please be aware at each visit you have been instructed that in order for your insurance company to approve your surgery you must show a consistent weight loss of 2 lbs or greater at each visit. We can not guarantee an approval by your insurance company we can only provide the information given to us it is up to you the patient to show compliancy to your insurance company. If you do gain weight during your supervised weight loss counseling sessions insurance companies starting in 2018 are denying patients for not showing consistent weight loss results when part of a supervised weight loss counseling program. Pt was instructed on 9/14/21. Pt verbalized understanding. · Low-Fat Diet  - Please be sure to begin your low-fat diet from today's date until your scheduled surgery date. If you are not at goal weight by your history and physical appointment with your surgeon your surgery will be cancelled. · Goal Weight: 382 lbs BMI: 57.2  · Low-Fat Diet Contract - Patient Acknowledge and Signed  · Supplement List - Please be sure to be saving to purchase your 3 month supply of supplements. If you do not bring supplements to your history and physical appointment your surgery will be cancelled. · Supplement Contract - Patient Acknowledge and Signed  · Please be sure to take a daily Multi-vitamin from now until surgery to reduce your incidence of infection. · If your insurance company requires additional dietary counseling you will be scheduled to see the dietitian the following month. ·  If your psychological evaluation is completed and all your dietary requirements for your individual insurance company have been completed you will be submitted to insurance.  Please make sure to check with us to see if we have received your psychological evaluation. Please be aware that any co-pays or deductibles may be requested prior to testing and / or procedures. You will need to schedule a psychological evaluation for weight loss surgery. Patients will be required to complete all psychological testing as required by the psychologist. Patients must follow all of the psychologist's recommendations before weight loss surgery can be scheduled. The evaluation must be done a standard way for weight loss surgery. We strongly recommend that you contact one of our preferred providers listed below to arrange this:    Sheila Segura, [facility]                                                              [address] [place] [address]                                                                                      [phone]     Pascual Salazar [address] [place] [address]                                                                                                [phone]        Dr. Phillip Waters, PhD                         [address] [place], [place]                                                                                              [phone]     You will also need to plan on attending a 2 hour nutrition class at the [facility] prior to your surgery. We will schedule this for you when we schedule your surgery. Please remember to have your labs drawn prior to your scheduled appointment to review the results of your testing. Please remember, that while we will submit your case to insurance for surgery authorization, it is your responsibility to know if your plan covers weight loss surgery and keep up-to-date with changes to your insurance coverage.   We will do everything possible to help you get approved for weight loss surgery, but cannot guarantee an approval. Please note that you will not be submitted to your insurance company until all   pre-operative testing requirements are met. · Please remember you need to schedule to attend one Support Group meeting held at the [facility] before surgery. This one meeting is mandatory. You can attend as many support group meetings before and after surgery. Support group meetings are held at the [facility] from 6:00 - 8:00pm 1st, and 3rd Tuesday of every month. See dates listed below. · Each individual person has his / her own medical requirements that need fulfilled before being able to schedule bariatric surgery . Please finalize these requirements by contacting our Bariatric Nurse at [phone].

## 2021-09-23 ENCOUNTER — TELEPHONE (OUTPATIENT)
Dept: FAMILY MEDICINE CLINIC | Age: 46
End: 2021-09-23

## 2021-09-23 DIAGNOSIS — L73.2 HIDRADENITIS SUPPURATIVA: ICD-10-CM

## 2021-09-23 NOTE — TELEPHONE ENCOUNTER
----- Message from Cb Shows sent at 9/23/2021  9:06 AM EDT -----  Subject: Message to Provider    QUESTIONS  Information for Provider? Patient has an outbreak of hidradenitis under   the left arm and wants to know if a RX can be called in. [address] [address], [place] [address] [phone]  ---------------------------------------------------------------------------  --------------  [place] KIM  What is the best way for the office to contact you? OK to leave message on   voicemail  Preferred Call Back Phone Number? [phone]  ---------------------------------------------------------------------------  --------------  SCRIPT ANSWERS  Relationship to Patient?  Self

## 2021-09-24 RX ORDER — SULFAMETHOXAZOLE AND TRIMETHOPRIM 800; 160 MG/1; MG/1
1 TABLET ORAL 2 TIMES DAILY
Qty: 20 TABLET | Refills: 0 | Status: SHIPPED | OUTPATIENT
Start: 2021-09-24 | End: 2021-10-04

## 2021-10-04 ENCOUNTER — HOSPITAL ENCOUNTER (OUTPATIENT)
Dept: MRI IMAGING | Age: 46
Discharge: HOME | End: 2021-10-06
Payer: MEDICARE

## 2021-10-04 ENCOUNTER — HOSPITAL ENCOUNTER (OUTPATIENT)
Dept: CARDIOLOGY | Age: 46
Discharge: HOME | End: 2021-10-04
Payer: MEDICARE

## 2021-10-04 DIAGNOSIS — G89.29 CHRONIC MIDLINE THORACIC BACK PAIN: ICD-10-CM

## 2021-10-04 DIAGNOSIS — M48.061 SPINAL STENOSIS OF LUMBAR REGION WITHOUT NEUROGENIC CLAUDICATION: ICD-10-CM

## 2021-10-04 DIAGNOSIS — M54.16 LUMBAR RADICULOPATHY: ICD-10-CM

## 2021-10-04 DIAGNOSIS — R94.30 EJECTION FRACTION < 50%: ICD-10-CM

## 2021-10-04 DIAGNOSIS — M54.6 CHRONIC MIDLINE THORACIC BACK PAIN: ICD-10-CM

## 2021-10-04 DIAGNOSIS — R06.02 SHORTNESS OF BREATH: ICD-10-CM

## 2021-10-04 PROCEDURE — 2580000003 HC RX 258: Performed by: INTERNAL MEDICINE

## 2021-10-04 PROCEDURE — 72148 MRI LUMBAR SPINE W/O DYE: CPT

## 2021-10-04 PROCEDURE — 72146 MRI CHEST SPINE W/O DYE: CPT

## 2021-10-04 PROCEDURE — 6360000004 HC RX CONTRAST MEDICATION: Performed by: INTERNAL MEDICINE

## 2021-10-04 PROCEDURE — 93308 TTE F-UP OR LMTD: CPT

## 2021-10-04 RX ORDER — SODIUM CHLORIDE 0.9 % (FLUSH) 0.9 %
10 SYRINGE (ML) INJECTION PRN
Status: DISCONTINUED | OUTPATIENT
Start: 2021-10-04 | End: 2021-10-05 | Stop reason: HOSPADM

## 2021-10-04 RX ADMIN — PERFLUTREN 1.1 MG: 6.52 INJECTION, SUSPENSION INTRAVENOUS at 13:49

## 2021-10-04 RX ADMIN — SODIUM CHLORIDE, PRESERVATIVE FREE 10 ML: 5 INJECTION INTRAVENOUS at 13:49

## 2021-10-09 DIAGNOSIS — I42.9 CARDIOMYOPATHY, UNSPECIFIED TYPE (HCC): ICD-10-CM

## 2021-10-11 RX ORDER — METOPROLOL SUCCINATE 100 MG/1
TABLET, EXTENDED RELEASE ORAL
Qty: 60 TABLET | Refills: 3 | Status: SHIPPED
Start: 2021-10-11 | End: 2022-01-12

## 2021-10-18 ENCOUNTER — TELEPHONE (OUTPATIENT)
Dept: CARDIOLOGY CLINIC | Age: 46
End: 2021-10-18

## 2021-10-18 ENCOUNTER — TELEPHONE (OUTPATIENT)
Dept: FAMILY MEDICINE CLINIC | Age: 46
End: 2021-10-18

## 2021-10-18 NOTE — TELEPHONE ENCOUNTER
----- Message from [employer] sent at 10/16/2021 10:20 AM EDT -----  Subject: Appointment Request    Reason for Call: Routine Existing Condition Follow Up (Diabetes)    QUESTIONS  Type of Appointment? Established Patient  Reason for appointment request? Other - said Ecc can not book   Additional Information for Provider? Patient is calling due to her aunt   passing and needing to reschedule her appt on 10/19 .  ---------------------------------------------------------------------------  --------------  CALL BACK INFO  What is the best way for the office to contact you? OK to leave message on   voicemail  Preferred Call Back Phone Number? [phone]  ---------------------------------------------------------------------------  --------------  SCRIPT ANSWERS  Relationship to Patient? Self  Have your symptoms changed? No  (Is the patient requesting to be seen urgently for their symptoms?)? No  Is this follow up request related to routine Diabetes Management? Yes  Are you having any new concerns about your existing condition? No  Have you been diagnosed with, awaiting test results for, or told that you   are suspected of having COVID-19 (Coronavirus)? (If patient has tested   negative or was tested as a requirement for work, school, or travel and   not based on symptoms, answer no)? No  Within the past two weeks have you developed any of the following symptoms   (answer no if symptoms have been present longer than 2 weeks or began   more than 2 weeks ago)? Fever or Chills, Cough, Shortness of breath or   difficulty breathing, Loss of taste or smell, Sore throat, Nasal   congestion, Sneezing or runny nose, Fatigue or generalized body aches   (answer no if pain is specific to a body part e.g. back pain), Diarrhea,   Headache? No  Have you had close contact with someone with COVID-19 in the last 14 days? No  (Service Expert  click yes below to proceed with [employer] As Usual   Scheduling)?  Yes

## 2021-10-18 NOTE — TELEPHONE ENCOUNTER
Contacted patient with echo results per Dr. Ananya Qureshi. She verbalized understanding.    ----- Message from Herve Soria MD sent at 10/18/2021  9:20 AM EDT -----  The heart function is on the lower end of normal.  Details will be discussed during follow-up visit.

## 2021-10-25 ENCOUNTER — OFFICE VISIT (OUTPATIENT)
Dept: FAMILY MEDICINE CLINIC | Age: 46
End: 2021-10-25
Payer: MEDICARE

## 2021-10-25 VITALS
OXYGEN SATURATION: 97 % | TEMPERATURE: 97.2 F | BODY MASS INDEX: 43.4 KG/M2 | HEART RATE: 109 BPM | HEIGHT: 69 IN | WEIGHT: 293 LBS | SYSTOLIC BLOOD PRESSURE: 138 MMHG | DIASTOLIC BLOOD PRESSURE: 76 MMHG | RESPIRATION RATE: 18 BRPM

## 2021-10-25 DIAGNOSIS — J45.20 MILD INTERMITTENT ASTHMA WITHOUT COMPLICATION: ICD-10-CM

## 2021-10-25 DIAGNOSIS — E66.01 MORBID OBESITY WITH BMI OF 50.0-59.9, ADULT (HCC): ICD-10-CM

## 2021-10-25 DIAGNOSIS — K21.9 GASTROESOPHAGEAL REFLUX DISEASE WITHOUT ESOPHAGITIS: ICD-10-CM

## 2021-10-25 DIAGNOSIS — R05.9 COUGH: ICD-10-CM

## 2021-10-25 DIAGNOSIS — F41.9 ANXIETY: ICD-10-CM

## 2021-10-25 DIAGNOSIS — L73.2 HIDRADENITIS SUPPURATIVA: ICD-10-CM

## 2021-10-25 DIAGNOSIS — I42.9 CARDIOMYOPATHY, UNSPECIFIED TYPE (HCC): Primary | ICD-10-CM

## 2021-10-25 DIAGNOSIS — B37.9 CANDIDIASIS: ICD-10-CM

## 2021-10-25 DIAGNOSIS — E11.9 TYPE 2 DIABETES MELLITUS WITHOUT COMPLICATION, WITHOUT LONG-TERM CURRENT USE OF INSULIN (HCC): ICD-10-CM

## 2021-10-25 DIAGNOSIS — L02.412 ABSCESS OF LEFT AXILLA: ICD-10-CM

## 2021-10-25 PROCEDURE — 4004F PT TOBACCO SCREEN RCVD TLK: CPT | Performed by: PHYSICIAN ASSISTANT

## 2021-10-25 PROCEDURE — G8427 DOCREV CUR MEDS BY ELIG CLIN: HCPCS | Performed by: PHYSICIAN ASSISTANT

## 2021-10-25 PROCEDURE — 3044F HG A1C LEVEL LT 7.0%: CPT | Performed by: PHYSICIAN ASSISTANT

## 2021-10-25 PROCEDURE — G8484 FLU IMMUNIZE NO ADMIN: HCPCS | Performed by: PHYSICIAN ASSISTANT

## 2021-10-25 PROCEDURE — 99214 OFFICE O/P EST MOD 30 MIN: CPT | Performed by: PHYSICIAN ASSISTANT

## 2021-10-25 PROCEDURE — 2022F DILAT RTA XM EVC RTNOPTHY: CPT | Performed by: PHYSICIAN ASSISTANT

## 2021-10-25 PROCEDURE — G8417 CALC BMI ABV UP PARAM F/U: HCPCS | Performed by: PHYSICIAN ASSISTANT

## 2021-10-25 RX ORDER — MULTIVIT-MIN/IRON/FOLIC ACID/K 18-600-40
1 CAPSULE ORAL DAILY
Qty: 30 CAPSULE | Refills: 2 | Status: SHIPPED | OUTPATIENT
Start: 2021-10-25

## 2021-10-25 RX ORDER — CLINDAMYCIN HYDROCHLORIDE 300 MG/1
300 CAPSULE ORAL 2 TIMES DAILY
Qty: 21 CAPSULE | Refills: 0 | Status: SHIPPED
Start: 2021-10-25 | End: 2022-04-13 | Stop reason: SDUPTHER

## 2021-10-25 RX ORDER — SEMAGLUTIDE 1.34 MG/ML
1 INJECTION, SOLUTION SUBCUTANEOUS WEEKLY
Qty: 1.5 ML | Refills: 5 | Status: SHIPPED
Start: 2021-10-25 | End: 2021-10-26

## 2021-10-25 RX ORDER — CITALOPRAM 40 MG/1
40 TABLET ORAL DAILY
Qty: 30 TABLET | Refills: 5 | Status: SHIPPED
Start: 2021-10-25 | End: 2022-05-25

## 2021-10-25 RX ORDER — FLUCONAZOLE 150 MG/1
150 TABLET ORAL ONCE
Qty: 2 TABLET | Refills: 0 | Status: SHIPPED | OUTPATIENT
Start: 2021-10-25 | End: 2021-10-25

## 2021-10-25 RX ORDER — CLOTRIMAZOLE AND BETAMETHASONE DIPROPIONATE 10; .64 MG/G; MG/G
CREAM TOPICAL
Qty: 45 G | Refills: 0 | Status: SHIPPED
Start: 2021-10-25 | End: 2022-10-25

## 2021-10-25 RX ORDER — BROMPHENIRAMINE MALEATE, PSEUDOEPHEDRINE HYDROCHLORIDE, AND DEXTROMETHORPHAN HYDROBROMIDE 2; 30; 10 MG/5ML; MG/5ML; MG/5ML
5 SYRUP ORAL 4 TIMES DAILY PRN
Qty: 240 ML | Refills: 1 | Status: SHIPPED
Start: 2021-10-25 | End: 2022-04-13 | Stop reason: SDUPTHER

## 2021-10-25 RX ORDER — BUPROPION HYDROCHLORIDE 150 MG/1
150 TABLET ORAL AS NEEDED
COMMUNITY
Start: 2021-10-11 | End: 2022-09-28

## 2021-10-25 RX ORDER — ASPIRIN 81 MG/1
81 TABLET, CHEWABLE ORAL DAILY
Qty: 30 TABLET | Refills: 3 | Status: SHIPPED
Start: 2021-10-25 | End: 2022-05-16 | Stop reason: SDUPTHER

## 2021-10-25 RX ORDER — ONDANSETRON 4 MG/1
8 TABLET, ORALLY DISINTEGRATING ORAL EVERY 8 HOURS PRN
Qty: 30 TABLET | Refills: 1 | Status: SHIPPED
Start: 2021-10-25 | End: 2022-04-13 | Stop reason: SDUPTHER

## 2021-10-25 NOTE — PROGRESS NOTES
She is obese. HENT:      Head: Normocephalic and atraumatic. Right Ear: External ear normal.      Left Ear: External ear normal.      Nose: Nose normal.   Eyes:      General: No scleral icterus. Conjunctiva/sclera: Conjunctivae normal.      Pupils: Pupils are equal, round, and reactive to light. Neck:      Thyroid: No thyromegaly. Cardiovascular:      Rate and Rhythm: Normal rate and regular rhythm. Heart sounds: Normal heart sounds. No murmur heard. Pulmonary:      Effort: Pulmonary effort is normal. No accessory muscle usage or respiratory distress. Breath sounds: Normal breath sounds. No wheezing. Musculoskeletal:         General: Normal range of motion. Cervical back: Normal range of motion and neck supple. Right lower leg: No edema. Left lower leg: No edema. Skin:     General: Skin is warm and dry. Findings: No rash. Neurological:      Mental Status: She is alert and oriented to person, place, and time. Deep Tendon Reflexes: Reflexes are normal and symmetric. Psychiatric:         Speech: Speech normal.         Behavior: Behavior normal.         Assessment/Plan:      Stevie Hale was seen today for diabetes, sinus problem and skin problem. Diagnoses and all orders for this visit:    Cardiomyopathy, unspecified type ([facility] [address]  -     aspirin 81 MG chewable tablet; Take 1 tablet by mouth daily    Type 2 diabetes mellitus without complication, without long-term current use of insulin ([facility])  -     Semaglutide, 1 MG/DOSE, (OZEMPIC, 1 MG/DOSE,) 2 MG/1.5ML SOPN; Inject 1 mg into the skin once a week Not using at present    Mild intermittent asthma without complication    Anxiety  -     citalopram (CELEXA) 40 MG tablet;  Take 1 tablet by mouth daily    Gastroesophageal reflux disease without esophagitis  -     ondansetron (ZOFRAN-ODT) 4 MG disintegrating tablet; Place 2 tablets under the tongue every 8 hours as needed for Nausea or Vomiting    Hidradenitis Patient and/or guardian verbalizes understanding and agrees with above counseling, assessment and plan. All questions answered. Ang Lopez PA-C  10/25/2021    I have personally reviewed and updated the chief complaint, HPI, Past Medical, Family and Social History, as well as the above Review of Systems.

## 2021-10-26 ENCOUNTER — TELEPHONE (OUTPATIENT)
Dept: FAMILY MEDICINE CLINIC | Age: 46
End: 2021-10-26

## 2021-10-26 ENCOUNTER — INITIAL CONSULT (OUTPATIENT)
Dept: BARIATRICS/WEIGHT MGMT | Age: 46
End: 2021-10-26
Payer: MEDICARE

## 2021-10-26 VITALS — HEIGHT: 69 IN | WEIGHT: 293 LBS | BODY MASS INDEX: 43.4 KG/M2

## 2021-10-26 DIAGNOSIS — Z71.3 DIETARY COUNSELING: Primary | ICD-10-CM

## 2021-10-26 DIAGNOSIS — E66.01 MORBID OBESITY DUE TO EXCESS CALORIES (HCC): ICD-10-CM

## 2021-10-26 DIAGNOSIS — E11.9 TYPE 2 DIABETES MELLITUS WITHOUT COMPLICATION, WITHOUT LONG-TERM CURRENT USE OF INSULIN (HCC): Primary | ICD-10-CM

## 2021-10-26 PROCEDURE — 99999 PR OFFICE/OUTPT VISIT,PROCEDURE ONLY: CPT

## 2021-10-26 RX ORDER — DULAGLUTIDE 0.75 MG/.5ML
0.75 INJECTION, SOLUTION SUBCUTANEOUS WEEKLY
Qty: 0.5 ML | Refills: 2 | Status: SHIPPED
Start: 2021-10-26 | End: 2022-07-26 | Stop reason: SDUPTHER

## 2021-10-26 NOTE — TELEPHONE ENCOUNTER
Prior Auth for Ozempic denied. Pt must try 60days of ONE preferred or Step therapy product.    Preferred medications are Metformin 500, 850, 100mg, Metfromin  and/or 750mg, Metaglip, Glucovance, ActosPlus, Trulicity or Victoza

## 2021-10-26 NOTE — PROGRESS NOTES
WEIGHT:  Weight: (!) 403 lb (182.8 kg)      Pt was in th office for his/her 2nd weight Loss appointment. Pt is aware he/she must meet his/her pre-op weight loss goal in order to proceed with weight loss surgery. Gokul / Bret faxed a copy of what was reviewed with the pt to her PCP. Pt verbalized understanding. Gokul// Bret enc the following at today's visit for successful post-surgical Weight Maintenance    Education:  Patient has been educated on the importance of reading food labels for the content of sugar and the content of fat that is in the foods serving size contributing to overall calorie consumption. Patient is aware how to identify hidden sugars and hidden fats found in food and reduce calorie intake of empty calories and high fat foods. Patient can verbalize the importance of label reading. Demonstrate the difference between a healthy food label and unhealthy food label. Real life food replicas demonstrating hidden sugars and hidden fats, and actual food labels were part of the patients education to help understand healthy eating habits and determine healthy food choices. 1. Weigh yourself daily and record it. 2. Keep documented food records daily   3. Just be more active in day to day routine   4. Higher protein intake and a higher fiber intake. Not a high protein or a high fiber diet just a higher intake. 5.Eliminate empty calorie consumption    Gokul Queen addressed the following with the pt:  - Gokul Queen enc pt to comply with nutrition recommendations  - Rd / Ld enc Participation in support group meetings  - Gokul Queen enc pt to go back to maintenance of food records and weight monitoring records   - Gokul Queen reviewed the importance of adequate sleep and stress management  - Gokul Queen reviewed nonfood strategies to cope with emotions and stress  - Gokul Queen encouraged pt to practice the following: Mindful eating: Eating slowly:  Focusing   on the eating experience without distraction  - Gokul Queen enc. pt to pay attention to hunger and fullness  - Rd / Ld enc meal planning  - Rd / Ld  Enc pt to chose nutrient dense whole foods instead of soft, high calorie foods  - Rd / Ld enc not dr Tawana Ochoa large amounts of fluids with or immediately after meals    Portion control ,meal planning and avoiding empty calorie consumption. Rd / Ld reviewed insurance company weight loss requirement on 10/26/21. Pt verbalized understanding. Please be aware at each visit you have been instructed that in order for your insurance company to approve your surgery you must show a consistent weight loss of 2 lbs or greater at each visit. We can not guarantee an approval by your insurance company we can only provide the information given to us it is up to you the patient to show compliancy to your insurance company.   If you do gain weight during your supervised weight loss counseling sessions insurance companies starting in 2018 are denying patients for not showing consistent weight loss results when part of a supervised weight loss counseling program.

## 2021-11-16 ENCOUNTER — INITIAL CONSULT (OUTPATIENT)
Dept: BARIATRICS/WEIGHT MGMT | Age: 46
End: 2021-11-16

## 2021-11-16 VITALS — BODY MASS INDEX: 43.4 KG/M2 | HEIGHT: 69 IN | WEIGHT: 293 LBS

## 2021-11-16 DIAGNOSIS — Z71.3 DIETARY COUNSELING: Primary | ICD-10-CM

## 2021-11-16 DIAGNOSIS — E66.01 MORBID OBESITY DUE TO EXCESS CALORIES (HCC): ICD-10-CM

## 2021-11-16 PROCEDURE — 99999 PR OFFICE/OUTPT VISIT,PROCEDURE ONLY: CPT

## 2021-11-16 NOTE — PROGRESS NOTES
WEIGHT:  Weight: (!) 394 lb (178.7 kg)      Pt was in th office for his/her 3rd weight Loss appointment. Pt is aware he/she must meet his/her pre-op weight loss goal in order to proceed with weight loss surgery. Gokul / Bret faxed a copy of what was reviewed with the pt to her PCP. Pt verbalized understanding. Rd// Ld enc the following at today's visit for successful pre/post surgical weight loss. Education:  Pt has been educated on the dangers of dining out when trying to lose weight as part of a medically / surgically supervised weight loss program.  Gokul Queen reviewed how restaurants add extra calories, fat and sugars in food preparation to make the food more palatable and enjoyable to the consumer. Gokul Queen reviewed strategies and coping skills for preparing meals at home and avoiding dinning out all together. 1. Weigh yourself daily and record it. 2. Keep documented food records daily. 3. Just be more active in day to day routine. 4. Higher protein intake and a higher fiber intake. Not a high protein or a high fiber diet     just a higher intake. 5.Eliminate empty calorie consumption. Gokul Queen addressed the following with the pt:  - Gokul Queen encouraged pt to comply with nutrition recommendations.  - Gokul / Ld encouraged participation in support group meetings.  - Gokul Queen encouraged pt to go back to maintenance of food records and weight monitoring   records. - Gokul Queen reviewed the importance of adequate sleep and stress management.  - Gokul Queen reviewed non-food strategies to cope with emotions and stress.  - Gokul Queen encouraged pt to practice the following: Mindful eating: Eating slowly: Focusing     on the eating experience without distraction.  - Gokul Queen encouraged pt to pay attention to hunger and fullness cues.   - Gokul / Ld encouraged meal planning.  - Rd / Ld  encouraged pt to chose nutrient dense whole foods instead of soft, high     calorie foods.  - Gokul / Ld encouraged not drinking large amounts of fluids with or immediately after      meals and avoiding high caloric empty beverage consumption. Portion control ,meal planning and avoiding empty calorie consumption was reviewed. Pt was encouraged to practice this daily for weight loss success. Gokul / Bret reviewed insurance company weight loss requirement on 11/16/21. Pt verbalized understanding. Please be aware at each visit you have been instructed that in order for your insurance company to approve your surgery you must show a consistent weight loss of 2 lbs or greater at each visit. We can not guarantee an approval by your insurance company we can only provide the information given to us it is up to you the patient to show compliancy to your insurance company. If you do gain weight during your supervised weight loss counseling sessions insurance companies starting in 2018 are denying patients for not showing consistent weight loss results when part of a supervised weight loss counseling program.     Pt spent 120 minutes with the Gokul Queen today preparing the patient for pre/post surgical dietary changes.

## 2021-11-22 ENCOUNTER — OFFICE VISIT (OUTPATIENT)
Dept: CARDIOLOGY CLINIC | Age: 46
End: 2021-11-22
Payer: MEDICARE

## 2021-11-22 VITALS
SYSTOLIC BLOOD PRESSURE: 110 MMHG | BODY MASS INDEX: 43.4 KG/M2 | RESPIRATION RATE: 18 BRPM | HEART RATE: 105 BPM | HEIGHT: 69 IN | DIASTOLIC BLOOD PRESSURE: 70 MMHG | WEIGHT: 293 LBS

## 2021-11-22 DIAGNOSIS — F32.9 REACTIVE DEPRESSION: ICD-10-CM

## 2021-11-22 DIAGNOSIS — M54.42 CHRONIC BILATERAL LOW BACK PAIN WITH BILATERAL SCIATICA: ICD-10-CM

## 2021-11-22 DIAGNOSIS — M54.41 CHRONIC BILATERAL LOW BACK PAIN WITH BILATERAL SCIATICA: ICD-10-CM

## 2021-11-22 DIAGNOSIS — E78.5 HYPERLIPIDEMIA LDL GOAL <70: ICD-10-CM

## 2021-11-22 DIAGNOSIS — I10 ESSENTIAL HYPERTENSION: ICD-10-CM

## 2021-11-22 DIAGNOSIS — I25.10 CORONARY ARTERY DISEASE INVOLVING NATIVE CORONARY ARTERY OF NATIVE HEART WITHOUT ANGINA PECTORIS: Primary | ICD-10-CM

## 2021-11-22 DIAGNOSIS — G47.33 OSA (OBSTRUCTIVE SLEEP APNEA): ICD-10-CM

## 2021-11-22 DIAGNOSIS — I49.3 FREQUENT PVCS: ICD-10-CM

## 2021-11-22 DIAGNOSIS — G89.29 CHRONIC BILATERAL LOW BACK PAIN WITH BILATERAL SCIATICA: ICD-10-CM

## 2021-11-22 DIAGNOSIS — R53.81 PHYSICAL DECONDITIONING: ICD-10-CM

## 2021-11-22 DIAGNOSIS — F17.200 TOBACCO DEPENDENCE: ICD-10-CM

## 2021-11-22 DIAGNOSIS — J45.20 MILD INTERMITTENT ASTHMA WITHOUT COMPLICATION: ICD-10-CM

## 2021-11-22 DIAGNOSIS — K21.9 GASTROESOPHAGEAL REFLUX DISEASE WITHOUT ESOPHAGITIS: ICD-10-CM

## 2021-11-22 DIAGNOSIS — E66.01 MORBID OBESITY (HCC): ICD-10-CM

## 2021-11-22 DIAGNOSIS — I50.22 CHRONIC SYSTOLIC HEART FAILURE (HCC): ICD-10-CM

## 2021-11-22 PROBLEM — K08.89 TOOTHACHE: Status: ACTIVE | Noted: 2019-11-06

## 2021-11-22 PROBLEM — I50.23 ACUTE ON CHRONIC SYSTOLIC HEART FAILURE (HCC): Status: RESOLVED | Noted: 2020-05-01 | Resolved: 2021-11-22

## 2021-11-22 PROCEDURE — 4004F PT TOBACCO SCREEN RCVD TLK: CPT | Performed by: INTERNAL MEDICINE

## 2021-11-22 PROCEDURE — G8484 FLU IMMUNIZE NO ADMIN: HCPCS | Performed by: INTERNAL MEDICINE

## 2021-11-22 PROCEDURE — 93000 ELECTROCARDIOGRAM COMPLETE: CPT | Performed by: INTERNAL MEDICINE

## 2021-11-22 PROCEDURE — 99214 OFFICE O/P EST MOD 30 MIN: CPT | Performed by: INTERNAL MEDICINE

## 2021-11-22 PROCEDURE — G8417 CALC BMI ABV UP PARAM F/U: HCPCS | Performed by: INTERNAL MEDICINE

## 2021-11-22 PROCEDURE — G8427 DOCREV CUR MEDS BY ELIG CLIN: HCPCS | Performed by: INTERNAL MEDICINE

## 2021-11-22 RX ORDER — FLUCONAZOLE 150 MG/1
TABLET ORAL
COMMUNITY
Start: 2021-10-25 | End: 2022-07-21 | Stop reason: ALTCHOICE

## 2021-11-22 NOTE — PROGRESS NOTES
Out Patient CARDIOLOGY FOLLOW UP    Name: Marcela Fuller    Age: 42 y.o. Date of Service: 11/22/2021      Referring Physician: No admitting provider for patient encounter. Chief Complaint   Patient presents with    Hypertension     3 mo /echo results-    Hyperlipidemia    Congestive Heart Failure    Cardiomyopathy        History of Present Illness: 81-year-old female with history of morbid obesity with BMI of 60 kg/m², asthma, hypertension, Bell's palsy, DJD with chronic back pain, history of pancreatitis 6, GERD, history of meningitis, spinal stenosis, history of heart failure with depressed systolic function with prior EF noted to be 30% but EF now recovered with last echocardiogram revealing EF of 50 to 55%. He also has sleep apnea and on CPAP. She has been planning bariatric surgery and subsequently underwent invasive coronary angiogram recently which revealed RCA of 70% but FFR shows no hemodynamic significant obstructive coronary disease and subsequently medically managed. She presented for follow-up visit today. She denies chest pain or shortness of breath of fatigue but main concern is back discomfort which is preventing her from exerting herself. Patient is advised to follow-up with her orthopedic doctor and pain management to find optimal pain management in order to allow patient to participate in physical exertion in order to lose weight and patient is advised if she exerts herself and she developed chest discomfort or any other concerning symptoms to give us a call so that further evaluation and management can be entertained.         History:  1. Essential hypertension. 2. Asthma. 3. Bell palsy. 4. Diverticulitis. 5. DJD. 6. GERD. 7. Meningitis. 8. Morbid obesity .  BMI 59.79 03/2021  9. Pancreas cyst.  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 11. Reactive depression/anxiety. 12. Nephrolithiasis  Hx . [place] Pyelonephritis. 13. Systemic inflammatory response syndrome.   14. Tobacco abuse.  15. Chronic PVCs. 16. Spinal stenosis of lumbar region at multiple levels. 17. Obstructive sleep apnea  18. CHF with normal EF March 2018.    19. Echo, 03/21/2018.  .  Ejection fraction  visually estimated  50-55%. Mild concentric LVH. Indeterminate diastolic function. Mildly dilated right ventricle. RV normal.  No significant valvular abnormalities.  Unable to estimate RVSP.  No  pericardial effusion. .  20. Hospitalized 02/11/2019 through 02/17/2019 with pneumonia due to human metapneumovirus.  Hospitalization was protracted due to hypoxemia.  He has had recurrent episodes of lower back pain. 21. Admitted from ED 04/28/2012 for dyspnea.  proBNP was 1/5/2008.   Echo estimate EF 30% with a dilatedLVV.   bilateral lung infiltrates. 22. Cath 05/04/2020: No LM stenosis. LAD  20% stenosis               Circumflex 20% stenosis.  RCA  70% stenosis with IFR 0.98.  LVEDP 43.  23. LifeVest.  hospital discharge 05/05/2020  24. Cardiac office evaluation 06/23/2020: Symptomatically stable.  Entresto increased to 97/103 twice daily  25. Outpatient cardiac follow-up 07/07/2020: Dyspnea improved.  Continues chronic back and foot pain.  Continues to wear LifeVest.  Isosorbide dinitrate 20 mg p.o. 3 times daily added   26. Echo 07/28/2020: LV mildly dilated with global hypokinesis.  Biplane EF 44%.  ZOLL vest discontinued  27. OP assessment 08/26/2020: Hydralazine increased to 50mg TID  28. OP cardiac follow-up 10/15/2020: Bumex increased.  1 mg daily with an extra milligram alternate days  29. Lipid panel 01/08/2021:  Total cholesterol 211, triglycerides 249, HDL 44,       Review of Systems:   Cardiac: As per HPI  General: Denies fever or chills  Pulmonary: As per HPI  HEENT: Denies runny nose  GI: No complaints  : No complaints  Endocrine: Denies night sweats  Musculoskeletal: No complaints  Skin: Dry skin  Neuro: No complaints  Psych: Denies depression    Past Medical History:  Past Medical History: Diagnosis Date    Asthma     Bell palsy     drooping    CHF (congestive heart failure) ([facility] [address]     [id number] wore life vest dr Jose Adorno DDD (degenerative disc disease), cervical     with spinal stenosis    Diabetes mellitus (HCC)     Diverticulitis     DJD (degenerative joint disease)     both hips    GERD without esophagitis     HTN (hypertension)     Hyperlipidemia     Meningitis 2009    Morbid obesity due to excess calories (HCC)     Neuropathy     Pancreas cyst     Sleep apnea     bipap    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[facility])        Past Surgical History:  Past Surgical History:   Procedure Laterality Date    CHOLECYSTECTOMY  2009    CYSTOSCOPY Left 01/14/2018    left stent placement    DILATION AND CURETTAGE OF UTERUS      younger age   Ruma Garrett LITHOTRIPSY Right 02/15/2018    Cystoscopy;Stent Removal    UPPER GASTROINTESTINAL ENDOSCOPY N/A 6/22/2018    EGD BIOPSY performed by Staci Fay MD at [place] [id number] ENDOSCOPY N/A 8/20/2021    EGD BIOPSY performed by Staci Fay MD at [facility] ENDOSCOPY       Family History:  Family History   Problem Relation Age of Onset    Arthritis Mother     Hypertension Mother     Asthma Mother     Cancer Father     Hypertension Father     Heart Attack Father     Asthma Father        Social History:  Social History     Socioeconomic History    Marital status: Legally      Spouse name: Not on file    Number of children: Not on file    Years of education: Not on file    Highest education level: Not on file   Occupational History    Occupation: direct care staff/counselor   Tobacco Use    Smoking status: Light Tobacco Smoker     Packs/day: 0.25     Years: 17.00     Pack years: 4.25     Types: Cigarettes     Start date: 3/27/2001     Last attempt to quit: 10/5/2018     Years since quitting: 3.1    Smokeless tobacco: Never Used    Tobacco comment: occasionally has 1 cigarette, very stressed now   Vaping Use    Vaping Use: Former   Substance and Sexual Activity    Alcohol use: No    Drug use: No    Sexual activity: Yes   Other Topics Concern    Not on file   Social History Narrative    Not on file     Social Determinants of Health     Financial Resource Strain: Unknown    Difficulty of Paying Living Expenses: Patient refused   Food Insecurity: Unknown    Worried About Running Out of Food in the Last Year: Patient refused    [address] in the Last Year: Patient refused   Transportation Needs:     Lack of Transportation (Medical): Not on file    Lack of Transportation (Non-Medical): Not on file   Physical Activity:     Days of Exercise per Week: Not on file    Minutes of Exercise per Session: Not on file   Stress:     Feeling of Stress : Not on file   Social Connections:     Frequency of Communication with Friends and Family: Not on file    Frequency of Social Gatherings with Friends and Family: Not on file    Attends [employer] Services: Not on file    Active Member of [address] [place] [address] or Organizations: Not on file    Attends Club or Organization Meetings: Not on file    Marital Status: Not on file   Intimate Partner Violence:     Fear of Current or Ex-Partner: Not on file    Emotionally Abused: Not on file    Physically Abused: Not on file    Sexually Abused: Not on file   Housing Stability:     Unable to Pay for Housing in the Last Year: Not on file    [facility] of [facility] in the Last Year: Not on file    Unstable Housing in the Last Year: Not on file       Allergies: Allergies   Allergen Reactions    Food Anaphylaxis     Food allergy - Fish and tree nuts    Ultram [Tramadol Hcl]      Per pt had a seizure    Fish-Derived Products     Norco [Hydrocodone-Acetaminophen] Hives    Nuts [Peanut-Containing Drug Products]     Pcn [Penicillins]     Cashews [Macadamia Nut Oil] Nausea And Vomiting       Home Medications:  Prior to Admission medications    Medication Sig Start Date End Date Taking?  Authorizing Provider   fluconazole (DIFLUCAN) 150 MG tablet  10/25/21  Yes Historical Provider, MD   gabapentin (NEURONTIN) 600 MG tablet Take 1 tablet by mouth 3 times daily for 90 days. 11/3/21 2/1/22 Yes Luis Resendiz MD   oxyCODONE-acetaminophen (PERCOCET) 5-325 MG per tablet Take 1 tablet by mouth every 8 hours as needed for Pain for up to 30 days. Intended supply: 3 days. Take lowest dose possible to manage pain 11/3/21 12/3/21 Yes Fercho Doyle MD   Dulaglutide (TRULICITY) 4.44 UZ/0.2XE SOPN Inject 0.75 mg into the skin once a week 10/26/21  Yes Lanre Prado PA-C   buPROPion (WELLBUTRIN XL) 150 MG extended release tablet  10/11/21  Yes Historical Provider, MD   aspirin 81 MG chewable tablet Take 1 tablet by mouth daily 10/25/21  Yes Lanre Prado PA-C   clotrimazole-betamethasone (LOTRISONE) 1-0.05 % cream Apply topically 2 times daily.  10/25/21  Yes Lanre Prado PA-C   ondansetron (ZOFRAN-ODT) 4 MG disintegrating tablet Place 2 tablets under the tongue every 8 hours as needed for Nausea or Vomiting 10/25/21  Yes Lanre Prado PA-C   Cholecalciferol (VITAMIN D) 50 MCG (2000 UT) CAPS capsule Take 1 capsule by mouth daily 10/25/21  Yes Lanre Prado PA-C   brompheniramine-pseudoephedrine-DM (BROMFED DM) 2-30-10 MG/5ML syrup Take 5 mLs by mouth 4 times daily as needed for Congestion or Cough 10/25/21 11/24/21 Yes Lanre Prado PA-C   citalopram (CELEXA) 40 MG tablet Take 1 tablet by mouth daily 10/25/21  Yes Lanre Prado PA-C   metoprolol succinate (TOPROL XL) 100 MG extended release tablet TAKE 1 TABLET BY MOUTH TWICE DAILY 10/11/21  Yes Lanre Prado PA-C   atorvastatin (LIPITOR) 80 MG tablet TAKE 1 TABLET BY MOUTH EVERY NIGHT 7/19/21  Yes Lanre Prado PA-C   bumetanide (BUMEX) 1 MG tablet Take 1 tablet by mouth daily with 2 tablets every other day 7/19/21  Yes Lanre Prado PA-C   hydrALAZINE (APRESOLINE) 50 MG tablet Take 1 tablet by mouth every 8 hours 7/19/21  Yes Lanre Prado PA-C   potassium chloride (KLOR-CON M) 20 MEQ extended release tablet Take 1 tablet by mouth daily 7/19/21  Yes iNdia Sena PA-C   pantoprazole (PROTONIX) 20 MG tablet Take 1 tablet by mouth daily  Patient taking differently: Take 20 mg by mouth daily as needed  7/19/21  Yes Nidia Sena PA-C   mometasone-formoterol [facility]) 100-5 MCG/ACT inhaler Inhale 2 puffs into the lungs 2 times daily 6/17/21  Yes Shaan Snow,    Continuous Blood Gluc  (FREESTYLE MARY READER) MARIJA 4 Devices by Does not apply route daily 5/14/21  Yes Nidia Sena PA-C   Continuous Blood Gluc Sensor ([address] [place] [address]) [address] [facility] 4 Devices by Does not apply route daily 5/14/21  Yes Nidia Sena PA-C   albuterol (PROVENTIL) (2.5 MG/3ML) 0.083% nebulizer solution Take 3 mLs by nebulization every 6 hours as needed for Wheezing 3/23/21  Yes Nidia Sena PA-C   spironolactone (ALDACTONE) 25 MG tablet TAKE 1 TABLET BY MOUTH EVERY DAY 3/22/21  Yes Jarrell Molina MD   mineral oil-hydrophilic petrolatum (AQUAPHOR) ointment Apply topically as needed.  3/16/21  Yes Nidia Sena PA-C   cetirizine (ZYRTEC) 10 MG tablet TAKE 1 TABLET BY MOUTH EVERY DAY 3/10/21  Yes Maira Bill,    albuterol sulfate HFA (PROAIR HFA) 108 (90 Base) MCG/ACT inhaler Inhale 2 puffs into the lungs every 4 hours as needed for Wheezing or Shortness of Breath 1/29/21  Yes Nidia Sena PA-C   sacubitril-valsartan (ENTRESTO)  MG per tablet Take 1 tablet by mouth 2 times daily 1/8/21  Yes Nidia Sena PA-C   isosorbide dinitrate (ISORDIL) 20 MG tablet Take 1 tablet by mouth 3 times daily 1/8/21  Yes Nidia Sena PA-C   fluticasone [facility]) 50 MCG/ACT nasal spray 2 sprays by Nasal route daily  Patient taking differently: 2 sprays by Nasal route daily as needed  1/8/21  Yes Nidia Sena PA-C   Continuous Blood Gluc Sensor (FREESTYLE MARY 2 SENSOR SYSTM) MISC 1 Device by Does not apply route daily 1/8/21  Yes Nidia Sena PA-C   Multiple Vitamin (DAILY-DOUGLAS) TABS TAKE release tablet TAKE 1 TABLET BY MOUTH TWICE DAILY 60 tablet 3    atorvastatin (LIPITOR) 80 MG tablet TAKE 1 TABLET BY MOUTH EVERY NIGHT 30 tablet 5    bumetanide (BUMEX) 1 MG tablet Take 1 tablet by mouth daily with 2 tablets every other day 60 tablet 3    hydrALAZINE (APRESOLINE) 50 MG tablet Take 1 tablet by mouth every 8 hours 90 tablet 3    potassium chloride (KLOR-CON M) 20 MEQ extended release tablet Take 1 tablet by mouth daily 60 tablet 3    pantoprazole (PROTONIX) 20 MG tablet Take 1 tablet by mouth daily (Patient taking differently: Take 20 mg by mouth daily as needed ) 30 tablet 3    mometasone-formoterol (DULERA) 100-5 MCG/ACT inhaler Inhale 2 puffs into the lungs 2 times daily 13 g 1    Continuous Blood Gluc  (FREESTYLE MARY READER) MARIJA 4 Devices by Does not apply route daily 4 Device 5    Continuous Blood Gluc Sensor (FREESTYLE MARY SENSOR SYSTEM) MISC 4 Devices by Does not apply route daily 4 each 5    albuterol (PROVENTIL) (2.5 MG/3ML) 0.083% nebulizer solution Take 3 mLs by nebulization every 6 hours as needed for Wheezing 120 each 3    spironolactone (ALDACTONE) 25 MG tablet TAKE 1 TABLET BY MOUTH EVERY DAY 30 tablet 5    mineral oil-hydrophilic petrolatum (AQUAPHOR) ointment Apply topically as needed.  99 g 5    cetirizine (ZYRTEC) 10 MG tablet TAKE 1 TABLET BY MOUTH EVERY DAY 30 tablet 3    albuterol sulfate HFA (PROAIR HFA) 108 (90 Base) MCG/ACT inhaler Inhale 2 puffs into the lungs every 4 hours as needed for Wheezing or Shortness of Breath 1 Inhaler 3    sacubitril-valsartan (ENTRESTO)  MG per tablet Take 1 tablet by mouth 2 times daily 60 tablet 5    isosorbide dinitrate (ISORDIL) 20 MG tablet Take 1 tablet by mouth 3 times daily 90 tablet 3    fluticasone (FLONASE) 50 MCG/ACT nasal spray 2 sprays by Nasal route daily (Patient taking differently: 2 sprays by Nasal route daily as needed ) 1 Bottle 5    Continuous Blood Gluc Sensor (FREESTYLE MARY 2 SENSOR SYSTM) MISC 1 Device by Does not apply route daily 1 each 0    Multiple Vitamin (DAILY-DOUGLAS) TABS TAKE 1 TABLET BY MOUTH EVERY DAY 90 tablet 5    ipratropium-albuterol (DUONEB) 0.5-2.5 (3) MG/3ML SOLN nebulizer solution Take 3 mLs by nebulization every 4 hours as needed for Shortness of Breath 24 vial 1    Insulin Pen Needle ([employer] PEN NEEDLES) 31G X 6 MM MISC 1 each by Does not apply route daily 100 each 3    ONE TOUCH ULTRA TEST strip 1 each by Does not apply route daily 120 each 5    Handicap Placard MISC by Does not apply route Patient cannot walk 200 ft without stopping to rest.    Expiration 5 yrs 1 each 0     No current facility-administered medications for this visit. Physical Exam:  /70 (Site: Right Lower Arm, Position: Sitting, Cuff Size: Medium Adult)   Pulse 105   Resp 18   Ht 5' 8.5\" (1.74 m)   Wt (!) 400 lb 1.6 oz (181.5 kg)   BMI 59.95 kg/m²   Wt Readings from Last 3 Encounters:   11/22/21 (!) 400 lb 1.6 oz (181.5 kg)   11/16/21 (!) 394 lb (178.7 kg)   10/26/21 (!) 403 lb (182.8 kg)       Appearance: Alert and oriented x3 not in acute distress. Skin: Dry skin  Head: Atraumatic  Eyes: Intact extraocular muscles   ENMT: Mucous membranes are moist  Neck: Supple  Lungs: Clear to auscultation  Cardiac: Normal S1 and S2  Abdomen: Protuberant  Extremities: Intact range of motion  Neurologic: No focal neurological deficits  Peripheral Pulses: 2+ peripheral pulses    Intake/Output:  No intake or output data in the 24 hours ending 11/22/21 1657  [unfilled]    Laboratory Tests:  No results for input(s): NA, K, CL, CO2, BUN, CREATININE, GLUCOSE, CALCIUM in the last 72 hours. Lab Results   Component Value Date    MG 1.9 05/03/2020    MG 2.1 04/28/2020    MG 2.1 06/07/2019     No results for input(s): ALKPHOS, ALT, AST, PROT, BILITOT, BILIDIR, LABALBU in the last 72 hours. No results for input(s): WBC, RBC, HGB, HCT, MCV, MCH, MCHC, RDW, PLT, MPV in the last 72 hours.   Lab Results Component Value Date    CKTOTAL 480 (H) 05/13/2019    CKMB 2.8 01/06/2011    TROPONINI 0.02 04/29/2020    TROPONINI 0.03 04/29/2020    TROPONINI 0.03 04/28/2020     No results for input(s): CKTOTAL, CKMB, CKMBINDEX, TROPHS in the last 72 hours. No results found for: INR, PROTIME  Lab Results   Component Value Date    TSH 0.909 06/07/2019    TSH 0.859 03/20/2018    TSH 0.887 03/27/2017     Lab Results   Component Value Date    LABA1C 6.8 (H) 08/20/2021    LABA1C 6.2 (H) 07/19/2021    LABA1C 6.5 04/07/2021     No results found for: EAG  Lab Results   Component Value Date    CHOL 194 08/20/2021    CHOL 171 07/19/2021    CHOL 211 (H) 01/08/2021     Lab Results   Component Value Date    TRIG 204 (H) 08/20/2021    TRIG 194 (H) 07/19/2021    TRIG 249 (H) 01/08/2021     Lab Results   Component Value Date    HDL 48 07/19/2021    HDL 44 01/08/2021    HDL 58 05/05/2020     Lab Results   Component Value Date    LDLCALC 84 07/19/2021    LDLCALC 117 (H) 01/08/2021    LDLCALC 87 05/05/2020     Lab Results   Component Value Date    LABVLDL 39 07/19/2021    LABVLDL 50 01/08/2021    LABVLDL 38 05/05/2020     No results found for: CHOLHDLRATIO  No results for input(s): PROBNP in the last 72 hours. Cardiac Tests:  EKG reviewed (EKG date: Shows sinus tachycardia, 105 bpm, right bundle branch block, nonspecific ST-T wave changes.):        Echocardiogram reviewed: 10/2021  Conclusions      Summary   LImited study for LV function   Left ventricle was not well visualized. Micro-bubble contrast injected to enhance left ventricular visualization. No evidence of left ventricular mass or thrombus noted. Grossly normal left ventricle size. Indeterminate diastolic function. Ejection fraction is visually estimated at 50-55%. Stress test reviewed:      Cardiac catheterization reviewed: 5/2020  CORONARY ANGIOGRAPHY:  LEFT MAIN:  The left main artery did not appear to have any significant  angiographic disease.   LAD:  The left anterior descending artery is a large vessel that wraps  around the left ventricular apex. It had minor luminal irregularities  but without any significant angiographic luminal narrowing. LCX:  The left circumflex is a large but nondominant vessel. It has  luminal irregularities but with no more than 20% angiographic luminal  narrowing. RCA:  The right coronary artery is a moderate-sized dominant vessel  giving the posterior descending artery branch. It has a mid to distal  eccentric 70% angiographic luminal narrowing.     A pressure wire evaluation of the mid to distal RCA lesion was  performed. Serial IFRs were 0.98, 0.98 and 0.99, respectively. The  patient was then given IV adenosine at 140 mcg/kg per minute for 4  minutes with an FFR at the end of the adenosine infusion of 0.94. These  findings rule out the RCA lesion being hemodynamically significant.       CONCLUSIONS:  1.  Moderate RCA disease which appeared angiographically to be in the  range of 70%, but which was not hemodynamically significant with the  pressure wire evaluation (IFR/FFR). 2.  Systemic hypertension. 3.  Markedly elevated left ventricular end-diastolic pressure.       CXR reviewed: The 10-year ASCVD risk score (Geovani De La Torre, et al., 2013) is: 6%    Values used to calculate the score:      Age: 42 years      Sex: Female      Is Non- : Yes      Diabetic: Yes      Tobacco smoker: Yes      Systolic Blood Pressure: 511 mmHg      Is BP treated: Yes      HDL Cholesterol: 48 mg/dL      Total Cholesterol: 194 mg/dL    ASSESSMENT / PLAN:    1. Coronary artery disease involving native coronary artery of native heart without angina pectoris  She has 70% RCA disease diagnosed in May 2020 for which FFR revealed no hemodynamically significant disease and subsequently has been medically managed.   She denies any anginal symptoms at this time but she has not been very active due to chronic back pain  She is going to work and then we can take in the next step in terms of cardiovascular evaluation. FOLLOW-UP in 3 months        Thank you for allowing me to participate in your patient's care. Please feel free to contact me if you have any questions or concerns.     Alex Brian MD  [facility]) Cardiology

## 2021-12-07 ENCOUNTER — HOSPITAL ENCOUNTER (OUTPATIENT)
Dept: GENERAL RADIOLOGY | Age: 46
Discharge: HOME | End: 2021-12-09
Payer: MEDICARE

## 2021-12-07 DIAGNOSIS — M54.16 LUMBAR RADICULOPATHY: ICD-10-CM

## 2021-12-07 DIAGNOSIS — G89.29 CHRONIC LOW BACK PAIN, UNSPECIFIED BACK PAIN LATERALITY, UNSPECIFIED WHETHER SCIATICA PRESENT: ICD-10-CM

## 2021-12-07 DIAGNOSIS — M54.50 CHRONIC LOW BACK PAIN, UNSPECIFIED BACK PAIN LATERALITY, UNSPECIFIED WHETHER SCIATICA PRESENT: ICD-10-CM

## 2021-12-07 PROCEDURE — 64483 NJX AA&/STRD TFRM EPI L/S 1: CPT

## 2021-12-14 ENCOUNTER — INITIAL CONSULT (OUTPATIENT)
Dept: BARIATRICS/WEIGHT MGMT | Age: 46
End: 2021-12-14

## 2021-12-14 VITALS — WEIGHT: 293 LBS | BODY MASS INDEX: 43.4 KG/M2 | HEIGHT: 69 IN

## 2021-12-14 DIAGNOSIS — Z71.3 DIETARY COUNSELING: Primary | ICD-10-CM

## 2021-12-14 DIAGNOSIS — E66.01 MORBID OBESITY DUE TO EXCESS CALORIES (HCC): ICD-10-CM

## 2021-12-14 PROCEDURE — 99999 PR OFFICE/OUTPT VISIT,PROCEDURE ONLY: CPT

## 2021-12-14 NOTE — PROGRESS NOTES
WEIGHT:  Weight: (!) 393 lb (178.3 kg)      Pt was in th office for his/her 4th weight Loss appointment. Pt is aware he/she must meet his/her pre-op weight loss goal in order to proceed with weight loss surgery. Gokul / Bret faxed a copy of what was reviewed with the pt to her PCP. Pt verbalized understanding. Rd// Ld enc the following at today's visit for successful pre/post surgical weight loss. Education:  Pt has been educated on the importance of weighing and measuring food for adequate portion control and to avoid extra calorie consumption from eating large portions. Gokul / Ld instructed the pt on the use and the importance of using a scale and measuring cups in order to achieve adequate measurements of common portion sizes both pre-op and post-op. Gokul Quene used actual food model replica's to show what an actual portion and serving size would look like 3 month's post-surgical after weight loss sx. Gokul Queen completed an exercise in which they had to weigh and measure foods for adequate portion control. Gokul / Bret also reviewed the use of a portion controlled plate after weight loss surgery. 1. Weigh yourself daily and record it. 2. Keep documented food records daily. 3. Just be more active in day to day routine. 4. Higher protein intake and a higher fiber intake. Not a high protein or a high fiber diet     just a higher intake. 5.Eliminate empty calorie consumption. Gokul Queen addressed the following with the pt:  - Gokul Queen encouraged pt to comply with nutrition recommendations.  - Gokul / Bret encouraged participation in support group meetings.  - Gokul Queen encouraged pt to go back to maintenance of food records and weight monitoring   records. - Gokul Queen reviewed the importance of adequate sleep and stress management.  - Gokul Queen reviewed non-food strategies to cope with emotions and stress.  - Gokul Queen encouraged pt to practice the following: Mindful eating: Eating slowly:  Focusing     on the eating experience without distraction.  - Gokul Ld encouraged pt to pay attention to hunger and fullness cues. - Rd / Ld encouraged meal planning.  - Rd / Ld  encouraged pt to chose nutrient dense whole foods instead of soft, high     calorie foods.  - Rd / Ld encouraged not drinking large amounts of fluids with or immediately after      meals and avoiding high caloric empty beverage consumption. Portion control ,meal planning and avoiding empty calorie consumption was reviewed. Pt was encouraged to practice this daily for weight loss success. Gokul / Bret reviewed insurance company weight loss requirement on 12/14/21. Pt verbalized understanding. Please be aware at each visit you have been instructed that in order for your insurance company to approve your surgery you must show a consistent weight loss of 2 lbs or greater at each visit. We can not guarantee an approval by your insurance company we can only provide the information given to us it is up to you the patient to show compliancy to your insurance company. If you do gain weight during your supervised weight loss counseling sessions insurance companies starting in 2018 are denying patients for not showing consistent weight loss results when part of a supervised weight loss counseling program.     Pt spent 120 minutes with the Gokul Queen today preparing the patient for pre/post surgical dietary changes.

## 2021-12-19 NOTE — PROGRESS NOTES
metFORMIN (GLUCOPHAGE) 500 MG tablet TAKE 1 TABLET BY MOUTH TWICE DAILY WITH MEALS  Percy Bartlett PA-C       Social History     Tobacco Use    Smoking status: Light Tobacco Smoker     Packs/day: 1.00     Years: 17.00     Pack years: 17.00     Types: Cigarettes     Start date: 3/27/2001     Last attempt to quit: 10/5/2018     Years since quittin.6    Smokeless tobacco: Never Used    Tobacco comment: occasionally has 1 cigarette, very stressed now   Substance Use Topics    Alcohol use: No    Drug use: No        Allergies   Allergen Reactions    Food Anaphylaxis     Food allergy - Fish and tree nuts    Fish-Derived Products     Nuts [Peanut-Containing Drug Products]     Pcn [Penicillins]     Ultram [Tramadol Hcl]      Per pt had a seizure    Cashews [Macadamia Nut Oil] Nausea And Vomiting   ,   Past Medical History:   Diagnosis Date    Asthma     Bell palsy     DDD (degenerative disc disease), cervical     with spinal stenosis    Diabetes mellitus ([address]     Diverticulitis     DJD (degenerative joint disease)     both hips    GERD without esophagitis     HTN (hypertension)     Hyperlipidemia     Meningitis     Morbid obesity due to excess calories (HCC)     Neuropathy     Pancreas cyst     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[facility]    ,   Past Surgical History:   Procedure Laterality Date    CARDIAC CATHETERIZATION  2020    Dr. Margaret López  2009    CYSTOSCOPY Left 2018    left stent placement    DILATION AND CURETTAGE OF UTERUS      younger age   Martin LITHOTRIPSY Right 02/15/2018    Cystoscopy;Stent Removal    UPPER GASTROINTESTINAL ENDOSCOPY N/A 2018    EGD BIOPSY performed by Christine Oconnell MD at [place] [id number]   ,   Social History     Tobacco Use    Smoking status: Light Tobacco Smoker     Packs/day: 1.00     Years: 17.00     Pack years: 17.00     Types: Cigarettes     Start date: 3/27/2001     Last attempt to quit: 10/5/2018     Years since quittin.6    Smokeless tobacco: Never Used    Tobacco comment: occasionally has 1 cigarette, very stressed now   Substance Use Topics    Alcohol use: No    Drug use: No   ,   Family History   Problem Relation Age of Onset    Arthritis Mother     Hypertension Mother    Martin Asthma Mother     Cancer Father     Hypertension Father     Heart Attack Father     Asthma Father    ,   Immunization History   Administered Date(s) Administered    Pneumococcal Polysaccharide ([id number]) 04/03/2018   ,   Health Maintenance   Topic Date Due    HIV screen  04/01/1992    Hepatitis B vaccine (1 of 3 - Risk 3-dose series) 04/01/1996    DTaP/Tdap/Td vaccine (1 - Tdap) 04/01/1996    Diabetic retinal exam  03/07/2018    Flu vaccine (Season Ended) 09/01/2020    Diabetic foot exam  09/17/2020    Diabetic microalbuminuria test  09/17/2020    A1C test (Diabetic or Prediabetic)  04/29/2021    Cervical cancer screen  04/30/2021    Lipid screen  05/05/2021    Potassium monitoring  05/05/2021    Creatinine monitoring  05/05/2021    Pneumococcal 0-64 years Vaccine  Completed    Hepatitis A vaccine  Aged Out    Hib vaccine  Aged Out    Meningococcal (ACWY) vaccine  Aged Out       PHYSICAL EXAMINATION:  [ INSTRUCTIONS:  \"[x]\" Indicates a positive item  \"[]\" Indicates a negative item  -- DELETE ALL ITEMS NOT EXAMINED]  Vital Signs: (As obtained by patient/caregiver or practitioner observation)    Blood pressure-  Heart rate-    Respiratory rate-    Temperature-  Pulse oximetry-     Constitutional: [x] Appears well-developed and well-nourished [x] No apparent distress      [] Abnormal-   Mental status  [x] Alert and awake  [x] Oriented to person/place/time [x]Able to follow commands      Eyes:  EOM    [x]  Normal  [] Abnormal-  Sclera  [x]  Normal  [] Abnormal -         Discharge [x]  None visible  [] Abnormal -    HENT:   [x] Normocephalic, atraumatic.   [] Abnormal   [x] Mouth/Throat: Mucous membranes are moist.     External Ears [x] no

## 2022-01-03 ENCOUNTER — TELEMEDICINE (OUTPATIENT)
Dept: BARIATRICS/WEIGHT MGMT | Age: 47
End: 2022-01-03
Payer: MEDICARE

## 2022-01-03 DIAGNOSIS — F50.9 COMPULSIVE EATING PATTERNS: ICD-10-CM

## 2022-01-03 DIAGNOSIS — J45.20 MILD INTERMITTENT ASTHMA WITHOUT COMPLICATION: ICD-10-CM

## 2022-01-03 DIAGNOSIS — Z00.8 ENCOUNTER FOR PSYCHOLOGICAL EVALUATION: Primary | ICD-10-CM

## 2022-01-03 DIAGNOSIS — F50.81 BINGE-EATING DISORDER, MILD: ICD-10-CM

## 2022-01-03 PROCEDURE — 90791 PSYCH DIAGNOSTIC EVALUATION: CPT | Performed by: SOCIAL WORKER

## 2022-01-03 RX ORDER — CETIRIZINE HYDROCHLORIDE 10 MG/1
10 TABLET ORAL DAILY
Qty: 30 TABLET | Refills: 3 | Status: SHIPPED
Start: 2022-01-03 | End: 2022-10-24 | Stop reason: SDUPTHER

## 2022-01-03 NOTE — PATIENT INSTRUCTIONS
Assignments/Recommendations: 1-2 follow-up sessions with SW for further education/evaluation. Complete entries in \"Why We Eat\", \"Reality Journal\" and \"Identifying and Handling Cravings\" to discuss next session. Attend [facility] support group. Follow-up with: referrals/present providers/all scheduled appointments at [facility].   Handouts provided via [employer]

## 2022-01-03 NOTE — PROGRESS NOTES
Diagnostic Evaluation [id number] Medical Services. Ronan Pearce is a 42 y.o. ,  ,  female, referred by her cardiologist  for evaluation and treatment. Patient identify verified by Name and . This session was provided as a live video telehealth contact using \"My Chart/Haiku/Myrtle\" due to  COVID 19 restriction on face to face visits. Pt was informed and understands the following: that this live video telehealth contact is being made in lieu of a face to face meeting due to the COVID-19 pandemic; this contact is considered a telehealth services; the same session fees that apply to face to face services apply to telehealth services; the benefits and risks of engaging in telehealth services; the need for reliable and secure Internet/phone connection; and that this is their responsibility to maintain the privacy and security of their device and location. With this information, the client verbally consents to participate in a live video telehealth session. Patient Consent :   SW discussed role and services including limits to confidentiality, documentation in a single EMR with care team, time-limited services, and potential financial responsibility. Patient expressed understanding and is agreeable to same. PT consented to receiving unencrypted e-mail   yes     Patient's location: home address in [address]  Location other address in [facility]. Those attending session : patient      Chief Complaint   Patient presents with    Consultation     Evaluation for bariatric surgery         Motivation for surgery: Pt reported that she has health condition CHF, chronic pain, mobility      Understanding of procedure: Pt stated that she has done research regarding gastric bypass. She stated that her sister recently had surgery and she does talk with another friend who has had surgery. She has not attended the support group. Reported Current weight 193.     Wt Readings from Last 3 Encounters:   21 (!) 393 lb (178.3 kg)   21 (!) 400 lb 1.6 oz (181.5 kg)   21 (!) 394 lb (178.7 kg)       Expectations: The patient expects to loose  lbs following surgery over 12 months. Goal weight post surgery: 230 lbs. Other expectations: Improved health, better mobility and less pain. HISTORY OF PRESENT ILLNESS       EAT/WEIGHT HISTORY    How old were you when you first became concerned with your weight? 32  Most successful diet in the past? Currently watching portions  Weight lost on the diet listed above? About 15 pounds  Patient stated he / she maintained his / her weight for the following time? current  How much control over your eating do you feel you Have? Some problems with control      Cravings: For what types of food: carbs, bread and pasta                  Strategies used to deal with cravings: watching portions      Eating habits: Generally skipping meals and then eating 2 meals, fried food, fast food, 2 large glasses of soda. Snacking on chips. Emotional eating: depression      BINGE EATING    Recurrent episodes of binge eating: An episode is characterized by:  1. Eating a larger amount of food than normal during a short period of time (within any two hour period): YES  2. Lack of control over eating during the binge episode (i.e. The feeling that one cannot stop eating): YES  Both Symptoms Met: YES    Binge eating episodes are associated with three or more of the followin. Eating until feeling uncomfortably full: YES  2. Eating large amounts of food when not physically hungary:  NO  3. Eating much more rapidly than normal: NO  4. Eating alone because you are embarrassed by how much you are eating;  NO  5.  Feeling disgusted, depressed, or guilty after eating: NO  THREE ASSOCIATED SYMPTOMS MET: NO    Marked distress regarding binge eating is present: Somewhat    Binge eating occurs at least once a week for 3 months: YES  The patient reports at least 7 binge episodes per week for the past several months months. COMPULSIVE EATING PATTERN    1. Compulsive overeating without thoughts to harmful consequences (weight gain, diabetes, chronic pain, low self esteem); YES  2. Inability to reduce or change continuous patterns of food intake (snacking/grazing, overeating foods that are high in simple carbs and/or fats); YES  3. Continued compulsive eating in spite of negative consequences. (Obesity, diabetes complications, others); YES    The binge eating is not associated with the regular use of inappropriate compensatory behavior (i.e. Purging, excessive exercise etc) and does not occur exclusively during the course of bulimia nervosa or anorexia nervosa: NO    PATIENT MEETS ABOVE CRITERIA FOR  EATING DISORDER: YES    What lifestyle changes started: Drinking more water and watching portions. MEDICAL PROBLEMS    Medical History:  Past Medical History:   Diagnosis Date    Asthma     Bell palsy     drooping    CHF (congestive heart failure) ([address]     [id number] wore life vest dr [place] Running DDD (degenerative disc disease), cervical     with spinal stenosis    Diabetes mellitus ([address]     Diverticulitis     DJD (degenerative joint disease)     both hips    GERD without esophagitis     HTN (hypertension)     Hyperlipidemia     Meningitis 2009    Morbid obesity due to excess calories (HCC)     Neuropathy     Pancreas cyst     Sleep apnea     bipap    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HCC)         Current Outpatient Medications   Medication Sig Dispense Refill    gabapentin (NEURONTIN) 600 MG tablet Take 1 tablet by mouth 3 times daily for 90 days. 90 tablet 2    [START ON 2/2/2022] oxyCODONE-acetaminophen (PERCOCET) 5-325 MG per tablet Take 1 tablet by mouth every 8 hours as needed for Pain for up to 30 days. Intended supply: 3 days.  Take lowest dose possible to manage pain 90 tablet 0    sulfamethoxazole-trimethoprim (BACTRIM DS) 800-160 MG per tablet Take 1 tablet by mouth 2 times daily for 10 days 20 tablet 0    fluconazole (DIFLUCAN) 150 MG tablet       Dulaglutide (TRULICITY) 9.04 HY/2.5JQ SOPN Inject 0.75 mg into the skin once a week 0.5 mL 2    buPROPion (WELLBUTRIN XL) 150 MG extended release tablet       aspirin 81 MG chewable tablet Take 1 tablet by mouth daily 30 tablet 3    clotrimazole-betamethasone (LOTRISONE) 1-0.05 % cream Apply topically 2 times daily.  45 g 0    ondansetron (ZOFRAN-ODT) 4 MG disintegrating tablet Place 2 tablets under the tongue every 8 hours as needed for Nausea or Vomiting 30 tablet 1    Cholecalciferol (VITAMIN D) 50 MCG (2000 UT) CAPS capsule Take 1 capsule by mouth daily 30 capsule 2    citalopram (CELEXA) 40 MG tablet Take 1 tablet by mouth daily 30 tablet 5    metoprolol succinate (TOPROL XL) 100 MG extended release tablet TAKE 1 TABLET BY MOUTH TWICE DAILY 60 tablet 3    atorvastatin (LIPITOR) 80 MG tablet TAKE 1 TABLET BY MOUTH EVERY NIGHT 30 tablet 5    bumetanide (BUMEX) 1 MG tablet Take 1 tablet by mouth daily with 2 tablets every other day 60 tablet 3    hydrALAZINE (APRESOLINE) 50 MG tablet Take 1 tablet by mouth every 8 hours 90 tablet 3    potassium chloride (KLOR-CON M) 20 MEQ extended release tablet Take 1 tablet by mouth daily 60 tablet 3    pantoprazole (PROTONIX) 20 MG tablet Take 1 tablet by mouth daily (Patient taking differently: Take 20 mg by mouth daily as needed ) 30 tablet 3    mometasone-formoterol (DULERA) 100-5 MCG/ACT inhaler Inhale 2 puffs into the lungs 2 times daily 13 g 1    Continuous Blood Gluc  (FREESTYLE MARY READER) MARIJA 4 Devices by Does not apply route daily 4 Device 5    Continuous Blood Gluc Sensor (FREESTYLE MARY SENSOR SYSTEM) MISC 4 Devices by Does not apply route daily 4 each 5    albuterol (PROVENTIL) (2.5 MG/3ML) 0.083% nebulizer solution Take 3 mLs by nebulization every 6 hours as needed for Wheezing 120 each 3    spironolactone (ALDACTONE) 25 MG tablet TAKE 1 TABLET BY MOUTH EVERY DAY 30 tablet 5    mineral oil-hydrophilic petrolatum (AQUAPHOR) ointment Apply topically as needed. 99 g 5    cetirizine (ZYRTEC) 10 MG tablet TAKE 1 TABLET BY MOUTH EVERY DAY 30 tablet 3    albuterol sulfate HFA (PROAIR HFA) 108 (90 Base) MCG/ACT inhaler Inhale 2 puffs into the lungs every 4 hours as needed for Wheezing or Shortness of Breath 1 Inhaler 3    sacubitril-valsartan (ENTRESTO)  MG per tablet Take 1 tablet by mouth 2 times daily 60 tablet 5    isosorbide dinitrate (ISORDIL) 20 MG tablet Take 1 tablet by mouth 3 times daily 90 tablet 3    fluticasone (FLONASE) 50 MCG/ACT nasal spray 2 sprays by Nasal route daily (Patient taking differently: 2 sprays by Nasal route daily as needed ) 1 Bottle 5    Continuous Blood Gluc Sensor (FREESTYLE MARY 2 SENSOR SYSTM) MISC 1 Device by Does not apply route daily 1 each 0    Multiple Vitamin (DAILY-DOUGLAS) TABS TAKE 1 TABLET BY MOUTH EVERY DAY 90 tablet 5    ipratropium-albuterol (DUONEB) 0.5-2.5 (3) MG/3ML SOLN nebulizer solution Take 3 mLs by nebulization every 4 hours as needed for Shortness of Breath 24 vial 1    Insulin Pen Needle ([employer] PEN NEEDLES) 31G X 6 MM MISC 1 each by Does not apply route daily 100 each 3    ONE TOUCH ULTRA TEST strip 1 each by Does not apply route daily 120 each 5    Handicap Placard MISC by Does not apply route Patient cannot walk 200 ft without stopping to rest.    Expiration 5 yrs 1 each 0     No current facility-administered medications for this visit. PSYCHIATRIC HISTORY    Past Psychiatric History:Pt reported that she has been seeing a therapist for about 2 years due to depression related to loss of her parents. She also had a psych clearance in the past for bariatric surgery. She is also prescribed medications for depression. She stated that medication is affective and she is stable.       Family Psychiatric History: PT reported that she has one uncle who has unknown mental health problems. No reported family hx of HEBER or alcohol. Family History of Obesity? YES Other family members with weight problems: Father and sister, grandparents. CURRENT/RECENT CHEMICAL USE: PT did not report an current use of alcohol or other recreational drugs. She reported that she drinks one cup of coffee everyday. She stated that she did smoke 3 cigarettes last month when her aunt passed away. PSYCHOSOCIAL STRESSORS    Living Arrangements: Lives alone in her own home  Children: none  Employment: Unemployed, disability is pending  Financial:  Family support, section 8 and other public benefits  Legal: none  Domestic Assessment no DV other issues. The patient reports the following stressors: Health issues and finances. PSYCHOSOCIAL HISTORY    The patient was born and raised in [facility]. The patient raised in an single parent but dad was very involved. Describes childhood as: \"It was OK, a normal childhood\"  Siblings: Efrem  52 (mom)  Elinor 40, Heber 52, Mignon 48, Terence 46 (father)  Family relationships: Very close supportive family, every one gets along well. Sexual orientation/gender identification: Heterosexual   history:none  Spiritual/ Scientology orientation: David Feldman  Cultural beliefs: none  Educational history: Pt reported that she had some childhood illnesses that interfered with school attendance and affected her grades. Some collage after HS but quit when mother became ill.    Business, [employer],   Abuse History: none  Trauma: YES, losing her parents and other family members \"back to back\"    The patient reports the following strengths: healthy self esteem    Mental Status Exam: appearance:  appropriately dressed and healthy looking, behavior:  normal, attitude:  cooperative, speech:  appropriate, mood:  euthymic, affect:  congruent with mood, thought content:  no evidence of psychosis, thought process:  logical and coherent, orientation:  oriented in all spheres, memory:  recent:  good and remote:  good, insight:  fair , judgment:  fair  and cognitive:  intact and intelligent    RISK ASSESSMENT    Suicide screen: denies current suicidal ideation, plan and intent    Protective factors are \"just not that kind of person, I love life\"    Self Injurious Behavior: denies    Homicide screen: denies current homicidal ideation, plan and intent    History of Violence: denies    Access to Guns/Weapons: no    CLINICAL ASSESSMENT    Major Psychiatric Contraindications for surgery: none        The patient appeared to have Reasonable expectations regarding surgery. Patient was informed about the surgery and changes needed post surgery. The patient appears to be motivated to make dietary changes as evidence by weight loss. The patient appears to have fair rehab potential:  Social support is fair as evidenced by families agreement that she should have surgery. Family's evidence of level of support for surgery: agree with decision for surgery     Other social supports: friends    DIAGNOSIS:   Encounter Diagnoses   Name Primary?  Encounter for psychological evaluation Yes    Compulsive eating patterns     Binge-eating disorder, mild         TREATMENT PLAN    Patient Goals: Increased understanding of role of emotional factors contributing to issues with food and obesity and strategies to cope with these. Interventions in session: Explored emotional, behavioral, cognitive and environmental factors contributing to issues with food and obesity, with goal of promoting optimal post bariatric surgery outcome. Discussed the importance of attending support group and reinforced the benefits of attending. Provided pt. with hand out \"Why We Eat\", \"Reality Journal\" and \"Identifying and Handling Cravings\"     Safety Plan: not applicable     Assignments/Recommendations: 1-2 follow-up sessions with SW for further education/evaluation. Complete entries in \"Why We Eat\", \"Reality Journal\" and \"Identifying and Handling Cravings\" to discuss next session. Attend [facility] support group. Follow-up with: referrals/present providers/all scheduled appointments at [facility]. Handouts provided via [employer]      Next steps: Schedule follow up with me for  60MIN in 4 weeks    Bariatric Surgery: Based on the information gathered through the interview process - there is no current evidence of mental health or substance abuse issues that would impact on the patient receiving bariatric surgery.     Patient and/or family/guardian verbalizes understanding of and agreement with treatment recommendations and plan: yes    Start time: 1:10 PM         End time: 2:10 PM    Visit Time: [address]

## 2022-01-11 ENCOUNTER — INITIAL CONSULT (OUTPATIENT)
Dept: BARIATRICS/WEIGHT MGMT | Age: 47
End: 2022-01-11

## 2022-01-11 VITALS — WEIGHT: 293 LBS | HEIGHT: 69 IN | BODY MASS INDEX: 43.4 KG/M2

## 2022-01-11 DIAGNOSIS — E66.01 MORBID OBESITY DUE TO EXCESS CALORIES (HCC): ICD-10-CM

## 2022-01-11 DIAGNOSIS — Z71.3 DIETARY COUNSELING: Primary | ICD-10-CM

## 2022-01-11 PROCEDURE — 99999 PR OFFICE/OUTPT VISIT,PROCEDURE ONLY: CPT

## 2022-01-11 NOTE — PROGRESS NOTES
WEIGHT:  Weight: (!) 387 lb (175.5 kg)      Pt was in th office for his/her 5th weight Loss appointment. Pt is aware he/she must meet his/her pre-op weight loss goal in order to proceed with weight loss surgery. Gokul / Bret faxed a copy of what was reviewed with the pt to her PCP. Pt verbalized understanding. Rd// Bret enc the following at today's visit for successful post-surgical Weight Maintenance    1. Weigh yourself daily and record it. 2. Keep documented food records daily   3. Just be more active in day to day routine   4. Higher protein intake and a higher fiber intake. Not a high protein or a high fiber diet just a higher intake. 5.Eliminate empty calorie consumption    Gokul Queen addressed the following with the pt:  - Gokul Queen enc pt to comply with nutrition recommendations  - Gokul / Bret enc Participation in support group meetings  - Gokul Queen enc pt to go back to maintenance of food records and weight monitoring records   - Gokul Queen reviewed the importance of adequate sleep and stress management  - Gokul Queen reviewed nonfood strategies to cope with emotions and stress  - Gokul Queen encouraged pt to practice the following: Mindful eating: Eating slowly: Focusing   on the eating experience without distraction  - Gokul Queen enc. pt to pay attention to hunger and fullness  - Rd / Ld enc meal planning  - Gokul Davalos pt to chose nutrient dense whole foods instead of soft, high calorie foods  - Gokul / Bret enc not dr [employer] large amounts of fluids with or immediately after meals    Portion control ,meal planning and avoiding empty calorie consumption. Gokul / Bret reviewed insurance company weight loss requirement on 1/11/22. Pt verbalized understanding. Please be aware at each visit you have been instructed that in order for your insurance company to approve your surgery you must show a consistent weight loss of 2 lbs or greater at each visit.  We can not guarantee an approval by your insurance company we can only provide the information given to us it is up to you the patient to show compliancy to your insurance company. If you do gain weight during your supervised weight loss counseling sessions insurance companies starting in 2018 are denying patients for not showing consistent weight loss results when part of a supervised weight loss counseling program.       The following education was reviewed:  [facility]  Supplement Guidelines for   Bariatric Surgery        Listed Below are the recommended supplements for Kendall-en-Y Gastric By-pass Surgery or laparoscopic Sleeve Gastrectomy. Please be aware you must purchase a 3 month supply of these supplements within the next month after attending your nutrition class and bring these to your History and Physical Appointment.      Approved supplements for use after Kendall-en-Y Gastric Bypass Surgery    Vitamins - Select One for usage after surgery:  (a) Optisource Chewable Multi-Vitamin (1 tablet 4 times daily)    (b) Bariatric Fusion Chewable Complete Multi-Vitamin (1 tablet 4 times daily)    (c) Celebrate Chewable Multi-Vitamin (1 tablet 2 times daily) - and -  Celebrate Iron 30 mg + C (1 tablet daily)    (d) Bariatric Advantage Chewable Multi-Formula (1 tablet 2 times daily) - and -   Bariatric Advantage Chewable Iron 29 mg (1 tablet daily)      Calcium - Select One for usage after surgery:  (a) Citracal with Vitamin D - 315 mg calcium citrate per tablet, 250 iu Vitamin D per tablet (1 tablet 5 times daily) - Dissolved in Water    (b) Citracal with Vitamin D Petities - 200 mg calcium citrate per tablet, 250 iu Vitamin D per tablet (2 tablets 4 times daily) - Dissolved in Water    (c) Bariatric Advantage Calcium Lozenges Chewable (1 tablet 3 times daily)    (d) Bariatric Advantage Calcium Chews (1 tablet 6 times daily) - contains calories    (e) Celebrate Calcium Plus 500 (1 tablet 3 times daily)    (f) Calcet Creamy Bites (1 tablet 3 times daily) - 800 -1000 iu daily. Spilt calcium into 500-600 mg doses; be mindful of serving size on supplement label. Space doses evenly throughout the day. ? If you are post menopausal and on hormone replacement therapy, please see your dietitian for calcium recommendations. ? If you currently have osteoporosis or osteopenia and are being treated medically (e.g. Actonel, Fosamax), please see your dietitian for calcium recommendations. ? Do not take any of your Calcium within 2 hours of other medications or iron containing multi-vitamins because it may interfere with absorption of the medication. ? Calcium supplements are not a replacement for calcium found within your food. We also recommend in addition to your calcium supplement at least 3 servings of low-fat dairy total daily to meet your calcium requirements. Combined calcium from food and calcium from calcium supplements intake should be greater than 1700 mg. total daily to prevent bone loss during rapid weight loss phase. ? Stay away from Calcium Carbonate. I can not stress this enough after surgery. It is Calcium Citrate we want all patients taking. Do not let supplement shops talk you into something that we do not recommend. It is important in your surgical outcome and overall medical care. ? We Do Not recommend the new Calcium Citrate Crystals  - These are not absorbed well after surgery because they are mainly Calcium Lactate-Gluconate. They are not Calcium Citrate. The company does market them as Calcium Citrate because Calcium Lactate- Gluconate has the same bioavailability as Calcium Citrate but can be made into a drink form. Store Location:  [employer], [employer], [employer], [employer] or by calling [phone] or on-line at [email or web address]. Bariatric Advantage   [phone] or on-line at [email or web address] Medical  Bariatric Fusion   [phone] or on-line at [email or web address] com  Celebrate    [phone] or on-line at [email or web address]. [employer]                                                            Types of Protein Supplements:    Whey Protein:   Is the highest quality of protein available. It is a rich source of Branched Chain Amino Acids containing the highest known levels of any natural food source. Types of Whey Protein:  Whey Protein Isolate -  Most amount of protein per serving and   literally zero carbohydrates, lactose and fat. 90% Protein. Highly recommended. Whey Protein Blends -  Blend Whey Protein Concentrate and whey   protein isolate to make a good quality   protein. Do not recommend due to trace     amounts of heavy metals present. Whey Protein Concentrate - cheapest , contain high levels of fat and        lactose ( Not good if you are Lactose        Intolerant) 75% Protein. Do ot recommend      due to trace amounts of heavy metals    present. Casein Protein:  Micellar Casein is a slow digesting and rich protein source that continues to feed your muscles long after whey proteins have dropped off. ( More for Body Builders not a good protein for bariatric patients due to it taking up to seven hours to fully digest)    Egg Protein:  Egg Protein is made from pure egg whites. It is fat-free, fast digesting and rich in Branched Chain Amino Acids and Glutamic Acid. 100% protein. Soy Protein:  Fast digesting form of protein with a solid Branched chain Amino Acid profile. Made from non-animal sources, ideal for Lactose Intolerant and Vegans! Avoid Soy-Protein Isolate Powders. Recommend soy protein without added hormones. Collagen Protein:  Is the component of human connective tissue found in skin, hair and nails and has no Essential Amino Acids to help build protein levels. Avoid. Protein Supplement Drinks Comparisons    We do not endorse these stores or products.  These are only to help you with your

## 2022-01-12 DIAGNOSIS — I10 ESSENTIAL HYPERTENSION: ICD-10-CM

## 2022-01-12 RX ORDER — HYDRALAZINE HYDROCHLORIDE 50 MG/1
50 TABLET, FILM COATED ORAL EVERY 8 HOURS SCHEDULED
Qty: 90 TABLET | Refills: 3 | Status: SHIPPED
Start: 2022-01-12 | End: 2022-06-13 | Stop reason: SDUPTHER

## 2022-02-17 ENCOUNTER — TELEPHONE (OUTPATIENT)
Dept: BARIATRICS/WEIGHT MGMT | Age: 47
End: 2022-02-17

## 2022-02-17 ENCOUNTER — INITIAL CONSULT (OUTPATIENT)
Dept: BARIATRICS/WEIGHT MGMT | Age: 47
End: 2022-02-17

## 2022-02-17 VITALS — WEIGHT: 293 LBS | HEIGHT: 69 IN | BODY MASS INDEX: 43.4 KG/M2

## 2022-02-17 DIAGNOSIS — Z02.6 ENCOUNTER FOR EXAMINATION FOR INSURANCE PURPOSES: ICD-10-CM

## 2022-02-17 DIAGNOSIS — Z87.891 FORMER SMOKER: ICD-10-CM

## 2022-02-17 DIAGNOSIS — Z00.8 NUTRITIONAL ASSESSMENT: Primary | ICD-10-CM

## 2022-02-17 DIAGNOSIS — Z78.9 NONSMOKER: Primary | ICD-10-CM

## 2022-02-17 DIAGNOSIS — Z71.3 NUTRITIONAL COUNSELING: ICD-10-CM

## 2022-02-17 PROCEDURE — 99999 PR OFFICE/OUTPT VISIT,PROCEDURE ONLY: CPT | Performed by: SURGERY

## 2022-02-17 NOTE — TELEPHONE ENCOUNTER
Last Dietary Appointment Notes: 22    [facility]: [place] [employer]    Surgery Requested by Patient: As of 22 - RYGB    Date: 2 Hour Nutrition Class: Once all testing is complete    Rd / Ld reviewed the following with the patient:    Rd / Ld at the [facility] reviewed with the patient that he / she has not completed the following in order to proceed with bariatric surgery:     -Initial Appt with Surgeon was 21. Initial Appt is only good until 22. Testing will be required again after this date per your insurance company policy. Please remember just because you finished all of your requirements if you did not finish the requirements in a timely manner they can  and you can be required to complete these requirements over again. Each [employer] has its own set of requirements with its own set of deadlines. Once everything listed below is completed you will need to complete the following to proceed with sx. The [facility] will contact you to complete this process. 1. You will be called in order to select your surgery date for insurance submission. 2. You will be called and scheduled to attend a 3 Hour Nutrition Class on the type of surgery you are having completed. These are always scheduled on  from 10:00 am to 1:00 pm and dates vary depending on the type of surgery you are having completed. You will need to purchase your bariatric supplements at this appointment cost is $240.00 to $290.00. Failure to purchase supplements or attend the class will lead to your surgery being cancelled. 3. You will need final Medical Clearance from your Primary Care Physician. Failure to complete will lead to your surgery being cancelled. 4. You will be scheduled for a H&P appointment with your surgeon . It is at this appointment you will need to make goal weight. Failure to complete will lead to your surgery being cancelled.     5. You will be scheduled for PAT at [address] usually the week before your scheduled surgery. Failure to complete will lead to your surgery being cancelled. Remember after all testing that is required it is your responsibility as a patient to call [facility] to review that we received any testing results or requirements that you had completed. The [facility] is not responsible for tracking of results and testing. Your phone call will help facilitate if what is required was received and completed. - Nicotine Script Given 2/17/22  // Quit Date 10/2021 // Draw Date ASAP // Pt instructed to call 10 day's after to review results. - Cardiac Clearance - Dr. Jazlyn Sanchez to review  - Psych Evaluation  - Pt is working with CONCE[facility]ION[facility] Dong / Bret at the [facility] reviewed that he / she is not at his / her pre-surgery goal weight of 382 lbs. Patient currently weighs 397 lbs and must return to the [facility] for a weight check on H&P before the patient can be scheduled for surgery. This has been reviewed with the patient and the patient is in agreement. Gokul / Bret reviewed with the patient that he / she must purchase a 3 month supply of supplements before his / her surgery or at the time of his / her H&P appointment and the patient states he / she is going to purchase the 3 month supply of supplements on H&P. Patient is aware that failure to purchase the supplements at this appointment will cancel the patients surgery date. Patient states at this time from the time of his / her initial consult here at the [facility] there has been no changes in his / her medical history. Patient is aware failure to disclose information can lead to his / her surgery being cancelled. Patient received a copy of this at the time of his / her final dietary consult.

## 2022-02-17 NOTE — PROGRESS NOTES
Quality 431: Preventive Care And Screening: Unhealthy Alcohol Use - Screening: Patient screened for unhealthy alcohol use using a single question and scores less than 2 times per year foods instead of soft, high calorie foods  - Rd / Ld enc not drinking large amounts of fluids with or immediately after meals    Portion control, meal planning and avoiding empty calorie consumption. Weight loss goal for next follow-up appointment: 382 lbs    Patient has established the following three goals for the next follow-up appointment. 1. Pt states she wants to continue on her low-fat, low-calorie portion controlled diet. 2. Pt states she wants to increase ADL's.   3. Pt states she wants to stay focused and keep daily food records. Patient has established the following exercise goal for next follow-up appointment:  ADL's -     Did the patient keep food records:No - Pt was instructed by the Gokul Queen that she / he needs to keep daily food records and bring the food records to all f/u appointments. Pt was instructed this is an important part of compliancy and long-term lifestyle changes and will help establish long-term weight loss and weight maintenance success after weight loss surgery. Gokul Queen reviewed with the pt how to keep track of protein intake along with calories, fat and sugar content. Pt needs to be able to see that he / she is meeting his / her macro nutrient needs daily. Gokul Queen reviewed different ways to keep foods records either manually or with computer apps. Pt was able to verbalize understanding. Failure to keep food records show poor compliancy following weight loss surgery. Pt has been given all the tools and education necessary to complete this task. Education spent with pt just on food records 20 minutes.         Pt. is aware if they do not comply with The [address] [address]  and [facility] Guidelines that this can lead to the patient being dismissed from the program.    The registered dietitian spent the following time 60 minutes educating the patient and providing the patient with nutritional handouts to follow. __________________________________________________________________________________  Primary Care Physician Follow-up:  Pt. was seen by Cheli Alves RD/KONG regarding weight loss education and follow-up on 2/17/22. This was the patients 6th appointment with the registered dietitian. The registered dietitian spent the following amount of time with the patient 60 minutes. Please [place] the following: The Primary Care Physician reviewed the above nutrition assessment and patient education and agrees with current diet plan. The Primary Care Physician wants the current diet plan changed to the following:_____________________________________________________________________________________________________________________________________________________________________________________________________________. Physician Signature:__________________________ Date:______________  Once signed please fax back to the [facility] [phone]. We thank you for allowing us to participate in your patients care. Quality 130: Documentation Of Current Medications In The Medical Record: Current Medications Documented Quality 226: Preventive Care And Screening: Tobacco Use: Screening And Cessation Intervention: Patient screened for tobacco and never smoked Detail Level: Simple Quality 110: Preventive Care And Screening: Influenza Immunization: Influenza Immunization Administered during Influenza season

## 2022-02-17 NOTE — TELEPHONE ENCOUNTER
Pt was in the office today for her final dietary appointment. Pt states her weight is up d/t being swollen and not taking her cardiac medications as prescribed. Pt also had a lot of SOB at today's visit. Gokul Queen instructed pt that she needs to contact her PCP and Cardiologist immediately to get back on her prescribed mediations. Gokul Queen reviewed importance of medications in preventing further complications based on MH. Pt is also on a fluid restriction but states she drinks at least 64 oz plus of  water a day and can not verbalize what her fluid restriction is. Pt was instructed to find out what her fluid restriction is before moving forward with WLS and the importance of adhering to her fluid restrictions, medications taken daily as instructed and dietary compliance. Pt can not be prescribed a VLCD Diet at this time due to the following reasons. 1. Pt is a CHF pt.  2. Pt is not taking her cardiac medications or regular medications as prescribed  3. Pt has had a fluid shift with edema - Up 10 lbs in 1 month  4. Pt states she does not follow a fluid restriction as indicated by her cardiologist and states she does not know what her fluid restriction is.      Pt can continue on her low-fat diet at this time and follow-up with Cardiology and PCP

## 2022-02-22 ENCOUNTER — HOSPITAL ENCOUNTER (OUTPATIENT)
Dept: GENERAL RADIOLOGY | Age: 47
Discharge: HOME | End: 2022-02-24
Payer: MEDICARE

## 2022-02-22 DIAGNOSIS — I42.9 CARDIOMYOPATHY, UNSPECIFIED TYPE (HCC): ICD-10-CM

## 2022-02-22 DIAGNOSIS — R52 PAIN: ICD-10-CM

## 2022-02-22 PROCEDURE — 64483 NJX AA&/STRD TFRM EPI L/S 1: CPT

## 2022-02-22 RX ORDER — ISOSORBIDE DINITRATE 20 MG/1
20 TABLET ORAL 3 TIMES DAILY
Qty: 90 TABLET | Refills: 3 | Status: SHIPPED
Start: 2022-02-22 | End: 2022-06-27

## 2022-02-23 ENCOUNTER — INITIAL CONSULT (OUTPATIENT)
Dept: BARIATRICS/WEIGHT MGMT | Age: 47
End: 2022-02-23
Payer: MEDICARE

## 2022-02-23 DIAGNOSIS — F50.9 COMPULSIVE EATING PATTERNS: Primary | ICD-10-CM

## 2022-02-23 DIAGNOSIS — F50.81 BINGE-EATING DISORDER, MILD: ICD-10-CM

## 2022-02-23 PROCEDURE — 4004F PT TOBACCO SCREEN RCVD TLK: CPT | Performed by: SOCIAL WORKER

## 2022-02-23 PROCEDURE — 90834 PSYTX W PT 45 MINUTES: CPT | Performed by: SOCIAL WORKER

## 2022-02-23 NOTE — PROGRESS NOTES
INDIVIDUAL SESSION:  SUMMARY/PSYCH CLEARANCE     Geraldo Estrada is a 42 y.o. , -American  female, referred by Cardiologist  for evaluation and treatment. Patient identify verified by Name and . Those attending session : patient    DX:   Encounter Diagnoses   Name Primary?  Compulsive eating patterns Yes    Binge-eating disorder, mild        Chief Complaint   Patient presents with    Consultation     2nd visit final clearance         Wt Readings from Last 3 Encounters:   22 (!) 397 lb (180.1 kg)   22 (!) 387 lb (175.5 kg)   21 (!) 393 lb (178.3 kg)        Narrative: Katarina Silveira stated that she completed the hand outs but did have some trouble as due to chronic pain she does not \"feel\" like eating. She stated that she lost her aunt recently which has been hard on her but she is following up with her therapist.  She stated that when she was younger she did eat for emotional reasons. She also identified a link between certain foods and who that food was connected with. She that her family has been very supportive and have changes some of the foods that they have at family gatherings. She shared that she has begun to identify things that trigger cravings including smells, family gathering, holidays and being in restaurant. She stated that \"staying focused\" on her goals has helped her to manage cravings. She also stated that listening to music, coloring and spending time with family have also been helpful in managing cravings. Katarina Silveira stated that  Stanford Polanco is supporting her, as are others in her family, and she is ready to go forward wit surgery.           Mental Status Exam: appearance:  appropriately dressed and appropriately groomed, behavior:  normal, attitude:  cooperative, speech:  appropriate, mood:  euthymic, affect:  congruent with mood, thought content:  no evidence of psychosis, thought process:  logical and coherent, orientation:  oriented in all spheres, memory:  recent:  good and remote:  good, insight:  fair , judgment:  fair  and cognitive:  intact and intelligent    RISK ASSESSMENT    Suicide screen: denies current suicidal ideation, plan and intent    Self Injurious Behavior: denies    Homicide screen: denies current homicidal ideation, plan and intent    TREATMENT PLAN:  Goal: Increase understanding of role of emotional factors contributing to issues with food and obesity and strategies to cope. Interventions in session:  Reviewed previous information provided to the patient and reinforced the need for continued engagement in support group and other resources of the [facility]. Provided Psychoeducational regarding physical, emotional, and behavioral changes that might occur for the patient post WLS. Assignments/Recommendations: Follow-up with SW as needed. Attend [facility] support group. Follow up with present providers/all scheduled appointment at [facility]. Next steps: Follow up with SW post surgery as needed. Bariatric Surgery: Final visit:  The patient has completed a Biopsychosocial assessment and 1 educational sessions to evaluate her appropriateness for bariatric surgery. This patient has been compliant with all scheduled sessions and completed all assignments/recommendations including attendance at least one support group meeting. She does present with mental health issues which appear to be stable and so would not interfere with her ability to adapt to the EBC changes that will be necessary for success. The patient appears well motivated to make necessary changes and achieve weight loss goals. Based on the information gathered during assessment and subsequent session it appears that she is a fair candidate for Bariatric surgery.      Patient and/or family/guardian verbalizes understanding of and agreement with treatment recommendations and plan: yes    Start time: 10:25 a          End time: 11:15 a    Visit Time: Susanne Brian Maryse Turpin

## 2022-03-10 DIAGNOSIS — L85.3 DRY SKIN DERMATITIS: ICD-10-CM

## 2022-03-11 ENCOUNTER — HOSPITAL ENCOUNTER (OUTPATIENT)
Age: 47
Discharge: HOME | End: 2022-03-11
Payer: MEDICARE

## 2022-03-11 DIAGNOSIS — Z02.6 ENCOUNTER FOR EXAMINATION FOR INSURANCE PURPOSES: ICD-10-CM

## 2022-03-11 DIAGNOSIS — Z78.9 NONSMOKER: ICD-10-CM

## 2022-03-11 DIAGNOSIS — Z87.891 FORMER SMOKER: ICD-10-CM

## 2022-03-11 PROCEDURE — G0480 DRUG TEST DEF 1-7 CLASSES: HCPCS

## 2022-03-14 DIAGNOSIS — I42.9 CARDIOMYOPATHY, UNSPECIFIED TYPE (HCC): ICD-10-CM

## 2022-03-16 LAB
3-OH-COTININE: <2 NG/ML
COTININE: 6 NG/ML
NICOTINE: <2 NG/ML

## 2022-03-25 ENCOUNTER — SCHEDULED TELEPHONE ENCOUNTER (OUTPATIENT)
Dept: BARIATRICS/WEIGHT MGMT | Age: 47
End: 2022-03-25

## 2022-03-25 DIAGNOSIS — Z00.8 NUTRITIONAL ASSESSMENT: Primary | ICD-10-CM

## 2022-03-25 DIAGNOSIS — Z71.3 NUTRITIONAL COUNSELING: ICD-10-CM

## 2022-03-25 PROCEDURE — 99999 PR OFFICE/OUTPT VISIT,PROCEDURE ONLY: CPT | Performed by: SURGERY

## 2022-03-25 NOTE — PROGRESS NOTES
Gokul Queen called the pt and scheduled the pt for the following. Pt is aware he/she needs to be down to their pre-op weight loss goal when they come in for their weight check or their sx will be cx. Pt verbalized understanding. Low fat diet reviewed. Pre-op weight loss goal reviewed. Pt scheduled for the following see below. Pt is aware supplements are cash, check, credit or debt card. SX Type: RYGB  SX Date: ???  H&P Appt: Dr. Milo Stone ???  Weight Check Appt: This will occur after the 3 hour class at the [facility]  3 Hour Class: At the [facility] From 10:00 - 1:00 pm on - RYGB Class  Supplements: Pt needs  2 Hour Pt Education Book: Pt needs  ASMBS Change in Pain Medication Education Book: Pt needs    Pt was instructed he / she can call any time with questions. Goal Weight 382 lbs - Pt has copy of pre-op diet. Enc pt to follow pre-op diet to get down to pre-op weight loss goal. Pt verbalized understanding.   Pt is aware sx can be cx by her surgeon at H&P appt if he feels pt has not achieved pre-op weight loss goal.

## 2022-04-04 DIAGNOSIS — I42.9 CARDIOMYOPATHY, UNSPECIFIED TYPE (HCC): ICD-10-CM

## 2022-04-04 RX ORDER — BUMETANIDE 1 MG/1
TABLET ORAL
Qty: 60 TABLET | Refills: 1 | Status: SHIPPED
Start: 2022-04-04 | End: 2022-09-26 | Stop reason: SDUPTHER

## 2022-04-05 ENCOUNTER — TELEPHONE (OUTPATIENT)
Dept: BARIATRICS/WEIGHT MGMT | Age: 47
End: 2022-04-05

## 2022-04-05 NOTE — LETTER
Date: 4-5-2022    [employer]:  Attention Prior Authorization    Regarding:  Triston Martinez  Member ID:  [id number]    Request:     Authorization for CPT 58827  (Laparoscopic Kendall-en-Y)                      Diagnosis Codes:  E66.01; I10; E78.5; G47.33; M19.90    Physician: Carine Eid M.D.  ([address] [id number])           (NPI [id number])    Facility: [address] [place] [address] [id number] (NPI [id number])    [facility]      Dear Ni Camacho or :     Pre-determination of insurance coverage, and authorization for hospitalization and surgical treatment are requested on behalf of your annuitant Triston Martinez for diagnoses of morbid obesity, hypertension, hyperlipidemia, obstructive sleep apnea and degenerative joint disease. Triston Martinez is 5'8.5, weighs 397 lb., and has a BMI of 59.4. She has been severely obese for a number of years despite many years of dietary efforts. She has lost weight through these efforts, however has been unable to maintain satisfactory weight loss. Triston Martinez has been evaluated in our bariatric program and is felt to be an excellent candidate  for surgery. The patient has undergone extensive pre-operative education and understands all the risks, benefits and possible complications of surgery. She has also undergone thorough nutritional evaluation and counseling with our registered dietician. Our program provides long term nutritional counseling with unlimited consults with the dietician. All female patients have been educated on the importance of using reliable birth control and avoiding pregnancy for the first 2 years following bariatric surgery. All patients are nicotine tested and require a negative nicotine level prior to surgery.  All patients are counseled on the importance of remaining nicotine free after surgery, as well as avoiding drug and alcohol use for life after bariatric surgery. I am requesting authorization for Laparoscopic Kendall-en-Y Gastric Bypass, procedure code 69750, with a hospital stay of 1 day, to be performed for the treatment of the patients severe and life threatening diseases. This procedure will be performed at [address] [place] [address]. I appreciate your consideration in this matter and your timely response. Supporting clinical documents are included with this request.    Electronically signed by Dr. Jamin Elder M.D.   Bariatric Surgery

## 2022-04-05 NOTE — TELEPHONE ENCOUNTER
Case prepared and submitted by fax to [employer] with request for LRYGB 5-. Fax confirmation received and patient notified of insurance submission. Also discussed need for medical clearance. Request faxed to PCP, she has an appt next week.

## 2022-04-05 NOTE — LETTER
Medical Clearance / Medical Necessity for Kendall-en-Y Gastric Bypass    Name: Faisal Buchanan                                     YOB: 1975    I have been caring for the above patient for _____ years. He/she suffers from the following medical conditions:  __________________________________________________________________________________________________________________________________________________________________________________________________________________    The above medical conditions are either caused by or worsened by the patients morbid obesity. Yes_________ No__________    The above medical conditions are currently stable:      Yes_________ No__________    The following medical conditions are difficult to manage/require multiple medications to achieve control due to the patients weight: ______________________________________________________________________  ______________________________________________________________________                    The above patient has participated in the following medical weight loss efforts without long-term weight reduction:  ____________________________________________________________________________________________________________________________________________    I am in support of my patient undergoing a weight loss surgical procedure with [address] [place] [address] [facility] and the patient understands the risks and benefits of weight loss surgery. The patient has reasonable expectations and I believe the patient will be compliant with all post-surgical requirements. I understand the program is comprehensive with dedicated and specially trained staff.  In the event that my patient is over the age of 61, I would like to state that the patients physiological age and co-morbid conditions result in a positive risk to benefit ratio.        ________  In my medical opinion, there are no medical contraindications to proceed with gastric bypass surgery and the patients benefits of surgery outweigh the risks of surgery.       Physician Name (Please Print): __________________________________    Primary Care Physicians Signature: __________________________________    Date: ______/______/______

## 2022-04-12 ENCOUNTER — PREP FOR PROCEDURE (OUTPATIENT)
Dept: SURGERY | Age: 47
End: 2022-04-12

## 2022-04-12 ENCOUNTER — TELEPHONE (OUTPATIENT)
Dept: BARIATRICS/WEIGHT MGMT | Age: 47
End: 2022-04-12

## 2022-04-12 RX ORDER — SODIUM CHLORIDE 0.9 % (FLUSH) 0.9 %
5-40 SYRINGE (ML) INJECTION PRN
Status: CANCELLED | OUTPATIENT
Start: 2022-04-12

## 2022-04-12 RX ORDER — ONDANSETRON 2 MG/ML
4 INJECTION INTRAMUSCULAR; INTRAVENOUS ONCE
Status: CANCELLED | OUTPATIENT
Start: 2022-04-12 | End: 2022-04-12

## 2022-04-12 RX ORDER — SODIUM CHLORIDE 9 MG/ML
25 INJECTION, SOLUTION INTRAVENOUS PRN
Status: CANCELLED | OUTPATIENT
Start: 2022-04-12

## 2022-04-12 RX ORDER — SODIUM CHLORIDE, SODIUM LACTATE, POTASSIUM CHLORIDE, CALCIUM CHLORIDE 600; 310; 30; 20 MG/100ML; MG/100ML; MG/100ML; MG/100ML
INJECTION, SOLUTION INTRAVENOUS CONTINUOUS
Status: CANCELLED | OUTPATIENT
Start: 2022-04-12

## 2022-04-12 RX ORDER — SODIUM CHLORIDE 0.9 % (FLUSH) 0.9 %
5-40 SYRINGE (ML) INJECTION EVERY 12 HOURS SCHEDULED
Status: CANCELLED | OUTPATIENT
Start: 2022-04-12

## 2022-04-12 RX ORDER — SCOLOPAMINE TRANSDERMAL SYSTEM 1 MG/1
1 PATCH, EXTENDED RELEASE TRANSDERMAL
Status: CANCELLED | OUTPATIENT
Start: 2022-04-12 | End: 2022-04-15

## 2022-04-12 NOTE — TELEPHONE ENCOUNTER
Per order of Dr. Audrey Bains patient is ready to be scheduled for LRYGB. Call placed to patient and she is in agreement with surgery on 5/31/2022. Pre-op class is scheduled and patient knows she will need to purchase supplements at that visit. Pre-op letter will be mailed. She is working towards pre-op weight loss goal.   She does not smoke and will refrain from alcohol use. We reviewed at length all meds to avoid 2 weeks prior to surgery and patient voiced understanding. This list is in the pre-op letter which will be mailed to patient. She uses a CPAP with a setting of 11,15. She has final medical clearance appointment scheduled on 4/13/22  Patient denies PONV  Patient placed on [employer] [facility]. Patient placed in [facility].

## 2022-04-13 ENCOUNTER — OFFICE VISIT (OUTPATIENT)
Dept: PRIMARY CARE CLINIC | Age: 47
End: 2022-04-13
Payer: MEDICARE

## 2022-04-13 VITALS
HEIGHT: 68 IN | WEIGHT: 293 LBS | RESPIRATION RATE: 16 BRPM | SYSTOLIC BLOOD PRESSURE: 130 MMHG | DIASTOLIC BLOOD PRESSURE: 80 MMHG | TEMPERATURE: 97.3 F | BODY MASS INDEX: 44.41 KG/M2 | HEART RATE: 100 BPM | OXYGEN SATURATION: 97 %

## 2022-04-13 DIAGNOSIS — K21.9 GASTROESOPHAGEAL REFLUX DISEASE WITHOUT ESOPHAGITIS: ICD-10-CM

## 2022-04-13 DIAGNOSIS — I42.9 CARDIOMYOPATHY, UNSPECIFIED TYPE (HCC): ICD-10-CM

## 2022-04-13 DIAGNOSIS — R05.9 COUGH: ICD-10-CM

## 2022-04-13 DIAGNOSIS — L02.211 ABSCESS OF ABDOMINAL WALL: ICD-10-CM

## 2022-04-13 DIAGNOSIS — J45.20 MILD INTERMITTENT ASTHMA WITHOUT COMPLICATION: ICD-10-CM

## 2022-04-13 DIAGNOSIS — B37.2 CANDIDAL DERMATITIS: ICD-10-CM

## 2022-04-13 DIAGNOSIS — E11.9 TYPE 2 DIABETES MELLITUS WITHOUT COMPLICATION, WITHOUT LONG-TERM CURRENT USE OF INSULIN (HCC): ICD-10-CM

## 2022-04-13 DIAGNOSIS — E11.9 TYPE 2 DIABETES MELLITUS WITHOUT COMPLICATION, WITHOUT LONG-TERM CURRENT USE OF INSULIN (HCC): Primary | ICD-10-CM

## 2022-04-13 LAB
ALBUMIN SERPL-MCNC: 4.1 G/DL (ref 3.5–5.2)
ALP BLD-CCNC: 87 U/L (ref 35–104)
ALT SERPL-CCNC: 9 U/L (ref 0–32)
ANION GAP SERPL CALCULATED.3IONS-SCNC: 11 MMOL/L (ref 7–16)
AST SERPL-CCNC: 10 U/L (ref 0–31)
BASOPHILS ABSOLUTE: 0.06 E9/L (ref 0–0.2)
BASOPHILS RELATIVE PERCENT: 0.5 % (ref 0–2)
BILIRUB SERPL-MCNC: 0.3 MG/DL (ref 0–1.2)
BUN BLDV-MCNC: 9 MG/DL (ref 6–20)
CALCIUM SERPL-MCNC: 9.6 MG/DL (ref 8.6–10.2)
CHLORIDE BLD-SCNC: 98 MMOL/L (ref 98–107)
CO2: 28 MMOL/L (ref 22–29)
CREAT SERPL-MCNC: 0.9 MG/DL (ref 0.5–1)
EOSINOPHILS ABSOLUTE: 0.3 E9/L (ref 0.05–0.5)
EOSINOPHILS RELATIVE PERCENT: 2.4 % (ref 0–6)
GFR AFRICAN AMERICAN: >60
GFR NON-AFRICAN AMERICAN: >60 ML/MIN/1.73
GLUCOSE BLD-MCNC: 103 MG/DL (ref 74–99)
HBA1C MFR BLD: 6.2 %
HCT VFR BLD CALC: 44.9 % (ref 34–48)
HEMOGLOBIN: 14 G/DL (ref 11.5–15.5)
IMMATURE GRANULOCYTES #: 0.07 E9/L
IMMATURE GRANULOCYTES %: 0.6 % (ref 0–5)
LYMPHOCYTES ABSOLUTE: 3.23 E9/L (ref 1.5–4)
LYMPHOCYTES RELATIVE PERCENT: 26 % (ref 20–42)
MCH RBC QN AUTO: 30 PG (ref 26–35)
MCHC RBC AUTO-ENTMCNC: 31.2 % (ref 32–34.5)
MCV RBC AUTO: 96.1 FL (ref 80–99.9)
MONOCYTES ABSOLUTE: 0.64 E9/L (ref 0.1–0.95)
MONOCYTES RELATIVE PERCENT: 5.1 % (ref 2–12)
NEUTROPHILS ABSOLUTE: 8.13 E9/L (ref 1.8–7.3)
NEUTROPHILS RELATIVE PERCENT: 65.4 % (ref 43–80)
PDW BLD-RTO: 16.2 FL (ref 11.5–15)
PLATELET # BLD: 353 E9/L (ref 130–450)
PMV BLD AUTO: 11.2 FL (ref 7–12)
POTASSIUM SERPL-SCNC: 4.1 MMOL/L (ref 3.5–5)
RBC # BLD: 4.67 E12/L (ref 3.5–5.5)
SODIUM BLD-SCNC: 137 MMOL/L (ref 132–146)
TOTAL PROTEIN: 7.7 G/DL (ref 6.4–8.3)
WBC # BLD: 12.4 E9/L (ref 4.5–11.5)

## 2022-04-13 PROCEDURE — 3044F HG A1C LEVEL LT 7.0%: CPT | Performed by: PHYSICIAN ASSISTANT

## 2022-04-13 PROCEDURE — G8427 DOCREV CUR MEDS BY ELIG CLIN: HCPCS | Performed by: PHYSICIAN ASSISTANT

## 2022-04-13 PROCEDURE — 99214 OFFICE O/P EST MOD 30 MIN: CPT | Performed by: PHYSICIAN ASSISTANT

## 2022-04-13 PROCEDURE — 1036F TOBACCO NON-USER: CPT | Performed by: PHYSICIAN ASSISTANT

## 2022-04-13 PROCEDURE — G8417 CALC BMI ABV UP PARAM F/U: HCPCS | Performed by: PHYSICIAN ASSISTANT

## 2022-04-13 PROCEDURE — 2022F DILAT RTA XM EVC RTNOPTHY: CPT | Performed by: PHYSICIAN ASSISTANT

## 2022-04-13 PROCEDURE — 83036 HEMOGLOBIN GLYCOSYLATED A1C: CPT | Performed by: PHYSICIAN ASSISTANT

## 2022-04-13 RX ORDER — MUPIROCIN CALCIUM 20 MG/G
CREAM TOPICAL
Qty: 30 G | Refills: 0 | Status: SHIPPED | OUTPATIENT
Start: 2022-04-13 | End: 2022-05-13

## 2022-04-13 RX ORDER — BROMPHENIRAMINE MALEATE, PSEUDOEPHEDRINE HYDROCHLORIDE, AND DEXTROMETHORPHAN HYDROBROMIDE 2; 30; 10 MG/5ML; MG/5ML; MG/5ML
5 SYRUP ORAL 4 TIMES DAILY PRN
Qty: 240 ML | Refills: 1 | Status: SHIPPED | OUTPATIENT
Start: 2022-04-13 | End: 2022-05-13

## 2022-04-13 RX ORDER — PANTOPRAZOLE SODIUM 20 MG/1
20 TABLET, DELAYED RELEASE ORAL DAILY
Qty: 30 TABLET | Refills: 3 | Status: SHIPPED
Start: 2022-04-13 | End: 2022-08-11 | Stop reason: SDUPTHER

## 2022-04-13 RX ORDER — ONDANSETRON 4 MG/1
8 TABLET, ORALLY DISINTEGRATING ORAL EVERY 8 HOURS PRN
Qty: 30 TABLET | Refills: 1 | Status: SHIPPED | OUTPATIENT
Start: 2022-04-13

## 2022-04-13 RX ORDER — NYSTATIN 100000 [USP'U]/G
POWDER TOPICAL
Qty: 60 G | Refills: 1 | Status: SHIPPED
Start: 2022-04-13 | End: 2022-09-28

## 2022-04-13 RX ORDER — CLINDAMYCIN HYDROCHLORIDE 300 MG/1
300 CAPSULE ORAL 2 TIMES DAILY
Qty: 21 CAPSULE | Refills: 0 | Status: SHIPPED | OUTPATIENT
Start: 2022-04-13 | End: 2022-04-20

## 2022-04-13 RX ORDER — ALBUTEROL SULFATE 90 UG/1
2 AEROSOL, METERED RESPIRATORY (INHALATION) EVERY 4 HOURS PRN
Qty: 1 EACH | Refills: 1 | Status: SHIPPED | OUTPATIENT
Start: 2022-04-13

## 2022-04-13 ASSESSMENT — PATIENT HEALTH QUESTIONNAIRE - PHQ9
SUM OF ALL RESPONSES TO PHQ QUESTIONS 1-9: 0
1. LITTLE INTEREST OR PLEASURE IN DOING THINGS: 0
SUM OF ALL RESPONSES TO PHQ9 QUESTIONS 1 & 2: 0
SUM OF ALL RESPONSES TO PHQ QUESTIONS 1-9: 0
3. TROUBLE FALLING OR STAYING ASLEEP: 0
4. FEELING TIRED OR HAVING LITTLE ENERGY: 0
10. IF YOU CHECKED OFF ANY PROBLEMS, HOW DIFFICULT HAVE THESE PROBLEMS MADE IT FOR YOU TO DO YOUR WORK, TAKE CARE OF THINGS AT HOME, OR GET ALONG WITH OTHER PEOPLE: 0
2. FEELING DOWN, DEPRESSED OR HOPELESS: 0
6. FEELING BAD ABOUT YOURSELF - OR THAT YOU ARE A FAILURE OR HAVE LET YOURSELF OR YOUR FAMILY DOWN: 0
9. THOUGHTS THAT YOU WOULD BE BETTER OFF DEAD, OR OF HURTING YOURSELF: 0
8. MOVING OR SPEAKING SO SLOWLY THAT OTHER PEOPLE COULD HAVE NOTICED. OR THE OPPOSITE, BEING SO FIGETY OR RESTLESS THAT YOU HAVE BEEN MOVING AROUND A LOT MORE THAN USUAL: 0
5. POOR APPETITE OR OVEREATING: 0
7. TROUBLE CONCENTRATING ON THINGS, SUCH AS READING THE NEWSPAPER OR WATCHING TELEVISION: 0

## 2022-04-13 NOTE — PROGRESS NOTES
DM2:   Patient is here to fu regarding DM2. Patient is  controlled. Taking all medications and tolerating well. Fasting sugars are running not checking. Patient is taking ASA and Ace Inhibitor/ARB. Patient is not on appropriately-dosed statin. LDL is  at goal.  BP is  controlled. No hypoglycemic episodes. Patient does not see Podiatry regularly. Patient is aware that it is necessary to see an Eye Dr yearly. Patient does not smoke. Most recent labs reviewed with patient. Patient does  have complaints or concerns today. She has a shallow lesion under her pannus. She has these recurrently. She saw Dr Kim Sutherland and is supposed to have gastric bypass surgery at the end of may. She had cardiology clearance. Lab Results   Component Value Date    LABA1C 6.2 04/13/2022       Lab Results   Component Value Date    [address] 84 07/19/2021        Patient's past medical, surgical, social and/or family history reviewed, updated in chart, and are non-contributory (unless otherwise stated). Medications and allergies also reviewed and updated in chart.       Review of Systems:  Constitutional:  No fever, no fatigue, no chills, no headaches, no weight change  Dermatology:  +rash, no mole, no dry or sensitive skin  ENT:  No cough, no sore throat, no sinus pain, no runny nose, no ear pain  Cardiology:  No chest pain, no palpitations, no leg edema, no shortness of breath, no PND  Gastroenterology:  No dysphagia, no abdominal pain, no nausea, no vomiting, no constipation, no diarrhea, no heartburn  Musculoskeletal:  No joint pain, no leg cramps, no back pain, no muscle aches  Respiratory:  No shortness of breath, no orthopnea, no wheezing, no QUINONEZ, no hemoptysis  Urology:  No blood in the urine, no urinary frequency, no urinary incontinence, no urinary urgency, no nocturia, no dysuria    Vitals:    04/13/22 0907   BP: 130/80   Pulse: 100   Resp: 16   Temp: 97.3 °F (36.3 °C)   SpO2: 97%   Weight: (!) 394 lb (178.7 kg) Height: 5' 8\" (1.727 m)       Physical Exam  Constitutional:       General: She is not in acute distress. Appearance: She is well-developed. HENT:      Head: Normocephalic and atraumatic. Right Ear: External ear normal.      Left Ear: External ear normal.      Nose: Nose normal.   Eyes:      General: No scleral icterus. Conjunctiva/sclera: Conjunctivae normal.      Pupils: Pupils are equal, round, and reactive to light. Neck:      Thyroid: No thyromegaly. Cardiovascular:      Rate and Rhythm: Normal rate and regular rhythm. Heart sounds: Normal heart sounds. No murmur heard. Pulmonary:      Effort: Pulmonary effort is normal. No accessory muscle usage or respiratory distress. Breath sounds: Normal breath sounds. No wheezing. Musculoskeletal:         General: Normal range of motion. Cervical back: Normal range of motion and neck supple. Skin:     General: Skin is warm and dry. Findings: Rash (candidal infection with skin breakdown) present. Neurological:      Mental Status: She is alert and oriented to person, place, and time. Deep Tendon Reflexes: Reflexes are normal and symmetric. Psychiatric:         Speech: Speech normal.         Behavior: Behavior normal.         Assessment/Plan:      Brittny Marx was seen today for diabetes and pre-op exam.    Diagnoses and all orders for this visit:    Type 2 diabetes mellitus without complication, without long-term current use of insulin ([facility])  -     POCT glycosylated hemoglobin (Hb A1C)  -     Cancel: POCT Microalbumin  -     Comprehensive Metabolic Panel;  Future  -     CBC with Auto Differential; Future  - very stable, improved from previous    Cardiomyopathy, unspecified type [facility])  -reviewed cardiology note    Gastroesophageal reflux disease without esophagitis  -     ondansetron (ZOFRAN-ODT) 4 MG disintegrating tablet; Place 2 tablets under the tongue every 8 hours as needed for Nausea or Vomiting  -     pantoprazole (PROTONIX) 20 MG tablet; Take 1 tablet by mouth daily    Mild intermittent asthma without complication  -     albuterol sulfate HFA (PROAIR HFA) 108 (90 Base) MCG/ACT inhaler; Inhale 2 puffs into the lungs every 4 hours as needed for Wheezing or Shortness of Breath    Abscess of abdominal wall  -     mupirocin (BACTROBAN) 2 % cream; Apply 3 times daily. -     clindamycin (CLEOCIN) 300 MG capsule; Take 1 capsule by mouth 2 times daily for 7 days    Candidal dermatitis  -     nystatin (MYCOSTATIN) 730006 UNIT/GM powder; Apply 3 times daily. Cough  -     brompheniramine-pseudoephedrine-DM (BROMFED DM) 2-30-10 MG/5ML syrup; Take 5 mLs by mouth 4 times daily as needed for Congestion or Cough      As above. Call or go to ED immediately if symptoms worsen or persist.  Return in about 4 weeks (around 5/11/2022). , or sooner if necessary. Educational materials and/or home exercises printed for patient's review and were included in patient instructions on his/her After Visit Summary and given to patient at the end of visit. Counseled regarding above diagnosis, including possible risks and complications,  especially if left uncontrolled. Counseled regarding the possible side effects, risks, benefits and alternatives to treatment; patient and/or guardian verbalizes understanding, agrees, feels comfortable with and wishes to proceed with above treatment plan. Advised patient to call with any new medication issues, and read all Rx info from pharmacy to assure aware of all possible risks and side effects of medication before taking. Reviewed age and gender appropriate health screening exams and vaccinations. Advised patient regarding importance of keeping up with recommended health maintenance and to schedule as soon as possible if overdue, as this is important in assessing for undiagnosed pathology, especially cancer, as well as protecting against potentially harmful/life threatening disease. Patient and/or guardian verbalizes understanding and agrees with above counseling, assessment and plan. All questions answered. Hector Rose PA-C  4/13/2022    I have personally reviewed and updated the chief complaint, HPI, Past Medical, Family and Social History, as well as the above Review of Systems.

## 2022-04-27 ENCOUNTER — INITIAL CONSULT (OUTPATIENT)
Dept: BARIATRICS/WEIGHT MGMT | Age: 47
End: 2022-04-27

## 2022-04-27 DIAGNOSIS — Z00.8 NUTRITIONAL ASSESSMENT: Primary | ICD-10-CM

## 2022-04-27 DIAGNOSIS — I10 ESSENTIAL HYPERTENSION: ICD-10-CM

## 2022-04-27 DIAGNOSIS — Z71.3 NUTRITIONAL COUNSELING: ICD-10-CM

## 2022-04-27 PROCEDURE — 99999 PR OFFICE/OUTPT VISIT,PROCEDURE ONLY: CPT | Performed by: DIETITIAN, REGISTERED

## 2022-04-27 NOTE — LETTER
[facility]  [address] [facility]  Jesus Patel  Phone: [phone]  Fax: [phone]    Jakob Rice RD, KONG        May 5, 2022    [address]      Dear Sharon Anna:        I personally wanted to thank you for selecting The [address] [place] [address] [facility] as your center of choice for surgery. I wanted to take the time to let you know it means a lot to me to be able to work with you both before and after surgery and I am so glad that you will become part of our surgical family. It has been a privilege to get to know you at your 3 Hour Nutrition Class and become part of your new journey on life. I look forward to working with you in the future and helping you achieve your new goals. I can't wait to see the growth and transformation that occurs for you after your surgery. If at any time you have any questions you can always contact me [phone].      Respectfully,          Jakob Rice RDN/ KONG  Bariatric Dietitian  [address] [place] [address] [facility]  Certified In Adult Weight Management I and II  Certified In Pediatric and Adolescent Weight Management          Jakob Rice RD, KONG

## 2022-04-28 RX ORDER — POTASSIUM CHLORIDE 20 MEQ/1
TABLET, EXTENDED RELEASE ORAL
Qty: 60 TABLET | Refills: 3 | Status: SHIPPED | OUTPATIENT
Start: 2022-04-28

## 2022-05-05 VITALS — BODY MASS INDEX: 44.41 KG/M2 | HEIGHT: 68 IN | WEIGHT: 293 LBS

## 2022-05-05 NOTE — PATIENT INSTRUCTIONS
[facility]  Dietary Follow-up Appointment Instructions    Doc Mcgill   Date: 5/5/2022     The RD / LD reviewed the following instructions with the patient and handouts have been given. Pt. is able to verbalize instruction and has been instructed to call with any problems or complications. If patient is not able to comply with the dietary or supplement instructions given pt. is instructed to call the [facility] at [phone]. Patient has been instructed to continue to follow a low-fat diet from today's date until the day before surgery. Patient has been instructed to drink 64 oz. of water daily eliminating carbonated and caffeinated beverages.     ________________________________________________________________________

## 2022-05-05 NOTE — PROGRESS NOTES
Patient attended a 3 Hour Initial Nutrition Consult Class for RYGB on 4/27/22. Patient was able to verbalize understanding of the class. [id number]  [address] [facility]  Nutrition History and Physical     Lisseth Ortiz    Surgery Type: RYGB  Today's Date: 5/5/22    yes  Patient attended a 3 hour nutrition education class on 4/27/22, and was given written educational material.  Patient signed and verbalized understanding of nutrition therapy for Bariatric Surgery. Assessment:              Vitals:    05/05/22 1208   Weight: (!) 384 lb (174.2 kg)   Height: 5' 8\" (1.727 m)    Height: 5' 8\" (1.727 m) Weight: (!) 384 lb (174.2 kg)   BMI:Body mass index is 58.39 kg/m². Food Allergies and Allergies: Yes - Seafood and Tree Nuts    Food Intolerances: No   Eating Problems:No  Chewing Problems:No  Swallowing Problems:No    Current Vitamins/Supplements and Medications:  Current Outpatient Medications:     potassium chloride (KLOR-CON M) 20 MEQ extended release tablet, TAKE 1 TABLET BY MOUTH DAILY WITH BREAKFAST, Disp: 60 tablet, Rfl: 3    ondansetron (ZOFRAN-ODT) 4 MG disintegrating tablet, Place 2 tablets under the tongue every 8 hours as needed for Nausea or Vomiting, Disp: 30 tablet, Rfl: 1    pantoprazole (PROTONIX) 20 MG tablet, Take 1 tablet by mouth daily, Disp: 30 tablet, Rfl: 3    mupirocin (BACTROBAN) 2 % cream, Apply 3 times daily. , Disp: 30 g, Rfl: 0    nystatin (MYCOSTATIN) 936600 UNIT/GM powder, Apply 3 times daily. , Disp: 60 g, Rfl: 1    brompheniramine-pseudoephedrine-DM (BROMFED DM) 2-30-10 MG/5ML syrup, Take 5 mLs by mouth 4 times daily as needed for Congestion or Cough, Disp: 240 mL, Rfl: 1    albuterol sulfate HFA (PROAIR HFA) 108 (90 Base) MCG/ACT inhaler, Inhale 2 puffs into the lungs every 4 hours as needed for Wheezing or Shortness of Breath, Disp: 1 each, Rfl: 1    bumetanide (BUMEX) 1 MG tablet, TAKE 1 TABLET BY MOUTH DAILY ALTERNATING WITH 2 TABLETS EVERY OTHER DAY, Disp: 60 tablet, Rfl: 1    sacubitril-valsartan (ENTRESTO)  MG per tablet, Take 1 tablet by mouth 2 times daily, Disp: 60 tablet, Rfl: 5    mineral oil-hydrophilic petrolatum (AQUAPHOR) ointment, Apply topically as needed. , Disp: 99 g, Rfl: 5    gabapentin (NEURONTIN) 600 MG tablet, Take 1 tablet by mouth 3 times daily for 90 days. , Disp: 90 tablet, Rfl: 2    isosorbide dinitrate (ISORDIL) 20 MG tablet, Take 1 tablet by mouth 3 times daily, Disp: 90 tablet, Rfl: 3    metoprolol succinate (TOPROL XL) 100 MG extended release tablet, TAKE 1 TABLET BY MOUTH TWICE DAILY, Disp: 60 tablet, Rfl: 3    atorvastatin (LIPITOR) 80 MG tablet, TAKE 1 TABLET BY MOUTH EVERY NIGHT, Disp: 30 tablet, Rfl: 3    hydrALAZINE (APRESOLINE) 50 MG tablet, TAKE 1 TABLET BY MOUTH EVERY 8 HOURS, Disp: 90 tablet, Rfl: 3    cetirizine (ZYRTEC) 10 MG tablet, Take 1 tablet by mouth daily, Disp: 30 tablet, Rfl: 3    fluconazole (DIFLUCAN) 150 MG tablet, , Disp: , Rfl:     Dulaglutide (TRULICITY) 4.45 ZQ/3.6CZ SOPN, Inject 0.75 mg into the skin once a week, Disp: 0.5 mL, Rfl: 2    buPROPion (WELLBUTRIN XL) 150 MG extended release tablet, , Disp: , Rfl:     aspirin 81 MG chewable tablet, Take 1 tablet by mouth daily, Disp: 30 tablet, Rfl: 3    clotrimazole-betamethasone (LOTRISONE) 1-0.05 % cream, Apply topically 2 times daily.  (Patient not taking: Reported on 4/13/2022), Disp: 45 g, Rfl: 0    Cholecalciferol (VITAMIN D) 50 MCG (2000 UT) CAPS capsule, Take 1 capsule by mouth daily, Disp: 30 capsule, Rfl: 2    citalopram (CELEXA) 40 MG tablet, Take 1 tablet by mouth daily, Disp: 30 tablet, Rfl: 5    mometasone-formoterol (DULERA) 100-5 MCG/ACT inhaler, Inhale 2 puffs into the lungs 2 times daily, Disp: 13 g, Rfl: 1    Continuous Blood Gluc  (FREESTYLE MARY READER) MARIJA, 4 Devices by Does not apply route daily (Patient not taking: Reported on 4/13/2022), Disp: 4 Device, Rfl: 5    Continuous Blood Gluc Sensor ([address] [place] [address]) MISC, 4 Devices by Does not apply route daily (Patient not taking: Reported on 4/13/2022), Disp: 4 each, Rfl: 5    albuterol (PROVENTIL) (2.5 MG/3ML) 0.083% nebulizer solution, Take 3 mLs by nebulization every 6 hours as needed for Wheezing, Disp: 120 each, Rfl: 3    spironolactone (ALDACTONE) 25 MG tablet, TAKE 1 TABLET BY MOUTH EVERY DAY, Disp: 30 tablet, Rfl: 5    fluticasone (FLONASE) 50 MCG/ACT nasal spray, 2 sprays by Nasal route daily (Patient taking differently: 2 sprays by Nasal route daily as needed ), Disp: 1 Bottle, Rfl: 5    Continuous Blood Gluc Sensor (FREESTYLE MARY 2 SENSOR SYSTM) MISC, 1 Device by Does not apply route daily (Patient not taking: Reported on 4/13/2022), Disp: 1 each, Rfl: 0    Multiple Vitamin (DAILY-DOUGLAS) TABS, TAKE 1 TABLET BY MOUTH EVERY DAY, Disp: 90 tablet, Rfl: 5    ipratropium-albuterol (DUONEB) 0.5-2.5 (3) MG/3ML SOLN nebulizer solution, Take 3 mLs by nebulization every 4 hours as needed for Shortness of Breath, Disp: 24 vial, Rfl: 1    Insulin Pen Needle ([employer] PEN NEEDLES) 31G X 6 MM MISC, 1 each by Does not apply route daily (Patient not taking: Reported on 4/13/2022), Disp: 100 each, Rfl: 3    ONE TOUCH ULTRA TEST strip, 1 each by Does not apply route daily (Patient not taking: Reported on 4/13/2022), Disp: 120 each, Rfl: 5    Handicap Placard MISC, by Does not apply route Patient cannot walk 200 ft without stopping to rest.   Expiration 5 yrs, Disp: 1 each, Rfl: 0  _    Please check all that apply:  Yes - Patient lost 10% of excess body weight prior to surgery  Yes - Patient  is able to verbalize a Bariatric Full Liquid Diet. Yes - Patient is able to verbalize the usage & importance of Protein Supplements. Yes- Patient purchased 3 month supply of protein vitamins and calcium. YES - Patient is instructed to follow a low-fat diet from now until surgery date.   YES - Patient is instructed to take 30 grams of a protein supplement from  now until surgery date in addition to low-fat diet guidelines. YES - Patient is instructed to consume 64 ounces of water daily from now until surgery date.  ________________________________________________________________________  Yes - Patient did  lose 10% of excess body weight prior to surgery  ________________________________________________________________________  Yes - Patient did purchase 3 month supply of protein, vitamins and Calcium.     Comments:   [facility] Supplements

## 2022-05-16 ENCOUNTER — OFFICE VISIT (OUTPATIENT)
Dept: PRIMARY CARE CLINIC | Age: 47
End: 2022-05-16
Payer: MEDICARE

## 2022-05-16 VITALS
HEIGHT: 68 IN | RESPIRATION RATE: 16 BRPM | OXYGEN SATURATION: 94 % | DIASTOLIC BLOOD PRESSURE: 70 MMHG | SYSTOLIC BLOOD PRESSURE: 130 MMHG | WEIGHT: 293 LBS | BODY MASS INDEX: 44.41 KG/M2 | TEMPERATURE: 97.5 F | HEART RATE: 66 BPM

## 2022-05-16 DIAGNOSIS — J45.20 MILD INTERMITTENT ASTHMA WITHOUT COMPLICATION: ICD-10-CM

## 2022-05-16 DIAGNOSIS — Z01.818 PRE-OP EVALUATION: ICD-10-CM

## 2022-05-16 DIAGNOSIS — L85.3 DRY SKIN DERMATITIS: ICD-10-CM

## 2022-05-16 DIAGNOSIS — E66.01 MORBID OBESITY (HCC): Primary | ICD-10-CM

## 2022-05-16 DIAGNOSIS — E11.9 TYPE 2 DIABETES MELLITUS WITHOUT COMPLICATION, WITHOUT LONG-TERM CURRENT USE OF INSULIN (HCC): ICD-10-CM

## 2022-05-16 DIAGNOSIS — I42.9 CARDIOMYOPATHY, UNSPECIFIED TYPE (HCC): ICD-10-CM

## 2022-05-16 DIAGNOSIS — L73.2 HIDRADENITIS SUPPURATIVA: ICD-10-CM

## 2022-05-16 DIAGNOSIS — G47.33 OSA (OBSTRUCTIVE SLEEP APNEA): ICD-10-CM

## 2022-05-16 PROBLEM — J45.901 ASTHMA EXACERBATION: Status: RESOLVED | Noted: 2018-03-20 | Resolved: 2022-05-16

## 2022-05-16 PROBLEM — J12.3 PNEUMONIA DUE TO HUMAN METAPNEUMOVIRUS: Status: RESOLVED | Noted: 2019-02-17 | Resolved: 2022-05-16

## 2022-05-16 LAB
CREATININE URINE POCT: ABNORMAL
MICROALBUMIN/CREAT 24H UR: ABNORMAL MG/G{CREAT}
MICROALBUMIN/CREAT UR-RTO: ABNORMAL

## 2022-05-16 PROCEDURE — 3044F HG A1C LEVEL LT 7.0%: CPT | Performed by: PHYSICIAN ASSISTANT

## 2022-05-16 PROCEDURE — G8417 CALC BMI ABV UP PARAM F/U: HCPCS | Performed by: PHYSICIAN ASSISTANT

## 2022-05-16 PROCEDURE — G8427 DOCREV CUR MEDS BY ELIG CLIN: HCPCS | Performed by: PHYSICIAN ASSISTANT

## 2022-05-16 PROCEDURE — 2022F DILAT RTA XM EVC RTNOPTHY: CPT | Performed by: PHYSICIAN ASSISTANT

## 2022-05-16 PROCEDURE — 1036F TOBACCO NON-USER: CPT | Performed by: PHYSICIAN ASSISTANT

## 2022-05-16 PROCEDURE — 82044 UR ALBUMIN SEMIQUANTITATIVE: CPT | Performed by: PHYSICIAN ASSISTANT

## 2022-05-16 PROCEDURE — 99214 OFFICE O/P EST MOD 30 MIN: CPT | Performed by: PHYSICIAN ASSISTANT

## 2022-05-16 RX ORDER — IBUPROFEN 600 MG/1
600 TABLET ORAL EVERY 6 HOURS PRN
Status: ON HOLD | COMMUNITY
End: 2022-06-01 | Stop reason: HOSPADM

## 2022-05-16 RX ORDER — GUAIFENESIN 600 MG/1
600 TABLET, EXTENDED RELEASE ORAL 2 TIMES DAILY
Qty: 30 TABLET | Refills: 0 | Status: ON HOLD
Start: 2022-05-16 | End: 2022-06-01

## 2022-05-16 RX ORDER — ASPIRIN 81 MG/1
81 TABLET, CHEWABLE ORAL DAILY
Qty: 30 TABLET | Refills: 5 | Status: SHIPPED
Start: 2022-05-16 | End: 2022-06-13 | Stop reason: SDUPTHER

## 2022-05-16 RX ORDER — SULFAMETHOXAZOLE AND TRIMETHOPRIM 800; 160 MG/1; MG/1
1 TABLET ORAL 2 TIMES DAILY
Qty: 20 TABLET | Refills: 0 | Status: SHIPPED | OUTPATIENT
Start: 2022-05-16 | End: 2022-05-26

## 2022-05-16 NOTE — PROGRESS NOTES
Chief Complaint   Patient presents with    Pre-op Exam     for weight loss surgery on 5/31/2022       HPI:  Patient is here for preoperative clearance. Pre-surgical evaluation:  Patient is a 42 y/o female here by request of Dr. Jason Ann who has upcoming Kendall en Y gastric bypass surgery scheduled with patient. Patient will be undergoing general anaesthesia. She has no complaints or concerns today. She is not generally healthy. Chronic medical problems include HTN, NICM, CHF, JODI, Asthma, DM2, Gerd, depression and spinal stenosis . These are generally controlled and stable at this time. /70 today. Most recent labs reviewed with patient and  are not remarkable. a1c was 6.2% in 4/22. Patient does not smoke. Patient does not drink alcohol. Patient  does not use drugs. Overall doing well. Patient does not have chest pain, palpitations, shortness of breath, or orthopnea. No known heart, kidney or liver issue to date to the best of patient's knowledge, my knowledge, or of that recorded in patient's current medical record. Patient has had cardiac testing in the past including EKG, stress test, and/or catheterization. Echo reviewed from 10/21, with EF 55%, indeterminate diastolic functon, and no valve dz. If available, these records have been reviewed today. Patient does have JODI. Patient has previously been cleared by Cardiology.     Past Medical History:   Diagnosis Date    Asthma     Bell palsy     drooping    CHF (congestive heart failure) ([address]     [id number] wore life vest dr Jl Addison DDD (degenerative disc disease), cervical     with spinal stenosis    Diabetes mellitus ([address]     Diverticulitis     DJD (degenerative joint disease)     both hips    GERD without esophagitis     HTN (hypertension)     Hyperlipidemia     Meningitis 2009    Morbid obesity due to excess calories (HCC)     Neuropathy     Pancreas cyst     Sleep apnea     bipap    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[address] Past Surgical History:   Procedure Laterality Date    CHOLECYSTECTOMY  2009    CYSTOSCOPY Left 01/14/2018    left stent placement    DILATION AND CURETTAGE OF UTERUS      younger age   Radha Goodwin LITHOTRIPSY Right 02/15/2018    Cystoscopy;Stent Removal    UPPER GASTROINTESTINAL ENDOSCOPY N/A 6/22/2018    EGD BIOPSY performed by Mu Hutchinson MD at [address] N/A 8/20/2021    EGD BIOPSY performed by Mu Hutchinson MD at [address]:     Frequency of Communication with Friends and Family: Not on file    Frequency of Social Gatherings with Friends and Family: Not on file    Attends Shinto Services: Not on file    Active Member of Clubs or Organizations: Not on file    Attends Club or Organization Meetings: Not on file    Marital Status: Not on file        Family History   Problem Relation Age of Onset    Arthritis Mother     Hypertension Mother     Asthma Mother     Cancer Father     Hypertension Father     Heart Attack Father     Asthma Father          Current Outpatient Medications:     ibuprofen (ADVIL;MOTRIN) 600 MG tablet, Take 600 mg by mouth every 6 hours as needed for Pain, Disp: , Rfl:     sacubitril-valsartan (ENTRESTO)  MG per tablet, Take 1 tablet by mouth 2 times daily, Disp: 60 tablet, Rfl: 5    mineral oil-hydrophilic petrolatum (AQUAPHOR) ointment, Apply topically as needed. , Disp: 99 g, Rfl: 5    aspirin 81 MG chewable tablet, Take 1 tablet by mouth daily, Disp: 30 tablet, Rfl: 5    gabapentin (NEURONTIN) 600 MG tablet, Take 1 tablet by mouth 3 times daily for 90 days. , Disp: 90 tablet, Rfl: 2    [START ON 6/5/2022] oxyCODONE-acetaminophen (PERCOCET) 5-325 MG per tablet, Take 1 tablet by mouth every 6 hours as needed for Pain for up to 30 days.  I, Disp: 120 tablet, Rfl: 0    potassium chloride (KLOR-CON M) 20 MEQ extended release tablet, TAKE 1 TABLET BY MOUTH DAILY WITH BREAKFAST, Disp: 60 tablet, Rfl: 3    ondansetron (ZOFRAN-ODT) 4 MG disintegrating tablet, Place 2 tablets under the tongue every 8 hours as needed for Nausea or Vomiting, Disp: 30 tablet, Rfl: 1    pantoprazole (PROTONIX) 20 MG tablet, Take 1 tablet by mouth daily, Disp: 30 tablet, Rfl: 3    nystatin (MYCOSTATIN) 267020 UNIT/GM powder, Apply 3 times daily. , Disp: 60 g, Rfl: 1    albuterol sulfate HFA (PROAIR HFA) 108 (90 Base) MCG/ACT inhaler, Inhale 2 puffs into the lungs every 4 hours as needed for Wheezing or Shortness of Breath, Disp: 1 each, Rfl: 1    bumetanide (BUMEX) 1 MG tablet, TAKE 1 TABLET BY MOUTH DAILY ALTERNATING WITH 2 TABLETS EVERY OTHER DAY, Disp: 60 tablet, Rfl: 1    isosorbide dinitrate (ISORDIL) 20 MG tablet, Take 1 tablet by mouth 3 times daily, Disp: 90 tablet, Rfl: 3    metoprolol succinate (TOPROL XL) 100 MG extended release tablet, TAKE 1 TABLET BY MOUTH TWICE DAILY, Disp: 60 tablet, Rfl: 3    atorvastatin (LIPITOR) 80 MG tablet, TAKE 1 TABLET BY MOUTH EVERY NIGHT, Disp: 30 tablet, Rfl: 3    hydrALAZINE (APRESOLINE) 50 MG tablet, TAKE 1 TABLET BY MOUTH EVERY 8 HOURS, Disp: 90 tablet, Rfl: 3    cetirizine (ZYRTEC) 10 MG tablet, Take 1 tablet by mouth daily, Disp: 30 tablet, Rfl: 3    Dulaglutide (TRULICITY) 6.96 PA/7.5ZO SOPN, Inject 0.75 mg into the skin once a week, Disp: 0.5 mL, Rfl: 2    buPROPion (WELLBUTRIN XL) 150 MG extended release tablet, Take 150 mg by mouth as needed , Disp: , Rfl:     clotrimazole-betamethasone (LOTRISONE) 1-0.05 % cream, Apply topically 2 times daily. , Disp: 45 g, Rfl: 0    Cholecalciferol (VITAMIN D) 50 MCG (2000 UT) CAPS capsule, Take 1 capsule by mouth daily, Disp: 30 capsule, Rfl: 2    citalopram (CELEXA) 40 MG tablet, Take 1 tablet by mouth daily (Patient taking differently: Take 40 mg by mouth daily as needed ), Disp: 30 tablet, Rfl: 5    mometasone-formoterol (DULERA) 100-5 MCG/ACT inhaler, Inhale 2 puffs into the lungs 2 times daily, Disp: 13 g, Rfl: 1   Continuous Blood Gluc  (FREESTYLE MARY READER) MARIJA, 4 Devices by Does not apply route daily, Disp: 4 Device, Rfl: 5    Continuous Blood Gluc Sensor ([address] [place] [address]) MISC, 4 Devices by Does not apply route daily, Disp: 4 each, Rfl: 5    albuterol (PROVENTIL) (2.5 MG/3ML) 0.083% nebulizer solution, Take 3 mLs by nebulization every 6 hours as needed for Wheezing, Disp: 120 each, Rfl: 3    spironolactone (ALDACTONE) 25 MG tablet, TAKE 1 TABLET BY MOUTH EVERY DAY, Disp: 30 tablet, Rfl: 5    fluticasone (FLONASE) 50 MCG/ACT nasal spray, 2 sprays by Nasal route daily (Patient taking differently: 2 sprays by Nasal route daily as needed ), Disp: 1 Bottle, Rfl: 5    Continuous Blood Gluc Sensor (FREESTYLE MARY 2 SENSOR SYSTM) MISC, 1 Device by Does not apply route daily, Disp: 1 each, Rfl: 0    Multiple Vitamin (DAILY-DOUGLAS) TABS, TAKE 1 TABLET BY MOUTH EVERY DAY, Disp: 90 tablet, Rfl: 5    ipratropium-albuterol (DUONEB) 0.5-2.5 (3) MG/3ML SOLN nebulizer solution, Take 3 mLs by nebulization every 4 hours as needed for Shortness of Breath, Disp: 24 vial, Rfl: 1    ONE TOUCH ULTRA TEST strip, 1 each by Does not apply route daily, Disp: 120 each, Rfl: 5    Handicap Placard MISC, by Does not apply route Patient cannot walk 200 ft without stopping to rest.   Expiration 5 yrs, Disp: 1 each, Rfl: 0    fluconazole (DIFLUCAN) 150 MG tablet, , Disp: , Rfl:     Insulin Pen Needle ([employer] PEN NEEDLES) 31G X 6 MM MISC, 1 each by Does not apply route daily (Patient not taking: Reported on 4/13/2022), Disp: 100 each, Rfl: 3     Allergies   Allergen Reactions    Food Anaphylaxis     Food allergy - Fish and tree nuts    Ultram [Tramadol Hcl]      Per pt had a seizure    Fish-Derived Products     Norco [Hydrocodone-Acetaminophen] Hives    Nuts [Peanut-Containing Drug Products]     Pcn [Penicillins]     Cashews [Macadamia Nut Oil] Nausea And Vomiting         Review of Systems:  Constitutional:  No fever, no fatigue, no chills, no headaches, no weight change  Dermatology:  No rash, no mole, no dry or sensitive skin  ENT:  No cough, no sore throat, no sinus pain, no runny nose, no ear pain  Cardiology:  No chest pain, no palpitations, no leg edema, no shortness of breath, no PND  Gastroenterology:  No dysphagia, no abdominal pain, no nausea, no vomiting, no constipation, no diarrhea, no heartburn  Musculoskeletal:  No joint pain, no leg cramps, + back pain, no muscle aches  Respiratory:  No shortness of breath, no orthopnea, no wheezing, no QUINONEZ, no hemoptysis  Urology:  No blood in the urine, no urinary frequency, no urinary incontinence, no urinary urgency, no nocturia, no dysuria    Vitals:    05/16/22 1043   BP: 130/70   Pulse: 66   Resp: 16   Temp: 97.5 °F (36.4 °C)   SpO2: 94%   Weight: (!) 385 lb 6.4 oz (174.8 kg)   Height: 5' 8\" (1.727 m)       Physical Exam  Constitutional:       General: She is not in acute distress. Appearance: She is well-developed. She is obese. HENT:      Head: Normocephalic and atraumatic. Right Ear: External ear normal.      Left Ear: External ear normal.      Nose: Nose normal.   Eyes:      General: No scleral icterus. Conjunctiva/sclera: Conjunctivae normal.      Pupils: Pupils are equal, round, and reactive to light. Neck:      Thyroid: No thyromegaly. Cardiovascular:      Rate and Rhythm: Normal rate and regular rhythm. Heart sounds: Normal heart sounds. No murmur heard. Pulmonary:      Effort: Pulmonary effort is normal. No accessory muscle usage or respiratory distress. Breath sounds: Normal breath sounds. No wheezing. Abdominal:      General: Bowel sounds are normal. There is no distension. Palpations: Abdomen is soft. Tenderness: There is no abdominal tenderness. Musculoskeletal:         General: Normal range of motion. Cervical back: Normal range of motion and neck supple. Right lower leg: No edema.       Left lower leg: No edema. Skin:     General: Skin is warm and dry. Findings: No rash. Neurological:      Mental Status: She is alert and oriented to person, place, and time. Deep Tendon Reflexes: Reflexes are normal and symmetric. Comments: Diabetic foot exam: No lesions seen. Pulses: intact and symmetric,  Monofilament: intact except for distal great toes   Psychiatric:         Mood and Affect: Mood normal.         Speech: Speech normal.         Behavior: Behavior normal.         Thought Content: Thought content normal.         Judgment: Judgment normal.         Assessment/Plan:      Mignon Mitchell was seen today for pre-op exam.    Diagnoses and all orders for this visit:    Morbid obesity ([facility]   upcoming Kendall en Y gastric bypass surgery    Pre-op evaluation  Pre-Operative Risk assessment using 2014 ACC/AHA guidelines     Emergent procedure No  Active Cardiac Condition No (decompensated HF, Arrhythmia, MI <3 weeks, severe valve disease)  Risk Level of Procedure Intermediate Risk (intraperitoneal, intrathoracic, HENT, orthopedic, or carotid endarterectomy, etc.)  Revised Cardiac Risk Index Risk factors: History of heart failure  Measurement of Exercise Tolerance before Surgery >4 No    According to the 2014 ACC/AHA pre-operative risk assessment guidelines Tylene Early is a low risk for major cardiac complications during a intermediate risk procedure and may continue as planned. Specific medication recommendations are listed below. Medications recommended to continue should be taken with a sip of water even when NPO. Further recommendations from consultants: Cardiology  Will discuss with Dr Shruthi Wesley    Cardiomyopathy, unspecified type ([facility]  -     sacubitril-valsartan (ENTRESTO)  MG per tablet; Take 1 tablet by mouth 2 times daily  -     aspirin 81 MG chewable tablet; Take 1 tablet by mouth daily    Dry skin dermatitis  -     mineral oil-hydrophilic petrolatum (AQUAPHOR) ointment;  Apply topically as needed. Mild intermittent asthma without complication  -stable    JODI (obstructive sleep apnea)  - uses cpap with good compliance    Type 2 diabetes mellitus without complication, without long-term current use of insulin ([facility])  -     POCT Microalbumin  -     Diabetic Foot Exam      As above. Call or go to ED immediately if symptoms worsen or persist.  Return in about 23 days (around 6/8/2022). , or sooner if necessary. Educational materials and/or home exercises printed for patient's review and were included in patient instructions on his/her After Visit Summary and given to patient at the end of visit. Counseled regarding above diagnosis, including possible risks and complications,  especially if left uncontrolled. Counseled regarding the possible side effects, risks, benefits and alternatives to treatment; patient and/or guardian verbalizes understanding, agrees, feels comfortable with and wishes to proceed with above treatment plan. Advised patient to call with any new medication issues, and read all Rx info from pharmacy to assure aware of all possible risks and side effects of medication before taking. Reviewed age and gender appropriate health screening exams and vaccinations. Advised patient regarding importance of keeping up with recommended health maintenance and to schedule as soon as possible if overdue, as this is important in assessing for undiagnosed pathology, especially cancer, as well as protecting against potentially harmful/life threatening disease. Patient and/or guardian verbalizes understanding and agrees with above counseling, assessment and plan. All questions answered. Lili Lucas PA-C  5/16/2022    I have personally reviewed and updated the chief complaint, HPI, Past Medical, Family and Social History, as well as the above Review of Systems. I have reviewed the above history and physical and agree with the assessment by Raúl Glendale.  Compa Fontanez, ESTER.

## 2022-05-18 ENCOUNTER — OFFICE VISIT (OUTPATIENT)
Dept: BARIATRICS/WEIGHT MGMT | Age: 47
End: 2022-05-18
Payer: MEDICARE

## 2022-05-18 VITALS
WEIGHT: 293 LBS | RESPIRATION RATE: 20 BRPM | TEMPERATURE: 97.2 F | BODY MASS INDEX: 43.4 KG/M2 | DIASTOLIC BLOOD PRESSURE: 94 MMHG | HEART RATE: 130 BPM | SYSTOLIC BLOOD PRESSURE: 147 MMHG | HEIGHT: 69 IN

## 2022-05-18 DIAGNOSIS — K91.2 MALNUTRITION FOLLOWING GASTROINTESTINAL SURGERY: Primary | ICD-10-CM

## 2022-05-18 DIAGNOSIS — Z98.890 PONV (POSTOPERATIVE NAUSEA AND VOMITING): ICD-10-CM

## 2022-05-18 DIAGNOSIS — K21.9 GASTROESOPHAGEAL REFLUX DISEASE WITHOUT ESOPHAGITIS: ICD-10-CM

## 2022-05-18 DIAGNOSIS — R11.2 PONV (POSTOPERATIVE NAUSEA AND VOMITING): ICD-10-CM

## 2022-05-18 PROCEDURE — G8417 CALC BMI ABV UP PARAM F/U: HCPCS | Performed by: SURGERY

## 2022-05-18 PROCEDURE — G8427 DOCREV CUR MEDS BY ELIG CLIN: HCPCS | Performed by: SURGERY

## 2022-05-18 PROCEDURE — 99213 OFFICE O/P EST LOW 20 MIN: CPT

## 2022-05-18 PROCEDURE — 99214 OFFICE O/P EST MOD 30 MIN: CPT | Performed by: SURGERY

## 2022-05-18 PROCEDURE — 1036F TOBACCO NON-USER: CPT | Performed by: SURGERY

## 2022-05-18 RX ORDER — ONDANSETRON 4 MG/1
4 TABLET, ORALLY DISINTEGRATING ORAL EVERY 8 HOURS PRN
Qty: 15 TABLET | Refills: 0 | Status: SHIPPED
Start: 2022-05-18 | End: 2022-09-20 | Stop reason: ALTCHOICE

## 2022-05-18 RX ORDER — FLUCONAZOLE 100 MG/1
100 TABLET ORAL DAILY
Qty: 7 TABLET | Refills: 0 | Status: SHIPPED | OUTPATIENT
Start: 2022-05-18 | End: 2022-05-25

## 2022-05-18 RX ORDER — OMEPRAZOLE 20 MG/1
20 CAPSULE, DELAYED RELEASE ORAL DAILY
Qty: 30 CAPSULE | Refills: 12 | Status: SHIPPED | OUTPATIENT
Start: 2022-05-18 | End: 2023-05-18

## 2022-05-18 NOTE — PROGRESS NOTES
Lisseth Ortiz  5/18/2022  [facility]               History and Physical  Gastric Bypass     CHIEF COMPLAINT: Morbid obesity, Hypertension, Hyperlipidemia, Obstructive Sleep Apnea, GERD and Degenerative Joint Disease (DJD)    HISTORY OF PRESENT ILLNESS: Lisseth Ortiz is a morbidly-obese 41 y.o.  female, who weighs (!) 382 lb (173.3 kg). She is 232 pounds over her ideal body weight. The Body mass index is 57.24 kg/m². She has lost 28 pounds over the past several months in preparation for surgery. She has multiple medical problems aggravated by her obesity. She wishes to have a gastric bypass so that she can lose more weight and keep the weight off. I have met with her on 2 different occasions in the [facility], where we discussed the surgery in great detail and went over the risks and benefits. She has watched our informational video so she understands all of the extensive risks involved. She states that she understands all of these risks and wishes to proceed.     Past Medical History  Patient Active Problem List   Diagnosis    Essential hypertension    Mild intermittent asthma without complication    Morbid obesity ([address]    Reactive depression    Anxiety    Pyelonephritis    Ureteric stone    Tobacco dependence    Hydronephrosis with urinary obstruction due to renal calculus    Pancreatic cyst    Frequent PVCs    Hyperkalemia    Spinal stenosis of lumbar region with neurogenic claudication    Primary osteoarthritis of both hips    Gastroesophageal reflux disease without esophagitis    Vitamin D deficiency    Diabetes mellitus ([address]    Hyperlipidemia LDL goal <70    JODI (obstructive sleep apnea)    Chronic pain syndrome    Lumbar radiculopathy    Chronic bilateral low back pain with bilateral sciatica    Hidradenitis suppurativa    Cardiomyopathy ([address]    Malnutrition ([address]    Current moderate episode of major depressive disorder without prior episode [facility]    Heart failure ([facility]    Toothache     Past Surgical History:   Procedure Laterality Date    CHOLECYSTECTOMY  2009    CYSTOSCOPY Left 01/14/2018    left stent placement    DILATION AND CURETTAGE OF UTERUS      younger age   [employer] LITHOTRIPSY Right 02/15/2018    Cystoscopy;Stent Removal    UPPER GASTROINTESTINAL ENDOSCOPY N/A 6/22/2018    EGD BIOPSY performed by Eladio Granda MD at [facility] N/A 8/20/2021    EGD BIOPSY performed by Eladio Granda MD at [facility] ENDOSCOPY      Allergies: Food, Ultram Brandin Luis A hcl], Fish-derived products, Norco [hydrocodone-acetaminophen], Nuts [peanut-containing drug products], Pcn [penicillins], and Cashews [macadamia nut oil]   Family History   Problem Relation Age of Onset    Arthritis Mother     Hypertension Mother     Asthma Mother     Cancer Father     Hypertension Father     Heart Attack Father     Asthma Father        Medications:  Current Outpatient Medications   Medication Sig Dispense Refill    omeprazole (PRILOSEC) 20 MG delayed release capsule Take 1 capsule by mouth Daily 30 capsule 12    ondansetron (ZOFRAN-ODT) 4 MG disintegrating tablet Take 1 tablet by mouth every 8 hours as needed for Nausea or Vomiting 15 tablet 0    ibuprofen (ADVIL;MOTRIN) 600 MG tablet Take 600 mg by mouth every 6 hours as needed for Pain      sacubitril-valsartan (ENTRESTO)  MG per tablet Take 1 tablet by mouth 2 times daily 60 tablet 5    mineral oil-hydrophilic petrolatum (AQUAPHOR) ointment Apply topically as needed.  99 g 5    aspirin 81 MG chewable tablet Take 1 tablet by mouth daily 30 tablet 5    sulfamethoxazole-trimethoprim (BACTRIM DS) 800-160 MG per tablet Take 1 tablet by mouth 2 times daily for 10 days 20 tablet 0    guaiFENesin (MUCINEX) 600 MG extended release tablet Take 1 tablet by mouth 2 times daily for 15 days 30 tablet 0    gabapentin (NEURONTIN) 600 MG tablet Take 1 tablet by mouth 3 times daily for 90 days. 90 tablet 2    [START ON 6/5/2022] oxyCODONE-acetaminophen (PERCOCET) 5-325 MG per tablet Take 1 tablet by mouth every 6 hours as needed for Pain for up to 30 days. I 120 tablet 0    potassium chloride (KLOR-CON M) 20 MEQ extended release tablet TAKE 1 TABLET BY MOUTH DAILY WITH BREAKFAST 60 tablet 3    ondansetron (ZOFRAN-ODT) 4 MG disintegrating tablet Place 2 tablets under the tongue every 8 hours as needed for Nausea or Vomiting 30 tablet 1    pantoprazole (PROTONIX) 20 MG tablet Take 1 tablet by mouth daily 30 tablet 3    nystatin (MYCOSTATIN) 736242 UNIT/GM powder Apply 3 times daily. 60 g 1    albuterol sulfate HFA (PROAIR HFA) 108 (90 Base) MCG/ACT inhaler Inhale 2 puffs into the lungs every 4 hours as needed for Wheezing or Shortness of Breath 1 each 1    bumetanide (BUMEX) 1 MG tablet TAKE 1 TABLET BY MOUTH DAILY ALTERNATING WITH 2 TABLETS EVERY OTHER DAY 60 tablet 1    isosorbide dinitrate (ISORDIL) 20 MG tablet Take 1 tablet by mouth 3 times daily 90 tablet 3    metoprolol succinate (TOPROL XL) 100 MG extended release tablet TAKE 1 TABLET BY MOUTH TWICE DAILY 60 tablet 3    atorvastatin (LIPITOR) 80 MG tablet TAKE 1 TABLET BY MOUTH EVERY NIGHT 30 tablet 3    hydrALAZINE (APRESOLINE) 50 MG tablet TAKE 1 TABLET BY MOUTH EVERY 8 HOURS 90 tablet 3    cetirizine (ZYRTEC) 10 MG tablet Take 1 tablet by mouth daily 30 tablet 3    fluconazole (DIFLUCAN) 150 MG tablet       Dulaglutide (TRULICITY) 0.29 ST/6.8TC SOPN Inject 0.75 mg into the skin once a week 0.5 mL 2    buPROPion (WELLBUTRIN XL) 150 MG extended release tablet Take 150 mg by mouth as needed       clotrimazole-betamethasone (LOTRISONE) 1-0.05 % cream Apply topically 2 times daily.  45 g 0    Cholecalciferol (VITAMIN D) 50 MCG (2000 UT) CAPS capsule Take 1 capsule by mouth daily 30 capsule 2    citalopram (CELEXA) 40 MG tablet Take 1 tablet by mouth daily (Patient taking differently: Take 40 mg by mouth daily as needed ) 30 tablet 5    mometasone-formoterol (DULERA) 100-5 MCG/ACT inhaler Inhale 2 puffs into the lungs 2 times daily 13 g 1    Continuous Blood Gluc  (FREESTYLE MARY READER) MARIJA 4 Devices by Does not apply route daily 4 Device 5    Continuous Blood Gluc Sensor ([address] [place] [address]) MISC 4 Devices by Does not apply route daily 4 each 5    albuterol (PROVENTIL) (2.5 MG/3ML) 0.083% nebulizer solution Take 3 mLs by nebulization every 6 hours as needed for Wheezing 120 each 3    spironolactone (ALDACTONE) 25 MG tablet TAKE 1 TABLET BY MOUTH EVERY DAY 30 tablet 5    fluticasone (FLONASE) 50 MCG/ACT nasal spray 2 sprays by Nasal route daily (Patient taking differently: 2 sprays by Nasal route daily as needed ) 1 Bottle 5    Continuous Blood Gluc Sensor (FREESTYLE MARY 2 SENSOR SYSTM) MISC 1 Device by Does not apply route daily 1 each 0    Multiple Vitamin (DAILY-DOUGLAS) TABS TAKE 1 TABLET BY MOUTH EVERY DAY 90 tablet 5    ipratropium-albuterol (DUONEB) 0.5-2.5 (3) MG/3ML SOLN nebulizer solution Take 3 mLs by nebulization every 4 hours as needed for Shortness of Breath 24 vial 1    Insulin Pen Needle ([employer] PEN NEEDLES) 31G X 6 MM MISC 1 each by Does not apply route daily 100 each 3    ONE TOUCH ULTRA TEST strip 1 each by Does not apply route daily 120 each 5    Handicap Placard MISC by Does not apply route Patient cannot walk 200 ft without stopping to rest.    Expiration 5 yrs 1 each 0     No current facility-administered medications for this visit.         Social History     Tobacco Use    Smoking status: Former Smoker     Packs/day: 0.25     Years: 17.00     Pack years: 4.25     Types: Cigarettes     Start date: 3/27/2001     Quit date: 10/5/2018     Years since quitting: 3.6    Smokeless tobacco: Never Used    Tobacco comment: occasionally has 1 cigarette, very stressed now   Substance Use Topics    Alcohol use: No      Review of Systems    Review of Systems - General ROS: negative for - chills, fatigue or malaise  ENT ROS: negative for - hearing change, nasal congestion or nasal discharge  Allergy and Immunology ROS: negative for - hives, itchy/watery eyes or nasal congestion  Hematological and Lymphatic ROS: negative for - blood clots, blood transfusions, bruising or fatigue  Endocrine ROS: negative for - malaise/lethargy, mood swings, palpitations or polydipsia/polyuria  Breast ROS: negative for - new or changing breast lumps or nipple changes  Respiratory ROS: negative for - sputum changes, stridor, tachypnea or wheezing  Cardiovascular ROS: negative for - irregular heartbeat, loss of consciousness, murmur or orthopnea  Gastrointestinal ROS: negative for - constipation, diarrhea, gas/bloating, heartburn or hematemesis  Genito-Urinary ROS: negative for - erectile dysfunction, genital discharge, genital ulcers or hematuria  Musculoskeletal ROS: negative for - gait disturbance, muscle pain or muscular weakness      Physical Exam:   Vitals: BP (!) 147/94 (Site: Right Lower Arm, Position: Sitting, Cuff Size: Large Adult)   Pulse 130   Temp 97.2 °F (36.2 °C) (Temporal)   Resp 20   Ht 5' 8.5\" (1.74 m)   Wt (!) 382 lb (173.3 kg)   LMP 05/12/2022 (Exact Date)   BMI 57.24 kg/m²     General appearance: alert, appears stated age and cooperative, does ambulate easily. Head: Normocephalic, without obvious abnormality, atraumatic  Neck: no adenopathy, no carotid bruit, no JVD, supple, symmetrical, trachea midline and thyroid not enlarged,   Lungs: clear to auscultation bilaterally  Heart: regular rate and rhythm  Abdomen: soft, non-tender; bowel sounds normal; no masses,  no organomegaly  Extremities: extremities normal, atraumatic, no cyanosis or edema    Assessment:  Morbid obesity with failure of conservative therapy. Patient has been cleared psychologically and medically. The gall bladder is out. Upper endoscopy showed  hiatal hernia.   The patient was informed that risks include, but are not limited to: death, anastomotic leak, obstruction, bleeding, and sepsis. Any of these could require further surgery. Other risks include heart attack, DVT, PE, pneumonia, hernia, wound infection, the need for dilatations of the gastrojejunostomy, and the inability to lose appropriate weight and keep it off. We may not be able to do a Gastic Bypass, in that case we will do a Sleeve Gastrectomy. We discussed that our goal is to ameliorate the medical problems and not to obtain a specific body mass index. She understands the risks and benefits and wishes to proceed with the procedure. She has signed a consent form. Plan:  ([id number]) Laparoscopic Kendall-en-Y Gastric Bypass possible hiatal hernia repair, possible robot assistance. She does not need Lovenox post-op.     Physician Signature: Electronically signed by Dr. Kayla Cote MD    Send copy of H&P to PCP, Maik Rodriguez PA-C

## 2022-05-25 ENCOUNTER — ANESTHESIA EVENT (OUTPATIENT)
Dept: OPERATING ROOM | Age: 47
DRG: 403 | End: 2022-05-25
Payer: MEDICARE

## 2022-05-25 ENCOUNTER — HOSPITAL ENCOUNTER (OUTPATIENT)
Dept: PREADMISSION TESTING | Age: 47
Discharge: HOME | End: 2022-05-25
Payer: MEDICARE

## 2022-05-25 VITALS
HEIGHT: 69 IN | DIASTOLIC BLOOD PRESSURE: 70 MMHG | TEMPERATURE: 97 F | WEIGHT: 293 LBS | SYSTOLIC BLOOD PRESSURE: 124 MMHG | HEART RATE: 105 BPM | RESPIRATION RATE: 20 BRPM | OXYGEN SATURATION: 95 % | BODY MASS INDEX: 43.4 KG/M2

## 2022-05-25 DIAGNOSIS — F41.9 ANXIETY: ICD-10-CM

## 2022-05-25 DIAGNOSIS — Z01.818 PREOP TESTING: Primary | ICD-10-CM

## 2022-05-25 DIAGNOSIS — K91.2 MALNUTRITION FOLLOWING GASTROINTESTINAL SURGERY: ICD-10-CM

## 2022-05-25 LAB
ALBUMIN SERPL-MCNC: 4.1 G/DL (ref 3.5–5.2)
ALP BLD-CCNC: 105 U/L (ref 35–104)
ALT SERPL-CCNC: 16 U/L (ref 0–32)
ANION GAP SERPL CALCULATED.3IONS-SCNC: 12 MMOL/L (ref 7–16)
AST SERPL-CCNC: 13 U/L (ref 0–31)
BILIRUB SERPL-MCNC: 0.3 MG/DL (ref 0–1.2)
BUN BLDV-MCNC: 17 MG/DL (ref 6–20)
CALCIUM SERPL-MCNC: 9.8 MG/DL (ref 8.6–10.2)
CHLORIDE BLD-SCNC: 99 MMOL/L (ref 98–107)
CO2: 25 MMOL/L (ref 22–29)
CREAT SERPL-MCNC: 1 MG/DL (ref 0.5–1)
EKG ATRIAL RATE: 97 BPM
EKG P AXIS: 50 DEGREES
EKG P-R INTERVAL: 158 MS
EKG Q-T INTERVAL: 424 MS
EKG QRS DURATION: 162 MS
EKG QTC CALCULATION (BAZETT): 538 MS
EKG R AXIS: -79 DEGREES
EKG T AXIS: 34 DEGREES
EKG VENTRICULAR RATE: 97 BPM
GFR AFRICAN AMERICAN: >60
GFR NON-AFRICAN AMERICAN: >60 ML/MIN/1.73
GLUCOSE BLD-MCNC: 110 MG/DL (ref 74–99)
HCT VFR BLD CALC: 43.8 % (ref 34–48)
HEMOGLOBIN: 14 G/DL (ref 11.5–15.5)
MCH RBC QN AUTO: 30.2 PG (ref 26–35)
MCHC RBC AUTO-ENTMCNC: 32 % (ref 32–34.5)
MCV RBC AUTO: 94.6 FL (ref 80–99.9)
PDW BLD-RTO: 15.3 FL (ref 11.5–15)
PLATELET # BLD: 390 E9/L (ref 130–450)
PMV BLD AUTO: 10.6 FL (ref 7–12)
POTASSIUM SERPL-SCNC: 4.2 MMOL/L (ref 3.5–5)
RBC # BLD: 4.63 E12/L (ref 3.5–5.5)
SODIUM BLD-SCNC: 136 MMOL/L (ref 132–146)
TOTAL PROTEIN: 8.2 G/DL (ref 6.4–8.3)
WBC # BLD: 10.5 E9/L (ref 4.5–11.5)

## 2022-05-25 PROCEDURE — 36415 COLL VENOUS BLD VENIPUNCTURE: CPT

## 2022-05-25 PROCEDURE — 85027 COMPLETE CBC AUTOMATED: CPT

## 2022-05-25 PROCEDURE — 80053 COMPREHEN METABOLIC PANEL: CPT

## 2022-05-25 PROCEDURE — 93005 ELECTROCARDIOGRAM TRACING: CPT | Performed by: ANESTHESIOLOGY

## 2022-05-25 RX ORDER — CITALOPRAM 40 MG/1
40 TABLET ORAL DAILY
Qty: 30 TABLET | Refills: 5 | Status: SHIPPED
Start: 2022-05-25 | End: 2022-10-24 | Stop reason: SDUPTHER

## 2022-05-25 ASSESSMENT — PAIN SCALES - GENERAL: PAINLEVEL_OUTOF10: 8

## 2022-05-25 ASSESSMENT — PAIN DESCRIPTION - LOCATION: LOCATION: BACK

## 2022-05-25 ASSESSMENT — PAIN DESCRIPTION - DESCRIPTORS: DESCRIPTORS: ACHING;SORE

## 2022-05-25 ASSESSMENT — PAIN DESCRIPTION - ORIENTATION: ORIENTATION: LOWER

## 2022-05-25 NOTE — PROGRESS NOTES
[facility]                                                                                                                    PRE OP INSTRUCTIONS FOR  Lisseth Ortiz        Date: 5/25/2022    Date of surgery: 5/31/22   Arrival Time: Hospital will call you between 5pm and 7pm the Friday before with your final arrival time for surgery    1. Nothing by mouth (NPO) as instructed.____Nothing solid may have water up to 6 hours prior to surgery. ________________________________________________________________    2. Take the following medications with a small sip of water on the morning of Surgery: isodordil, metoprolol, entresto, hydralazine, protonix,use dulera,  if needed zofran, allergy meds, bring rescue inhaler. 3. Diabetics may take evening dose of insulin but none after midnight. If you feel symptomatic or low blood sugar morning of surgery drink 1-2 ounces of apple juice only. 4. Aspirin, Ibuprofen, Advil, Naproxen, Vitamin E and other Anti-inflammatory products should be stopped  before surgery  as directed by your physician. Take Tylenol only unless instructed otherwise by your surgeon. 5. Check with your Doctor regarding stopping Plavix, Coumadin, Lovenox, Eliquis, Effient, or other blood thinners. 6. Do not smoke,use illicit drugs and do not drink any alcoholic beverages 24 hours prior to surgery. 7. You may brush your teeth the morning of surgery. DO NOT SWALLOW WATER    8. You MUST make arrangements for a responsible adult to take you home after your surgery. You will not be allowed to leave alone or drive yourself home. It is strongly suggested someone stay with you the first 24 hrs. Your surgery will be cancelled if you do not have a ride home. 9. PEDIATRIC PATIENTS ONLY:  A parent/legal guardian must accompany a child scheduled for surgery and plan to stay at the hospital until the child is discharged.   Please do not bring other children with you.    10. Please wear simple, loose fitting clothing to the hospital.  Valeria Holden not bring valuables (money, credit cards, checkbooks, etc.) Do not wear any makeup (including no eye makeup) or nail polish on your fingers or toes. 11. DO NOT wear any jewelry or piercings on day of surgery. All body piercing jewelry must be removed. 12. Shower the night before surgery with _x__Antibacterial soap /PROMISE WIPES________May use deodorant. No creams lotions or powders from the neck down. 13. TOTAL JOINT REPLACEMENT/HYSTERECTOMY PATIENTS ONLY---Remember to bring Blood Bank bracelet to the hospital on the day of surgery. 14. If you have a Living Will and Durable Power of  for Healthcare, please bring in a copy. 15. If appropriate bring crutches, inspirex, WALKER, CANE etc... 12. Notify your Surgeon if you develop any illness between now and surgery time, cough, cold, fever, sore throat, nausea, vomiting, etc.  Please notify your surgeon if you experience dizziness, shortness of breath or blurred vision between now & the time of your surgery. 17. If you have ___dentures, they will be removed before going to the OR; we will provide you a container. If you wear ___contact lenses or ___glasses, they will be removed; please bring a case for them. 18. To provide excellent care visitors will be limited to 2 in the room at any given time. 19. Please bring picture ID and insurance card. 20. During flu season no children under the age of 15 are permitted in the hospital for the safety of all patients. 21. Other please check in at the information desk/main lobby. Please wear a mask. Please call AMBULATORY CARE if you have any further questions.    [address]     [address]

## 2022-05-31 ENCOUNTER — ANESTHESIA (OUTPATIENT)
Dept: OPERATING ROOM | Age: 47
DRG: 403 | End: 2022-05-31
Payer: MEDICARE

## 2022-05-31 ENCOUNTER — HOSPITAL ENCOUNTER (INPATIENT)
Age: 47
LOS: 1 days | Discharge: HOME | DRG: 403 | End: 2022-06-01
Attending: SURGERY | Admitting: STUDENT IN AN ORGANIZED HEALTH CARE EDUCATION/TRAINING PROGRAM
Payer: MEDICARE

## 2022-05-31 DIAGNOSIS — L73.2 HIDRADENITIS SUPPURATIVA: ICD-10-CM

## 2022-05-31 PROBLEM — Z98.84 S/P GASTRIC BYPASS: Status: ACTIVE | Noted: 2022-05-31

## 2022-05-31 LAB
ANION GAP SERPL CALCULATED.3IONS-SCNC: 11 MMOL/L (ref 7–16)
BUN BLDV-MCNC: 9 MG/DL (ref 6–20)
CALCIUM SERPL-MCNC: 8.6 MG/DL (ref 8.6–10.2)
CHLORIDE BLD-SCNC: 102 MMOL/L (ref 98–107)
CO2: 23 MMOL/L (ref 22–29)
CREAT SERPL-MCNC: 0.8 MG/DL (ref 0.5–1)
GFR AFRICAN AMERICAN: >60
GFR NON-AFRICAN AMERICAN: >60 ML/MIN/1.73
GLUCOSE BLD-MCNC: 181 MG/DL (ref 74–99)
HBA1C MFR BLD: 5.7 % (ref 4–5.6)
HCG(URINE) PREGNANCY TEST: NEGATIVE
MAGNESIUM: 2.1 MG/DL (ref 1.6–2.6)
METER GLUCOSE: 106 MG/DL (ref 74–99)
METER GLUCOSE: 138 MG/DL (ref 74–99)
METER GLUCOSE: 162 MG/DL (ref 74–99)
METER GLUCOSE: 168 MG/DL (ref 74–99)
POTASSIUM SERPL-SCNC: 4.5 MMOL/L (ref 3.5–5)
SODIUM BLD-SCNC: 136 MMOL/L (ref 132–146)

## 2022-05-31 PROCEDURE — 80048 BASIC METABOLIC PNL TOTAL CA: CPT

## 2022-05-31 PROCEDURE — 83735 ASSAY OF MAGNESIUM: CPT

## 2022-05-31 PROCEDURE — 94640 AIRWAY INHALATION TREATMENT: CPT

## 2022-05-31 PROCEDURE — 6370000000 HC RX 637 (ALT 250 FOR IP): Performed by: SURGERY

## 2022-05-31 PROCEDURE — 6360000002 HC RX W HCPCS

## 2022-05-31 PROCEDURE — 2580000003 HC RX 258: Performed by: SURGERY

## 2022-05-31 PROCEDURE — 2500000003 HC RX 250 WO HCPCS: Performed by: SURGERY

## 2022-05-31 PROCEDURE — 82962 GLUCOSE BLOOD TEST: CPT

## 2022-05-31 PROCEDURE — 3600000005 HC SURGERY LEVEL 5 BASE: Performed by: SURGERY

## 2022-05-31 PROCEDURE — 1200000000 HC SEMI PRIVATE

## 2022-05-31 PROCEDURE — 7100000000 HC PACU RECOVERY - FIRST 15 MIN: Performed by: SURGERY

## 2022-05-31 PROCEDURE — 36415 COLL VENOUS BLD VENIPUNCTURE: CPT

## 2022-05-31 PROCEDURE — 6370000000 HC RX 637 (ALT 250 FOR IP): Performed by: NURSE PRACTITIONER

## 2022-05-31 PROCEDURE — 6360000002 HC RX W HCPCS: Performed by: SURGERY

## 2022-05-31 PROCEDURE — 0D164ZA BYPASS STOMACH TO JEJUNUM, PERCUTANEOUS ENDOSCOPIC APPROACH: ICD-10-PCS | Performed by: SURGERY

## 2022-05-31 PROCEDURE — 49905 OMENTAL FLAP INTRA-ABDOM: CPT | Performed by: SURGERY

## 2022-05-31 PROCEDURE — 2700000000 HC OXYGEN THERAPY PER DAY

## 2022-05-31 PROCEDURE — 2709999900 HC NON-CHARGEABLE SUPPLY: Performed by: SURGERY

## 2022-05-31 PROCEDURE — 81025 URINE PREGNANCY TEST: CPT

## 2022-05-31 PROCEDURE — 2720000010 HC SURG SUPPLY STERILE: Performed by: SURGERY

## 2022-05-31 PROCEDURE — 3600000015 HC SURGERY LEVEL 5 ADDTL 15MIN: Performed by: SURGERY

## 2022-05-31 PROCEDURE — 6370000000 HC RX 637 (ALT 250 FOR IP)

## 2022-05-31 PROCEDURE — 3700000000 HC ANESTHESIA ATTENDED CARE: Performed by: SURGERY

## 2022-05-31 PROCEDURE — 83036 HEMOGLOBIN GLYCOSYLATED A1C: CPT

## 2022-05-31 PROCEDURE — 43644 LAP GASTRIC BYPASS/ROUX-EN-Y: CPT | Performed by: SURGERY

## 2022-05-31 PROCEDURE — 7100000001 HC PACU RECOVERY - ADDTL 15 MIN: Performed by: SURGERY

## 2022-05-31 PROCEDURE — 2500000003 HC RX 250 WO HCPCS

## 2022-05-31 PROCEDURE — 3700000001 HC ADD 15 MINUTES (ANESTHESIA): Performed by: SURGERY

## 2022-05-31 PROCEDURE — 6360000002 HC RX W HCPCS: Performed by: NURSE PRACTITIONER

## 2022-05-31 PROCEDURE — 88305 TISSUE EXAM BY PATHOLOGIST: CPT

## 2022-05-31 PROCEDURE — 94664 DEMO&/EVAL PT USE INHALER: CPT

## 2022-05-31 RX ORDER — BUDESONIDE 0.5 MG/2ML
0.5 INHALANT ORAL 2 TIMES DAILY
Status: DISCONTINUED | OUTPATIENT
Start: 2022-05-31 | End: 2022-06-01 | Stop reason: HOSPADM

## 2022-05-31 RX ORDER — IPRATROPIUM BROMIDE AND ALBUTEROL SULFATE 2.5; .5 MG/3ML; MG/3ML
1 SOLUTION RESPIRATORY (INHALATION) ONCE
Status: COMPLETED | OUTPATIENT
Start: 2022-05-31 | End: 2022-05-31

## 2022-05-31 RX ORDER — MIDAZOLAM HYDROCHLORIDE 1 MG/ML
INJECTION INTRAMUSCULAR; INTRAVENOUS PRN
Status: DISCONTINUED | OUTPATIENT
Start: 2022-05-31 | End: 2022-05-31 | Stop reason: SDUPTHER

## 2022-05-31 RX ORDER — ONDANSETRON 2 MG/ML
4 INJECTION INTRAMUSCULAR; INTRAVENOUS EVERY 6 HOURS PRN
Status: DISCONTINUED | OUTPATIENT
Start: 2022-05-31 | End: 2022-06-01 | Stop reason: HOSPADM

## 2022-05-31 RX ORDER — ARFORMOTEROL TARTRATE 15 UG/2ML
15 SOLUTION RESPIRATORY (INHALATION) 2 TIMES DAILY
Status: DISCONTINUED | OUTPATIENT
Start: 2022-05-31 | End: 2022-06-01 | Stop reason: HOSPADM

## 2022-05-31 RX ORDER — ATORVASTATIN CALCIUM 40 MG/1
80 TABLET, FILM COATED ORAL NIGHTLY
Status: DISCONTINUED | OUTPATIENT
Start: 2022-06-01 | End: 2022-06-01 | Stop reason: HOSPADM

## 2022-05-31 RX ORDER — PROCHLORPERAZINE EDISYLATE 5 MG/ML
INJECTION INTRAMUSCULAR; INTRAVENOUS
Status: COMPLETED
Start: 2022-05-31 | End: 2022-05-31

## 2022-05-31 RX ORDER — NEOSTIGMINE METHYLSULFATE 1 MG/ML
INJECTION, SOLUTION INTRAVENOUS PRN
Status: DISCONTINUED | OUTPATIENT
Start: 2022-05-31 | End: 2022-05-31 | Stop reason: SDUPTHER

## 2022-05-31 RX ORDER — ALBUTEROL SULFATE 2.5 MG/3ML
2.5 SOLUTION RESPIRATORY (INHALATION) EVERY 4 HOURS PRN
Status: DISCONTINUED | OUTPATIENT
Start: 2022-05-31 | End: 2022-05-31 | Stop reason: SDUPTHER

## 2022-05-31 RX ORDER — KETOROLAC TROMETHAMINE 15 MG/ML
INJECTION, SOLUTION INTRAMUSCULAR; INTRAVENOUS
Status: COMPLETED
Start: 2022-05-31 | End: 2022-05-31

## 2022-05-31 RX ORDER — ONDANSETRON 2 MG/ML
4 INJECTION INTRAMUSCULAR; INTRAVENOUS ONCE
Status: COMPLETED | OUTPATIENT
Start: 2022-05-31 | End: 2022-05-31

## 2022-05-31 RX ORDER — MORPHINE SULFATE 4 MG/ML
4 INJECTION, SOLUTION INTRAMUSCULAR; INTRAVENOUS
Status: DISCONTINUED | OUTPATIENT
Start: 2022-05-31 | End: 2022-06-01 | Stop reason: HOSPADM

## 2022-05-31 RX ORDER — METOPROLOL SUCCINATE 100 MG/1
100 TABLET, EXTENDED RELEASE ORAL 2 TIMES DAILY
Status: DISCONTINUED | OUTPATIENT
Start: 2022-05-31 | End: 2022-06-01 | Stop reason: HOSPADM

## 2022-05-31 RX ORDER — PROCHLORPERAZINE EDISYLATE 5 MG/ML
10 INJECTION INTRAMUSCULAR; INTRAVENOUS EVERY 6 HOURS PRN
Status: DISCONTINUED | OUTPATIENT
Start: 2022-05-31 | End: 2022-06-01 | Stop reason: HOSPADM

## 2022-05-31 RX ORDER — ROCURONIUM BROMIDE 10 MG/ML
INJECTION, SOLUTION INTRAVENOUS PRN
Status: DISCONTINUED | OUTPATIENT
Start: 2022-05-31 | End: 2022-05-31 | Stop reason: SDUPTHER

## 2022-05-31 RX ORDER — ISOSORBIDE DINITRATE 10 MG/1
20 TABLET ORAL 3 TIMES DAILY
Status: DISCONTINUED | OUTPATIENT
Start: 2022-06-01 | End: 2022-06-01 | Stop reason: HOSPADM

## 2022-05-31 RX ORDER — MEPERIDINE HYDROCHLORIDE 25 MG/ML
12.5 INJECTION INTRAMUSCULAR; INTRAVENOUS; SUBCUTANEOUS EVERY 5 MIN PRN
Status: DISCONTINUED | OUTPATIENT
Start: 2022-05-31 | End: 2022-05-31 | Stop reason: HOSPADM

## 2022-05-31 RX ORDER — SODIUM CHLORIDE 0.9 % (FLUSH) 0.9 %
5-40 SYRINGE (ML) INJECTION PRN
Status: DISCONTINUED | OUTPATIENT
Start: 2022-05-31 | End: 2022-05-31 | Stop reason: HOSPADM

## 2022-05-31 RX ORDER — ONDANSETRON 2 MG/ML
INJECTION INTRAMUSCULAR; INTRAVENOUS PRN
Status: DISCONTINUED | OUTPATIENT
Start: 2022-05-31 | End: 2022-05-31 | Stop reason: SDUPTHER

## 2022-05-31 RX ORDER — POTASSIUM CHLORIDE 20 MEQ/1
40 TABLET, EXTENDED RELEASE ORAL PRN
Status: DISCONTINUED | OUTPATIENT
Start: 2022-05-31 | End: 2022-06-01 | Stop reason: HOSPADM

## 2022-05-31 RX ORDER — INSULIN LISPRO 100 [IU]/ML
0-6 INJECTION, SOLUTION INTRAVENOUS; SUBCUTANEOUS
Status: DISCONTINUED | OUTPATIENT
Start: 2022-05-31 | End: 2022-06-01 | Stop reason: HOSPADM

## 2022-05-31 RX ORDER — SODIUM CHLORIDE 0.9 % (FLUSH) 0.9 %
5-40 SYRINGE (ML) INJECTION EVERY 12 HOURS SCHEDULED
Status: DISCONTINUED | OUTPATIENT
Start: 2022-05-31 | End: 2022-05-31 | Stop reason: HOSPADM

## 2022-05-31 RX ORDER — POTASSIUM CHLORIDE 7.45 MG/ML
10 INJECTION INTRAVENOUS PRN
Status: DISCONTINUED | OUTPATIENT
Start: 2022-05-31 | End: 2022-06-01 | Stop reason: HOSPADM

## 2022-05-31 RX ORDER — PROCHLORPERAZINE EDISYLATE 5 MG/ML
10 INJECTION INTRAMUSCULAR; INTRAVENOUS ONCE
Status: COMPLETED | OUTPATIENT
Start: 2022-05-31 | End: 2022-05-31

## 2022-05-31 RX ORDER — MAGNESIUM SULFATE 1 G/100ML
1000 INJECTION INTRAVENOUS PRN
Status: DISCONTINUED | OUTPATIENT
Start: 2022-05-31 | End: 2022-06-01 | Stop reason: HOSPADM

## 2022-05-31 RX ORDER — SODIUM CHLORIDE, SODIUM LACTATE, POTASSIUM CHLORIDE, CALCIUM CHLORIDE 600; 310; 30; 20 MG/100ML; MG/100ML; MG/100ML; MG/100ML
INJECTION, SOLUTION INTRAVENOUS CONTINUOUS
Status: DISCONTINUED | OUTPATIENT
Start: 2022-05-31 | End: 2022-05-31 | Stop reason: HOSPADM

## 2022-05-31 RX ORDER — SODIUM CHLORIDE 9 MG/ML
25 INJECTION, SOLUTION INTRAVENOUS PRN
Status: DISCONTINUED | OUTPATIENT
Start: 2022-05-31 | End: 2022-05-31 | Stop reason: HOSPADM

## 2022-05-31 RX ORDER — FENTANYL CITRATE 50 UG/ML
INJECTION, SOLUTION INTRAMUSCULAR; INTRAVENOUS PRN
Status: DISCONTINUED | OUTPATIENT
Start: 2022-05-31 | End: 2022-05-31 | Stop reason: SDUPTHER

## 2022-05-31 RX ORDER — PROPOFOL 10 MG/ML
INJECTION, EMULSION INTRAVENOUS PRN
Status: DISCONTINUED | OUTPATIENT
Start: 2022-05-31 | End: 2022-05-31 | Stop reason: SDUPTHER

## 2022-05-31 RX ORDER — SODIUM CHLORIDE 9 MG/ML
INJECTION, SOLUTION INTRAVENOUS PRN
Status: DISCONTINUED | OUTPATIENT
Start: 2022-05-31 | End: 2022-05-31 | Stop reason: HOSPADM

## 2022-05-31 RX ORDER — INSULIN LISPRO 100 [IU]/ML
0-3 INJECTION, SOLUTION INTRAVENOUS; SUBCUTANEOUS NIGHTLY
Status: DISCONTINUED | OUTPATIENT
Start: 2022-05-31 | End: 2022-06-01 | Stop reason: HOSPADM

## 2022-05-31 RX ORDER — MEPERIDINE HYDROCHLORIDE 25 MG/ML
INJECTION INTRAMUSCULAR; INTRAVENOUS; SUBCUTANEOUS
Status: COMPLETED
Start: 2022-05-31 | End: 2022-05-31

## 2022-05-31 RX ORDER — SODIUM CHLORIDE 0.9 % (FLUSH) 0.9 %
5-40 SYRINGE (ML) INJECTION EVERY 12 HOURS SCHEDULED
Status: DISCONTINUED | OUTPATIENT
Start: 2022-05-31 | End: 2022-06-01 | Stop reason: HOSPADM

## 2022-05-31 RX ORDER — GLYCOPYRROLATE 0.2 MG/ML
INJECTION INTRAMUSCULAR; INTRAVENOUS PRN
Status: DISCONTINUED | OUTPATIENT
Start: 2022-05-31 | End: 2022-05-31 | Stop reason: SDUPTHER

## 2022-05-31 RX ORDER — HYDRALAZINE HYDROCHLORIDE 50 MG/1
50 TABLET, FILM COATED ORAL EVERY 8 HOURS SCHEDULED
Status: DISCONTINUED | OUTPATIENT
Start: 2022-06-01 | End: 2022-06-01 | Stop reason: HOSPADM

## 2022-05-31 RX ORDER — ONDANSETRON 2 MG/ML
INJECTION INTRAMUSCULAR; INTRAVENOUS
Status: COMPLETED
Start: 2022-05-31 | End: 2022-05-31

## 2022-05-31 RX ORDER — SODIUM CHLORIDE 0.9 % (FLUSH) 0.9 %
5-40 SYRINGE (ML) INJECTION PRN
Status: DISCONTINUED | OUTPATIENT
Start: 2022-05-31 | End: 2022-06-01 | Stop reason: HOSPADM

## 2022-05-31 RX ORDER — IPRATROPIUM BROMIDE AND ALBUTEROL SULFATE 2.5; .5 MG/3ML; MG/3ML
SOLUTION RESPIRATORY (INHALATION)
Status: COMPLETED
Start: 2022-05-31 | End: 2022-05-31

## 2022-05-31 RX ORDER — ASPIRIN 81 MG/1
81 TABLET, CHEWABLE ORAL DAILY
Status: DISCONTINUED | OUTPATIENT
Start: 2022-05-31 | End: 2022-06-01 | Stop reason: HOSPADM

## 2022-05-31 RX ORDER — SODIUM CHLORIDE 9 MG/ML
INJECTION, SOLUTION INTRAVENOUS PRN
Status: DISCONTINUED | OUTPATIENT
Start: 2022-05-31 | End: 2022-06-01 | Stop reason: HOSPADM

## 2022-05-31 RX ORDER — SODIUM CHLORIDE, SODIUM LACTATE, POTASSIUM CHLORIDE, CALCIUM CHLORIDE 600; 310; 30; 20 MG/100ML; MG/100ML; MG/100ML; MG/100ML
INJECTION, SOLUTION INTRAVENOUS CONTINUOUS
Status: DISCONTINUED | OUTPATIENT
Start: 2022-05-31 | End: 2022-06-01 | Stop reason: HOSPADM

## 2022-05-31 RX ORDER — SCOLOPAMINE TRANSDERMAL SYSTEM 1 MG/1
1 PATCH, EXTENDED RELEASE TRANSDERMAL
Status: DISCONTINUED | OUTPATIENT
Start: 2022-05-31 | End: 2022-05-31

## 2022-05-31 RX ORDER — BUPIVACAINE HYDROCHLORIDE AND EPINEPHRINE 2.5; 5 MG/ML; UG/ML
INJECTION, SOLUTION EPIDURAL; INFILTRATION; INTRACAUDAL; PERINEURAL PRN
Status: DISCONTINUED | OUTPATIENT
Start: 2022-05-31 | End: 2022-05-31 | Stop reason: ALTCHOICE

## 2022-05-31 RX ORDER — DEXAMETHASONE SODIUM PHOSPHATE 10 MG/ML
INJECTION, SOLUTION INTRAMUSCULAR; INTRAVENOUS PRN
Status: DISCONTINUED | OUTPATIENT
Start: 2022-05-31 | End: 2022-05-31 | Stop reason: SDUPTHER

## 2022-05-31 RX ORDER — MORPHINE SULFATE 2 MG/ML
2 INJECTION, SOLUTION INTRAMUSCULAR; INTRAVENOUS
Status: DISCONTINUED | OUTPATIENT
Start: 2022-05-31 | End: 2022-06-01 | Stop reason: HOSPADM

## 2022-05-31 RX ORDER — ALBUTEROL SULFATE 2.5 MG/3ML
2.5 SOLUTION RESPIRATORY (INHALATION) EVERY 4 HOURS PRN
Status: DISCONTINUED | OUTPATIENT
Start: 2022-05-31 | End: 2022-06-01 | Stop reason: HOSPADM

## 2022-05-31 RX ORDER — DEXTROSE MONOHYDRATE 50 MG/ML
100 INJECTION, SOLUTION INTRAVENOUS PRN
Status: DISCONTINUED | OUTPATIENT
Start: 2022-05-31 | End: 2022-06-01 | Stop reason: HOSPADM

## 2022-05-31 RX ORDER — KETOROLAC TROMETHAMINE 30 MG/ML
15 INJECTION, SOLUTION INTRAMUSCULAR; INTRAVENOUS EVERY 6 HOURS
Status: DISCONTINUED | OUTPATIENT
Start: 2022-05-31 | End: 2022-06-01 | Stop reason: HOSPADM

## 2022-05-31 RX ADMIN — FENTANYL CITRATE 50 MCG: 50 INJECTION, SOLUTION INTRAMUSCULAR; INTRAVENOUS at 09:06

## 2022-05-31 RX ADMIN — SODIUM CHLORIDE, POTASSIUM CHLORIDE, SODIUM LACTATE AND CALCIUM CHLORIDE: 600; 310; 30; 20 INJECTION, SOLUTION INTRAVENOUS at 06:33

## 2022-05-31 RX ADMIN — METOPROLOL SUCCINATE 100 MG: 100 TABLET, EXTENDED RELEASE ORAL at 21:57

## 2022-05-31 RX ADMIN — PROPOFOL 170 MG: 10 INJECTION, EMULSION INTRAVENOUS at 08:21

## 2022-05-31 RX ADMIN — IPRATROPIUM BROMIDE AND ALBUTEROL SULFATE 1 AMPULE: .5; 3 SOLUTION RESPIRATORY (INHALATION) at 09:48

## 2022-05-31 RX ADMIN — GLYCOPYRROLATE 0.6 MG: 0.2 INJECTION INTRAMUSCULAR; INTRAVENOUS at 09:13

## 2022-05-31 RX ADMIN — IPRATROPIUM BROMIDE AND ALBUTEROL SULFATE 1 AMPULE: 2.5; .5 SOLUTION RESPIRATORY (INHALATION) at 09:48

## 2022-05-31 RX ADMIN — ROCURONIUM BROMIDE 10 MG: 10 INJECTION, SOLUTION INTRAVENOUS at 08:55

## 2022-05-31 RX ADMIN — ONDANSETRON 4 MG: 2 INJECTION INTRAMUSCULAR; INTRAVENOUS at 09:06

## 2022-05-31 RX ADMIN — Medication 10 ML: at 22:13

## 2022-05-31 RX ADMIN — FENTANYL CITRATE 50 MCG: 50 INJECTION, SOLUTION INTRAMUSCULAR; INTRAVENOUS at 08:21

## 2022-05-31 RX ADMIN — PROCHLORPERAZINE EDISYLATE 10 MG: 5 INJECTION INTRAMUSCULAR; INTRAVENOUS at 10:07

## 2022-05-31 RX ADMIN — SODIUM CHLORIDE, POTASSIUM CHLORIDE, SODIUM LACTATE AND CALCIUM CHLORIDE: 600; 310; 30; 20 INJECTION, SOLUTION INTRAVENOUS at 22:40

## 2022-05-31 RX ADMIN — ONDANSETRON 4 MG: 2 INJECTION INTRAMUSCULAR; INTRAVENOUS at 10:03

## 2022-05-31 RX ADMIN — Medication 0.5 MG: at 10:15

## 2022-05-31 RX ADMIN — Medication 3 MG: at 09:13

## 2022-05-31 RX ADMIN — KETOROLAC TROMETHAMINE 15 MG: 30 INJECTION, SOLUTION INTRAMUSCULAR; INTRAVENOUS at 21:40

## 2022-05-31 RX ADMIN — ROCURONIUM BROMIDE 40 MG: 10 INJECTION, SOLUTION INTRAVENOUS at 08:21

## 2022-05-31 RX ADMIN — MEPERIDINE HYDROCHLORIDE 12.5 MG: 25 INJECTION INTRAMUSCULAR; INTRAVENOUS; SUBCUTANEOUS at 10:25

## 2022-05-31 RX ADMIN — ARFORMOTEROL TARTRATE 15 MCG: 15 SOLUTION RESPIRATORY (INHALATION) at 17:16

## 2022-05-31 RX ADMIN — HYDROMORPHONE HYDROCHLORIDE 0.5 MG: 1 INJECTION, SOLUTION INTRAMUSCULAR; INTRAVENOUS; SUBCUTANEOUS at 10:15

## 2022-05-31 RX ADMIN — KETOROLAC TROMETHAMINE 15 MG: 30 INJECTION, SOLUTION INTRAMUSCULAR; INTRAVENOUS at 16:29

## 2022-05-31 RX ADMIN — BUDESONIDE 500 MCG: 0.5 INHALANT RESPIRATORY (INHALATION) at 17:16

## 2022-05-31 RX ADMIN — MIDAZOLAM 2 MG: 1 INJECTION INTRAMUSCULAR; INTRAVENOUS at 08:11

## 2022-05-31 RX ADMIN — MORPHINE SULFATE 4 MG: 4 INJECTION, SOLUTION INTRAMUSCULAR; INTRAVENOUS at 23:54

## 2022-05-31 RX ADMIN — CEFAZOLIN SODIUM 3000 MG: 10 INJECTION, POWDER, FOR SOLUTION INTRAVENOUS at 08:25

## 2022-05-31 RX ADMIN — Medication 0.5 MG: at 09:53

## 2022-05-31 RX ADMIN — MEPERIDINE HYDROCHLORIDE 12.5 MG: 25 INJECTION, SOLUTION INTRAMUSCULAR; INTRAVENOUS; SUBCUTANEOUS at 10:25

## 2022-05-31 RX ADMIN — HYDROMORPHONE HYDROCHLORIDE 0.5 MG: 1 INJECTION, SOLUTION INTRAMUSCULAR; INTRAVENOUS; SUBCUTANEOUS at 09:53

## 2022-05-31 RX ADMIN — ONDANSETRON 4 MG: 2 INJECTION INTRAMUSCULAR; INTRAVENOUS at 06:35

## 2022-05-31 RX ADMIN — PROPOFOL 30 MG: 10 INJECTION, EMULSION INTRAVENOUS at 08:55

## 2022-05-31 RX ADMIN — MORPHINE SULFATE 2 MG: 2 INJECTION, SOLUTION INTRAMUSCULAR; INTRAVENOUS at 18:11

## 2022-05-31 RX ADMIN — SODIUM CHLORIDE, POTASSIUM CHLORIDE, SODIUM LACTATE AND CALCIUM CHLORIDE: 600; 310; 30; 20 INJECTION, SOLUTION INTRAVENOUS at 08:43

## 2022-05-31 RX ADMIN — MORPHINE SULFATE 2 MG: 2 INJECTION, SOLUTION INTRAMUSCULAR; INTRAVENOUS at 13:51

## 2022-05-31 RX ADMIN — PROCHLORPERAZINE EDISYLATE 10 MG: 5 INJECTION INTRAMUSCULAR; INTRAVENOUS at 15:22

## 2022-05-31 RX ADMIN — KETOROLAC TROMETHAMINE 15 MG: 15 INJECTION, SOLUTION INTRAMUSCULAR; INTRAVENOUS at 10:11

## 2022-05-31 RX ADMIN — DEXAMETHASONE SODIUM PHOSPHATE 10 MG: 10 INJECTION, SOLUTION INTRAMUSCULAR; INTRAVENOUS at 08:29

## 2022-05-31 RX ADMIN — SODIUM CHLORIDE, POTASSIUM CHLORIDE, SODIUM LACTATE AND CALCIUM CHLORIDE: 600; 310; 30; 20 INJECTION, SOLUTION INTRAVENOUS at 11:57

## 2022-05-31 ASSESSMENT — PAIN DESCRIPTION - LOCATION
LOCATION: ABDOMEN

## 2022-05-31 ASSESSMENT — PAIN SCALES - GENERAL
PAINLEVEL_OUTOF10: 7
PAINLEVEL_OUTOF10: 6
PAINLEVEL_OUTOF10: 0
PAINLEVEL_OUTOF10: 7
PAINLEVEL_OUTOF10: 8
PAINLEVEL_OUTOF10: 0
PAINLEVEL_OUTOF10: 9
PAINLEVEL_OUTOF10: 5
PAINLEVEL_OUTOF10: 10

## 2022-05-31 ASSESSMENT — PAIN - FUNCTIONAL ASSESSMENT
PAIN_FUNCTIONAL_ASSESSMENT: PREVENTS OR INTERFERES SOME ACTIVE ACTIVITIES AND ADLS
PAIN_FUNCTIONAL_ASSESSMENT: ACTIVITIES ARE NOT PREVENTED
PAIN_FUNCTIONAL_ASSESSMENT: NONE - DENIES PAIN
PAIN_FUNCTIONAL_ASSESSMENT: ACTIVITIES ARE NOT PREVENTED

## 2022-05-31 ASSESSMENT — PAIN DESCRIPTION - FREQUENCY: FREQUENCY: CONTINUOUS

## 2022-05-31 ASSESSMENT — PAIN SCALES - WONG BAKER
WONGBAKER_NUMERICALRESPONSE: 0
WONGBAKER_NUMERICALRESPONSE: 0

## 2022-05-31 ASSESSMENT — LIFESTYLE VARIABLES: SMOKING_STATUS: 1

## 2022-05-31 ASSESSMENT — PAIN DESCRIPTION - DESCRIPTORS
DESCRIPTORS: ACHING;CRAMPING;DISCOMFORT
DESCRIPTORS: ACHING;CRAMPING;DISCOMFORT
DESCRIPTORS: ACHING
DESCRIPTORS: ACHING;THROBBING

## 2022-05-31 ASSESSMENT — PAIN DESCRIPTION - ONSET: ONSET: ON-GOING

## 2022-05-31 ASSESSMENT — PAIN DESCRIPTION - ORIENTATION
ORIENTATION: MID;UPPER
ORIENTATION: MID;UPPER

## 2022-05-31 ASSESSMENT — PAIN DESCRIPTION - PAIN TYPE: TYPE: SURGICAL PAIN

## 2022-05-31 NOTE — SIGNIFICANT EVENT
Was notified by the RN providing care for the patient in the PACU that the patient was having bouts of trigeminy. She has extensive cardiac history as documented in the consult note. She is asymptomatic with only postsurgical discomfort at the time of repeat examination. She is on supplemental nasal cannula oxygen at 5 L and does have a history of obstructive sleep apnea. Home NIPPV therapy will need to be resumed once okayed by the bariatric surgery team.  Telemetry capabilities will be needed in the postoperative phase and we will transfer to telemetry once cleared to leave the PACU as per their protocol. Stat BMP and magnesium level be assessed as well we will maintain magnesium greater than 2, potassium greater than 4.

## 2022-05-31 NOTE — CONSULTS
Department of Internal Medicine  Internal Medicine Consultation Note    Primary Care Physician: Prashant Guthrie PA-C   Admitting Physician:  Inge Yepez MD  Admission date and time: 5/31/2022  5:47 AM    Room:  OR POOL/NONE  Admitting diagnosis: Morbid obesity [facility]) [E66.01]    Patient Name: Triston Martinez  MRN: [id number]    Date of Service: 5/31/2022     Reason for consultation:  Medical management     HISTORY OF PRESENT ILLNESS:    Triston Martinez 46-year-old female patient who presented to [address] [place] [address] for planned bariatric surgery. She is slated for gastric bypass by Dr. Anay Rodriguez today. She is at her baseline state of health prior to the procedure. She has undergone clearance by both her primary care provider and cardiology given her extensive cardiovascular history. She voices no untoward symptoms or complaints this morning prior to the operation. Denies any associated fever or chills. Denies chest pains, shortness of breath, cough with phlegm or hemoptysis. Denies abdominal pains, nausea, vomiting, change in bowel patterns, hematochezia or melena. Denies dysuria, hematuria, suprapubic or flank pains.       PAST MEDICAL Hx:  Past Medical History:   Diagnosis Date    Asthma     Bell palsy     drooping    CHF (congestive heart failure) ([address]     [id number] wore life vest dr Robert Show DDD (degenerative disc disease), cervical     with spinal stenosis    Diabetes mellitus ([address]     Diverticulitis     DJD (degenerative joint disease)     both hips    GERD without esophagitis     HTN (hypertension)     Hyperlipidemia     Meningitis 2009    Morbid obesity due to excess calories (HCC)     Neuropathy     Pancreas cyst     Sleep apnea     bipap    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[address]        PAST SURGICAL Hx:   Past Surgical History:   Procedure Laterality Date    CHOLECYSTECTOMY  2009    CYSTOSCOPY Left 01/14/2018    left stent placement    DILATION AND CURETTAGE OF UTERUS      younger age   Riki Mccoy LITHOTRIPSY Right 02/15/2018    Cystoscopy;Stent Removal    UPPER GASTROINTESTINAL ENDOSCOPY N/A 6/22/2018    EGD BIOPSY performed by Inge Yepez MD at [facility] N/A 8/20/2021    EGD BIOPSY performed by Inge Yepez MD at [address] [place] [address] Hx:  Family History   Problem Relation Age of Onset    Arthritis Mother     Hypertension Mother     Asthma Mother     Cancer Father     Hypertension Father     Heart Attack Father     Asthma Father        HOME MEDICATIONS:  Prior to Admission medications    Medication Sig Start Date End Date Taking? Authorizing Provider   citalopram (CELEXA) 40 MG tablet TAKE 1 TABLET BY MOUTH DAILY 5/25/22   Vicci Cancer, PA-C   omeprazole (PRILOSEC) 20 MG delayed release capsule Take 1 capsule by mouth Daily 5/18/22 5/18/23  Inge Yepez MD   ondansetron (ZOFRAN-ODT) 4 MG disintegrating tablet Take 1 tablet by mouth every 8 hours as needed for Nausea or Vomiting 5/18/22   Inge Yepez MD   ibuprofen (ADVIL;MOTRIN) 600 MG tablet Take 600 mg by mouth every 6 hours as needed for Pain    Historical Provider, MD   sacubitril-valsartan (ENTRESTO)  MG per tablet Take 1 tablet by mouth 2 times daily 5/16/22   Vicci Cancer, PA-C   mineral oil-hydrophilic petrolatum (AQUAPHOR) ointment Apply topically as needed. 5/16/22   Vicci Cancer, PA-C   aspirin 81 MG chewable tablet Take 1 tablet by mouth daily 5/16/22   Vicci Cancer, PA-C   guaiFENesin [facility]) 600 MG extended release tablet Take 1 tablet by mouth 2 times daily for 15 days 5/16/22 5/31/22  Vicci Cancer, PA-C   gabapentin (NEURONTIN) 600 MG tablet Take 1 tablet by mouth 3 times daily for 90 days. 5/6/22 8/4/22  Meek Matthews MD   oxyCODONE-acetaminophen (PERCOCET) 5-325 MG per tablet Take 1 tablet by mouth every 6 hours as needed for Pain for up to 30 days.  I 6/5/22 7/5/22  Meek Matthews MD   potassium chloride (KLOR-CON M) 20 MEQ extended release tablet TAKE 1 TABLET BY MOUTH DAILY WITH BREAKFAST 4/28/22   Isabel Guerrero PA-C   ondansetron (ZOFRAN-ODT) 4 MG disintegrating tablet Place 2 tablets under the tongue every 8 hours as needed for Nausea or Vomiting 4/13/22   Isabel Guerrero PA-C   pantoprazole (PROTONIX) 20 MG tablet Take 1 tablet by mouth daily 4/13/22   Isabel Guerrero PA-C   nystatin (MYCOSTATIN) 463351 UNIT/GM powder Apply 3 times daily. 4/13/22   Isabel Guerrero PA-C   albuterol sulfate HFA (PROAIR HFA) 108 (90 Base) MCG/ACT inhaler Inhale 2 puffs into the lungs every 4 hours as needed for Wheezing or Shortness of Breath 4/13/22   Isabel Guerrero PA-C   bumetanide (BUMEX) 1 MG tablet TAKE 1 TABLET BY MOUTH DAILY ALTERNATING WITH 2 TABLETS EVERY OTHER DAY 4/4/22   Isabel Guerrero PA-C   isosorbide dinitrate (ISORDIL) 20 MG tablet Take 1 tablet by mouth 3 times daily 2/22/22   Fercho Knox MD   metoprolol succinate (TOPROL XL) 100 MG extended release tablet TAKE 1 TABLET BY MOUTH TWICE DAILY 1/12/22   Isabel Guerrero PA-C   atorvastatin (LIPITOR) 80 MG tablet TAKE 1 TABLET BY MOUTH EVERY NIGHT 1/12/22   Isabel Guerrero PA-C   hydrALAZINE (APRESOLINE) 50 MG tablet TAKE 1 TABLET BY MOUTH EVERY 8 HOURS 1/12/22   Isabel Guerrero PA-C   cetirizine (ZYRTEC) 10 MG tablet Take 1 tablet by mouth daily 1/3/22   Isabel Guerrero PA-C   fluconazole (DIFLUCAN) 150 MG tablet  10/25/21   Historical Provider, MD   Dulaglutide (TRULICITY) 5.68 TJ/9.3BM SOPN Inject 0.75 mg into the skin once a week  Patient taking differently: Inject 0.75 mg into the skin once a week monday 10/26/21   Isabel Guerrero PA-C   buPROPion (WELLBUTRIN XL) 150 MG extended release tablet Take 150 mg by mouth as needed  10/11/21   Historical Provider, MD   clotrimazole-betamethasone (LOTRISONE) 1-0.05 % cream Apply topically 2 times daily.  10/25/21   Isabel Guerrero PA-C   Cholecalciferol (VITAMIN D) 50 MCG (2000 UT) CAPS capsule Take 1 capsule by mouth daily 10/25/21   Isabel Guerrero PA-C   mometasone-formoterol [facility]) 100-5 MCG/ACT inhaler Inhale 2 puffs into the lungs 2 times daily 6/17/21   Pilo Love DO   Continuous Blood Gluc  (FREESTYLE MARY READER) MARIJA 4 Devices by Does not apply route daily 5/14/21   Iliana Mcneal PA-C   Continuous Blood Gluc Sensor ([facility]) [facility] 4 Devices by Does not apply route daily 5/14/21   Iliana Mcneal PA-C   albuterol (PROVENTIL) (2.5 MG/3ML) 0.083% nebulizer solution Take 3 mLs by nebulization every 6 hours as needed for Wheezing 3/23/21   Iliana Mcneal PA-C   spironolactone (ALDACTONE) 25 MG tablet TAKE 1 TABLET BY MOUTH EVERY DAY 3/22/21   Eron Markham MD   fluticasone Rosezena Gaucher) 50 MCG/ACT nasal spray 2 sprays by Nasal route daily  Patient taking differently: 2 sprays by Nasal route daily as needed  1/8/21   Iliana Mcneal PA-C   Continuous Blood Gluc Sensor (FREESTYLE MARY 2 SENSOR SYSTM) MISC 1 Device by Does not apply route daily 1/8/21   Iliana Mcneal PA-C   Multiple Vitamin (DAILY-DOUGLAS) TABS TAKE 1 TABLET BY MOUTH EVERY DAY 6/26/20   Pilo Love DO   ipratropium-albuterol (DUONEB) 0.5-2.5 (3) MG/3ML SOLN nebulizer solution Take 3 mLs by nebulization every 4 hours as needed for Shortness of Breath 5/1/20   Pablo Gtz MD   Insulin Pen Needle ([employer] PEN NEEDLES) 31G X 6 MM MISC 1 each by Does not apply route daily 10/14/19   Iliana Mcneal PA-C   ONE TOUCH ULTRA TEST strip 1 each by Does not apply route daily 9/17/19   Iliana Mcneal PA-C   Handicap Placard MISC by Does not apply route Patient cannot walk 200 ft without stopping to rest.    Expiration 5 yrs 2/22/19   Iliana Mcneal PA-C       ALLERGIES:  Food, Ultram Coco Pastel hcl], Fish-derived products, Norco [hydrocodone-acetaminophen], Nuts [peanut-containing drug products], Pcn [penicillins], and Cashews [macadamia nut oil]    SOCIAL Hx:  Social History     Socioeconomic History    Marital status: Legally      Spouse name: Not on file    Number of children: Not on file  Years of education: Not on file    Highest education level: Not on file   Occupational History    Occupation: direct care staff/counselor   Tobacco Use    Smoking status: Former Smoker     Packs/day: 0.25     Years: 17.00     Pack years: 4.25     Types: Cigarettes     Start date: 3/27/2001     Quit date: 10/5/2018     Years since quitting: 3.6    Smokeless tobacco: Never Used    Tobacco comment: occasionally has 1 cigarette, very stressed now   Vaping Use    Vaping Use: Former   Substance and Sexual Activity    Alcohol use: No    Drug use: No    Sexual activity: Yes   Other Topics Concern    Not on file   Social History Narrative    Not on file     Social Determinants of Health     Financial Resource Strain: Unknown    Difficulty of Paying Living Expenses: Patient refused   Food Insecurity: Unknown    Worried About Running Out of Food in the Last Year: Patient refused    [address] in the Last Year: Patient refused   Transportation Needs:     Lack of Transportation (Medical): Not on file    Lack of Transportation (Non-Medical):  Not on file   Physical Activity:     Days of Exercise per Week: Not on file    Minutes of Exercise per Session: Not on file   Stress:     Feeling of Stress : Not on file   Social Connections:     Frequency of Communication with Friends and Family: Not on file    Frequency of Social Gatherings with Friends and Family: Not on file    Attends Restorationism Services: Not on file    Active Member of Clubs or Organizations: Not on file    Attends Club or Organization Meetings: Not on file    Marital Status: Not on file   Intimate Partner Violence:     Fear of Current or Ex-Partner: Not on file    Emotionally Abused: Not on file    Physically Abused: Not on file    Sexually Abused: Not on file   Housing Stability:     Unable to Pay for Housing in the Last Year: Not on file    Number of [place] in the Last Year: Not on file    Unstable Housing in the Last Year: Not on file     ROS: 12 point review of symptoms was conducted, pertinent positives and negative were reviewed, aside from that all 12 systems were reviewed and negative. PHYSICAL EXAM:  VITALS:  Vitals:    05/31/22 0615   BP: (!) 127/58   Pulse: 58   Resp: 16   Temp: 97.7 °F (36.5 °C)   SpO2: 97%         CONSTITUTIONAL:    Awake, alert, cooperative, no apparent distress    EYES:    PERRL, EOMI, sclera clear without icterus, conjunctiva normal    ENT:    Normocephalic, atraumatic, sinuses nontender on palpation. External ears without lesions. Oral pharynx with moist mucus membranes. NECK:    Supple, symmetrical, trachea midline, no adenopathy, thyroid symmetric, not enlarged and no tenderness, skin normal, no bruits, no JVD    HEMATOLOGIC/LYMPHATICS:    No cervical lymphadenopathy and no supraclavicular lymphadenopathy    LUNGS:    Symmetric. No increased work of breathing, mildly diminished air exchange, clear to auscultation bilaterally, no wheezes, rhonchi, or rales,     CARDIOVASCULAR:    Normal apical impulse, regular rate and rhythm, normal S1 and S2, systolic murmur noted    ABDOMEN:    Morbidly obese contour, normal bowel sounds, soft, non-distended, non-tender, no rebound or guarding elicited on palpation     MUSCULOSKELETAL:    There is no redness, warmth, or swelling of the joints. Tone is normal.    NEUROLOGIC:    Awake, alert, oriented to name, place and time. Cranial nerves II-XII are grossly intact. Motor is 5 out of 5 bilaterally. SKIN:    No bruising or bleeding. No redness, warmth, or swelling    EXTREMITIES:    Peripheral pulses present. No edema, cyanosis, or swelling.     LABORATORY DATA:  CBC with Differential:    Lab Results   Component Value Date    WBC 10.5 05/25/2022    RBC 4.63 05/25/2022    HGB 14.0 05/25/2022    HCT 43.8 05/25/2022     05/25/2022    MCV 94.6 05/25/2022    MCH 30.2 05/25/2022    MCHC 32.0 05/25/2022    RDW 15.3 05/25/2022    LYMPHOPCT 26.0 04/13/2022    MONOPCT 5.1 04/13/2022    BASOPCT 0.5 04/13/2022    MONOSABS 0.64 04/13/2022    LYMPHSABS 3.23 04/13/2022    EOSABS 0.30 04/13/2022    BASOSABS 0.06 04/13/2022     BMP:    Lab Results   Component Value Date     05/25/2022    K 4.2 05/25/2022    K 4.5 04/29/2020    CL 99 05/25/2022    CO2 25 05/25/2022    BUN 17 05/25/2022    LABALBU 4.1 05/25/2022    CREATININE 1.0 05/25/2022    CALCIUM 9.8 05/25/2022    GFRAA >60 05/25/2022    LABGLOM >60 05/25/2022    GLUCOSE 110 05/25/2022    GLUCOSE 92 01/06/2011       ASSESSMENT:  · Morbid obesity with BMI 56.68 kg per metered squared with planned gastric bypass surgery 5/31/2022 by Dr. Katelyn Izaguirre  · Nonischemic cardiomyopathy with severe LV systolic dysfunction, medically optimized most recent echo showing improved systolic function and normal EF  · Essential hypertension  · Diabetes mellitus type 2  · Hyperlipidemia  · Asthma without acute exacerbation  · Obstructive sleep apnea with NIPPV use as an outpatient  · Depression/anxiety    PLAN:  Hung Johns is a 70-year-old female patient who is slated for gastric bypass surgery today by Dr. Katelyn Izaguirre. She has extensive past medical history and is undergone clearance procedure by both primary care and cardiology. We will attempt to resume home medication regimen as able tomorrow given her known severe cardiomyopathy and most recent echo showed improved LV function with essentially normal LVEF. Symptomatic and supportive care, dietary advancement and activity as per the surgery team. Routine lab will be assessed including A1c, lipid panel and thyroid studies will be addressed accordingly. Underlying co-morbidites will be addressed during hospitalization as well. Labs and vital signs will be monitored closely and addressed accordingly. See additional orders for details.      SANJUANA Smith CNP  8:37 AM  5/31/2022

## 2022-05-31 NOTE — H&P
Elizabeth Thomas  5/31/2022  [facility]               History and Physical  Gastric Bypass     CHIEF COMPLAINT: Morbid obesity, Hypertension, Hyperlipidemia, Obstructive Sleep Apnea, GERD and Degenerative Joint Disease (DJD)    HISTORY OF PRESENT ILLNESS: Elizabeth Thomas is a morbidly-obese 41 y.o.  female, who weighs (!) 382 lb (173.3 kg). She is 232 pounds over her ideal body weight. The There is no height or weight on file to calculate BMI. She has lost 28 pounds over the past several months in preparation for surgery. She has multiple medical problems aggravated by her obesity. She wishes to have a gastric bypass so that she can lose more weight and keep the weight off. I have met with her on 2 different occasions in the [facility], where we discussed the surgery in great detail and went over the risks and benefits. She has watched our informational video so she understands all of the extensive risks involved. She states that she understands all of these risks and wishes to proceed.     Past Medical History  Patient Active Problem List   Diagnosis    Essential hypertension    Mild intermittent asthma without complication    Morbid obesity ([address]    Reactive depression    Anxiety    Pyelonephritis    Ureteric stone    Tobacco dependence    Hydronephrosis with urinary obstruction due to renal calculus    Pancreatic cyst    Frequent PVCs    Hyperkalemia    Spinal stenosis of lumbar region with neurogenic claudication    Primary osteoarthritis of both hips    Gastroesophageal reflux disease without esophagitis    Vitamin D deficiency    Diabetes mellitus ([address]    Hyperlipidemia LDL goal <70    JODI (obstructive sleep apnea)    Chronic pain syndrome    Lumbar radiculopathy    Chronic bilateral low back pain with bilateral sciatica    Hidradenitis suppurativa    Cardiomyopathy ([address]    Malnutrition ([address]    Current moderate episode of major depressive disorder without prior episode ([facility]    Heart failure ([facility]    Toothache     Past Surgical History:   Procedure Laterality Date    CHOLECYSTECTOMY  2009    CYSTOSCOPY Left 01/14/2018    left stent placement    DILATION AND CURETTAGE OF UTERUS      younger age   Chinle Sicard LITHOTRIPSY Right 02/15/2018    Cystoscopy;Stent Removal    UPPER GASTROINTESTINAL ENDOSCOPY N/A 6/22/2018    EGD [facility]OPSY [facility] Manuel [facility]o MD at [facility] N/A 8/20/2021    EGD BIOPSY performed by Manuel [facility]o MD at [facility] ENDOSCOPY      Allergies: Food, Ultram Corwin Koroma hcl], Fish-derived products, Norco [hydrocodone-acetaminophen], Nuts [peanut-containing drug products], Pcn [penicillins], and Cashews [macadamia nut oil]   Family History   Problem Relation Age of Onset    Arthritis Mother     Hypertension Mother    Chinle Sicard Asthma Mother     Cancer Father     Hypertension Father     Heart Attack Father     Asthma Father        Medications:  Current Facility-Administered Medications   Medication Dose Route Frequency Provider Last Rate Last Admin    0.9 % sodium chloride infusion  25 mL IntraVENous PRN Manuel Romo MD        ceFAZolin (ANCEF) 3,000 mg in dextrose 5 % 100 mL IVPB  3,000 mg IntraVENous On Call to [address] [place] [address] 58MD Nyla        lactated ringers infusion   IntraVENous Continuous Manuel MD Clarissa        ondansetron [facility]) injection 4 mg  4 mg IntraVENous Once Manuel MD Clarissa        scopolamine (TRANSDERM-SCOP) transdermal patch 1 patch  1 patch TransDERmal Q72H Manuel MD Clarissa        sodium chloride flush 0.9 % injection 5-40 mL  5-40 mL IntraVENous 2 times per day Manuel MD Clarissa        sodium chloride flush 0.9 % injection 5-40 mL  5-40 mL IntraVENous PRN Manuel MD Clarissa            Social History     Tobacco Use    Smoking status: Former Smoker     Packs/day: 0.25     Years: 17.00     Pack years: 4.25     Types: Cigarettes     Start date: 3/27/2001     Quit date: 10/5/2018     Years since quitting: 3.6    Smokeless tobacco: Never Used    Tobacco comment: occasionally has 1 cigarette, very stressed now   Substance Use Topics    Alcohol use: No      Review of Systems    Review of Systems - General ROS: negative for - chills, fatigue or malaise  ENT ROS: negative for - hearing change, nasal congestion or nasal discharge  Allergy and Immunology ROS: negative for - hives, itchy/watery eyes or nasal congestion  Hematological and Lymphatic ROS: negative for - blood clots, blood transfusions, bruising or fatigue  Endocrine ROS: negative for - malaise/lethargy, mood swings, palpitations or polydipsia/polyuria  Breast ROS: negative for - new or changing breast lumps or nipple changes  Respiratory ROS: negative for - sputum changes, stridor, tachypnea or wheezing  Cardiovascular ROS: negative for - irregular heartbeat, loss of consciousness, murmur or orthopnea  Gastrointestinal ROS: negative for - constipation, diarrhea, gas/bloating, heartburn or hematemesis  Genito-Urinary ROS: negative for - erectile dysfunction, genital discharge, genital ulcers or hematuria  Musculoskeletal ROS: negative for - gait disturbance, muscle pain or muscular weakness      Physical Exam:   Vitals: BP (!) 127/58   Pulse 58   Temp 97.7 °F (36.5 °C) (Infrared)   Resp 16   LMP 05/12/2022 (Exact Date)   SpO2 97%     General appearance: alert, appears stated age and cooperative, does ambulate easily. Head: Normocephalic, without obvious abnormality, atraumatic  Neck: no adenopathy, no carotid bruit, no JVD, supple, symmetrical, trachea midline and thyroid not enlarged,   Lungs: clear to auscultation bilaterally  Heart: regular rate and rhythm  Abdomen: soft, non-tender; bowel sounds normal; no masses,  no organomegaly  Extremities: extremities normal, atraumatic, no cyanosis or edema    Assessment:  Morbid obesity with failure of conservative therapy.  Patient has been cleared psychologically and medically. The gall bladder is out. Upper endoscopy showed  hiatal hernia. The patient was informed that risks include, but are not limited to: death, anastomotic leak, obstruction, bleeding, and sepsis. Any of these could require further surgery. Other risks include heart attack, DVT, PE, pneumonia, hernia, wound infection, the need for dilatations of the gastrojejunostomy, and the inability to lose appropriate weight and keep it off. We may not be able to do a Gastic Bypass, in that case we will do a Sleeve Gastrectomy. We discussed that our goal is to ameliorate the medical problems and not to obtain a specific body mass index. She understands the risks and benefits and wishes to proceed with the procedure. She has signed a consent form. Plan:  ([id number]) Laparoscopic Kendall-en-Y Gastric Bypass possible hiatal hernia repair, possible robot assistance. She does not need Lovenox post-op. Physician Signature: Electronically signed by Dr. Wilber Aranda name on file.     Send copy of H&P to PCP, Starr Rollins PA-C

## 2022-05-31 NOTE — ANESTHESIA POSTPROCEDURE EVALUATION
Department of Anesthesiology  Postprocedure Note    Patient: Bridget Stein  MRN: [id number]  YOB: 1975  Date of evaluation: 5/31/2022  Time:  11:55 AM     Procedure Summary     Date: 05/31/22 Room / Location: [address] [place] [address] 03 / [address] [facility], [place]    Anesthesia Start: 8296 Anesthesia Stop: 1666    Procedure: GASTRIC BYPASS MARGUERITE-EN-Y LAPAROSCOPIC (N/A Abdomen) Diagnosis: (MORBID OBESITY)    Surgeons: Lora Mcguire MD Responsible Provider: Montana Bojorquez MD    Anesthesia Type: MAC, general ASA Status: 3          Anesthesia Type: No value filed. Leandra Phase I: Leandra Score: 8    Leandra Phase II:      Last vitals: Reviewed and per EMR flowsheets.        Anesthesia Post Evaluation    Patient location during evaluation: PACU  Patient participation: complete - patient participated  Level of consciousness: awake  Pain score: 0  Airway patency: patent  Nausea & Vomiting: no nausea  Complications: no  Cardiovascular status: blood pressure returned to baseline  Respiratory status: acceptable  Hydration status: euvolemic

## 2022-05-31 NOTE — PROGRESS NOTES
Patient felt that she urinated after ambulating to the bathroom. There was blue  in the toilet so I was unable to tell if there was urine in the toilet. Patient was bladder scanned and only 68 cc urine found in bladder. Will pass on to midnight shift to make sure that she voids. Charge notified.

## 2022-05-31 NOTE — PROGRESS NOTES
Patient has drank 30 mL every 30 min to an hour. She states she has mild fullness and discomfort. Patient has had a total of five ounces of fluid without emesis.

## 2022-05-31 NOTE — OP NOTE
Deisy Henderson    Operative Report  DATE OF PROCEDURE: 5/31/2022  SURGEON: Dr. Yousuf Oneill MD, M.D.   Connie American: Dr. Dhruv Shi MD (no adequate resident available)  PREOPERATIVE DIAGNOSES:   Patient Active Problem List   Diagnosis    Essential hypertension    Mild intermittent asthma without complication    Morbid obesity ([address]    Reactive depression    Anxiety    Pyelonephritis    Ureteric stone    Tobacco dependence    Hydronephrosis with urinary obstruction due to renal calculus    Pancreatic cyst    Frequent PVCs    Hyperkalemia    Spinal stenosis of lumbar region with neurogenic claudication    Primary osteoarthritis of both hips    Gastroesophageal reflux disease without esophagitis    Vitamin D deficiency    Diabetes mellitus ([address]    Hyperlipidemia LDL goal <70    JODI (obstructive sleep apnea)    Chronic pain syndrome    Lumbar radiculopathy    Chronic bilateral low back pain with bilateral sciatica    Hidradenitis suppurativa    Cardiomyopathy ([address]    Malnutrition ([address]    Current moderate episode of major depressive disorder without prior episode ([address]    Heart failure ([address]    Toothache    S/P gastric bypass       Morbid obesity 278.01  POSTOPERATIVE DIAGNOSES:   Same    OPERATION:   1) Laparoscopic Kendall-en-Y gastric bypass. 2) mobilization and placement of omental flap    ANESTHESIA: General endotracheal.   ESTIMATED BLOOD LOSS: minimal mL. COMPLICATIONS: None. HISTORY: Pao Healy is a morbidly-obese 41 y.o.  female, who weighs 378 pounds. The BMI is 56.68 She has multiple medical problems aggravated by her obesity. She wishes to have a gastric bypass so that she can lose more weight and keep the weight off. She understands the extensive risks involved in the surgery and wishes to proceed.      PROCEDURE: The patient was placed on the table in the supine position and placed under general endotracheal anesthesia. She had SCDs on the legs as DVT prophylaxis. She had IV abx. Orogastric tube placed in the stomach, then removed, Olson placed and left in the bladder. The abdomen was shaved, then prepped with ChloroPrep and draped in the usual sterile fashion. Then, 0.25% Marcaine plus epinephrine was injected into the skin and muscle of each incision to help with postoperative pain control. A 5-mm incision was made above and left of the umbilicus. Varies needle used to insufflate with CO2. A 5 mm trocar and 5 mm 45 degree angled scope was placed. A 12 mm trocar was placed in the right mid abdomen, a 5-mm trocar in the left midabdomen, 5-mm trocar in the right lateral subcostal region. The head of the bed was elevated. The liver was large. Venus Caden liver retractor was placed below the xiphoid. Liver was retracted. There was no hiatal hernia. Pars flaccida clear area was opened with harmonic scalpel. The lesser omental vessels were taken down to the lesser curvature of the stomach. The Ethicon BOOGIE 60 blue cartridge was fired horizontally across the stomach to start the pouch. Three more firings vertically out through an opening at the angle of HIS. Hook cautery was used to make a small hole in the posterior wall at the distal gastric staple line. The ligament of Treitz was identified and measured 150 cm and the loop was brought up to the stomach pouch. The jejunum at that point was sewn to the pouch with two running v loc absorbable sutures. A 36 F bougie was placed by Anaesthesia through the anastomosis into the oral channel. Harmonic was used to make a small opening in the mesentery of the biliary limb and it was then divided with the BOOGIE white cartridge lateral to the gastric anastomosis. Pouch size approximately 30 mL. The Bougie was removed. The small bowel was then run 100 cm from the anastomosis and a loop of jejunum was brought up for the distal anastomosis using the triple-staple technique.  The 2 limbs were held together with a stay suture. Hook cautery was used to make a small opening in each limb. The BOOGIE 60 white cartridge was fired, inside the lumen in opposite directions. The enterotomy was closed transversely with a BOOGIE 60 white cartridge, finishing the distal anastomosis. The mesenteric defect was closed with a running absorbable v loc to prevent internal hernias. The abdomen was filled with saline. A bowel clamp was placed on the jejunum distal to the gastrojejunal anastomosis. An Endoscope was passed down through the mouth. Old blood was removed with suction. The pouch was inflated with air. There was no bubbling and  no leak was seen from any of the staple lines/suture lines indicating they were airtight and watertight. The anastomosis was open approximately 14 mm. The scope was easily passed through the anastomosis into the jejunum. All of the mucosa looked healthy. The scope was withdrawn. The anastomosis was wrapped with omentum after omentum was mobilized with cautery and tacked with a v lock suture. All of the fluid in the abdomen was removed with suction. All of the CO2 was released. The trocars were removed. Skin wounds were closed with 4-0 Monocryl dermal stitches and Derma+flex glue was applied. The needle and sponge count were correct. The patient tolerated the procedure well and went to recovery in stable condition.      Physician Signature: Electronically signed by Dr. Mu Hutchinson MD, M.D.

## 2022-05-31 NOTE — ANESTHESIA PRE PROCEDURE
Department of Anesthesiology  Preprocedure Note       Name:  Maikel Ga   Age:  41 y.o.  :  1975                                          MRN:  [id number]         Date:  2022      Surgeon: Jackson Chopra):  Josue Rodriguez MD    Procedure: Procedure(s):  GASTRIC BYPASS MARGUERITE-EN-Y LAPAROSCOPIC    Medications prior to admission:   Prior to Admission medications    Medication Sig Start Date End Date Taking? Authorizing Provider   citalopram (CELEXA) 40 MG tablet TAKE 1 TABLET BY MOUTH DAILY 22   Murali Galeano PA-C   omeprazole (PRILOSEC) 20 MG delayed release capsule Take 1 capsule by mouth Daily 22  Josue Rodriguez MD   ondansetron (ZOFRAN-ODT) 4 MG disintegrating tablet Take 1 tablet by mouth every 8 hours as needed for Nausea or Vomiting 22   Josue Rodriguez MD   ibuprofen (ADVIL;MOTRIN) 600 MG tablet Take 600 mg by mouth every 6 hours as needed for Pain    Historical Provider, MD   sacubitril-valsartan (ENTRESTO)  MG per tablet Take 1 tablet by mouth 2 times daily 22   Murali Galeano PA-C   mineral oil-hydrophilic petrolatum (AQUAPHOR) ointment Apply topically as needed. 22   Murali Galeano PA-C   aspirin 81 MG chewable tablet Take 1 tablet by mouth daily 22   Murali Galeano PA-C   guaiFENesin [facility]) 600 MG extended release tablet Take 1 tablet by mouth 2 times daily for 15 days 22  Murali Galeano PA-C   gabapentin (NEURONTIN) 600 MG tablet Take 1 tablet by mouth 3 times daily for 90 days. 22  Familia Vaca MD   oxyCODONE-acetaminophen (PERCOCET) 5-325 MG per tablet Take 1 tablet by mouth every 6 hours as needed for Pain for up to 30 days.  I 22  Familia Vaca MD   potassium chloride (KLOR-CON M) 20 MEQ extended release tablet TAKE 1 TABLET BY MOUTH DAILY WITH BREAKFAST 22   Murali Galeano PA-C   ondansetron (ZOFRAN-ODT) 4 MG disintegrating tablet Place 2 tablets under the tongue every 8 hours as needed for Nausea or Vomiting 4/13/22   Joseph Pelaez PA-C   pantoprazole (PROTONIX) 20 MG tablet Take 1 tablet by mouth daily 4/13/22   Joseph Pelaez PA-C   nystatin (MYCOSTATIN) 500485 UNIT/GM powder Apply 3 times daily. 4/13/22   Joseph Pelaez PA-C   albuterol sulfate HFA (PROAIR HFA) 108 (90 Base) MCG/ACT inhaler Inhale 2 puffs into the lungs every 4 hours as needed for Wheezing or Shortness of Breath 4/13/22   Joseph Pelaez PA-C   bumetanide (BUMEX) 1 MG tablet TAKE 1 TABLET BY MOUTH DAILY ALTERNATING WITH 2 TABLETS EVERY OTHER DAY 4/4/22   Joseph Pelaez PA-C   isosorbide dinitrate (ISORDIL) 20 MG tablet Take 1 tablet by mouth 3 times daily 2/22/22   Retia Monday, MD   metoprolol succinate (TOPROL XL) 100 MG extended release tablet TAKE 1 TABLET BY MOUTH TWICE DAILY 1/12/22   Joseph Pelaez PA-C   atorvastatin (LIPITOR) 80 MG tablet TAKE 1 TABLET BY MOUTH EVERY NIGHT 1/12/22   Joseph Pelaez PA-C   hydrALAZINE (APRESOLINE) 50 MG tablet TAKE 1 TABLET BY MOUTH EVERY 8 HOURS 1/12/22   Joseph Pelaez PA-C   cetirizine (ZYRTEC) 10 MG tablet Take 1 tablet by mouth daily 1/3/22   Joseph Pelaez PA-C   fluconazole (DIFLUCAN) 150 MG tablet  10/25/21   Historical Provider, MD   Dulaglutide (TRULICITY) 2.73 FY/8.6PH SOPN Inject 0.75 mg into the skin once a week  Patient taking differently: Inject 0.75 mg into the skin once a week monday 10/26/21   Joseph Pelaez PA-C   buPROPion (WELLBUTRIN XL) 150 MG extended release tablet Take 150 mg by mouth as needed  10/11/21   Historical Provider, MD   clotrimazole-betamethasone (LOTRISONE) 1-0.05 % cream Apply topically 2 times daily.  10/25/21   Joseph Pelaez PA-C   Cholecalciferol (VITAMIN D) 50 MCG (2000 UT) CAPS capsule Take 1 capsule by mouth daily 10/25/21   Joseph Pelaez PA-C   mometasone-formoterol [facility]) 100-5 MCG/ACT inhaler Inhale 2 puffs into the lungs 2 times daily 6/17/21   Waldo Parada DO   Continuous Blood Gluc  (FREESTYLE MARY READER) MARIJA 4 Devices by Does not apply route daily 5/14/21   Yolanda Garcia PA-C   Continuous Blood Gluc Sensor ([facility]) [address] [facility] 4 Devices by Does not apply route daily 5/14/21   Yolanda Garcia PA-C   albuterol (PROVENTIL) (2.5 MG/3ML) 0.083% nebulizer solution Take 3 mLs by nebulization every 6 hours as needed for Wheezing 3/23/21   Yolanda Garcia PA-C   spironolactone (ALDACTONE) 25 MG tablet TAKE 1 TABLET BY MOUTH EVERY DAY 3/22/21   Tariq Gracia MD   fluticasone [facility]) 50 MCG/ACT nasal spray 2 sprays by Nasal route daily  Patient taking differently: 2 sprays by Nasal route daily as needed  1/8/21   Yolanda Garcia PA-C   Continuous Blood Gluc Sensor (FREESTYLE MARY 2 SENSOR SYSTM) MISC 1 Device by Does not apply route daily 1/8/21   Yolanda Garcia PA-C   Multiple Vitamin (DAILY-DOUGLAS) TABS TAKE 1 TABLET BY MOUTH EVERY DAY 6/26/20   Chuck Silva DO   ipratropium-albuterol (DUONEB) 0.5-2.5 (3) MG/3ML SOLN nebulizer solution Take 3 mLs by nebulization every 4 hours as needed for Shortness of Breath 5/1/20   Lisa Cross MD   Insulin Pen Needle ([employer] PEN NEEDLES) 31G X 6 MM MISC 1 each by Does not apply route daily 10/14/19   Yolanda Garcia PA-C   ONE TOUCH ULTRA TEST strip 1 each by Does not apply route daily 9/17/19   Yolanda Garcia PA-C   Handicap Placard MISC by Does not apply route Patient cannot walk 200 ft without stopping to rest.    Expiration 5 yrs 2/22/19   Yolanda Garcia PA-C       Current medications:    No current facility-administered medications for this visit. No current outpatient medications on file.      Facility-Administered Medications Ordered in Other Visits   Medication Dose Route Frequency Provider Last Rate Last Admin    0.9 % sodium chloride infusion  25 mL IntraVENous PRN Manuel Romo MD        ceFAZolin (ANCEF) 3,000 mg in dextrose 5 % 100 mL IVPB  3,000 mg IntraVENous On Call to [address] [place] [address] MD Vanessa        lactated ringers infusion   IntraVENous Continuous Manuel Sis,  mL/hr at 05/31/22 0633 New Bag at 05/31/22 3961    scopolamine (TRANSDERM-SCOP) transdermal patch 1 patch  1 patch TransDERmal Q72H Nayla Diaz MD   1 patch at 05/31/22 0636    sodium chloride flush 0.9 % injection 5-40 mL  5-40 mL IntraVENous 2 times per day Nayla Diaz MD        sodium chloride flush 0.9 % injection 5-40 mL  5-40 mL IntraVENous PRN Nayla Diaz MD           Allergies:     Allergies   Allergen Reactions    Food Anaphylaxis     Food allergy - Fish and tree nuts    Ultram [Tramadol Hcl]      Per pt had a seizure    Fish-Derived Products     Norco [Hydrocodone-Acetaminophen] Hives    Nuts [Peanut-Containing Drug Products]     Pcn [Penicillins]      Not known    Cashews [Macadamia Nut Oil] Nausea And Vomiting       Problem List:    Patient Active Problem List   Diagnosis Code    Essential hypertension I10    Mild intermittent asthma without complication R91.14    Morbid obesity ([address] E66.01    Reactive depression F32.9    Anxiety F41.9    Pyelonephritis N12    Ureteric stone N20.1    Tobacco dependence F17.200    Hydronephrosis with urinary obstruction due to renal calculus N13.2    Pancreatic cyst K86.2    Frequent PVCs I49.3    Hyperkalemia E87.5    Spinal stenosis of lumbar region with neurogenic claudication M48.062    Primary osteoarthritis of both hips M16.0    Gastroesophageal reflux disease without esophagitis K21.9    Vitamin D deficiency E55.9    Diabetes mellitus (HCC) E11.9    Hyperlipidemia LDL goal <70 E78.5    JODI (obstructive sleep apnea) G47.33    Chronic pain syndrome G89.4    Lumbar radiculopathy M54.16    Chronic bilateral low back pain with bilateral sciatica M54.42, M54.41, G89.29    Hidradenitis suppurativa L73.2    Cardiomyopathy (HCC) I42.9    Malnutrition (HCC) E46    Current moderate episode of major depressive disorder without prior episode (HCC) F32.1    Heart failure (HCC) I50.9    Toothache K08.89    S/P gastric bypass Z98.84       Past Medical History:        Diagnosis Date    Asthma     Bell palsy     drooping    CHF (congestive heart failure) [address]     [id number] wore life vest dr Rito Chow DDD (degenerative disc disease), cervical     with spinal stenosis    Diabetes mellitus (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25     Years: 17.00     Pack years: 4.25     Types: Cigarettes     Start date: 3/27/2001     Quit date: 10/5/2018     Years since quitting: 3.6    Smokeless tobacco: Never Used    Tobacco comment: occasionally has 1 cigarette, very stressed now   Substance Use Topics    Alcohol use: No                                Counseling given: Not Answered  Comment: occasionally has 1 cigarette, very stressed now      Vital Signs (Current): There were no vitals filed for this visit.                                            BP Readings from Last 3 Encounters:   05/31/22 (!) 127/58   05/25/22 124/70   05/18/22 (!) 147/94       NPO Status:                                                                                 BMI:   Wt Readings from Last 3 Encounters: 05/25/22 (!) 378 lb 4.8 oz (171.6 kg)   05/18/22 (!) 382 lb (173.3 kg)   05/16/22 (!) 385 lb 6.4 oz (174.8 kg)     There is no height or weight on file to calculate BMI.    CBC:   Lab Results   Component Value Date    WBC 10.5 05/25/2022    RBC 4.63 05/25/2022    HGB 14.0 05/25/2022    HCT 43.8 05/25/2022    MCV 94.6 05/25/2022    RDW 15.3 05/25/2022     05/25/2022       CMP:   Lab Results   Component Value Date     05/25/2022    K 4.2 05/25/2022    K 4.5 04/29/2020    CL 99 05/25/2022    CO2 25 05/25/2022    BUN 17 05/25/2022    CREATININE 1.0 05/25/2022    GFRAA >60 05/25/2022    LABGLOM >60 05/25/2022    GLUCOSE 110 05/25/2022    GLUCOSE 92 01/06/2011    PROT 8.2 05/25/2022    CALCIUM 9.8 05/25/2022    BILITOT 0.3 05/25/2022    ALKPHOS 105 05/25/2022    AST 13 05/25/2022    ALT 16 05/25/2022       POC Tests: No results for input(s): POCGLU, POCNA, POCK, POCCL, POCBUN, POCHEMO, POCHCT in the last 72 hours. Coags: No results found for: PROTIME, INR, APTT    HCG (If Applicable):   Lab Results   Component Value Date    PREGTESTUR NEGATIVE 05/31/2022        ABGs: No results found for: PHART, PO2ART, JGE4DLF, AVK8KUW, BEART, R2CKYQOD     Type & Screen (If Applicable):  No results found for: LABABO, LABRH    Drug/Infectious Status (If Applicable):  No results found for: HIV, HEPCAB    COVID-19 Screening (If Applicable):   Lab Results   Component Value Date    COVID19 Not Detected 04/29/2020           Anesthesia Evaluation  Patient summary reviewed  Airway: Mallampati: I  TM distance: >3 FB   Neck ROM: full  Mouth opening: > = 3 FB   Dental:          Pulmonary: breath sounds clear to auscultation  (+) pneumonia:  sleep apnea:  asthma: current smoker (0.25 PPD)                          ROS comment: Light Tobacco Abuse.    Cardiovascular:    (+) hypertension:, CHF:, hyperlipidemia      ECG reviewed  Rhythm: regular  Rate: normal  Echocardiogram reviewed         Beta Blocker:  Dose within 24 Hrs      ROS comment: EKG: Sinus Rhythm 81, with Frequent ectopic beats. CATH:  1.  Moderate RCA disease which appeared angiographically to be in the range of 70%, but which was not hemodynamically significant with the pressure wire evaluation (IFR/FFR). 2.  Systemic hypertension. 3.  Markedly elevated left ventricular end-diastolic pressure. ECHO:   Mildly dilated left ventricle. LV global hypokinesis   Ejection fraction biplane =44%. Neuro/Psych:   (+) neuromuscular disease:, psychiatric history:             ROS comment: Spinal Meningocele,   H/O Meningitis. Neuropathy. DDD C/S. GI/Hepatic/Renal:   (+) GERD:, morbid obesity (BMI: 62.03.)         ROS comment: Diverticulitis  . Endo/Other:    (+) DiabetesType II DM, using insulin, : arthritis (DJD.):., .                 Abdominal:   (+) obese (BMI: 62.03.),           Vascular: negative vascular ROS. Other Findings:             Anesthesia Plan      MAC and general     ASA 3       Induction: intravenous. Anesthetic plan and risks discussed with patient. Plan discussed with CRNA.     Attending anesthesiologist reviewed and agrees with Funmi Reinoso MD   5/31/2022

## 2022-06-01 VITALS
OXYGEN SATURATION: 99 % | BODY MASS INDEX: 43.4 KG/M2 | SYSTOLIC BLOOD PRESSURE: 130 MMHG | DIASTOLIC BLOOD PRESSURE: 76 MMHG | HEART RATE: 80 BPM | RESPIRATION RATE: 20 BRPM | TEMPERATURE: 98.5 F | WEIGHT: 293 LBS | HEIGHT: 69 IN

## 2022-06-01 LAB
ANION GAP SERPL CALCULATED.3IONS-SCNC: 9 MMOL/L (ref 7–16)
BASOPHILS ABSOLUTE: 0.02 E9/L (ref 0–0.2)
BASOPHILS RELATIVE PERCENT: 0.1 % (ref 0–2)
BUN BLDV-MCNC: 12 MG/DL (ref 6–20)
CALCIUM SERPL-MCNC: 8.9 MG/DL (ref 8.6–10.2)
CHLORIDE BLD-SCNC: 102 MMOL/L (ref 98–107)
CHOLESTEROL, TOTAL: 225 MG/DL (ref 0–199)
CO2: 24 MMOL/L (ref 22–29)
CREAT SERPL-MCNC: 0.7 MG/DL (ref 0.5–1)
EOSINOPHILS ABSOLUTE: 0 E9/L (ref 0.05–0.5)
EOSINOPHILS RELATIVE PERCENT: 0 % (ref 0–6)
GFR AFRICAN AMERICAN: >60
GFR NON-AFRICAN AMERICAN: >60 ML/MIN/1.73
GLUCOSE BLD-MCNC: 121 MG/DL (ref 74–99)
HCT VFR BLD CALC: 38.9 % (ref 34–48)
HDLC SERPL-MCNC: 64 MG/DL
HEMOGLOBIN: 12.4 G/DL (ref 11.5–15.5)
IMMATURE GRANULOCYTES #: 0.1 E9/L
IMMATURE GRANULOCYTES %: 0.6 % (ref 0–5)
LDL CHOLESTEROL CALCULATED: 132 MG/DL (ref 0–99)
LYMPHOCYTES ABSOLUTE: 2.33 E9/L (ref 1.5–4)
LYMPHOCYTES RELATIVE PERCENT: 13.6 % (ref 20–42)
MAGNESIUM: 2.2 MG/DL (ref 1.6–2.6)
MCH RBC QN AUTO: 30.2 PG (ref 26–35)
MCHC RBC AUTO-ENTMCNC: 31.9 % (ref 32–34.5)
MCV RBC AUTO: 94.6 FL (ref 80–99.9)
METER GLUCOSE: 105 MG/DL (ref 74–99)
METER GLUCOSE: 117 MG/DL (ref 74–99)
METER GLUCOSE: 122 MG/DL (ref 74–99)
MONOCYTES ABSOLUTE: 0.81 E9/L (ref 0.1–0.95)
MONOCYTES RELATIVE PERCENT: 4.7 % (ref 2–12)
NEUTROPHILS ABSOLUTE: 13.93 E9/L (ref 1.8–7.3)
NEUTROPHILS RELATIVE PERCENT: 81 % (ref 43–80)
PDW BLD-RTO: 14.9 FL (ref 11.5–15)
PLATELET # BLD: 317 E9/L (ref 130–450)
PMV BLD AUTO: 10.8 FL (ref 7–12)
POTASSIUM SERPL-SCNC: 5 MMOL/L (ref 3.5–5)
RBC # BLD: 4.11 E12/L (ref 3.5–5.5)
SODIUM BLD-SCNC: 135 MMOL/L (ref 132–146)
T4 FREE: 0.98 NG/DL (ref 0.93–1.7)
TRIGL SERPL-MCNC: 143 MG/DL (ref 0–149)
TSH SERPL DL<=0.05 MIU/L-ACNC: 0.38 UIU/ML (ref 0.27–4.2)
VLDLC SERPL CALC-MCNC: 29 MG/DL
WBC # BLD: 17.2 E9/L (ref 4.5–11.5)

## 2022-06-01 PROCEDURE — 6360000002 HC RX W HCPCS: Performed by: NURSE PRACTITIONER

## 2022-06-01 PROCEDURE — 83735 ASSAY OF MAGNESIUM: CPT

## 2022-06-01 PROCEDURE — 36415 COLL VENOUS BLD VENIPUNCTURE: CPT

## 2022-06-01 PROCEDURE — 84443 ASSAY THYROID STIM HORMONE: CPT

## 2022-06-01 PROCEDURE — 6360000002 HC RX W HCPCS: Performed by: SURGERY

## 2022-06-01 PROCEDURE — 6370000000 HC RX 637 (ALT 250 FOR IP): Performed by: NURSE PRACTITIONER

## 2022-06-01 PROCEDURE — 85025 COMPLETE CBC W/AUTO DIFF WBC: CPT

## 2022-06-01 PROCEDURE — 80048 BASIC METABOLIC PNL TOTAL CA: CPT

## 2022-06-01 PROCEDURE — 99024 POSTOP FOLLOW-UP VISIT: CPT | Performed by: SURGERY

## 2022-06-01 PROCEDURE — 82962 GLUCOSE BLOOD TEST: CPT

## 2022-06-01 PROCEDURE — 94640 AIRWAY INHALATION TREATMENT: CPT

## 2022-06-01 PROCEDURE — 6370000000 HC RX 637 (ALT 250 FOR IP): Performed by: STUDENT IN AN ORGANIZED HEALTH CARE EDUCATION/TRAINING PROGRAM

## 2022-06-01 PROCEDURE — 84439 ASSAY OF FREE THYROXINE: CPT

## 2022-06-01 PROCEDURE — 2580000003 HC RX 258: Performed by: SURGERY

## 2022-06-01 PROCEDURE — 80061 LIPID PANEL: CPT

## 2022-06-01 PROCEDURE — 2700000000 HC OXYGEN THERAPY PER DAY

## 2022-06-01 RX ORDER — OXYCODONE HYDROCHLORIDE AND ACETAMINOPHEN 5; 325 MG/1; MG/1
1 TABLET ORAL EVERY 6 HOURS PRN
Status: DISCONTINUED | OUTPATIENT
Start: 2022-06-01 | End: 2022-06-01 | Stop reason: HOSPADM

## 2022-06-01 RX ORDER — GABAPENTIN 300 MG/1
600 CAPSULE ORAL 3 TIMES DAILY
Status: DISCONTINUED | OUTPATIENT
Start: 2022-06-01 | End: 2022-06-01 | Stop reason: HOSPADM

## 2022-06-01 RX ORDER — GUAIFENESIN 600 MG/1
600 TABLET, EXTENDED RELEASE ORAL 2 TIMES DAILY
Qty: 30 TABLET | Refills: 0 | Status: SHIPPED | OUTPATIENT
Start: 2022-06-01 | End: 2022-06-16

## 2022-06-01 RX ADMIN — MORPHINE SULFATE 2 MG: 2 INJECTION, SOLUTION INTRAMUSCULAR; INTRAVENOUS at 02:51

## 2022-06-01 RX ADMIN — SODIUM CHLORIDE, POTASSIUM CHLORIDE, SODIUM LACTATE AND CALCIUM CHLORIDE: 600; 310; 30; 20 INJECTION, SOLUTION INTRAVENOUS at 06:49

## 2022-06-01 RX ADMIN — KETOROLAC TROMETHAMINE 15 MG: 30 INJECTION, SOLUTION INTRAMUSCULAR; INTRAVENOUS at 05:32

## 2022-06-01 RX ADMIN — KETOROLAC TROMETHAMINE 15 MG: 30 INJECTION, SOLUTION INTRAMUSCULAR; INTRAVENOUS at 17:19

## 2022-06-01 RX ADMIN — GABAPENTIN 600 MG: 300 CAPSULE ORAL at 10:23

## 2022-06-01 RX ADMIN — HYDRALAZINE HYDROCHLORIDE 50 MG: 50 TABLET, FILM COATED ORAL at 14:10

## 2022-06-01 RX ADMIN — OXYCODONE AND ACETAMINOPHEN 1 TABLET: 5; 325 TABLET ORAL at 07:29

## 2022-06-01 RX ADMIN — KETOROLAC TROMETHAMINE 15 MG: 30 INJECTION, SOLUTION INTRAMUSCULAR; INTRAVENOUS at 10:24

## 2022-06-01 RX ADMIN — ISOSORBIDE DINITRATE 20 MG: 10 TABLET ORAL at 09:07

## 2022-06-01 RX ADMIN — MORPHINE SULFATE 4 MG: 4 INJECTION, SOLUTION INTRAMUSCULAR; INTRAVENOUS at 13:21

## 2022-06-01 RX ADMIN — ISOSORBIDE DINITRATE 20 MG: 10 TABLET ORAL at 14:09

## 2022-06-01 RX ADMIN — BUDESONIDE 500 MCG: 0.5 INHALANT RESPIRATORY (INHALATION) at 06:43

## 2022-06-01 RX ADMIN — SODIUM CHLORIDE, POTASSIUM CHLORIDE, SODIUM LACTATE AND CALCIUM CHLORIDE: 600; 310; 30; 20 INJECTION, SOLUTION INTRAVENOUS at 15:33

## 2022-06-01 RX ADMIN — ARFORMOTEROL TARTRATE 15 MCG: 15 SOLUTION RESPIRATORY (INHALATION) at 06:43

## 2022-06-01 RX ADMIN — GABAPENTIN 600 MG: 300 CAPSULE ORAL at 14:09

## 2022-06-01 RX ADMIN — METOPROLOL SUCCINATE 100 MG: 100 TABLET, EXTENDED RELEASE ORAL at 09:07

## 2022-06-01 RX ADMIN — SACUBITRIL AND VALSARTAN 2 TABLET: 49; 51 TABLET, FILM COATED ORAL at 09:06

## 2022-06-01 RX ADMIN — OXYCODONE AND ACETAMINOPHEN 1 TABLET: 5; 325 TABLET ORAL at 15:31

## 2022-06-01 RX ADMIN — HYDRALAZINE HYDROCHLORIDE 50 MG: 50 TABLET, FILM COATED ORAL at 05:31

## 2022-06-01 ASSESSMENT — PAIN SCALES - GENERAL
PAINLEVEL_OUTOF10: 9
PAINLEVEL_OUTOF10: 10
PAINLEVEL_OUTOF10: 6
PAINLEVEL_OUTOF10: 5
PAINLEVEL_OUTOF10: 7
PAINLEVEL_OUTOF10: 10
PAINLEVEL_OUTOF10: 8
PAINLEVEL_OUTOF10: 6

## 2022-06-01 ASSESSMENT — PAIN DESCRIPTION - FREQUENCY
FREQUENCY: CONTINUOUS
FREQUENCY: INTERMITTENT

## 2022-06-01 ASSESSMENT — PAIN DESCRIPTION - ONSET
ONSET: ON-GOING
ONSET: ON-GOING

## 2022-06-01 ASSESSMENT — PAIN DESCRIPTION - DESCRIPTORS
DESCRIPTORS: ACHING;CRAMPING;DISCOMFORT
DESCRIPTORS: ACHING;DISCOMFORT;DULL

## 2022-06-01 ASSESSMENT — PAIN DESCRIPTION - PAIN TYPE
TYPE: SURGICAL PAIN
TYPE: SURGICAL PAIN

## 2022-06-01 ASSESSMENT — PAIN DESCRIPTION - LOCATION
LOCATION: ABDOMEN
LOCATION: ABDOMEN

## 2022-06-01 ASSESSMENT — PAIN DESCRIPTION - ORIENTATION
ORIENTATION: MID;UPPER
ORIENTATION: MID;UPPER

## 2022-06-01 NOTE — PROGRESS NOTES
GENERAL SURGERY  DAILY PROGRESS NOTE  6/1/2022  CC: s/p gastric bypass      Subjective:  States she is feeling better than yesterday, but still sore. Tolerating liquids, denies nausea, emesis. No acute events overnight    Objective:  BP (!) 178/98   Pulse 81   Temp 98.5 °F (36.9 °C) (Oral)   Resp 18   Ht 5' 8\" (1.727 m)   LMP 05/12/2022 (Exact Date)   SpO2 92%   BMI 57.52 kg/m²     GENERAL:  Laying in bed, awake, alert, cooperative, no apparent distress  HEAD: Normocephalic, atraumatic  EYES: No sclera icterus, pupils equal  LUNGS:  No increased work of breathing on 4LNC  CARDIOVASCULAR:  RR  ABDOMEN:  Soft, appropriately tender, non-distended.  Incisions C/D/I  EXTREMITIES: No edema or swelling  SKIN: Warm and dry    Assessment/Plan:  41 y.o. female s/p laparoscopic vonnie en y gastric bypass 5/31    Wean O2 as able  OOB, ambulate  Continue liquids  Pain/nausea control prn  Discharge today      Electronically signed by Narda Egan MD on 6/1/2022 at 6:41 AM     As above, ok to d/c if stable medically

## 2022-06-01 NOTE — PROGRESS NOTES
Patient seen earlier today by Dr. Morgan Rose for discharge today discussed discharge planning      Internal Medicine Progress Note    [facility]    Bearclaude Cinthya Chase, JAIDEN Lima D.O., SONIA Nance, MSN, APRN, NP-C  Aliza Guadalupe. Mandy Valverde, MSN, APRN-CNP     Primary Care Physician: Daryl Ariza PA-C   Admitting Physician:  Josue Rodriguez MD  Admission date and time: 5/31/2022  5:47 AM    Room:  [address] [place] [address]  Admitting diagnosis: Morbid obesity [facility]) [E66.01]    Patient Name: Maikel Ga  MRN: [id number]    Date of Service: 6/1/2022     Subjective:  Mynor Valladares is a 41 y.o. female who was seen and examined today,6/1/2022, at the bedside. Patient seem to be doing well today. Minimal amount of discomfort since surgery yesterday. Plan for discharge later today    No family present during my examination. Review of System:   Constitutional:   Denies fever or chills, weight loss or gain, fatigue or malaise. HEENT:   Denies ear pain, sore throat, sinus or eye problems. Cardiovascular:   Denies any chest pain, irregular heartbeats, or palpitations. Respiratory:   Denies shortness of breath, coughing, sputum production, hemoptysis, or wheezing. Gastrointestinal:   Denies nausea, vomiting, diarrhea, or constipation. Minimal postop pain genitourinary:    Denies any urgency, frequency, hematuria. Voiding  without difficulty. Extremities:   Denies lower extremity swelling, edema or cyanosis. Neurology:    Denies any headache or focal neurological deficits, Denies generalized weakness or memory difficulty. Psch:   Denies being anxious or depressed. Musculoskeletal:    Denies  myalgias, joint complaints or back pain. Integumentary:   Denies any rashes, ulcers, or excoriations. Denies bruising. Hematologic/Lymphatic:  Denies bruising or bleeding.     Physical Exam:  No intake/output data recorded. Intake/Output Summary (Last 24 hours) at 6/1/2022 1528  Last data filed at 5/31/2022 1825  Gross per 24 hour   Intake 800 ml   Output --   Net 800 ml   I/O last 3 completed shifts: In: 2600 [I.V.:2600]  Out: 5 [Blood:5]  Patient Vitals for the past 96 hrs (Last 3 readings):   Weight   06/01/22 0936 (!) 397 lb 4 oz (180.2 kg)     Vital Signs:   Blood pressure (!) 142/99, pulse 76, temperature 98.6 °F (37 °C), temperature source Oral, resp. rate 22, height 5' 8.5\" (1.74 m), weight (!) 397 lb 4 oz (180.2 kg), last menstrual period 05/12/2022, SpO2 93 %, not currently breastfeeding. General appearance:  Alert, responsive, oriented to person, place, and time. Well preserved, alert, no distress. Head:  Normocephalic. No masses, lesions or tenderness. Eyes:  PERRLA. EOMI. Sclera clear. Buccal mucosa moist.  ENT:  Ears normal. Mucosa normal.  Neck:    Supple. Trachea midline. No thyromegaly. No JVD. No bruits. Heart:    Rhythm regular. Rate controlled. No murmurs. Lungs:    Symmetrical. Clear to auscultation bilaterally. No wheezes. No rhonchi. No rales. Abdomen:   Soft. Non-tender. Non-distended. Bowel sounds positive. No organomegaly or masses. No pain on palpation. Markedly obese  Extremities:    Peripheral pulses present. No peripheral edema. No ulcers. No cyanosis. No clubbing. Neurologic:    Alert x 3. No focal deficit. Cranial nerves grossly intact. No focal weakness. Psych:   Behavior is normal. Mood appears normal. Speech is not rapid and/or pressured. Musculoskeletal:   Spine ROM normal. Muscular strength intact. Gait not assessed. Integumentary:  No rashes  Skin normal color and texture.   Genitalia/Breast:  Deferred    Medication:  Scheduled Meds:   gabapentin  600 mg Oral TID    atorvastatin  80 mg Oral Nightly    [Held by provider] aspirin  81 mg Oral Daily    hydrALAZINE  50 mg Oral 3 times per day    isosorbide dinitrate  20 mg Oral TID    metoprolol succinate  100 mg Oral BID    budesonide  0.5 mg Nebulization BID    Arformoterol Tartrate  15 mcg Nebulization BID    insulin lispro  0-6 Units SubCUTAneous TID WC    insulin lispro  0-3 Units SubCUTAneous Nightly    sodium chloride flush  5-40 mL IntraVENous 2 times per day    ketorolac  15 mg IntraVENous Q6H    sacubitril-valsartan  2 tablet Oral BID     Continuous Infusions:   dextrose      lactated ringers 125 mL/hr at 06/01/22 0649    sodium chloride         Objective Data:  CBC with Differential:    Lab Results   Component Value Date    WBC 17.2 06/01/2022    RBC 4.11 06/01/2022    HGB 12.4 06/01/2022    HCT 38.9 06/01/2022     06/01/2022    MCV 94.6 06/01/2022    MCH 30.2 06/01/2022    MCHC 31.9 06/01/2022    RDW 14.9 06/01/2022    LYMPHOPCT 13.6 06/01/2022    MONOPCT 4.7 06/01/2022    BASOPCT 0.1 06/01/2022    MONOSABS 0.81 06/01/2022    LYMPHSABS 2.33 06/01/2022    EOSABS 0.00 06/01/2022    BASOSABS 0.02 06/01/2022     CMP:    Lab Results   Component Value Date     06/01/2022    K 5.0 06/01/2022    K 4.5 04/29/2020     06/01/2022    CO2 24 06/01/2022    BUN 12 06/01/2022    CREATININE 0.7 06/01/2022    GFRAA >60 06/01/2022    LABGLOM >60 06/01/2022    GLUCOSE 121 06/01/2022    GLUCOSE 92 01/06/2011    PROT 8.2 05/25/2022    LABALBU 4.1 05/25/2022    CALCIUM 8.9 06/01/2022    BILITOT 0.3 05/25/2022    ALKPHOS 105 05/25/2022    AST 13 05/25/2022    ALT 16 05/25/2022              Assessment:    · Morbid obesity with BMI 56.68 kg per metered squared with planned gastric bypass surgery 5/31/2022 by Dr. Fazal Vasquez  · Nonischemic cardiomyopathy with severe LV systolic dysfunction, medically optimized most recent echo showing improved systolic function and normal EF  · Essential hypertension  · Diabetes mellitus type 2  · Hyperlipidemia  · Asthma without acute exacerbation  · Obstructive sleep apnea with NIPPV use as an outpatient  · Depression/anxiety        Plan:      · Patient doing well today  · Denies chest pain or shortness of breath  · Pending progression probable discharge later today  · Mild leukocytosis present            More than 50% of my time was spent at the bedside counseling/coordinating care with the patient and/or family with face to face contact. This time was spent reviewing notes and laboratory data as well as instructing and counseling the patient. Time I spent with the family or surrogate(s) is included only if the patient was incapable of providing the necessary information or participating in medical decisions. I also discussed the differential diagnosis and all of the proposed management plans with the patient and individuals accompanying the patient. I am readily available for any further decision-making and intervention.        Peyton Avila DO, F.A.C.O.I.  6/1/2022  3:28 PM

## 2022-06-01 NOTE — DISCHARGE SUMMARY
Physician Discharge Summary     Shahida Doty  [id number]    Admit date: 5/31/2022    Discharge date and time: 6/1/2022     Admitting Physician: Melody Kaminski MD     Condition: stable    Patient Active Problem List   Diagnosis    Essential hypertension    Mild intermittent asthma without complication    Morbid obesity ([address]    Reactive depression    Anxiety    Pyelonephritis    Ureteric stone    Tobacco dependence    Hydronephrosis with urinary obstruction due to renal calculus    Pancreatic cyst    Frequent PVCs    Hyperkalemia    Spinal stenosis of lumbar region with neurogenic claudication    Primary osteoarthritis of both hips    Gastroesophageal reflux disease without esophagitis    Vitamin D deficiency    Diabetes mellitus ([address]    Hyperlipidemia LDL goal <70    JODI (obstructive sleep apnea)    Chronic pain syndrome    Lumbar radiculopathy    Chronic bilateral low back pain with bilateral sciatica    Hidradenitis suppurativa    Cardiomyopathy ([address]    Malnutrition ([address]    Current moderate episode of major depressive disorder without prior episode ([address]    Heart failure ([address]    Toothache    S/P gastric bypass       Hospital Course: Shahida Doty is a 41 y.o. female one day post-op from a laparoscopic Gastric Bypass. She did well with surgery, pain has been well controlled over night, walking well in the halls. Labs normal. Olson out. She is tolerating the Bariatric clear liquid diet with no nausea, no pain. Incisions all clean and dry, glue in place.     Lab Results   Component Value Date    WBC 10.5 05/25/2022    HGB 14.0 05/25/2022     05/25/2022     05/31/2022     05/31/2022    K 4.5 05/31/2022    K 4.5 04/29/2020    BUN 9 05/31/2022    CREATININE 0.8 05/31/2022    GLUCOSE 181 05/31/2022    GLUCOSE 92 01/06/2011    LABGLOM >60 05/31/2022    LABALBU 4.1 05/25/2022    PROT 8.2 05/25/2022    CALCIUM 8.6 05/31/2022    MG 2.1 05/31/2022 BILITOT 0.3 05/25/2022    BILIDIR <0.2 04/29/2020     04/28/2020    ALKPHOS 105 05/25/2022    AST 13 05/25/2022    ALT 16 05/25/2022       Discharge Exam:   VITALS: BP (!) 178/98   Pulse 81   Temp 98.5 °F (36.9 °C) (Oral)   Resp 18   Ht 5' 8\" (1.727 m)   LMP 05/12/2022 (Exact Date)   SpO2 92%   BMI 57.52 kg/m²     General appearance: alert, appears stated age and cooperative  Neck: no adenopathy, no carotid bruit, no JVD, supple, symmetrical, trachea midline and thyroid not enlarged, symmetric, no tenderness/mass/nodules  Lungs: clear to auscultation bilaterally  Heart: regular rate and rhythm, S1, S2 normal, no murmur, click, rub or gallop  Abdomen: Incisions clean and dry, surgical glue in place. Extremities: extremities normal, atraumatic, no cyanosis or edema    Disposition: home    Patient Instructions:   Hold medicines for blood pressure, diabetes and cholesterol. Do not take diuretics at home. Take Beta-blockers but decrease the dose by half. Ok to restart Aspirin and other blood thinners. Medication List      CONTINUE taking these medications    * FreeStyle Alley 2 Sensor Systm Misc  1 Device by Does not apply route daily     * FreeAmberWave. [employer] System Misc  4 Devices by Does not apply route daily     FreeStyle [employer]  4 Devices by Does not apply route daily     Handicap Placard Misc  by Does not apply route Patient cannot walk 200 ft without stopping to rest.    Expiration 5 yrs     Insulin Pen Needle 31G X 6 MM Misc  Commonly known as: [employer] Pen Needles  1 each by Does not apply route daily         * This list has 2 medication(s) that are the same as other medications prescribed for you. Read the directions carefully, and ask your doctor or other care provider to review them with you.             ASK your doctor about these medications    * albuterol (2.5 MG/3ML) 0.083% nebulizer solution  Commonly known as: PROVENTIL  Take 3 mLs by nebulization every 6 hours as needed needed. mometasone-formoterol 100-5 MCG/ACT inhaler  Commonly known as: Dulera  Inhale 2 puffs into the lungs 2 times daily     nystatin 222691 UNIT/GM powder  Commonly known as: MYCOSTATIN  Apply 3 times daily. omeprazole 20 MG delayed release capsule  Commonly known as: PRILOSEC  Take 1 capsule by mouth Daily     * ondansetron 4 MG disintegrating tablet  Commonly known as: ZOFRAN-ODT  Place 2 tablets under the tongue every 8 hours as needed for Nausea or Vomiting     * ondansetron 4 MG disintegrating tablet  Commonly known as: ZOFRAN-ODT  Take 1 tablet by mouth every 8 hours as needed for Nausea or Vomiting     ONE TOUCH ULTRA TEST strip  Generic drug: blood glucose test strips  1 each by Does not apply route daily     oxyCODONE-acetaminophen 5-325 MG per tablet  Commonly known as: Percocet  Take 1 tablet by mouth every 6 hours as needed for Pain for up to 30 days. I  Start taking on: June 5, 2022     pantoprazole 20 MG tablet  Commonly known as: PROTONIX  Take 1 tablet by mouth daily     potassium chloride 20 MEQ extended release tablet  Commonly known as: KLOR-CON M  TAKE 1 TABLET BY MOUTH DAILY WITH BREAKFAST     sacubitril-valsartan  MG per tablet  Commonly known as: ENTRESTO  Take 1 tablet by mouth 2 times daily     spironolactone 25 MG tablet  Commonly known as: ALDACTONE  TAKE 1 TABLET BY MOUTH EVERY DAY     Trulicity 1.43 MS/7.2YG Sopn  Generic drug: Dulaglutide  Inject 0.75 mg into the skin once a week     vitamin D 50 MCG (2000 UT) Caps capsule  Take 1 capsule by mouth daily         * This list has 4 medication(s) that are the same as other medications prescribed for you. Read the directions carefully, and ask your doctor or other care provider to review them with you. Activity:  no lifting, or strenuous exercise for one or two weeks. No driving for one week. Diet:  Bariatric clear liquid for 2 days, then bariatric full liquids for 2 weeks, 1 oz every 15 minutes.   Wound Care: shower tomorrow then leave the incisions open to air     Follow-up with Dr. Carol Conway in 2 weeks.     Signed:  Kiya Hooper MD  6/1/2022  7:01 AM

## 2022-06-01 NOTE — CARE COORDINATION
6/1/2022 1116 CM note: No covid testing. Met with patient for transition of care needs. Pt resides alone in an apartment, 5 step entry. She relays that her friends/family assist as needed. DME includes: cane, rollator,shower chair,bsc and CPAP. PCP is GREGORIO Sanchez and uses [facility] [employer]. Pt plans to return home at [place], her family will provide ride. No needs identified.  Marsha KHAN

## 2022-06-03 ENCOUNTER — TELEPHONE (OUTPATIENT)
Dept: BARIATRICS/WEIGHT MGMT | Age: 47
End: 2022-06-03

## 2022-06-03 DIAGNOSIS — Z98.84 S/P GASTRIC BYPASS: Primary | ICD-10-CM

## 2022-06-03 RX ORDER — POLYETHYLENE GLYCOL 3350 17 G/17G
17 POWDER, FOR SOLUTION ORAL DAILY
Qty: 1530 G | Refills: 1 | Status: SHIPPED | OUTPATIENT
Start: 2022-06-03 | End: 2022-07-03

## 2022-06-03 RX ORDER — DOCUSATE SODIUM 100 MG/1
100 CAPSULE, LIQUID FILLED ORAL 2 TIMES DAILY
Qty: 60 CAPSULE | Refills: 0 | Status: SHIPPED | OUTPATIENT
Start: 2022-06-03 | End: 2022-07-03

## 2022-06-03 NOTE — TELEPHONE ENCOUNTER
Post op questions:    Nausea/vomiting present (YES/NO): No    Fever > 101/chills present (YES/NO): No    Tolerating liquids (YES/NO): Yes - but hard  Ounces per day: 8 oz  Protein shakes: Yes    Taking post op medications, Carafate/Omeprazole & Zofran (YES/NO): Yes    Is patient up and walking (YES/NO): Yes    Having bowel movements (YES/NO): No    Are incisions healing well (YES/NO): Yes    Having any problems or concerns that need to be addressed:

## 2022-06-03 NOTE — TELEPHONE ENCOUNTER
[facility]:  [facility] or [facility] - Dietary Phone Follow-up Form:  Sx Date:5/31/22  rs/p RYGB      Current Diet:_____________Full Liquid _________________       Patient's symptoms include the following:    Pt states no symptoms feels fine no problems noted      Pt states  he / she has the following symptoms:    Nausea -  no  Vomiting -  No  Diarrhea -  no  Abdominal Cramping -  no  Gas -  yes, had this 1st 2 days - now resolved       Dizziness -  no   Fever - no //  Pt physically took his / her temperature today no,    Constipation -  no  Bloody Stools - no  Tarry Stools - no  Shakiness -  no   Low Blood Sugar -  no  Feels Faint -  no   Excessive Salvia -  no  Other pt feels full very quickly   Pt started his / her Colace 100 mgs twice daily -  Not taking yet - pt will buy (pt has not had BM yet - urged pt to immediately start Colace today!)  Pt is taking PPI Medications or Carafate as instructed -  YES     Hunger during the liquid phase no   Reports no restriction during the liquid phase no  Reports moderate restriction during liquid phase no  Reports severe restriction during liquid phase no  Can't tolerate liquids no  Food gets stuck frequently no    Reflux Symptoms no       24 Hour Recall:6/2/22  Breakfast: SF Gelatin  Snack: Broth  Lunch: Broth  Snack: Sipping on broth  Dinner: Broth  Snack: SF gelatin  Water Intake: 1/2 bottle on 6/2/22  (pt felt unusually full- thought it may be due to meds and stomach was very gassy). 1 full pitcher day before hospital.  Other Beverage: a few sips of Propel water  Pt  states denies dehydration on today's date 6/3/22    Protein Supplement: Gokul Queen reviewed pt can start his / her protein supplement on 6/3/22. Gokul Queen reviewed how pt can mix his / her protein supplement - 2 scoops Nectar mixed in 12 oz Fat Free Fair Life Milk or  12 oz of Water broken down into 4 meals 3 oz in size. Pt verbalized understanding.    How many times a day do you take your Protein Supplement: Pt will start it today and take it 3-4 times today and then follow timed sample menu and take it 4 times every day. Instructed per Standing Orders: yes,  Bariatric Full Liquid Diet . Rd Ld reviewed Bar Full Liquid Diet and reinforced diet concepts. Pt has education book and handouts and states he/ she is following the instruction given. Use of protein supplement was reviewed with how to mix his / her protein supplement. Pt verbalized understanding. Pt instructed to call with any dietary questions. POC D/T problem noted above:   Pt had poor po intake of water/fluids yesterday, due to feeling too full. Urged pt to push water and all liquids today, with goal of 48-64 oz to avoid dehydration!!!  Pt verbalized understanding. Pt had not started protein yet, will start it today. Pt to mix 3 scoops protein into 12 oz water and take 4 times/day. Pt to get Colace stool softener today and begin taking twice daily!     Surgeon Norified:no    Patient Response:  Verbalized understanding of the above instruction: yes,   Will keep detailed food records for 2 week f/u appt: yes,   Will return to [facility] for his her 2 week f/u appointment - Reminded pt to have labs drawn

## 2022-06-06 ENCOUNTER — TELEPHONE (OUTPATIENT)
Dept: BARIATRICS/WEIGHT MGMT | Age: 47
End: 2022-06-06

## 2022-06-13 ENCOUNTER — OFFICE VISIT (OUTPATIENT)
Dept: PRIMARY CARE CLINIC | Age: 47
End: 2022-06-13
Payer: MEDICARE

## 2022-06-13 VITALS
HEIGHT: 69 IN | DIASTOLIC BLOOD PRESSURE: 80 MMHG | SYSTOLIC BLOOD PRESSURE: 122 MMHG | HEART RATE: 96 BPM | OXYGEN SATURATION: 100 % | BODY MASS INDEX: 43.4 KG/M2 | WEIGHT: 293 LBS | TEMPERATURE: 96.8 F | RESPIRATION RATE: 16 BRPM

## 2022-06-13 DIAGNOSIS — I42.9 CARDIOMYOPATHY, UNSPECIFIED TYPE (HCC): ICD-10-CM

## 2022-06-13 DIAGNOSIS — Z79.891 CHRONIC PRESCRIPTION OPIATE USE: ICD-10-CM

## 2022-06-13 DIAGNOSIS — I10 ESSENTIAL HYPERTENSION: ICD-10-CM

## 2022-06-13 DIAGNOSIS — Z98.84 S/P GASTRIC BYPASS: ICD-10-CM

## 2022-06-13 DIAGNOSIS — M48.062 SPINAL STENOSIS OF LUMBAR REGION WITH NEUROGENIC CLAUDICATION: Primary | ICD-10-CM

## 2022-06-13 DIAGNOSIS — L73.2 HIDRADENITIS SUPPURATIVA: ICD-10-CM

## 2022-06-13 DIAGNOSIS — L85.3 DRY SKIN DERMATITIS: ICD-10-CM

## 2022-06-13 PROCEDURE — G8417 CALC BMI ABV UP PARAM F/U: HCPCS | Performed by: PHYSICIAN ASSISTANT

## 2022-06-13 PROCEDURE — 1111F DSCHRG MED/CURRENT MED MERGE: CPT | Performed by: PHYSICIAN ASSISTANT

## 2022-06-13 PROCEDURE — 99214 OFFICE O/P EST MOD 30 MIN: CPT | Performed by: PHYSICIAN ASSISTANT

## 2022-06-13 PROCEDURE — G8427 DOCREV CUR MEDS BY ELIG CLIN: HCPCS | Performed by: PHYSICIAN ASSISTANT

## 2022-06-13 PROCEDURE — 1036F TOBACCO NON-USER: CPT | Performed by: PHYSICIAN ASSISTANT

## 2022-06-13 RX ORDER — ASPIRIN 81 MG/1
81 TABLET, CHEWABLE ORAL DAILY
Qty: 30 TABLET | Refills: 5 | Status: SHIPPED | OUTPATIENT
Start: 2022-06-13

## 2022-06-13 RX ORDER — SPIRONOLACTONE 25 MG/1
TABLET ORAL
Qty: 30 TABLET | Refills: 5 | Status: SHIPPED | OUTPATIENT
Start: 2022-06-13

## 2022-06-13 RX ORDER — HYDRALAZINE HYDROCHLORIDE 50 MG/1
50 TABLET, FILM COATED ORAL EVERY 8 HOURS SCHEDULED
Qty: 60 TABLET | Refills: 5 | Status: SHIPPED
Start: 2022-06-13 | End: 2022-07-25

## 2022-06-15 ENCOUNTER — SCHEDULED TELEPHONE ENCOUNTER (OUTPATIENT)
Dept: BARIATRICS/WEIGHT MGMT | Age: 47
End: 2022-06-15

## 2022-06-15 DIAGNOSIS — Z71.3 DIETARY COUNSELING: Primary | ICD-10-CM

## 2022-06-15 DIAGNOSIS — E66.01 MORBID OBESITY DUE TO EXCESS CALORIES (HCC): ICD-10-CM

## 2022-06-15 PROCEDURE — 99024 POSTOP FOLLOW-UP VISIT: CPT | Performed by: SURGERY

## 2022-06-15 PROCEDURE — 99999 PR OFFICE/OUTPT VISIT,PROCEDURE ONLY: CPT

## 2022-06-15 NOTE — PROGRESS NOTES
Medical Nutrition Therapy (MNT) Assessment     Pt. Name: Doc Mcgill   Date:6/15/2022  F/U Appt: Sx Type 2 week R[facility]GB     Food Records Kept: No  24Hour Recall Completed at Office:No    There were no vitals taken for this visit. Height:  5' 8.5\"  Weight:  367 lbs   IBW:ideal body weight   150 lbs % EBWL: 6%     Wt Readings from Last 3 Encounters:   06/13/22 (!) 367 lb (166.5 kg)   06/01/22 (!) 397 lb 4 oz (180.2 kg)   05/25/22 (!) 378 lb 4.8 oz (171.6 kg)                     Protein supplements: Pt. is currently using the following protein supplement Nectar and consuming the following grams of protein 50. Rd / Ld reviewed with patient based on 1.0 gram per kg of IBW patient needs 68-78 grams of protein total daily.       Subjective:                   Current MNT:        Bariatric full liquid diet with MVI, Calcium & Protein Supplements  MNT Advanced to:     Bariatric puree diet with MVI, Calcium & Protein Supplements        Allergies and Food Allergies and Food Intolerances:  Fish and tree nuts    Nutritional Data  No - Nausea  No - Vomiting    [facility] Bowel Protocol:  Patient states he / she has the following bowel movements per week 2-3  no - Diarrhea  No - Steatorrhea  Yes - Constipation    Pt has this issue chronically due to pain meds  When was your last bowel movement yesterday   How much plain water are you drinking daily 48 oz   What other beverages / fluids are you drinking daily and the amount Popsicles, Gatorade zero, decaf coffee, crystal light   Yes - Are you taking Colace daily  What amount of Colace are you taking daily 2        Miralax  Per Dr Elmer Gamino prn  No - Are you taking Sugar Free Chewable Fiber Gummies  What amount of Sugar Free Chewable Fiber Gummies are you taking daily none  No Patient was provided today the Constipation Handout  Yes Rd Ld reinforced pt needs to consume the following - Water Intake should be 64 oz, Fiber Chews prn, Dietary Fiber Intake 25 g        No - Hair loss   No - Weight regain       No - Acid Reflux  No - Dumping Syndrome      Yes - Food gets stuck   Only once due to eating cheese off of pizza  - pt says she learned her lesson  Yes - Are you eating solids - should not be eating solids until 6 weeks post-op. See above    no - Night Time Coughing  not applicable -  TANYA Johnson Noted  No - Are you chewing thoroughly  - Does not take effect until 6 weeks post-op  No - Are you hungry after eating     How many meals a day 6 / Portions Sizes of Meals are 3 oz         Labs: none drawn for this appt    Supplements: not taking yet - will start 6/16/22 or 6/17/22    Estimated Daily Nutritional Needs: Based on Bariatric procedure for Wt. Loss  Energy: Will be calculated at Maintenance Stage    Protein: 60 - 80 gms Daily    MNT Plan and Additional Education: RD/LD reviewed Bariatric Puree Diet and how to puree food. Encouraged pt to meet protein and fluid needs daily. Pt. verbalized understanding. Handouts given. Pt. Instructed to call with questions. MEDICAL NUTRITION THERAPY (MNT) - Nutrition Care Plan   (The patient has been educated and given written education material that reinforces the following dietary guidelines for Bariatric Surgery)  Goal:  Patient able to verbalize the following dietary concepts:  3 to 4 ounce portions per meal     6 small meals daily      60 to 80 grams of protein   48 to 64 ounces of fluid daily (water)     Always consume protein item first with all meals   Pt is aware wt loss is pt controlled. Slow Meals - 30-35 chews per bite / Meals 30 - 45 minutes long            The RD / LD reviewed with the patient that the dietary goals of the bariatric patient is to ensure that patient is able to meet established nutritional needs for a Bariatric Surgery  Patient at this time in order to promote healing, prevent significant weight changes, prevent skin breakdown and abnormal lab values, prevent complications, and tolerance to diet and texture of foods.   Pt is able to identify proper food choices and needed changes and is able to explain proper food preparation. RD / LD reviewed compliance with assigned diet stages, volume of food and drink consumed, timing of meals, amount of protein and energy intake, daily vitamin and mineral supplementation, identify food cravings and any adverse reactions associated with intake of food and drink. RD / LD uses behavioral tools such as goal setting, MI, and self-monitoring, to reinforce the anatomic and physiologic effects of the surgery, so that the described behaviors become more of a habit not simply a reaction to the altered anatomy. Care Plan:   · Rd/Ld Addressed Food Records or 24-Hour Recall  · Rd / Ld encouraged patient to exercise at least 30 minutes daily  · Rd / Ld instructed patient on how to increase oral protein intake within the diet. Pt. can verbalize he / she will need to consume 60 - 80 grams. · Rd / Ld instructed the patient on how to increase the use of protein supplements within the diet. · Pt. was instructed on how to increase water intake. Patient will need to consume 48 - 64 oz. of just plain water in addition to 30 oz. of non-caloric beverages. · Handouts given to patient  · RD / LD encouraged oral intake    · RD / LD encouraged pt. to keep food records. · Pt. is able to verbalize diet concepts      No - Constipation Handout Given and Reviewed    No - High Fiber Handout Given and Reviewed      No - Ulcer Handout Given and Reviewed          No - Pt. was instructed to continue current MNT   Yes - Pt. diet was advanced to the following stage ( See above)   No - Supplements: initiated (See list below  - Pt. given instruction)     No - Supplemental foods:______________________  No - Pt. was placed on a altered meal schedule    Fair - Dietary Compliance    Pt doing quite well.   Instructed pt to increase scoops of protein to 3 daily due to using flavored water for protein shakes. Pt says she adds SF popsicles to her protein shake for better flavor. Encouraged pt to increase water intake. Pt admitted to eating cheese off of pizza once but says she did not feel well after doing so and said she will not do it agan.

## 2022-06-15 NOTE — PROGRESS NOTES
Elizabeth Thomas  6/15/2022  Laparoscopic Kendall-en- Y Gastric Bypass  Two weeks Post-Op Follow-up. Subjective:   Elizabeth Thomas is a 41 y.o. female is two weeks post Laparoscopic Kendall-en-Y Gastric Bypass. The patient is not having any pain. Reports no problems with swallowing liquids, bowel movements, voiding, or the wounds. She is not having swallowing difficulty, is compliant most of the time with the multivitamins and calcium + Vit D. She is meeting fluid recommendations of at least 64 ounces per day and is meeting protein recommendations. Exercise: no regular exercise. Prior to Admission medications    Medication Sig Start Date End Date Taking? Authorizing Provider   aspirin 81 MG chewable tablet Take 1 tablet by mouth daily 6/13/22   Yolanda Garcia PA-C   spironolactone (ALDACTONE) 25 MG tablet TAKE 1 TABLET BY MOUTH EVERY DAY 6/13/22   Yolanda Garcia PA-C   sacubitril-valsartan (ENTRESTO)  MG per tablet Take 1 tablet by mouth 2 times daily 6/13/22   Yolanda Garcia PA-C   hydrALAZINE (APRESOLINE) 50 MG tablet Take 1 tablet by mouth every 8 hours 6/13/22   Yolanda Garcia PA-C   mineral oil-hydrophilic petrolatum (AQUAPHOR) ointment Apply topically as needed.  6/13/22   Yolanda Garcia PA-C   docusate sodium (COLACE) 100 mg capsule Take 1 capsule by mouth 2 times daily 6/3/22 7/3/22  Manuel MD Clarissa   polyethylene glycol [facility]) 17 GM/SCOOP powder Take 17 g by mouth daily 6/3/22 7/3/22  Manuel Romo MD   guaiFENesin [facility]) 600 MG extended release tablet Take 1 tablet by mouth 2 times daily for 15 days 6/1/22 6/16/22  Manuel Romo MD   citalopram (CELEXA) 40 MG tablet TAKE 1 TABLET BY MOUTH DAILY 5/25/22   Yolanda Garcia PA-C   omeprazole (PRILOSEC) 20 MG delayed release capsule Take 1 capsule by mouth Daily 5/18/22 5/18/23  Manuel Romo MD   ondansetron (ZOFRAN-ODT) 4 MG disintegrating tablet Take 1 tablet by mouth every 8 hours as needed for Nausea or Vomiting 5/18/22   Maico Givens MD   gabapentin (NEURONTIN) 600 MG tablet Take 1 tablet by mouth 3 times daily for 90 days. 5/6/22 8/4/22  Mayito Glass MD   oxyCODONE-acetaminophen (PERCOCET) 5-325 MG per tablet Take 1 tablet by mouth every 6 hours as needed for Pain for up to 30 days. I 6/5/22 7/5/22  Mayito Glass MD   potassium chloride (KLOR-CON M) 20 MEQ extended release tablet TAKE 1 TABLET BY MOUTH DAILY WITH BREAKFAST 4/28/22   Nena Melissa PA-C   ondansetron (ZOFRAN-ODT) 4 MG disintegrating tablet Place 2 tablets under the tongue every 8 hours as needed for Nausea or Vomiting 4/13/22   Nena Melissa PA-C   pantoprazole (PROTONIX) 20 MG tablet Take 1 tablet by mouth daily 4/13/22   Nena Melissa PA-C   nystatin (MYCOSTATIN) 456791 UNIT/GM powder Apply 3 times daily.  4/13/22   Nena Melissa PA-C   albuterol sulfate HFA (PROAIR HFA) 108 (90 Base) MCG/ACT inhaler Inhale 2 puffs into the lungs every 4 hours as needed for Wheezing or Shortness of Breath 4/13/22   Nena Melissa PA-C   bumetanide (BUMEX) 1 MG tablet TAKE 1 TABLET BY MOUTH DAILY ALTERNATING WITH 2 TABLETS EVERY OTHER DAY 4/4/22   Nena Melissa PA-C   isosorbide dinitrate (ISORDIL) 20 MG tablet Take 1 tablet by mouth 3 times daily 2/22/22   Arleen Knowles MD   metoprolol succinate (TOPROL XL) 100 MG extended release tablet TAKE 1 TABLET BY MOUTH TWICE DAILY 1/12/22   Nena Melissa PA-C   atorvastatin (LIPITOR) 80 MG tablet TAKE 1 TABLET BY MOUTH EVERY NIGHT 1/12/22   Nena Melissa PA-C   cetirizine (ZYRTEC) 10 MG tablet Take 1 tablet by mouth daily 1/3/22   Nena Melissa PA-C   fluconazole (DIFLUCAN) 150 MG tablet  10/25/21   Historical Provider, MD   Dulaglutide (TRULICITY) 0.48 PL/1.9HV SOPN Inject 0.75 mg into the skin once a week  Patient taking differently: Inject 0.75 mg into the skin once a week monday 10/26/21   Nena Melissa PA-C   buPROPion (WELLBUTRIN XL) 150 MG extended release tablet Take 150 mg by mouth as needed  10/11/21 Historical Provider, MD pelaezzole-betamethasone (LOTRISONE) 1-0.05 % cream Apply topically 2 times daily. 10/25/21   Merari Parr PA-C   Cholecalciferol (VITAMIN D) 50 MCG (2000 UT) CAPS capsule Take 1 capsule by mouth daily 10/25/21   Merari Parr PA-C   mometasone-formoterol [facility]) 100-5 MCG/ACT inhaler Inhale 2 puffs into the lungs 2 times daily 6/17/21   Rajora Bard, DO   Continuous Blood Gluc  (FREESTYLE MARY READER) MARIJA 4 Devices by Does not apply route daily 5/14/21   Merari Parr PA-C   Continuous Blood Gluc Sensor ([address] [place] [address]) [address] [facility] 4 Devices by Does not apply route daily 5/14/21   Merari Parr PA-C   albuterol (PROVENTIL) (2.5 MG/3ML) 0.083% nebulizer solution Take 3 mLs by nebulization every 6 hours as needed for Wheezing 3/23/21   Merari Parr PA-C   fluticasone [facility]) 50 MCG/ACT nasal spray 2 sprays by Nasal route daily  Patient taking differently: 2 sprays by Nasal route daily as needed  1/8/21   Merari Parr PA-C   Continuous Blood Gluc Sensor (FREESTYLE MARY 2 SENSOR SYSTM) MISC 1 Device by Does not apply route daily 1/8/21   Merari Parr PA-C   Multiple Vitamin (DAILY-DOUGLAS) TABS TAKE 1 TABLET BY MOUTH EVERY DAY 6/26/20   [facility] , DO   ipratropium-albuterol (DUONEB) 0.5-2.5 (3) MG/3ML SOLN nebulizer solution Take 3 mLs by nebulization every 4 hours as needed for Shortness of Breath 5/1/20   Sp Mcguire MD   Insulin Pen Needle ([employer] PEN NEEDLES) 31G X 6 MM MISC 1 each by Does not apply route daily 10/14/19   Merari Parr PA-C   ONE TOUCH ULTRA TEST strip 1 each by Does not apply route daily 9/17/19   Merari Parr PA-C   Handicap Placard MISC by Does not apply route Patient cannot walk 200 ft without stopping to rest.    Expiration 5 yrs 2/22/19   Merari Parr PA-C        PE   not done due to phone visit    Assessment:    Doing well two weeks post laparoscopic gastric bypass. Plan:   Keep up work.  Walk as much as possible but keep your legs elevated when sitting. Continue deep breathing exercizes. Transition to the high protein Pureed-liquid diet. Continue drinking 1 oz every 10-15 minutes to prevent dehydration. Slowly increase this amount as tolerated. Aim for 60 gm Protein and 90 oz of liquids per day. Slow down if you feel chest pressure, acid or fullness. Track protein and fluid intake and bring to your next appointment. Follow up in 4 weeks and call the clinic if questions arise in the meantime. Repeat labs in one month. Make sure the bowel move daily by taking fiber such as Metamucil. Physician Signature: Electronically signed by Dr. Erlin Flowers MD       Consent:  He and/or health care decision maker is aware that that he may receive a bill for this telephone service, depending on his insurance coverage, and has provided verbal consent to proceed: Yes      Documentation:  I communicated with the patient and/or health care decision maker about post op. Details of this discussion including any medical advice provided: plans for wt loss      I affirm his is a Patient Initiated Episode with a Patient who has not had a related appointment within my department in the past 7 days or scheduled within the next 24 hours.         Patient's location: home address  Physician  location office address in [facility]  Other people involved in call none          Total Time: minutes: <5 minutes (not billable)

## 2022-06-17 DIAGNOSIS — J30.2 SEASONAL ALLERGIES: ICD-10-CM

## 2022-06-17 RX ORDER — FLUTICASONE PROPIONATE 50 MCG
SPRAY, SUSPENSION (ML) NASAL
Qty: 16 G | Refills: 2 | Status: SHIPPED
Start: 2022-06-17 | End: 2022-10-24 | Stop reason: SDUPTHER

## 2022-06-26 DIAGNOSIS — E78.5 HYPERLIPIDEMIA LDL GOAL <70: ICD-10-CM

## 2022-06-26 DIAGNOSIS — I42.9 CARDIOMYOPATHY, UNSPECIFIED TYPE (HCC): ICD-10-CM

## 2022-06-27 ENCOUNTER — TELEPHONE (OUTPATIENT)
Dept: PRIMARY CARE CLINIC | Age: 47
End: 2022-06-27

## 2022-06-27 RX ORDER — NITROFURANTOIN 25; 75 MG/1; MG/1
100 CAPSULE ORAL 2 TIMES DAILY
Qty: 20 CAPSULE | Refills: 0 | Status: SHIPPED
Start: 2022-06-27 | End: 2022-07-06

## 2022-06-27 RX ORDER — ATORVASTATIN CALCIUM 80 MG/1
TABLET, FILM COATED ORAL
Qty: 30 TABLET | Refills: 3 | Status: SHIPPED
Start: 2022-06-27 | End: 2022-10-24 | Stop reason: SDUPTHER

## 2022-06-27 RX ORDER — METOPROLOL SUCCINATE 100 MG/1
TABLET, EXTENDED RELEASE ORAL
Qty: 60 TABLET | Refills: 3 | Status: SHIPPED
Start: 2022-06-27 | End: 2022-10-24 | Stop reason: SDUPTHER

## 2022-06-27 NOTE — TELEPHONE ENCOUNTER
Pt calling uti sx frequency,buring,spasms when urinating pt requesting antibiotic to [facility] on [address], she is allergic to pcn,    Declines appt or to bring urine no ride

## 2022-06-29 RX ORDER — ISOSORBIDE DINITRATE 20 MG/1
TABLET ORAL
Qty: 90 TABLET | Refills: 3 | Status: SHIPPED | OUTPATIENT
Start: 2022-06-29

## 2022-07-06 ENCOUNTER — TELEPHONE (OUTPATIENT)
Dept: PRIMARY CARE CLINIC | Age: 47
End: 2022-07-06

## 2022-07-06 ENCOUNTER — TELEPHONE (OUTPATIENT)
Dept: BARIATRICS/WEIGHT MGMT | Age: 47
End: 2022-07-06

## 2022-07-06 RX ORDER — NITROFURANTOIN 25; 75 MG/1; MG/1
CAPSULE ORAL
Qty: 20 CAPSULE | Refills: 0 | OUTPATIENT
Start: 2022-07-06

## 2022-07-06 RX ORDER — NITROFURANTOIN 25; 75 MG/1; MG/1
100 CAPSULE ORAL 2 TIMES DAILY
Qty: 14 CAPSULE | Refills: 0 | Status: SHIPPED | OUTPATIENT
Start: 2022-07-06 | End: 2022-07-13

## 2022-07-06 NOTE — TELEPHONE ENCOUNTER
Pt called in w/ question regarding having a UTI. She reports that she has recently had this issue and saw her PCP. It is not resolved and wondered if it is common after baritric surgery and if she should see Dr Simon Rodgers for this. Told pt to continue seeing her PCP as this is a medical and not surgical issue. Pt had RYGB on 5/31/22 and says she is doing well, nutritionally. She is drinking about 50 oz water daily. She is taking protein and also vitamin/mineral supplements. She did ask about alternatives to the MVI and the calcium. RD suggested soft chews instead of the current tablets (currently using Bariatric [employer] brand). Pt asked if she is still to follow pureed - told pt yes, this will continue until her 6 week appt on 7/20/22 with RD/LD. Pt asked if water therapy exercise is ok to do. Told pt this should be fine as long as it is not unusually intense exercising in water. Pt did note that she is taking Colace twice daily and using Miralax, is there  Anything that she can do to resolve constipation that she is having. Suggested a fiber supplement and gave suggestions she can use as suggested per her binder. Suggested pt increase water and continue to be sure to take all supplements including protein every day. Discussed current wt. Pt weighs 355 lbs, which is down 27 lbs since surgery or 11%. Gave pt goal weights to be at proper goal weight for her next follow up at 6 weeks post op. All questions answered. Pt voiced understanding.

## 2022-07-06 NOTE — TELEPHONE ENCOUNTER
Pt calling she was feeling better but not since she finished her macrobid antibiotic she feels her uti sxs returning urinary frequency spasm,burning, can you send refill to [facility] [address].

## 2022-07-08 RX ORDER — FLUCONAZOLE 100 MG/1
100 TABLET ORAL DAILY
Qty: 7 TABLET | Refills: 0 | OUTPATIENT
Start: 2022-07-08 | End: 2022-07-15

## 2022-07-11 ENCOUNTER — APPOINTMENT (OUTPATIENT)
Dept: CT IMAGING | Age: 47
End: 2022-07-11
Payer: MEDICARE

## 2022-07-11 ENCOUNTER — HOSPITAL ENCOUNTER (EMERGENCY)
Age: 47
Discharge: HOME | End: 2022-07-12
Attending: EMERGENCY MEDICINE
Payer: MEDICARE

## 2022-07-11 VITALS
TEMPERATURE: 98.1 F | SYSTOLIC BLOOD PRESSURE: 128 MMHG | RESPIRATION RATE: 16 BRPM | BODY MASS INDEX: 44.41 KG/M2 | HEART RATE: 94 BPM | DIASTOLIC BLOOD PRESSURE: 92 MMHG | OXYGEN SATURATION: 97 % | WEIGHT: 293 LBS | HEIGHT: 68 IN

## 2022-07-11 DIAGNOSIS — N30.00 ACUTE CYSTITIS WITHOUT HEMATURIA: ICD-10-CM

## 2022-07-11 DIAGNOSIS — R10.9 FLANK PAIN: Primary | ICD-10-CM

## 2022-07-11 LAB
ALBUMIN SERPL-MCNC: 3.8 G/DL (ref 3.5–5.2)
ALP BLD-CCNC: 92 U/L (ref 35–104)
ALT SERPL-CCNC: 6 U/L (ref 0–32)
ANION GAP SERPL CALCULATED.3IONS-SCNC: 15 MMOL/L (ref 7–16)
AST SERPL-CCNC: 10 U/L (ref 0–31)
BACTERIA: ABNORMAL /HPF
BASOPHILS ABSOLUTE: 0.05 E9/L (ref 0–0.2)
BASOPHILS RELATIVE PERCENT: 0.5 % (ref 0–2)
BILIRUB SERPL-MCNC: 0.3 MG/DL (ref 0–1.2)
BILIRUBIN URINE: ABNORMAL
BLOOD, URINE: NEGATIVE
BUN BLDV-MCNC: 9 MG/DL (ref 6–20)
CALCIUM SERPL-MCNC: 9.5 MG/DL (ref 8.6–10.2)
CHLORIDE BLD-SCNC: 104 MMOL/L (ref 98–107)
CLARITY: CLEAR
CO2: 22 MMOL/L (ref 22–29)
COLOR: ABNORMAL
CREAT SERPL-MCNC: 0.7 MG/DL (ref 0.5–1)
EOSINOPHILS ABSOLUTE: 0.39 E9/L (ref 0.05–0.5)
EOSINOPHILS RELATIVE PERCENT: 3.8 % (ref 0–6)
EPITHELIAL CELLS, UA: ABNORMAL /HPF
GFR AFRICAN AMERICAN: >60
GFR NON-AFRICAN AMERICAN: >60 ML/MIN/1.73
GLUCOSE BLD-MCNC: 110 MG/DL (ref 74–99)
GLUCOSE URINE: NEGATIVE MG/DL
HCG(URINE) PREGNANCY TEST: NEGATIVE
HCT VFR BLD CALC: 46.2 % (ref 34–48)
HEMOGLOBIN: 14.7 G/DL (ref 11.5–15.5)
IMMATURE GRANULOCYTES #: 0.02 E9/L
IMMATURE GRANULOCYTES %: 0.2 % (ref 0–5)
KETONES, URINE: >=80 MG/DL
LACTIC ACID: 1.2 MMOL/L (ref 0.5–2.2)
LEUKOCYTE ESTERASE, URINE: ABNORMAL
LIPASE: 16 U/L (ref 13–60)
LYMPHOCYTES ABSOLUTE: 4.24 E9/L (ref 1.5–4)
LYMPHOCYTES RELATIVE PERCENT: 41.6 % (ref 20–42)
MCH RBC QN AUTO: 29.6 PG (ref 26–35)
MCHC RBC AUTO-ENTMCNC: 31.8 % (ref 32–34.5)
MCV RBC AUTO: 93.1 FL (ref 80–99.9)
MONOCYTES ABSOLUTE: 0.64 E9/L (ref 0.1–0.95)
MONOCYTES RELATIVE PERCENT: 6.3 % (ref 2–12)
NEUTROPHILS ABSOLUTE: 4.86 E9/L (ref 1.8–7.3)
NEUTROPHILS RELATIVE PERCENT: 47.6 % (ref 43–80)
NITRITE, URINE: NEGATIVE
PDW BLD-RTO: 16.1 FL (ref 11.5–15)
PH UA: 6 (ref 5–9)
PLATELET # BLD: 332 E9/L (ref 130–450)
PMV BLD AUTO: 10.8 FL (ref 7–12)
POTASSIUM REFLEX MAGNESIUM: 3.6 MMOL/L (ref 3.5–5)
PROTEIN UA: ABNORMAL MG/DL
RBC # BLD: 4.96 E12/L (ref 3.5–5.5)
RBC UA: ABNORMAL /HPF (ref 0–2)
SODIUM BLD-SCNC: 141 MMOL/L (ref 132–146)
SPECIFIC GRAVITY UA: >=1.03 (ref 1–1.03)
TOTAL PROTEIN: 7.5 G/DL (ref 6.4–8.3)
UROBILINOGEN, URINE: 1 E.U./DL
WBC # BLD: 10.2 E9/L (ref 4.5–11.5)
WBC UA: ABNORMAL /HPF (ref 0–5)

## 2022-07-11 PROCEDURE — 96374 THER/PROPH/DIAG INJ IV PUSH: CPT

## 2022-07-11 PROCEDURE — 85025 COMPLETE CBC W/AUTO DIFF WBC: CPT

## 2022-07-11 PROCEDURE — 83605 ASSAY OF LACTIC ACID: CPT

## 2022-07-11 PROCEDURE — 6370000000 HC RX 637 (ALT 250 FOR IP): Performed by: STUDENT IN AN ORGANIZED HEALTH CARE EDUCATION/TRAINING PROGRAM

## 2022-07-11 PROCEDURE — 81001 URINALYSIS AUTO W/SCOPE: CPT

## 2022-07-11 PROCEDURE — 6360000002 HC RX W HCPCS: Performed by: STUDENT IN AN ORGANIZED HEALTH CARE EDUCATION/TRAINING PROGRAM

## 2022-07-11 PROCEDURE — 74177 CT ABD & PELVIS W/CONTRAST: CPT

## 2022-07-11 PROCEDURE — 87088 URINE BACTERIA CULTURE: CPT

## 2022-07-11 PROCEDURE — 80053 COMPREHEN METABOLIC PANEL: CPT

## 2022-07-11 PROCEDURE — 6360000004 HC RX CONTRAST MEDICATION: Performed by: RADIOLOGY

## 2022-07-11 PROCEDURE — 81025 URINE PREGNANCY TEST: CPT

## 2022-07-11 PROCEDURE — 96375 TX/PRO/DX INJ NEW DRUG ADDON: CPT

## 2022-07-11 PROCEDURE — 99285 EMERGENCY DEPT VISIT HI MDM: CPT

## 2022-07-11 PROCEDURE — 83690 ASSAY OF LIPASE: CPT

## 2022-07-11 PROCEDURE — 2580000003 HC RX 258: Performed by: STUDENT IN AN ORGANIZED HEALTH CARE EDUCATION/TRAINING PROGRAM

## 2022-07-11 RX ORDER — 0.9 % SODIUM CHLORIDE 0.9 %
1000 INTRAVENOUS SOLUTION INTRAVENOUS ONCE
Status: COMPLETED | OUTPATIENT
Start: 2022-07-11 | End: 2022-07-11

## 2022-07-11 RX ORDER — OXYCODONE HYDROCHLORIDE AND ACETAMINOPHEN 5; 325 MG/1; MG/1
1 TABLET ORAL ONCE
Status: COMPLETED | OUTPATIENT
Start: 2022-07-11 | End: 2022-07-11

## 2022-07-11 RX ORDER — ONDANSETRON 2 MG/ML
4 INJECTION INTRAMUSCULAR; INTRAVENOUS ONCE
Status: COMPLETED | OUTPATIENT
Start: 2022-07-11 | End: 2022-07-11

## 2022-07-11 RX ORDER — KETOROLAC TROMETHAMINE 30 MG/ML
15 INJECTION, SOLUTION INTRAMUSCULAR; INTRAVENOUS ONCE
Status: COMPLETED | OUTPATIENT
Start: 2022-07-11 | End: 2022-07-11

## 2022-07-11 RX ADMIN — IOPAMIDOL 90 ML: 755 INJECTION, SOLUTION INTRAVENOUS at 23:32

## 2022-07-11 RX ADMIN — OXYCODONE HYDROCHLORIDE AND ACETAMINOPHEN 1 TABLET: 5; 325 TABLET ORAL at 22:29

## 2022-07-11 RX ADMIN — ONDANSETRON 4 MG: 2 INJECTION INTRAMUSCULAR; INTRAVENOUS at 22:29

## 2022-07-11 RX ADMIN — KETOROLAC TROMETHAMINE 15 MG: 30 INJECTION, SOLUTION INTRAMUSCULAR; INTRAVENOUS at 22:29

## 2022-07-11 RX ADMIN — SODIUM CHLORIDE 1000 ML: 9 INJECTION, SOLUTION INTRAVENOUS at 22:28

## 2022-07-11 NOTE — ED NOTES
Department of Emergency Medicine  FIRST PROVIDER TRIAGE NOTE             Independent MLP           7/11/22  5:42 PM EDT    Date of Encounter: 7/11/22   MRN: [id number]      HPI: Charles Childers is a 41 y.o. female who presents to the ED for Hematuria and Back Pain  Patient reports that she has been treated for a UTI for approximately the past 2 weeks via her doctor with [facility]. She reports that she is taking all medication as directed however her symptoms do not seem to be improving and now she is having increased left-sided tenderness which does radiate into the groin region. She reports that she is noticing blood in her urine and it does have a dark cola appearance. She denies any additional symptoms. ROS: Negative for cp, sob, fever, cough, vomiting, diarrhea, urinary complaints, rash, headache or dizziness. PE: Gen Appearance/Constitutional: alert  CV: regular rate  Pulm: CTA bilat  GI: tender to palpation     Initial Plan of Care: All treatment areas with department are currently occupied. Plan to order/Initiate the following while awaiting opening in ED: labs, EKG, imaging studies, analgesics, antibiotics and IV fluids.   Initiate Treatment-Testing, Proceed toTreatment Area When Bed Available for ED Attending/MLP to Continue Care    Electronically signed by GREGORIO Soriano   DD: 7/11/22         Ham Soriano  07/11/22 1748

## 2022-07-12 PROCEDURE — 96375 TX/PRO/DX INJ NEW DRUG ADDON: CPT

## 2022-07-12 PROCEDURE — 2580000003 HC RX 258: Performed by: STUDENT IN AN ORGANIZED HEALTH CARE EDUCATION/TRAINING PROGRAM

## 2022-07-12 PROCEDURE — 6370000000 HC RX 637 (ALT 250 FOR IP): Performed by: STUDENT IN AN ORGANIZED HEALTH CARE EDUCATION/TRAINING PROGRAM

## 2022-07-12 PROCEDURE — 6360000002 HC RX W HCPCS: Performed by: STUDENT IN AN ORGANIZED HEALTH CARE EDUCATION/TRAINING PROGRAM

## 2022-07-12 RX ORDER — CEFDINIR 300 MG/1
300 CAPSULE ORAL ONCE
Status: COMPLETED | OUTPATIENT
Start: 2022-07-12 | End: 2022-07-12

## 2022-07-12 RX ORDER — 0.9 % SODIUM CHLORIDE 0.9 %
1000 INTRAVENOUS SOLUTION INTRAVENOUS ONCE
Status: COMPLETED | OUTPATIENT
Start: 2022-07-12 | End: 2022-07-12

## 2022-07-12 RX ORDER — FENTANYL CITRATE 50 UG/ML
25 INJECTION, SOLUTION INTRAMUSCULAR; INTRAVENOUS ONCE
Status: COMPLETED | OUTPATIENT
Start: 2022-07-12 | End: 2022-07-12

## 2022-07-12 RX ORDER — CEFDINIR 300 MG/1
300 CAPSULE ORAL 2 TIMES DAILY
Qty: 14 CAPSULE | Refills: 0 | Status: SHIPPED | OUTPATIENT
Start: 2022-07-12 | End: 2022-07-19

## 2022-07-12 RX ADMIN — SODIUM CHLORIDE 1000 ML: 9 INJECTION, SOLUTION INTRAVENOUS at 00:48

## 2022-07-12 RX ADMIN — CEFDINIR 300 MG: 300 CAPSULE ORAL at 01:53

## 2022-07-12 RX ADMIN — FENTANYL CITRATE 25 MCG: 50 INJECTION, SOLUTION INTRAMUSCULAR; INTRAVENOUS at 01:07

## 2022-07-12 ASSESSMENT — ENCOUNTER SYMPTOMS
DIARRHEA: 0
NAUSEA: 0
SINUS PRESSURE: 0
EYE REDNESS: 0
SHORTNESS OF BREATH: 0
SORE THROAT: 0
VOMITING: 0
EYE DISCHARGE: 0
ABDOMINAL DISTENTION: 0
WHEEZING: 0
BACK PAIN: 0
COUGH: 0
EYE PAIN: 0

## 2022-07-12 NOTE — ED PROVIDER NOTES
[facility]  Department of Emergency Medicine     Written by: Angelica Purcell DO  Patient Name: Ramez Coyle  Attending Provider: Cassandra Escamilla DO  Admit Date: 2022  9:23 PM  MRN: [id number]                   : 1975        Chief Complaint   Patient presents with    Hematuria    Back Pain    - Chief complaint    Patient is a 65-year-old female past medical history of hypertension, diabetes, hyperlipidemia and CHF. Patient presents with chief complaint of left flank pain as well as hematuria. Patient states that she was being treated for UTI for the last week with minimal improvement in symptoms. Patient notes that over the last few days she has had increasing blood in her urine as well as left-sided flank pain. Patient describes the pain as a sharp aching sensation. She currently rates pain a 8 out of 10. Patient states the pain is worse with palpation she denies any relieving factors. Patient does note a somewhat similar episode in the past when she was diagnosed with a kidney stone. Patient denies any fevers, chills, chest pain, cough, constipation, diarrhea, headache, lightheadedness, numbness or tingling. The history is provided by the patient. No  was used. Review of Systems   Constitutional: Negative for chills and fever. HENT: Negative for ear pain, sinus pressure and sore throat. Eyes: Negative for pain, discharge and redness. Respiratory: Negative for cough, shortness of breath and wheezing. Cardiovascular: Negative for chest pain. Gastrointestinal: Negative for abdominal distention, diarrhea, nausea and vomiting. Genitourinary: Positive for dysuria, flank pain and hematuria. Negative for frequency. Musculoskeletal: Negative for arthralgias and back pain. Skin: Negative for rash and wound. Neurological: Negative for weakness and headaches. Hematological: Negative for adenopathy.    All other systems reviewed and are negative. Physical Exam  Vitals and nursing note reviewed. Constitutional:       General: She is not in acute distress. Appearance: Normal appearance. HENT:      Head: Normocephalic and atraumatic. Nose: No congestion or rhinorrhea. Mouth/Throat:      Mouth: Mucous membranes are moist.      Pharynx: Oropharynx is clear. Eyes:      Extraocular Movements: Extraocular movements intact. Pupils: Pupils are equal, round, and reactive to light. Cardiovascular:      Rate and Rhythm: Normal rate and regular rhythm. Heart sounds: No murmur heard. No gallop. Pulmonary:      Effort: Pulmonary effort is normal. No respiratory distress. Breath sounds: No wheezing, rhonchi or rales. Abdominal:      General: Abdomen is flat. Palpations: Abdomen is soft. There is no mass. Tenderness: There is no abdominal tenderness. There is left CVA tenderness. There is no guarding. Hernia: No hernia is present. Musculoskeletal:         General: No swelling, tenderness or signs of injury. Normal range of motion. Cervical back: Normal range of motion. No rigidity. No muscular tenderness. Skin:     General: Skin is warm and dry. Capillary Refill: Capillary refill takes less than 2 seconds. Neurological:      General: No focal deficit present. Mental Status: She is alert and oriented to person, place, and time. Mental status is at baseline. Psychiatric:         Mood and Affect: Mood normal.         Behavior: Behavior normal.          Procedures       MDM  Number of Diagnoses or Management Options  Acute cystitis without hematuria  Flank pain  Diagnosis management comments: Patient is a 42-year-old female past medical history of hypertension, diabetes, hyperlipidemia and CHF. Patient presents with chief complaint of left flank pain hematuria. Vital signs stable presentation.   On physical exam heart regular rate and rhythm, lungs clear to auscultation bilaterally, abdomen soft nontender no rigidity rebound or guarding. There is left flank tenderness. Laboratory work obtained CBC unremarkable, CMP unremarkable, urinalysis was indicative of infection with moderate bacteria trace leukocyte Estrace, lactic acid 1.2, lipase 16, urine pregnancy test negative. CT scan of the abdomen pelvis demonstrated no acute abnormalities. Findings consistent with left flank pain likely secondary to UTI. Patient given IV fluids as well as pain medicine. Patient given cefdinir for UTI. On reevaluation patient notes improvement in symptoms. Decision made to discharge patient. Patient given prescription for cefdinir. In addition patient instructed to follow-up with primary care doctor. If patient notes any new worrisome symptoms she should return to emergency department for evaluation. Plan of care discussed with patient occluding discharge, all questions were answered, patient was agreement plan of care and discharged home in stable condition. Amount and/or Complexity of Data Reviewed  Clinical lab tests: ordered and reviewed  Tests in the radiology section of CPT®: ordered and reviewed    Risk of Complications, Morbidity, and/or Mortality  Presenting problems: moderate  Diagnostic procedures: moderate  Management options: moderate    Patient Progress  Patient progress: stable             --------------------------------------------- PAST HISTORY ---------------------------------------------  Past Medical History:  has a past medical history of Asthma, Bell palsy, CHF (congestive heart failure) ([address] DDD (degenerative disc disease), cervical, Diabetes mellitus ([address] Diverticulitis, DJD (degenerative joint disease), GERD without esophagitis, HTN (hypertension), Hyperlipidemia, Meningitis, Morbid obesity due to excess calories ([address] Neuropathy, Pancreas cyst, Sleep apnea, and 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[address]     Past Surgical History:  has a past surgical history that includes Cystocopy (Left, 01/14/2018); Cholecystectomy (2009); Lithotripsy (Right, 02/15/2018); Upper gastrointestinal endoscopy (N/A, 6/22/2018); Dilation and curettage of uterus; Upper gastrointestinal endoscopy (N/A, 8/20/2021); and Kendall-en-Y Gastric Bypass (N/A, 5/31/2022). Social History:  reports that she quit smoking about 3 years ago. Her smoking use included cigarettes. She started smoking about 21 years ago. She has a 4.25 pack-year smoking history. She has never used smokeless tobacco. She reports that she does not drink alcohol and does not use drugs. Family History: family history includes Arthritis in her mother; Asthma in her father and mother; Cancer in her father; Heart Attack in her father; Hypertension in her father and mother. The patients home medications have been reviewed.     Allergies: Food, Ultram [tramadol hcl], Fish-derived products, Norco [hydrocodone-acetaminophen], Nuts [peanut-containing drug products], Pcn [penicillins], and Cashews [macadamia nut oil]    -------------------------------------------------- RESULTS -------------------------------------------------  Labs:  Results for orders placed or performed during the hospital encounter of 07/11/22   CBC with Auto Differential   Result Value Ref Range    WBC 10.2 4.5 - 11.5 E9/L    RBC 4.96 3.50 - 5.50 E12/L    Hemoglobin 14.7 11.5 - 15.5 g/dL    Hematocrit 46.2 34.0 - 48.0 %    MCV 93.1 80.0 - 99.9 fL    MCH 29.6 26.0 - 35.0 pg    MCHC 31.8 (L) 32.0 - 34.5 %    RDW 16.1 (H) 11.5 - 15.0 fL    Platelets 961 402 - 611 E9/L    MPV 10.8 7.0 - 12.0 fL    Neutrophils % 47.6 43.0 - 80.0 %    Immature Granulocytes % 0.2 0.0 - 5.0 %    Lymphocytes % 41.6 20.0 - 42.0 %    Monocytes % 6.3 2.0 - 12.0 %    Eosinophils % 3.8 0.0 - 6.0 %    Basophils % 0.5 0.0 - 2.0 %    Neutrophils Absolute 4.86 1.80 - 7.30 E9/L    Immature Granulocytes # 0.02 E9/L    Lymphocytes Absolute 4.24 (H) 1.50 - 4.00 E9/L    Monocytes Absolute 0.64 0.10 - 0.95 E9/L    Eosinophils Absolute 0.39 0.05 - 0.50 E9/L    Basophils Absolute 0.05 0.00 - 0.20 E9/L   Comprehensive Metabolic Panel w/ Reflex to MG   Result Value Ref Range    Sodium 141 132 - 146 mmol/L    Potassium reflex Magnesium 3.6 3.5 - 5.0 mmol/L    Chloride 104 98 - 107 mmol/L    CO2 22 22 - 29 mmol/L    Anion Gap 15 7 - 16 mmol/L    Glucose 110 (H) 74 - 99 mg/dL    BUN 9 6 - 20 mg/dL    CREATININE 0.7 0.5 - 1.0 mg/dL    GFR Non-African American >60 >=60 mL/min/1.73    GFR African American >60     Calcium 9.5 8.6 - 10.2 mg/dL    Total Protein 7.5 6.4 - 8.3 g/dL    Albumin 3.8 3.5 - 5.2 g/dL    Total Bilirubin 0.3 0.0 - 1.2 mg/dL    Alkaline Phosphatase 92 35 - 104 U/L    ALT 6 0 - 32 U/L    AST 10 0 - 31 U/L   Lipase   Result Value Ref Range    Lipase 16 13 - 60 U/L   Lactic Acid   Result Value Ref Range    Lactic Acid 1.2 0.5 - 2.2 mmol/L   Urinalysis with Microscopic   Result Value Ref Range    Color, UA DARK YELLOW (A) Straw/Yellow    Clarity, UA Clear Clear    Glucose, Ur Negative Negative mg/dL    Bilirubin Urine MODERATE (A) Negative    Ketones, Urine >=80 (A) Negative mg/dL    Specific Gravity, UA >=1.030 1.005 - 1.030    Blood, Urine Negative Negative    pH, UA 6.0 5.0 - 9.0    Protein, UA TRACE Negative mg/dL    Urobilinogen, Urine 1.0 <2.0 E.U./dL    Nitrite, Urine Negative Negative    Leukocyte Esterase, Urine TRACE (A) Negative    WBC, UA 1-3 0 - 5 /HPF    RBC, UA NONE 0 - 2 /HPF    Epithelial Cells, UA MODERATE /HPF    Bacteria, UA MODERATE (A) None Seen /HPF   Pregnancy, Urine   Result Value Ref Range    HCG(Urine) Pregnancy Test NEGATIVE NEGATIVE       Radiology:  CT ABDOMEN PELVIS W IV CONTRAST Additional Contrast? None   Final Result   No urinary tract stones or hydronephrosis. Cholecystectomy. Stable 3.8 cm low-attenuation lesion involving the head of the pancreas   unchanged compared to 04/29/2020. No definitive findings to explain the patient's left flank pain. Punctate bilateral nonobstructing renal calculi.             ------------------------- NURSING NOTES AND VITALS REVIEWED ---------------------------  Date / Time Roomed:  7/11/2022  9:23 PM  ED Bed Assignment:  10/10    The nursing notes within the ED encounter and vital signs as below have been reviewed. BP (!) 128/92   Pulse 94   Temp 98.1 °F (36.7 °C) (Oral)   Resp 16   Ht 5' 8\" (1.727 m)   Wt (!) 350 lb (158.8 kg)   SpO2 97%   BMI 53.22 kg/m²   Oxygen Saturation Interpretation: Normal      ------------------------------------------ PROGRESS NOTES ------------------------------------------  6:16 AM EDT  I have spoken with the patient and discussed todays results, in addition to providing specific details for the plan of care and counseling regarding the diagnosis and prognosis. Their questions are answered at this time and they are agreeable with the plan. I discussed at length with them reasons for immediate return here for re evaluation. They will followup with their primary care physician by calling their office tomorrow. --------------------------------- ADDITIONAL PROVIDER NOTES ---------------------------------  At this time the patient is without objective evidence of an acute process requiring hospitalization or inpatient management. They have remained hemodynamically stable throughout their entire ED visit and are stable for discharge with outpatient follow-up. The plan has been discussed in detail and they are aware of the specific conditions for emergent return, as well as the importance of follow-up. Discharge Medication List as of 7/12/2022  2:19 AM      START taking these medications    Details   cefdinir (OMNICEF) 300 MG capsule Take 1 capsule by mouth 2 times daily for 7 days, Disp-14 capsule, R-0Normal             Diagnosis:  1. Flank pain    2.  Acute cystitis without hematuria        Disposition:  Patient's disposition: Discharge to home  Patient's condition is stable. Patient was seen and evaluated by myself and my attending Lucy Bradley DO. Assessment and Plan discussed with attending provider, please see attestation for final plan of care.      Pako Durbin, DO Chris Venegas DO  Resident  07/12/22 [id number]

## 2022-07-14 LAB
ORGANISM: ABNORMAL
URINE CULTURE, ROUTINE: ABNORMAL

## 2022-07-20 ENCOUNTER — INITIAL CONSULT (OUTPATIENT)
Dept: BARIATRICS/WEIGHT MGMT | Age: 47
End: 2022-07-20

## 2022-07-20 VITALS — BODY MASS INDEX: 43.4 KG/M2 | HEIGHT: 69 IN | WEIGHT: 293 LBS

## 2022-07-20 DIAGNOSIS — Z00.8 NUTRITIONAL ASSESSMENT: ICD-10-CM

## 2022-07-20 DIAGNOSIS — Z71.3 NUTRITIONAL COUNSELING: ICD-10-CM

## 2022-07-20 DIAGNOSIS — E66.01 MORBID OBESITY DUE TO EXCESS CALORIES (HCC): ICD-10-CM

## 2022-07-20 DIAGNOSIS — K91.2 MALNUTRITION FOLLOWING GASTROINTESTINAL SURGERY: Primary | ICD-10-CM

## 2022-07-20 PROCEDURE — 99999 PR OFFICE/OUTPT VISIT,PROCEDURE ONLY: CPT | Performed by: DIETITIAN, REGISTERED

## 2022-07-20 NOTE — PATIENT INSTRUCTIONS
three times a week. People who are constipated may find it difficult and painful to have a bowel movement. Other symptoms of constipation include feeling bloated, uncomfortable, and sluggish. What Causes Constipation? To understand constipation, it helps to know how the colon (large intestine) works. As food moves through it, the colon absorbs water while forming waste products, or stool. Muscle contractions in the colon push the stool toward the rectum. By the time stool reaches the rectum, it is solid because most of the water has been absorbed. The hard and dry stools of constipation occur when the colon absorbs too much water. This happens because the colon's muscle contractions are slow or sluggish causing the stool to move through the colon too slowly causing too much water being able to be absorbed and a hard stool forming. Why Does This Happen After Bariatric Surgery? Most patients do not drink enough Water. That's right. You need to be drinking at the minimum 64 ounces of just plain water a day and additional 30 oz. of other non-caloric beverages. All other beverages do not count as water intake and will cause constipation. Adding water to the diet adds fluid to the colon and bulk to the stools, making bowel movements softer and easier to pass. Avoid all other beverages besides water especially coffee and tea when you are constipated. Most patients do not get enough fiber in the diet. We recommend at least 25 - 35 grams of fiber daily from your diet starting 3 months post-op. The most common cause of constipation is a diet low in fiber found in vegetables, fruits, and whole grains and high in fats found in cheese, eggs, and meats. People who eat plenty of high-fiber foods are less likely to become constipated. Refer to your high fiber food's handout - this is based on what stage of the diet you may be in and what foods are tolerated at this stage.   Lack of exercise can lead to constipation - Regular activity especially walking helps push food through the intestines and helps to push waste through the colon. Please be sure to be following your exercise guidelines. If constipated eliminate Cheese, Eggs, Applesauce, Apples, Bananas, Rice and Bread from the diet you can resume in small amounts once the constipation resolves and these foods have been incorporated into your diet stage. If you are 2 -6 weeks post bariatric surgery you may add high fiber foods such as oatmeal, oat bran at this time. If your diet has progressed and you are 12 weeks post bariatric surgery you may add other high fiber foods such as fresh fruits, fresh vegetables, and whole grains. Track your fiber intake daily the goal is to have 25 - 35 grams of fiber total daily starting at your 3 month follow-up appointment. Remember half the fiber intake can come from your fiber supplement the other half should come from dietary intake. Refer to the High Fiber Foods Handout. Surgeon Bowel Instruction:    Your surgeon has instructed you on the following:    Start by following the [address] [place] [address] [facility] recommendations of taking a stool softener -  Colace or Docusate 100mg gel tablet once at breakfast and once before bed. The day you are discharged from the hospital until your 6 week follow-up appointment. Start by adding a Sugar Free Chewable Fiber Supplement at your 2 week follow-up appointment lifelong -  Per the surgeons protocol you should be taking a fiber supplement lifelong since your two week follow-up appointment. Your surgeon recommends a daily approved Fiber supplement 15 grams total daily. If you are just introducing a Fiber supplement the 15 grams should be introduced gradually - 5 grams of a fiber supplement daily the 1st week, 10 grams of a fiber supplement daily the 2nd week and 15 grams of a fiber supplement daily the third week.   Pt is instructed to continue the 15 grams of a fiber supplement daily. Gokul Queen reviewed. Pt verbalized understanding. Select One from Below     Vitafusion Sugar Free Fiber Well Chewable Fiber Gummies - Follow the [facility] instructions of taking 2 Fiber Gummies 3 times Daily. (15 grams)    -or-    Benefiber Original - Follow the [facility] instructions of taking 1tablespoon 3 times daily mixed in water or sugar free beverage of choice. (13.5 grams)     -or-    Metamucil Sugar Free Original - Follow the [facility] instruction of taking 1 rounded tablespoon 2 times daily mixed in water or sugar free beverage of choice. (18 grams)    You need to be drinking at the minimum 64 ounces of just plain water a day and an additional 30 oz. of other non-caloric, non-carbonated, non-caffeinated beverages. This will make a total of 90oz or greater daily. Unless you are on a fluid restriction. You can add a Probiotic. The [address] [place] [address] [facility]  recommends a Probiotic that contains 15 billion cfu's in one capsule and the first  ingredient needs to be Lactobacillus Acidophilus. The instructions are to take 1 capsule daily if you have no results increase to 2 capsules daily. Increase your activity and walk as tolerated daily. Follow your exercise guidelines. If you find that you are still having trouble with constipation after trying the  suggestions mentioned above please contact the [address] [place] [address] [facility] [phone].                                                                   [facility] Staff Stool Softener and Fiber Instruction Education for Patients:       D/C from Hospital 2-week F/U Appt: 6-week F/U Appt: 3 Month F/U Appt: 6 -Month - On   Water Intake 48 - 64 oz Water 64 oz of water plus 30 oz other SF / FF/ NC / CF Beverages 64 oz of water plus 30 oz other SF / FF/ NC / CF Beverages 64 oz of water plus 30 oz other SF / FF/ NC / CF Beverages 64 oz of water plus 30 oz other SF / FF/ NC / CF Beverages   Colace 100 mgs 2 times daily 100 mgs 2 times daily 100 mgs 2 times daily. Pt should be instructed to stop the Colace Pt should be off the Colace Pt should be off the Colace   Fiber Supplement   ____________ Add Fiber Supplement - Pt needs educated at 2 weeks Appt Continue Fiber Supplement -Continue Pt Education Continue Fiber Supplement -Continue Pt Education Continue Fiber Supplement -Continue Pt Education   Fiber Foods   ____________     ____________     ____________   Goal is 25 - 35 grams of Fiber Total Daily - Pt needs Educated Goal is 25 - 35 grams of Fiber Total Daily - Pt needs Educated                                     Vitamin and Supplement Instruction:  Vitamin and Mineral Supplementation   After Gastric Bypass and Sleeve Gastrectomy Surgery      Patient is able to verbalize the above supplements must be taken daily in order to insure proper health and nutrition, and prevent nutrient deficiencies. The patient is aware that not complying can lead to the patient placing him/her self at risk for complications. RD / LD reviewed with patient the importance of dietary supplements. Pt. verbalized understanding. Vitamins:   Bariatric Advantage Chewable Multi-Formula (1 tablet 2 times daily) - and - Bariatric Advantage Chewable Iron 29 mg (1 tablet daily)    Other Vitamin Deficiencies:    Vitamin D - Dry Vitamin D3 5,000iu every day. Calcium Supplements:  Calcium Supplement: Total daily intake should be 1500 mg from calcium citrate and 800 - 1000 iu Vitamin D daily. Plus three servings of low-fat dairy for a total daily intake of 1800 - 2200 mg of Calcium. Bariatric Advantage Calcium 500 Chews (1 tablet 3 times daily) - contains Calories    Pt. given copy.

## 2022-07-20 NOTE — PROGRESS NOTES
No problems noted that require a surgeon to evaluate pt today. Pt was instructed to call if problems occur or constipation does not resolve. Pt is scheduled for a 3 month f/u appt. with the surgeon. 3 month lab scripts given. Pt was instructed labs need to be fasting and have drawn 2 weeks prior to visit. Pt verbalized understanding. AVS and instruction provided. Medical Nutrition Therapy (MNT) Assessment   Pt is aware this phone call conversation will be documented and is in agreement to the documentation: Pt verbalized - Yes    Pt. Name: Jerald Chowdhury   Date:7/20/2022  F/U Appt: Sx Type 6 week RYGB      Ht 5' 8.5\" (1.74 m)   Wt (!) 352 lb (159.7 kg)   BMI 52.74 kg/m²  Height: 5' 8.5\" (1.74 m) Weight: (!) 352 lb (159.7 kg)   IBW:ideal body weight   150 lbs % EBWL: 13%     Wt Readings from Last 3 Encounters:   07/20/22 (!) 352 lb (159.7 kg)   07/11/22 (!) 350 lb (158.8 kg)   06/13/22 (!) 367 lb (166.5 kg)                     Protein supplements: Pt. is currently using the following protein supplement Nectar and consuming the following grams of protein 69 grams . Rd / Ld reviewed with patient based on 1.0 gram per kg of IBW patient needs 68 - 78 grams of protein total daily.     Subjective:                   Current MNT:       Bariatric puree diet with MVI, Calcium & Protein Supplements     MNT Advanced to:     Bariatric soft diet with MVI, Calcium & Protein Supplements      Allergies and Food Allergies and Food Intolerances:Food Allergies - Fish and Tree Nuts    Nutritional Data  No - Poor appetite more than 5 days     No - NPO or clear liquid more than 3 days     No - Problem Chewing      No - Problem Swallowing      No - Problem Mouth Pain      No - Problem Denture  No - Missing Teeth         No - Pressure Sore    No - Open Wound    No - Surgical Wound  No - Documented: Sepsis or Infection    Yes - Nausea  Yes - Vomiting - [facility] Bariatric Surgeons Bowel Protocol:  Patient states he / she fiber supplement daily the 1st week, 10 grams of a fiber supplement daily the 2nd week and 15 grams of a fiber supplement daily the third week. Pt is instructed to continue the 15 grams of a fiber supplement daily. Gokul Queen reviewed. See After Visits Summary. Did your surgeon discuss any other medications / supplements to take daily to move your bowels and avoid constipation? Pt was instructed to start Miralax or Mag Citrate to get bowels moving then once her bowels are moving to stop these medications and start to take her Colace 100 mgs twice kramer and add a fiber supplement. Pt was instructed if problems occur to contact [facility] or report to [facility] ER. Pt was instructed she should  should add fiber foods to help with BM's. Yes -  Patient was provided today the Constipation Handout - Gokul Queen reviewed Handout - Pt. verbalized understanding. See AVS    Yes Gokul Queen reinforced pt needs to consume the following - Water Intake should be 64 - 90 oz, Fiber Chews 15 grams, Dietary Fiber Intake 25 - 35 grams        No - Hair loss   No - Weight regain       Yes - Acid Reflux - Pt was not taking her PPI medication pt instructed to resume PPI medication  Yes - Dumping Syndrome - Sweet Tea caused vomiting      Yes - Food gets stuck  Yes - Are you eating solids - should not be eating solids until 6 weeks post-op. not applicable - Night Time Coughing  not applicable -  B Ping Noted  Yes - Are you chewing thoroughly  - Does not take effect until 6 weeks post-op  No - Are you hungry after eating     How many meals a day 3 / Portions Sizes of Meals are bite or two         Labs: No labs    Supplements: Bariatric Advantage Multi-Vitamin 1 tablet 2 times Daily, Bariatric Advantage 29 mgs Iron 1 tablet Daily, Bariatric Advanatge Calcium Lozenges 1 tablet 3 times Daily , Dry Vitamin D3 5,000iu every day - Pt was instructed to take correct dosage daily    Estimated Daily Nutritional Needs: Based on Bariatric procedure for Wt. Loss  Energy: Will be calculated at Maintenance Stage    Protein: Average 60 - 80 gms Daily - See individual needs listed above based on IBW    MNT Plan and Additional Education: RD/LD reviewed Bariatric Soft Diet. Encouraged pt to meet protein and fluid needs daily. Pt. verbalized understanding. Handouts given. Pt. instructed to call with questions. A high fiber diet / supplements with plenty of fluids (up to 8 glasses of water daily) is suggested to relieve these symptoms. Metamucil, 1 tablespoon once or twice daily can be used to keep bowels regular if needed. Pt also does not exercise or walk. Pt states she is in her room most of the time. Pt was instructed she needs in increase ADL's and PA which will also help with constipation. Pt states she will discuss PT with PCP. Pt was also instructed d/t the amount of pain medication she takes this can be causing the constipation. Pt was also instructed to f/u with her prescriber on weaning from pain med's to improve over-all health. Nutrition Diagnosis: 1. Constipation  2. Excess Calories    3. Dietary Non-Compliance    Etiologies: Constipation  Unwillingness to select food consistent with guidelines   No use of a fiber supplement   Pain Medication  Lack of ADL's inactivity    Common Signs and Symptoms:    Inability, unwillingness, or disinterest in selecting food consistent with guidelines:  Pt states she did not purchase her fiber supplement and is not taking. Pt was not taking Colace correctly. Pt does not like legumes or lentils to increase oral fiber consumption. Pt has lots of likes and dislikes reported. Pt states when she is out she just picks things up and eats them knowing she can 't have them    Possible Interventions: Gokul Queen Discussed  Increased fluid diet - attention to consumption fluids per guidelines for diet stage.   Bioactive substance supplement - psyllium management  Increased Fiber Diet  Take stool softener as instructed  Add Probiotic    Monitoring and evaluation out-come indicators:  Fluid intake: water estimated oral intake in 24 hours - amount consumed per diet stage guidelines. Fiber intake - intake of fiber per recommendations  Nutrition -focused physical findings digestive system - reduction or elimination of reports of constipation. Nutrition Diagnosis: Predicted excessive energy intake     Etiologies:  Food and nutrient related knowledge deficit  Inappropriate food choices - Iced Tea, Gingerale, 3 Meal's Daily   Large volume of food - Excess Secretions     Common Signs and Symptoms:   6 weeks post-op only lost 13% of EBWL. No food records brought to this appt  Dumping Syndrome - Pt states vomiting, excess secretions d/t food volume         Possible Interventions: oGkul Queen Discussed  6 week diet - Bar Soft Diet  6 meals per day including Breakfast  Potion control - All foods need measured no more than 3 oz in size  Commercial beverage medical food supplement therapy   Increased PA  Slow eating / Chew 30 - 35 times per diet  Consume only foods listed on diet. Monitoring and evaluation out-come indicators:  Estimated energy intake: Adherence : self-reported adherene Food Records kept daily  Food Intake: amount of food - reduce portion sizes eaten  Weight - measured weight decreased     Pts dietary compliance is extremely poor - See above. Pt is at risk for severe post-op medical complications based on non-compliance listed above. Consequences of non adherence include worsening condition, increased comorbid diseases and readmits for long tern post-op complications related to wt loss sx. Pt has been educated at all appointments about what POC she needs to follow to prevent complications after wt loss sx. Pt verbalizes understanding. Yes 1. Pt is consuming his / her recommended amount of protein within the diet based on patients Ideal Body Weight. .    Yes 2.  Pt is using the recommended Bariatric approved protein supplement and taking in the correct amount of protein daily. Pt is able to verbalize how to mix his / her protein supplement correctly to meet pts needs. Pt verbalized understanding. No 3. Pt is using the recommended Bariatric approved Multi-Vitamin, Bariatric approved Calcium, Bariatric approved Iron supplement or Bariatric approved Vitamin D and taking the correct dose. Pt was educated per his / her Bariatric Surgeons protocol he / she needs the following daily -  Bariatric Advantage Multi-Vitamin 1 tablet 2 times Daily, Bariatric Advantage 29 mgs Iron 1 tablet Daily, Bariatric Advantage Calcium Lozenges 1 tablet 3 times Daily , Dry Vitamin D3 5,000iu every day. Pt verbalized understanding. Yes 4. Pt kept daily food records. Pt is aware food records need to be kept life-long daily and brought to all follow-up appointments in order to show compliancy after weight loss surgery. Pt verbalized understanding. States she keeps but did not bring to this appt    No 5. Pt is taking the correct stool softener for the first 6 weeks with the correct dose. Pt is also taking the correct fiber supplement with the correct dose starting at his / her 2 week f/u appointment. Pt verbalized understanding. See above instruction and AVS.     Yes 6. Pt is drinking 64 - 90 oz of plain water daily. Patient was instructed on the importance of increasing water intake to 48 - 64 oz. of water total daily. Pt. was also instructed he / she is allowed an additional 30 oz. of sugar free caffeine free clear liquid beverages for a total of 90 oz. of fluid total daily. Pt. was able to verbalize how he / she can get more water and fluids within the diet. Per bariatric surgeons protocol after weight loss surgery. Pt. verbalized understanding. No 7. Pt achieved his / her percentage of excess body weight loss at his / her f/u appointments and is on track to reach his / her weight loss goal.    No 8.  Pt is weighing and measuring all foods using a food scale and measuring cups. Pt reports portion sizes are 3 to 4 oz in size. Portion sizes are not exceeding 6 ounces total per meal lifelong. Pt verbalized understanding. .     No 9. Pt is not experiencing Dumping Syndrome from food / beverage consumption. Sweet Tea    No 10. Pt is complying with all stages and consistencies of the bariatric diet and is not eating or drinking foods / beverages that is not listed on the diet at this stage. No (Staff FYI) - Pt is not drinking carbonated beverages, alcoholic beverages or juices at this time. Carbonated sip    Compliancy Scale  - After Weight Loss Surgery :  4 Poor + - Dietary Compliance - This is based on patient following the Medical Nutrition Therapy protocol after Weight Loss Surgery  7 to 10 Points - Good (70% - to 100%) -  Pt is following what he / she has been instructed on at the time of this visit. 4 to 7 Points - Fair (40% to 70%) - Pt has areas that he / she needs to work on in order to be compliant with the bariatric protocol after weight loss surgery. 0 to 4 Points - Poor (0% - 40%) - Pt is failing to follow the instructions given to the pt. Rd Ld has concerns pt may be creating harm. Pt was offered additional dietary counseling appointments to help pt make the necessary lifestyle changes to show compliancy after weight loss surgery. MEDICAL NUTRITION THERAPY (MNT) - Nutrition Care Plan   (The patient has been educated and given written education material that reinforces the following dietary guidelines for Bariatric Surgery)  Goal:  Patient able to verbalize the following dietary concepts:  3 to 4 ounce portions per meal     6 small meals daily      60 to 80 grams of protein on average see individual protein needs   48 to 64 ounces of fluid daily (water)     Always consume protein item first with all meals   Pt is aware wt loss is pt controlled.    Slow Meals - 30-35 chews per bite / Meals 30 - 45 minutes long            The RD / LD reviewed with the patient that the dietary goals of the bariatric patient is to ensure that patient is able to meet established nutritional needs for a Bariatric Surgery Patient at this time in order to promote healing, prevent significant weight changes, prevent skin breakdown and abnormal lab values, prevent complications, and tolerance to diet and texture of foods. Pt is able to identify proper food choices and needed changes and is able to explain proper food preparation. RD / LD reviewed compliance with assigned diet stages, volume of food and drink consumed, timing of meals, amount of protein and energy intake, daily vitamin and mineral supplementation, identify food cravings and any adverse reactions associated with intake of food and drink. RD / LD uses behavioral tools such as goal setting, MI, and self-monitoring, to reinforce the anatomic and physiologic effects of the surgery, so that the described behaviors become more of a habit not simply a reaction to the altered anatomy. Care Plan:   Yes - Rd/Ld Addressed Food Records or 24-Hour Recall  Yes - Rd / Ld encouraged patient to exercise at least 30 minutes daily  Yes - Rd / Ld instructed patient on how to increase oral protein intake within the diet. Pt. can verbalize his / her individual protein needs at this visit. Yes - Rd / Ld instructed the patient on how to increase the use of protein supplements within the diet if appropriate at this time. Yes - Pt. was instructed on how to increase water intake. Patient will need to consume 48 - 64 oz. of just plain water in addition to 30 oz. of non-caloric beverages. Yes - Handouts given to patient - Also see After Visit Summary in addition to paper copy diet instruction. Yes - RD / LD encouraged oral intake    Yes -  RD / LD encouraged pt. to keep food records daily.       Yes - Pt. is able to verbalize diet concepts      Yes - Constipation Handout Given and Reviewed - Part of surgeons Bowel Protocol - See After Visit Summary    Yes - High Fiber Handout Given and Reviewed - Part of surgeons Bowel Protocol - See After Visit Summary        No - Ulcer Handout Given and Reviewed          No - Pt. was instructed to continue current MNT   Yes - Pt. diet was advanced to the following stage ( See above)   Yes - Supplements: initiated (See list below  - Pt. given instruction)     No - Supplemental foods:______________________  No - Pt. was placed on a altered meal schedule

## 2022-07-21 ENCOUNTER — OFFICE VISIT (OUTPATIENT)
Dept: PAIN MANAGEMENT | Age: 47
End: 2022-07-21
Payer: MEDICARE

## 2022-07-21 VITALS
RESPIRATION RATE: 16 BRPM | TEMPERATURE: 98.1 F | DIASTOLIC BLOOD PRESSURE: 94 MMHG | BODY MASS INDEX: 44.41 KG/M2 | SYSTOLIC BLOOD PRESSURE: 129 MMHG | WEIGHT: 293 LBS | OXYGEN SATURATION: 94 % | HEART RATE: 94 BPM | HEIGHT: 68 IN

## 2022-07-21 DIAGNOSIS — Z98.84 S/P GASTRIC BYPASS: ICD-10-CM

## 2022-07-21 DIAGNOSIS — M48.062 SPINAL STENOSIS OF LUMBAR REGION WITH NEUROGENIC CLAUDICATION: ICD-10-CM

## 2022-07-21 DIAGNOSIS — M54.16 LUMBAR RADICULOPATHY: ICD-10-CM

## 2022-07-21 DIAGNOSIS — G89.29 CHRONIC BILATERAL LOW BACK PAIN WITH BILATERAL SCIATICA: ICD-10-CM

## 2022-07-21 DIAGNOSIS — M54.41 CHRONIC BILATERAL LOW BACK PAIN WITH BILATERAL SCIATICA: ICD-10-CM

## 2022-07-21 DIAGNOSIS — E66.01 MORBID OBESITY (HCC): ICD-10-CM

## 2022-07-21 DIAGNOSIS — G47.33 OSA (OBSTRUCTIVE SLEEP APNEA): ICD-10-CM

## 2022-07-21 DIAGNOSIS — M16.12 PRIMARY OSTEOARTHRITIS OF LEFT HIP: ICD-10-CM

## 2022-07-21 DIAGNOSIS — M54.42 CHRONIC BILATERAL LOW BACK PAIN WITH BILATERAL SCIATICA: ICD-10-CM

## 2022-07-21 DIAGNOSIS — G89.4 CHRONIC PAIN SYNDROME: Primary | ICD-10-CM

## 2022-07-21 DIAGNOSIS — M25.552 LEFT HIP PAIN: ICD-10-CM

## 2022-07-21 PROCEDURE — G8417 CALC BMI ABV UP PARAM F/U: HCPCS | Performed by: PAIN MEDICINE

## 2022-07-21 PROCEDURE — 99204 OFFICE O/P NEW MOD 45 MIN: CPT | Performed by: PAIN MEDICINE

## 2022-07-21 PROCEDURE — G8427 DOCREV CUR MEDS BY ELIG CLIN: HCPCS | Performed by: PAIN MEDICINE

## 2022-07-21 PROCEDURE — 99215 OFFICE O/P EST HI 40 MIN: CPT | Performed by: PAIN MEDICINE

## 2022-07-21 PROCEDURE — 4004F PT TOBACCO SCREEN RCVD TLK: CPT | Performed by: PAIN MEDICINE

## 2022-07-21 NOTE — PROGRESS NOTES
narrowing. In addition, there is a central and left paramedian disc   herniation with inferior extension of small extruded fragment behind the L3   vertebral body causing severe left lateral recess stenosis. L3-L4: There is disc bulge and severe bilateral posterior facet degenerative   change and ligamentum flavum hypertrophy causing severe central canal   stenosis. There is severe bilateral neural foraminal stenosis. The findings   at L2-3 and L3-4 are worse compared to the prior study. L4-L5: There is mild disc bulge with severe left and moderate right posterior   facet degenerative change with ligamentum flavum hypertrophy. This causes   moderate left subarticular recess stenosis. No significant central canal   stenosis is seen. There is severe bilateral neural foraminal narrowing. L5-S1: There is disc bulge and moderate to severe bilateral posterior facet   degenerative change, greater on the left with superimposed right   posterolateral and lateral disc protrusion causing severe right subarticular   recess stenosis. There is also severe bilateral neural foraminal narrowing. No significant central canal stenosis seen. Impression   1. Advanced degenerative change with mild to moderate levoscoliosis. 2. Multilevel central canal stenosis, including severe central canal stenosis   at L2-3 and L3-4 and moderate stenosis at L1-2. Moderate left subarticular   recess stenosis at L4-5 and severe right subarticular recess stenosis at   L5-S1.   3. Central and left paramedian disc herniation at L2-3 with inferiorly   extruded disc fragment causing severe left lateral recess stenosis. 4. Multilevel bilateral neural foraminal stenosis including severe foraminal   stenosis on the left at L2-3 and bilaterally at L3-4, L4-5 and L5-S1.   5. Overall, the findings are worse compared to the prior study.    6.  The findings were sent to the [facility] at   11:57 am on 10/4/2021to be communicated to a licensed caregiver. 2021 xray L hip -  FINDINGS:   Demonstrated is joint space narrowing with marginal acetabular are   osteophyte. A femoral head maintains normal contour. No acute fracture identified. Bony pelvis is intact. Enthesopathy noted at   the the iliac wings           Impression   Moderate to severe DJD     Xray pelvis 3/2019 -      1. No evidence of acute fracture or hip dislocation. 2. Mild osteoarthritis involving both hips but with prominent bridging   of the superior acetabulum. This can contribute to impingement. 3. At least moderately severe degenerative changes in the lower lumbar   spine. CT lumbar 3/2019 -   The study is limited by the patient's body habitus which degrades the   imaging quality. No acute fracture or dislocation is seen. Moderate loss of intervertebral disc height is seen throughout the   visualized spine. Moderate to severe bilateral neural foraminal   narrowing is seen at L3-L4, L4-L5 and L5-S1 due to large vertebral   body and facet joint osteophytes. Mild levoconvex scoliosis of the   lumbar spine is seen. Moderate degenerative changes with periarticular osteophytes and loss   of joint space are seen within the sacroiliac joints      Lumbar MRI 2018 -   Findings: There is normal sagittal alignment visualized lumbar spine. No STIR   hyperintensity to suggest an acute fracture or ligamentous injury is   noted. Limited evaluation secondary to technique. Disc desiccation   throughout the visualized lumbar intervertebral discs. Bone marrow   signal is relatively homogeneous throughout. Visualized portions of   the kidneys and aorta are grossly unremarkable although limited in   evaluation. Sagittal imaging only T11-T12: Mild to moderate circumferential disc   bulge. Moderate spinal canal stenosis is noted on this limited study. Moderate to moderately severe bilateral neural foraminal narrowing.    Limited evaluation secondary to sagittal imaging only. Sagittal imaging only T12-L1: No evidence of posterior disc contour   abnormality, spinal canal stenosis, neural foraminal narrowing or   spinal nerve root abutment/impingement. .       L1-L2: Large circumferential disc bulge. Thecal sac measures   approximately 0.83 cm anterior posterior dimension of the central   spinal canal. This is secondary to a combination of circumferential   disc bulge and posterior epidural lipomatosis. Moderately severe left   and right neural foraminal narrowing with suspected   abutment/impingement exiting right L1 spinal nerve root. Moderate   bilateral facet osteoarthropathy. Underlying crowding/compression of   the cauda equina nerve roots. L2-L3: Moderate circumferential disc bulge. Moderate bilateral facet   osteoarthropathy. Moderate to moderately severe left and right neural   foraminal narrowing. There is abutment of the exiting left L2 spinal   nerve root. Thecal sac measures approximately 0.92 cm anterior   posterior dimension and central spinal canal. There is questionable   abnormal appearance of the cauda equina nerve roots in this region. L3-L4: Severe circumferential disc bulge with a centrally located   slight inferior directed disc extrusion. Severe facet   osteoarthropathy. Moderate spinal canal stenosis. Severe left and   right neural foraminal narrowing. Abutment/impingement exiting   bilateral L3 spinal nerve roots. Moderately severe thecal sac neural   foraminal narrowing and spinal canal stenosis secondary to a   combination of the facet osteoarthropathy and posterior epidural   hematoma stenosis with crowding and compression of cauda equina nerve   roots. L4-L5: Moderately large circumferential disc bulge. Severe bilateral   facet osteoarthropathy. Severe left and moderately severe right neural   foraminal narrowing with abutment/impingement of exiting bilateral L4   spinal nerve roots. Extensive facet osteoarthropathy on the left   obliterates the neural foramen. No marco spinal canal stenosis is   seen. L5-S1: Severe bilateral facet osteoarthropathy. Moderate   circumferential disc bulge. Severe left and right neural foraminal   narrowing. Abutment/impingement exiting bilateral L5 spinal nerve   roots. No marco spinal canal stenosis. 1. Multilevel degenerative disc disease and facet osteoarthropathy   T11-T12 and L1-S1. Abutment/impingement exiting right L1, exiting left   L2, exiting bilateral L3, exiting bilateral L4 and exiting bilateral   L5 spinal nerve roots as described above. Severe bilateral neural   foraminal narrowing at L3-L4 and L5-S1 with severe left and moderately   severe right neural foraminal narrowing at L4-L5. Moderate spinal   canal stenosis at L3-L4 and narrowing of the thecal sac at L2-L3. 2. There is a questionable abnormal periods of spinal nerve roots at   the L2-L3 motion segment levels of indeterminate etiology and   significance, clinical correlation for any for infectious parameters   recommended. Consider further evaluation with pre and postcontrast   lumbar spine for further investigation with consideration given to   patient's renal status and any potential safety or allergenic   concerns. 3. Crowding/compression of cauda equina nerve roots greatest at L3-L4   but to a lesser degree at L1-L2.       Potential Aberrant Drug-Related Behavior:      Urine Drug Screening:    OARRS report:  7/2022 consistent    Past Medical History:   Diagnosis Date    Asthma     Bell palsy     drooping    CHF (congestive heart failure) ([address]     [id number] wore life vest dr Radha Ramirez    DDD (degenerative disc disease), cervical     with spinal stenosis    Diabetes mellitus ([address]     Diverticulitis     DJD (degenerative joint disease)     both hips    GERD without esophagitis     HTN (hypertension)     Hyperlipidemia     Meningitis 2009    Morbid obesity due to excess calories [facility])     Neuropathy     Pancreas cyst     Scoliosis     Sleep apnea     bipap    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[facility])        Past Surgical History:   Procedure Laterality Date    CHOLECYSTECTOMY  2009    CYSTOSCOPY Left 01/14/2018    left stent placement    DILATION AND CURETTAGE OF UTERUS      younger age    LITHOTRIPSY Right 02/15/2018    Cystoscopy;Stent Removal    MARGUERITE-EN-Y GASTRIC BYPASS N/A 5/31/2022    GASTRIC BYPASS MARGUERITE-EN-Y LAPAROSCOPIC performed by Uday Renae MD at [address] [facility] 6/22/2018    EGD BIOPSY performed by Uday Renae MD at [address] [facility] 8/20/2021    EGD BIOPSY performed by Uday Renae MD at [place] [address]       Prior to Admission medications    Medication Sig Start Date End Date Taking? Authorizing Provider   gabapentin (NEURONTIN) 600 MG tablet Take 1 tablet by mouth 3 times daily for 90 days. 7/1/22 9/29/22 Yes Srinivas Duke MD   oxyCODONE-acetaminophen (PERCOCET) 5-325 MG per tablet Take 1 tablet by mouth every 8 hours as needed for Pain for up to 30 days.  I 7/31/22 8/30/22 Yes Fercho Doyle MD   isosorbide dinitrate (ISORDIL) 20 MG tablet TAKE 1 TABLET BY MOUTH THREE TIMES DAILY 6/29/22  Yes Jessica Curran MD   metoprolol succinate (TOPROL XL) 100 MG extended release tablet TAKE 1 TABLET BY MOUTH TWICE DAILY 6/27/22  Yes Idris Durbin PA-C   atorvastatin (LIPITOR) 80 MG tablet TAKE 1 TABLET BY MOUTH EVERY NIGHT 6/27/22  Yes Idris Durbin PA-C   fluticasone (FLONASE) 50 MCG/ACT nasal spray SHAKE LIQUID AND USE 2 SPRAYS IN [facility] NOSTRIL DAILY 6/17/22  Yes Idris Durbin PA-C   aspirin 81 MG chewable tablet Take 1 tablet by mouth daily 6/13/22  Yes Idris Durbin PA-C   spironolactone (ALDACTONE) 25 MG tablet TAKE 1 TABLET BY MOUTH EVERY DAY 6/13/22  Yes Idris Durbin PA-C   sacubitril-valsartan (ENTRESTO)  MG per tablet Take 1 tablet by mouth 2 times daily 6/13/22  Yes Idris ESTER Durbin   hydrALAZINE (APRESOLINE) 50 MG tablet Take 1 tablet by mouth every 8 hours 6/13/22  Yes Ester Gonsales PA-C   mineral oil-hydrophilic petrolatum (AQUAPHOR) ointment Apply topically as needed. 6/13/22  Yes Ester Gonsales PA-C   citalopram (CELEXA) 40 MG tablet TAKE 1 TABLET BY MOUTH DAILY 5/25/22  Yes Ester Gonsales PA-C   omeprazole (PRILOSEC) 20 MG delayed release capsule Take 1 capsule by mouth Daily 5/18/22 5/18/23 Yes Issac León MD   ondansetron (ZOFRAN-ODT) 4 MG disintegrating tablet Take 1 tablet by mouth every 8 hours as needed for Nausea or Vomiting 5/18/22  Yes Issac León MD   potassium chloride (KLOR-CON M) 20 MEQ extended release tablet TAKE 1 TABLET BY MOUTH DAILY WITH BREAKFAST 4/28/22  Yes Ester Gonsales PA-C   pantoprazole (PROTONIX) 20 MG tablet Take 1 tablet by mouth daily 4/13/22  Yes Ester Gonsales PA-C   nystatin (MYCOSTATIN) 436606 UNIT/GM powder Apply 3 times daily. 4/13/22  Yes Ester Gonsales PA-C   albuterol sulfate HFA (PROAIR HFA) 108 (90 Base) MCG/ACT inhaler Inhale 2 puffs into the lungs every 4 hours as needed for Wheezing or Shortness of Breath 4/13/22  Yes Ester Gonsales PA-C   bumetanide (BUMEX) 1 MG tablet TAKE 1 TABLET BY MOUTH DAILY ALTERNATING WITH 2 TABLETS EVERY OTHER DAY 4/4/22  Yes Ester Gonsales PA-C   cetirizine (ZYRTEC) 10 MG tablet Take 1 tablet by mouth daily 1/3/22  Yes Ester Gonsales PA-C   Dulaglutide (TRULICITY) 6.07 PK/5.2US SOPN Inject 0.75 mg into the skin once a week  Patient taking differently: Inject 0.75 mg into the skin once a week monday 10/26/21  Yes sEter Gonsales PA-C   buPROPion (WELLBUTRIN XL) 150 MG extended release tablet Take 150 mg by mouth as needed  10/11/21  Yes Historical Provider, MD   clotrimazole-betamethasone (LOTRISONE) 1-0.05 % cream Apply topically 2 times daily.  10/25/21  Yes Ester Gonsales PA-C   Cholecalciferol (VITAMIN D) 50 MCG (2000 [address]) CAPS capsule Take 1 capsule by mouth daily 10/25/21  Yes Ester Gonsales PA-C   mometasone-formoterol [facility]) 100-5 MCG/ACT inhaler Inhale 2 puffs into the lungs 2 times daily 6/17/21  Yes Malcolm Lentz DO   Continuous Blood Gluc  (FREESTYLE MARY READER) MARIJA 4 Devices by Does not apply route daily 5/14/21  Yes Nany Fletcher PA-C   Continuous Blood Gluc Sensor ([address] [facility]) [address] [facility] 4 Devices by Does not apply route daily 5/14/21  Yes Nany Fletcher PA-C   albuterol (PROVENTIL) (2.5 MG/3ML) 0.083% nebulizer solution Take 3 mLs by nebulization every 6 hours as needed for Wheezing 3/23/21  Yes Nany Fletcher PA-C   Continuous Blood Gluc Sensor (FREESTYLE MARY 2 SENSOR SYSTM) MISC 1 Device by Does not apply route daily 1/8/21  Yes Nany Fletcher PA-C   Multiple Vitamin (DAILY-DOUGLAS) TABS TAKE 1 TABLET BY MOUTH EVERY DAY 6/26/20  Yes Malcolm Lentz DO   ipratropium-albuterol (DUONEB) 0.5-2.5 (3) MG/3ML SOLN nebulizer solution Take 3 mLs by nebulization every 4 hours as needed for Shortness of Breath 5/1/20  Yes Cameron Blood MD   Insulin Pen Needle ([employer] PEN NEEDLES) 31G X 6 MM MISC 1 each by Does not apply route daily 10/14/19  Yes Nany Fletcher PA-C   ONE TOUCH ULTRA TEST strip 1 each by Does not apply route daily 9/17/19  Yes Nany Fletcher PA-C   Handicap Placard MISC by Does not apply route Patient cannot walk 200 ft without stopping to rest.    Expiration 5 yrs 2/22/19  Yes Nany Fletcher PA-C   ondansetron (ZOFRAN-ODT) 4 MG disintegrating tablet Place 2 tablets under the tongue every 8 hours as needed for Nausea or Vomiting  Patient not taking: Reported on 7/21/2022 4/13/22   Nany Fletcher PA-C       Allergies   Allergen Reactions    Food Anaphylaxis     Food allergy - Fish and tree nuts    Ultram [Tramadol Hcl]      Per pt had a seizure    Fish-Derived Products     Norco [Hydrocodone-Acetaminophen] Hives    Nuts [Peanut-Containing Drug Products]     Pcn [Penicillins]      Not known    Cashews [Macadamia Nut Oil] Nausea And Vomiting       Social History     Socioeconomic History Marital status: Legally      Spouse name: Not on file    Number of children: Not on file    Years of education: Not on file    Highest education level: Not on file   Occupational History    Occupation: direct care staff/counselor   Tobacco Use    Smoking status: Some Days     Packs/day: 0.25     Years: 17.00     Pack years: 4.25     Types: Cigarettes     Start date: 3/27/2001     Last attempt to quit: 10/5/2018     Years since quitting: 3.7    Smokeless tobacco: Never    Tobacco comments:     occasionally has 1 cigarette, very stressed now   Vaping Use    Vaping Use: Former   Substance and Sexual Activity    Alcohol use: No    Drug use: No    Sexual activity: Yes   Other Topics Concern    Not on file   Social History Narrative    Not on file     Social Determinants of Health     Financial Resource Strain: Not on file   Food Insecurity: Not on file   Transportation Needs: Not on file   Physical Activity: Not on file   Stress: Not on file   Social Connections: Not on file   Intimate Partner Violence: Not on file   Housing Stability: Not on file       Family History   Problem Relation Age of Onset    Stroke Mother     Arthritis Mother     Hypertension Mother     Asthma Mother     Fibromyalgia Mother     Cancer Father     Hypertension Father     Heart Attack Father     Asthma Father        REVIEW OF SYSTEMS:     Shala Olivares denies fever/chills, chest pain, shortness of breath, new bowel or bladder complaints. All other review of systems was negative. PHYSICAL EXAMINATION:      BP (!) 129/94   Pulse 94   Temp 98.1 °F (36.7 °C) (Infrared)   Resp 16   Ht 5' 8\" (1.727 m)   Wt (!) 352 lb (159.7 kg)   SpO2 94%   BMI 53.52 kg/m²     General:       General appearance:pleasant and well-hydrated, in no distress and A & O x3  Build: Morbidly obese  Function:in wheelchair     HEENT:     Head:normocephalic, atraumatic  Pupils:regular, round, equal  Sclera: icterus absent     Lungs:     Breathing:normal breathing pattern     Abdomen:     Shape:obese and non-distended  Tenderness:none  Guarding:none     Lumbar spine:     Spine inspection:normal  CVA tenderness:No  Palpation:tenderness paravertebral muscles, left, right positive. Range of motion:abnormal moderately in lateral bending, flexion, extension rotation bilateral and is painful. Musculoskeletal:     Trigger points in Paraveteral:absent bilaterally  SI joint tenderness:negative right, negative left              DAVE test:not done right, not done left  Piriformis tenderness:negative right, negative left  Trochanteric bursa tenderness:negative right, negative left  SLR:negative right, negative left, sitting     Extremities:     Tremors:None bilaterally upper and lower  Range of motion:pain with internal rotation of hips negative.   Intact:Yes  Varicose veins:absent  Pulses:present Lt radial  Cyanosis:none  Edema:none x all 4 extremities     Neurological:     Sensory:normal to light touch BLE     Motor:                Right Quadriceps5/5          Left Quadriceps5/5           Right Gastrocnemius5/5    Left Gastrocnemius5/5  Right Ant Tibialis5/5  Left Ant Tibialis5/5     Reflexes:    Right Quadriceps reflex2+  Left Quadriceps reflex2+  Right Achilles reflex2+  Left Achilles reflex2+  Gait:in a wheelchair     Dermatology:     Skin:no rashes or lesions noted     Assessment/Plan:      Previously seen in 2019 for LBP and LLE pain (nondermatomal)  Upon 2022 consultation pt reports diffuse pain consistent with hyperalgesia, LBP BLE, and severe L hip pain  Lumbar MRI shows significant degenerative changes with multilevel stenosis  L hip xray shows significant OA changes  PMHx: JODI (compliant with cpap), asthma, morbid obesity s/p gastric bypass 5/2022 and as of 7/2022 has lost 30 lbs, CHF, DM, GERD, DLD, HTN    She re-presents today having not been seen in almost 3 years as referred by Estela Velez change in practice status  Overall patient is nonfunctional, spending a significant amount of time in a wheelchair     Reviewed referral documents and imaging  Updated lumbar and hip imaging reviewed  Urine screen today  OARRS report reviewed  Discussed negative SE's associated with chronic opioid therapy, given her comorbidities (morbid obesity, JODI, minimizing opioid dosing will be paramount)  Pt is aware I cannot prescribe medications at this re-establishing visit, I first need an updated urine drug screen  Aqua therapy ordered  Patient encouraged to stay active and to lose weight, encouraged to continue with bariatric program  Treatment plan discussed with the patient including medication and procedure side effects    We discussed with the patient that combining opioids, benzodiazepines, alcohol, illicit drugs or sleep aids increases the risk of respiratory depression including death. We discussed that these medications may cause drowsiness, sedation or dizziness and have counseled the patient not to drive or operate machinery. We have discussed that these medications will be prescribed only by one provider. We have discussed with the patient about age related risk factors and have thoroughly discussed the importance of taking these medications as prescribed. The patient verbalizes understanding. Spent >40 minutes on this case with >50% of that time spent in face to face contact    Cc:  Referring physician    BARBRA Sands.

## 2022-07-21 NOTE — PROGRESS NOTES
Do you currently have any of the following:    Fever: No  Headache:  No  Cough: No  Shortness of breath: No  Exposed to anyone with these symptoms: No         Ramez Coyle presents to the [facility] on 7/21/2022. Nabeel Sam is complaining of pain back. The pain is constant. The pain is described as aching and sharp. Pain is rated on her best day at a 7, on her worst day at a 10, and on average at a 8 on the VAS scale. She took her last dose of Percocet and Neurontin This morning. Any procedures since your last visit: No.    Pacemaker or defibrillator: No.    She is not on NSAIDS and is  on anticoagulation medications to include ASA and is managed by Dr. Cristian Alan. Medication Contract and Consent for Opioid Use Documents Filed       Patient Documents       Type of Document Status Date Received Received By Description    Medication Contract Received 7/1/2019 11:37 AM Marc Luevano PAIN MGMT CONTRACT DR. VILLANUEVA    Medication Contract Signed 7/24/2019 10:15 AM KALIA BYRD medication contract                    BP (!) 129/94   Pulse 94   Temp 98.1 °F (36.7 °C) (Infrared)   Resp 16   Ht 5' 8\" (1.727 m)   Wt (!) 352 lb (159.7 kg)   SpO2 94%   BMI 53.52 kg/m²      No LMP recorded.

## 2022-07-25 DIAGNOSIS — E11.9 TYPE 2 DIABETES MELLITUS WITHOUT COMPLICATION, WITHOUT LONG-TERM CURRENT USE OF INSULIN (HCC): ICD-10-CM

## 2022-07-25 DIAGNOSIS — I10 ESSENTIAL HYPERTENSION: ICD-10-CM

## 2022-07-25 RX ORDER — HYDRALAZINE HYDROCHLORIDE 50 MG/1
50 TABLET, FILM COATED ORAL EVERY 8 HOURS SCHEDULED
Qty: 90 TABLET | Refills: 2 | Status: SHIPPED | OUTPATIENT
Start: 2022-07-25

## 2022-07-25 RX ORDER — DULAGLUTIDE 0.75 MG/.5ML
0.75 INJECTION, SOLUTION SUBCUTANEOUS WEEKLY
Qty: 3 PEN | Refills: 2 | Status: SHIPPED
Start: 2022-07-25 | End: 2022-10-11

## 2022-07-26 RX ORDER — DULAGLUTIDE 0.75 MG/.5ML
INJECTION, SOLUTION SUBCUTANEOUS
Qty: 2 ML | Refills: 3 | Status: SHIPPED | OUTPATIENT
Start: 2022-07-26

## 2022-08-11 DIAGNOSIS — K21.9 GASTROESOPHAGEAL REFLUX DISEASE WITHOUT ESOPHAGITIS: ICD-10-CM

## 2022-08-11 RX ORDER — PANTOPRAZOLE SODIUM 20 MG/1
20 TABLET, DELAYED RELEASE ORAL DAILY
Qty: 30 TABLET | Refills: 3 | Status: SHIPPED | OUTPATIENT
Start: 2022-08-11

## 2022-08-23 ENCOUNTER — TELEPHONE (OUTPATIENT)
Dept: BARIATRICS/WEIGHT MGMT | Age: 47
End: 2022-08-23

## 2022-08-23 NOTE — TELEPHONE ENCOUNTER
I called this patient regarding their labs being drawn for their upcoming appointment. The patient stated that they would get them drawn.

## 2022-08-29 ENCOUNTER — HOSPITAL ENCOUNTER (OUTPATIENT)
Age: 47
Discharge: HOME | End: 2022-08-29
Payer: MEDICARE

## 2022-08-29 DIAGNOSIS — E66.01 MORBID OBESITY DUE TO EXCESS CALORIES (HCC): ICD-10-CM

## 2022-08-29 DIAGNOSIS — K91.2 MALNUTRITION FOLLOWING GASTROINTESTINAL SURGERY: ICD-10-CM

## 2022-08-29 LAB
ALBUMIN SERPL-MCNC: 3.4 G/DL (ref 3.5–5.2)
ALP BLD-CCNC: 91 U/L (ref 35–104)
ALT SERPL-CCNC: 9 U/L (ref 0–32)
ANION GAP SERPL CALCULATED.3IONS-SCNC: 13 MMOL/L (ref 7–16)
AST SERPL-CCNC: 12 U/L (ref 0–31)
BILIRUB SERPL-MCNC: 0.3 MG/DL (ref 0–1.2)
BUN BLDV-MCNC: 7 MG/DL (ref 6–20)
CALCIUM SERPL-MCNC: 9 MG/DL (ref 8.6–10.2)
CHLORIDE BLD-SCNC: 105 MMOL/L (ref 98–107)
CHOLESTEROL, TOTAL: 231 MG/DL (ref 0–199)
CO2: 22 MMOL/L (ref 22–29)
CREAT SERPL-MCNC: 0.7 MG/DL (ref 0.5–1)
FERRITIN: 61 NG/ML
FOLATE: >20 NG/ML (ref 4.8–24.2)
GFR AFRICAN AMERICAN: >60
GFR NON-AFRICAN AMERICAN: >60 ML/MIN/1.73
GLUCOSE BLD-MCNC: 95 MG/DL (ref 74–99)
HCT VFR BLD CALC: 45.9 % (ref 34–48)
HEMOGLOBIN: 15.3 G/DL (ref 11.5–15.5)
MCH RBC QN AUTO: 30.1 PG (ref 26–35)
MCHC RBC AUTO-ENTMCNC: 33.3 % (ref 32–34.5)
MCV RBC AUTO: 90.4 FL (ref 80–99.9)
PDW BLD-RTO: 16.4 FL (ref 11.5–15)
PLATELET # BLD: 332 E9/L (ref 130–450)
PMV BLD AUTO: 10.8 FL (ref 7–12)
POTASSIUM SERPL-SCNC: 3.9 MMOL/L (ref 3.5–5)
PREALBUMIN: 16 MG/DL (ref 20–40)
RBC # BLD: 5.08 E12/L (ref 3.5–5.5)
SODIUM BLD-SCNC: 140 MMOL/L (ref 132–146)
TOTAL PROTEIN: 7 G/DL (ref 6.4–8.3)
TRIGL SERPL-MCNC: 196 MG/DL (ref 0–149)
VITAMIN B-12: 452 PG/ML (ref 211–946)
WBC # BLD: 11 E9/L (ref 4.5–11.5)

## 2022-08-29 PROCEDURE — 82607 VITAMIN B-12: CPT

## 2022-08-29 PROCEDURE — 82746 ASSAY OF FOLIC ACID SERUM: CPT

## 2022-08-29 PROCEDURE — 82465 ASSAY BLD/SERUM CHOLESTEROL: CPT

## 2022-08-29 PROCEDURE — 82728 ASSAY OF FERRITIN: CPT

## 2022-08-29 PROCEDURE — 36415 COLL VENOUS BLD VENIPUNCTURE: CPT

## 2022-08-29 PROCEDURE — 84134 ASSAY OF PREALBUMIN: CPT

## 2022-08-29 PROCEDURE — 84630 ASSAY OF ZINC: CPT

## 2022-08-29 PROCEDURE — 84425 ASSAY OF VITAMIN B-1: CPT

## 2022-08-29 PROCEDURE — 84478 ASSAY OF TRIGLYCERIDES: CPT

## 2022-08-29 PROCEDURE — 80053 COMPREHEN METABOLIC PANEL: CPT

## 2022-08-29 PROCEDURE — 85027 COMPLETE CBC AUTOMATED: CPT

## 2022-08-30 NOTE — PROGRESS NOTES
[facility]        [address]  [facility], [address] [facility]  Dept: [phone]        Follow up Note      Tamie Guardado     Date of Visit:  09/01/22     CC:  Patient presents for follow up   Chief Complaint   Patient presents with    Follow-up    Lower Back Pain    Hip Pain     Left hip       HPI:    Pain is unchanged. Change in quality of symptoms:no. Medication side effects:none. Recent diagnostic testing:none. Recent interventional procedures:none. She has not been on anticoagulation medications to include ASA, NSAIDS, Plavix, heparin, LMW heparin and warfarin and has not been on herbal supplements. She is diabetic. Imaging:   10/2021 lumbar MRI -      BONES/ALIGNMENT: There is normal alignment of the spine. Mild-to-moderate   levoscoliosis of the lumbar spine is again noted. The vertebral body heights   are maintained. There is spurring and disc desiccation at multiple levels   with mild-to-moderate disc space narrowing at L1-2, L2-3, L3-4 and L4-5. Slight subchondral signal change is seen, most notably at L2-3 and L4-5. This is most likely related to degenerative disc disease. Otherwise, the   bone marrow signal appears unremarkable. SPINAL CORD: The conus terminates normally. SOFT TISSUES: No paraspinal mass identified. The spinal canal is somewhat congenitally narrowed. L1-L2: There is disc bulge and severe right and moderate left posterior facet   degenerative change with ligamentum flavum hypertrophy causing moderate   central canal stenosis. There is mild right and moderate left neural   foraminal narrowing. L2-L3: There is a disc bulge and severe bilateral posterior facet   degenerative change with ligamentum flavum hypertrophy causing severe central   canal stenosis. There is right lateral disc protrusion causing severe right   neural foraminal narrowing.   There is moderate left neural foraminal narrowing. In addition, there is a central and left paramedian disc   herniation with inferior extension of small extruded fragment behind the L3   vertebral body causing severe left lateral recess stenosis. L3-L4: There is disc bulge and severe bilateral posterior facet degenerative   change and ligamentum flavum hypertrophy causing severe central canal   stenosis. There is severe bilateral neural foraminal stenosis. The findings   at L2-3 and L3-4 are worse compared to the prior study. L4-L5: There is mild disc bulge with severe left and moderate right posterior   facet degenerative change with ligamentum flavum hypertrophy. This causes   moderate left subarticular recess stenosis. No significant central canal   stenosis is seen. There is severe bilateral neural foraminal narrowing. L5-S1: There is disc bulge and moderate to severe bilateral posterior facet   degenerative change, greater on the left with superimposed right   posterolateral and lateral disc protrusion causing severe right subarticular   recess stenosis. There is also severe bilateral neural foraminal narrowing. No significant central canal stenosis seen. Impression   1. Advanced degenerative change with mild to moderate levoscoliosis. 2. Multilevel central canal stenosis, including severe central canal stenosis   at L2-3 and L3-4 and moderate stenosis at L1-2. Moderate left subarticular   recess stenosis at L4-5 and severe right subarticular recess stenosis at   L5-S1.   3. Central and left paramedian disc herniation at L2-3 with inferiorly   extruded disc fragment causing severe left lateral recess stenosis. 4. Multilevel bilateral neural foraminal stenosis including severe foraminal   stenosis on the left at L2-3 and bilaterally at L3-4, L4-5 and L5-S1.   5. Overall, the findings are worse compared to the prior study.    6.  The findings were sent to the Radiology [facility] at   11:57 am on 10/4/2021to be communicated to a licensed caregiver. 2021 xray L hip -  FINDINGS:   Demonstrated is joint space narrowing with marginal acetabular are   osteophyte. A femoral head maintains normal contour. No acute fracture identified. Bony pelvis is intact. Enthesopathy noted at   the the iliac wings           Impression   Moderate to severe DJD      Xray pelvis 3/2019 -      1. No evidence of acute fracture or hip dislocation. 2. Mild osteoarthritis involving both hips but with prominent bridging   of the superior acetabulum. This can contribute to impingement. 3. At least moderately severe degenerative changes in the lower lumbar   spine. CT lumbar 3/2019 -   The study is limited by the patient's body habitus which degrades the   imaging quality. No acute fracture or dislocation is seen. Moderate loss of intervertebral disc height is seen throughout the   visualized spine. Moderate to severe bilateral neural foraminal   narrowing is seen at L3-L4, L4-L5 and L5-S1 due to large vertebral   body and facet joint osteophytes. Mild levoconvex scoliosis of the   lumbar spine is seen. Moderate degenerative changes with periarticular osteophytes and loss   of joint space are seen within the sacroiliac joints      Lumbar MRI 2018 -   Findings: There is normal sagittal alignment visualized lumbar spine. No STIR   hyperintensity to suggest an acute fracture or ligamentous injury is   noted. Limited evaluation secondary to technique. Disc desiccation   throughout the visualized lumbar intervertebral discs. Bone marrow   signal is relatively homogeneous throughout. Visualized portions of   the kidneys and aorta are grossly unremarkable although limited in   evaluation. Sagittal imaging only T11-T12: Mild to moderate circumferential disc   bulge. Moderate spinal canal stenosis is noted on this limited study. Moderate to moderately severe bilateral neural foraminal narrowing. Limited evaluation secondary to sagittal imaging only. Sagittal imaging only T12-L1: No evidence of posterior disc contour   abnormality, spinal canal stenosis, neural foraminal narrowing or   spinal nerve root abutment/impingement. .       L1-L2: Large circumferential disc bulge. Thecal sac measures   approximately 0.83 cm anterior posterior dimension of the central   spinal canal. This is secondary to a combination of circumferential   disc bulge and posterior epidural lipomatosis. Moderately severe left   and right neural foraminal narrowing with suspected   abutment/impingement exiting right L1 spinal nerve root. Moderate   bilateral facet osteoarthropathy. Underlying crowding/compression of   the cauda equina nerve roots. L2-L3: Moderate circumferential disc bulge. Moderate bilateral facet   osteoarthropathy. Moderate to moderately severe left and right neural   foraminal narrowing. There is abutment of the exiting left L2 spinal   nerve root. Thecal sac measures approximately 0.92 cm anterior   posterior dimension and central spinal canal. There is questionable   abnormal appearance of the cauda equina nerve roots in this region. L3-L4: Severe circumferential disc bulge with a centrally located   slight inferior directed disc extrusion. Severe facet   osteoarthropathy. Moderate spinal canal stenosis. Severe left and   right neural foraminal narrowing. Abutment/impingement exiting   bilateral L3 spinal nerve roots. Moderately severe thecal sac neural   foraminal narrowing and spinal canal stenosis secondary to a   combination of the facet osteoarthropathy and posterior epidural   hematoma stenosis with crowding and compression of cauda equina nerve   roots. L4-L5: Moderately large circumferential disc bulge. Severe bilateral   facet osteoarthropathy.  Severe left and moderately severe right neural   foraminal narrowing with abutment/impingement of exiting bilateral L4   spinal nerve roots. Extensive facet osteoarthropathy on the left   obliterates the neural foramen. No marco spinal canal stenosis is   seen. L5-S1: Severe bilateral facet osteoarthropathy. Moderate   circumferential disc bulge. Severe left and right neural foraminal   narrowing. Abutment/impingement exiting bilateral L5 spinal nerve   roots. No marco spinal canal stenosis. 1. Multilevel degenerative disc disease and facet osteoarthropathy   T11-T12 and L1-S1. Abutment/impingement exiting right L1, exiting left   L2, exiting bilateral L3, exiting bilateral L4 and exiting bilateral   L5 spinal nerve roots as described above. Severe bilateral neural   foraminal narrowing at L3-L4 and L5-S1 with severe left and moderately   severe right neural foraminal narrowing at L4-L5. Moderate spinal   canal stenosis at L3-L4 and narrowing of the thecal sac at L2-L3. 2. There is a questionable abnormal periods of spinal nerve roots at   the L2-L3 motion segment levels of indeterminate etiology and   significance, clinical correlation for any for infectious parameters   recommended. Consider further evaluation with pre and postcontrast   lumbar spine for further investigation with consideration given to   patient's renal status and any potential safety or allergenic   concerns. 3. Crowding/compression of cauda equina nerve roots greatest at L3-L4   but to a lesser degree at L1-L2.                    Potential Aberrant Drug-Related Behavior: no    Urine Drug Screenin2022 consistent with OARRS    OARRS report:  2022 consistent    Past Medical History:   Diagnosis Date    Asthma     Bell palsy     drooping    CHF (congestive heart failure) ([address]     [id number] wore life vest dr Kingsley Diaz    DDD (degenerative disc disease), cervical     with spinal stenosis    Diabetes mellitus ([address]     Diverticulitis     DJD (degenerative joint disease)     both hips    GERD without esophagitis     HTN (hypertension)     Hyperlipidemia Meningitis 2009    Morbid obesity due to excess calories ([facility] [address]     Neuropathy     Pancreas cyst     Scoliosis     Sleep apnea     bipap    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[facility])        Past Surgical History:   Procedure Laterality Date    CHOLECYSTECTOMY  2009    CYSTOSCOPY Left 01/14/2018    left stent placement    DILATION AND CURETTAGE OF UTERUS      younger age    LITHOTRIPSY Right 02/15/2018    Cystoscopy;Stent Removal    MARGUERITE-EN-Y GASTRIC BYPASS N/A 5/31/2022    GASTRIC BYPASS MARGUREITE-EN-Y LAPAROSCOPIC performed by Reg Dominguez MD at [id number] [place] [address] 6/22/2018    EGD BIOPSY performed by Reg Dominguez MD at [address] [facility] 8/20/2021    EGD BIOPSY performed by Reg Dominguez MD at [place] [address]       Prior to Admission medications    Medication Sig Start Date End Date Taking? Authorizing Provider   pantoprazole (PROTONIX) 20 MG tablet TAKE 1 TABLET BY MOUTH DAILY 8/11/22  Yes Micheline Barth PA-C   TRULICITY 9.09 EZ/7.0QE SOPN ADMINISTER 0.75 MG UNDER THE SKIN 1 TIME A WEEK 7/26/22  Yes Micheline Barth PA-C   hydrALAZINE (APRESOLINE) 50 MG tablet TAKE 1 TABLET BY MOUTH EVERY 8 HOURS 7/25/22  Yes Micheline Barth PA-C   Dulaglutide (TRULICITY) 8.66 XS/4.8MQ SOPN Inject 0.75 mg into the skin once a week monday 7/25/22  Yes Micheline Barth PA-C   gabapentin (NEURONTIN) 600 MG tablet Take 1 tablet by mouth 3 times daily for 90 days.  7/1/22 9/29/22 Yes Fercho Doyle MD   isosorbide dinitrate (ISORDIL) 20 MG tablet TAKE 1 TABLET BY MOUTH THREE TIMES DAILY 6/29/22  Yes Bridget Curran MD   metoprolol succinate (TOPROL XL) 100 MG extended release tablet TAKE 1 TABLET BY MOUTH TWICE DAILY 6/27/22  Yes Micheline Barth PA-C   atorvastatin (LIPITOR) 80 MG tablet TAKE 1 TABLET BY MOUTH EVERY NIGHT 6/27/22  Yes Micheline Barth PA-C   fluticasone [facility]) 50 MCG/ACT nasal spray SHAKE LIQUID AND USE 2 SPRAYS IN [facility] NOSTRIL DAILY 6/17/22  Yes Micheline Barth PA-C   aspirin 81 MG chewable tablet Take 1 tablet by mouth daily 6/13/22  Yes Adelso Cruz PA-C   spironolactone (ALDACTONE) 25 MG tablet TAKE 1 TABLET BY MOUTH EVERY DAY 6/13/22  Yes Adelso Cruz PA-C   sacubitril-valsartan (ENTRESTO)  MG per tablet Take 1 tablet by mouth 2 times daily 6/13/22  Yes Adelso Cruz PA-C   mineral oil-hydrophilic petrolatum (AQUAPHOR) ointment Apply topically as needed. 6/13/22  Yes Adelso Cruz PA-C   citalopram (CELEXA) 40 MG tablet TAKE 1 TABLET BY MOUTH DAILY 5/25/22  Yes Adelso Cruz PA-C   omeprazole (PRILOSEC) 20 MG delayed release capsule Take 1 capsule by mouth Daily 5/18/22 5/18/23 Yes Rashaun Douglas MD   ondansetron (ZOFRAN-ODT) 4 MG disintegrating tablet Take 1 tablet by mouth every 8 hours as needed for Nausea or Vomiting 5/18/22  Yes Rashaun Douglas MD   potassium chloride (KLOR-CON M) 20 MEQ extended release tablet TAKE 1 TABLET BY MOUTH DAILY WITH BREAKFAST 4/28/22  Yes Adelso Cruz PA-C   ondansetron (ZOFRAN-ODT) 4 MG disintegrating tablet Place 2 tablets under the tongue every 8 hours as needed for Nausea or Vomiting 4/13/22  Yes Adelso Cruz PA-C   nystatin (MYCOSTATIN) 268985 UNIT/GM powder Apply 3 times daily. 4/13/22  Yes Adelso Cruz PA-C   albuterol sulfate HFA (PROAIR HFA) 108 (90 Base) MCG/ACT inhaler Inhale 2 puffs into the lungs every 4 hours as needed for Wheezing or Shortness of Breath 4/13/22  Yes Adelso Cruz PA-C   bumetanide (BUMEX) 1 MG tablet TAKE 1 TABLET BY MOUTH DAILY ALTERNATING WITH 2 TABLETS EVERY OTHER DAY 4/4/22  Yes Adelso Cruz PA-C   cetirizine (ZYRTEC) 10 MG tablet Take 1 tablet by mouth daily 1/3/22  Yes Adelso Cruz PA-C   buPROPion (WELLBUTRIN XL) 150 MG extended release tablet Take 150 mg by mouth as needed  10/11/21  Yes Historical MD [place]   clotrimazole-betamethasone (LOTRISONE) 1-0.05 % cream Apply topically 2 times daily.  10/25/21  Yes Adelso Cruz PA-C   Cholecalciferol (VITAMIN D) 50 MCG (2000 UT) CAPS capsule Take 1 capsule by mouth daily 10/25/21  Yes Coleman Bosworth, PA-C   mometasone-formoterol [facility]) 100-5 MCG/ACT inhaler Inhale 2 puffs into the lungs 2 times daily 6/17/21  Yes Evelyn Bhagat DO   Continuous Blood Gluc  (FREESTYLE MARY READER) MARIJA 4 Devices by Does not apply route daily 5/14/21  Yes Coleman Bosworth, PA-C   Continuous Blood Gluc Sensor ([facility]) [address] [facility] 4 Devices by Does not apply route daily 5/14/21  Yes Coleman Bosworth, PA-C   albuterol (PROVENTIL) (2.5 MG/3ML) 0.083% nebulizer solution Take 3 mLs by nebulization every 6 hours as needed for Wheezing 3/23/21  Yes Coleman Bosworth, PA-C   Continuous Blood Gluc Sensor (FREESTYLE MARY 2 SENSOR SYSTM) MISC 1 Device by Does not apply route daily 1/8/21  Yes Coleman Bosworth, PA-C   Multiple Vitamin (DAILY-DOUGLAS) TABS TAKE 1 TABLET BY MOUTH EVERY DAY 6/26/20  Yes Evelyn Bhagat,    ipratropium-albuterol (DUONEB) 0.5-2.5 (3) MG/3ML SOLN nebulizer solution Take 3 mLs by nebulization every 4 hours as needed for Shortness of Breath 5/1/20  Yes Kassie Florentino MD   Insulin Pen Needle (Mariah Amass PEN NEEDLES) 31G X 6 MM MISC 1 each by Does not apply route daily 10/14/19  Yes Coleman Bosworth, PA-C   ONE TOUCH ULTRA TEST strip 1 each by Does not apply route daily 9/17/19  Yes Coleman Bosworth, PA-C   Handicap Placard MISC by Does not apply route Patient cannot walk 200 ft without stopping to rest.    Expiration 5 yrs 2/22/19  Yes Coleman Bosworth, PA-C       Allergies   Allergen Reactions    Food Anaphylaxis     Food allergy - Fresh Water Fish, [employer] and Tree Nuts    Ultram [Tramadol Hcl]      Per pt had a seizure    Fish-Derived Products     Norco [Hydrocodone-Acetaminophen] Hives    Nuts [Peanut-Containing Drug Products]     Pcn [Penicillins]      Not known    Cashews [Macadamia Nut Oil] Nausea And Vomiting       Social History     Socioeconomic History    Marital status: Legally      Spouse name: Not on file    Number of children: Not on file Years of education: Not on file    Highest education level: Not on file   Occupational History    Occupation: direct care staff/counselor   Tobacco Use    Smoking status: Some Days     Packs/day: 0.25     Years: 17.00     Pack years: 4.25     Types: Cigarettes     Start date: 3/27/2001     Last attempt to quit: 10/5/2018     Years since quitting: 3.9    Smokeless tobacco: Never    Tobacco comments:     occasionally has 1 cigarette, very stressed now   Vaping Use    Vaping Use: Former   Substance and Sexual Activity    Alcohol use: No    Drug use: No    Sexual activity: Yes   Other Topics Concern    Not on file   Social History Narrative    Not on file     Social Determinants of Health     Financial Resource Strain: Not on file   Food Insecurity: Not on file   Transportation Needs: Not on file   Physical Activity: Not on file   Stress: Not on file   Social Connections: Not on file   Intimate Partner Violence: Not on file   Housing Stability: Not on file       Family History   Problem Relation Age of Onset    Stroke Mother     Arthritis Mother     Hypertension Mother     Asthma Mother     Fibromyalgia Mother     Cancer Father     Hypertension Father     Heart Attack Father     Asthma Father        REVIEW OF SYSTEMS:     Agueda Carmona denies fever/chills, chest pain, shortness of breath, new bowel or bladder complaints. All other review of systems was negative. PHYSICAL EXAMINATION:      /81   Pulse 89   Temp 97.5 °F (36.4 °C) (Infrared)   Resp 16   Ht 5' 8.9\" (1.75 m)   Wt (!) 327 lb (148.3 kg)   SpO2 97%   BMI 48.43 kg/m²     General:       General appearance:pleasant and well-hydrated, in no distress and A & O x3  Build: Morbidly obese  Function:in wheelchair     HEENT:     Head:normocephalic, atraumatic  Pupils:regular, round, equal  Sclera: icterus absent     Lungs:     Breathing:normal breathing pattern     Abdomen:     Shape:obese and non-distended  Tenderness:none  Guarding:none     Lumbar spine: Spine inspection:normal  CVA tenderness:No  Palpation:tenderness paravertebral muscles, left, right positive. Range of motion:abnormal moderately in lateral bending, flexion, extension rotation bilateral and is painful. Musculoskeletal:     Trigger points in Paraveteral:absent bilaterally  SI joint tenderness:negative right, negative left              DVAE test:not done right, not done left  Piriformis tenderness:negative right, negative left  Trochanteric bursa tenderness:negative right, negative left  SLR:negative right, negative left, sitting     Extremities:     Tremors:None bilaterally upper and lower  Range of motion:pain with internal rotation of left hip positive.   Intact:Yes  Varicose veins:absent  Pulses:present Lt radial  Cyanosis:none  Edema:none x all 4 extremities     Neurological:     Sensory:normal to light touch BLE     Motor:                Right Quadriceps5/5          Left Quadriceps5/5           Right Gastrocnemius5/5    Left Gastrocnemius5/5  Right Ant Tibialis5/5  Left Ant Tibialis5/5     Reflexes:  (not assessed today)  Right Quadriceps reflex2+  Left Quadriceps reflex2+  Right Achilles reflex2+  Left Achilles reflex2+    Gait:in a wheelchair     Dermatology:     Skin:no rashes or lesions noted     Assessment/Plan:        Previously seen in 2019 for LBP and LLE pain (nondermatomal)  Upon 2022 consultation pt reports diffuse pain consistent with hyperalgesia, LBP BLE, and severe L hip pain  Lumbar MRI shows significant degenerative changes with multilevel stenosis  L hip xray shows significant OA changes  PMHx: JODI (compliant with cpap), asthma, morbid obesity s/p gastric bypass 5/2022 and as of 7/2022 has lost 30 lbs, CHF, DM, GERD, DLD, HTN     She re-presented last month having not been seen in almost 3 years as referred by Carmine Merida change in practice status  Overall patient is nonfunctional, spending a significant amount of time in a wheelchair    She has lost 55

## 2022-08-31 ENCOUNTER — INITIAL CONSULT (OUTPATIENT)
Dept: BARIATRICS/WEIGHT MGMT | Age: 47
End: 2022-08-31
Payer: MEDICARE

## 2022-08-31 ENCOUNTER — TELEPHONE (OUTPATIENT)
Dept: BARIATRICS/WEIGHT MGMT | Age: 47
End: 2022-08-31

## 2022-08-31 ENCOUNTER — OFFICE VISIT (OUTPATIENT)
Dept: BARIATRICS/WEIGHT MGMT | Age: 47
End: 2022-08-31
Payer: MEDICARE

## 2022-08-31 VITALS
RESPIRATION RATE: 20 BRPM | TEMPERATURE: 97.3 F | BODY MASS INDEX: 43.4 KG/M2 | HEIGHT: 69 IN | SYSTOLIC BLOOD PRESSURE: 159 MMHG | WEIGHT: 293 LBS | HEART RATE: 97 BPM | DIASTOLIC BLOOD PRESSURE: 95 MMHG

## 2022-08-31 VITALS — WEIGHT: 293 LBS | HEIGHT: 69 IN | BODY MASS INDEX: 43.4 KG/M2

## 2022-08-31 DIAGNOSIS — R26.89 BALANCE DISORDER: ICD-10-CM

## 2022-08-31 DIAGNOSIS — K59.01 SLOW TRANSIT CONSTIPATION: ICD-10-CM

## 2022-08-31 DIAGNOSIS — Z00.8 NUTRITIONAL ASSESSMENT: Primary | ICD-10-CM

## 2022-08-31 DIAGNOSIS — E78.00 HIGH CHOLESTEROL: Primary | ICD-10-CM

## 2022-08-31 DIAGNOSIS — M19.90 ARTHRITIS: ICD-10-CM

## 2022-08-31 DIAGNOSIS — R26.9 ABNORMAL GAIT: ICD-10-CM

## 2022-08-31 DIAGNOSIS — G62.9 NEUROPATHY: ICD-10-CM

## 2022-08-31 DIAGNOSIS — K91.2 MALNUTRITION FOLLOWING GASTROINTESTINAL SURGERY: ICD-10-CM

## 2022-08-31 DIAGNOSIS — Z02.6 ENCOUNTER FOR EXAMINATION FOR INSURANCE PURPOSES: ICD-10-CM

## 2022-08-31 DIAGNOSIS — Z71.3 NUTRITIONAL COUNSELING: ICD-10-CM

## 2022-08-31 PROCEDURE — 99213 OFFICE O/P EST LOW 20 MIN: CPT | Performed by: SURGERY

## 2022-08-31 PROCEDURE — 4004F PT TOBACCO SCREEN RCVD TLK: CPT | Performed by: SURGERY

## 2022-08-31 PROCEDURE — 97113 AQUATIC THERAPY/EXERCISES: CPT | Performed by: DIETITIAN, REGISTERED

## 2022-08-31 PROCEDURE — G8417 CALC BMI ABV UP PARAM F/U: HCPCS | Performed by: SURGERY

## 2022-08-31 PROCEDURE — 99212 OFFICE O/P EST SF 10 MIN: CPT

## 2022-08-31 PROCEDURE — 99999 PR OFFICE/OUTPT VISIT,PROCEDURE ONLY: CPT | Performed by: DIETITIAN, REGISTERED

## 2022-08-31 PROCEDURE — G8427 DOCREV CUR MEDS BY ELIG CLIN: HCPCS | Performed by: SURGERY

## 2022-08-31 NOTE — PROGRESS NOTES
Jeffery Sarah  8/31/2022  Laparoscopic Kendall-en- Y Gastric Bypass   3 months Post-Operative Follow-up. Subjective:   Jeffery Sarah is a 41 y.o. female three months post Laparoscopic Kendall-en-Y Gastric Bypass. She reports constipation. Reports no problems with eating, bowel movements, voiding, or the wounds. She is not having swallowing difficulty, is compliant most of the time with the multivitamins and calcium + Vit D. She is meeting fluid recommendations of at least 64 ounces per day and is meeting protein recommendations. Exercise: no regular exercise. Weight=(!) 327 lb (148.3 kg)  Today's weight represents a total weight loss to date of 55 pounds. Prior to Admission medications    Medication Sig Start Date End Date Taking? Authorizing Provider   pantoprazole (PROTONIX) 20 MG tablet TAKE 1 TABLET BY MOUTH DAILY 8/11/22  Yes Roxanne Reina PA-C   TRULICITY 5.42 JV/7.0AF SOPN ADMINISTER 0.75 MG UNDER THE SKIN 1 TIME A WEEK 7/26/22  Yes Roxanne Reina PA-C   hydrALAZINE (APRESOLINE) 50 MG tablet TAKE 1 TABLET BY MOUTH EVERY 8 HOURS 7/25/22  Yes Roxanne Reina PA-C   Dulaglutide (TRULICITY) 3.68 DD/6.5HW SOPN Inject 0.75 mg into the skin once a week monday 7/25/22  Yes Roxanne Reina PA-C   gabapentin (NEURONTIN) 600 MG tablet Take 1 tablet by mouth 3 times daily for 90 days.  7/1/22 9/29/22 Yes Fercho Doyle MD   isosorbide dinitrate (ISORDIL) 20 MG tablet TAKE 1 TABLET BY MOUTH THREE TIMES DAILY 6/29/22  Yes Aime Curran MD   metoprolol succinate (TOPROL XL) 100 MG extended release tablet TAKE 1 TABLET BY MOUTH TWICE DAILY 6/27/22  Yes Roxanne Reina PA-C   atorvastatin (LIPITOR) 80 MG tablet TAKE 1 TABLET BY MOUTH EVERY NIGHT 6/27/22  Yes Roxanne Reina PA-C   fluticasone [facility]) 50 MCG/ACT nasal spray SHAKE LIQUID AND USE 2 SPRAYS IN [facility] NOSTRIL DAILY 6/17/22  Yes Roxanne Reina PA-C   aspirin 81 MG chewable tablet Take 1 tablet by mouth daily 6/13/22  Yes Roxanne Reina PA-C (DULERA) 100-5 MCG/ACT inhaler Inhale 2 puffs into the lungs 2 times daily 6/17/21  Yes Payton Castellanos,    Continuous Blood Gluc  (FREESTYLE MARY READER) MARIJA 4 Devices by Does not apply route daily 5/14/21  Yes Myrna Cagle PA-C   Continuous Blood Gluc Sensor ([address] [place] [address]) [address] [facility] 4 Devices by Does not apply route daily 5/14/21  Yes Myrna Cagle PA-C   albuterol (PROVENTIL) (2.5 MG/3ML) 0.083% nebulizer solution Take 3 mLs by nebulization every 6 hours as needed for Wheezing 3/23/21  Yes Myrna Cagle PA-C   Continuous Blood Gluc Sensor (FREESTYLE MARY 2 SENSOR SYSTM) MISC 1 Device by Does not apply route daily 1/8/21  Yes Myrna Cagle PA-C   Multiple Vitamin (DAILY-DOUGLAS) TABS TAKE 1 TABLET BY MOUTH EVERY DAY 6/26/20  Yes Payton Castellanos,    ipratropium-albuterol (DUONEB) 0.5-2.5 (3) MG/3ML SOLN nebulizer solution Take 3 mLs by nebulization every 4 hours as needed for Shortness of Breath 5/1/20  Yes Karri Nassar MD   Insulin Pen Needle ([place] Host PEN NEEDLES) 31G X 6 MM MISC 1 each by Does not apply route daily 10/14/19  Yes Myrna Cagle PA-C   ONE TOUCH ULTRA TEST strip 1 each by Does not apply route daily 9/17/19  Yes Myrna Cagle PA-C   Handicap Placard MISC by Does not apply route Patient cannot walk 200 ft without stopping to rest.    Expiration 5 yrs 2/22/19  Yes Myrna Cagle PA-C          Physical exam:   BP (!) 159/95 (Site: Right Lower Arm, Position: Sitting, Cuff Size: Large Adult)   Pulse 97   Temp 97.3 °F (36.3 °C) (Temporal)   Resp 20   Ht 5' 8.5\" (1.74 m)   Wt (!) 327 lb (148.3 kg)   BMI 49.00 kg/m²   General appearance: alert, appears stated age, and cooperative  Head: Normocephalic, without obvious abnormality, atraumatic  Neck: no adenopathy, no carotid bruit, no JVD, supple, symmetrical, trachea midline, and thyroid not enlarged, symmetric, no tenderness/mass/nodules  Lungs: clear to auscultation bilaterally  Heart: regular rate and rhythm, S1, S2 normal, no murmur, click, rub or gallop  Abdomen: soft, non-tender; bowel sounds normal; no masses,  no organomegaly  Extremities: extremities normal, atraumatic, no cyanosis or edema    Assessment: constipation and high cholesterol 3 months post Laparoscopic Kendall-en- Y Gastric Bypass. Plan:  Watch cholesterol high foods and cont to take fiber and stool softeners, prn laxative. Continue eating a soft, high protein, low calorie diet. Eat small portions very slowly and chew well before swallowing. Drink plenty of water,  maintain adequate variety and balance. Try to exercise more, at least 30 minutes per day, 7 days per week. Follow up in 3 months and contact me if questions arise in the meantime. Start using fiber therapy such as Metamucil twice a day to prevent constipation. Bowels must move every day.       Physician Signature: Electronically signed by Dr. Lana Coyle MD

## 2022-08-31 NOTE — PROGRESS NOTES
Medical Nutrition Therapy (MNT) Assessment   Pt is aware this phone call conversation will be documented and is in agreement to the documentation: Pt verbalized - Yes    Pt. Name: Ramez Coyle   Date:8/31/2022  F/U Appt: [facility] Type  - 3 Month RYGB F/U Appointment      Ht 5' 8.5\" (1.74 m)   Wt (!) 327 lb (148.3 kg)   BMI 49.00 kg/m²  Height: 5' 8.5\" (1.74 m) Weight: (!) 327 lb (148.3 kg)   IBW:ideal body weight   150 lbs % EBWL: 23%     Wt Readings from Last 3 Encounters:   08/31/22 (!) 327 lb (148.3 kg)   08/31/22 (!) 327 lb (148.3 kg)   07/21/22 (!) 352 lb (159.7 kg)                     Protein supplements: Pt. is currently using the following protein supplement  - Pt is looking at Gummy Protein in which Gokul Queen reviewed is Collagen ad not a good source of protein. Pt is consuming the following grams of protein ? . Rd / Bret reviewed with patient based on 1.0 gram per kg of IBW patient needs 68 - 78 grams of protein total daily. Pt was instructed by the Gokul Queen that she / he needs to keep daily food records and bring the food records to all f/u appointments. Pt was instructed this is an important part of compliancy and long-term lifestyle changes and will help establish long-term weight loss and weight maintenance success after weight loss surgery. Gokul Queen reviewed with the pt how to keep track of protein intake along with calories, fat and sugar content. Pt needs to be able to see that he / she is meeting his / her macro nutrient needs daily. Gokul Queen reviewed different ways to keep foods records either manually or with computer apps. Pt was able to verbalize understanding. Failure to keep food records show poor compliancy following weight loss surgery. Pt has been given all the tools and education necessary to complete this task. Education spent with pt just on food records 20 minutes.          Subjective:                   Current MNT:       Bariatric soft diet with MVI, Calcium & Protein Supplements     MNT Advanced to:     Bariatric soft diet introducing raw fruit, raw vegetables, rice, protein bars, multivitamin, calcium, protein supplements      Allergies and Food Allergies and Food Intolerances: - Food Allergy - Tree Nut's, Fresh Water Fish, [place]'s    Nutritional Data  No - Poor appetite more than 5 days     No - NPO or clear liquid more than 3 days     No - Problem Chewing      No - Problem Swallowing      No - Problem Mouth Pain      No - Problem Denture  No - Missing Teeth         No - Pressure Sore    No - Open Wound    No - Surgical Wound  No - Documented: Sepsis or Infection    Yes - Nausea  No - Vomiting    [facility] Bariatric Surgeons Bowel Protocol:  Patient states he / she has the following bowel movements per week  - Once A Week  no - Diarrhea  No - Steatorrhea  Yes - Constipation - Pt takes a significant amount of pain medication prescribed by Pain MGT. Pt has an appointment with Pain Mgt [facility]  in which Gokul Queen asked pt to discuss this with her Pain MGT physician. Pain MGT might be dino to change medication or decrease medication based on wt loss results. Pt states she does get water in and is taking a fiber supplement and stool softener. Gokul Queen enc pt to increase the amount of fiber within her diet, increase the Probiotic to 15 - 30 billion CFU's daily and placed an order and faxed it to [facility] to start PT to help with moving the bowels, mobility, improving gait and wt loss. Pt is also sendentary and uses a cane for movement. When was your last bowel movement  - Yesterday Morning    How much plain water are you drinking daily  - 4 to 5  Litre's daily  What other beverages / fluids are you drinking daily and the amount  - Coffee and Gingerale - Pt instructed this needs avoided and can cause damage after WLS.     Yes - Are you taking Colace daily  What amount of Colace are you taking daily  - Colace - 100 mgs Twice daily    Yes - Are you taking Sugar Free Chewable Fiber Gummies / Supplements  What amount of Sugar Free Chewable Fiber Gummies are you taking daily  - Benefiber with Probiotics - 2 Gummies daily pt instructed to increase to 6 daily - See AVS    How much water were you instructed to take daily? Gokul Triana reviewed today -  Patient was instructed by Gokul Triana on the importance of increasing water intake to 48 - 64 oz. of water total daily. Pt. was also instructed he / she is allowed an additional 30 oz. of sugar free caffeine free clear liquid beverages for a total of 90 oz. of fluid total daily. Pt. was able to verbalize how he / she can get more water and fluids within the diet. Pt. verbalized understanding. How much Colace did your surgeon instruct you to take? Gokul Triana reviewed today - Per the surgeons protocol you should be taking since the day of your surgery Colace - 100 mgs 1 tablet 2 times daily until your 6 week F/U appointment. Gokul TRIANA reviewed. See After Visit Summary. How much Fiber Gummies did your surgeon instruct you to take? Gokul Triana reviewed today Per the surgeons protocol you should be taking a fiber supplement lifelong since your two week follow-up appointment. Your surgeon recommends a daily approved Fiber supplement 15 grams total daily. If you are just introducing a Fiber supplement the 15 grams should be introduced 5 grams of a fiber supplement daily the 1st week, 10 grams of a fiber supplement daily the 2nd week and 15 grams of a fiber supplement daily the third week. Pt is instructed to continue the 15 grams of a fiber supplement daily. Gokul Triana reviewed. See After Visits Summary. Did your surgeon discuss any other medications / supplements to take daily to move your bowels and avoid constipation? N/A    Yes -  Patient was provided today the Constipation Handout - Gokul Triana reviewed Handout - Pt. verbalized understanding.  See AVS    Yes Gokul Triana reinforced pt needs to consume the following - Water Intake should be 64 - 90 oz, Fiber Chews 15 grams, Dietary Fiber Intake 25 - 35 grams        No - Hair loss   No - Weight regain       Yes - Acid Reflux - Pt is taking Omeprazole and Protonix  Yes - Dumping Syndrome  - Sweet Tea, Gingerale, [place], [place] Chicken Dip and [employer], Becky Bees and [place] & [facility]    No - Food gets stuck  Yes - Are you eating solids - should not be eating solids until 6 weeks post-op. no - Night Time Coughing  no -  B Ping Noted  Yes - Are you chewing thoroughly  - Does not take effect until 6 weeks post-op  No - Are you hungry after eating     How many meals a day 4 - Pt is grazing with her meals 3 bites - Gokul Queen reviewed needs avoided. Meals should be planned and portion controlled. Labs: Lab's Results Not Complete: Due to patient not having labs drawn on date given. Since you did not go and have your labs drawn on time, your lab results are incomplete at today's visit and this portion of your visit has not been completed in full. It is now your responsibility to call 10 day's after today's appointment to the [address] [place] [address] [facility] to see if we received the lab results that had to be sent away. When you call The [address] [place] [address] [facility] we will be able to access your chart and review the results with you. If a treatment plan is needed based on your lab results we can then start you on a treatment plan. Gokul Queen reviewed this instruction with the pt. Pt verbalized understanding. 8/29/22 - ALb 3.43L, Prealbumin 16L, Tri 196H, Chol 231 H    Supplements: Vitafusion Gunanjaliy MVI , Vitamin D once daily,  - Pt instructed current supplements she switched herself to and not complete and need avoided. Pt instructed to go back to Bariatric Advantage Multi-Vitamin 1 tablet 2 times Daily, Bariatric Advantage 29 mgs Iron 1 tablet Daily, Bariatric Advanatge Calcium Lozenges 1 tablet 3 times Daily , Dry Vitamin D3 5,000iu every day . Pt verbalized understanding. Handout provided. Estimated Daily Nutritional Needs: Based on Bariatric procedure for Wt. Loss  Energy: Will be calculated at Maintenance Stage    Protein: Average 60 - 80 gms Daily - See individual needs listed above based on IBW    MNT Plan and Additional Education: RD/KONG reviewed Bariatric Soft Diet incorporating in Raw Fruits, Raw Vegetables, Red Meat, and Rice. Encouraged pt to meet protein and fluid needs daily. Handouts given. Pt. verbalized understanding. Pt instructed to call with questions. Nutrition Diagnosis:   Inadequate Protein Intake  Excessive energy intake  Non-compliant with post-op bariatric supplementation  4. Constipation  5. Non-Compliance in general after WLS    Nutrition Diagnosis: Inadequate Protein Intake  Sample Etiologies:  Difficulty consuming enough protein due to volume limitations  Lack of access to food  Economic constraints  Food and nutrient related knowledge deficit - Pt does not understand what healthy protein foods are. Gokul Queen reviewed the guide with the pt. Inappropriate food choices  Not using appropriate protein supplements  Lack of suppression of gut hormones due to type of procedure (AGB)    Common Signs and Symptoms:   Estimated intake of protein insufficient to meet requirements:  Not following bariatric postop diet instruction. Not using protein supplements  Reports of excessive hunger with AGB     Possible Interventions: Gokul Queen Discussed  Meals / snacks: Increased protein diet - attention to consumption of protein per guidelines for diet stage   - Pt states he / she is going to try to follow the schedule and consume high protein foods such as greek yogurt, cottage cheese, chicken, turkey fish, egg beaters and use of protein supplement if tolerated if protein needs can not be met orally. Monitoring and evaluation out-come indicators:  Protein intake - measured protein intake - intake of protein per recommendations  Adherence : self-reported nutrition adherence - Keep and track with daily food records.        2. Nutrition Diagnosis: Predicted excessive energy intake     Etiologies:  Food and nutrient related knowledge deficit  Inappropriate food choices - Gingerale pt states she knows she should not have. Coffee, [place] Chicken Dip and Tortilla Chips which pt identifies as being a healthy food she consumes. [employer], [place], Sweet Tea. Pt states I just eat a little bit. Pt identifies eating a little bit as being okay. Common Signs and Symptoms:   Inadequate weight loss at 3 months post-op. Pt did not make goal weight. Dumping Syndrome - Pt states she is dumping after eating certain foods. Possible Interventions: Gokul Ld Discussed  Low calorie diet  Reduced fat intake  Reduced carbohydrate (starch) intake  6 meals per day including breakfast  Potion control - 3 to 4 oz  Commercial beverage medical food supplement therapy   Increased PA  Food should not contain more than 2 grams of added sugar per food item and no more than 20 - 25 grams of fat total daily. Enc pt label read and track foods being consumed with food records. Monitoring and evaluation out-come indicators:  Food intake types of foods - Only foods listed on bariatric handouts - 6 small meals, 3 oz on size, 30 minutes to consume a meal versus grazing. Adherence : self-reported adherene score - Food Records. Achieve adequate wt loss  Food Intake: amount of food - reduce portion sizes eaten  Weight - measured weight decreased     3. Nutrition Diagnosis: Non-compliant with post-op bariatric supplementation      Etiologies:  Pt non-compliant with post-op bariatric supplementation.  Pt switched to Gummy MVI's and is only taking Vitamin D  Food and nutrition related deficit  Pt stated she never took the Bariatric Advantage Multi-Vitamin 1 tablet 2 times Daily, Bariatric Advantage 29 mgs Iron 1 tablet Daily, Bariatric Advanatge Calcium Lozenges 1 tablet 3 times Daily , Dry Vitamin D3 5,000iu every day  that was prescribed and states she does not like the taste of them. Common Signs and Symptoms:  Reports not taking supplements  Reports taking supplements without all recommended minerals  Malnutrition            Possible Interventions: Gokul Queen Discussed  Nutrition education content: Post-Op maintenance of micro nutrient supplementation to maintain normal labs values and prevent malnutrition following WLS  Nutrition related supplementation -  Instructed pt to take the bariatric vitamins and supplements as instructed with correct dose -  Bariatric Advantage Multi-Vitamin 1 tablet 2 times Daily, Bariatric Advantage 29 mgs Iron 1 tablet Daily, Bariatric Advantage Calcium Lozenges 1 tablet 3 times Daily , Dry Vitamin D3 5,000iu every day or a Bar MVI that contains 200% of DRV, 1500 mg of Ca Citrate with Mg and Vit D, Vitamin D 5,000iu daily       Monitoring and evaluation out-come indicators:  Supplementation / Medication - Compliance  Adherence: Post-Op Bariatric Supplementation  Labs:  - Lab recheck 6 Month's Post-Op    Malnutrition from Micro nutrient deficiencies: Resolves bariatric labs WNL. Nutrient eduation content: content related nutrition and nutrient education. See AVS    Pt verbalized understanding. 4. Nutrition Diagnosis: Constipation    Etiologies:   Dietary non-compliance with high dose of pain medication pt is managed by pain mgt. Enc pt to discuss with pain mgt at her next F/U appt. Common Signs and Symptoms:    Inability, unwillingness, or disinterest in selecting food consistent with guidelines:      Possible Interventions: oGkul Queen Discussed  Increased fluid diet - attention to consumption fluids per guidelines for diet stage. Bioactive substance supplement - psyllium management  Increased Fiber Diet  Probiotic - 15 - 30 Billion CFU's    Monitoring and evaluation out-come indicators:  Fluid intake: water estimated oral intake in 24 hours - amount consumed per diet stage guidelines.   Fiber intake - intake of fiber per recommendations  Nutrition -focused physical findings digestive system - reduction or elimination of reports of constipation. 5. Pts dietary compliance is extremely poor - See above. Pt is at risk for severe post-op medical complications based on non-compliance listed above. Consequences of non adherence include worsening condition, increased comorbid diseases and readmits for long tern post-op complications related to wt loss sx. Pt has been educated at all appointments about what POC she needs to follow to prevent complications after wt loss sx. Pt verbalizes understanding. No 1. Pt is consuming his / her recommended amount of protein within the diet based on patients Ideal Body Weight. .    No 2. Pt is using the recommended Bariatric approved protein supplement and taking in the correct amount of protein daily. Pt is able to verbalize how to mix his / her protein supplement correctly to meet pts needs. Pt verbalized understanding. No 3. Pt is using the recommended Bariatric approved Multi-Vitamin, Bariatric approved Calcium, Bariatric approved Iron supplement or Bariatric approved Vitamin D and taking the correct dose. Pt was educated per his / her Bariatric Surgeons protocol he / she needs the following daily -  Bariatric Advantage Multi-Vitamin 1 tablet 2 times Daily, Bariatric Advantage 29 mgs Iron 1 tablet Daily, Bariatric Advantage Calcium Lozenges 1 tablet 3 times Daily , Dry Vitamin D3 5,000iu every day. Pt verbalized understanding. No 4. Pt kept daily food records. Pt is aware food records need to be kept life-long daily and brought to all follow-up appointments in order to show compliancy after weight loss surgery. Pt verbalized understanding. No 5. Pt is taking the correct stool softener for the first 6 weeks with the correct dose. Pt is also taking the correct fiber supplement with the correct dose starting at his / her 2 week f/u appointment. Pt verbalized understanding.  See above instruction and AVS.     Yes 6. Pt is drinking 64 - 90 oz of plain water daily. Patient was instructed on the importance of increasing water intake to 48 - 64 oz. of water total daily. Pt. was also instructed he / she is allowed an additional 30 oz. of sugar free caffeine free clear liquid beverages for a total of 90 oz. of fluid total daily. Pt. was able to verbalize how he / she can get more water and fluids within the diet. Per bariatric surgeons protocol after weight loss surgery. Pt. verbalized understanding. No 7. Pt achieved his / her percentage of excess body weight loss at his / her f/u appointments and is on track to reach his / her weight loss goal.    No 8. Pt is weighing and measuring all foods using a food scale and measuring cups. Pt reports portion sizes are 3 to 4 oz in size. Portion sizes are not exceeding 6 ounces total per meal lifelong. Pt verbalized understanding. .     No 9. Pt is not experiencing Dumping Syndrome from food / beverage consumption. No 10. Pt is complying with all stages and consistencies of the bariatric diet and is not eating or drinking foods / beverages that is not listed on the diet at this stage. Yes (Staff FYI) - Pt is drinking carbonated beverages (Gingerale), alcoholic beverages or juices at this time. Sweet Tea    Compliancy Scale  - After Weight Loss Surgery :  1 Poor - Dietary Compliance - This is based on patient following the Medical Nutrition Therapy protocol after Weight Loss Surgery  7 to 10 Points - Good (70% - to 100%) -  Pt is following what he / she has been instructed on at the time of this visit. 4 to 7 Points - Fair (40% to 70%) - Pt has areas that he / she needs to work on in order to be compliant with the bariatric protocol after weight loss surgery. 0 to 4 Points - Poor (0% - 40%) - Pt is failing to follow the instructions given to the pt. Gokul Queen has concerns pt may be creating harm.  Pt was offered additional dietary counseling appointments to help pt make the necessary lifestyle changes to show compliancy after weight loss surgery. MEDICAL NUTRITION THERAPY (MNT) - Nutrition Care Plan   (The patient has been educated and given written education material that reinforces the following dietary guidelines for Bariatric Surgery)  Goal:  Patient able to verbalize the following dietary concepts:  3 to 4 ounce portions per meal     6 small meals daily      60 to 80 grams of protein on average see individual protein needs   48 to 64 ounces of fluid daily (water)     Always consume protein item first with all meals   Pt is aware wt loss is pt controlled. Slow Meals - 30-35 chews per bite / Meals 30 - 45 minutes long            The RD / LD reviewed with the patient that the dietary goals of the bariatric patient is to ensure that patient is able to meet established nutritional needs for a Bariatric Surgery Patient at this time in order to promote healing, prevent significant weight changes, prevent skin breakdown and abnormal lab values, prevent complications, and tolerance to diet and texture of foods. Pt is able to identify proper food choices and needed changes and is able to explain proper food preparation. RD / LD reviewed compliance with assigned diet stages, volume of food and drink consumed, timing of meals, amount of protein and energy intake, daily vitamin and mineral supplementation, identify food cravings and any adverse reactions associated with intake of food and drink. RD / LD uses behavioral tools such as goal setting, MI, and self-monitoring, to reinforce the anatomic and physiologic effects of the surgery, so that the described behaviors become more of a habit not simply a reaction to the altered anatomy.      Care Plan:   Yes - Rd/Ld Addressed Food Records or 24-Hour Recall  Yes - Rd / Ld encouraged patient to exercise at least 30 minutes daily  Yes - Rd / Ld instructed patient on how to increase oral protein intake within the diet. Pt. can verbalize his / her individual protein needs at this visit. Yes - Rd / Ld instructed the patient on how to increase the use of protein supplements within the diet if appropriate at this time. Yes - Pt. was instructed on how to increase water intake. Patient will need to consume 48 - 64 oz. of just plain water in addition to 30 oz. of non-caloric beverages. Yes - Handouts given to patient - Also see After Visit Summary in addition to paper copy diet instruction. Yes - RD / LD encouraged oral intake    Yes -  RD / LD encouraged pt. to keep food records daily.       Yes - Pt. is able to verbalize diet concepts      Yes - Constipation Handout Given and Reviewed - Part of surgeons Bowel Protocol - See After Visit Summary    Yes - High Fiber Handout Given and Reviewed - Part of surgeons Bowel Protocol - See After Visit Summary        No - Ulcer Handout Given and Reviewed          No - Pt. was instructed to continue current MNT   Yes - Pt. diet was advanced to the following stage ( See above)   Yes - Supplements: initiated (See list below  - Pt. given instruction)     No - Supplemental foods:______________________  No - Pt. was placed on a altered meal schedule

## 2022-08-31 NOTE — PATIENT INSTRUCTIONS
[facility]  Dietary Follow-up Appointment Instructions    Sena iRchards   Date: 8/31/2022     The RD / LD reviewed the following instructions with the patient and handouts have been given. Pt. is able to verbalize instruction and has been instructed to call with any problems or complications. If patient is not able to comply with the dietary advancement or dietary or supplement instructions given pt. is instructed to call the [facility] at [phone]. If [facility] is closed and problems occur patient is instructed to go to [id number] [facility] and have his / her surgeon paged. Current Diet Instruction: Handouts Given  Pt instructed he / she must keep daily food records and bring to all follow-up appointments. Pt. has been instructed to slow down eating habits and chew food 30 - 35 times per bite. Rd / Ld reviewed with patient that eating too fast can cause food to get stuck or create nausea and vomiting after bariatric surgery. Rd / Ld highly encouraged patient to set a timer and really count bites to help with slowing down eating habits this will also create more of a sensation of fullness and satiety. Patient was instructed on the importance of increasing water intake to 48 - 64 oz. of water total daily. Pt. was also instructed he / she is allowed an additional 30 oz. of sugar free caffeine free clear liquid beverages for a total of 90 oz. of fluid total daily. Pt. was able to verbalize how he / she can get more water and fluids within the diet. Pt. verbalized understanding.      Bariatric soft diet introducing raw fruit, raw vegetables, rice, protein bars, multivitamin, calcium, protein supplements        Care Plan:  [address] [facility]   Stool Softener / Fiber Supplement  / Surgeons Bowel Protocol After Weight Loss Surgery  Written By: Hue Menchaca  Definition:  Constipation is the passage of small amounts of hard, dry bowel movements, usually fewer than three times a week. People who are constipated may find it difficult and painful to have a bowel movement. Other symptoms of constipation include feeling bloated, uncomfortable, and sluggish. What Causes Constipation? To understand constipation, it helps to know how the colon (large intestine) works. As food moves through it, the colon absorbs water while forming waste products, or stool. Muscle contractions in the colon push the stool toward the rectum. By the time stool reaches the rectum, it is solid because most of the water has been absorbed. The hard and dry stools of constipation occur when the colon absorbs too much water. This happens because the colon's muscle contractions are slow or sluggish causing the stool to move through the colon too slowly causing too much water being able to be absorbed and a hard stool forming. Why Does This Happen After Bariatric Surgery? Most patients do not drink enough Water. That's right. You need to be drinking at the minimum 64 ounces of just plain water a day and additional 30 oz. of other non-caloric beverages. All other beverages do not count as water intake and will cause constipation. Adding water to the diet adds fluid to the colon and bulk to the stools, making bowel movements softer and easier to pass. Avoid all other beverages besides water especially coffee and tea when you are constipated. Most patients do not get enough fiber in the diet. We recommend at least 25 - 35 grams of fiber daily from your diet starting 3 months post-op. The most common cause of constipation is a diet low in fiber found in vegetables, fruits, and whole grains and high in fats found in cheese, eggs, and meats. People who eat plenty of high-fiber foods are less likely to become constipated.   Refer to your high fiber food's handout - this is based on what stage of the diet you may be in and what foods are tolerated at this stage. Lack of exercise can lead to constipation - Regular activity especially walking helps push food through the intestines and helps to push waste through the colon. Please be sure to be following your exercise guidelines. If constipated eliminate Cheese, Eggs, Applesauce, Apples, Bananas, Rice and Bread from the diet you can resume in small amounts once the constipation resolves and these foods have been incorporated into your diet stage. If you are 2 -6 weeks post bariatric surgery you may add high fiber foods such as oatmeal, oat bran at this time. If your diet has progressed and you are 12 weeks post bariatric surgery you may add other high fiber foods such as fresh fruits, fresh vegetables, and whole grains. Track your fiber intake daily the goal is to have 25 - 35 grams of fiber total daily starting at your 3 month follow-up appointment. Remember half the fiber intake can come from your fiber supplement the other half should come from dietary intake. Refer to the High Fiber Foods Handout. Surgeon Bowel Instruction:    Your surgeon has instructed you on the following:    Start by following the [address] [place] [address] [facility] recommendations of taking a stool softener -  Colace or Docusate 100mg gel tablet once at breakfast and once before bed. The day you are discharged from the hospital until your 6 week follow-up appointment. Start by adding a Sugar Free Chewable Fiber Supplement at your 2 week follow-up appointment lifelong -  Per the surgeons protocol you should be taking a fiber supplement lifelong since your two week follow-up appointment. Your surgeon recommends a daily approved Fiber supplement 15 grams total daily.   If you are just introducing a Fiber supplement the 15 grams should be introduced gradually - 5 grams of a fiber supplement daily the 1st week, 10 grams of a fiber supplement daily the 2nd week and 15 grams of a fiber supplement daily the third week. Pt is instructed to continue the 15 grams of a fiber supplement daily. Gokul Queen reviewed. Pt verbalized understanding. Select One from Below     Vitafusion Sugar Free Fiber Well Chewable Fiber Gummies - Follow the [facility] instructions of taking 2 Fiber Gummies 3 times Daily. (15 grams)    -or-    Benefiber Original - Follow the [facility] instructions of taking 1tablespoon 3 times daily mixed in water or sugar free beverage of choice. (13.5 grams)     -or-    Metamucil Sugar Free Original - Follow the [facility] instruction of taking 1 rounded tablespoon 2 times daily mixed in water or sugar free beverage of choice. (18 grams)    You need to be drinking at the minimum 64 ounces of just plain water a day and an additional 30 oz. of other non-caloric, non-carbonated, non-caffeinated beverages. This will make a total of 90oz or greater daily. Unless you are on a fluid restriction. You can add a Probiotic. The [facility]  recommends a Probiotic that contains 15 billion cfu's in one capsule and the first  ingredient needs to be Lactobacillus Acidophilus. The instructions are to take 1 capsule daily if you have no results increase to 2 capsules daily. Increase your activity and walk as tolerated daily. Follow your exercise guidelines. If you find that you are still having trouble with constipation after trying the  suggestions mentioned above please contact the [facility] [phone].                                                                   [facility] Staff Stool Softener and Fiber Instruction Education for Patients:       D/C from Hospital 2-week F/U Appt: 6-week F/U Appt: 3 Month F/U Appt: 6 -Month - On   Water Intake 48 - 64 oz Water 64 oz of water plus 30 oz other SF / FF/ NC / CF Beverages 64 oz of water plus 30 oz other SF / FF/ NC / CF Beverages 64 oz of water plus 30 oz other SF / FF/ NC / CF Beverages 64 oz of water plus 30 oz other SF / FF/ NC / CF Beverages   Colace 100 mgs 2 times daily 100 mgs 2 times daily 100 mgs 2 times daily. Pt should be instructed to stop the Colace Pt should be off the Colace Pt should be off the Colace   Fiber Supplement   ____________ Add Fiber Supplement - Pt needs educated at 2 weeks Appt Continue Fiber Supplement -Continue Pt Education Continue Fiber Supplement -Continue Pt Education Continue Fiber Supplement -Continue Pt Education   Fiber Foods   ____________     ____________     ____________   Goal is 25 - 35 grams of Fiber Total Daily - Pt needs Educated Goal is 25 - 35 grams of Fiber Total Daily - Pt needs Educated                                     Vitamin and Supplement Instruction:  Vitamin and Mineral Supplementation   After Gastric Bypass and Sleeve Gastrectomy Surgery      Patient is able to verbalize the above supplements must be taken daily in order to insure proper health and nutrition, and prevent nutrient deficiencies. The patient is aware that not complying can lead to the patient placing him/her self at risk for complications. RD / LD reviewed with patient the importance of dietary supplements. Pt. verbalized understanding. Vitamins:   Bariatric Advantage Chewable Multi-Formula (1 tablet 2 times daily) - and - Bariatric Advantage Chewable Iron 29 mg (1 tablet daily)    Other Vitamin Deficiencies:    Vitamin D - Dry Vitamin D3 5,000iu every day. Calcium Supplements:  Calcium Supplement: Total daily intake should be 1500 mg from calcium citrate and 800 - 1000 iu Vitamin D daily. Plus three servings of low-fat dairy for a total daily intake of 1800 - 2200 mg of Calcium. Bariatric Advantage Calcium 500 Chews (1 tablet 3 times daily) - contains Calories    Pt. given copy. Lab's Results Not Complete: Due to patient not having labs drawn on date given.      Since you did not go and have your labs drawn on time, your lab results are incomplete at today's visit and this portion of your visit has not been completed in full. It is now your responsibility to call 10 day's after today's appointment to the [facility] [place] to see if we received the lab results that had to be sent away. When you call The [facility] [place] we will be able to access your chart and review the results with you. If a treatment plan is needed based on your lab results we can then start you on a treatment plan. Gokul Queen reviewed this instruction with the pt. Pt verbalized understanding. [address] [place]   Constipation  Written By: Juan Pablo Sun  Definition:  Constipation is the passage of small amounts of hard, dry bowel movements, usually fewer than three times a week. People who are constipated may find it difficult and painful to have a bowel movement. Other symptoms of constipation include feeling bloated, uncomfortable, and sluggish. What Causes Constipation? To understand constipation, it helps to know how the colon (large intestine) works. As food moves through it, the colon absorbs water while forming waste products, or stool. Muscle contractions in the colon push the stool toward the rectum. By the time stool reaches the rectum, it is solid because most of the water has been absorbed. The hard and dry stools of constipation occur when the colon absorbs too much water. This happens because the colon's muscle contractions are slow or sluggish causing the stool to move through the colon too slowly causing too much water being able to be absorbed and a hard stool forming. Why Does This Happen After Bariatric Surgery? Most patients do not drink enough Water. That's right. You need to be drinking at the minimum 64 ounces of just plain water a day and additional 30 oz. of other non-caloric beverages.   All other beverages do not count as water intake and will cause constipation. Adding water to the diet adds fluid to the colon and bulk to the stools, making bowel movements softer and easier to pass. Avoid all other beverages besides water especially coffee and tea when you are constipated. It does not matter if it is regular or decaffeinated. Most patients do not get enough fiber in the diet. We recommend at least 25 - 35 grams of fiber daily from your diet. The most common cause of constipation is a diet low in fiber found in vegetables, fruits, and whole grains and high in fats found in cheese, eggs, and meats. People who eat plenty of high-fiber foods are less likely to become constipated. Refer to your high fiber food's handout - this is based on what stage of the diet you may be in and what foods are tolerated at this stage. Lack of exercise can lead to constipation - Regular activity especially walking helps push food through the intestines and helps to push waste through the colon. Please be sure to be following your exercise guidelines. Others - Medications, Irritable Bowel Syndrome, changes in life or routine,        (e.g. surgery and travel), abuse of laxatives, ignoring the urge to have a bowel          movement, problems with the colon, rectum or intestinal function. Tips to Control Constipation:    Your surgeon has instructed you on the following:  Start by following the [address] [place] [address] [facility] recommendations of taking a stool softener -  Colace or Docusate 100mg gel tablet once at breakfast and once before bed. Start by adding a Sugar Free Chewable Fiber Supplement. Most fiber supplements contain 3 grams of fiber per chew/tablet. The [place] [address] [facility] recommends starting with 2 chews/tablets a day and increasing to 5 if you have no results.     You need to be drinking at the minimum 64 ounces of just plain water a day and an additional 30 oz. of other non-caloric, non-carbonated, non-caffeinated beverages. This will make a total of 90oz or greater daily. Unless you are on a fluid restriction. You can add a Probiotic. The [place] [address] [facility]  recommends a Probiotic that contains 15 billion cfu's in one capsule and the first  ingredient needs to be Lactobacillus Acidophilus. The instructions are to take 1 capsule daily if you have no results increase to 2 capsules daily. Increase your activity and walk as tolerated daily. Follow your exercise guidelines. If you find that you are still having trouble with constipation after trying the  suggestions mentioned above please contact the [place] [address] [facility] [phone]. Eliminate Cheese, Eggs, Applesauce, Apples, Bananas, Rice and Bread from the diet you can resume in small amounts once the constipation resolves. If you are 2 -6 weeks post bariatric surgery you may add high fiber foods such as oatmeal, oat bran at this time. If your diet has progressed and you are 12 weeks post bariatric surgery you may add other high fiber foods such as fresh fruits, fresh vegetables, and whole grains. Track your fiber intake daily the goal is to have 25 - 35 grams of fiber total daily. Remember half the fiber intake can come from your fiber supplement the other have should come from dietary intake. Refer to the High Fiber Foods Handout. Drink 2 ounces of sugar free prune juice in the evening before bedtime. Try making a PAB before bedtime - 2 ounces unsweetened applesauce, 1-tablespoon crushed bran flakes, and 1-tablespoon prune juice - Mix together serve cold or warm before bed in the evening. [facility] and [address] [place] [address] [facility]  High Fiber Diet  Written By: Racheal Dumont    What is Fiber? Roughage, Bulk and Bran are all terms that are used to mean fiber. Fiber is the part of the plant foods that cannot be digested by humans.   It is found in breads, fruits, cereals, vegetables and grains. What is the difference between Soluble and Insoluble Fiber? Soluble Fiber:  Dissolves in water. Foods that are high in soluble fiber include fruits, vegetables, oat bran, oats, barley and some beans. Insoluble Fiber:  Does not dissolve in water but retains water, and is used to soften and build up stool, thus preventing or easing constipation. It can be found in fruits, vegetables, cereals, whole grain wheat products and wheat bran. Why do you need fiber? A regular daily intake of fiber has many advantages and can help even if you are healthy. Fiber can help keep bowels working regularly, and help prevent constipation. By providing bulk and softening stool, the pressure of hard bowel movements is eliminated. Research shows that fiber can also reduce your chance of getting colon cancer. By increasing food bulk, fiber gives your brain more time to realize that your body is no longer hungry and helps prevent overeating. Soluble fiber is also thought to lower cholesterol and control blood sugar levels in people with diabetes. How much Fiber do you need? The daily recommendation is 25 - 35 grams of fiber per day. Because people vary in their sensitivity to fiber, the amount that a person can eat also varies. A good target is to eat the amount that results in normal bowel movements. Where can you get Fiber from? Patients who are 2 weeks to 3 month post-op should   consume the following in order to meet their fiber needs: Sugar Free Oatmeal, Prune Juice diluted with water, Legumes, and Beans.  (Please check what stage of the diet your diet has advanced to and see what other food items you may be able to add based on the chart below.)     Patients who are 3 months post-op or further out from   their surgery date should consume the following in order to meet their    fiber needs: Fiber can be increased by eating food in their natural state, before peeling or juicing. Cereals are a quick way to get fiber. Fruits can provide up to    5 grams of fiber in a single serving. Fruits and vegetables with edible skins are    high in fiber but caution may not be tolerated well after bariatric surgery. Breads  in   small quantities with whole grain flours and added fiber are also good sources    of fiber. Bran muffins are popular as well, but may contain high levels of fat. Legumes and beans contain up to 6 grams of fiber per ½ cup. Patients who have bariatric surgery tolerate food items differently. If an above food item does not agree with you eliminate it from the diet immediately. It is important to work closely with the dietitian to make sure you are meeting you fiber goal.  Use the recommendations on the High Fiber Foods Handout and The Handout on Constipation together in order to meet your dietary needs. Fiber is best consumed from a food source but if you are unable to meet your fiber needs through diet alone it is also available in liquid and powder form. Consult with your physician before using these products if you have questions or problems, or are taking any other medications. Remember to always read and follow the directions on the label when using these products. You will also need to take in smaller amounts more frequently due to the small pouch size. Attached are suggestions to help with increasing fiber intake and a chart with high fiber food items. Make sure to drink 48 - 64oz. of water daily. Increase Fiber intake gradually to minimize potentially adverse side effects such as abdominal distress, bloating, flatulence, cramps and diarrhea. Try having a PAB in the evening before going to bed. Make sure you are currently following your exercise           recommendations for which stage of the diet you are currently        in.     PAB Recipe:  -2 ounces of unsweetened applesauce,  -1 tablespoon baby bran flakes  -1 tablespoon of prune juice  Directions: Mix together serve hot or cold before bedtime. High Fiber Foods Guide:  Make sure to only take in food items that are appropriate for which stage of the diet you are currently in. If any food items give you difficulty discontinue use of these items and contact the [address] [facility] and [facility] [phone].       Food Item: Fiber: Serving:     Vegetables   Beans 2.0 gms ½ cup   Broccoli 2.2 gms ½ cup   Brussel Sprouts 2.3 gms ½ cup   Carrots 2.0 gms ½ cup   Celery 1.0 gms ½ cup   Corn 4.0 gms ½ cup   Corn on the Cob 5.9 gms 1 ear   Lettuce 0.5 gms ½ cup   Peas (canned) 4.0 gms ½ cup   Peas (dried) 7.9 gms ½ cup   Spinach 2.0 gms ½ cup   Legumes   Beans(James,Kidney, Baked) 10.0 gms ½ cup   Refried Beans 6.0 gms ½ cup   Lentils 4.0 gms ½ cup   Peas (canned) 4.0 gms ½ cup   Peas (dried) 7.9 gms ½ cup   Fruit   Apple with peel 3.5 gms 1 medium   Banana 2.4 gms 1 medium   Grapefruit (Fresh) 0.6 gms ½ medium   Orange (Fresh) 2.0 gms 1 medium   Peach (Fresh) 2.0 gms 1 medium   Strawberries 1.5 gms ½ cup   Kiwi 5.0 gms 1 medium   Pear 4.5 gms 1 medium   Cereal:   Fiber One  7.0 gms ½ cup   100% Bran 6.5 gms ½ cup   All-Bran Extra Fiber 6.5 gms ½ cup   Grains / Breads:   Whole Wheat Bread 1.3 gms 1 slice   White, Rye, Bread 0.7 gms 1 slice

## 2022-09-01 ENCOUNTER — OFFICE VISIT (OUTPATIENT)
Dept: PAIN MANAGEMENT | Age: 47
End: 2022-09-01
Payer: MEDICARE

## 2022-09-01 ENCOUNTER — PREP FOR PROCEDURE (OUTPATIENT)
Dept: PAIN MANAGEMENT | Age: 47
End: 2022-09-01

## 2022-09-01 VITALS
DIASTOLIC BLOOD PRESSURE: 81 MMHG | RESPIRATION RATE: 16 BRPM | TEMPERATURE: 97.5 F | HEART RATE: 89 BPM | OXYGEN SATURATION: 97 % | WEIGHT: 293 LBS | HEIGHT: 69 IN | BODY MASS INDEX: 43.4 KG/M2 | SYSTOLIC BLOOD PRESSURE: 131 MMHG

## 2022-09-01 DIAGNOSIS — K59.03 THERAPEUTIC OPIOID INDUCED CONSTIPATION: ICD-10-CM

## 2022-09-01 DIAGNOSIS — M16.12 PRIMARY OSTEOARTHRITIS OF LEFT HIP: ICD-10-CM

## 2022-09-01 DIAGNOSIS — G89.4 CHRONIC PAIN SYNDROME: Primary | ICD-10-CM

## 2022-09-01 DIAGNOSIS — M48.062 SPINAL STENOSIS OF LUMBAR REGION WITH NEUROGENIC CLAUDICATION: ICD-10-CM

## 2022-09-01 DIAGNOSIS — M54.16 LUMBAR RADICULOPATHY: ICD-10-CM

## 2022-09-01 DIAGNOSIS — E66.01 MORBID OBESITY (HCC): ICD-10-CM

## 2022-09-01 DIAGNOSIS — M25.552 LEFT HIP PAIN: ICD-10-CM

## 2022-09-01 DIAGNOSIS — T40.2X5A THERAPEUTIC OPIOID INDUCED CONSTIPATION: ICD-10-CM

## 2022-09-01 DIAGNOSIS — M16.0 PRIMARY OSTEOARTHRITIS OF BOTH HIPS: Primary | ICD-10-CM

## 2022-09-01 DIAGNOSIS — G47.33 OSA (OBSTRUCTIVE SLEEP APNEA): ICD-10-CM

## 2022-09-01 DIAGNOSIS — M54.41 CHRONIC BILATERAL LOW BACK PAIN WITH BILATERAL SCIATICA: ICD-10-CM

## 2022-09-01 DIAGNOSIS — G89.29 CHRONIC BILATERAL LOW BACK PAIN WITH BILATERAL SCIATICA: ICD-10-CM

## 2022-09-01 DIAGNOSIS — Z98.84 S/P GASTRIC BYPASS: ICD-10-CM

## 2022-09-01 DIAGNOSIS — M54.42 CHRONIC BILATERAL LOW BACK PAIN WITH BILATERAL SCIATICA: ICD-10-CM

## 2022-09-01 PROCEDURE — G8427 DOCREV CUR MEDS BY ELIG CLIN: HCPCS | Performed by: PAIN MEDICINE

## 2022-09-01 PROCEDURE — 99215 OFFICE O/P EST HI 40 MIN: CPT | Performed by: PAIN MEDICINE

## 2022-09-01 PROCEDURE — 4004F PT TOBACCO SCREEN RCVD TLK: CPT | Performed by: PAIN MEDICINE

## 2022-09-01 PROCEDURE — 99213 OFFICE O/P EST LOW 20 MIN: CPT | Performed by: PAIN MEDICINE

## 2022-09-01 PROCEDURE — G8417 CALC BMI ABV UP PARAM F/U: HCPCS | Performed by: PAIN MEDICINE

## 2022-09-01 RX ORDER — GABAPENTIN 600 MG/1
600 TABLET ORAL 3 TIMES DAILY
Qty: 90 TABLET | Refills: 0 | Status: SHIPPED
Start: 2022-09-01 | End: 2022-09-28 | Stop reason: SDUPTHER

## 2022-09-01 RX ORDER — OXYCODONE HYDROCHLORIDE AND ACETAMINOPHEN 5; 325 MG/1; MG/1
1 TABLET ORAL 3 TIMES DAILY PRN
Qty: 90 TABLET | Refills: 0 | Status: SHIPPED
Start: 2022-09-04 | End: 2022-09-28 | Stop reason: SDUPTHER

## 2022-09-01 NOTE — PROGRESS NOTES
Do you currently have any of the following:    Fever: No  Headache:  No  Cough: No  Shortness of breath: No  Exposed to anyone with these symptoms: No         Layton Ballard presents to the [facility] on 9/1/[facility]. Murali Pelayo is complaining of pain lower back and left hip. The pain is constant. The pain is described as aching and numb. Pain is rated on her best day at a 8, on her worst day at a 10, and on average at a 10 on the VAS scale. She took her last dose of Neurontin today. Any procedures since your last visit: No    Pacemaker or defibrillator: No .    She is not on NSAIDS and is  on anticoagulation medications to include ASA and is managed by Huber Pike PA-C  . Medication Contract and Consent for Opioid Use Documents Filed       Patient Documents       Type of Document Status Date Received Received By Description    Medication Contract Received 7/1/2019 11:37 AM [facility] CONTRACT DR. VILLANUEVA    Medication Contract Signed 7/24/2019 10:15 AM [facility] medication contract                    /81   Pulse 89   Temp 97.5 °F (36.4 °C) (Infrared)   Resp 16   Ht 5' 8.9\" (1.75 m)   Wt (!) 327 lb (148.3 kg)   SpO2 97%   BMI 48.43 kg/m²      No LMP recorded.

## 2022-09-02 ENCOUNTER — TELEPHONE (OUTPATIENT)
Dept: PAIN MANAGEMENT | Age: 47
End: 2022-09-02

## 2022-09-02 LAB — ZINC: 71.6 UG/DL (ref 60–120)

## 2022-09-06 ENCOUNTER — TELEPHONE (OUTPATIENT)
Dept: BARIATRICS/WEIGHT MGMT | Age: 47
End: 2022-09-06

## 2022-09-06 DIAGNOSIS — Z00.8 NUTRITIONAL ASSESSMENT: Primary | ICD-10-CM

## 2022-09-06 DIAGNOSIS — Z71.3 NUTRITIONAL COUNSELING: ICD-10-CM

## 2022-09-06 NOTE — TELEPHONE ENCOUNTER
Gokul Queen called pt. Pt had appt last week but not all lab results were in. Zinc Results - Are WNL received today - 8/29/22  -71. 6. However Vitamin B1 was cx so no results. Pt instructed to f/u with hospital to have redrawn and call [facility] 10 day's after lab draw d/t frozen specimen. [facility] awaiting results.

## 2022-09-07 ENCOUNTER — TELEPHONE (OUTPATIENT)
Dept: PAIN MANAGEMENT | Age: 47
End: 2022-09-07

## 2022-09-07 DIAGNOSIS — K59.03 DRUG-INDUCED CONSTIPATION: Primary | ICD-10-CM

## 2022-09-08 RX ORDER — LACTULOSE 10 G/15ML
10 SOLUTION ORAL EVERY EVENING
Qty: 450 ML | Refills: 0 | Status: SHIPPED
Start: 2022-09-08 | End: 2022-09-28 | Stop reason: SDUPTHER

## 2022-09-08 NOTE — TELEPHONE ENCOUNTER
Please check with her. Has she failed Senna? If so, she has failed 3 of them (bisocadyl, polyethylene glycol and senna). If she has not failed senna as well, I did escribe lactulose.     Thank you

## 2022-09-13 NOTE — TELEPHONE ENCOUNTER
Spoke to Gm, she has not tried Senna. She has been using the lactulose and so far she has went to the bathroom twice, but said she is unsure if the medication is working so far.

## 2022-09-15 ENCOUNTER — TELEPHONE (OUTPATIENT)
Dept: PAIN MANAGEMENT | Age: 47
End: 2022-09-15

## 2022-09-15 NOTE — TELEPHONE ENCOUNTER
Call to Hardik Ledbetter that procedure was approved for 9/22/2022 and that the surgery center should call her a few days before for the pre op call and after 3:00 PM the business day before with the arrival time. Instructed Monique to hold ibuprofen for 24 hours, naprosyn for 4 days and any aspirin containing products or fish oil for 7 days. Instructed to call office back if any questions. Shantal Hartmann verbalized understanding.      Triston Naranjo RN  Pain Management

## 2022-09-20 NOTE — PROGRESS NOTES
[facility]  INSTRUCTIONS  . .......................................................................................................................................... [x] Parking the day of Surgery is located in the [facility].   Upon entering the door, make immediate right into the surgery reception room    [x]  Bring photo ID and insurance card     [x] You may have a light breakfast day of procedure    [x]  Wear loose comfortable clothing    [x]  Please follow instructions for medications as given per Dr's office    [x] You can expect a call the business day prior to procedure to notify you of your arrival time     [x] Please arrange for     []  Other instructions

## 2022-09-22 ENCOUNTER — HOSPITAL ENCOUNTER (OUTPATIENT)
Dept: GENERAL RADIOLOGY | Age: 47
Setting detail: OUTPATIENT SURGERY
Discharge: HOME | End: 2022-09-24
Attending: PAIN MEDICINE
Payer: MEDICARE

## 2022-09-22 ENCOUNTER — HOSPITAL ENCOUNTER (OUTPATIENT)
Age: 47
Setting detail: OUTPATIENT SURGERY
Discharge: HOME | End: 2022-09-22
Attending: PAIN MEDICINE | Admitting: PAIN MEDICINE
Payer: MEDICARE

## 2022-09-22 VITALS
HEIGHT: 68 IN | RESPIRATION RATE: 20 BRPM | WEIGHT: 293 LBS | TEMPERATURE: 98 F | HEART RATE: 81 BPM | DIASTOLIC BLOOD PRESSURE: 80 MMHG | SYSTOLIC BLOOD PRESSURE: 158 MMHG | OXYGEN SATURATION: 95 % | BODY MASS INDEX: 44.41 KG/M2

## 2022-09-22 DIAGNOSIS — R52 PAIN MANAGEMENT: ICD-10-CM

## 2022-09-22 PROCEDURE — 20610 DRAIN/INJ JOINT/BURSA W/O US: CPT | Performed by: PAIN MEDICINE

## 2022-09-22 PROCEDURE — 7100000010 HC PHASE II RECOVERY - FIRST 15 MIN: Performed by: PAIN MEDICINE

## 2022-09-22 PROCEDURE — 6360000002 HC RX W HCPCS: Performed by: PAIN MEDICINE

## 2022-09-22 PROCEDURE — 3600000002 HC SURGERY LEVEL 2 BASE: Performed by: PAIN MEDICINE

## 2022-09-22 PROCEDURE — 7100000011 HC PHASE II RECOVERY - ADDTL 15 MIN: Performed by: PAIN MEDICINE

## 2022-09-22 PROCEDURE — 6360000004 HC RX CONTRAST MEDICATION: Performed by: PAIN MEDICINE

## 2022-09-22 PROCEDURE — 3209999900 FLUORO FOR SURGICAL PROCEDURES

## 2022-09-22 PROCEDURE — 2500000003 HC RX 250 WO HCPCS: Performed by: PAIN MEDICINE

## 2022-09-22 PROCEDURE — 77002 NEEDLE LOCALIZATION BY XRAY: CPT | Performed by: PAIN MEDICINE

## 2022-09-22 PROCEDURE — 2709999900 HC NON-CHARGEABLE SUPPLY: Performed by: PAIN MEDICINE

## 2022-09-22 RX ORDER — METHYLPREDNISOLONE ACETATE 40 MG/ML
INJECTION, SUSPENSION INTRA-ARTICULAR; INTRALESIONAL; INTRAMUSCULAR; SOFT TISSUE PRN
Status: DISCONTINUED | OUTPATIENT
Start: 2022-09-22 | End: 2022-09-22 | Stop reason: ALTCHOICE

## 2022-09-22 RX ORDER — BUPIVACAINE HYDROCHLORIDE 2.5 MG/ML
INJECTION, SOLUTION EPIDURAL; INFILTRATION; INTRACAUDAL PRN
Status: DISCONTINUED | OUTPATIENT
Start: 2022-09-22 | End: 2022-09-22 | Stop reason: ALTCHOICE

## 2022-09-22 RX ORDER — LIDOCAINE HYDROCHLORIDE 5 MG/ML
INJECTION, SOLUTION INFILTRATION; INTRAVENOUS PRN
Status: DISCONTINUED | OUTPATIENT
Start: 2022-09-22 | End: 2022-09-22 | Stop reason: ALTCHOICE

## 2022-09-22 ASSESSMENT — PAIN - FUNCTIONAL ASSESSMENT: PAIN_FUNCTIONAL_ASSESSMENT: 0-10

## 2022-09-22 NOTE — DISCHARGE INSTRUCTIONS
Calixto Francois Block/Radiofrequency  Home Going Instructions    1-Go home, rest for the remainder of the day  2-Please do not lift over 20 pounds the day of the injection  3-If you received sedation No: alcohol, driving, operating lawn mowers, plows, tractors or other dangerous equipment until next morning. Do not make important decisions or sign legal documents for 24 hours. You may experience light headedness, dizziness, nausea or sleepiness after sedation. Do not stay alone. A responsible adult must be with you for 24 hours. You could be nauseated from the medications you have received. Your IV site may be sore and bruised. 4-No dietary restrictions     5-Resume all medications the same day, blood thinners to be resumed 24 hours after injection if you were instructed to stop any. 6-Keep the surgical site clean and dry, you may shower the next morning and remove the      dressing. 7- No sitz baths, tub baths or hot tubs/swimming for 24 hours. 8- If you have any pain at the injection site(s), application of an ice pack to the area should be       helpful, 20 minutes on/20 minutes off for next 48 hours. 9- Call [facility] immediately at if you develop.   Fever greater than 100.4 F  Have bleeding or drainage from the puncture site  Have progressive Leg/arm numbness and or weakness  Loss of control of bowel and or bladder (wet/soil yourself)  Severe headache with inability to lift head  10-You may return to work the next day

## 2022-09-22 NOTE — H&P
[facility] Pain Management        [address] [facility], [address]  Dept: [phone]    Procedure History & Physical      Debra Thakkar     HPI:    Patient  is here for left hip pain for left hip injection  Labs/imaging studies reviewed   All question and concerns addressed including R/B/A associated with the procedure    Past Medical History:   Diagnosis Date    Asthma     Bell palsy     drooping    CHF (congestive heart failure) ([address]     DDD (degenerative disc disease), cervical     with spinal stenosis    Diabetes mellitus ([address]     DJD (degenerative joint disease)     both hips    GERD without esophagitis     HTN (hypertension)     Hyperlipidemia     Left hip pain     Meningitis 2009    Morbid obesity due to excess calories [address]     Neuropathy     JODI treated with BiPAP     Scoliosis     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[facility])        Past Surgical History:   Procedure Laterality Date    CHOLECYSTECTOMY  2009    CYSTOSCOPY Left 01/14/2018    left stent placement    DILATION AND CURETTAGE OF UTERUS      younger age    LITHOTRIPSY Right 02/15/2018    Cystoscopy;Stent Removal    MARGUERITE-EN-Y GASTRIC BYPASS N/A 5/31/2022    GASTRIC BYPASS MARGUERITE-EN-Y LAPAROSCOPIC performed by Staci Fay MD at [address] [facility] N/A 6/22/2018    EGD BIOPSY performed by Staci Fay MD at [address] [place] [address] 8/20/2021    EGD BIOPSY performed by Staci Fay MD at [place] [address]       Prior to Admission medications    Medication Sig Start Date End Date Taking? Authorizing Provider   lactulose [facility]) 10 GM/15ML solution Take 15 mLs by mouth every evening 9/8/22 10/8/22  Grace Ferguson, DO   gabapentin (NEURONTIN) 600 MG tablet Take 1 tablet by mouth 3 times daily for 30 days. 9/1/22 10/1/22  Grace Ferguson, DO   oxyCODONE-acetaminophen (PERCOCET) 5-325 MG per tablet Take 1 tablet by mouth 3 times daily as needed for Pain for up to 30 days.  Intended ondansetron (ZOFRAN-ODT) 4 MG disintegrating tablet Place 2 tablets under the tongue every 8 hours as needed for Nausea or Vomiting 4/13/22   Ester Gonsales PA-C   nystatin (MYCOSTATIN) 513349 UNIT/GM powder Apply 3 times daily. 4/13/22   Ester Gonsales PA-C   albuterol sulfate HFA (PROAIR HFA) 108 (90 Base) MCG/ACT inhaler Inhale 2 puffs into the lungs every 4 hours as needed for Wheezing or Shortness of Breath 4/13/22   Ester Gonsales PA-C   bumetanide (BUMEX) 1 MG tablet TAKE 1 TABLET BY MOUTH DAILY ALTERNATING WITH 2 TABLETS EVERY OTHER DAY 4/4/22   Ester Gonsales PA-C   cetirizine (ZYRTEC) 10 MG tablet Take 1 tablet by mouth daily 1/3/22   Ester Gonsales PA-C   buPROPion (WELLBUTRIN XL) 150 MG extended release tablet Take 150 mg by mouth as needed   Patient not taking: Reported on 9/22/2022 10/11/21   Historical Provider, MD   clotrimazole-betamethasone (LOTRISONE) 1-0.05 % cream Apply topically 2 times daily.  10/25/21   Ester Gonsales PA-C   Cholecalciferol (VITAMIN D) 50 MCG (2000 UT) CAPS capsule Take 1 capsule by mouth daily 10/25/21   Ester Gonsales PA-C   mometasone-formoterol [facility]) 100-5 MCG/ACT inhaler Inhale 2 puffs into the lungs 2 times daily 6/17/21   Maureen Cordero DO   Continuous Blood Gluc  (FREESTYLE MARY READER) MARIJA 4 Devices by Does not apply route daily 5/14/21   Ester Gonsales PA-C   Continuous Blood Gluc Sensor ([facility]) [facility] 4 Devices by Does not apply route daily 5/14/21   Ester Gonsales PA-C   albuterol (PROVENTIL) (2.5 MG/3ML) 0.083% nebulizer solution Take 3 mLs by nebulization every 6 hours as needed for Wheezing 3/23/21   Ester Gonsales PA-C   Continuous Blood Gluc Sensor (FREESTYLE MARY 2 SENSOR SYSTM) MISC 1 Device by Does not apply route daily 1/8/21   Ester Gonsales PA-C   Multiple Vitamin (DAILY-DOUGLAS) TABS TAKE 1 TABLET BY MOUTH EVERY DAY 6/26/20   Maureen Cordero DO   ipratropium-albuterol (DUONEB) 0.5-2.5 (3) MG/3ML SOLN nebulizer solution Take on file       Family History   Problem Relation Age of Onset    Stroke Mother     Arthritis Mother     Hypertension Mother     Asthma Mother     Fibromyalgia Mother     Cancer Father     Hypertension Father     Heart Attack Father     Asthma Father          REVIEW OF SYSTEMS:    CONSTITUTIONAL:  negative for  fevers, chills, sweats and fatigue    RESPIRATORY:  negative for  dry cough, cough with sputum, dyspnea, wheezing and chest pain    CARDIOVASCULAR:  negative for chest pain, dyspnea, palpitations, syncope    GASTROINTESTINAL:  negative for nausea, vomiting, change in bowel habits, diarrhea, constipation and abdominal pain    MUSCULOSKELETAL: negative for muscle weakness    SKIN: negative for itching or rashes. BEHAVIOR/PSYCH:  negative for poor appetite, increased appetite, decreased sleep and poor concentration    All other systems negative      PHYSICAL EXAM:    VITALS:  BP (!) 169/99   Pulse 97   Temp 98 °F (36.7 °C) (Temporal)   Resp 20   Ht 5' 8\" (1.727 m)   Wt (!) 327 lb (148.3 kg)   SpO2 97%   BMI 49.72 kg/m²     CONSTITUTIONAL:  awake, alert, cooperative, no apparent distress, and appears stated age    EYES: PERRLA, EOMI    LUNGS:  No increased work of breathing, no audible wheezing    CARDIOVASCULAR:  regular rate and rhythm    ABDOMEN:  Soft non tender non distended     EXTREMITIES: no signs of clubbing or cyanosis. MUSCULOSKELETAL: negative for flaccid muscle tone or spastic movements. SKIN: gross examination reveals no signs of rashes, or diaphoresis. NEURO: Cranial nerves II-XII grossly intact. No signs of agitated mood.        Assessment/Plan:    Left hip pain for left hip injection

## 2022-09-22 NOTE — OP NOTE
2022    Patient: Chichi Ryan  :  1975  Age:  41 y.o. Sex:  female     PRE-OPERATIVE DIAGNOSIS: Left Hip osteoarthritis, hip pain. POST-OPERATIVE DIAGNOSIS: Same. PROCEDURE PERFORMED: Left Hip steroid injection under fluoroscopic guidance. SURGEON: BARBRA Torrez. ANESTHESIA: local    ESTIMATED BLOOD LOSS: None. BRIEF HISTORY: Chichi Ryan comes in today for Left hip steroid injection under fluoroscopic guidance. After discussing the potential risks and benefits of the procedure with the patient  Nava Garcia did request that we proceed. A complete History & Physical was reviewed and it is unchanged. DESCRIPTION OF PROCEDURE:     After confirming written and informed consent, a time-out was performed and Moniques name and date of birth, the procedure to be performed as well as the plan for the location of the needle insertion were confirmed. Patient was brought into the procedure room and was placed in the lateral decubitus position on the fluoroscopy table. Standard monitors were placed and vital signs were observed throughout the procedure  The area of the Left hip was prepped with chloraprep and draped in a sterile manner. The overlying skin and subcutaneous tissues were anesthetized with 0.5% lidocaine. At this time, a 22 gauge spinal 3 1/2 inch spinal needle was advanced in the lateral fluoroscopic view until bone was contacted. Then, the PA fluoroscopic view was utilized and the needle was slightly advanced into the hip joint. 1 cc of Isovue-M 200 was injected with appropiate contrast spread under live fluoroscopy. A solution of 0.25 % marcaine 4 cc and 40 mg DepoMedrol was injected easily after negative aspiration without complications. The needle was then removed and Band-Aid applied. Disposition the patient tolerated the procedure well and there were no complications . Vital signs remained stable throughout the procedure.  The patient was escorted to the recovery area where they remained until discharge and written discharge instructions for the procedure were given. Plan: Umberto Holstein will return to our pain management center as scheduled.      Charlotte Washington DO

## 2022-09-24 DIAGNOSIS — I42.9 CARDIOMYOPATHY, UNSPECIFIED TYPE (HCC): ICD-10-CM

## 2022-09-26 RX ORDER — BUMETANIDE 1 MG/1
TABLET ORAL
Qty: 60 TABLET | Refills: 1 | Status: SHIPPED | OUTPATIENT
Start: 2022-09-26

## 2022-09-28 ENCOUNTER — PREP FOR PROCEDURE (OUTPATIENT)
Dept: PAIN MANAGEMENT | Age: 47
End: 2022-09-28

## 2022-09-28 ENCOUNTER — OFFICE VISIT (OUTPATIENT)
Dept: PAIN MANAGEMENT | Age: 47
End: 2022-09-28
Payer: MEDICARE

## 2022-09-28 VITALS
HEIGHT: 68 IN | RESPIRATION RATE: 16 BRPM | WEIGHT: 293 LBS | HEART RATE: 77 BPM | SYSTOLIC BLOOD PRESSURE: 133 MMHG | TEMPERATURE: 97.2 F | DIASTOLIC BLOOD PRESSURE: 85 MMHG | BODY MASS INDEX: 44.41 KG/M2 | OXYGEN SATURATION: 97 %

## 2022-09-28 DIAGNOSIS — M54.16 LUMBAR RADICULOPATHY: ICD-10-CM

## 2022-09-28 DIAGNOSIS — M16.12 PRIMARY OSTEOARTHRITIS OF LEFT HIP: ICD-10-CM

## 2022-09-28 DIAGNOSIS — K59.03 DRUG-INDUCED CONSTIPATION: ICD-10-CM

## 2022-09-28 DIAGNOSIS — M16.0 PRIMARY OSTEOARTHRITIS OF BOTH HIPS: ICD-10-CM

## 2022-09-28 DIAGNOSIS — M54.42 CHRONIC BILATERAL LOW BACK PAIN WITH BILATERAL SCIATICA: ICD-10-CM

## 2022-09-28 DIAGNOSIS — M54.16 LUMBAR RADICULOPATHY: Primary | ICD-10-CM

## 2022-09-28 DIAGNOSIS — G89.29 CHRONIC BILATERAL LOW BACK PAIN WITH BILATERAL SCIATICA: ICD-10-CM

## 2022-09-28 DIAGNOSIS — M48.062 SPINAL STENOSIS OF LUMBAR REGION WITH NEUROGENIC CLAUDICATION: ICD-10-CM

## 2022-09-28 DIAGNOSIS — G89.4 CHRONIC PAIN SYNDROME: Primary | ICD-10-CM

## 2022-09-28 DIAGNOSIS — M54.41 CHRONIC BILATERAL LOW BACK PAIN WITH BILATERAL SCIATICA: ICD-10-CM

## 2022-09-28 DIAGNOSIS — M25.552 LEFT HIP PAIN: ICD-10-CM

## 2022-09-28 PROCEDURE — 99214 OFFICE O/P EST MOD 30 MIN: CPT | Performed by: PAIN MEDICINE

## 2022-09-28 PROCEDURE — G8417 CALC BMI ABV UP PARAM F/U: HCPCS | Performed by: PAIN MEDICINE

## 2022-09-28 PROCEDURE — G8427 DOCREV CUR MEDS BY ELIG CLIN: HCPCS | Performed by: PAIN MEDICINE

## 2022-09-28 PROCEDURE — 4004F PT TOBACCO SCREEN RCVD TLK: CPT | Performed by: PAIN MEDICINE

## 2022-09-28 PROCEDURE — 99214 OFFICE O/P EST MOD 30 MIN: CPT

## 2022-09-28 RX ORDER — GABAPENTIN 600 MG/1
600 TABLET ORAL 3 TIMES DAILY
Qty: 90 TABLET | Refills: 0 | Status: SHIPPED
Start: 2022-09-28 | End: 2022-10-26 | Stop reason: SDUPTHER

## 2022-09-28 RX ORDER — LACTULOSE 10 G/15ML
10 SOLUTION ORAL EVERY EVENING
Qty: 450 ML | Refills: 0 | Status: SHIPPED | OUTPATIENT
Start: 2022-09-28 | End: 2022-10-28

## 2022-09-28 RX ORDER — OXYCODONE HYDROCHLORIDE AND ACETAMINOPHEN 5; 325 MG/1; MG/1
1 TABLET ORAL 3 TIMES DAILY PRN
Qty: 90 TABLET | Refills: 0 | Status: SHIPPED
Start: 2022-10-04 | End: 2022-10-26 | Stop reason: SDUPTHER

## 2022-09-28 NOTE — PROGRESS NOTES
Do you currently have any of the following:    Fever: No  Headache:  No  Cough: No  Shortness of breath: No  Exposed to anyone with these symptoms: No         Liv Epperson presents to the [facility] on 9/28/2022. Murali Pelayo is complaining of pain in her left hip. The pain is constant. The pain is described as aching, dull, sharp, and miserable. Pain is rated on her best day at a 0, on her worst day at a 4, and on average at a 1 on the VAS scale. She took her last dose of Percocet today. Any procedures since your last visit: Yes, with 80 % relief. Pacemaker or defibrillator: No     She is not on NSAIDS and is  on anticoagulation medications to include ASA and is managed by Huber Pike PA-C  . Medication Contract and Consent for Opioid Use Documents Filed       Patient Documents       Type of Document Status Date Received Received By Description    Medication Contract Received 7/1/2019 11:37 AM [facility] CONTRACT DR. VILLANUEVA    Medication Contract Signed 7/24/2019 10:15 AM KALIA BYRD medication contract                    /85   Pulse 77   Temp 97.2 °F (36.2 °C) (Infrared)   Resp 16   Ht 5' 8\" (1.727 m)   Wt (!) 322 lb (146.1 kg)   SpO2 97%   BMI 48.96 kg/m²      No LMP recorded.

## 2022-10-03 ENCOUNTER — HOSPITAL ENCOUNTER (EMERGENCY)
Age: 47
Discharge: HOME | End: 2022-10-03
Payer: MEDICARE

## 2022-10-03 VITALS
OXYGEN SATURATION: 95 % | HEART RATE: 88 BPM | SYSTOLIC BLOOD PRESSURE: 165 MMHG | HEIGHT: 68 IN | RESPIRATION RATE: 16 BRPM | TEMPERATURE: 98 F | WEIGHT: 293 LBS | BODY MASS INDEX: 44.41 KG/M2 | DIASTOLIC BLOOD PRESSURE: 90 MMHG

## 2022-10-03 DIAGNOSIS — L73.2 HYDRADENITIS: ICD-10-CM

## 2022-10-03 DIAGNOSIS — G89.29 OTHER CHRONIC PAIN: Primary | ICD-10-CM

## 2022-10-03 LAB
BACTERIA: ABNORMAL /HPF
BILIRUBIN URINE: ABNORMAL
BLOOD, URINE: NEGATIVE
CLARITY: ABNORMAL
COLOR: YELLOW
EPITHELIAL CELLS, UA: ABNORMAL /HPF
GLUCOSE URINE: NEGATIVE MG/DL
KETONES, URINE: NEGATIVE MG/DL
LEUKOCYTE ESTERASE, URINE: NEGATIVE
NITRITE, URINE: NEGATIVE
PH UA: 6.5 (ref 5–9)
PROTEIN UA: NEGATIVE MG/DL
RBC UA: ABNORMAL /HPF (ref 0–2)
SPECIFIC GRAVITY UA: 1.02 (ref 1–1.03)
UROBILINOGEN, URINE: 1 E.U./DL
WBC UA: ABNORMAL /HPF (ref 0–5)

## 2022-10-03 PROCEDURE — 6370000000 HC RX 637 (ALT 250 FOR IP): Performed by: NURSE PRACTITIONER

## 2022-10-03 PROCEDURE — 99283 EMERGENCY DEPT VISIT LOW MDM: CPT

## 2022-10-03 PROCEDURE — 81001 URINALYSIS AUTO W/SCOPE: CPT

## 2022-10-03 RX ORDER — ONDANSETRON 4 MG/1
4 TABLET, ORALLY DISINTEGRATING ORAL ONCE
Status: COMPLETED | OUTPATIENT
Start: 2022-10-03 | End: 2022-10-03

## 2022-10-03 RX ORDER — CEPHALEXIN 500 MG/1
500 CAPSULE ORAL 4 TIMES DAILY
Qty: 28 CAPSULE | Refills: 0 | Status: SHIPPED | OUTPATIENT
Start: 2022-10-03 | End: 2022-10-10

## 2022-10-03 RX ORDER — CEPHALEXIN 500 MG/1
1000 CAPSULE ORAL ONCE
Status: COMPLETED | OUTPATIENT
Start: 2022-10-03 | End: 2022-10-03

## 2022-10-03 RX ORDER — IBUPROFEN 800 MG/1
800 TABLET ORAL ONCE
Status: COMPLETED | OUTPATIENT
Start: 2022-10-03 | End: 2022-10-03

## 2022-10-03 RX ORDER — OXYCODONE HYDROCHLORIDE AND ACETAMINOPHEN 5; 325 MG/1; MG/1
1 TABLET ORAL ONCE
Status: COMPLETED | OUTPATIENT
Start: 2022-10-03 | End: 2022-10-03

## 2022-10-03 RX ADMIN — IBUPROFEN 800 MG: 800 TABLET, FILM COATED ORAL at 18:16

## 2022-10-03 RX ADMIN — CEPHALEXIN 1000 MG: 500 CAPSULE ORAL at 20:05

## 2022-10-03 RX ADMIN — OXYCODONE AND ACETAMINOPHEN 1 TABLET: 5; 325 TABLET ORAL at 18:16

## 2022-10-03 RX ADMIN — ONDANSETRON 4 MG: 4 TABLET, ORALLY DISINTEGRATING ORAL at 18:17

## 2022-10-03 NOTE — ED PROVIDER NOTES
[address] [facility]  Department of Emergency Medicine   ED  Encounter Note  Admit Date/RoomTime: 10/3/2022  5:33 PM  ED Room: \[facility]\""/[place] [address]    NAME: Jacinto Mims  : 1975  MRN: [id number]     Chief Complaint:  Back Pain, Hip Pain (Left more than right. ), Facial Pain, and Nasal Congestion    History of Present Illness        Jacinto Mims is a 41 y.o. old female with a prior history of chronic back pain, peripheral neuropathy, gastric bypass surgery in May, CHF, arthritis, hypertension, prediabetes, high cholesterol and hydradenitis . She presents to the ER today with complaints of worsening chronic pain and nausea. She says that she has been experiencing severe low back pain, bilateral lower extremity pain and bilateral hip pain that got worse last night. She states that she has been dealing with chronic pain for a long time. She has establish care with pain management. Her last Percocet prescription was on 2022 for a 30-day supply. She denies headaches, dizziness, fevers, chest pain, shortness of breath, vomiting, diarrhea. Her last pain management office visit was on 2022 for a follow-up of a left hip steroid injection done under fluoroscopic guidance. Per the pain management note, her pain was better. Pain management note confirms that patient is nonfunctional and spends a lot of her time in a wheelchair due to difficulty getting around. She states that her left hip was hurting worse and she believes she is having a flareup of her chronic hidradenitis within her abdominal skin folds. She has been dealing with this for some time and stated that when she was here last and treated for a UTI, the antibiotics they prescribed her helped with this pain as well. ROS   Pertinent positives and negatives are stated within HPI, all other systems reviewed and are negative.     Past Medical History:  has a past medical history of Asthma, Bell palsy, CHF (congestive heart failure) ([facility] DDD (degenerative disc disease), cervical, Diabetes mellitus ([address] DJD (degenerative joint disease), GERD without esophagitis, HTN (hypertension), Hyperlipidemia, Left hip pain, Meningitis, Morbid obesity due to excess calories ([facility] Neuropathy, JODI treated with BiPAP, Scoliosis, and 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[facility] Surgical History:  has a past surgical history that includes Cystocopy (Left, 01/14/2018); Cholecystectomy (2009); Lithotripsy (Right, 02/15/2018); Upper gastrointestinal endoscopy (N/A, 6/22/2018); Dilation and curettage of uterus; Upper gastrointestinal endoscopy (N/A, 8/20/2021); Knedall-en-Y Gastric Bypass (N/A, 5/31/2022); and hip surgery (Left, 9/22/2022). Social History:  reports that she has been smoking cigarettes. She started smoking about 21 years ago. She has a 4.25 pack-year smoking history. She has never used smokeless tobacco. She reports that she does not drink alcohol and does not use drugs. Family History: family history includes Arthritis in her mother; Asthma in her father and mother; Cancer in her father; Fibromyalgia in her mother; Heart Attack in her father; Hypertension in her father and mother; Stroke in her mother. Allergies: Food, Ultram [tramadol hcl], Fish-derived products, Norco [hydrocodone-acetaminophen], Nuts [peanut-containing drug products], Pcn [penicillins], and Cashews [macadamia nut oil]    Physical Exam   Oxygen Saturation Interpretation: Normal.        ED Triage Vitals [10/03/22 1731]   BP Temp Temp Source Heart Rate Resp SpO2 Height Weight   (!) 151/113 98 °F (36.7 °C) Temporal 95 16 95 % 5' 8\" (1.727 m) (!) 322 lb (146.1 kg)         Constitutional:  Alert, development consistent with age. HEENT:  NC/NT. Airway patent. Neck:  Normal ROM. Supple.   Respiratory:  Clear to auscultation and breath sounds equal.  Regular, nonlabored  CV:  Regular rate and rhythm, normal heart sounds, without pathological murmurs, ectopy, gallops, or rubs. GI:  Abdomen Soft, nontender, good bowel sounds. No firm or pulsatile mass. Back: middle and lower thoracic spine and lumbar spine bilateral and central.             Tenderness: Mild. Swelling: no.              Range of Motion: diminished range with pain. CVA Tenderness: No CVA tenderness. Straight leg raising: Unable to assess due to patient's immobility            Skin:  no wounds, erythema, or swelling. Distal Function:              Motor deficit: none. Sensory deficit: none. Exam somewhat skewed due to severe peripheral neuropathy            Pulse deficit: none. Calf Tenderness:  No Bilateral.               Edema:  Trace BLE Both lower extremity(s). Gait:  ambulates with need of a cane. Integument:  Normal turgor. Warm, dry, without visible rash. Draining wound to left groin region. Patient states these wounds are reoccurring. Lymphatics: No lymphangitis or adenopathy noted. Neurological:  Oriented. Motor functions intact. Lab / Imaging Results   (All laboratory and radiology results have been personally reviewed by myself)  Labs:  Results for orders placed or performed during the hospital encounter of 10/03/22   Urinalysis with Microscopic   Result Value Ref Range    Color, UA Yellow Straw/Yellow    Clarity, UA SL CLOUDY Clear    Glucose, Ur Negative Negative mg/dL    Bilirubin Urine SMALL (A) Negative    Ketones, Urine Negative Negative mg/dL    Specific Gravity, UA 1.020 1.005 - 1.030    Blood, Urine Negative Negative    pH, UA 6.5 5.0 - 9.0    Protein, UA Negative Negative mg/dL    Urobilinogen, Urine 1.0 <2.0 E.U./dL    Nitrite, Urine Negative Negative    Leukocyte Esterase, Urine Negative Negative    WBC, UA 0-1 0 - 5 /HPF    RBC, UA NONE 0 - 2 /HPF    Epithelial Cells, UA MODERATE /HPF    Bacteria, UA MODERATE (A) None Seen /HPF       Imaging:   All Radiology results interpreted by Radiologist unless otherwise noted. No orders to display       ED Course / Medical Decision Making     Medications   oxyCODONE-acetaminophen (PERCOCET) 5-325 MG per tablet 1 tablet (1 tablet Oral Given 10/3/22 1816)   ibuprofen (ADVIL;MOTRIN) tablet 800 mg (800 mg Oral Given 10/3/22 1816)   ondansetron (ZOFRAN-ODT) disintegrating tablet 4 mg (4 mg Oral Given 10/3/22 1817)   cephALEXin (KEFLEX) capsule 1,000 mg (1,000 mg Oral Given 10/3/22 2005)        Re-examination:  10/3/22       Time: 730pm   Patients condition is improving after treatment. States her pain is slightly better. Said she still has percocet and Neurontin at home. Consult(s):   None    Procedure(s):   none    Medical Decision Making/Counseling:    Patient presented to the ER today with complaints of chronic pain including her low back, bilateral hips and bilateral lower extremities. She is established with pain management and sees them routinely. She states that the pain got worse overnight. She has had some associated nausea. She denies fevers, chills, vomiting, diarrhea, abdominal pain, chest pain, dizziness, headaches, shortness of breath. She has a difficult time getting around and is baseline in a wheelchair due to her chronic pain. She states that her urine was \"foul-smelling\". The UA that was performed today was negative for any signs of a urinary tract infection. She was also concerned about her chronic hidradenitis that she has been dealing with for some time. On evaluation she had a draining wound in her left groin. She states that when she was on antibiotics for her last UTI, her pain improved because she felt antibiotics were helping clear up her hidradenitis. She was discharged home and encouraged to continue seeing pain management. A prescription for Keflex was also prescribed at time of discharge for her wound. She was medicated with 1 dose here prior to discharge.   SHe states that she took Keflex in the past.  I encouraged her to follow-up with her primary care doctor tomorrow and to return to the ER for any worsening symptoms. Assessment      1. Other chronic pain    2. Hydradenitis      Plan   Discharged home. Patient condition is stable    New Medications     New Prescriptions    CEPHALEXIN (KEFLEX) 500 MG CAPSULE    Take 1 capsule by mouth 4 times daily for 7 days     Electronically signed by SANJUANA Cortes CNP   DD: 10/3/22  **This report was transcribed using voice recognition software. Every effort was made to ensure accuracy; however, inadvertent computerized transcription errors may be present.   END OF ED PROVIDER NOTE      SANJUANA Cortes CNP  10/03/22 2030

## 2022-10-03 NOTE — DISCHARGE INSTRUCTIONS
Take all of the antibiotics as prescribed. Call your PCP tomorrow to discuss further management of the hidradenitis  Return to the ER for any worsening symptoms.   Continue to work with pain management for the chronic pain

## 2022-10-09 DIAGNOSIS — K59.03 DRUG-INDUCED CONSTIPATION: ICD-10-CM

## 2022-10-09 DIAGNOSIS — E11.9 TYPE 2 DIABETES MELLITUS WITHOUT COMPLICATION, WITHOUT LONG-TERM CURRENT USE OF INSULIN (HCC): ICD-10-CM

## 2022-10-10 RX ORDER — LACTULOSE 10 G/15ML
SOLUTION ORAL; RECTAL
Qty: 450 ML | OUTPATIENT
Start: 2022-10-10

## 2022-10-11 RX ORDER — DULAGLUTIDE 0.75 MG/.5ML
INJECTION, SOLUTION SUBCUTANEOUS
Qty: 2 ML | Refills: 2 | Status: SHIPPED | OUTPATIENT
Start: 2022-10-11

## 2022-10-20 ENCOUNTER — PREP FOR PROCEDURE (OUTPATIENT)
Dept: PAIN MANAGEMENT | Age: 47
End: 2022-10-20

## 2022-10-20 ENCOUNTER — TELEPHONE (OUTPATIENT)
Dept: PAIN MANAGEMENT | Age: 47
End: 2022-10-20

## 2022-10-20 NOTE — TELEPHONE ENCOUNTER
Call to Jodie Nogueira that procedure was approved for 10/27/2022 and that Bertalori should call her a few days before for the pre op call and between 2:00 PM and 4:00 PM  the business day before with the arrival time. Instructed Monique to hold ibuprofen for 24 hours, naprosyn for 4 days and any aspirin containing products or fish oil for 7 days. Instructed to call office back if any questions. Daniel Otto verbalized understanding.     Electronically signed by Brandin Tesfaye RN on 10/20/2022 at 1:48 PM

## 2022-10-23 DIAGNOSIS — E78.5 HYPERLIPIDEMIA LDL GOAL <70: ICD-10-CM

## 2022-10-23 DIAGNOSIS — I42.9 CARDIOMYOPATHY, UNSPECIFIED TYPE (HCC): ICD-10-CM

## 2022-10-23 DIAGNOSIS — J30.2 SEASONAL ALLERGIES: ICD-10-CM

## 2022-10-23 DIAGNOSIS — J45.20 MILD INTERMITTENT ASTHMA WITHOUT COMPLICATION: ICD-10-CM

## 2022-10-23 DIAGNOSIS — K59.03 DRUG-INDUCED CONSTIPATION: ICD-10-CM

## 2022-10-23 DIAGNOSIS — F41.9 ANXIETY: ICD-10-CM

## 2022-10-24 RX ORDER — LACTULOSE 10 G/15ML
SOLUTION ORAL
Qty: 473 ML | OUTPATIENT
Start: 2022-10-24

## 2022-10-24 RX ORDER — FLUTICASONE PROPIONATE 50 MCG
SPRAY, SUSPENSION (ML) NASAL
Qty: 16 G | Refills: 2 | Status: SHIPPED | OUTPATIENT
Start: 2022-10-24

## 2022-10-24 RX ORDER — ATORVASTATIN CALCIUM 80 MG/1
TABLET, FILM COATED ORAL
Qty: 30 TABLET | Refills: 3 | Status: SHIPPED | OUTPATIENT
Start: 2022-10-24

## 2022-10-24 RX ORDER — CITALOPRAM 40 MG/1
40 TABLET ORAL DAILY
Qty: 30 TABLET | Refills: 5 | Status: SHIPPED | OUTPATIENT
Start: 2022-10-24

## 2022-10-24 RX ORDER — METOPROLOL SUCCINATE 100 MG/1
TABLET, EXTENDED RELEASE ORAL
Qty: 60 TABLET | Refills: 3 | Status: SHIPPED | OUTPATIENT
Start: 2022-10-24

## 2022-10-24 RX ORDER — CETIRIZINE HYDROCHLORIDE 10 MG/1
10 TABLET ORAL DAILY
Qty: 30 TABLET | Refills: 3 | Status: SHIPPED | OUTPATIENT
Start: 2022-10-24

## 2022-10-25 RX ORDER — LANOLIN ALCOHOL/MO/W.PET/CERES
1000 CREAM (GRAM) TOPICAL DAILY
COMMUNITY

## 2022-10-25 NOTE — PROGRESS NOTES
[facility]  INSTRUCTIONS  . .......................................................................................................................................... [x] Parking the day of Surgery is located in the [facility].   Upon entering the door, make immediate right into the surgery reception room    [x]  Bring photo ID and insurance card     [x] You may have a light breakfast day of procedure    [x]  Wear loose comfortable clothing    [x]  Please follow instructions for medications as given per Dr's office    [x] You can expect a call the business day prior to procedure to notify you of your arrival time     [x] Please arrange for     []  Other instructions

## 2022-10-26 ENCOUNTER — OFFICE VISIT (OUTPATIENT)
Dept: PAIN MANAGEMENT | Age: 47
End: 2022-10-26
Payer: MEDICARE

## 2022-10-26 VITALS
SYSTOLIC BLOOD PRESSURE: 156 MMHG | HEIGHT: 68 IN | RESPIRATION RATE: 16 BRPM | HEART RATE: 42 BPM | DIASTOLIC BLOOD PRESSURE: 90 MMHG | BODY MASS INDEX: 44.41 KG/M2 | WEIGHT: 293 LBS | OXYGEN SATURATION: 90 % | TEMPERATURE: 97.7 F

## 2022-10-26 DIAGNOSIS — M54.42 CHRONIC BILATERAL LOW BACK PAIN WITH BILATERAL SCIATICA: ICD-10-CM

## 2022-10-26 DIAGNOSIS — M25.552 LEFT HIP PAIN: ICD-10-CM

## 2022-10-26 DIAGNOSIS — M16.0 PRIMARY OSTEOARTHRITIS OF BOTH HIPS: ICD-10-CM

## 2022-10-26 DIAGNOSIS — G89.29 CHRONIC BILATERAL LOW BACK PAIN WITH BILATERAL SCIATICA: ICD-10-CM

## 2022-10-26 DIAGNOSIS — M54.41 CHRONIC BILATERAL LOW BACK PAIN WITH BILATERAL SCIATICA: ICD-10-CM

## 2022-10-26 DIAGNOSIS — M16.12 PRIMARY OSTEOARTHRITIS OF LEFT HIP: ICD-10-CM

## 2022-10-26 DIAGNOSIS — G89.4 CHRONIC PAIN SYNDROME: Primary | ICD-10-CM

## 2022-10-26 DIAGNOSIS — M54.16 LUMBAR RADICULOPATHY: ICD-10-CM

## 2022-10-26 DIAGNOSIS — M48.062 SPINAL STENOSIS OF LUMBAR REGION WITH NEUROGENIC CLAUDICATION: ICD-10-CM

## 2022-10-26 PROCEDURE — 3078F DIAST BP <80 MM HG: CPT | Performed by: PHYSICIAN ASSISTANT

## 2022-10-26 PROCEDURE — 3074F SYST BP LT 130 MM HG: CPT | Performed by: PHYSICIAN ASSISTANT

## 2022-10-26 PROCEDURE — 99213 OFFICE O/P EST LOW 20 MIN: CPT | Performed by: PHYSICIAN ASSISTANT

## 2022-10-26 PROCEDURE — G8417 CALC BMI ABV UP PARAM F/U: HCPCS | Performed by: PHYSICIAN ASSISTANT

## 2022-10-26 PROCEDURE — G8427 DOCREV CUR MEDS BY ELIG CLIN: HCPCS | Performed by: PHYSICIAN ASSISTANT

## 2022-10-26 PROCEDURE — G8484 FLU IMMUNIZE NO ADMIN: HCPCS | Performed by: PHYSICIAN ASSISTANT

## 2022-10-26 PROCEDURE — 4004F PT TOBACCO SCREEN RCVD TLK: CPT | Performed by: PHYSICIAN ASSISTANT

## 2022-10-26 RX ORDER — GABAPENTIN 600 MG/1
600 TABLET ORAL 3 TIMES DAILY
Qty: 90 TABLET | Refills: 0 | Status: SHIPPED
Start: 2022-10-26 | End: 2022-11-17 | Stop reason: SDUPTHER

## 2022-10-26 RX ORDER — OXYCODONE HYDROCHLORIDE AND ACETAMINOPHEN 5; 325 MG/1; MG/1
1 TABLET ORAL 3 TIMES DAILY PRN
Qty: 90 TABLET | Refills: 0 | Status: SHIPPED
Start: 2022-11-03 | End: 2022-11-17 | Stop reason: SDUPTHER

## 2022-10-26 NOTE — PROGRESS NOTES
Monique's blood pressure was elevated today. She was instructed to contact his primary care provider as soon as possible.

## 2022-10-26 NOTE — PROGRESS NOTES
Do you currently have any of the following:    Fever: No  Headache:  No  Cough: No  Shortness of breath: No  Exposed to anyone with these symptoms: No         Angelia Marcial presents to the [facility] on 10[facility]26[facility]22. Aracleis Grandchild is complaining of pain in her right shoulder and low back. The pain is constant. The pain is described as aching, dull, sharp, and miserable. Pain is rated on her best day at a 7, on her worst day at a 10, and on average at a 8 on the VAS scale. She took her last dose of Percocet today. Any procedures since your last visit: No    Pacemaker or defibrillator: No    She is not on NSAIDS and is  on anticoagulation medications to include ASA and is managed by Bakari Parsons PA-C  . Medication Contract and Consent for Opioid Use Documents Filed       Patient Documents       Type of Document Status Date Received Received By Description    Medication Contract Received 7/1/2019 11:37 AM [facility] PAIN MGMT CONTRACT DR. VILLANUEVA    Medication Contract Signed 7/24/2019 10:15 AM KALIA BYRD medication contract                    BP (!) 156/90   Pulse (!) 42   Temp 97.7 °F (36.5 °C) (Infrared)   Resp 16   Ht 5' 8\" (1.727 m)   Wt (!) 322 lb (146.1 kg)   LMP 09/28/2022   SpO2 90%   BMI 48.96 kg/m²      Patient's last menstrual period was 09/28/2022.

## 2022-10-26 NOTE — PROGRESS NOTES
[facility]  [address]  [facility]    Follow up Note      Davon Bird     Date of Visit:  10/26/2022    CC:  Patient presents for follow up   Chief Complaint   Patient presents with    Follow-up     Right shoulder pain and low back pain        HPI:    Pain is worse to left hip. Injection only helped about 1.5 weeks. Appropriate analgesia with current medications regimen: yes. Change in quality of symptoms:no. Medication side effects:none. Recent diagnostic testing:none. Recent interventional procedures:none. She has been on anticoagulation medications to include ASA and has not been on herbal supplements. She is diabetic. Imaging:   10/2021 lumbar MRI -      BONES/ALIGNMENT: There is normal alignment of the spine. Mild-to-moderate   levoscoliosis of the lumbar spine is again noted. The vertebral body heights   are maintained. There is spurring and disc desiccation at multiple levels   with mild-to-moderate disc space narrowing at L1-2, L2-3, L3-4 and L4-5. Slight subchondral signal change is seen, most notably at L2-3 and L4-5. This is most likely related to degenerative disc disease. Otherwise, the   bone marrow signal appears unremarkable. SPINAL CORD: The conus terminates normally. SOFT TISSUES: No paraspinal mass identified. The spinal canal is somewhat congenitally narrowed. L1-L2: There is disc bulge and severe right and moderate left posterior facet   degenerative change with ligamentum flavum hypertrophy causing moderate   central canal stenosis. There is mild right and moderate left neural   foraminal narrowing. L2-L3: There is a disc bulge and severe bilateral posterior facet   degenerative change with ligamentum flavum hypertrophy causing severe central   canal stenosis. There is right lateral disc protrusion causing severe right   neural foraminal narrowing.   There is moderate left neural foraminal narrowing. In addition, there is a central and left paramedian disc   herniation with inferior extension of small extruded fragment behind the L3   vertebral body causing severe left lateral recess stenosis. L3-L4: There is disc bulge and severe bilateral posterior facet degenerative   change and ligamentum flavum hypertrophy causing severe central canal   stenosis. There is severe bilateral neural foraminal stenosis. The findings   at L2-3 and L3-4 are worse compared to the prior study. L4-L5: There is mild disc bulge with severe left and moderate right posterior   facet degenerative change with ligamentum flavum hypertrophy. This causes   moderate left subarticular recess stenosis. No significant central canal   stenosis is seen. There is severe bilateral neural foraminal narrowing. L5-S1: There is disc bulge and moderate to severe bilateral posterior facet   degenerative change, greater on the left with superimposed right   posterolateral and lateral disc protrusion causing severe right subarticular   recess stenosis. There is also severe bilateral neural foraminal narrowing. No significant central canal stenosis seen. Impression   1. Advanced degenerative change with mild to moderate levoscoliosis. 2. Multilevel central canal stenosis, including severe central canal stenosis   at L2-3 and L3-4 and moderate stenosis at L1-2. Moderate left subarticular   recess stenosis at L4-5 and severe right subarticular recess stenosis at   L5-S1.   3. Central and left paramedian disc herniation at L2-3 with inferiorly   extruded disc fragment causing severe left lateral recess stenosis. 4. Multilevel bilateral neural foraminal stenosis including severe foraminal   stenosis on the left at L2-3 and bilaterally at L3-4, L4-5 and L5-S1.   5. Overall, the findings are worse compared to the prior study.    6.  The findings were sent to the Radiology Results Communication Center at   11:57 am on 10/4/2021to be communicated to a licensed caregiver. 2021 xray L hip -  FINDINGS:   Demonstrated is joint space narrowing with marginal acetabular are   osteophyte. A femoral head maintains normal contour. No acute fracture identified. Bony pelvis is intact. Enthesopathy noted at   the the iliac wings           Impression   Moderate to severe DJD      Xray pelvis 3/2019 -      1. No evidence of acute fracture or hip dislocation. 2. Mild osteoarthritis involving both hips but with prominent bridging   of the superior acetabulum. This can contribute to impingement. 3. At least moderately severe degenerative changes in the lower lumbar   spine. CT lumbar 3/2019 -   The study is limited by the patient's body habitus which degrades the   imaging quality. No acute fracture or dislocation is seen. Moderate loss of intervertebral disc height is seen throughout the   visualized spine. Moderate to severe bilateral neural foraminal   narrowing is seen at L3-L4, L4-L5 and L5-S1 due to large vertebral   body and facet joint osteophytes. Mild levoconvex scoliosis of the   lumbar spine is seen. Moderate degenerative changes with periarticular osteophytes and loss   of joint space are seen within the sacroiliac joints      Lumbar MRI 2018 -   Findings: There is normal sagittal alignment visualized lumbar spine. No STIR   hyperintensity to suggest an acute fracture or ligamentous injury is   noted. Limited evaluation secondary to technique. Disc desiccation   throughout the visualized lumbar intervertebral discs. Bone marrow   signal is relatively homogeneous throughout. Visualized portions of   the kidneys and aorta are grossly unremarkable although limited in   evaluation. Sagittal imaging only T11-T12: Mild to moderate circumferential disc   bulge. Moderate spinal canal stenosis is noted on this limited study. Moderate to moderately severe bilateral neural foraminal narrowing. Limited evaluation secondary to sagittal imaging only. Sagittal imaging only T12-L1: No evidence of posterior disc contour   abnormality, spinal canal stenosis, neural foraminal narrowing or   spinal nerve root abutment/impingement. .       L1-L2: Large circumferential disc bulge. Thecal sac measures   approximately 0.83 cm anterior posterior dimension of the central   spinal canal. This is secondary to a combination of circumferential   disc bulge and posterior epidural lipomatosis. Moderately severe left   and right neural foraminal narrowing with suspected   abutment/impingement exiting right L1 spinal nerve root. Moderate   bilateral facet osteoarthropathy. Underlying crowding/compression of   the cauda equina nerve roots. L2-L3: Moderate circumferential disc bulge. Moderate bilateral facet   osteoarthropathy. Moderate to moderately severe left and right neural   foraminal narrowing. There is abutment of the exiting left L2 spinal   nerve root. Thecal sac measures approximately 0.92 cm anterior   posterior dimension and central spinal canal. There is questionable   abnormal appearance of the cauda equina nerve roots in this region. L3-L4: Severe circumferential disc bulge with a centrally located   slight inferior directed disc extrusion. Severe facet   osteoarthropathy. Moderate spinal canal stenosis. Severe left and   right neural foraminal narrowing. Abutment/impingement exiting   bilateral L3 spinal nerve roots. Moderately severe thecal sac neural   foraminal narrowing and spinal canal stenosis secondary to a   combination of the facet osteoarthropathy and posterior epidural   hematoma stenosis with crowding and compression of cauda equina nerve   roots. L4-L5: Moderately large circumferential disc bulge. Severe bilateral   facet osteoarthropathy.  Severe left and moderately severe right neural   foraminal narrowing with abutment/impingement of exiting bilateral L4   spinal nerve roots. Extensive facet osteoarthropathy on the left   obliterates the neural foramen. No marco spinal canal stenosis is   seen. L5-S1: Severe bilateral facet osteoarthropathy. Moderate   circumferential disc bulge. Severe left and right neural foraminal   narrowing. Abutment/impingement exiting bilateral L5 spinal nerve   roots. No marco spinal canal stenosis. 1. Multilevel degenerative disc disease and facet osteoarthropathy   T11-T12 and L1-S1. Abutment/impingement exiting right L1, exiting left   L2, exiting bilateral L3, exiting bilateral L4 and exiting bilateral   L5 spinal nerve roots as described above. Severe bilateral neural   foraminal narrowing at L3-L4 and L5-S1 with severe left and moderately   severe right neural foraminal narrowing at L4-L5. Moderate spinal   canal stenosis at L3-L4 and narrowing of the thecal sac at L2-L3. 2. There is a questionable abnormal periods of spinal nerve roots at   the L2-L3 motion segment levels of indeterminate etiology and   significance, clinical correlation for any for infectious parameters   recommended. Consider further evaluation with pre and postcontrast   lumbar spine for further investigation with consideration given to   patient's renal status and any potential safety or allergenic   concerns. 3. Crowding/compression of cauda equina nerve roots greatest at L3-L4   but to a lesser degree at L1-L2. Potential Aberrant Drug-Related Behavior: no     Urine Drug Screenin2022 consistent with OARRS     OARRS report:  2022 consistent to 10/2022 consistent    Opioid Agreement:  Renewal date: to be filled out today.     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 [address] 6/22/2018    EGD BIOPSY performed by Jam Purvis MD at [address] [facility] [address] 8/20/2021    EGD BIOPSY performed by Jam Purvis MD at [place] [address]       Prior to Admission medications    Medication Sig Start Date End Date Taking? Authorizing Provider   Misc. Devices (ROLLER WALKER) MISC 1 each by Does not apply route daily Wheeled walker with seat. 10/26/22  Yes GREGORIO Ramirez   oxyCODONE-acetaminophen (PERCOCET) 5-325 MG per tablet Take 1 tablet by mouth 3 times daily as needed for Pain for up to 30 days. Intended supply: 30 days. Take lowest dose possible to manage pain 11/3/22 12/3/22 Yes GREGORIO Ramirez   gabapentin (NEURONTIN) 600 MG tablet Take 1 tablet by mouth 3 times daily for 30 days.  10/26/22 11/25/22 Yes GREGORIO Ramirez   Ferrous Sulfate (IRON PO) Take by mouth   Yes Historical Provider, MD   vitamin B-12 (CYANOCOBALAMIN) 1000 MCG tablet Take 1,000 mcg by mouth daily   Yes Historical Provider, MD   citalopram (CELEXA) 40 MG tablet TAKE 1 TABLET BY MOUTH DAILY  Patient taking differently: Take 40 mg by mouth daily as needed (anxiety) 10/24/22  Yes Mauricio Soria PA-C   fluticasone (FLONASE) 50 MCG/ACT nasal spray SHAKE LIQUID AND USE 2 SPRAYS IN [facility] NOSTRIL DAILY 10/24/22  Yes Claire Staples PA-C   cetirizine (ZYRTEC) 10 MG tablet TAKE 1 TABLET BY MOUTH DAILY 10/24/22  Yes Claire Staples PA-C   metoprolol succinate (TOPROL XL) 100 MG extended release tablet TAKE 1 TABLET BY MOUTH TWICE DAILY 10/24/22  Yes Claire Staples PA-C   atorvastatin (LIPITOR) 80 MG tablet TAKE 1 TABLET BY MOUTH EVERY NIGHT 10/24/22  Yes Claire Staples PA-C   TRULICITY 5.83 SL/8.7KF SOPN INJECT 0.75MG INTO THE SKIN ONCE A WEEK ON MONDAYS 10/11/22  Yes Claire Staples PA-C   bumetanide (BUMEX) 1 MG tablet TAKE 1 TABLET BY MOUTH EVERY OTHER DAY ALTERNATING WITH 2 TABLETS EVERY OTHER DAY 9/26/22  Yes Claire Staples PA-C   pantoprazole (PROTONIX) 20 MG tablet TAKE 1 TABLET BY MOUTH DAILY 8/11/22  Yes Claire Staples PA-C   TRULICITY 3.20 EL/0.6TG SOPN ADMINISTER 0.75 MG UNDER THE SKIN 1 TIME A WEEK 7/26/22  Yes Claire Staples PA-C   hydrALAZINE (APRESOLINE) 50 MG tablet TAKE 1 TABLET BY MOUTH EVERY 8 HOURS 7/25/22  Yes Claire Staples PA-C   isosorbide dinitrate (ISORDIL) 20 MG tablet TAKE 1 TABLET BY MOUTH THREE TIMES DAILY 6/29/22  Yes Thu Curran MD   spironolactone (ALDACTONE) 25 MG tablet TAKE 1 TABLET BY MOUTH EVERY DAY 6/13/22  Yes Claier Staples PA-C   sacubitril-valsartan (ENTRESTO)  MG per tablet Take 1 tablet by mouth 2 times daily 6/13/22  Yes Claire Staples PA-C   mineral oil-hydrophilic petrolatum (AQUAPHOR) ointment Apply topically as needed.  6/13/22  Yes Claire Staples PA-C   omeprazole (PRILOSEC) 20 MG delayed release capsule Take 1 capsule by mouth Daily 5/18/22 5/18/23 Yes Dawson Gibbons MD   potassium chloride (KLOR-CON M) 20 MEQ extended release tablet TAKE 1 TABLET BY MOUTH DAILY WITH BREAKFAST 4/28/22  Yes Claire Staples PA-C   ondansetron (ZOFRAN-ODT) 4 MG disintegrating tablet Place 2 tablets under the tongue every 8 hours as needed for Nausea or Vomiting 4/13/22  Yes Claire Staples PA-C   albuterol sulfate HFA (PROAIR HFA) 108 (90 Base) MCG/ACT inhaler Inhale 2 puffs into the lungs every 4 hours as needed for Wheezing or Shortness of Breath 4/13/22  Yes Collin Vaughan PA-C   Cholecalciferol (VITAMIN D) 50 MCG (2000 UT) CAPS capsule Take 1 capsule by mouth daily 10/25/21  Yes Collin Vaughan PA-C   mometasone-formoterol [facility]) 100-5 MCG/ACT inhaler Inhale 2 puffs into the lungs 2 times daily 6/17/21  Yes Azucena Han DO   Continuous Blood Gluc  (FREESTYLE MARY READER) MARIJA 4 Devices by Does not apply route daily 5/14/21  Yes Collin Vaughan PA-C   Continuous Blood Gluc Sensor ([address] [place] [address]) [address] [facility] 4 Devices by Does not apply route daily 5/14/21  Yes Collin Vaughan PA-C   albuterol (PROVENTIL) (2.5 MG/3ML) 0.083% nebulizer solution Take 3 mLs by nebulization every 6 hours as needed for Wheezing 3/23/21  Yes Collin Vaughan PA-C   Continuous Blood Gluc Sensor (FREESTYLE MARY 2 SENSOR SYSTM) MISC 1 Device by Does not apply route daily 1/8/21  Yes Collin Vaughan PA-C   Multiple Vitamin (DAILY-DOUGLAS) TABS TAKE 1 TABLET BY MOUTH EVERY DAY 6/26/20  Yes Azucena Han,    ipratropium-albuterol (DUONEB) 0.5-2.5 (3) MG/3ML SOLN nebulizer solution Take 3 mLs by nebulization every 4 hours as needed for Shortness of Breath 5/1/20  Yes Tracy Khan MD   ONE TOUCH ULTRA TEST strip 1 each by Does not apply route daily 9/17/19  Yes Collin Vaughan PA-C   Handicap Placard [address] [facility] by Does not apply route Patient cannot walk 200 ft without stopping to rest.    Expiration 5 yrs 2/22/19  Yes Collin Vaughan PA-C   lactulose [facility]) 10 GM/15ML solution Take 15 mLs by mouth every evening  Patient not taking: Reported on 10/26/2022 9/28/22 10/28/22  Kenneth Aleman, DO   Methylnaltrexone Bromide 150 MG TABS Take 450 mg by mouth every morning 9/1/22 10/1/22  Kenneth Aleman DO   aspirin 81 MG chewable tablet Take 1 tablet by mouth daily  Patient not taking: Reported on 10/26/2022 6/13/22   Collin Vaughan PA-C       Allergies   Allergen Reactions    Food Anaphylaxis     Food allergy - Fresh Water Fish, [place] and Tree Nuts    Ultram [Tramadol Hcl]      Per pt had a seizure    Fish-Derived Products     Norco [Hydrocodone-Acetaminophen] Hives    Pcn [Penicillins]      Not known    Cashews [Macadamia Nut Oil] Nausea And Vomiting       Social History     Socioeconomic History    Marital status: Legally      Spouse name: Not on file    Number of children: Not on file    Years of education: Not on file    Highest education level: Not on file   Occupational History    Occupation: direct care staff/counselor   Tobacco Use    Smoking status: Some Days     Packs/day: 0.25     Years: 17.00     Pack years: 4.25     Types: Cigarettes     Start date: 3/27/2001     Last attempt to quit: 10/5/2018     Years since quittin.0    Smokeless tobacco: Never   Vaping Use    Vaping Use: Former   Substance and Sexual Activity    Alcohol use: No    Drug use: No    Sexual activity: Not on file   Other Topics Concern    Not on file   Social History Narrative    Not on file     Social Determinants of Health     Financial Resource Strain: Not on file   Food Insecurity: Not on file   Transportation Needs: Not on file   Physical Activity: Not on file   Stress: Not on file   Social Connections: Not on file   Intimate Partner Violence: Not on file   Housing Stability: Not on file       Family History   Problem Relation Age of Onset    Stroke Mother     Arthritis Mother     Hypertension Mother     Asthma Mother     Fibromyalgia Mother     Cancer Father     Hypertension Father     Heart Attack Father     Asthma Father        REVIEW OF SYSTEMS:     Sushant Garay denies fever/chills, chest pain, shortness of breath, new bowel or bladder complaints. All other review of systems was negative.     PHYSICAL EXAMINATION:      BP (!) 156/90   Pulse (!) 42   Temp 97.7 °F (36.5 °C) (Infrared)   Resp 16   Ht 5' 8\" (1.727 m)   Wt (!) 322 lb (146.1 kg)   LMP 2022   SpO2 90%   BMI 48.96 kg/m² per patient request  RF gabapentin 600 mg TID #90  RF percocet 5/325 #90 - no increases  No RF lactulose  Aqua therapy - Has stopped due to pain. States that she knows that she needs to go back. L hip injection with 80% relief x 1.5 weeks only. Consider referral to ortho, however, she would like to hold off. B/l L5 TFESI - scheduled for tomorrow. Patient encouraged to stay active and to lose weight, encouraged to continue with bariatric program  Treatment plan discussed with the patient including medication and procedure side effects    Controlled Substance Monitoring:    Acute and Chronic Pain Monitoring:   RX Monitoring 10/26/2022   Attestation -   Acute Pain Prescriptions -   Periodic Controlled Substance Monitoring Possible medication side effects, risk of tolerance/dependence & alternative treatments discussed. ;No signs of potential drug abuse or diversion identified. ;Assessed functional status. ;Obtaining appropriate analgesic effect of treatment. Chronic Pain > 80 MEDD -                        We discussed with the patient that combining opioids, benzodiazepines, alcohol, illicit drugs or sleep aids increases the risk of respiratory depression including death. We discussed that these medications may cause drowsiness, sedation or dizziness and have counseled the patient not to drive or operate machinery. We have discussed that these medications will be prescribed only by one provider. We have discussed with the patient about age related risk factors and have thoroughly discussed the importance of taking these medications as prescribed. The patient verbalizes understanding.     ccreferring physic

## 2022-10-27 ENCOUNTER — HOSPITAL ENCOUNTER (OUTPATIENT)
Age: 47
Setting detail: OUTPATIENT SURGERY
Discharge: HOME | End: 2022-10-27
Attending: PAIN MEDICINE | Admitting: PAIN MEDICINE
Payer: MEDICARE

## 2022-10-27 ENCOUNTER — HOSPITAL ENCOUNTER (OUTPATIENT)
Dept: GENERAL RADIOLOGY | Age: 47
Setting detail: OUTPATIENT SURGERY
Discharge: HOME | End: 2022-10-29
Attending: PAIN MEDICINE
Payer: MEDICARE

## 2022-10-27 VITALS
SYSTOLIC BLOOD PRESSURE: 149 MMHG | WEIGHT: 293 LBS | OXYGEN SATURATION: 96 % | DIASTOLIC BLOOD PRESSURE: 83 MMHG | RESPIRATION RATE: 12 BRPM | HEIGHT: 68 IN | BODY MASS INDEX: 44.41 KG/M2 | TEMPERATURE: 97.7 F | HEART RATE: 65 BPM

## 2022-10-27 DIAGNOSIS — R52 PAIN MANAGEMENT: ICD-10-CM

## 2022-10-27 LAB
HCG, URINE, POC: NEGATIVE
Lab: NORMAL
METER GLUCOSE: 110 MG/DL (ref 74–99)
NEGATIVE QC PASS/FAIL: NORMAL
POSITIVE QC PASS/FAIL: NORMAL

## 2022-10-27 PROCEDURE — 7100000011 HC PHASE II RECOVERY - ADDTL 15 MIN: Performed by: PAIN MEDICINE

## 2022-10-27 PROCEDURE — 82962 GLUCOSE BLOOD TEST: CPT

## 2022-10-27 PROCEDURE — 2709999900 HC NON-CHARGEABLE SUPPLY: Performed by: PAIN MEDICINE

## 2022-10-27 PROCEDURE — 2500000003 HC RX 250 WO HCPCS: Performed by: PAIN MEDICINE

## 2022-10-27 PROCEDURE — 6360000002 HC RX W HCPCS: Performed by: PAIN MEDICINE

## 2022-10-27 PROCEDURE — 7100000010 HC PHASE II RECOVERY - FIRST 15 MIN: Performed by: PAIN MEDICINE

## 2022-10-27 PROCEDURE — 3209999900 FLUORO FOR SURGICAL PROCEDURES

## 2022-10-27 PROCEDURE — 6360000004 HC RX CONTRAST MEDICATION: Performed by: PAIN MEDICINE

## 2022-10-27 PROCEDURE — 3600000002 HC SURGERY LEVEL 2 BASE: Performed by: PAIN MEDICINE

## 2022-10-27 PROCEDURE — 64483 NJX AA&/STRD TFRM EPI L/S 1: CPT | Performed by: PAIN MEDICINE

## 2022-10-27 RX ORDER — METHYLPREDNISOLONE ACETATE 40 MG/ML
INJECTION, SUSPENSION INTRA-ARTICULAR; INTRALESIONAL; INTRAMUSCULAR; SOFT TISSUE PRN
Status: DISCONTINUED | OUTPATIENT
Start: 2022-10-27 | End: 2022-10-27 | Stop reason: ALTCHOICE

## 2022-10-27 RX ORDER — LIDOCAINE HYDROCHLORIDE 5 MG/ML
INJECTION, SOLUTION INFILTRATION; INTRAVENOUS PRN
Status: DISCONTINUED | OUTPATIENT
Start: 2022-10-27 | End: 2022-10-27 | Stop reason: ALTCHOICE

## 2022-10-27 ASSESSMENT — PAIN DESCRIPTION - ORIENTATION: ORIENTATION: RIGHT;LEFT

## 2022-10-27 ASSESSMENT — PAIN DESCRIPTION - DESCRIPTORS
DESCRIPTORS: ACHING
DESCRIPTORS: PRESSURE;ACHING

## 2022-10-27 ASSESSMENT — PAIN DESCRIPTION - LOCATION: LOCATION: BACK

## 2022-10-27 ASSESSMENT — PAIN SCALES - GENERAL: PAINLEVEL_OUTOF10: 7

## 2022-10-27 ASSESSMENT — PAIN - FUNCTIONAL ASSESSMENT: PAIN_FUNCTIONAL_ASSESSMENT: 0-10

## 2022-10-27 NOTE — DISCHARGE INSTRUCTIONS
Teena Officer  Dr. Clive De Jesus  Dr. Patricia Oliveira Block/Radiofrequency  Home Going Instructions    1-Go home, rest for the remainder of the day  2-Please do not lift over 20 pounds the day of the injection  3-If you received sedation No: alcohol, driving, operating lawn mowers, plows, tractors or other dangerous equipment until next morning. Do not make important decisions or sign legal documents for 24 hours. You may experience light headedness, dizziness, nausea or sleepiness after sedation. Do not stay alone. A responsible adult must be with you for 24 hours. You could be nauseated from the medications you have received. Your IV site may be sore and bruised. 4-No dietary restrictions     5-Resume all medications the same day, blood thinners to be resumed 24 hours after injection if you were instructed to stop any. 6-Keep the surgical site clean and dry, you may shower the next morning and remove the      dressing. 7- No sitz baths, tub baths or hot tubs/swimming for 24 hours. 8- If you have any pain at the injection site(s), application of an ice pack to the area should be       helpful, 20 minutes on/20 minutes off for next 48 hours. 9- Call [facility] immediately at if you develop.   Fever greater than 100.4 F  Have bleeding or drainage from the puncture site  Have progressive Leg/arm numbness and or weakness  Loss of control of bowel and or bladder (wet/soil yourself)  Severe headache with inability to lift head  10-You may return to work the next day

## 2022-10-27 NOTE — H&P
[facility] Pain Management        [address] [facility], [address]  Dept: [phone]    Procedure History & Physical      Macie Sidney     HPI:    Patient  is here for LBP BLE pain for b/l L5 TFESI  Labs/imaging studies reviewed   All question and concerns addressed including R/B/A associated with the procedure    Past Medical History:   Diagnosis Date    Asthma     Bell palsy     drooping    CHF (congestive heart failure) ([address]     DDD (degenerative disc disease), cervical     with spinal stenosis    Diabetes mellitus ([address]     DJD (degenerative joint disease)     both hips    GERD without esophagitis     HTN (hypertension)     Hyperlipidemia     Left hip pain     Meningitis 2009    Morbid obesity due to excess calories ([facility]     Neuropathy     JODI treated with BiPAP     Scoliosis     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[facility])        Past Surgical History:   Procedure Laterality Date    CHOLECYSTECTOMY  2009    CYSTOSCOPY Left 01/14/2018    left stent placement    DILATION AND CURETTAGE OF UTERUS      younger age    HIP SURGERY Left 9/22/2022    LEFT HIP INJECTION performed by Sandro oJseph DO at [address] [facility]    LITHOTRIPSY Right 02/15/2018    Cystoscopy;Stent Removal    MARGUERITE-EN-Y GASTRIC BYPASS N/A 5/31/2022    GASTRIC BYPASS MARGUERITE-EN-Y LAPAROSCOPIC performed by Cheryl Blanchard MD at [address] [facility] 6/22/2018    EGD BIOPSY performed by Cheryl Blanchard MD at [address] [facility] 8/20/2021    EGD BIOPSY performed by Cheryl Blanchard MD at [place] [address]       Prior to Admission medications    Medication Sig Start Date End Date Taking? Authorizing Provider   Ferrous Sulfate (IRON PO) Take by mouth   Yes Historical Provider, MD   vitamin B-12 (CYANOCOBALAMIN) 1000 MCG tablet Take 1,000 mcg by mouth daily   Yes Historical Provider, MD   Misc. Devices (ROLLER WALKER) MISC 1 each by Does not apply route daily Wheeled walker with seat.  10/26/22 GREGORIO Richardson   oxyCODONE-acetaminophen (PERCOCET) 5-325 MG per tablet Take 1 tablet by mouth 3 times daily as needed for Pain for up to 30 days. Intended supply: 30 days. Take lowest dose possible to manage pain 11/3/22 12/3/22  GREGORIO Richardson   gabapentin (NEURONTIN) 600 MG tablet Take 1 tablet by mouth 3 times daily for 30 days.  10/26/22 11/25/22  GREGORIO Richardson   citalopram (CELEXA) 40 MG tablet TAKE 1 TABLET BY MOUTH DAILY  Patient taking differently: Take 40 mg by mouth daily as needed (anxiety) 10/24/22   Keyon Nava PA-C   fluticasone [facility]) 50 MCG/ACT nasal spray SHAKE LIQUID AND USE 2 SPRAYS IN [facility] NOSTRIL DAILY 10/24/22   Keyon Nava PA-C   cetirizine (ZYRTEC) 10 MG tablet TAKE 1 TABLET BY MOUTH DAILY 10/24/22   Keyon Nava PA-C   metoprolol succinate (TOPROL XL) 100 MG extended release tablet TAKE 1 TABLET BY MOUTH TWICE DAILY 10/24/22   Keyon Nava PA-C   atorvastatin (LIPITOR) 80 MG tablet TAKE 1 TABLET BY MOUTH EVERY NIGHT 10/24/22   Keyon Nava PA-C   TRULICITY 4.65 GA/9.3HI SOPN INJECT 0.75MG INTO THE SKIN ONCE A WEEK ON MONDAYS 10/11/22   Keyon Nava PA-C   lactulose ([address]) 10 GM/15ML solution Take 15 mLs by mouth every evening  Patient not taking: No sig reported 9/28/22 10/28/22  Rainelle Ahumada, DO   bumetanide (BUMEX) 1 MG tablet TAKE 1 TABLET BY MOUTH EVERY OTHER DAY ALTERNATING WITH 2 TABLETS EVERY OTHER DAY 9/26/22   Keyon Nava PA-C   Methylnaltrexone Bromide 150 MG TABS Take 450 mg by mouth every morning 9/1/22 10/1/22  Rainelle Ahumada, DO   pantoprazole (PROTONIX) 20 MG tablet TAKE 1 TABLET BY MOUTH DAILY 8/11/22   Keyon Nava PA-C   TRULICITY 3.31 PE/8.4IR SOPN ADMINISTER 0.75 MG UNDER THE SKIN 1 TIME A WEEK 7/26/22   Keyon Nava PA-C   hydrALAZINE (APRESOLINE) 50 MG tablet TAKE 1 TABLET BY MOUTH EVERY 8 HOURS 7/25/22   Keyon Nava PA-C   isosorbide dinitrate (ISORDIL) 20 MG tablet TAKE 1 TABLET BY MOUTH THREE TIMES DAILY 6/29/22 Bharati Manning MD   aspirin 81 MG chewable tablet Take 1 tablet by mouth daily  Patient not taking: No sig reported 6/13/22   Claire Staples PA-C   spironolactone (ALDACTONE) 25 MG tablet TAKE 1 TABLET BY MOUTH EVERY DAY 6/13/22   Claire Staples PA-C   sacubitril-valsartan (ENTRESTO)  MG per tablet Take 1 tablet by mouth 2 times daily 6/13/22   Claire Staples PA-C   mineral oil-hydrophilic petrolatum (AQUAPHOR) ointment Apply topically as needed.  6/13/22   Claire Staples PA-C   omeprazole (PRILOSEC) 20 MG delayed release capsule Take 1 capsule by mouth Daily 5/18/22 5/18/23  Dawson Gibbons MD   potassium chloride (KLOR-CON M) 20 MEQ extended release tablet TAKE 1 TABLET BY MOUTH DAILY WITH BREAKFAST 4/28/22   Claire Staples PA-C   ondansetron (ZOFRAN-ODT) 4 MG disintegrating tablet Place 2 tablets under the tongue every 8 hours as needed for Nausea or Vomiting 4/13/22   Claire Staples PA-C   albuterol sulfate HFA (PROAIR HFA) 108 (90 Base) MCG/ACT inhaler Inhale 2 puffs into the lungs every 4 hours as needed for Wheezing or Shortness of Breath 4/13/22   CONNIE AlfonsoC   Cholecalciferol (VITAMIN D) 50 MCG (2000 UT) CAPS capsule Take 1 capsule by mouth daily 10/25/21   GREGORIO Alfonso-NOAH   mometasone-formoterol [facility]) 100-5 MCG/ACT inhaler Inhale 2 puffs into the lungs 2 times daily 6/17/21   Yelena Álvarez, DO   Continuous Blood Gluc  (FREESTYLE MARY READER) MARIJA 4 Devices by Does not apply route daily 5/14/21   ClaireGREGORIO Mathews-C   Continuous Blood Gluc Sensor (FREESTYLE MARY SENSOR SYSTEM) [facility] 4 Devices by Does not apply route daily 5/14/21   ClaireGREGORIO Mathews-C   albuterol (PROVENTIL) (2.5 MG/3ML) 0.083% nebulizer solution Take 3 mLs by nebulization every 6 hours as needed for Wheezing 3/23/21   Claire Staples PA-C   Continuous Blood Gluc Sensor (FREESTYLE MARY 2 SENSOR SYSTM) MISC 1 Device by Does not apply route daily 1/8/21   Claire Staples PA-C   Multiple Vitamin (DAILY-DOUGLAS) TABS TAKE 1 TABLET BY MOUTH EVERY DAY 20   Azucena Han DO   ipratropium-albuterol (DUONEB) 0.5-2.5 (3) MG/3ML SOLN nebulizer solution Take 3 mLs by nebulization every 4 hours as needed for Shortness of Breath 20   Tracy Khan MD   ONE TOUCH ULTRA TEST strip 1 each by Does not apply route daily 19   Collin Vaughan PA-C   Handicap Placard MISC by Does not apply route Patient cannot walk 200 ft without stopping to rest.    Expiration 5 yrs 19   Collin Vaughan PA-C       Allergies   Allergen Reactions    Food Anaphylaxis     Food allergy - Fresh Water Fish, Fleming[employer] and Tree Nuts    Ultram [Tramadol Hcl]      Per pt had a seizure    Fish-Derived Products     Norco [Hydrocodone-Acetaminophen] Hives    Pcn [Penicillins]      Not known    Cashews [Macadamia Nut Oil] Nausea And Vomiting       Social History     Socioeconomic History    Marital status: Legally      Spouse name: Not on file    Number of children: Not on file    Years of education: Not on file    Highest education level: Not on file   Occupational History    Occupation: direct care staff/counselor   Tobacco Use    Smoking status: Some Days     Packs/day: 0.25     Years: 17.00     Pack years: 4.25     Types: Cigarettes     Start date: 3/27/2001     Last attempt to quit: 10/5/2018     Years since quittin.0    Smokeless tobacco: Never   Vaping Use    Vaping Use: Former   Substance and Sexual Activity    Alcohol use: No    Drug use: No    Sexual activity: Not on file   Other Topics Concern    Not on file   Social History Narrative    Not on file     Social Determinants of Health     Financial Resource Strain: Not on file   Food Insecurity: Not on file   Transportation Needs: Not on file   Physical Activity: Not on file   Stress: Not on file   Social Connections: Not on file   Intimate Partner Violence: Not on file   Housing Stability: Not on file       Family History   Problem Relation Age of Onset    Stroke Mother     Arthritis Mother     Hypertension Mother     Asthma Mother     Fibromyalgia Mother     Cancer Father     Hypertension Father     Heart Attack Father     Asthma Father          REVIEW OF SYSTEMS:    CONSTITUTIONAL:  negative for  fevers, chills, sweats and fatigue    RESPIRATORY:  negative for  dry cough, cough with sputum, dyspnea, wheezing and chest pain    CARDIOVASCULAR:  negative for chest pain, dyspnea, palpitations, syncope    GASTROINTESTINAL:  negative for nausea, vomiting, change in bowel habits, diarrhea, constipation and abdominal pain    MUSCULOSKELETAL: negative for muscle weakness    SKIN: negative for itching or rashes. BEHAVIOR/PSYCH:  negative for poor appetite, increased appetite, decreased sleep and poor concentration    All other systems negative      PHYSICAL EXAM:    VITALS:  /72   Pulse 70   Temp 97.7 °F (36.5 °C) (Temporal)   Resp 20   Ht 5' 8\" (1.727 m)   Wt (!) 322 lb (146.1 kg)   LMP 09/28/2022   SpO2 95%   BMI 48.96 kg/m²     CONSTITUTIONAL:  awake, alert, cooperative, no apparent distress, and appears stated age    EYES: PERRLA, EOMI    LUNGS:  No increased work of breathing, no audible wheezing    CARDIOVASCULAR:  regular rate and rhythm    ABDOMEN:  Soft non tender non distended     EXTREMITIES: no signs of clubbing or cyanosis. MUSCULOSKELETAL: negative for flaccid muscle tone or spastic movements. SKIN: gross examination reveals no signs of rashes, or diaphoresis. NEURO: Cranial nerves II-XII grossly intact. No signs of agitated mood.        Assessment/Plan:    LBP BLE pain for b/l L5 TFESI

## 2022-10-27 NOTE — OP NOTE
10/27/2022    Patient: Jose Webster  :  1975  Age:  41 y.o. Sex:  female     PRE-OPERATIVE DIAGNOSIS: Lumbar disc displacement, lumbar neural foraminal stenosis, lumbar radiculopathy. POST-OPERATIVE DIAGNOSIS: Same. PROCEDURE: Bilateral Transforaminal epidural steroid injection under fluoroscopic guidance at foraminal level L5 (#2 steroid injection in this 6 month period). SURGEON: BARBRA Younger. ANESTHESIA: local    ESTIMATED BLOOD LOSS: None.  ______________________________________________________________________  BRIEF HISTORY: Jose Webster comes in today for the first Bilateral transforaminal epidural steroid injection under fluoroscopic guidance at foraminal level L5 (second steroid injection in this 6 month period) . The potential complications of this procedure were discussed with her again today. She has elected to undergo the aforementioned procedure. Robe complete History & Physical examination were reviewed in depth, a copy of which is in the chart. DESCRIPTION OF PROCEDURE:    After confirming written and informed consent, a time-out was performed and Robe name and date of birth, the procedure to be performed as well as the plan for the location of the needle insertion were confirmed. The patient was brought into the procedure room and placed in the prone position on the fluoroscopy table. Standard monitors were placed and vital signs were observed throughout the procedure. The area of the lumbar spine was prepped with chloraprep and draped in a sterile manner. The vertebral body was identified with AP fluoroscopy. An oblique view was obtained to better visualize the inferior junction of the pedicle and transverse process . The 6 o'clock position of the pedicle was marked and identified. The skin and subcutaneous tissue were anesthetized with 0.5% lidocaine.  A # 22 gauge pencil point needle was directed toward the targeted point under fluoroscopy until bone was contacted. The needle was then walked inferiorly until the neural foramen was entered . A lateral fluoroscopic view was then used to place the needle tip in the middle to anterior aspect of the foramen. Negative aspiration was confirmed for blood and CSF and 1 cc of Isovue-M 300 contrast was injected at each level under live AP fluoroscopy. Appropriate neurograms were observed under live AP fluoroscopy. Then after negative aspiration, a solution of the 2 cc of 0.5% lidocaine and 40 mg DepoMedrol was easily injected between each level. The needles were removed with constant aspiration technique. The patient's back was cleaned and a bandage was placed over the needle insertion points    Disposition the patient tolerated the procedure well and there were no complications . Vital signs remained stable throughout the procedure. The patient was escorted to the recovery area where they remained until discharge and written discharge instructions for the procedure were given. Plan: Florence Ceron will return to our pain management center as scheduled.      Sweetie Floyd,

## 2022-11-09 ENCOUNTER — OFFICE VISIT (OUTPATIENT)
Dept: PRIMARY CARE CLINIC | Age: 47
End: 2022-11-09
Payer: MEDICARE

## 2022-11-09 VITALS
OXYGEN SATURATION: 92 % | HEART RATE: 58 BPM | BODY MASS INDEX: 44.41 KG/M2 | SYSTOLIC BLOOD PRESSURE: 120 MMHG | TEMPERATURE: 97 F | DIASTOLIC BLOOD PRESSURE: 80 MMHG | HEIGHT: 68 IN | WEIGHT: 293 LBS | RESPIRATION RATE: 16 BRPM

## 2022-11-09 DIAGNOSIS — L73.2 HIDRADENITIS SUPPURATIVA: ICD-10-CM

## 2022-11-09 DIAGNOSIS — I42.9 CARDIOMYOPATHY, UNSPECIFIED TYPE (HCC): ICD-10-CM

## 2022-11-09 DIAGNOSIS — F17.200 TOBACCO DEPENDENCE: ICD-10-CM

## 2022-11-09 DIAGNOSIS — E66.01 MORBID OBESITY (HCC): ICD-10-CM

## 2022-11-09 DIAGNOSIS — I10 ESSENTIAL HYPERTENSION: ICD-10-CM

## 2022-11-09 DIAGNOSIS — Z98.84 S/P GASTRIC BYPASS: ICD-10-CM

## 2022-11-09 DIAGNOSIS — E11.9 TYPE 2 DIABETES MELLITUS WITHOUT COMPLICATION, WITHOUT LONG-TERM CURRENT USE OF INSULIN (HCC): Primary | ICD-10-CM

## 2022-11-09 DIAGNOSIS — R82.90 BAD ODOR OF URINE: ICD-10-CM

## 2022-11-09 LAB
BILIRUBIN, POC: NORMAL
BLOOD URINE, POC: NORMAL
CLARITY, POC: YELLOW
COLOR, POC: NORMAL
GLUCOSE URINE, POC: NORMAL
HBA1C MFR BLD: 5.6 %
KETONES, POC: NORMAL
LEUKOCYTE EST, POC: NORMAL
NITRITE, POC: NORMAL
PH, POC: 6.5
PROTEIN, POC: NORMAL
SPECIFIC GRAVITY, POC: 1.02
UROBILINOGEN, POC: 1

## 2022-11-09 PROCEDURE — G8427 DOCREV CUR MEDS BY ELIG CLIN: HCPCS | Performed by: PHYSICIAN ASSISTANT

## 2022-11-09 PROCEDURE — 3044F HG A1C LEVEL LT 7.0%: CPT | Performed by: PHYSICIAN ASSISTANT

## 2022-11-09 PROCEDURE — 83036 HEMOGLOBIN GLYCOSYLATED A1C: CPT | Performed by: PHYSICIAN ASSISTANT

## 2022-11-09 PROCEDURE — G8484 FLU IMMUNIZE NO ADMIN: HCPCS | Performed by: PHYSICIAN ASSISTANT

## 2022-11-09 PROCEDURE — 2022F DILAT RTA XM EVC RTNOPTHY: CPT | Performed by: PHYSICIAN ASSISTANT

## 2022-11-09 PROCEDURE — 3074F SYST BP LT 130 MM HG: CPT | Performed by: PHYSICIAN ASSISTANT

## 2022-11-09 PROCEDURE — 99214 OFFICE O/P EST MOD 30 MIN: CPT | Performed by: PHYSICIAN ASSISTANT

## 2022-11-09 PROCEDURE — 4004F PT TOBACCO SCREEN RCVD TLK: CPT | Performed by: PHYSICIAN ASSISTANT

## 2022-11-09 PROCEDURE — 3078F DIAST BP <80 MM HG: CPT | Performed by: PHYSICIAN ASSISTANT

## 2022-11-09 PROCEDURE — G8417 CALC BMI ABV UP PARAM F/U: HCPCS | Performed by: PHYSICIAN ASSISTANT

## 2022-11-09 RX ORDER — SULFAMETHOXAZOLE AND TRIMETHOPRIM 800; 160 MG/1; MG/1
1 TABLET ORAL 2 TIMES DAILY
Qty: 20 TABLET | Refills: 0 | Status: SHIPPED | OUTPATIENT
Start: 2022-11-09 | End: 2022-11-19

## 2022-11-09 SDOH — ECONOMIC STABILITY: FOOD INSECURITY: WITHIN THE PAST 12 MONTHS, YOU WORRIED THAT YOUR FOOD WOULD RUN OUT BEFORE YOU GOT MONEY TO BUY MORE.: NEVER TRUE

## 2022-11-09 SDOH — ECONOMIC STABILITY: FOOD INSECURITY: WITHIN THE PAST 12 MONTHS, THE FOOD YOU BOUGHT JUST DIDN'T LAST AND YOU DIDN'T HAVE MONEY TO GET MORE.: NEVER TRUE

## 2022-11-09 ASSESSMENT — SOCIAL DETERMINANTS OF HEALTH (SDOH): HOW HARD IS IT FOR YOU TO PAY FOR THE VERY BASICS LIKE FOOD, HOUSING, MEDICAL CARE, AND HEATING?: NOT HARD AT ALL

## 2022-11-09 NOTE — PROGRESS NOTES
DM2:   Patient is here to fu regarding DM2. Patient is  controlled. Taking all medications and tolerating well. Fasting sugars are running not checking. Patient is taking ASA and Ace Inhibitor/ARB. Patient is  on appropriately-dosed statin. LDL is not at goal.  BP is  controlled. No hypoglycemic episodes. Patient does see Podiatry regularly. Patient is aware that it is necessary to see an Eye Dr yearly. Patient does smoke. Most recent labs reviewed with patient. Patient does have complaints or concerns today. She has a foul odor to urine. She is seeing Dr Luis Cm. She is doing well with the injections, and her back pain is very well controlled. The hip pain continues and the plan is more weight loss prior to considering joint replacement. She has lost about 100 lbs. She has HS on the left side of pannus. Lab Results   Component Value Date    LABA1C 5.6 11/09/2022       Lab Results   Component Value Date    LDLCALC 132 (H) 06/01/2022        Patient's past medical, surgical, social and/or family history reviewed, updated in chart, and are non-contributory (unless otherwise stated). Medications and allergies also reviewed and updated in chart.       Review of Systems:  Constitutional:  No fever, no fatigue, no chills, + headaches, no weight change  Dermatology:  No rash, no mole, no dry or sensitive skin  ENT:  No cough, no sore throat, no sinus pain, no runny nose, no ear pain  Cardiology:  No chest pain, no palpitations, no leg edema, no shortness of breath, no PND  Gastroenterology:  No dysphagia, no abdominal pain, no nausea, no vomiting, no constipation, no diarrhea, no heartburn  Musculoskeletal:  + joint pain, no leg cramps, + back pain, no muscle aches  Respiratory:  No shortness of breath, no orthopnea, no wheezing, no QUINONEZ, no hemoptysis  Urology:  No blood in the urine, no urinary frequency, no urinary incontinence, no urinary urgency, no nocturia, no dysuria    Vitals:    11/09/22 1111 BP: 120/80   Pulse: 58   Resp: 16   Temp: 97 °F (36.1 °C)   SpO2: 92%   Weight: (!) 309 lb (140.2 kg)   Height: 5' 8\" (1.727 m)       Physical Exam  Constitutional:       General: She is not in acute distress. Appearance: Normal appearance. She is well-developed. She is obese. HENT:      Head: Normocephalic and atraumatic. Right Ear: External ear normal.      Left Ear: External ear normal.      Nose: Nose normal.   Eyes:      General: No scleral icterus. Conjunctiva/sclera: Conjunctivae normal.      Pupils: Pupils are equal, round, and reactive to light. Neck:      Thyroid: No thyromegaly. Cardiovascular:      Rate and Rhythm: Normal rate and regular rhythm. Heart sounds: Normal heart sounds. No murmur heard. Pulmonary:      Effort: Pulmonary effort is normal. No accessory muscle usage or respiratory distress. Breath sounds: Normal breath sounds. No wheezing. Musculoskeletal:         General: Normal range of motion. Cervical back: Normal range of motion and neck supple. Right lower leg: No edema. Skin:     General: Skin is warm and dry. Findings: No rash. Comments: +HS of pannus   Neurological:      Mental Status: She is alert and oriented to person, place, and time. Deep Tendon Reflexes: Reflexes are normal and symmetric. Psychiatric:         Mood and Affect: Mood and affect normal.         Speech: Speech normal.         Behavior: Behavior normal.       Assessment/Plan:      Torin Heller was seen today for diabetes and other.     Diagnoses and all orders for this visit:    Type 2 diabetes mellitus without complication, without long-term current use of insulin ([facility])  -     POCT glycosylated hemoglobin (Hb A1C)  -consider d/c of trulicity    Essential hypertension  -stable on current    Cardiomyopathy, unspecified type ([facility]    Bad odor of urine  -     POCT Urinalysis no Micro  -u/a unremarkable    Morbid obesity ([facility])    S/P gastric bypass  -lost 100 lbs    Tobacco dependence  - will need to d/c completely for joint replacement    Hidradenitis suppurativa  -     sulfamethoxazole-trimethoprim (BACTRIM DS) 800-160 MG per tablet; Take 1 tablet by mouth 2 times daily for 10 days      As above. Call or go to ED immediately if symptoms worsen or persist.  Return for DM2., or sooner if necessary. Educational materials and/or home exercises printed for patient's review and were included in patient instructions on his/her After Visit Summary and given to patient at the end of visit. Counseled regarding above diagnosis, including possible risks and complications,  especially if left uncontrolled. Counseled regarding the possible side effects, risks, benefits and alternatives to treatment; patient and/or guardian verbalizes understanding, agrees, feels comfortable with and wishes to proceed with above treatment plan. Advised patient to call with any new medication issues, and read all Rx info from pharmacy to assure aware of all possible risks and side effects of medication before taking. Reviewed age and gender appropriate health screening exams and vaccinations. Advised patient regarding importance of keeping up with recommended health maintenance and to schedule as soon as possible if overdue, as this is important in assessing for undiagnosed pathology, especially cancer, as well as protecting against potentially harmful/life threatening disease. Patient and/or guardian verbalizes understanding and agrees with above counseling, assessment and plan. All questions answered. Stephen Escudero PA-C  11/9/2022    I have personally reviewed and updated the chief complaint, HPI, Past Medical, Family and Social History, as well as the above Review of Systems.

## 2022-11-17 ENCOUNTER — PREP FOR PROCEDURE (OUTPATIENT)
Dept: PAIN MANAGEMENT | Age: 47
End: 2022-11-17

## 2022-11-17 ENCOUNTER — OFFICE VISIT (OUTPATIENT)
Dept: PAIN MANAGEMENT | Age: 47
End: 2022-11-17
Payer: MEDICARE

## 2022-11-17 VITALS
DIASTOLIC BLOOD PRESSURE: 81 MMHG | HEART RATE: 64 BPM | TEMPERATURE: 97.2 F | WEIGHT: 293 LBS | BODY MASS INDEX: 44.41 KG/M2 | HEIGHT: 68 IN | OXYGEN SATURATION: 95 % | SYSTOLIC BLOOD PRESSURE: 139 MMHG

## 2022-11-17 DIAGNOSIS — M54.41 CHRONIC BILATERAL LOW BACK PAIN WITH BILATERAL SCIATICA: ICD-10-CM

## 2022-11-17 DIAGNOSIS — M16.12 PRIMARY OSTEOARTHRITIS OF LEFT HIP: ICD-10-CM

## 2022-11-17 DIAGNOSIS — G89.4 CHRONIC PAIN SYNDROME: Primary | ICD-10-CM

## 2022-11-17 DIAGNOSIS — M54.16 LUMBAR RADICULOPATHY: ICD-10-CM

## 2022-11-17 DIAGNOSIS — M48.062 SPINAL STENOSIS OF LUMBAR REGION WITH NEUROGENIC CLAUDICATION: ICD-10-CM

## 2022-11-17 DIAGNOSIS — G89.29 CHRONIC BILATERAL LOW BACK PAIN WITH BILATERAL SCIATICA: ICD-10-CM

## 2022-11-17 DIAGNOSIS — M25.552 LEFT HIP PAIN: ICD-10-CM

## 2022-11-17 DIAGNOSIS — M54.16 LUMBAR RADICULOPATHY: Primary | ICD-10-CM

## 2022-11-17 DIAGNOSIS — M54.42 CHRONIC BILATERAL LOW BACK PAIN WITH BILATERAL SCIATICA: ICD-10-CM

## 2022-11-17 PROCEDURE — 4004F PT TOBACCO SCREEN RCVD TLK: CPT | Performed by: PAIN MEDICINE

## 2022-11-17 PROCEDURE — G8427 DOCREV CUR MEDS BY ELIG CLIN: HCPCS | Performed by: PAIN MEDICINE

## 2022-11-17 PROCEDURE — G8417 CALC BMI ABV UP PARAM F/U: HCPCS | Performed by: PAIN MEDICINE

## 2022-11-17 PROCEDURE — 3074F SYST BP LT 130 MM HG: CPT | Performed by: PAIN MEDICINE

## 2022-11-17 PROCEDURE — 3078F DIAST BP <80 MM HG: CPT | Performed by: PAIN MEDICINE

## 2022-11-17 PROCEDURE — 99213 OFFICE O/P EST LOW 20 MIN: CPT | Performed by: PAIN MEDICINE

## 2022-11-17 PROCEDURE — G8484 FLU IMMUNIZE NO ADMIN: HCPCS | Performed by: PAIN MEDICINE

## 2022-11-17 RX ORDER — OXYCODONE HYDROCHLORIDE AND ACETAMINOPHEN 5; 325 MG/1; MG/1
1 TABLET ORAL 3 TIMES DAILY PRN
Qty: 90 TABLET | Refills: 0 | Status: SHIPPED | OUTPATIENT
Start: 2022-12-03 | End: 2023-01-02

## 2022-11-17 RX ORDER — GABAPENTIN 600 MG/1
600 TABLET ORAL 3 TIMES DAILY
Qty: 90 TABLET | Refills: 0 | Status: SHIPPED | OUTPATIENT
Start: 2022-11-17 | End: 2022-12-17

## 2022-11-17 NOTE — PROGRESS NOTES
[facility] - BE[facility]AVIOR[facility] HE[facility] SERVICES Pain Management  [facility]  [facility] - [facility], [facility] [place]    Follow up Note      Sherif Marques     Date of Visit:  11/17/2022    CC:  Patient presents for follow up   Chief Complaint   Patient presents with    Follow Up After Procedure      Bilateral Transforaminal epidural steroid injection under fluoroscopic guidance at foraminal level L5      HPI:    Pain is better. Appropriate analgesia with current medications regimen: yes. Change in quality of symptoms:no. Medication side effects:none. Recent diagnostic testing:none. Recent interventional procedures:b/l L5 TFESI with 60% relief. She has been on anticoagulation medications to include ASA and has not been on herbal supplements. She is diabetic. Imaging:   10/2021 lumbar MRI -      BONES/ALIGNMENT: There is normal alignment of the spine. Mild-to-moderate   levoscoliosis of the lumbar spine is again noted. The vertebral body heights   are maintained. There is spurring and disc desiccation at multiple levels   with mild-to-moderate disc space narrowing at L1-2, L2-3, L3-4 and L4-5. Slight subchondral signal change is seen, most notably at L2-3 and L4-5. This is most likely related to degenerative disc disease. Otherwise, the   bone marrow signal appears unremarkable. SPINAL CORD: The conus terminates normally. SOFT TISSUES: No paraspinal mass identified. The spinal canal is somewhat congenitally narrowed. L1-L2: There is disc bulge and severe right and moderate left posterior facet   degenerative change with ligamentum flavum hypertrophy causing moderate   central canal stenosis. There is mild right and moderate left neural   foraminal narrowing. L2-L3: There is a disc bulge and severe bilateral posterior facet   degenerative change with ligamentum flavum hypertrophy causing severe central   canal stenosis.   There is right lateral disc protrusion causing severe right   neural foraminal narrowing. There is moderate left neural foraminal   narrowing. In addition, there is a central and left paramedian disc   herniation with inferior extension of small extruded fragment behind the L3   vertebral body causing severe left lateral recess stenosis. L3-L4: There is disc bulge and severe bilateral posterior facet degenerative   change and ligamentum flavum hypertrophy causing severe central canal   stenosis. There is severe bilateral neural foraminal stenosis. The findings   at L2-3 and L3-4 are worse compared to the prior study. L4-L5: There is mild disc bulge with severe left and moderate right posterior   facet degenerative change with ligamentum flavum hypertrophy. This causes   moderate left subarticular recess stenosis. No significant central canal   stenosis is seen. There is severe bilateral neural foraminal narrowing. L5-S1: There is disc bulge and moderate to severe bilateral posterior facet   degenerative change, greater on the left with superimposed right   posterolateral and lateral disc protrusion causing severe right subarticular   recess stenosis. There is also severe bilateral neural foraminal narrowing. No significant central canal stenosis seen. Impression   1. Advanced degenerative change with mild to moderate levoscoliosis. 2. Multilevel central canal stenosis, including severe central canal stenosis   at L2-3 and L3-4 and moderate stenosis at L1-2. Moderate left subarticular   recess stenosis at L4-5 and severe right subarticular recess stenosis at   L5-S1.   3. Central and left paramedian disc herniation at L2-3 with inferiorly   extruded disc fragment causing severe left lateral recess stenosis. 4. Multilevel bilateral neural foraminal stenosis including severe foraminal   stenosis on the left at L2-3 and bilaterally at L3-4, L4-5 and L5-S1.   5. Overall, the findings are worse compared to the prior study.    6.  The findings were sent to the Radiology Results [id number] at   11:57 am on 10/4/2021to be communicated to a licensed caregiver. 2021 xray L hip -  FINDINGS:   Demonstrated is joint space narrowing with marginal acetabular are   osteophyte. A femoral head maintains normal contour. No acute fracture identified. Bony pelvis is intact. Enthesopathy noted at   the the iliac wings           Impression   Moderate to severe DJD      Xray pelvis 3/2019 -      1. No evidence of acute fracture or hip dislocation. 2. Mild osteoarthritis involving both hips but with prominent bridging   of the superior acetabulum. This can contribute to impingement. 3. At least moderately severe degenerative changes in the lower lumbar   spine. CT lumbar 3/2019 -   The study is limited by the patient's body habitus which degrades the   imaging quality. No acute fracture or dislocation is seen. Moderate loss of intervertebral disc height is seen throughout the   visualized spine. Moderate to severe bilateral neural foraminal   narrowing is seen at L3-L4, L4-L5 and L5-S1 due to large vertebral   body and facet joint osteophytes. Mild levoconvex scoliosis of the   lumbar spine is seen. Moderate degenerative changes with periarticular osteophytes and loss   of joint space are seen within the sacroiliac joints      Lumbar MRI 2018 -   Findings: There is normal sagittal alignment visualized lumbar spine. No STIR   hyperintensity to suggest an acute fracture or ligamentous injury is   noted. Limited evaluation secondary to technique. Disc desiccation   throughout the visualized lumbar intervertebral discs. Bone marrow   signal is relatively homogeneous throughout. Visualized portions of   the kidneys and aorta are grossly unremarkable although limited in   evaluation. Sagittal imaging only T11-T12: Mild to moderate circumferential disc   bulge. Moderate spinal canal stenosis is noted on this limited study.    Moderate to moderately severe bilateral neural foraminal narrowing. Limited evaluation secondary to sagittal imaging only. Sagittal imaging only T12-L1: No evidence of posterior disc contour   abnormality, spinal canal stenosis, neural foraminal narrowing or   spinal nerve root abutment/impingement. .       L1-L2: Large circumferential disc bulge. Thecal sac measures   approximately 0.83 cm anterior posterior dimension of the central   spinal canal. This is secondary to a combination of circumferential   disc bulge and posterior epidural lipomatosis. Moderately severe left   and right neural foraminal narrowing with suspected   abutment/impingement exiting right L1 spinal nerve root. Moderate   bilateral facet osteoarthropathy. Underlying crowding/compression of   the cauda equina nerve roots. L2-L3: Moderate circumferential disc bulge. Moderate bilateral facet   osteoarthropathy. Moderate to moderately severe left and right neural   foraminal narrowing. There is abutment of the exiting left L2 spinal   nerve root. Thecal sac measures approximately 0.92 cm anterior   posterior dimension and central spinal canal. There is questionable   abnormal appearance of the cauda equina nerve roots in this region. L3-L4: Severe circumferential disc bulge with a centrally located   slight inferior directed disc extrusion. Severe facet   osteoarthropathy. Moderate spinal canal stenosis. Severe left and   right neural foraminal narrowing. Abutment/impingement exiting   bilateral L3 spinal nerve roots. Moderately severe thecal sac neural   foraminal narrowing and spinal canal stenosis secondary to a   combination of the facet osteoarthropathy and posterior epidural   hematoma stenosis with crowding and compression of cauda equina nerve   roots. L4-L5: Moderately large circumferential disc bulge. Severe bilateral   facet osteoarthropathy.  Severe left and moderately severe right neural   foraminal narrowing with abutment/impingement of exiting bilateral L4   spinal nerve roots. Extensive facet osteoarthropathy on the left   obliterates the neural foramen. No marco spinal canal stenosis is   seen. L5-S1: Severe bilateral facet osteoarthropathy. Moderate   circumferential disc bulge. Severe left and right neural foraminal   narrowing. Abutment/impingement exiting bilateral L5 spinal nerve   roots. No marco spinal canal stenosis. 1. Multilevel degenerative disc disease and facet osteoarthropathy   T11-T12 and L1-S1. Abutment/impingement exiting right L1, exiting left   L2, exiting bilateral L3, exiting bilateral L4 and exiting bilateral   L5 spinal nerve roots as described above. Severe bilateral neural   foraminal narrowing at L3-L4 and L5-S1 with severe left and moderately   severe right neural foraminal narrowing at L4-L5. Moderate spinal   canal stenosis at L3-L4 and narrowing of the thecal sac at L2-L3. 2. There is a questionable abnormal periods of spinal nerve roots at   the L2-L3 motion segment levels of indeterminate etiology and   significance, clinical correlation for any for infectious parameters   recommended. Consider further evaluation with pre and postcontrast   lumbar spine for further investigation with consideration given to   patient's renal status and any potential safety or allergenic   concerns. 3. Crowding/compression of cauda equina nerve roots greatest at L3-L4   but to a lesser degree at L1-L2.                                          Potential Aberrant Drug-Related Behavior: no     Urine Drug Screenin2022 consistent with OARRS     OARRS report:  2022 consistent to 2022 consistent    Opioid Agreement:  Renewal date: 10/27/2022    Past Medical History:   Diagnosis Date    Asthma     Bell palsy     drooping    CHF (congestive heart failure) (Formerly [facility])     DDD (degenerative disc disease), cervical     with spinal stenosis    Diabetes mellitus (Formerly [facility])     DJD (degenerative joint disease)     both hips    GERD without esophagitis     HTN (hypertension)     Hyperlipidemia     Left hip pain     Meningitis 2009    Morbid obesity due to excess calories ([address]     Neuropathy     JODI treated with BiPAP     Scoliosis     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[facility])        Past Surgical History:   Procedure Laterality Date    CHOLECYSTECTOMY  2009    CYSTOSCOPY Left 01/14/2018    left stent placement    DILATION AND CURETTAGE OF UTERUS      younger age    [de-identified] SURGERY Left 9/22/2022    LEFT HIP INJECTION performed by Ruma Curtis DO at [address]    LITHOTRIPSY Right 02/15/2018    Cystoscopy;Stent Removal    PAIN MANAGEMENT PROCEDURE Bilateral 10/27/2022    BILATERAL L5 TRANSFORAMINAL EPIDURAL STEROID INJECTION performed by Ruma Curtis DO at [address] [facility] N/A 5/31/2022    GASTRIC BYPASS MARGUERITE-EN-Y LAPAROSCOPIC performed by Jasmyn Schumacher MD at [address] [place] [address] 6/22/2018    EGD BIOPSY performed by Jasmyn Schumacher MD at [facility] 8/20/2021    EGD BIOPSY performed by Jasmyn Schumacher MD at [place] [address]       Prior to Admission medications    Medication Sig Start Date End Date Taking? Authorizing Provider   sulfamethoxazole-trimethoprim (BACTRIM DS) 800-160 MG per tablet Take 1 tablet by mouth 2 times daily for 10 days 11/9/22 11/19/22 Yes Daquan Wiggins PA-C   Misc. Devices (ROLLER WALKER) MISC 1 each by Does not apply route daily Wheeled walker with seat. 10/26/22  Yes GREGORIO Rinaldi   oxyCODONE-acetaminophen (PERCOCET) 5-325 MG per tablet Take 1 tablet by mouth 3 times daily as needed for Pain for up to 30 days. Intended supply: 30 days. Take lowest dose possible to manage pain 11/3/22 12/3/22 Yes GREGORIO Rinaldi   gabapentin (NEURONTIN) 600 MG tablet Take 1 tablet by mouth 3 times daily for 30 days.  10/26/22 11/25/22 Yes GREGORIO Rinaldi   Ferrous Sulfate (IRON PO) Take by mouth   Yes Historical Provider, MD   vitamin B-12 (CYANOCOBALAMIN) 1000 MCG tablet Take 1,000 mcg by mouth daily   Yes Historical Provider, MD   citalopram (CELEXA) 40 MG tablet TAKE 1 TABLET BY MOUTH DAILY  Patient taking differently: Take 40 mg by mouth daily as needed (anxiety) 10/24/22  Yes Compa Mensah PA-C   fluticasone (FLONASE) 50 MCG/ACT nasal spray SHAKE LIQUID AND USE 2 SPRAYS IN [facility] NOSTRIL DAILY 10/24/22  Yes Compa Mensah PA-C   cetirizine (ZYRTEC) 10 MG tablet TAKE 1 TABLET BY MOUTH DAILY 10/24/22  Yes Compa Mensah PA-C   metoprolol succinate (TOPROL XL) 100 MG extended release tablet TAKE 1 TABLET BY MOUTH TWICE DAILY 10/24/22  Yes Compa Mensah PA-C   atorvastatin (LIPITOR) 80 MG tablet TAKE 1 TABLET BY MOUTH EVERY NIGHT 10/24/22  Yes Compa Mensah PA-C   TRULICITY 0.72 UF/6.0XV SOPN INJECT 0.75MG INTO THE SKIN ONCE A WEEK ON MONDAYS 10/11/22  Yes Compa Mensah PA-C   bumetanide (BUMEX) 1 MG tablet TAKE 1 TABLET BY MOUTH EVERY OTHER DAY ALTERNATING WITH 2 TABLETS EVERY OTHER DAY 9/26/22  Yes Compa Mensah PA-C   pantoprazole (PROTONIX) 20 MG tablet TAKE 1 TABLET BY MOUTH DAILY 8/11/22  Yes Compa Mensah PA-C   TRULICITY 5.55 QJ/6.3FE SOPN ADMINISTER 0.75 MG UNDER THE SKIN 1 TIME A WEEK 7/26/22  Yes Compa Mensah PA-C   hydrALAZINE (APRESOLINE) 50 MG tablet TAKE 1 TABLET BY MOUTH EVERY 8 HOURS 7/25/22  Yes Compa Mensah PA-C   isosorbide dinitrate (ISORDIL) 20 MG tablet TAKE 1 TABLET BY MOUTH THREE TIMES DAILY 6/29/22  Yes Cheryl Curran MD   aspirin 81 MG chewable tablet Take 1 tablet by mouth daily 6/13/22  Yes Compa Mensah PA-C   spironolactone (ALDACTONE) 25 MG tablet TAKE 1 TABLET BY MOUTH EVERY DAY 6/13/22  Yes Compa Mensah PA-C   sacubitril-valsartan (ENTRESTO)  MG per tablet Take 1 tablet by mouth 2 times daily 6/13/22  Yes Compa Mensah PA-C   mineral oil-hydrophilic petrolatum (AQUAPHOR) ointment Apply topically as needed.  6/13/22  Yes Compa Mensah PA-C   omeprazole (PRILOSEC) 20 MG delayed release capsule Take 1 capsule by mouth Daily 5/18/22 5/18/23 Yes Brandon John MD   potassium chloride (KLOR-CON M) 20 MEQ extended release tablet TAKE 1 TABLET BY MOUTH DAILY WITH BREAKFAST 4/28/22  Yes Felisha Davis PA-C   ondansetron (ZOFRAN-ODT) 4 MG disintegrating tablet Place 2 tablets under the tongue every 8 hours as needed for Nausea or Vomiting 4/13/22  Yes Felisha Davis PA-C   albuterol sulfate HFA (PROAIR HFA) 108 (90 Base) MCG/ACT inhaler Inhale 2 puffs into the lungs every 4 hours as needed for Wheezing or Shortness of Breath 4/13/22  Yes Felisha Davis PA-C   Cholecalciferol (VITAMIN D) 50 MCG (2000 UT) CAPS capsule Take 1 capsule by mouth daily 10/25/21  Yes Felisha Davis PA-C   mometasone-formoterol [facility]) 100-5 MCG/ACT inhaler Inhale 2 puffs into the lungs 2 times daily 6/17/21  Yes Bay Ruiz,    Continuous Blood Gluc  (FREESTYLE MARY READER) MARIJA 4 Devices by Does not apply route daily 5/14/21  Yes Felisha Davis PA-C   Continuous Blood Gluc Sensor ([address] [place] [address]) [facility] 4 Devices by Does not apply route daily 5/14/21  Yes Felisha Davis PA-C   albuterol (PROVENTIL) (2.5 MG/3ML) 0.083% nebulizer solution Take 3 mLs by nebulization every 6 hours as needed for Wheezing 3/23/21  Yes Felisha Davis PA-C   Continuous Blood Gluc Sensor (FREESTYLE MARY 2 SENSOR SYSTM) MISC 1 Device by Does not apply route daily 1/8/21  Yes Felisha Davis PA-C   Multiple Vitamin (DAILY-DOUGLAS) TABS TAKE 1 TABLET BY MOUTH EVERY DAY 6/26/20  Yes Bay Ruiz DO   ipratropium-albuterol (DUONEB) 0.5-2.5 (3) MG/3ML SOLN nebulizer solution Take 3 mLs by nebulization every 4 hours as needed for Shortness of Breath 5/1/20  Yes Guanakito Dhaliwal MD   ONE TOUCH ULTRA TEST strip 1 each by Does not apply route daily 9/17/19  Yes Felisha Davis PA-C   Handicap [facility] [address] [facility] by Does not apply route Patient cannot walk 200 ft without stopping to rest.    Expiration 5 yrs 2/22/19  Yes Felisha Davis, PA-C   Methylnaltrexone Bromide 150 MG TABS Take 450 mg by mouth every morning 9/1/22 10/1/22  Nima Berman DO       Allergies   Allergen Reactions    Food Anaphylaxis     Food allergy - Fresh Water Fish, [place] and Tree Nuts    Ultram [Tramadol Hcl]      Per pt had a seizure    Fish-Derived Products     Norco [Hydrocodone-Acetaminophen] Hives    Pcn [Penicillins]      Not known    Cashews [Macadamia Nut Oil] Nausea And Vomiting       Social History     Socioeconomic History    Marital status: Legally      Spouse name: Not on file    Number of children: Not on file    Years of education: Not on file    Highest education level: Not on file   Occupational History    Occupation: direct care staff/counselor   Tobacco Use    Smoking status: Some Days     Packs/day: 0.25     Years: 17.00     Pack years: 4.25     Types: Cigarettes     Start date: 3/27/2001     Last attempt to quit: 10/5/2018     Years since quittin.1    Smokeless tobacco: Never   Vaping Use    Vaping Use: Former   Substance and Sexual Activity    Alcohol use: No    Drug use: No    Sexual activity: Not on file   Other Topics Concern    Not on file   Social History Narrative    Not on file     Social Determinants of Health     Financial Resource Strain: Low Risk     Difficulty of Paying Living Expenses: Not hard at all   Food Insecurity: No Food Insecurity    Worried About Running Out of Food in the Last Year: Never true    Ran Out of Food in the Last Year: Never true   Transportation Needs: Not on file   Physical Activity: Not on file   Stress: Not on file   Social Connections: Not on file   Intimate Partner Violence: Not on file   Housing Stability: Not on file       Family History   Problem Relation Age of Onset    Stroke Mother     Arthritis Mother     Hypertension Mother     Asthma Mother     Fibromyalgia Mother     Cancer Father     Hypertension Father     Heart Attack Father     Asthma Father        REVIEW OF SYSTEMS:     Danii Azul denies fever/chills, chest pain, shortness of breath, new bowel or bladder complaints. All other review of systems was negative. PHYSICAL EXAMINATION:      /81   Pulse 64   Temp 97.2 °F (36.2 °C) (Infrared)   Ht 5' 8\" (1.727 m)   Wt (!) 309 lb (140.2 kg)   SpO2 95%   BMI 46.98 kg/m²     General:      General appearance:pleasant and well-hydrated, in mild discomfort and A & O x3  Build: Morbidly obese    HEENT:    Head:normocephalic and atraumatic  Sclera: icterus absent    Lungs:    Breathing:Normal expansion. No audible wheezing. Abdomen:    Shape:non-distended and normal    Lumbar spine:    Spine inspection:normal   Range of motion:abnormal moderately in lateral bending, flexion, extension rotation bilateral and is painful. Extremities:    Tremors:None bilaterally upper and lower  Range of motion:pain with internal rotation of hips not done.   Intact:Yes  Edema:Normal    Neurological:    Gait: in wheelchair    Dermatology:    Skin:no unusual rashes, no skin lesions    Impression:    Previously seen in 2019 for LBP and LLE pain (nondermatomal)  Upon 2022 consultation pt reports diffuse pain consistent with hyperalgesia, LBP BLE, and severe L hip pain  Lumbar MRI shows significant degenerative changes with multilevel stenosis  L hip xray shows significant OA changes  PMHx: JODI (compliant with cpap), asthma, morbid obesity s/p gastric bypass 5/2022 and as of 7/2022 has lost 30 lbs, CHF, DM, GERD, DLD, HTN     Overall patient is nonfunctional, spending a significant amount of time in a wheelchair - this is improving since her hip injection, TFESI and gastric bypass surgery     She has lost 70 lbs since her gastric bypass      The hip pain is significantly improved   Her LBP BLE pain is now most bothersome  OIC resolved with lactulose and Benefiber  UDS today - last dose of percocet today  OARRS report reviewed  Discussed negative SE's associated with chronic opioid therapy, given her comorbidities (morbid obesity, JODI, minimizing opioid dosing will be paramount)  New wheeled walker with seat ordered per patient request  RF gabapentin 600 mg TID #90  RF percocet 5/325 #90 - no increases  No RF lactulose  Aqua therapy - Has stopped due to pain. States that she knows that she needs to go back. L hip injection with 80% relief x 1.5 weeks only. Consider referral to ortho, however, she would like to hold off. B/l L5 TFESI #1 with 60% relief, pt would like to repeat - r/b and procedure discussed  Patient encouraged to stay active and to lose weight, encouraged to continue with bariatric program  Treatment plan discussed with the patient including medication and procedure side effects               We discussed with the patient that combining opioids, benzodiazepines, alcohol, illicit drugs or sleep aids increases the risk of respiratory depression including death. We discussed that these medications may cause drowsiness, sedation or dizziness and have counseled the patient not to drive or operate machinery. We have discussed that these medications will be prescribed only by one provider. We have discussed with the patient about age related risk factors and have thoroughly discussed the importance of taking these medications as prescribed. The patient verbalizes understanding.

## 2022-11-17 NOTE — PROGRESS NOTES
Do you currently have any of the following:    Fever: No  Headache:  No  Cough: No  Shortness of breath: No  Exposed to anyone with these symptoms: No         Radhika Wood presents to the [facility] on 11/17/2022. Rafa Luevano is complaining of pain in back. The pain is constant. The pain is described as aching. Pain is rated on her best day at a 5, on her worst day at a 5, and on average at a 5 on the VAS scale. She took her last dose of Percocet today. Any procedures since your last visit: Yes, with 50 % relief. Pacemaker or defibrillator: No.    She is  on NSAIDS and is not on anticoagulation medications to include none and is managed by none. Medication Contract and Consent for Opioid Use Documents Filed       Patient Documents       Type of Document Status Date Received Received By Description    Medication Contract Received 7/1/2019 11:37 AM Santos Walsh PAIN MGMT CONTRACT DR. VILLANUEVA    Medication Contract Signed 7/24/2019 10:15 AM KALIA BYRD medication contract    Medication Contract Received 10/27/2022  3:10 PM April Diazjoann [employer] Agreement                    Temp 97.2 °F (36.2 °C) (Infrared)   Ht 5' 8\" (1.727 m)   Wt (!) 309 lb (140.2 kg)   BMI 46.98 kg/m²      No LMP recorded.

## 2022-12-01 DIAGNOSIS — I42.9 CARDIOMYOPATHY, UNSPECIFIED TYPE (HCC): ICD-10-CM

## 2022-12-01 RX ORDER — ISOSORBIDE DINITRATE 20 MG/1
TABLET ORAL
Qty: 90 TABLET | Refills: 5 | Status: SHIPPED | OUTPATIENT
Start: 2022-12-01

## 2022-12-04 DIAGNOSIS — I42.9 CARDIOMYOPATHY, UNSPECIFIED TYPE (HCC): ICD-10-CM

## 2022-12-05 RX ORDER — SPIRONOLACTONE 25 MG/1
TABLET ORAL
Qty: 30 TABLET | Refills: 5 | Status: SHIPPED | OUTPATIENT
Start: 2022-12-05

## 2022-12-05 RX ORDER — BUMETANIDE 1 MG/1
TABLET ORAL
Qty: 60 TABLET | Refills: 1 | Status: SHIPPED | OUTPATIENT
Start: 2022-12-05

## 2022-12-12 ENCOUNTER — TELEPHONE (OUTPATIENT)
Dept: PRIMARY CARE CLINIC | Age: 47
End: 2022-12-12

## 2022-12-12 DIAGNOSIS — R05.9 COUGH: ICD-10-CM

## 2022-12-12 DIAGNOSIS — R05.1 ACUTE COUGH: ICD-10-CM

## 2022-12-12 DIAGNOSIS — L73.2 HIDRADENITIS SUPPURATIVA: ICD-10-CM

## 2022-12-12 DIAGNOSIS — N76.0 ACUTE VAGINITIS: Primary | ICD-10-CM

## 2022-12-12 DIAGNOSIS — L02.412 ABSCESS OF LEFT AXILLA: ICD-10-CM

## 2022-12-12 RX ORDER — BROMPHENIRAMINE MALEATE, PSEUDOEPHEDRINE HYDROCHLORIDE, AND DEXTROMETHORPHAN HYDROBROMIDE 2; 30; 10 MG/5ML; MG/5ML; MG/5ML
SYRUP ORAL
Qty: 240 ML | Refills: 1 | OUTPATIENT
Start: 2022-12-12

## 2022-12-12 RX ORDER — FLUCONAZOLE 150 MG/1
150 TABLET ORAL ONCE
Qty: 2 TABLET | Refills: 0 | Status: SHIPPED | OUTPATIENT
Start: 2022-12-12 | End: 2022-12-12

## 2022-12-12 RX ORDER — GUAIFENESIN 600 MG/1
TABLET, EXTENDED RELEASE ORAL
Qty: 30 TABLET | Refills: 0 | OUTPATIENT
Start: 2022-12-12

## 2022-12-12 RX ORDER — FLUCONAZOLE 150 MG/1
TABLET ORAL
Qty: 2 TABLET | Refills: 0 | OUTPATIENT
Start: 2022-12-12

## 2022-12-12 RX ORDER — DEXTROMETHORPHAN HYDROBROMIDE AND PROMETHAZINE HYDROCHLORIDE 15; 6.25 MG/5ML; MG/5ML
5 SYRUP ORAL 4 TIMES DAILY PRN
Qty: 240 ML | Refills: 1 | Status: SHIPPED | OUTPATIENT
Start: 2022-12-12 | End: 2022-12-19

## 2022-12-12 NOTE — TELEPHONE ENCOUNTER
Pt calling she just finished and antibiotic and now has a yeast infx can you send diflucan to pharmacy. She is also requesting something for mucous and congestion.      Kory Li

## 2022-12-14 ENCOUNTER — TELEPHONE (OUTPATIENT)
Dept: BARIATRICS/WEIGHT MGMT | Age: 47
End: 2022-12-14

## 2022-12-15 ENCOUNTER — TELEPHONE (OUTPATIENT)
Dept: PAIN MANAGEMENT | Age: 47
End: 2022-12-15

## 2022-12-15 NOTE — TELEPHONE ENCOUNTER
Call to Radhika Wood that procedure was approved for 12/22/2022 and that Delicia should call her a few days before for the pre op call and between 2:00 PM and 4:00 PM  the business day before with the arrival time. Instructed Monique to hold ibuprofen for 24 hours, naprosyn for 4 days and any aspirin containing products or fish oil for 7 days. Instructed to call office back if any questions. Rafa Luevano verbalized understanding.     Electronically signed by Yuri Chu RN on 12/15/2022 at 2:57 PM

## 2022-12-20 NOTE — PROGRESS NOTES
[facility] PAIN MANAG[facility]  INSTRUCTIONS  . .......................................................................................................................................... [x] Parking the day of Surgery is located in the [facility].   Upon entering the door, make immediate right into the surgery reception room    [x]  Bring photo ID and insurance card     [x] You may have a light breakfast day of procedure    [x]  Wear loose comfortable clothing    [x]  Please follow instructions for medications as given per Dr's office    [x] You can expect a call the business day prior to procedure to notify you of your arrival time     [x] Please arrange for     []  Other instructions

## 2022-12-22 ENCOUNTER — HOSPITAL ENCOUNTER (OUTPATIENT)
Age: 47
Setting detail: OUTPATIENT SURGERY
Discharge: HOME | End: 2022-12-22
Attending: PAIN MEDICINE | Admitting: PAIN MEDICINE
Payer: MEDICARE

## 2022-12-22 ENCOUNTER — HOSPITAL ENCOUNTER (OUTPATIENT)
Dept: GENERAL RADIOLOGY | Age: 47
Setting detail: OUTPATIENT SURGERY
Discharge: HOME | End: 2022-12-24
Attending: PAIN MEDICINE
Payer: MEDICARE

## 2022-12-22 VITALS
RESPIRATION RATE: 18 BRPM | HEART RATE: 71 BPM | TEMPERATURE: 98 F | WEIGHT: 293 LBS | HEIGHT: 68 IN | SYSTOLIC BLOOD PRESSURE: 141 MMHG | BODY MASS INDEX: 44.41 KG/M2 | OXYGEN SATURATION: 100 % | DIASTOLIC BLOOD PRESSURE: 80 MMHG

## 2022-12-22 DIAGNOSIS — R52 PAIN MANAGEMENT: ICD-10-CM

## 2022-12-22 LAB
HCG, URINE, POC: NEGATIVE
Lab: NORMAL
METER GLUCOSE: 105 MG/DL (ref 74–99)
NEGATIVE QC PASS/FAIL: NORMAL
POSITIVE QC PASS/FAIL: NORMAL

## 2022-12-22 PROCEDURE — 7100000010 HC PHASE II RECOVERY - FIRST 15 MIN: Performed by: PAIN MEDICINE

## 2022-12-22 PROCEDURE — 2709999900 HC NON-CHARGEABLE SUPPLY: Performed by: PAIN MEDICINE

## 2022-12-22 PROCEDURE — 3209999900 FLUORO FOR SURGICAL PROCEDURES

## 2022-12-22 PROCEDURE — 3600000002 HC SURGERY LEVEL 2 BASE: Performed by: PAIN MEDICINE

## 2022-12-22 PROCEDURE — 64483 NJX AA&/STRD TFRM EPI L/S 1: CPT | Performed by: PAIN MEDICINE

## 2022-12-22 PROCEDURE — 2500000003 HC RX 250 WO HCPCS: Performed by: PAIN MEDICINE

## 2022-12-22 PROCEDURE — 6360000002 HC RX W HCPCS: Performed by: PAIN MEDICINE

## 2022-12-22 PROCEDURE — 82962 GLUCOSE BLOOD TEST: CPT

## 2022-12-22 PROCEDURE — 6360000004 HC RX CONTRAST MEDICATION: Performed by: PAIN MEDICINE

## 2022-12-22 PROCEDURE — 7100000011 HC PHASE II RECOVERY - ADDTL 15 MIN: Performed by: PAIN MEDICINE

## 2022-12-22 RX ORDER — LIDOCAINE HYDROCHLORIDE 5 MG/ML
INJECTION, SOLUTION INFILTRATION; INTRAVENOUS PRN
Status: DISCONTINUED | OUTPATIENT
Start: 2022-12-22 | End: 2022-12-22 | Stop reason: ALTCHOICE

## 2022-12-22 RX ORDER — METHYLPREDNISOLONE ACETATE 40 MG/ML
INJECTION, SUSPENSION INTRA-ARTICULAR; INTRALESIONAL; INTRAMUSCULAR; SOFT TISSUE PRN
Status: DISCONTINUED | OUTPATIENT
Start: 2022-12-22 | End: 2022-12-22 | Stop reason: ALTCHOICE

## 2022-12-22 ASSESSMENT — PAIN - FUNCTIONAL ASSESSMENT: PAIN_FUNCTIONAL_ASSESSMENT: 0-10

## 2022-12-22 NOTE — H&P
[facility] - BEHAVIORAL HEALTH SERVICES Pain Management        [address] [facility], [address] [facility]  Dept: [phone]    Procedure History & Physical      Malena Bush     HPI:    Patient  is here for LBP BLE pain for b/l L5 TFESI  Labs/imaging studies reviewed   All question and concerns addressed including R/B/A associated with the procedure    Past Medical History:   Diagnosis Date    Asthma     Bell palsy     drooping    CHF (congestive heart failure) ([address]     DDD (degenerative disc disease), cervical     with spinal stenosis    Diabetes mellitus ([address]     DJD (degenerative joint disease)     both hips    GERD without esophagitis     HTN (hypertension)     Hyperlipidemia     Left hip pain     Meningitis 2009    Morbid obesity due to excess calories ([facility]     Neuropathy     JODI treated with BiPAP     Scoliosis     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[facility])        Past Surgical History:   Procedure Laterality Date    CHOLECYSTECTOMY  2009    CYSTOSCOPY Left 01/14/2018    left stent placement    DILATION AND CURETTAGE OF UTERUS      younger age    [de-identified] SURGERY Left 9/22/2022    LEFT HIP INJECTION performed by Philippe Gordillo DO at [address] [facility]    LITHOTRIPSY Right 02/15/2018    Cystoscopy;Stent Removal    PAIN MANAGEMENT PROCEDURE Bilateral 10/27/2022    BILATERAL L5 TRANSFORAMINAL EPIDURAL STEROID INJECTION performed by Philippe Gordillo DO at [address] [facility] N/A 5/31/2022    GASTRIC BYPASS MARGUERITE-EN-Y LAPAROSCOPIC performed by Shashank Post MD at [facility] 6/22/2018    EGD BIOPSY performed by Shashank Post MD at [address] [place] [address] 8/20/2021    EGD BIOPSY performed by Shashank Post MD at [place] [address]       Prior to Admission medications    Medication Sig Start Date End Date Taking?  Authorizing Provider   spironolactone (ALDACTONE) 25 MG tablet TAKE 1 TABLET BY MOUTH EVERY DAY 12/5/22   Josephine More PA-C   bumetanide (BUMEX) 1 MG tablet TAKE 1 TABLET BY MOUTH EVERY OTHER DAY ALTERNATING WITH 2 TABLETS EVERY OTHER DAY 12/5/22   Claire Staples PA-C   isosorbide dinitrate (ISORDIL) 20 MG tablet TAKE 1 TABLET BY MOUTH THREE TIMES DAILY 12/1/22   Isaias Marie MD   oxyCODONE-acetaminophen (PERCOCET) 5-325 MG per tablet Take 1 tablet by mouth 3 times daily as needed for Pain for up to 30 days. Intended supply: 30 days. Take lowest dose possible to manage pain 12/3/22 1/2/23  Mahogany Suarez DO   gabapentin (NEURONTIN) 600 MG tablet Take 1 tablet by mouth 3 times daily for 30 days. 11/17/22 12/17/22  Mahogany Suarez DO   Misc. Devices (ROLLER WALKER) MISC 1 each by Does not apply route daily Wheeled walker with seat.  10/26/22   GREGORIO Posey   Ferrous Sulfate (IRON PO) Take by mouth daily    Historical Provider, MD   vitamin B-12 (CYANOCOBALAMIN) 1000 MCG tablet Take 1,000 mcg by mouth daily    Historical Provider, MD   citalopram (CELEXA) 40 MG tablet TAKE 1 TABLET BY MOUTH DAILY  Patient taking differently: Take 40 mg by mouth daily as needed (anxiety) 10/24/22   Claire Staples PA-C   fluticasone (FLONASE) 50 MCG/ACT nasal spray SHAKE LIQUID AND USE 2 SPRAYS IN [facility] NOSTRIL DAILY 10/24/22   Claire Staples PA-C   cetirizine (ZYRTEC) 10 MG tablet TAKE 1 TABLET BY MOUTH DAILY 10/24/22   Claire Staples PA-C   metoprolol succinate (TOPROL XL) 100 MG extended release tablet TAKE 1 TABLET BY MOUTH TWICE DAILY 10/24/22   Claire Satples PA-C   atorvastatin (LIPITOR) 80 MG tablet TAKE 1 TABLET BY MOUTH EVERY NIGHT 10/24/22   Claire Staples PA-C   Methylnaltrexone Bromide 150 MG TABS Take 450 mg by mouth every morning  Patient not taking: Reported on 12/20/2022 9/1/22 10/1/22  Mahogany Suarez DO   pantoprazole (PROTONIX) 20 MG tablet TAKE 1 TABLET BY MOUTH DAILY  Patient taking differently: Take 20 mg by mouth daily as needed 8/11/22   Claire Staples PA-C   TRULICITY 0.85 CS/5.4TJ SOPN ADMINISTER 0.75 MG UNDER THE SKIN 1 TIME A WEEK 7/26/22   Claire Staples PA-C hydrALAZINE (APRESOLINE) 50 MG tablet TAKE 1 TABLET BY MOUTH EVERY 8 HOURS 7/25/22   Santosh Woods PA-C   aspirin 81 MG chewable tablet Take 1 tablet by mouth daily 6/13/22   Santosh Woods PA-C   sacubitril-valsartan (ENTRESTO)  MG per tablet Take 1 tablet by mouth 2 times daily 6/13/22   Santosh Woosd PA-C   mineral oil-hydrophilic petrolatum (AQUAPHOR) ointment Apply topically as needed.  6/13/22   Santosh Woods PA-C   omeprazole (PRILOSEC) 20 MG delayed release capsule Take 1 capsule by mouth Daily  Patient taking differently: Take 20 mg by mouth daily as needed 5/18/22 5/18/23  Genet Ruiz MD   potassium chloride (KLOR-CON M) 20 MEQ extended release tablet TAKE 1 TABLET BY MOUTH DAILY WITH BREAKFAST 4/28/22   Santosh Woods PA-C   ondansetron (ZOFRAN-ODT) 4 MG disintegrating tablet Place 2 tablets under the tongue every 8 hours as needed for Nausea or Vomiting 4/13/22   Santosh Woods PA-C   albuterol sulfate HFA (PROAIR HFA) 108 (90 Base) MCG/ACT inhaler Inhale 2 puffs into the lungs every 4 hours as needed for Wheezing or Shortness of Breath 4/13/22   Santosh Woods PA-C   Cholecalciferol (VITAMIN D) 50 MCG (2000 UT) CAPS capsule Take 1 capsule by mouth daily 10/25/21   Santosh Woods PA-C   mometasone-formoterol [facility]) 100-5 MCG/ACT inhaler Inhale 2 puffs into the lungs 2 times daily 6/17/21   Aura Salines, DO   Continuous Blood Gluc  (FREESTYLE MARY READER) MARIJA 4 Devices by Does not apply route daily 5/14/21   Santosh Woods PA-C   Continuous Blood Gluc Sensor (FREESTYLE MARY SENSOR SYSTEM) [facility] 4 Devices by Does not apply route daily 5/14/21   Santosh Woods PA-C   albuterol (PROVENTIL) (2.5 MG/3ML) 0.083% nebulizer solution Take 3 mLs by nebulization every 6 hours as needed for Wheezing 3/23/21   Santosh Woods PA-C   Continuous Blood Gluc Sensor (FREESTYLE MARY 2 SENSOR SYSTM) MISC 1 Device by Does not apply route daily 1/8/21   Santosh Woods PA-C   Multiple Vitamin (  [address]) TABS TAKE 1 TABLET BY MOUTH EVERY DAY 20   Ward Adler DO   ipratropium-albuterol (DUONEB) 0.5-2.5 (3) MG/3ML SOLN nebulizer solution Take 3 mLs by nebulization every 4 hours as needed for Shortness of Breath 20   Juaquin Siemens, MD   ONE TOUCH ULTRA TEST strip 1 each by Does not apply route daily 19   Jacqui Nguyen PA-C   Handicap Placard MISC by Does not apply route Patient cannot walk 200 ft without stopping to rest.    Expiration 5 yrs 19   Jacqui Nguyen PA-C       Allergies   Allergen Reactions    Food Anaphylaxis     Food allergy - Fresh Water Fish, [facility] and Tree Nuts    Ultram [Tramadol Hcl]      Per pt had a seizure    Fish-Derived Products     Norco [Hydrocodone-Acetaminophen] Hives    Pcn [Penicillins]      Not known    Cashews [Macadamia Nut Oil] Nausea And Vomiting       Social History     Socioeconomic History    Marital status: Legally      Spouse name: Not on file    Number of children: Not on file    Years of education: Not on file    Highest education level: Not on file   Occupational History    Occupation: direct care staff/counselor   Tobacco Use    Smoking status: Some Days     Packs/day: 0.25     Years: 17.00     Pack years: 4.25     Types: Cigarettes     Start date: 3/27/2001     Last attempt to quit: 10/5/2018     Years since quittin.2    Smokeless tobacco: Never   Vaping Use    Vaping Use: Former   Substance and Sexual Activity    Alcohol use: No    Drug use: No    Sexual activity: Not on file   Other Topics Concern    Not on file   Social History Narrative    Not on file     Social Determinants of Health     Financial Resource Strain: Low Risk     Difficulty of Paying Living Expenses: Not hard at all   Food Insecurity: No Food Insecurity    Worried About Running Out of Food in the Last Year: Never true    Ran Out of Food in the Last Year: Never true   Transportation Needs: Not on file   Physical Activity: Not on file   Stress: Not on file Social Connections: Not on file   Intimate Partner Violence: Not on file   Housing Stability: Not on file       Family History   Problem Relation Age of Onset    Stroke Mother     Arthritis Mother     Hypertension Mother     Asthma Mother     Fibromyalgia Mother     Cancer Father     Hypertension Father     Heart Attack Father     Asthma Father          REVIEW OF SYSTEMS:    CONSTITUTIONAL:  negative for  fevers, chills, sweats and fatigue    RESPIRATORY:  negative for  dry cough, cough with sputum, dyspnea, wheezing and chest pain    CARDIOVASCULAR:  negative for chest pain, dyspnea, palpitations, syncope    GASTROINTESTINAL:  negative for nausea, vomiting, change in bowel habits, diarrhea, constipation and abdominal pain    MUSCULOSKELETAL: negative for muscle weakness    SKIN: negative for itching or rashes. BEHAVIOR/PSYCH:  negative for poor appetite, increased appetite, decreased sleep and poor concentration    All other systems negative      PHYSICAL EXAM:    VITALS:  BP (!) 152/93   Pulse 94   Temp 98 °F (36.7 °C) (Temporal)   Resp 18   Ht 5' 8\" (1.727 m)   Wt (!) 309 lb (140.2 kg)   SpO2 97%   BMI 46.98 kg/m²     CONSTITUTIONAL:  awake, alert, cooperative, no apparent distress, and appears stated age    EYES: PERRLA, EOMI    LUNGS:  No increased work of breathing, no audible wheezing    CARDIOVASCULAR:  regular rate and rhythm    ABDOMEN:  Soft non tender non distended     EXTREMITIES: no signs of clubbing or cyanosis. MUSCULOSKELETAL: negative for flaccid muscle tone or spastic movements. SKIN: gross examination reveals no signs of rashes, or diaphoresis. NEURO: Cranial nerves II-XII grossly intact. No signs of agitated mood.        Assessment/Plan:    LBP BLE pain for b/l L5 TFESI

## 2022-12-22 NOTE — DISCHARGE INSTRUCTIONS
Calista [facility]/Radiofrequency  Home Going Instructions    1-Go home, rest for the remainder of the day  2-Please do not lift over 20 pounds the day of the injection  3-If you received sedation No: alcohol, driving, operating lawn mowers, plows, tractors or other dangerous equipment until next morning. Do not make important decisions or sign legal documents for 24 hours. You may experience light headedness, dizziness, nausea or sleepiness after sedation. Do not stay alone. A responsible adult must be with you for 24 hours. You could be nauseated from the medications you have received. Your IV site may be sore and bruised. 4-No dietary restrictions     5-Resume all medications the same day, blood thinners to be resumed 24 hours after injection if you were instructed to stop any. 6-Keep the surgical site clean and dry, you may shower the next morning and remove the      dressing. 7- No sitz baths, tub baths or hot tubs/swimming for 24 hours. 8- If you have any pain at the injection site(s), application of an ice pack to the area should be       helpful, 20 minutes on/20 minutes off for next 48 hours. 9- Call [facility] immediately at if you develop.   Fever greater than 100.4 F  Have bleeding or drainage from the puncture site  Have progressive Leg/arm numbness and or weakness  Loss of control of bowel and or bladder (wet/soil yourself)  Severe headache with inability to lift head  10-You may return to work the next day

## 2022-12-22 NOTE — OP NOTE
2022    Patient: Néstor Harp  :  1975  Age:  41 y.o. Sex:  female     PRE-OPERATIVE DIAGNOSIS: Lumbar disc displacement, lumbar neural foraminal stenosis, lumbar radiculopathy. POST-OPERATIVE DIAGNOSIS: Same. PROCEDURE: Bilateral Transforaminal epidural steroid injection under fluoroscopic guidance at foraminal level L5 (#3 steroid injections in the past 6 months). SURGEON: BARBRA Joseph. ANESTHESIA: local    ESTIMATED BLOOD LOSS: None.  ______________________________________________________________________  BRIEF HISTORY: Néstor Harp comes in today for the third Bilateral transforaminal epidural steroid injection under fluoroscopic guidance at foraminal level L5 . The potential complications of this procedure were discussed with her again today. She has elected to undergo the aforementioned procedure. Robe complete History & Physical examination were reviewed in depth, a copy of which is in the chart. DESCRIPTION OF PROCEDURE:    After confirming written and informed consent, a time-out was performed and Robe name and date of birth, the procedure to be performed as well as the plan for the location of the needle insertion were confirmed. The patient was brought into the procedure room and placed in the prone position on the fluoroscopy table. Standard monitors were placed and vital signs were observed throughout the procedure. The area of the lumbar spine was prepped with chloraprep and draped in a sterile manner. The vertebral body was identified with AP fluoroscopy. An oblique view was obtained to better visualize the inferior junction of the pedicle and transverse process . The 6 o'clock position of the pedicle was marked and identified. The skin and subcutaneous tissue were anesthetized with 0.5% lidocaine. A # 22 gauge pencil point needle was directed toward the targeted point under fluoroscopy until bone was contacted.  The needle was then walked inferiorly until the neural foramen was entered . A lateral fluoroscopic view was then used to place the needle tip in the middle to anterior aspect of the foramen. Negative aspiration was confirmed for blood and CSF and 1 cc of Isovue-M 300 contrast was injected at each level under live AP fluoroscopy. Appropriate neurograms were observed under live AP fluoroscopy. Then after negative aspiration, a solution of the 2 cc of 0.5% lidocaine and 40 mg DepoMedrol was easily injected between each level. The needles were removed with constant aspiration technique. The patient's back was cleaned and a bandage was placed over the needle insertion points    Disposition the patient tolerated the procedure well and there were no complications . Vital signs remained stable throughout the procedure. The patient was escorted to the recovery area where they remained until discharge and written discharge instructions for the procedure were given. Plan: Florence Ceron will return to our pain management center as scheduled.      Sweetie Floyd, DO

## 2022-12-27 ENCOUNTER — TELEPHONE (OUTPATIENT)
Dept: PAIN MANAGEMENT | Age: 47
End: 2022-12-27

## 2022-12-27 DIAGNOSIS — M54.42 CHRONIC BILATERAL LOW BACK PAIN WITH BILATERAL SCIATICA: ICD-10-CM

## 2022-12-27 DIAGNOSIS — G89.4 CHRONIC PAIN SYNDROME: ICD-10-CM

## 2022-12-27 DIAGNOSIS — G89.29 CHRONIC BILATERAL LOW BACK PAIN WITH BILATERAL SCIATICA: ICD-10-CM

## 2022-12-27 DIAGNOSIS — M54.41 CHRONIC BILATERAL LOW BACK PAIN WITH BILATERAL SCIATICA: ICD-10-CM

## 2022-12-27 DIAGNOSIS — M54.16 LUMBAR RADICULOPATHY: ICD-10-CM

## 2022-12-27 DIAGNOSIS — M16.12 PRIMARY OSTEOARTHRITIS OF LEFT HIP: ICD-10-CM

## 2022-12-27 DIAGNOSIS — M25.552 LEFT HIP PAIN: ICD-10-CM

## 2022-12-27 DIAGNOSIS — M48.062 SPINAL STENOSIS OF LUMBAR REGION WITH NEUROGENIC CLAUDICATION: ICD-10-CM

## 2022-12-27 RX ORDER — OXYCODONE HYDROCHLORIDE AND ACETAMINOPHEN 5; 325 MG/1; MG/1
1 TABLET ORAL 3 TIMES DAILY PRN
Qty: 36 TABLET | Refills: 0 | Status: SHIPPED | OUTPATIENT
Start: 2023-01-02 | End: 2023-01-14

## 2022-12-27 RX ORDER — GABAPENTIN 600 MG/1
600 TABLET ORAL 3 TIMES DAILY
Qty: 90 TABLET | Refills: 0 | Status: SHIPPED | OUTPATIENT
Start: 2023-01-02 | End: 2023-02-01

## 2022-12-27 NOTE — TELEPHONE ENCOUNTER
Contacted patient to notify. She is also asking for a refill of Gabapentin until her appointment. Per [facility] - last fill on 12/03 - 90 pills for a 30 day fill.

## 2022-12-27 NOTE — TELEPHONE ENCOUNTER
Patient calling in asking for refill of Percocet. She missed her last follow up appt on 12/16    Rescheduled for 1/13 with Nora Chase. Last office visit 11/17  Last fill per [facility] 12/03    Patient will run out before next appointment.

## 2022-12-27 NOTE — TELEPHONE ENCOUNTER
Covering MD for Dr. Hannah Dinh. Pt on Chronic opioid therapy, managed by Dr. Hannah Dinh. Last seen in office 11/17/2022. Last seen for B/L L5 TFESI 12/22/2022. Missed appointment for medication therapy followup on 12/16/2022. Appointment scheduled for 1/13/2023. Per [facility], last filled Oxy-Acet (Percocet) 5/325 #90 for 30 days no refill on 12/3/2022. Will need new script on 1/2/2023. Will provide new script to cover from 1/2/2023 until 1/13/2023 when she sees Chandrakant PERKINS for follow up and then can continue appropriate follow up for chronic opioid use. Also requesting refill of Gabapentin. Taken as directed. No side effects. New Rx   Oxy-Acet (Percocet) 5/325 TID PRN #[id number], Fill 01/02/2023 for 12 days. Gabapentin 600mg TID for 30 days. Fill 01/02/2023 for 30 days. Rashad Fuentes MD    Orders Placed This Encounter    oxyCODONE-acetaminophen (PERCOCET) 5-325 MG per tablet     Sig: Take 1 tablet by mouth 3 times daily as needed for Pain for up to 12 days. Take lowest dose possible to manage pain Max Daily Amount: 3 tablets     Dispense:  36 tablet     Refill:  0     Reduce doses taken as pain becomes manageable    gabapentin (NEURONTIN) 600 MG tablet     Sig: Take 1 tablet by mouth 3 times daily for 30 days.      Dispense:  90 tablet     Refill:  0

## 2023-01-03 ENCOUNTER — TELEPHONE (OUTPATIENT)
Dept: BARIATRICS/WEIGHT MGMT | Age: 48
End: 2023-01-03

## 2023-01-06 DIAGNOSIS — E11.9 TYPE 2 DIABETES MELLITUS WITHOUT COMPLICATION, WITHOUT LONG-TERM CURRENT USE OF INSULIN (HCC): ICD-10-CM

## 2023-01-06 RX ORDER — DULAGLUTIDE 0.75 MG/.5ML
INJECTION, SOLUTION SUBCUTANEOUS
Qty: 2 ML | Refills: 3 | Status: SHIPPED | OUTPATIENT
Start: 2023-01-06

## 2023-01-07 DIAGNOSIS — I10 ESSENTIAL HYPERTENSION: ICD-10-CM

## 2023-01-09 RX ORDER — POTASSIUM CHLORIDE 20 MEQ/1
TABLET, EXTENDED RELEASE ORAL
Qty: 60 TABLET | Refills: 3 | Status: SHIPPED | OUTPATIENT
Start: 2023-01-09

## 2023-01-12 NOTE — PROGRESS NOTES
[facility]IOR[facility] Pain Management  [facility]  [facility], [facility]    Follow up Note      [place] Person     Date of Visit:  1/13/2023    CC:  Patient presents for follow up   No chief complaint on file. HPI:    Pain is unchanged. Having issues with b/l hands n/t x 1 years. Denies cervical radiculopathy. Appropriate analgesia with current medications regimen: yes. Change in quality of symptoms:no. Medication side effects:none. Recent diagnostic testing:none. Recent interventional procedures: 12/22/2022  PROCEDURE: Bilateral Transforaminal epidural steroid injection under fluoroscopic guidance at foraminal level L5 (#3 steroid injections in the past 6 months). 70-80% relief. She has been on anticoagulation medications to include ASA and has not been on herbal supplements. She is diabetic. Imaging:   10/2021 lumbar MRI -      BONES/ALIGNMENT: There is normal alignment of the spine. Mild-to-moderate   levoscoliosis of the lumbar spine is again noted. The vertebral body heights   are maintained. There is spurring and disc desiccation at multiple levels   with mild-to-moderate disc space narrowing at L1-2, L2-3, L3-4 and L4-5. Slight subchondral signal change is seen, most notably at L2-3 and L4-5. This is most likely related to degenerative disc disease. Otherwise, the   bone marrow signal appears unremarkable. SPINAL CORD: The conus terminates normally. SOFT TISSUES: No paraspinal mass identified. The spinal canal is somewhat congenitally narrowed. L1-L2: There is disc bulge and severe right and moderate left posterior facet   degenerative change with ligamentum flavum hypertrophy causing moderate   central canal stenosis. There is mild right and moderate left neural   foraminal narrowing.        L2-L3: There is a disc bulge and severe bilateral posterior facet   degenerative change with ligamentum flavum hypertrophy causing severe central   canal stenosis. There is right lateral disc protrusion causing severe right   neural foraminal narrowing. There is moderate left neural foraminal   narrowing. In addition, there is a central and left paramedian disc   herniation with inferior extension of small extruded fragment behind the L3   vertebral body causing severe left lateral recess stenosis. L3-L4: There is disc bulge and severe bilateral posterior facet degenerative   change and ligamentum flavum hypertrophy causing severe central canal   stenosis. There is severe bilateral neural foraminal stenosis. The findings   at L2-3 and L3-4 are worse compared to the prior study. L4-L5: There is mild disc bulge with severe left and moderate right posterior   facet degenerative change with ligamentum flavum hypertrophy. This causes   moderate left subarticular recess stenosis. No significant central canal   stenosis is seen. There is severe bilateral neural foraminal narrowing. L5-S1: There is disc bulge and moderate to severe bilateral posterior facet   degenerative change, greater on the left with superimposed right   posterolateral and lateral disc protrusion causing severe right subarticular   recess stenosis. There is also severe bilateral neural foraminal narrowing. No significant central canal stenosis seen. Impression   1. Advanced degenerative change with mild to moderate levoscoliosis. 2. Multilevel central canal stenosis, including severe central canal stenosis   at L2-3 and L3-4 and moderate stenosis at L1-2. Moderate left subarticular   recess stenosis at L4-5 and severe right subarticular recess stenosis at   L5-S1.   3. Central and left paramedian disc herniation at L2-3 with inferiorly   extruded disc fragment causing severe left lateral recess stenosis.    4. Multilevel bilateral neural foraminal stenosis including severe foraminal   stenosis on the left at L2-3 and bilaterally at L3-4, L4-5 and L5-S1.   5. Overall, the findings are worse compared to the prior study. 6.  The findings were sent to the Radiology Results [id number] at   11:57 am on 10/4/2021to be communicated to a licensed caregiver. 2021 xray L hip -  FINDINGS:   Demonstrated is joint space narrowing with marginal acetabular are   osteophyte. A femoral head maintains normal contour. No acute fracture identified. Bony pelvis is intact. Enthesopathy noted at   the the iliac wings           Impression   Moderate to severe DJD      Xray pelvis 3/2019 -      1. No evidence of acute fracture or hip dislocation. 2. Mild osteoarthritis involving both hips but with prominent bridging   of the superior acetabulum. This can contribute to impingement. 3. At least moderately severe degenerative changes in the lower lumbar   spine. CT lumbar 3/2019 -   The study is limited by the patient's body habitus which degrades the   imaging quality. No acute fracture or dislocation is seen. Moderate loss of intervertebral disc height is seen throughout the   visualized spine. Moderate to severe bilateral neural foraminal   narrowing is seen at L3-L4, L4-L5 and L5-S1 due to large vertebral   body and facet joint osteophytes. Mild levoconvex scoliosis of the   lumbar spine is seen. Moderate degenerative changes with periarticular osteophytes and loss   of joint space are seen within the sacroiliac joints      Lumbar MRI 2018 -   Findings: There is normal sagittal alignment visualized lumbar spine. No STIR   hyperintensity to suggest an acute fracture or ligamentous injury is   noted. Limited evaluation secondary to technique. Disc desiccation   throughout the visualized lumbar intervertebral discs. Bone marrow   signal is relatively homogeneous throughout. Visualized portions of   the kidneys and aorta are grossly unremarkable although limited in   evaluation.        Sagittal imaging only T11-T12: Mild to moderate circumferential disc bulge. Moderate spinal canal stenosis is noted on this limited study. Moderate to moderately severe bilateral neural foraminal narrowing. Limited evaluation secondary to sagittal imaging only. Sagittal imaging only T12-L1: No evidence of posterior disc contour   abnormality, spinal canal stenosis, neural foraminal narrowing or   spinal nerve root abutment/impingement. .       L1-L2: Large circumferential disc bulge. Thecal sac measures   approximately 0.83 cm anterior posterior dimension of the central   spinal canal. This is secondary to a combination of circumferential   disc bulge and posterior epidural lipomatosis. Moderately severe left   and right neural foraminal narrowing with suspected   abutment/impingement exiting right L1 spinal nerve root. Moderate   bilateral facet osteoarthropathy. Underlying crowding/compression of   the cauda equina nerve roots. L2-L3: Moderate circumferential disc bulge. Moderate bilateral facet   osteoarthropathy. Moderate to moderately severe left and right neural   foraminal narrowing. There is abutment of the exiting left L2 spinal   nerve root. Thecal sac measures approximately 0.92 cm anterior   posterior dimension and central spinal canal. There is questionable   abnormal appearance of the cauda equina nerve roots in this region. L3-L4: Severe circumferential disc bulge with a centrally located   slight inferior directed disc extrusion. Severe facet   osteoarthropathy. Moderate spinal canal stenosis. Severe left and   right neural foraminal narrowing. Abutment/impingement exiting   bilateral L3 spinal nerve roots. Moderately severe thecal sac neural   foraminal narrowing and spinal canal stenosis secondary to a   combination of the facet osteoarthropathy and posterior epidural   hematoma stenosis with crowding and compression of cauda equina nerve   roots. L4-L5: Moderately large circumferential disc bulge.  Severe bilateral   facet osteoarthropathy. Severe left and moderately severe right neural   foraminal narrowing with abutment/impingement of exiting bilateral L4   spinal nerve roots. Extensive facet osteoarthropathy on the left   obliterates the neural foramen. No marco spinal canal stenosis is   seen. L5-S1: Severe bilateral facet osteoarthropathy. Moderate   circumferential disc bulge. Severe left and right neural foraminal   narrowing. Abutment/impingement exiting bilateral L5 spinal nerve   roots. No marco spinal canal stenosis. 1. Multilevel degenerative disc disease and facet osteoarthropathy   T11-T12 and L1-S1. Abutment/impingement exiting right L1, exiting left   L2, exiting bilateral L3, exiting bilateral L4 and exiting bilateral   L5 spinal nerve roots as described above. Severe bilateral neural   foraminal narrowing at L3-L4 and L5-S1 with severe left and moderately   severe right neural foraminal narrowing at L4-L5. Moderate spinal   canal stenosis at L3-L4 and narrowing of the thecal sac at L2-L3. 2. There is a questionable abnormal periods of spinal nerve roots at   the L2-L3 motion segment levels of indeterminate etiology and   significance, clinical correlation for any for infectious parameters   recommended. Consider further evaluation with pre and postcontrast   lumbar spine for further investigation with consideration given to   patient's renal status and any potential safety or allergenic   concerns. 3. Crowding/compression of cauda equina nerve roots greatest at L3-L4   but to a lesser degree at L1-L2.                                          Potential Aberrant Drug-Related Behavior: no     Urine Drug Screenin2022 consistent with Bailee Colon report:  2022 consistent to  consistent    Opioid Agreement:  10/27/2022     Past Medical History:   Diagnosis Date    Asthma     Bell palsy     drooping    CHF (congestive heart failure) (HCC)     DDD (degenerative disc disease), cervical with spinal stenosis    Diabetes mellitus ([facility] [address]     DJD (degenerative joint disease)     both hips    GERD without esophagitis     HTN (hypertension)     Hyperlipidemia     Left hip pain     Meningitis 2009    Morbid obesity due to excess calories ([facility] [address]     Neuropathy     JODI treated with BiPAP     Scoliosis     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[facility])        Past Surgical History:   Procedure Laterality Date    CHOLECYSTECTOMY  2009    CYSTOSCOPY Left 01/14/2018    left stent placement    DILATION AND CURETTAGE OF UTERUS      younger age    HIP SURGERY Left 9/22/2022    LEFT HIP INJECTION performed by Crissy Segovia DO at [address] [facility]    LITHOTRIPSY Right 02/15/2018    Cystoscopy;Stent Removal    NERVE BLOCK Bilateral 12/22/2022    BILATERAL L5 TRANSFORAMINAL EPIDURAL STEROID INJECTION performed by Crissy Segovia DO at [facility] [address] Bilateral 10/27/2022    BILATERAL L5 TRANSFORAMINAL EPIDURAL STEROID INJECTION performed by Crissy Segovia DO at [address] [facility] N/A 5/31/2022    GASTRIC BYPASS MARGUERITE-EN-Y LAPAROSCOPIC performed by Ang Cano MD at [address] [facility] 6/22/2018    EGD BIOPSY performed by Ang Cano MD at [address] [place] [address] 8/20/2021    EGD BIOPSY performed by Ang Cano MD at [place] [address]       Prior to Admission medications    Medication Sig Start Date End Date Taking? Authorizing Provider   oxyCODONE-acetaminophen (PERCOCET) 5-325 MG per tablet Take 1 tablet by mouth 3 times daily as needed for Pain for up to 30 days. Intended supply: 30 days.  Take lowest dose possible to manage pain 1/13/23 2/12/23 Yes GREGORIO Carter   potassium chloride (KLOR-CON M) 20 MEQ extended release tablet TAKE 1 TABLET BY MOUTH DAILY WITH BREAKFAST 1/9/23  Yes Austyn Merida PA-C   TRULICITY 0.02 VS/4.1XY SOPN INJECT 0.75 MG INTO THE SKIN ONCE A WEEK ON MONDAYS 1/6/23  Yes Austyn Merida PA-C oxyCODONE-acetaminophen (PERCOCET) 5-325 MG per tablet Take 1 tablet by mouth 3 times daily as needed for Pain for up to 12 days. Take lowest dose possible to manage pain Max Daily Amount: 3 tablets 1/2/23 1/14/23 Yes Abel Fuller MD   gabapentin (NEURONTIN) 600 MG tablet Take 1 tablet by mouth 3 times daily for 30 days. 1/2/23 2/1/23 Yes Abel Fuller MD   spironolactone (ALDACTONE) 25 MG tablet TAKE 1 TABLET BY MOUTH EVERY DAY 12/5/22  Yes Mauricio Soria PA-C   bumetanide (BUMEX) 1 MG tablet TAKE 1 TABLET BY MOUTH EVERY OTHER DAY ALTERNATING WITH 2 TABLETS EVERY OTHER DAY 12/5/22  Yes Mauricio Soria PA-C   isosorbide dinitrate (ISORDIL) 20 MG tablet TAKE 1 TABLET BY MOUTH THREE TIMES DAILY 12/1/22  Yes Tessie Garcia MD   Misc. Devices (ROLLER WALKER) MISC 1 each by Does not apply route daily Wheeled walker with seat.  10/26/22  Yes GREGORIO Ramirez   Ferrous Sulfate (IRON PO) Take by mouth daily   Yes Historical Provider, MD   vitamin B-12 (CYANOCOBALAMIN) 1000 MCG tablet Take 1,000 mcg by mouth daily   Yes Historical Provider, MD   citalopram (CELEXA) 40 MG tablet TAKE 1 TABLET BY MOUTH DAILY  Patient taking differently: Take 40 mg by mouth daily as needed (anxiety) 10/24/22  Yes Mauricio Soria PA-C   fluticasone Cheyanne Rachel) 50 MCG/ACT nasal spray SHAKE LIQUID AND USE 2 SPRAYS IN [facility] NOSTRIL DAILY 10/24/22  Yes Mauricio Soria PA-C   cetirizine (ZYRTEC) 10 MG tablet TAKE 1 TABLET BY MOUTH DAILY 10/24/22  Yes Mauricio Soria PA-C   metoprolol succinate (TOPROL XL) 100 MG extended release tablet TAKE 1 TABLET BY MOUTH TWICE DAILY 10/24/22  Yes Mauricio Soria PA-C   atorvastatin (LIPITOR) 80 MG tablet TAKE 1 TABLET BY MOUTH EVERY NIGHT 10/24/22  Yes Mauricio Soria PA-C   pantoprazole (PROTONIX) 20 MG tablet TAKE 1 TABLET BY MOUTH DAILY  Patient taking differently: Take 20 mg by mouth daily as needed 8/11/22  Yes Mauricio Soria PA-C   hydrALAZINE (APRESOLINE) 50 MG tablet TAKE 1 TABLET BY MOUTH EVERY 8 HOURS 7/25/22  Yes CONNIE AlfonsoC   aspirin 81 MG chewable tablet Take 1 tablet by mouth daily 6/13/22  Yes GREGORIO Alfonso-C   sacubitril-valsartan [facility])  MG per tablet Take 1 tablet by mouth 2 times daily 6/13/22  Yes Claire Staples PA-C   mineral oil-hydrophilic petrolatum (AQUAPHOR) ointment Apply topically as needed.  6/13/22  Yes GREGORIO Alfonso-NOAH   omeprazole (PRILOSEC) 20 MG delayed release capsule Take 1 capsule by mouth Daily  Patient taking differently: Take 20 mg by mouth daily as needed 5/18/22 5/18/23 Yes Dawson Gibbons MD   ondansetron (ZOFRAN-ODT) 4 MG disintegrating tablet Place 2 tablets under the tongue every 8 hours as needed for Nausea or Vomiting 4/13/22  Yes GREGORIO Alfonso-C   albuterol sulfate HFA (PROAIR HFA) 108 (90 Base) MCG/ACT inhaler Inhale 2 puffs into the lungs every 4 hours as needed for Wheezing or Shortness of Breath 4/13/22  Yes CONNIE AlfonsoC   Cholecalciferol (VITAMIN D) 50 MCG (2000 UT) CAPS capsule Take 1 capsule by mouth daily 10/25/21  Yes GREGORIO Alfonso-C   mometasone-formoterol [facility]) 100-5 MCG/ACT inhaler Inhale 2 puffs into the lungs 2 times daily 6/17/21  Yes Yelena Álvarez,    Continuous Blood Gluc  (FREESTYLE MARY READER) MARIJA 4 Devices by Does not apply route daily 5/14/21  Yes Clairejarrod Staples PA-C   Continuous Blood Gluc Sensor ([facility]) [address] [facility] 4 Devices by Does not apply route daily 5/14/21  Yes Clairejarrod Staples PA-C   albuterol (PROVENTIL) (2.5 MG/3ML) 0.083% nebulizer solution Take 3 mLs by nebulization every 6 hours as needed for Wheezing 3/23/21  Yes Claire Candpetr, PA-C   Continuous Blood Gluc Sensor (FREESTYLE MARY 2 SENSOR SYSTM) MISC 1 Device by Does not apply route daily 1/8/21  Yes Claire Staples PA-C   Multiple Vitamin (DAILY-DOUGLAS) TABS TAKE 1 TABLET BY MOUTH EVERY DAY 6/26/20  Yes Naomia Bias, DO   ipratropium-albuterol (DUONEB) 0.5-2.5 (3) MG/3ML SOLN nebulizer solution Take 3 mLs by nebulization every 4 hours as needed for Shortness of Breath 20  Yes Shashank Corona MD   ONE TOUCH ULTRA TEST strip 1 each by Does not apply route daily 19  Yes Inell Adelso PAUzmaC   Handicap Vijay [address] [facility] by Does not apply route Patient cannot walk 200 ft without stopping to rest.    Expiration 5 yrs 19  Yes Inell Fish, PA-C   Methylnaltrexone Bromide 150 MG TABS Take 450 mg by mouth every morning  Patient not taking: Reported on 2022 9/1/22 10/1/22  Omero Purcell DO       Allergies   Allergen Reactions    Food Anaphylaxis     Food allergy - Fresh Water Fish, [place] and Tree Nuts    Ultram [Tramadol Hcl]      Per pt had a seizure    Fish-Derived Products     Norco [Hydrocodone-Acetaminophen] Hives    Pcn [Penicillins]      Not known    Cashews [Macadamia Nut Oil] Nausea And Vomiting       Social History     Socioeconomic History    Marital status: Legally      Spouse name: Not on file    Number of children: Not on file    Years of education: Not on file    Highest education level: Not on file   Occupational History    Occupation: direct care staff/counselor   Tobacco Use    Smoking status: Some Days     Packs/day: 0.25     Years: 17.00     Pack years: 4.25     Types: Cigarettes     Start date: 3/27/2001     Last attempt to quit: 10/5/2018     Years since quittin.2    Smokeless tobacco: Never   Vaping Use    Vaping Use: Former   Substance and Sexual Activity    Alcohol use: No    Drug use: No    Sexual activity: Not on file   Other Topics Concern    Not on file   Social History Narrative    Not on file     Social Determinants of Health     Financial Resource Strain: Low Risk     Difficulty of Paying Living Expenses: Not hard at all   Food Insecurity: No Food Insecurity    Worried About Running Out of Food in the Last Year: Never true    Ran Out of Food in the Last Year: Never true   Transportation Needs: Not on file   Physical Activity: Not on file   Stress: Not on file   Social Connections: Not on file   Intimate Partner Violence: Not on file   Housing Stability: Not on file       Family History   Problem Relation Age of Onset    Stroke Mother     Arthritis Mother     Hypertension Mother     Asthma Mother     Fibromyalgia Mother     Cancer Father     Hypertension Father     Heart Attack Father     Asthma Father        REVIEW OF SYSTEMS:     Skipppetr Guillen denies fever/chills, chest pain, shortness of breath, new bowel or bladder complaints. All other review of systems was negative. PHYSICAL EXAMINATION:      BP (!) 147/98   Pulse 73   Temp 97.6 °F (36.4 °C) (Infrared)   Resp 16   Ht 5' 8\" (1.727 m)   Wt (!) 309 lb (140.2 kg)   BMI 46.98 kg/m²     General:      General appearance:   pleasant and well-hydrated. , in mild discomfort and A & O x3  Build: Morbidly obese    HEENT:    Head:normocephalic and atraumatic  Sclera: icterus absent,    Lungs:    Breathing:Normal expansion. No wheezing. Abdomen:    Shape:non-distended and normal    Lumbar spine:    Spine inspection:normal   Range of motion:abnormal moderately Lateral bending, flexion, extension rotation bilateral and is  painful. Extremities:    Tremors:None bilaterally upper and lower  Range of motion:Generally normal shoulders, pain with internal rotation of hips not done. Intact:Yes  Edema:Normal  Negative Tinel's sign.     Neurological:    Gait: in wheelchair    Dermatology:    Skin:no unusual rashes, no skin lesions, no palpable subcutaneous nodules, and good skin turgor    Impression:    Previously seen in 2019 for LBP and LLE pain (nondermatomal)  Upon 2022 consultation pt reports diffuse pain consistent with hyperalgesia, LBP BLE, and severe L hip pain  Lumbar MRI shows significant degenerative changes with multilevel stenosis  L hip xray shows significant OA changes  PMHx: JODI (compliant with cpap), asthma, morbid obesity s/p gastric bypass 5/2022 and as of 7/2022 has lost 30 lbs, CHF, DM, GERD, DLD, HTN     She re-presented recently after having not been seen in almost 3 years as referred by Ravinder Hendricks given Dr. Corine Ortega change in practice status  Overall patient is nonfunctional, spending a significant amount of time in a wheelchair - this is improving since her hip injection     She has lost 60 lbs since her gastric bypass     The opioid induced constipation is resolved with the lactulose along with Benefiber     She would like to repeat her LESI and hip injections before her birthday - middle of March. OARRS report reviewed  EMG UEs ordered for b/l hand numbness/tingling x 1 year. Discussed negative SE's associated with chronic opioid therapy, given her comorbidities (morbid obesity, JODI, minimizing opioid dosing will be paramount)  No RF gabapentin 600 mg TID #90  RF percocet 5/325 #90 - no increases  No RF lactulose  Aqua therapy - Has stopped due to pain. States that she knows that she needs to go back. L hip injection with 80% relief x 1.5 weeks only. Consider referral to ortho, however, she would like to hold off. B/l L5 TFESI - with 70-80% relief. Patient encouraged to stay active and to lose weight, encouraged to continue with bariatric program  Treatment plan discussed with the patient including medication and procedure side effects      Controlled Substance Monitoring:    Acute and Chronic Pain Monitoring:   RX Monitoring 1/13/2023   Attestation -   Acute Pain Prescriptions -   Periodic Controlled Substance Monitoring Possible medication side effects, risk of tolerance/dependence & alternative treatments discussed. ;No signs of potential drug abuse or diversion identified. ;Assessed functional status. ;Obtaining appropriate analgesic effect of treatment. Chronic Pain > 80 MEDD -                           We discussed with the patient that combining opioids, benzodiazepines, alcohol, illicit drugs or sleep aids increases the risk of respiratory depression including death.  We discussed that these medications may cause drowsiness, sedation or dizziness and have counseled the patient not to drive or operate machinery. We have discussed that these medications will be prescribed only by one provider. We have discussed with the patient about age related risk factors and have thoroughly discussed the importance of taking these medications as prescribed. The patient verbalizes understanding.     ccreferring physic

## 2023-01-13 ENCOUNTER — OFFICE VISIT (OUTPATIENT)
Dept: PAIN MANAGEMENT | Age: 48
End: 2023-01-13
Payer: MEDICARE

## 2023-01-13 VITALS
HEIGHT: 68 IN | WEIGHT: 293 LBS | RESPIRATION RATE: 16 BRPM | DIASTOLIC BLOOD PRESSURE: 98 MMHG | BODY MASS INDEX: 44.41 KG/M2 | SYSTOLIC BLOOD PRESSURE: 147 MMHG | TEMPERATURE: 97.6 F | HEART RATE: 73 BPM

## 2023-01-13 DIAGNOSIS — M48.062 SPINAL STENOSIS OF LUMBAR REGION WITH NEUROGENIC CLAUDICATION: ICD-10-CM

## 2023-01-13 DIAGNOSIS — M25.552 LEFT HIP PAIN: ICD-10-CM

## 2023-01-13 DIAGNOSIS — G89.4 CHRONIC PAIN SYNDROME: Primary | ICD-10-CM

## 2023-01-13 DIAGNOSIS — Z98.84 S/P GASTRIC BYPASS: ICD-10-CM

## 2023-01-13 DIAGNOSIS — M16.0 PRIMARY OSTEOARTHRITIS OF BOTH HIPS: ICD-10-CM

## 2023-01-13 DIAGNOSIS — M54.16 LUMBAR RADICULOPATHY: ICD-10-CM

## 2023-01-13 DIAGNOSIS — R20.0 NUMBNESS AND TINGLING IN BOTH HANDS: ICD-10-CM

## 2023-01-13 DIAGNOSIS — R20.2 NUMBNESS AND TINGLING IN BOTH HANDS: ICD-10-CM

## 2023-01-13 DIAGNOSIS — M54.42 CHRONIC BILATERAL LOW BACK PAIN WITH BILATERAL SCIATICA: ICD-10-CM

## 2023-01-13 DIAGNOSIS — M16.12 PRIMARY OSTEOARTHRITIS OF LEFT HIP: ICD-10-CM

## 2023-01-13 DIAGNOSIS — K59.03 DRUG-INDUCED CONSTIPATION: ICD-10-CM

## 2023-01-13 DIAGNOSIS — M54.41 CHRONIC BILATERAL LOW BACK PAIN WITH BILATERAL SCIATICA: ICD-10-CM

## 2023-01-13 DIAGNOSIS — G89.29 CHRONIC BILATERAL LOW BACK PAIN WITH BILATERAL SCIATICA: ICD-10-CM

## 2023-01-13 PROCEDURE — 99213 OFFICE O/P EST LOW 20 MIN: CPT | Performed by: PHYSICIAN ASSISTANT

## 2023-01-13 RX ORDER — OXYCODONE HYDROCHLORIDE AND ACETAMINOPHEN 5; 325 MG/1; MG/1
1 TABLET ORAL 3 TIMES DAILY PRN
Qty: 90 TABLET | Refills: 0 | Status: SHIPPED | OUTPATIENT
Start: 2023-01-13 | End: 2023-02-12

## 2023-01-13 NOTE — PROGRESS NOTES
Do you currently have any of the following:    Fever: No  Headache:  No  Cough: No  Shortness of breath: No  Exposed to anyone with these symptoms: No         An Post presents to the [facility] 1/13/[facility]. Michelle Mcconnell is complaining of pain lower back. The pain is constant. The pain is described as aching, throbbing, shooting, sharp, and burning. Pain is rated on her best day at a 7, on her worst day at a 10, and on average at a 10 on the VAS scale. She took her last dose of Percocet today. Any procedures since your last visit: No.    Pacemaker or defibrillator: No    She is not on NSAIDS and is  on anticoagulation medications to include ASA and is managed by Hans Halsted, PA-C  . Medication Contract and Consent for Opioid Use Documents Filed       Patient Documents       Type of Document Status Date Received Received By Description    Medication Contract Received 7/1/2019 11:37 AM Marcio Rodarte PAIN MGMT CONTRACT DR. VILLANUEVA    Medication Contract Signed 7/24/2019 10:15 AM KALIA BYRD medication contract    Medication Contract Received 10/27/2022  3:10 PM Elaine Patel [place] Agreement                    BP (!) 147/98   Pulse 73   Temp 97.6 °F (36.4 °C) (Infrared)   Resp 16   Ht 5' 8\" (1.727 m)   Wt (!) 309 lb (140.2 kg)   BMI 46.98 kg/m²      No LMP recorded.

## 2023-01-24 ENCOUNTER — TELEPHONE (OUTPATIENT)
Dept: PRIMARY CARE CLINIC | Age: 48
End: 2023-01-24

## 2023-01-24 DIAGNOSIS — L02.211 ABSCESS OF ABDOMINAL WALL: ICD-10-CM

## 2023-01-24 NOTE — TELEPHONE ENCOUNTER
Pt calling with a flare up of hidradenitis in groin area please send antibiotic to [facility] on [address]

## 2023-01-25 RX ORDER — CLINDAMYCIN HYDROCHLORIDE 300 MG/1
300 CAPSULE ORAL 2 TIMES DAILY
Qty: 21 CAPSULE | Refills: 0 | Status: SHIPPED | OUTPATIENT
Start: 2023-01-25 | End: 2023-02-01

## 2023-01-26 ENCOUNTER — OFFICE VISIT (OUTPATIENT)
Dept: BARIATRICS/WEIGHT MGMT | Age: 48
End: 2023-01-26
Payer: MEDICARE

## 2023-01-26 ENCOUNTER — INITIAL CONSULT (OUTPATIENT)
Dept: BARIATRICS/WEIGHT MGMT | Age: 48
End: 2023-01-26

## 2023-01-26 VITALS
DIASTOLIC BLOOD PRESSURE: 80 MMHG | HEART RATE: 69 BPM | SYSTOLIC BLOOD PRESSURE: 132 MMHG | WEIGHT: 293 LBS | BODY MASS INDEX: 43.4 KG/M2 | RESPIRATION RATE: 20 BRPM | TEMPERATURE: 97.1 F | HEIGHT: 69 IN

## 2023-01-26 VITALS — WEIGHT: 293 LBS | HEIGHT: 69 IN | BODY MASS INDEX: 43.4 KG/M2

## 2023-01-26 DIAGNOSIS — L30.4 INTERTRIGO: ICD-10-CM

## 2023-01-26 DIAGNOSIS — B37.31 VAGINAL CANDIDA: ICD-10-CM

## 2023-01-26 DIAGNOSIS — N76.0 ACUTE VAGINITIS: ICD-10-CM

## 2023-01-26 DIAGNOSIS — Z71.3 NUTRITIONAL COUNSELING: ICD-10-CM

## 2023-01-26 DIAGNOSIS — K91.2 MALNUTRITION FOLLOWING GASTROINTESTINAL SURGERY: ICD-10-CM

## 2023-01-26 DIAGNOSIS — Z00.8 NUTRITIONAL ASSESSMENT: Primary | ICD-10-CM

## 2023-01-26 DIAGNOSIS — K91.2 MALNUTRITION FOLLOWING GASTROINTESTINAL SURGERY: Primary | ICD-10-CM

## 2023-01-26 PROCEDURE — 99214 OFFICE O/P EST MOD 30 MIN: CPT | Performed by: NURSE PRACTITIONER

## 2023-01-26 PROCEDURE — G8484 FLU IMMUNIZE NO ADMIN: HCPCS | Performed by: NURSE PRACTITIONER

## 2023-01-26 PROCEDURE — 4004F PT TOBACCO SCREEN RCVD TLK: CPT | Performed by: NURSE PRACTITIONER

## 2023-01-26 PROCEDURE — G8417 CALC BMI ABV UP PARAM F/U: HCPCS | Performed by: NURSE PRACTITIONER

## 2023-01-26 PROCEDURE — G8427 DOCREV CUR MEDS BY ELIG CLIN: HCPCS | Performed by: NURSE PRACTITIONER

## 2023-01-26 PROCEDURE — 3075F SYST BP GE 130 - 139MM HG: CPT | Performed by: NURSE PRACTITIONER

## 2023-01-26 PROCEDURE — 3079F DIAST BP 80-89 MM HG: CPT | Performed by: NURSE PRACTITIONER

## 2023-01-26 PROCEDURE — 99212 OFFICE O/P EST SF 10 MIN: CPT

## 2023-01-26 RX ORDER — CLOTRIMAZOLE AND BETAMETHASONE DIPROPIONATE 10; .64 MG/G; MG/G
CREAM TOPICAL
Qty: 15 G | Refills: 1 | Status: SHIPPED | OUTPATIENT
Start: 2023-01-26

## 2023-01-26 RX ORDER — FLUCONAZOLE 150 MG/1
150 TABLET ORAL ONCE
Qty: 2 TABLET | Refills: 0 | Status: SHIPPED | OUTPATIENT
Start: 2023-01-26 | End: 2023-01-26

## 2023-01-26 RX ORDER — NYSTATIN 100000 [USP'U]/G
POWDER TOPICAL
Qty: 60 G | Refills: 0 | Status: SHIPPED | OUTPATIENT
Start: 2023-01-26

## 2023-01-26 NOTE — PATIENT INSTRUCTIONS
Please continue to take your vitamin and mineral supplements as instructed. If you received a blood work prescription today for laboratory monitoring due prior to your next routine follow-up visit, please have this blood work obtained 10 to 14 days prior to your next visit. It is important to fast for 12 hours prior to routine weight loss surgery blood work, EXCEPT for drinking water, to ensure accuracy of results. Please report nausea, vomiting, abdominal pain, or any other problems you experience to your surgeon. For problems related to weight loss surgery, it is best to go to [address] [place] [address] Emergency Department and have your surgeon paged.

## 2023-01-26 NOTE — PROGRESS NOTES
Lorna Sharma  1/26/2023  [address]     Kendall-en- Y Gastric Bypass  6 months Post-Operative Follow-up     Lorna Sharma is a 41 y.o. female who is 6 months post Laparoscopic Kendall-en-Y Gastric Bypass surgery. Reports that she is doing good, however reports that she feels like she is dehydrated at times. She is not having swallowing difficulty. Patient reports occasional nausea and vomiting if she eats too fast.  Patient denies gastric reflux symptoms. Bowel movements are normal per patient, occasional constipation. Patient is compliant most of the time with the multivitamins and calcium + Vit D. She is meeting fluid recommendations of at least 64 ounces per day and is meeting protein recommendations. She is no longer doing the protein drinks. She  is exercising:  upper body at home in bed . Patient is not taking fiber supplements. She is having issues with skin under her pannus. She has been using Aquaphor as a skin barrier. She has a spot on her right inner thigh, also reports that under her pannus it gets a bad odor      Weight=(!) 302 lb (137 kg)  Today's weight represents a total weight loss of 80 pounds since surgery with 0 pounds regained since the lowest weight. Prior to Admission medications    Medication Sig Start Date End Date Taking? Authorizing Provider   clotrimazole-betamethasone (LOTRISONE) 1-0.05 % cream Apply topically 2 times daily. 1/26/23  Yes SANJUANA Arnold CNP   nystatin (MYCOSTATIN) 506361 UNIT/GM powder Apply to affected area three times a day 1/26/23  Yes SANJUANA Arnold CNP   fluconazole (DIFLUCAN) 150 MG tablet Take 1 tablet by mouth once for 1 dose May repeat in 3-5 days, if symptoms persist or recur.  1/26/23 1/26/23 Yes SANJUANA Arnold CNP   clindamycin (CLEOCIN) 300 MG capsule Take 1 capsule by mouth 2 times daily for 7 days 1/25/23 2/1/23 Yes Marzena Hu PA-C   oxyCODONE-acetaminophen (PERCOCET) 2-260 MG per tablet Take 1 tablet by mouth 3 times daily as needed for Pain for up to 30 days. Intended supply: 30 days. Take lowest dose possible to manage pain 1/13/23 2/12/23 Yes GREGORIO Bates   potassium chloride (KLOR-CON M) 20 MEQ extended release tablet TAKE 1 TABLET BY MOUTH DAILY WITH BREAKFAST 1/9/23  Yes Cindy Figueredo PA-C   TRULICITY 9.43 CF/5.9SH SOPN INJECT 0.75 MG INTO THE SKIN ONCE A WEEK ON MONDAYS 1/6/23  Yes Cindy Figueredo PA-C   gabapentin (NEURONTIN) 600 MG tablet Take 1 tablet by mouth 3 times daily for 30 days. 1/2/23 2/1/23 Yes Sirena Joel MD   spironolactone (ALDACTONE) 25 MG tablet TAKE 1 TABLET BY MOUTH EVERY DAY 12/5/22  Yes Cindy Figueredo PA-C   bumetanide (BUMEX) 1 MG tablet TAKE 1 TABLET BY MOUTH EVERY OTHER DAY ALTERNATING WITH 2 TABLETS EVERY OTHER DAY 12/5/22  Yes Cindy iFgueredo PA-C   isosorbide dinitrate (ISORDIL) 20 MG tablet TAKE 1 TABLET BY MOUTH THREE TIMES DAILY 12/1/22  Yes Luciano Barron MD   Misc. Devices (ROLLER WALKER) MISC 1 each by Does not apply route daily Wheeled walker with seat.  10/26/22  Yes GREGORIO Bates   Ferrous Sulfate (IRON PO) Take by mouth daily   Yes Historical Provider, MD   vitamin B-12 (CYANOCOBALAMIN) 1000 MCG tablet Take 1,000 mcg by mouth daily   Yes Historical Provider, MD   citalopram (CELEXA) 40 MG tablet TAKE 1 TABLET BY MOUTH DAILY  Patient taking differently: Take 40 mg by mouth daily as needed (anxiety) 10/24/22  Yes Cindy Figueredo PA-C   fluticasone [facility]) 50 MCG/ACT nasal spray SHAKE LIQUID AND USE 2 SPRAYS IN [facility] NOSTRIL DAILY 10/24/22  Yes Cindy Figueredo PA-C   cetirizine (ZYRTEC) 10 MG tablet TAKE 1 TABLET BY MOUTH DAILY 10/24/22  Yes Cindy Figueredo PA-C   metoprolol succinate (TOPROL XL) 100 MG extended release tablet TAKE 1 TABLET BY MOUTH TWICE DAILY 10/24/22  Yes Cindy Figueredo PA-C   atorvastatin (LIPITOR) 80 MG tablet TAKE 1 TABLET BY MOUTH EVERY NIGHT 10/24/22  Yes Read ESTER Figueredo   pantoprazole (PROTONIX) 20 MG tablet TAKE 1 TABLET BY MOUTH DAILY  Patient taking differently: Take 20 mg by mouth daily as needed 8/11/22  Yes Kamlesh Lawler PA-C   hydrALAZINE (APRESOLINE) 50 MG tablet TAKE 1 TABLET BY MOUTH EVERY 8 HOURS 7/25/22  Yes Kamlesh Lawler PA-C   aspirin 81 MG chewable tablet Take 1 tablet by mouth daily 6/13/22  Yes Kamlesh Lawler PA-C   sacubitril-valsartan (ENTRESTO)  MG per tablet Take 1 tablet by mouth 2 times daily 6/13/22  Yes Kamlesh Lawler PA-C   mineral oil-hydrophilic petrolatum (AQUAPHOR) ointment Apply topically as needed.  6/13/22  Yes Kamlesh Lawler PA-C   omeprazole (PRILOSEC) 20 MG delayed release capsule Take 1 capsule by mouth Daily  Patient taking differently: Take 20 mg by mouth daily as needed 5/18/22 5/18/23 Yes Ai Newton MD   ondansetron (ZOFRAN-ODT) 4 MG disintegrating tablet Place 2 tablets under the tongue every 8 hours as needed for Nausea or Vomiting 4/13/22  Yes Kamlesh Lawler PA-C   albuterol sulfate HFA (PROAIR HFA) 108 (90 Base) MCG/ACT inhaler Inhale 2 puffs into the lungs every 4 hours as needed for Wheezing or Shortness of Breath 4/13/22  Yes Kamlesh Lawler PA-C   Cholecalciferol (VITAMIN D) 50 MCG (2000 UT) CAPS capsule Take 1 capsule by mouth daily 10/25/21  Yes Kamlesh Lawler PA-C   mometasone-formoterol [facility]) 100-5 MCG/ACT inhaler Inhale 2 puffs into the lungs 2 times daily 6/17/21  Yes Deo Perez,    Continuous Blood Gluc  (FREESTYLE MARY READER) MARIJA 4 Devices by Does not apply route daily 5/14/21  Yes Kamlesh Lawler PA-C   Continuous Blood Gluc Sensor ([facility]) [address] [facility] 4 Devices by Does not apply route daily 5/14/21  Yes Kamlesh Lawler PA-C   albuterol (PROVENTIL) (2.5 MG/3ML) 0.083% nebulizer solution Take 3 mLs by nebulization every 6 hours as needed for Wheezing 3/23/21  Yes Kamlesh Lawler PA-C   Continuous Blood Gluc Sensor (FREESTYLE MARY 2 SENSOR SYSTM) MISC 1 Device by Does not apply route daily 1/8/21  Yes Kamlesh Lawler PA-C Multiple Vitamin (DAILY-DOUGLAS) TABS TAKE 1 TABLET BY MOUTH EVERY DAY 6/26/20  Yes Juan C Muñoz DO   ipratropium-albuterol (DUONEB) 0.5-2.5 (3) MG/3ML SOLN nebulizer solution Take 3 mLs by nebulization every 4 hours as needed for Shortness of Breath 5/1/20  Yes Laura Hunt MD   ONE TOUCH ULTRA TEST strip 1 each by Does not apply route daily 9/17/19  Yes Santi Hussein PA-C   Handicap Placard MISC by Does not apply route Patient cannot walk 200 ft without stopping to rest.    Expiration 5 yrs 2/22/19  Yes Santi Hussein PA-C   Methylnaltrexone Bromide 150 MG TABS Take 450 mg by mouth every morning  Patient not taking: Reported on 12/20/2022 9/1/22 10/1/22  Domenica Moreno DO        Allergies   Allergen Reactions    Food Anaphylaxis     Food allergy - Fresh Water Fish, [place] and Tree Nuts    Ultram [Tramadol Hcl]      Per pt had a seizure    Fish-Derived Products     Norco [Hydrocodone-Acetaminophen] Hives    Pcn [Penicillins]      Not known    Cashews [Macadamia Nut Oil] Nausea And Vomiting           Past Medical History:   Diagnosis Date    Asthma     Bell palsy     drooping    CHF (congestive heart failure) ([facility])     DDD (degenerative disc disease), cervical     with spinal stenosis    Diabetes mellitus ([address]     DJD (degenerative joint disease)     both hips    GERD without esophagitis     HTN (hypertension)     Hyperlipidemia     Left hip pain     Meningitis 2009    Morbid obesity due to excess calories ([address]     Neuropathy     JODI treated with BiPAP     Scoliosis     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[facility])        Past Surgical History:   Procedure Laterality Date    CHOLECYSTECTOMY  2009    CYSTOSCOPY Left 01/14/2018    left stent placement    DILATION AND CURETTAGE OF UTERUS      younger age    HIP SURGERY Left 9/22/2022    LEFT HIP INJECTION performed by Domenica Moreno DO at [address]    LITHOTRIPSY Right 02/15/2018    Cystoscopy;Stent Removal    NERVE BLOCK Bilateral 12/22/2022    BILATERAL L5 TRANSFORAMINAL EPIDURAL STEROID INJECTION performed by Elham Mueller DO at [address] Bilateral 10/27/2022    BILATERAL L5 TRANSFORAMINAL EPIDURAL STEROID INJECTION performed by Elham Mueller DO at [address] [facility] N/A 5/31/2022    GASTRIC BYPASS MARGUERITE-EN-Y LAPAROSCOPIC performed by Issac Saeed MD at [facility]. 6/22/2018    EGD BIOPSY performed by Issac Saeed MD at [facility] 8/20/2021    EGD BIOPSY performed by Issac Saeed MD at [place] [address]       Current Outpatient Medications   Medication Sig Dispense Refill    clotrimazole-betamethasone (LOTRISONE) 1-0.05 % cream Apply topically 2 times daily. 15 g 1    nystatin (MYCOSTATIN) 449571 UNIT/GM powder Apply to affected area three times a day 60 g 0    fluconazole (DIFLUCAN) 150 MG tablet Take 1 tablet by mouth once for 1 dose May repeat in 3-5 days, if symptoms persist or recur. 2 tablet 0    clindamycin (CLEOCIN) 300 MG capsule Take 1 capsule by mouth 2 times daily for 7 days 21 capsule 0    oxyCODONE-acetaminophen (PERCOCET) 5-325 MG per tablet Take 1 tablet by mouth 3 times daily as needed for Pain for up to 30 days. Intended supply: 30 days. Take lowest dose possible to manage pain 90 tablet 0    potassium chloride (KLOR-CON M) 20 MEQ extended release tablet TAKE 1 TABLET BY MOUTH DAILY WITH BREAKFAST 60 tablet 3    TRULICITY 4.81 QM/2.9CD SOPN INJECT 0.75 MG INTO THE SKIN ONCE A WEEK ON MONDAYS 2 mL 3    gabapentin (NEURONTIN) 600 MG tablet Take 1 tablet by mouth 3 times daily for 30 days. 90 tablet 0    spironolactone (ALDACTONE) 25 MG tablet TAKE 1 TABLET BY MOUTH EVERY DAY 30 tablet 5    bumetanide (BUMEX) 1 MG tablet TAKE 1 TABLET BY MOUTH EVERY OTHER DAY ALTERNATING WITH 2 TABLETS EVERY OTHER DAY 60 tablet 1    isosorbide dinitrate (ISORDIL) 20 MG tablet TAKE 1 TABLET BY MOUTH THREE TIMES DAILY 90 tablet 5    Misc.  Devices (ROLLER WALKER) MISC 1 each by Does not apply route daily Wheeled walker with seat. 1 each 0    Ferrous Sulfate (IRON PO) Take by mouth daily      vitamin B-12 (CYANOCOBALAMIN) 1000 MCG tablet Take 1,000 mcg by mouth daily      citalopram (CELEXA) 40 MG tablet TAKE 1 TABLET BY MOUTH DAILY (Patient taking differently: Take 40 mg by mouth daily as needed (anxiety)) 30 tablet 5    fluticasone (FLONASE) 50 MCG/ACT nasal spray SHAKE LIQUID AND USE 2 SPRAYS IN EACH NOSTRIL DAILY 16 g 2    cetirizine (ZYRTEC) 10 MG tablet TAKE 1 TABLET BY MOUTH DAILY 30 tablet 3    metoprolol succinate (TOPROL XL) 100 MG extended release tablet TAKE 1 TABLET BY MOUTH TWICE DAILY 60 tablet 3    atorvastatin (LIPITOR) 80 MG tablet TAKE 1 TABLET BY MOUTH EVERY NIGHT 30 tablet 3    pantoprazole (PROTONIX) 20 MG tablet TAKE 1 TABLET BY MOUTH DAILY (Patient taking differently: Take 20 mg by mouth daily as needed) 30 tablet 3    hydrALAZINE (APRESOLINE) 50 MG tablet TAKE 1 TABLET BY MOUTH EVERY 8 HOURS 90 tablet 2    aspirin 81 MG chewable tablet Take 1 tablet by mouth daily 30 tablet 5    sacubitril-valsartan (ENTRESTO)  MG per tablet Take 1 tablet by mouth 2 times daily 60 tablet 5    mineral oil-hydrophilic petrolatum (AQUAPHOR) ointment Apply topically as needed.  99 g 5    omeprazole (PRILOSEC) 20 MG delayed release capsule Take 1 capsule by mouth Daily (Patient taking differently: Take 20 mg by mouth daily as needed) 30 capsule 12    ondansetron (ZOFRAN-ODT) 4 MG disintegrating tablet Place 2 tablets under the tongue every 8 hours as needed for Nausea or Vomiting 30 tablet 1    albuterol sulfate HFA (PROAIR HFA) 108 (90 Base) MCG/ACT inhaler Inhale 2 puffs into the lungs every 4 hours as needed for Wheezing or Shortness of Breath 1 each 1    Cholecalciferol (VITAMIN D) 50 MCG (2000 UT) CAPS capsule Take 1 capsule by mouth daily 30 capsule 2    mometasone-formoterol (DULERA) 100-5 MCG/ACT inhaler Inhale 2 puffs into the lungs 2 times daily 13 g 1    Continuous Blood Gluc  (FREESTYLE MARY READER) MARIJA 4 Devices by Does not apply route daily 4 Device 5    Continuous Blood Gluc Sensor ([facility]) MISC 4 Devices by Does not apply route daily 4 each 5    albuterol (PROVENTIL) (2.5 MG/3ML) 0.083% nebulizer solution Take 3 mLs by nebulization every 6 hours as needed for Wheezing 120 each 3    Continuous Blood Gluc Sensor (FREESTYLE MARY 2 SENSOR SYSTM) MISC 1 Device by Does not apply route daily 1 each 0    Multiple Vitamin (DAILY-DOUGLAS) TABS TAKE 1 TABLET BY MOUTH EVERY DAY 90 tablet 5    ipratropium-albuterol (DUONEB) 0.5-2.5 (3) MG/3ML SOLN nebulizer solution Take 3 mLs by nebulization every 4 hours as needed for Shortness of Breath 24 vial 1    ONE TOUCH ULTRA TEST strip 1 each by Does not apply route daily 120 each 5    Handicap Placard MISC by Does not apply route Patient cannot walk 200 ft without stopping to rest.    Expiration 5 yrs 1 each 0    Methylnaltrexone Bromide 150 MG TABS Take 450 mg by mouth every morning (Patient not taking: Reported on 12/20/2022) 30 tablet 0     No current facility-administered medications for this visit.        Allergies   Allergen Reactions    Food Anaphylaxis     Food allergy - Fresh Water Fish, [place] and Tree Nuts    Ultram [Tramadol Hcl]      Per pt had a seizure    Fish-Derived Products     Norco [Hydrocodone-Acetaminophen] Hives    Pcn [Penicillins]      Not known    Cashews [Macadamia Nut Oil] Nausea And Vomiting       Family History   Problem Relation Age of Onset    Stroke Mother     Arthritis Mother     Hypertension Mother     Asthma Mother     Fibromyalgia Mother     Cancer Father     Hypertension Father     Heart Attack Father     Asthma Father        Social History     Socioeconomic History    Marital status: Legally      Spouse name: Not on file    Number of children: Not on file    Years of education: Not on file    Highest education level: Not on file Occupational History    Occupation: direct care staff/counselor   Tobacco Use    Smoking status: Some Days     Packs/day: 0.25     Years: 17.00     Pack years: 4.25     Types: Cigarettes     Start date: 3/27/2001     Last attempt to quit: 10/5/2018     Years since quittin.3    Smokeless tobacco: Never   Vaping Use    Vaping Use: Former   Substance and Sexual Activity    Alcohol use: No    Drug use: No    Sexual activity: Not on file   Other Topics Concern    Not on file   Social History Narrative    Not on file     Social Determinants of Health     Financial Resource Strain: Low Risk     Difficulty of Paying Living Expenses: Not hard at all   Food Insecurity: No Food Insecurity    Worried About Running Out of Food in the Last Year: Never true    Ran Out of Food in the Last Year: Never true   Transportation Needs: Not on file   Physical Activity: Not on file   Stress: Not on file   Social Connections: Not on file   Intimate Partner Violence: Not on file   Housing Stability: Not on file       A complete 10 system review was performed and are otherwise negative unless mentioned in the above HPI. Specific negatives are listed below but may not include all those reviewed.     General ROS: negative obtundation, AMS  ENT ROS: negative rhinorrhea, epistaxis  Allergy and Immunology ROS: negative itchy/watery eyes or nasal congestion  Hematological and Lymphatic ROS: negative spontaneous bleeding or bruising  Endocrine ROS: negative  lethargy, mood swings, palpitations or polydipsia/polyuria  Respiratory ROS: negative sputum changes, stridor, tachypnea or wheezing  Cardiovascular ROS: negative for - loss of consciousness, murmur or orthopnea  Gastrointestinal ROS: negative for - hematochezia or hematemesis  Genito-Urinary ROS: negative for -  genital discharge or hematuria  Musculoskeletal ROS: negative for - focal weakness, gangrene  Psych/Neuro ROS: negative for - visual or auditory hallucinations, suicidal ideation        Physical exam:   /80 (Site: Right Upper Arm)   Pulse 69   Temp 97.1 °F (36.2 °C) (Temporal)   Resp 20   Ht 5' 8.5\" (1.74 m)   Wt (!) 302 lb (137 kg)   BMI 45.25 kg/m²    General appearance: alert, appears stated age and cooperative  Head: Normocephalic, without obvious abnormality, atraumatic  Neck: no adenopathy, no carotid bruit, no JVD, supple, symmetrical, trachea midline and thyroid not enlarged, symmetric, no tenderness/mass/nodules  Lungs: clear to auscultation bilaterally  Heart: regular rate and rhythm  Abdomen: soft, non-tender; bowel sounds normal; no masses,  no organomegaly  Extremities: extremities normal, atraumatic, no cyanosis or edema    Assessment: Post Kendall-en- Y Gastric Bypass. She does not complain of GERD,  does have sleep apnea,  does have diabetes,  does have hypertension off medical treatment. Cholesterol and triglycerides are normal.    1. Malnutrition following gastrointestinal surgery  See below     2. Intertrigo    - clotrimazole-betamethasone (LOTRISONE) 1-0.05 % cream; Apply topically 2 times daily. Dispense: 15 g; Refill: 1  - nystatin (MYCOSTATIN) 550164 UNIT/GM powder; Apply to affected area three times a day  Dispense: 60 g; Refill: 0    Plan:  Continue to eat a high protein, low calorie diet, eat small portions very slowly and chew well before swallowing. Drink plenty of water and fluids. Make sure to use fiber to keep the bowels regular. Try to exercise 7 days per week, maintain adequate variety and balance. Always notify the clinic if you have any medical problems. Follow up in 3 months.       Physician Signature: Electronically signed by SANJUANA Mackay - JAI

## 2023-01-26 NOTE — PROGRESS NOTES
Patient is 8 mo LRYGB. Water intake is lacking - dehydrated. Protein through mostly foods. Vitamin intake is good. Bowel movements are good.

## 2023-01-26 NOTE — PROGRESS NOTES
Medical Nutrition Therapy (MNT) Assessment   Pt is aware this phone call conversation will be documented and is in agreement to the documentation: Pt verbalized - Yes    Pt. Name: Annabella Sumner   Date:1/26/2023  F/U Appt: Sx Type 6 Month RYGB F/U Appt      Ht 5' 8.5\" (1.74 m)   Wt (!) 302 lb (137 kg)   BMI 45.25 kg/m²  Height: 5' 8.5\" (1.74 m) Weight: (!) 302 lb (137 kg)   IBW:ideal body weight   150 lbs % EBWL: 34%     Wt Readings from Last 3 Encounters:   01/26/23 (!) 302 lb (137 kg)   01/26/23 (!) 302 lb (137 kg)   01/13/23 (!) 309 lb (140.2 kg)                     Protein supplements: Pt. is currently using the following protein supplement none and consuming the following grams of protein  - Pt is not sure. Rd / Bret reviewed with patient based on 1.0 gram per kg of IBW patient needs 68 - 78 grams of protein total daily. Pt was instructed by the Gokul Queen that she / he needs to keep daily food records and bring the food records to all f/u appointments. Pt was instructed this is an important part of compliancy and long-term lifestyle changes and will help establish long-term weight loss and weight maintenance success after weight loss surgery. Gokul Queen reviewed with the pt how to keep track of protein intake along with calories, fat and sugar content. Pt needs to be able to see that he / she is meeting his / her macro nutrient needs daily. Gokul Queen reviewed different ways to keep foods records either manually or with computer apps. Pt was able to verbalize understanding. Failure to keep food records show poor compliancy following weight loss surgery. Pt has been given all the tools and education necessary to complete this task. Education spent with pt just on food records 20 minutes.          24 Hour Recall:   Breakfast - 2 Boiled Egg's and 5 Pieces of Weinstein  Snack: None  Lunch: Chicken Wing Ding's, Broccoli  Snack: Chicken Wings  Dinner: Chicken Wings  Snack: Chicken Wings or Slim Bimal's  Water: 1/2 gallon to 1 gallon  Beverages: Coffee sugar free cream     Subjective:                   Current MNT:       Bariatric soft diet introducing raw fruit, raw vegetables, rice, protein bars, multivitamin, calcium, protein supplements     MNT Advanced to:     Bariatric soft diet introducing raw fruit, raw vegetables, rice, protein bars, multivitamin, calcium, protein supplements      Allergies and Food Allergies and Food Intolerances: Food Allergies - Fresh Water Fish, Wilson's and Tree Nuts    Nutritional Data  No - Poor appetite more than 5 days     No - NPO or clear liquid more than 3 days     No - Problem Chewing      No - Problem Swallowing      No - Problem Mouth Pain      No - Problem Denture  No - Missing Teeth         No - Pressure Sore    No - Open Wound    No - Surgical Wound  No - Documented: Sepsis or Infection    No - Nausea  No - Vomiting    [facility] Bariatric Surgeons Bowel Protocol:  Patient states he / she has the following bowel movements per week  - Once  no - Diarrhea  No - Steatorrhea  Yes - Constipation - Pt states sometimes  When was your last bowel movement  - Today    How much plain water are you drinking daily 1/2 Gallon to 1 Gallon  What other beverages / fluids are you drinking daily and the amount  - Coffee    No - Are you taking Colace daily  What amount of Colace are you taking daily N/A    No - Are you taking Sugar Free Chewable Fiber Gummies / Supplements  What amount of Sugar Free Chewable Fiber Gummies are you taking daily  - Pt is not taking pt was educated to go back on the fiber. Pt takes Lactulose and Miralax    How much water were you instructed to take daily? Gokul Queen reviewed today -  Patient was instructed by Gokul Queen on the importance of increasing water intake to 48 - 64 oz. of water total daily. Pt. was also instructed he / she is allowed an additional 30 oz. of sugar free caffeine free clear liquid beverages for a total of 90 oz. of fluid total daily.  Pt. was able to verbalize how he / she can get more water and fluids within the diet. Pt. verbalized understanding. How much Colace did your surgeon instruct you to take? Gokul Triana reviewed today - Per the surgeons protocol you should be taking since the day of your surgery Colace - 100 mgs 1 tablet 2 times daily until your 6 week F/U appointment. Gokul TRIANA reviewed. See After Visit Summary. How much Fiber Gummies did your surgeon instruct you to take? Gokul Triana reviewed today Per the surgeons protocol you should be taking a fiber supplement lifelong since your two week follow-up appointment. Your surgeon recommends a daily approved Fiber supplement 15 grams total daily. If you are just introducing a Fiber supplement the 15 grams should be introduced 5 grams of a fiber supplement daily the 1st week, 10 grams of a fiber supplement daily the 2nd week and 15 grams of a fiber supplement daily the third week. Pt is instructed to continue the 15 grams of a fiber supplement daily. Gokul Triana reviewed. See After Visits Summary. Did your surgeon discuss any other medications / supplements to take daily to move your bowels and avoid constipation? N/A    Yes -  Patient was provided today the Constipation Handout - Gokul Triana reviewed Handout - Pt. verbalized understanding. See AVS    Yes Gokul Triana reinforced pt needs to consume the following - Water Intake should be 64 - 90 oz, Fiber Chews 15 grams , Dietary Fiber Intake 25 - 35 grams        No - Hair loss   No - Weight regain       Yes - Acid Reflux - Pt states she takes Protonix as needed  No - Dumping Syndrome      Yes - Food gets stuck - Pt reports eating too fast  Yes - Are you eating solids - should not be eating solids until 6 weeks post-op.       not applicable - Night Time Coughing  not applicable -  B Ping Noted  Yes - Are you chewing thoroughly  - Does not take effect until 6 weeks post-op  No - Are you hungry after eating     How many meals a day 6 / Portions Sizes of Meals are small pt does not measure         Labs: No labs for today's visit    Supplements: Bariatric Advantage Multi-Vitamin 1 tablet 2 times Daily, Bariatric Advantage 29 mgs Iron 1 tablet Daily, Bariatric Advantage Calcium Lozenges 1 tablet 3 times Daily , Dry Vitamin D3 5,000iu every day     Estimated Daily Nutritional Needs: Based on Bariatric procedure for Wt. Loss  Energy: Will be calculated at Maintenance Stage    Protein: Average 60 - 80 gms Daily - See individual needs listed above based on IBW    MNT Plan and Additional Education: RD/LD reviewed Bariatric Soft Diet incorporating in Raw Fruits, Raw Vegetables, Red Meat, and Rice. Encouraged pt to meet protein and fluid needs daily. Handouts given. Pt. verbalized understanding. Pt instructed to call with questions. Pt. has been instructed to slow down eating habits and chew food 30 - 35 times per bite. Rd / Ld reviewed with patient that eating too fast can cause food to get stuck or create nausea and vomiting after bariatric surgery. Rd / Ld highly encouraged patient to set a timer and really count bites to help with slowing down eating habits this will also create more of a sensation of fullness and satiety. 1. Nutrition Diagnosis: Constipation    Etiologies:   Unwillingness to select food consistent with guidelines   No use of a fiber supplement   No use of a stool softener  No fiber intake within pts diet    Common Signs and Symptoms:    Inability, unwillingness, or disinterest in selecting food consistent with guidelines:  Pt does take pain medication which does contribute to pts constipation but pt is not increasing PA, or following the guidelines for a bariatric diet. Pts diet is very high in fat intake with no fiber consumption at all. Possible Interventions: Gokul Queen Discussed  Increased fluid diet - attention to consumption fluids per guidelines for diet stage.   Bioactive substance supplement - psyllium management  Increased Fiber Diet  Eliminate chicken wings for all meals. Eliminate malave for breakfast and high fat meats in general  Enc pt to start to keep daily food records. Pt states she does not have to keep food records because she eats the same food but can not report how many calories, fat or protein are in what she is consuming which is the purpose of keeping the food records. Monitoring and evaluation out-come indicators:  Fluid intake: water estimated oral intake in 24 hours - amount consumed per diet stage guidelines. Fiber intake - intake of fiber per recommendations  Nutrition -focused physical findings digestive system - reduction or elimination of reports of constipation. Add a fiber supplement  Add a stool softener  Increase ADL's        2. Nutrition Diagnosis: Predicted excessive energy intake     Etiologies:  Food and nutrient related knowledge deficit  Inappropriate food choices     Common Signs and Symptoms:   Weight Gain  Dumping Syndrome - Pt states she has not had it in a while  Pt also reports acid reflux  Pt reports over-eating  Pt reports she is not aware that malave and wings are not healthy food choices. Pt looks at them as protein foods in which Gokul Queen reviewed malave is an actual fat exchange within a diet. Possible Interventions: Gokul Queen Discussed  Low calorie diet  Reduced fat intake  Eliminate high fat meats  Track macros with FR  6 small meals a day  Use of a bariatric approved protein supplement   Potion control  Commercial beverage medical food supplement therapy   Increased PA  Weigh and measure foods     Monitoring and evaluation out-come indicators:  Estimated energy intake: 6 Month's post-op RYGB  Food intake types of foods - number of food group servings per recommendations  Adherence : self-reported adherence score - Keep daily food records  Food Intake: amount of food - reduce portion sizes eaten  Weight - measured weight decreased     3.  Nutrition Diagnosis: Inadequate Protein Intake    Sample Etiologies:  Difficulty consuming enough protein due to volume limitations  Lack of access to food  Economic constraints  Food and nutrient related knowledge deficit  Inappropriate food choices  Lack of suppression of gut hormones due to type of procedure (AGB)  Pt discontinue use of a  protein supplement    Common Signs and Symptoms:   Estimated intake of protein insufficient to meet requirements:  Not selecting protein foods  Not using protein supplements    Possible Interventions: Gokul Queen Discussed  Meals / snacks: Increased protein diet - attention to consumption of protein per guidelines for diet stage   - Pt states he / she is going to try to follow the schedule and consume high protein foods such as greek yogurt, cottage cheese, chicken, turkey fish, egg beaters and use of protein supplement if tolerated if protein needs can not be met orally. Gave pt options for using plain chicken tenders and seasoning them  instead of chicken wings to decrease fat intake. Monitoring and evaluation out-come indicators:  Protein intake - measured protein intake - intake of protein per recommendations  Adherence : self-reported nutrition adherence       4. Pts dietary compliance is extremely poor - See above. Pt is at risk for severe post-op medical complications based on non-compliance listed above. Consequences of non adherence include worsening condition, increased comorbid diseases and readmits for long tern post-op complications related to wt loss sx. Pt has been educated at all appointments about what POC she needs to follow to prevent complications after wt loss sx. Pt verbalizes understanding. No 1. Pt is consuming his / her recommended amount of protein within the diet based on patients Ideal Body Weight. .    No 2. Pt is using the recommended Bariatric approved protein supplement and taking in the correct amount of protein daily.   Pt is able to verbalize how to mix his / her protein supplement correctly to meet pts needs. Pt verbalized understanding. Yes 3. Pt is using the recommended Bariatric approved Multi-Vitamin, Bariatric approved Calcium, Bariatric approved Iron supplement or Bariatric approved Vitamin D and taking the correct dose. Pt was educated per his / her Bariatric Surgeons protocol he / she needs the following daily -  Bariatric Advantage Multi-Vitamin 1 tablet 2 times Daily, Bariatric Advantage 29 mgs Iron 1 tablet Daily, Bariatric Advantage Calcium Lozenges 1 tablet 3 times Daily , Dry Vitamin D3 5,000iu every day. Pt verbalized understanding. No 4. Pt kept daily food records. Pt is aware food records need to be kept life-long daily and brought to all follow-up appointments in order to show compliancy after weight loss surgery. Pt verbalized understanding. No 5. Pt is taking the correct stool softener for the first 6 weeks with the correct dose. Pt is also taking the correct fiber supplement with the correct dose starting at his / her 2 week f/u appointment. Pt verbalized understanding. See above instruction and AVS.     Yes 6. Pt is drinking 64 - 90 oz of plain water daily. Patient was instructed on the importance of increasing water intake to 48 - 64 oz. of water total daily. Pt. was also instructed he / she is allowed an additional 30 oz. of sugar free caffeine free clear liquid beverages for a total of 90 oz. of fluid total daily. Pt. was able to verbalize how he / she can get more water and fluids within the diet. Per bariatric surgeons protocol after weight loss surgery. Pt. verbalized understanding. No 7. Pt achieved his / her percentage of excess body weight loss at his / her f/u appointments and is on track to reach his / her weight loss goal.    No 8. Pt is weighing and measuring all foods using a food scale and measuring cups. Pt reports portion sizes are 3 to 4 oz in size. Portion sizes are not exceeding 6 ounces total per meal lifelong. Pt verbalized understanding. Svetlana Saba No 9. Pt is not experiencing Dumping Syndrome from food / beverage consumption. No 10. Pt is complying with all stages and consistencies of the bariatric diet and is not eating or drinking foods / beverages that is not listed on the diet at this stage. Yes (Staff ELISE) - Pt is not drinking carbonated beverages, alcoholic beverages or juices at this time. Compliancy Scale  - After Weight Loss Surgery :  2 Poor - Dietary Compliance - This is based on patient following the Medical Nutrition Therapy protocol after Weight Loss Surgery  7 to 10 Points - Good (70% - to 100%) -  Pt is following what he / she has been instructed on at the time of this visit. 4 to 7 Points - Fair (40% to 70%) - Pt has areas that he / she needs to work on in order to be compliant with the bariatric protocol after weight loss surgery. 0 to 4 Points - Poor (0% - 40%) - Pt is failing to follow the instructions given to the pt. Gokul Ld has concerns pt may be creating harm. Pt was offered additional dietary counseling appointments to help pt make the necessary lifestyle changes to show compliancy after weight loss surgery. MEDICAL NUTRITION THERAPY (MNT) - Nutrition Care Plan   (The patient has been educated and given written education material that reinforces the following dietary guidelines for Bariatric Surgery)  Goal:  Patient able to verbalize the following dietary concepts:  3 to 4 ounce portions per meal     6 small meals daily      60 to 80 grams of protein on average see individual protein needs   48 to 64 ounces of fluid daily (water)     Always consume protein item first with all meals   Pt is aware wt loss is pt controlled.    Slow Meals - 30-35 chews per bite / Meals 30 - 45 minutes long            The RD / LD reviewed with the patient that the dietary goals of the bariatric patient is to ensure that patient is able to meet established nutritional needs for a Bariatric Surgery Patient at this time in order to promote healing, prevent significant weight changes, prevent skin breakdown and abnormal lab values, prevent complications, and tolerance to diet and texture of foods. Pt is able to identify proper food choices and needed changes and is able to explain proper food preparation. RD / LD reviewed compliance with assigned diet stages, volume of food and drink consumed, timing of meals, amount of protein and energy intake, daily vitamin and mineral supplementation, identify food cravings and any adverse reactions associated with intake of food and drink. RD / LD uses behavioral tools such as goal setting, MI, and self-monitoring, to reinforce the anatomic and physiologic effects of the surgery, so that the described behaviors become more of a habit not simply a reaction to the altered anatomy. Care Plan:   Yes - Rd/Ld Addressed Food Records or 24-Hour Recall  Yes - Rd / Ld encouraged patient to exercise at least 30 minutes daily  Yes - Rd / Ld instructed patient on how to increase oral protein intake within the diet. Pt. can verbalize his / her individual protein needs at this visit. Yes - Rd / Ld instructed the patient on how to increase the use of protein supplements within the diet if appropriate at this time. Yes - Pt. was instructed on how to increase water intake. Patient will need to consume 48 - 64 oz. of just plain water in addition to 30 oz. of non-caloric beverages. Yes - Handouts given to patient - Also see After Visit Summary in addition to paper copy diet instruction. Yes - RD / LD encouraged oral intake    Yes -  RD / LD encouraged pt. to keep food records daily.       Yes - Pt. is able to verbalize diet concepts      Yes - Constipation Handout Given and Reviewed - Part of surgeons Bowel Protocol - See After Visit Summary    Yes - High Fiber Handout Given and Reviewed - Part of surgeons Bowel Protocol - See After Visit Summary        No - Ulcer Handout Given and Reviewed          No - Pt. was instructed to continue current MNT   Yes - Pt. diet was advanced to the following stage ( See above)   No - Supplements: initiated (See list below  - Pt. given instruction)     No - Supplemental foods:______________________  No - Pt. was placed on a altered meal schedule

## 2023-02-02 ENCOUNTER — TELEPHONE (OUTPATIENT)
Dept: PRIMARY CARE CLINIC | Age: 48
End: 2023-02-02

## 2023-02-02 NOTE — TELEPHONE ENCOUNTER
Pt states welfare needs something stating pt is under you care and still is unable to work    Pt case#: [id number]  Fax: [phone]

## 2023-02-02 NOTE — TELEPHONE ENCOUNTER
Pt states she already asked welfare and they informed her on prescription paper an order can be writen that pt is under you care.  Welfare does not need any further information says the patient

## 2023-02-05 DIAGNOSIS — J45.20 MILD INTERMITTENT ASTHMA WITHOUT COMPLICATION: ICD-10-CM

## 2023-02-08 ENCOUNTER — HOSPITAL ENCOUNTER (OUTPATIENT)
Dept: NEUROLOGY | Age: 48
Discharge: HOME | End: 2023-02-08
Payer: MEDICAID

## 2023-02-08 ENCOUNTER — HOSPITAL ENCOUNTER (OUTPATIENT)
Age: 48
Discharge: HOME | End: 2023-02-08
Payer: MEDICAID

## 2023-02-08 VITALS — HEIGHT: 68 IN | BODY MASS INDEX: 44.41 KG/M2 | WEIGHT: 293 LBS

## 2023-02-08 DIAGNOSIS — R20.0 NUMBNESS AND TINGLING IN BOTH HANDS: ICD-10-CM

## 2023-02-08 DIAGNOSIS — Z71.3 NUTRITIONAL COUNSELING: ICD-10-CM

## 2023-02-08 DIAGNOSIS — R20.2 NUMBNESS AND TINGLING IN BOTH HANDS: ICD-10-CM

## 2023-02-08 DIAGNOSIS — Z00.8 NUTRITIONAL ASSESSMENT: ICD-10-CM

## 2023-02-08 DIAGNOSIS — K91.2 MALNUTRITION FOLLOWING GASTROINTESTINAL SURGERY: ICD-10-CM

## 2023-02-08 LAB
ALBUMIN SERPL-MCNC: 3.7 G/DL (ref 3.5–5.2)
ALP BLD-CCNC: 100 U/L (ref 35–104)
ALT SERPL-CCNC: 10 U/L (ref 0–32)
ANION GAP SERPL CALCULATED.3IONS-SCNC: 14 MMOL/L (ref 7–16)
AST SERPL-CCNC: 11 U/L (ref 0–31)
BILIRUB SERPL-MCNC: 0.4 MG/DL (ref 0–1.2)
BUN BLDV-MCNC: 9 MG/DL (ref 6–20)
CALCIUM SERPL-MCNC: 9.5 MG/DL (ref 8.6–10.2)
CHLORIDE BLD-SCNC: 103 MMOL/L (ref 98–107)
CHOLESTEROL, TOTAL: 218 MG/DL (ref 0–199)
CO2: 23 MMOL/L (ref 22–29)
CREAT SERPL-MCNC: 0.7 MG/DL (ref 0.5–1)
FERRITIN: 55 NG/ML
FOLATE: 12.8 NG/ML (ref 4.8–24.2)
GFR SERPL CREATININE-BSD FRML MDRD: >60 ML/MIN/1.73
GLUCOSE BLD-MCNC: 90 MG/DL (ref 74–99)
HCT VFR BLD CALC: 44.8 % (ref 34–48)
HEMOGLOBIN: 14.8 G/DL (ref 11.5–15.5)
MCH RBC QN AUTO: 29.2 PG (ref 26–35)
MCHC RBC AUTO-ENTMCNC: 33 % (ref 32–34.5)
MCV RBC AUTO: 88.5 FL (ref 80–99.9)
PDW BLD-RTO: 15.6 FL (ref 11.5–15)
PLATELET # BLD: 287 E9/L (ref 130–450)
PMV BLD AUTO: 11.8 FL (ref 7–12)
POTASSIUM SERPL-SCNC: 4 MMOL/L (ref 3.5–5)
RBC # BLD: 5.06 E12/L (ref 3.5–5.5)
SODIUM BLD-SCNC: 140 MMOL/L (ref 132–146)
TOTAL PROTEIN: 7.3 G/DL (ref 6.4–8.3)
TRIGL SERPL-MCNC: 158 MG/DL (ref 0–149)
VITAMIN B-12: >2000 PG/ML (ref 211–946)
WBC # BLD: 10.8 E9/L (ref 4.5–11.5)

## 2023-02-08 PROCEDURE — 84630 ASSAY OF ZINC: CPT

## 2023-02-08 PROCEDURE — 82746 ASSAY OF FOLIC ACID SERUM: CPT

## 2023-02-08 PROCEDURE — 82728 ASSAY OF FERRITIN: CPT

## 2023-02-08 PROCEDURE — 95886 MUSC TEST DONE W/N TEST COMP: CPT

## 2023-02-08 PROCEDURE — 95910 NRV CNDJ TEST 7-8 STUDIES: CPT

## 2023-02-08 PROCEDURE — 85027 COMPLETE CBC AUTOMATED: CPT

## 2023-02-08 PROCEDURE — 84478 ASSAY OF TRIGLYCERIDES: CPT

## 2023-02-08 PROCEDURE — 80053 COMPREHEN METABOLIC PANEL: CPT

## 2023-02-08 PROCEDURE — 82607 VITAMIN B-12: CPT

## 2023-02-08 PROCEDURE — 95885 MUSC TST DONE W/NERV TST LIM: CPT

## 2023-02-08 PROCEDURE — 36415 COLL VENOUS BLD VENIPUNCTURE: CPT

## 2023-02-08 PROCEDURE — 84425 ASSAY OF VITAMIN B-1: CPT

## 2023-02-08 PROCEDURE — 82465 ASSAY BLD/SERUM CHOLESTEROL: CPT

## 2023-02-08 NOTE — PROCEDURES
[facility] [facility]  [address] [place] [address], [address] [place] [address] Rd  Phone: [phone]  Fax: [phone]      Referring Provider: Ham Rankin  Primary Care Physician: Igor Lu PA-C  Patient Name: Daisy Kelsey  Patient YOB: 1975  Gender: female  BMI: Body mass index is 45.92 kg/m². Height 5' 8\" (1.727 m), weight (!) 302 lb (137 kg), not currently breastfeeding. 2/8/2023    Description of clinical problem:   CC: numbness in hands    Pain Yes, Numbness/tingling  Yes; Weakness  No       Brief physical exam:   Sensory deficit Yes- decreased sensation LT bilateral median nerve distribution; Weakness No; Atrophy  No    Motor NCS      Nerve / Sites Lat. Amplitude Distance Lat Diff Velocity Temp.    ms mV cm ms m/s °C   R Median - APB      Wrist 5.31 8.8 8   32      Elbow 9.74 7.4 24 4.43 54 32.2   L Median - APB      Wrist 4.58 9.6 8   32.7      Elbow 8.75 8.1 23 4.17 55 32.7   R Ulnar - ADM      Wrist 3.02 11.6 8   32.7      B. Elbow 7.24 10.4 21 4.22 50 32.6      A. Elbow 9.17 9.8 10 1.93 52 32.6       Sensory NCS      Nerve / Sites Peak Lat PP Amp Distance Velocity Temp. ms µV cm m/s °C   R Median - Digit II (Antidromic)      Mid Palm 2.08 26.2 7 46 32.7      Wrist 4.27 23.5 14 41 32.7   L Median - Digit II (Antidromic)      Mid Palm 2.19 19.8 7 42 32.7      Wrist 4.11 19.2 14 43 32.7   R Ulnar - Digit V (Antidromic)      Wrist 3.28 32.4 14 56 32.7   R Radial - Anatomical snuff box (Forearm)      Forearm 2.45 22.5 10 53 32.7       F  Wave      Nerve F Lat M Lat F-M Lat    ms ms ms   R Median - APB 32.4 5.3 27.1   R Ulnar - ADM 31.0 2.9 28.1   L Median - APB 29.0 1.8 27.2       EMG         EMG Summary Table     Spontaneous MUAP Recruitment   Muscle IA Fib PSW Fasc H.F. Amp Dur. PPP Pattern   L. Biceps brachii N None None None None N N N N   L. Triceps brachii N None None None None N N N N   L.  Pronator teres N None None None None N N N N   L. First dorsal interosseous N None None None None N N N N   L. Abductor pollicis brevis N None None None None N N N Decr   R. First dorsal interosseous N None None None None N N N N   R. Pronator teres N None None None None N N N N   R. Abductor pollicis brevis N None None None None N N N Decr      Study Limitations:  none     Summary of Findings:    1. Sensory nerve conduction studies of bilateral median nerves showed prolonged peak wrist latency, normal amplitude and slowing across the wrist. All other nerves tested showed normal peak latencies, normal SNAP amplitudes, and normal conduction velocities. 2. Motor nerve conduction studies of bilateral median nerves showed prolonged distal wrist latencies. The right ulnar nerve showed normal distal latencies, normal CMAP amplitudes, and normal conduction velocities. 3. F-wave studies of the right median nerve revealed mildly prolonged latency. All others tested were normal.     4. EMG was performed with monopolar needle in multiple muscles outlined above. There was reduced recruitment in bilateral abductor pollicis brevis muscles. All other muscles tested revealed normal insertional activity, without evidence of abnormal spontaneous activity. All MUAP's were of normal amplitude and duration with a full recruitment pattern. No increase in polyphasic potentials was noted. Diagnostic Interpretation: This study was abnormal.     1. 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 It is chronic in duration. This is consistent with the clinical diagnosis of carpal tunnel syndrome. Prognosis for recovery of demyelinating lesions is good. Previous Study: none       Follow up EMG is recommended if clinically indicated.      Technologist: Tessa Momin     Physician: Adenike Lima DO, [facility]   Board Certified Physical Medicine and Rehabilitation      Nerve conduction studies and electromyography were performed according to our laboratory policies and procedures which can be provided upon request. All abnormal values are identified in the table.  Laboratory normal values can also be provided upon request.       Cc: GREGORIO Branham PA-C

## 2023-02-10 ENCOUNTER — OFFICE VISIT (OUTPATIENT)
Dept: PAIN MANAGEMENT | Age: 48
End: 2023-02-10
Payer: MEDICAID

## 2023-02-10 VITALS
BODY MASS INDEX: 44.41 KG/M2 | RESPIRATION RATE: 16 BRPM | TEMPERATURE: 96.9 F | HEART RATE: 59 BPM | OXYGEN SATURATION: 97 % | HEIGHT: 68 IN | DIASTOLIC BLOOD PRESSURE: 96 MMHG | SYSTOLIC BLOOD PRESSURE: 146 MMHG | WEIGHT: 293 LBS

## 2023-02-10 DIAGNOSIS — M54.41 CHRONIC BILATERAL LOW BACK PAIN WITH BILATERAL SCIATICA: ICD-10-CM

## 2023-02-10 DIAGNOSIS — G89.29 CHRONIC BILATERAL LOW BACK PAIN WITH BILATERAL SCIATICA: ICD-10-CM

## 2023-02-10 DIAGNOSIS — M54.42 CHRONIC BILATERAL LOW BACK PAIN WITH BILATERAL SCIATICA: ICD-10-CM

## 2023-02-10 DIAGNOSIS — M25.552 LEFT HIP PAIN: ICD-10-CM

## 2023-02-10 DIAGNOSIS — M54.16 LUMBAR RADICULOPATHY: ICD-10-CM

## 2023-02-10 DIAGNOSIS — M48.062 SPINAL STENOSIS OF LUMBAR REGION WITH NEUROGENIC CLAUDICATION: ICD-10-CM

## 2023-02-10 DIAGNOSIS — G89.4 CHRONIC PAIN SYNDROME: ICD-10-CM

## 2023-02-10 DIAGNOSIS — M16.12 PRIMARY OSTEOARTHRITIS OF LEFT HIP: Primary | ICD-10-CM

## 2023-02-10 DIAGNOSIS — M16.0 PRIMARY OSTEOARTHRITIS OF BOTH HIPS: ICD-10-CM

## 2023-02-10 PROCEDURE — 99213 OFFICE O/P EST LOW 20 MIN: CPT | Performed by: PHYSICIAN ASSISTANT

## 2023-02-10 RX ORDER — GABAPENTIN 800 MG/1
800 TABLET ORAL 3 TIMES DAILY
Qty: 90 TABLET | Refills: 0 | Status: SHIPPED | OUTPATIENT
Start: 2023-02-10 | End: 2023-03-12

## 2023-02-10 RX ORDER — OXYCODONE HYDROCHLORIDE AND ACETAMINOPHEN 5; 325 MG/1; MG/1
1 TABLET ORAL 3 TIMES DAILY PRN
Qty: 90 TABLET | Refills: 0 | Status: SHIPPED | OUTPATIENT
Start: 2023-02-12 | End: 2023-03-14

## 2023-02-10 NOTE — PROGRESS NOTES
[place] [address] [facility]  [facility]  [address]    Follow up Note      Sharon Arthur     Date of Visit:  2/10/2023    CC:  Patient presents for follow up   No chief complaint on file. HPI:    Pain is unchanged. Patient c/o lower back pain, n/t to b/l hands, nereyda hip pain. States that she is in significant pain all day long. Appropriate analgesia with current medications regimen: yes. Change in quality of symptoms:no. Medication side effects:none. Recent diagnostic testing:none. Recent interventional procedures: None. She has been on anticoagulation medications to include ASA and has not been on herbal supplements. She is diabetic. Imagin2023 EMG UEs    Diagnostic Interpretation: This study was abnormal.      1. 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 It is chronic in duration. This is consistent with the clinical diagnosis of carpal tunnel syndrome. Prognosis for recovery of demyelinating lesions is good. 10/2021 lumbar MRI -      BONES/ALIGNMENT: There is normal alignment of the spine. Mild-to-moderate   levoscoliosis of the lumbar spine is again noted. The vertebral body heights   are maintained. There is spurring and disc desiccation at multiple levels   with mild-to-moderate disc space narrowing at L1-2, L2-3, L3-4 and L4-5. Slight subchondral signal change is seen, most notably at L2-3 and L4-5. This is most likely related to degenerative disc disease. Otherwise, the   bone marrow signal appears unremarkable. SPINAL CORD: The conus terminates normally. SOFT TISSUES: No paraspinal mass identified. The spinal canal is somewhat congenitally narrowed.        L1-L2: There is disc bulge and severe right and moderate left posterior facet degenerative change with ligamentum flavum hypertrophy causing moderate   central canal stenosis. There is mild right and moderate left neural   foraminal narrowing. L2-L3: There is a disc bulge and severe bilateral posterior facet   degenerative change with ligamentum flavum hypertrophy causing severe central   canal stenosis. There is right lateral disc protrusion causing severe right   neural foraminal narrowing. There is moderate left neural foraminal   narrowing. In addition, there is a central and left paramedian disc   herniation with inferior extension of small extruded fragment behind the L3   vertebral body causing severe left lateral recess stenosis. L3-L4: There is disc bulge and severe bilateral posterior facet degenerative   change and ligamentum flavum hypertrophy causing severe central canal   stenosis. There is severe bilateral neural foraminal stenosis. The findings   at L2-3 and L3-4 are worse compared to the prior study. L4-L5: There is mild disc bulge with severe left and moderate right posterior   facet degenerative change with ligamentum flavum hypertrophy. This causes   moderate left subarticular recess stenosis. No significant central canal   stenosis is seen. There is severe bilateral neural foraminal narrowing. L5-S1: There is disc bulge and moderate to severe bilateral posterior facet   degenerative change, greater on the left with superimposed right   posterolateral and lateral disc protrusion causing severe right subarticular   recess stenosis. There is also severe bilateral neural foraminal narrowing. No significant central canal stenosis seen. Impression   1. Advanced degenerative change with mild to moderate levoscoliosis. 2. Multilevel central canal stenosis, including severe central canal stenosis   at L2-3 and L3-4 and moderate stenosis at L1-2.   Moderate left subarticular   recess stenosis at L4-5 and severe right subarticular recess stenosis at   L5-S1.   3. Central and left paramedian disc herniation at L2-3 with inferiorly   extruded disc fragment causing severe left lateral recess stenosis. 4. Multilevel bilateral neural foraminal stenosis including severe foraminal   stenosis on the left at L2-3 and bilaterally at L3-4, L4-5 and L5-S1.   5. Overall, the findings are worse compared to the prior study. 6.  The findings were sent to the Radiology Results [id number] at   11:57 am on 10/4/2021to be communicated to a licensed caregiver. 2021 xray L hip -  FINDINGS:   Demonstrated is joint space narrowing with marginal acetabular are   osteophyte. A femoral head maintains normal contour. No acute fracture identified. Bony pelvis is intact. Enthesopathy noted at   the the iliac wings           Impression   Moderate to severe DJD      Xray pelvis 3/2019 -      1. No evidence of acute fracture or hip dislocation. 2. Mild osteoarthritis involving both hips but with prominent bridging   of the superior acetabulum. This can contribute to impingement. 3. At least moderately severe degenerative changes in the lower lumbar   spine. CT lumbar 3/2019 -   The study is limited by the patient's body habitus which degrades the   imaging quality. No acute fracture or dislocation is seen. Moderate loss of intervertebral disc height is seen throughout the   visualized spine. Moderate to severe bilateral neural foraminal   narrowing is seen at L3-L4, L4-L5 and L5-S1 due to large vertebral   body and facet joint osteophytes. Mild levoconvex scoliosis of the   lumbar spine is seen. Moderate degenerative changes with periarticular osteophytes and loss   of joint space are seen within the sacroiliac joints      Lumbar MRI 2018 -   Findings: There is normal sagittal alignment visualized lumbar spine. No STIR   hyperintensity to suggest an acute fracture or ligamentous injury is   noted.  Limited evaluation secondary to technique. Disc desiccation   throughout the visualized lumbar intervertebral discs. Bone marrow   signal is relatively homogeneous throughout. Visualized portions of   the kidneys and aorta are grossly unremarkable although limited in   evaluation. Sagittal imaging only T11-T12: Mild to moderate circumferential disc   bulge. Moderate spinal canal stenosis is noted on this limited study. Moderate to moderately severe bilateral neural foraminal narrowing. Limited evaluation secondary to sagittal imaging only. Sagittal imaging only T12-L1: No evidence of posterior disc contour   abnormality, spinal canal stenosis, neural foraminal narrowing or   spinal nerve root abutment/impingement. .       L1-L2: Large circumferential disc bulge. Thecal sac measures   approximately 0.83 cm anterior posterior dimension of the central   spinal canal. This is secondary to a combination of circumferential   disc bulge and posterior epidural lipomatosis. Moderately severe left   and right neural foraminal narrowing with suspected   abutment/impingement exiting right L1 spinal nerve root. Moderate   bilateral facet osteoarthropathy. Underlying crowding/compression of   the cauda equina nerve roots. L2-L3: Moderate circumferential disc bulge. Moderate bilateral facet   osteoarthropathy. Moderate to moderately severe left and right neural   foraminal narrowing. There is abutment of the exiting left L2 spinal   nerve root. Thecal sac measures approximately 0.92 cm anterior   posterior dimension and central spinal canal. There is questionable   abnormal appearance of the cauda equina nerve roots in this region. L3-L4: Severe circumferential disc bulge with a centrally located   slight inferior directed disc extrusion. Severe facet   osteoarthropathy. Moderate spinal canal stenosis. Severe left and   right neural foraminal narrowing. Abutment/impingement exiting   bilateral L3 spinal nerve roots.  Moderately severe thecal sac neural   foraminal narrowing and spinal canal stenosis secondary to a   combination of the facet osteoarthropathy and posterior epidural   hematoma stenosis with crowding and compression of cauda equina nerve   roots. L4-L5: Moderately large circumferential disc bulge. Severe bilateral   facet osteoarthropathy. Severe left and moderately severe right neural   foraminal narrowing with abutment/impingement of exiting bilateral L4   spinal nerve roots. Extensive facet osteoarthropathy on the left   obliterates the neural foramen. No marco spinal canal stenosis is   seen. L5-S1: Severe bilateral facet osteoarthropathy. Moderate   circumferential disc bulge. Severe left and right neural foraminal   narrowing. Abutment/impingement exiting bilateral L5 spinal nerve   roots. No marco spinal canal stenosis. 1. Multilevel degenerative disc disease and facet osteoarthropathy   T11-T12 and L1-S1. Abutment/impingement exiting right L1, exiting left   L2, exiting bilateral L3, exiting bilateral L4 and exiting bilateral   L5 spinal nerve roots as described above. Severe bilateral neural   foraminal narrowing at L3-L4 and L5-S1 with severe left and moderately   severe right neural foraminal narrowing at L4-L5. Moderate spinal   canal stenosis at L3-L4 and narrowing of the thecal sac at L2-L3. 2. There is a questionable abnormal periods of spinal nerve roots at   the L2-L3 motion segment levels of indeterminate etiology and   significance, clinical correlation for any for infectious parameters   recommended. Consider further evaluation with pre and postcontrast   lumbar spine for further investigation with consideration given to   patient's renal status and any potential safety or allergenic   concerns. 3. Crowding/compression of cauda equina nerve roots greatest at L3-L4   but to a lesser degree at L1-L2.                                          Potential Aberrant Drug-Related Behavior: no     Urine Drug Screenin2022 consistent with OARRS     OARRS report:  2022 consistent to 2023 consistent    Opioid Agreement:  10/27/2022     Past Medical History:   Diagnosis Date    Asthma     Bell palsy     drooping    CHF (congestive heart failure) (HCC)     DDD (degenerative disc disease), cervical     with spinal stenosis    Diabetes mellitus (HCC)     DJD (degenerative joint disease)     both hips    GERD without esophagitis     HTN (hypertension)     Hyperlipidemia     Left hip pain     Meningitis 2009    Morbid obesity due to excess calories (HCC)     Neuropathy     JODI treated with BiPAP     Scoliosis     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[facility])        Past Surgical History:   Procedure Laterality Date    CHOLECYSTECTOMY  2009    CYSTOSCOPY Left 2018    left stent placement    DILATION AND CURETTAGE OF UTERUS      younger age    HIP SURGERY Left 2022    LEFT HIP INJECTION performed by Renny Polanco DO at [address] [facility]    LITHOTRIPSY Right 02/15/2018    Cystoscopy;Stent Removal    NERVE BLOCK Bilateral 2022    BILATERAL L5 TRANSFORAMINAL EPIDURAL STEROID INJECTION performed by Renny Polanco DO at [address] Bilateral 10/27/2022    BILATERAL L5 TRANSFORAMINAL EPIDURAL STEROID INJECTION performed by Renny Polanco DO at [address] [facility] N/A 2022    GASTRIC BYPASS MARGUERITE-EN-Y LAPAROSCOPIC performed by Jessy Alexander MD at [address] [place] [address] 2018    EGD BIOPSY performed by Jessy Alexander MD at [address] [facility] 2021    EGD BIOPSY performed by Jessy Alexander MD at [place] [address]       Prior to Admission medications    Medication Sig Start Date End Date Taking? Authorizing Provider   oxyCODONE-acetaminophen (PERCOCET) 5-325 MG per tablet Take 1 tablet by mouth 3 times daily as needed for Pain for up to 30 days. Intended supply: 30 days.  Take lowest dose possible to manage pain 23 3/14/23 Yes GREGORIO Delatorre   Elastic Bandages & Supports (WRIST BRACE/RIGHT LARGE) MISC 1 Device by Does not apply route daily as needed (CTS) 2/10/23  Yes GREGORIO Delatorre   gabapentin (NEURONTIN) 800 MG tablet Take 1 tablet by mouth 3 times daily for 30 days. 2/10/23 3/12/23 Yes GREGORIO Delatorre   PROAIR  (84 Base) MCG/ACT inhaler INHALE 2 PUFFS INTO THE LUNGS EVERY 4 HOURS AS NEEDED FOR WHEEZING OR SHORTNESS OF BREATH 2/6/23  Yes Nay Oconnell PA-C   clotrimazole-betamethasone (LOTRISONE) 1-0.05 % cream Apply topically 2 times daily. 1/26/23  Yes SANJUANA Gaston CNP   nystatin (MYCOSTATIN) 219540 UNIT/GM powder Apply to affected area three times a day 1/26/23  Yes SANJUANA Gaston CNP   potassium chloride (KLOR-CON M) 20 MEQ extended release tablet TAKE 1 TABLET BY MOUTH DAILY WITH BREAKFAST 1/9/23  Yes Nay Oconnell PA-C   TRULICITY 6.18 EG/1.2PI SOPN INJECT 0.75 MG INTO THE SKIN ONCE A WEEK ON MONDAYS 1/6/23  Yes Nay Oconnell PA-C   spironolactone (ALDACTONE) 25 MG tablet TAKE 1 TABLET BY MOUTH EVERY DAY 12/5/22  Yes Nay Oconnell PA-C   bumetanide (BUMEX) 1 MG tablet TAKE 1 TABLET BY MOUTH EVERY OTHER DAY ALTERNATING WITH 2 TABLETS EVERY OTHER DAY 12/5/22  Yes Nay Oconnell PA-C   isosorbide dinitrate (ISORDIL) 20 MG tablet TAKE 1 TABLET BY MOUTH THREE TIMES DAILY 12/1/22  Yes Marnie Celis MD   Misc. Devices (ROLLER WALKER) MISC 1 each by Does not apply route daily Wheeled walker with seat.  10/26/22  Yes GREGORIO Delatorre   Ferrous Sulfate (IRON PO) Take by mouth daily   Yes Historical Provider, MD   vitamin B-12 (CYANOCOBALAMIN) 1000 MCG tablet Take 1,000 mcg by mouth daily   Yes Historical Provider, MD   citalopram (CELEXA) 40 MG tablet TAKE 1 TABLET BY MOUTH DAILY  Patient taking differently: Take 40 mg by mouth daily as needed (anxiety) 10/24/22  Yes Nay Oconnell PA-C   fluticasone (FLONASE) 50 MCG/ACT nasal spray SHAKE LIQUID AND USE 2 SPRAYS IN [facility] NOSTRIL DAILY 10/24/22  Yes Daphene GREGORIO Dean-C   cetirizine (ZYRTEC) 10 MG tablet TAKE 1 TABLET BY MOUTH DAILY 10/24/22  Yes Daphene Dianne PA-C   metoprolol succinate (TOPROL XL) 100 MG extended release tablet TAKE 1 TABLET BY MOUTH TWICE DAILY 10/24/22  Yes Daphene Dianne PA-C   atorvastatin (LIPITOR) 80 MG tablet TAKE 1 TABLET BY MOUTH EVERY NIGHT 10/24/22  Yes Daphene Finely PA-C   pantoprazole (PROTONIX) 20 MG tablet TAKE 1 TABLET BY MOUTH DAILY  Patient taking differently: Take 20 mg by mouth daily as needed 8/11/22  Yes Daphene GREGORIO Dean-NOAH   hydrALAZINE (APRESOLINE) 50 MG tablet TAKE 1 TABLET BY MOUTH EVERY 8 HOURS 7/25/22  Yes Daphene GREGORIO Dean-NOAH   aspirin 81 MG chewable tablet Take 1 tablet by mouth daily 6/13/22  Yes Daphene GREGORIO Dean-NOAH   sacubitril-valsartan (ENTRESTO)  MG per tablet Take 1 tablet by mouth 2 times daily 6/13/22  Yes Daphene GREGORIO Dean-NOAH   mineral oil-hydrophilic petrolatum (AQUAPHOR) ointment Apply topically as needed.  6/13/22  Yes Daphene ESTER Dean   omeprazole (PRILOSEC) 20 MG delayed release capsule Take 1 capsule by mouth Daily  Patient taking differently: Take 20 mg by mouth daily as needed 5/18/22 5/18/23 Yes Jeanna Bedoya MD   ondansetron (ZOFRAN-ODT) 4 MG disintegrating tablet Place 2 tablets under the tongue every 8 hours as needed for Nausea or Vomiting 4/13/22  Yes Daphene ESTER Dean   Cholecalciferol (VITAMIN D) 50 MCG (2000 UT) CAPS capsule Take 1 capsule by mouth daily 10/25/21  Yes Daphene ESTER Dean   mometasone-formoterol [facility]) 100-5 MCG/ACT inhaler Inhale 2 puffs into the lungs 2 times daily 6/17/21  Yes Joceline Braga DO   Continuous Blood Gluc  (FREESTYLE MARY READER) MARIJA 4 Devices by Does not apply route daily 5/14/21  Yes Wilmer Dean PA-C   Continuous Blood Gluc Sensor ([address] [facility]) [address] [facility] 4 Devices by Does not apply route daily 5/14/21  Yes Wilmer Dean PA-C   albuterol (PROVENTIL) (2.5 MG/3ML) 0.083% nebulizer solution Take 3 mLs by nebulization every 6 hours as needed for Wheezing 3/23/21  Yes Vielka Stoner PA-C   Continuous Blood Gluc Sensor (FREESTYLE MARY 2 SENSOR SYSTM) MISC 1 Device by Does not apply route daily 21  Yes Vielka Stoner PA-C   Multiple Vitamin (DAILY-DOUGLAS) TABS TAKE 1 TABLET BY MOUTH EVERY DAY 20  Yes Aditya Rosado DO   ipratropium-albuterol (DUONEB) 0.5-2.5 (3) MG/3ML SOLN nebulizer solution Take 3 mLs by nebulization every 4 hours as needed for Shortness of Breath 20  Yes Charles Mosqueda MD   ONE TOUCH ULTRA TEST strip 1 each by Does not apply route daily 19  Yes Vielka Sotner PA-C   Handicap Placard [address] [facility] by Does not apply route Patient cannot walk 200 ft without stopping to rest.    Expiration 5 yrs 19  Yes Vielka Stoner PA-C   Methylnaltrexone Bromide 150 MG TABS Take 450 mg by mouth every morning  Patient not taking: Reported on 2022 9/1/22 10/1/22  Adolph Lopez DO       Allergies   Allergen Reactions    Food Anaphylaxis     Food allergy - Fresh Water Fish, [employer] and Tree Nuts    Ultram [Tramadol Hcl]      Per pt had a seizure    Fish-Derived Products     Norco [Hydrocodone-Acetaminophen] Hives    Pcn [Penicillins]      Not known    Cashews [Macadamia Nut Oil] Nausea And Vomiting       Social History     Socioeconomic History    Marital status: Legally      Spouse name: Not on file    Number of children: Not on file    Years of education: Not on file    Highest education level: Not on file   Occupational History    Occupation: direct care staff/counselor   Tobacco Use    Smoking status: Some Days     Packs/day: 0.25     Years: 17.00     Pack years: 4.25     Types: Cigarettes     Start date: 3/27/2001     Last attempt to quit: 10/5/2018     Years since quittin.3    Smokeless tobacco: Never   Vaping Use    Vaping Use: Former   Substance and Sexual Activity    Alcohol use: No    Drug use: No    Sexual activity: Not on file   Other Topics Concern    Not on file Social History Narrative    Not on file     Social Determinants of Health     Financial Resource Strain: Low Risk     Difficulty of Paying Living Expenses: Not hard at all   Food Insecurity: No Food Insecurity    Worried About Running Out of Food in the Last Year: Never true    Ran Out of Food in the Last Year: Never true   Transportation Needs: Not on file   Physical Activity: Not on file   Stress: Not on file   Social Connections: Not on file   Intimate Partner Violence: Not on file   Housing Stability: Not on file       Family History   Problem Relation Age of Onset    Stroke Mother     Arthritis Mother     Hypertension Mother     Asthma Mother     Fibromyalgia Mother     Cancer Father     Hypertension Father     Heart Attack Father     Asthma Father        REVIEW OF SYSTEMS:     Leia Duran denies fever/chills, chest pain, shortness of breath, new bowel or bladder complaints. All other review of systems was negative. PHYSICAL EXAMINATION:      BP (!) 146/96   Pulse 59   Temp 96.9 °F (36.1 °C) (Infrared)   Resp 16   Ht 5' 8\" (1.727 m)   Wt (!) 302 lb (137 kg)   SpO2 97%   BMI 45.92 kg/m²     General:      General appearance:   pleasant and well-hydrated. , in mild discomfort and A & O x3  Build: Morbidly obese    HEENT:    Head:normocephalic and atraumatic  Sclera: icterus absent,    Lungs:    Breathing:Normal expansion. No wheezing. Abdomen:    Shape:non-distended and normal    Lumbar spine:    Spine inspection:normal   Range of motion:abnormal moderately Lateral bending, flexion, extension rotation bilateral and is  painful. Extremities:    Tremors:None bilaterally upper and lower  Range of motion:Generally normal shoulders, pain with internal rotation of hips not done. Intact:Yes  Edema:Normal  Negative Tinel's sign.     Neurological:    Gait: in wheelchair    Dermatology:    Skin:no unusual rashes, no skin lesions, no palpable subcutaneous nodules, and good skin brent    Impression:    Previously seen in 2019 for LBP and LLE pain (nondermatomal)  Upon 2022 consultation pt reports diffuse pain consistent with hyperalgesia, LBP BLE, and severe L hip pain  Lumbar MRI shows significant degenerative changes with multilevel stenosis  L hip xray shows significant OA changes  PMHx: JODI (compliant with cpap), asthma, morbid obesity s/p gastric bypass 5/2022 and as of 7/2022 has lost 30 lbs, CHF, DM, GERD, DLD, HTN     She re-presented recently after having not been seen in almost 3 years as referred by Inge Archibald given Dr. Cabrera Gomez change in practice status  Overall patient is nonfunctional, spending a significant amount of time in a wheelchair. She has lost 60 lbs since her gastric bypass     The opioid induced constipation is resolved with the lactulose along with Benefiber      OARRS report reviewed  EMG UEs reviewed for b/l hand numbness/tingling x 1 year. Moderate CTS. Discussed referral to ortho. She declines. Right wrist brace ordered. Discussed long term effects if she does not eventually see ortho. Discussed negative SE's associated with chronic opioid therapy, given her comorbidities (morbid obesity, JODI, minimizing opioid dosing will be paramount)  RF gabapentin 600 mg TID but change to 800 mg TID due to continued significant pain. RF percocet 5/325 #90 - no increases  No RF lactulose  Aqua therapy - Has stopped due to pain. States that she knows that she needs to go back and this was stressed to her this visit. .    L hip injection with 80% relief x 1.5 weeks only. Consider referral to ortho, however, she would like to hold off. B/l L5 TFESI -  has worn off recently. Repeat ordered for the end of March when she is due.    Patient encouraged to stay active and to lose weight, encouraged to continue with bariatric program  Treatment plan discussed with the patient including medication and procedure side effects    30 minutes was spent with this patient and family members counseling and educating her on her treatment plan. Controlled Substance Monitoring:    Acute and Chronic Pain Monitoring:   RX Monitoring 2/10/2023   Attestation -   Acute Pain Prescriptions -   Periodic Controlled Substance Monitoring Possible medication side effects, risk of tolerance/dependence & alternative treatments discussed. ;No signs of potential drug abuse or diversion identified. ;Assessed functional status. Chronic Pain > 80 MEDD -                             We discussed with the patient that combining opioids, benzodiazepines, alcohol, illicit drugs or sleep aids increases the risk of respiratory depression including death. We discussed that these medications may cause drowsiness, sedation or dizziness and have counseled the patient not to drive or operate machinery. We have discussed that these medications will be prescribed only by one provider. We have discussed with the patient about age related risk factors and have thoroughly discussed the importance of taking these medications as prescribed. The patient verbalizes understanding.     ccreferring physic

## 2023-02-10 NOTE — PROGRESS NOTES
Do you currently have any of the following:    Fever: No  Headache:  No  Cough: No  Shortness of breath: No  Exposed to anyone with these symptoms: No         Chi Annie presents to the [facility] on 2/10/2023. Evelio Penny is complaining of pain lower back. The pain is constant. The pain is described as aching, throbbing, shooting, stabbing, tender, and burning. Pain is rated on her best day at a 7, on her worst day at a 10, and on average at a 8 on the VAS scale. She took her last dose of Percocet today. Any procedures since your last visit: No    Pacemaker or defibrillator: No .    She is not on NSAIDS and is  on anticoagulation medications to include ASA and is managed by Michoacano Pennington PA-C  . Medication Contract and Consent for Opioid Use Documents Filed       Patient Documents       Type of Document Status Date Received Received By Description    Medication Contract Received 7/1/2019 11:37 AM Diya [place] [facility] MGMT CONTRACT DR. VILLANUEVA    Medication Contract Signed 7/24/2019 10:15 AM KALIA BYRD medication contract    Medication Contract Received 10/27/2022  3:10 PM Martha [employer] Agreement                    BP (!) 146/96   Pulse 59   Temp 96.9 °F (36.1 °C) (Infrared)   Resp 16   Ht 5' 8\" (1.727 m)   Wt (!) 302 lb (137 kg)   SpO2 97%   BMI 45.92 kg/m²      No LMP recorded.

## 2023-02-11 LAB — ZINC: 127.1 UG/DL (ref 60–120)

## 2023-03-07 DIAGNOSIS — R05.1 ACUTE COUGH: ICD-10-CM

## 2023-03-07 DIAGNOSIS — L30.4 INTERTRIGO: ICD-10-CM

## 2023-03-07 DIAGNOSIS — I42.9 CARDIOMYOPATHY, UNSPECIFIED TYPE (HCC): ICD-10-CM

## 2023-03-07 DIAGNOSIS — K21.9 GASTROESOPHAGEAL REFLUX DISEASE WITHOUT ESOPHAGITIS: ICD-10-CM

## 2023-03-07 DIAGNOSIS — J45.20 MILD INTERMITTENT ASTHMA WITHOUT COMPLICATION: ICD-10-CM

## 2023-03-07 DIAGNOSIS — E78.5 HYPERLIPIDEMIA LDL GOAL <70: ICD-10-CM

## 2023-03-07 DIAGNOSIS — J30.2 SEASONAL ALLERGIES: ICD-10-CM

## 2023-03-07 RX ORDER — NYSTATIN 100000 [USP'U]/G
POWDER TOPICAL
Qty: 60 G | Refills: 0 | Status: SHIPPED | OUTPATIENT
Start: 2023-03-07

## 2023-03-08 RX ORDER — PANTOPRAZOLE SODIUM 20 MG/1
20 TABLET, DELAYED RELEASE ORAL DAILY
Qty: 30 TABLET | Refills: 3 | Status: SHIPPED | OUTPATIENT
Start: 2023-03-08

## 2023-03-08 RX ORDER — CETIRIZINE HYDROCHLORIDE 10 MG/1
10 TABLET ORAL DAILY
Qty: 30 TABLET | Refills: 3 | Status: SHIPPED | OUTPATIENT
Start: 2023-03-08

## 2023-03-08 RX ORDER — DEXTROMETHORPHAN HYDROBROMIDE AND PROMETHAZINE HYDROCHLORIDE 15; 6.25 MG/5ML; MG/5ML
SYRUP ORAL
Qty: 240 ML | Refills: 1 | Status: SHIPPED | OUTPATIENT
Start: 2023-03-08

## 2023-03-08 RX ORDER — ATORVASTATIN CALCIUM 80 MG/1
TABLET, FILM COATED ORAL
Qty: 30 TABLET | Refills: 3 | Status: SHIPPED | OUTPATIENT
Start: 2023-03-08

## 2023-03-08 RX ORDER — FLUTICASONE PROPIONATE 50 MCG
SPRAY, SUSPENSION (ML) NASAL
Qty: 16 G | Refills: 2 | Status: SHIPPED | OUTPATIENT
Start: 2023-03-08

## 2023-03-08 RX ORDER — METOPROLOL SUCCINATE 100 MG/1
TABLET, EXTENDED RELEASE ORAL
Qty: 60 TABLET | Refills: 3 | Status: SHIPPED | OUTPATIENT
Start: 2023-03-08

## 2023-03-10 ENCOUNTER — OFFICE VISIT (OUTPATIENT)
Dept: PAIN MANAGEMENT | Age: 48
End: 2023-03-10
Payer: MEDICAID

## 2023-03-10 VITALS
SYSTOLIC BLOOD PRESSURE: 128 MMHG | DIASTOLIC BLOOD PRESSURE: 79 MMHG | RESPIRATION RATE: 16 BRPM | BODY MASS INDEX: 44.41 KG/M2 | HEART RATE: 69 BPM | TEMPERATURE: 97.2 F | WEIGHT: 293 LBS | HEIGHT: 68 IN | OXYGEN SATURATION: 91 %

## 2023-03-10 DIAGNOSIS — M54.42 CHRONIC BILATERAL LOW BACK PAIN WITH BILATERAL SCIATICA: ICD-10-CM

## 2023-03-10 DIAGNOSIS — M54.16 LUMBAR RADICULOPATHY: ICD-10-CM

## 2023-03-10 DIAGNOSIS — M25.552 LEFT HIP PAIN: ICD-10-CM

## 2023-03-10 DIAGNOSIS — M54.41 CHRONIC BILATERAL LOW BACK PAIN WITH BILATERAL SCIATICA: ICD-10-CM

## 2023-03-10 DIAGNOSIS — G89.29 CHRONIC BILATERAL LOW BACK PAIN WITH BILATERAL SCIATICA: ICD-10-CM

## 2023-03-10 DIAGNOSIS — M16.0 PRIMARY OSTEOARTHRITIS OF BOTH HIPS: ICD-10-CM

## 2023-03-10 DIAGNOSIS — G89.4 CHRONIC PAIN SYNDROME: Primary | ICD-10-CM

## 2023-03-10 DIAGNOSIS — M48.062 SPINAL STENOSIS OF LUMBAR REGION WITH NEUROGENIC CLAUDICATION: ICD-10-CM

## 2023-03-10 DIAGNOSIS — M16.12 PRIMARY OSTEOARTHRITIS OF LEFT HIP: ICD-10-CM

## 2023-03-10 PROCEDURE — 99213 OFFICE O/P EST LOW 20 MIN: CPT | Performed by: PHYSICIAN ASSISTANT

## 2023-03-10 RX ORDER — GABAPENTIN 800 MG/1
800 TABLET ORAL 3 TIMES DAILY
Qty: 90 TABLET | Refills: 0 | Status: SHIPPED | OUTPATIENT
Start: 2023-03-10 | End: 2023-04-09

## 2023-03-10 RX ORDER — FLUCONAZOLE 150 MG/1
TABLET ORAL
COMMUNITY
Start: 2023-01-26

## 2023-03-10 RX ORDER — OXYCODONE HYDROCHLORIDE AND ACETAMINOPHEN 5; 325 MG/1; MG/1
1 TABLET ORAL 3 TIMES DAILY PRN
Qty: 90 TABLET | Refills: 0 | Status: SHIPPED | OUTPATIENT
Start: 2023-03-14 | End: 2023-04-13

## 2023-03-10 NOTE — PROGRESS NOTES
[facility] - BE[facility]AVIOR[facility] HE[facility] SERVICES Pain Management  [facility]  [facility] - [facility], [facility]    Follow up Note      Mauro Gallardo     Date of Visit:  3/10/2023    CC:  Patient presents for follow up   Chief Complaint   Patient presents with    Follow-up     Low back pain          HPI:    Pain is unchanged. Patient continues to c/o pain to her lower back and left hip. Injection scheduled. Appropriate analgesia with current medications regimen: yes. Change in quality of symptoms:no. Medication side effects:none. Recent diagnostic testing:none. Recent interventional procedures: None. She has been on anticoagulation medications to include ASA and has not been on herbal supplements. She is diabetic. Imagin2023 EMG UEs    Diagnostic Interpretation: This study was abnormal.      1. 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 It is chronic in duration. This is consistent with the clinical diagnosis of carpal tunnel syndrome. Prognosis for recovery of demyelinating lesions is good. 10/2021 lumbar MRI -      BONES/ALIGNMENT: There is normal alignment of the spine. Mild-to-moderate   levoscoliosis of the lumbar spine is again noted. The vertebral body heights   are maintained. There is spurring and disc desiccation at multiple levels   with mild-to-moderate disc space narrowing at L1-2, L2-3, L3-4 and L4-5. Slight subchondral signal change is seen, most notably at L2-3 and L4-5. This is most likely related to degenerative disc disease. Otherwise, the   bone marrow signal appears unremarkable. SPINAL CORD: The conus terminates normally. SOFT TISSUES: No paraspinal mass identified. The spinal canal is somewhat congenitally narrowed.        L1-L2: There is disc bulge and severe right and moderate left posterior facet   degenerative change with ligamentum flavum hypertrophy causing moderate   central canal stenosis. There is mild right and moderate left neural   foraminal narrowing. L2-L3: There is a disc bulge and severe bilateral posterior facet   degenerative change with ligamentum flavum hypertrophy causing severe central   canal stenosis. There is right lateral disc protrusion causing severe right   neural foraminal narrowing. There is moderate left neural foraminal   narrowing. In addition, there is a central and left paramedian disc   herniation with inferior extension of small extruded fragment behind the L3   vertebral body causing severe left lateral recess stenosis. L3-L4: There is disc bulge and severe bilateral posterior facet degenerative   change and ligamentum flavum hypertrophy causing severe central canal   stenosis. There is severe bilateral neural foraminal stenosis. The findings   at L2-3 and L3-4 are worse compared to the prior study. L4-L5: There is mild disc bulge with severe left and moderate right posterior   facet degenerative change with ligamentum flavum hypertrophy. This causes   moderate left subarticular recess stenosis. No significant central canal   stenosis is seen. There is severe bilateral neural foraminal narrowing. L5-S1: There is disc bulge and moderate to severe bilateral posterior facet   degenerative change, greater on the left with superimposed right   posterolateral and lateral disc protrusion causing severe right subarticular   recess stenosis. There is also severe bilateral neural foraminal narrowing. No significant central canal stenosis seen. Impression   1. Advanced degenerative change with mild to moderate levoscoliosis. 2. Multilevel central canal stenosis, including severe central canal stenosis   at L2-3 and L3-4 and moderate stenosis at L1-2.   Moderate left subarticular   recess stenosis at L4-5 and severe right subarticular recess stenosis at   L5-S1.   3. Central and left paramedian disc herniation at L2-3 with inferiorly   extruded disc fragment causing severe left lateral recess stenosis. 4. Multilevel bilateral neural foraminal stenosis including severe foraminal   stenosis on the left at L2-3 and bilaterally at L3-4, L4-5 and L5-S1.   5. Overall, the findings are worse compared to the prior study. 6.  The findings were sent to the Radiology Results [id number] at   11:57 am on 10/4/2021to be communicated to a licensed caregiver. 2021 xray L hip -  FINDINGS:   Demonstrated is joint space narrowing with marginal acetabular are   osteophyte. A femoral head maintains normal contour. No acute fracture identified. Bony pelvis is intact. Enthesopathy noted at   the the iliac wings           Impression   Moderate to severe DJD      Xray pelvis 3/2019 -      1. No evidence of acute fracture or hip dislocation. 2. Mild osteoarthritis involving both hips but with prominent bridging   of the superior acetabulum. This can contribute to impingement. 3. At least moderately severe degenerative changes in the lower lumbar   spine. CT lumbar 3/2019 -   The study is limited by the patient's body habitus which degrades the   imaging quality. No acute fracture or dislocation is seen. Moderate loss of intervertebral disc height is seen throughout the   visualized spine. Moderate to severe bilateral neural foraminal   narrowing is seen at L3-L4, L4-L5 and L5-S1 due to large vertebral   body and facet joint osteophytes. Mild levoconvex scoliosis of the   lumbar spine is seen. Moderate degenerative changes with periarticular osteophytes and loss   of joint space are seen within the sacroiliac joints      Lumbar MRI 2018 -   Findings: There is normal sagittal alignment visualized lumbar spine. No STIR   hyperintensity to suggest an acute fracture or ligamentous injury is   noted.  Limited evaluation secondary to technique. Disc desiccation   throughout the visualized lumbar intervertebral discs. Bone marrow   signal is relatively homogeneous throughout. Visualized portions of   the kidneys and aorta are grossly unremarkable although limited in   evaluation. Sagittal imaging only T11-T12: Mild to moderate circumferential disc   bulge. Moderate spinal canal stenosis is noted on this limited study. Moderate to moderately severe bilateral neural foraminal narrowing. Limited evaluation secondary to sagittal imaging only. Sagittal imaging only T12-L1: No evidence of posterior disc contour   abnormality, spinal canal stenosis, neural foraminal narrowing or   spinal nerve root abutment/impingement. .       L1-L2: Large circumferential disc bulge. Thecal sac measures   approximately 0.83 cm anterior posterior dimension of the central   spinal canal. This is secondary to a combination of circumferential   disc bulge and posterior epidural lipomatosis. Moderately severe left   and right neural foraminal narrowing with suspected   abutment/impingement exiting right L1 spinal nerve root. Moderate   bilateral facet osteoarthropathy. Underlying crowding/compression of   the cauda equina nerve roots. L2-L3: Moderate circumferential disc bulge. Moderate bilateral facet   osteoarthropathy. Moderate to moderately severe left and right neural   foraminal narrowing. There is abutment of the exiting left L2 spinal   nerve root. Thecal sac measures approximately 0.92 cm anterior   posterior dimension and central spinal canal. There is questionable   abnormal appearance of the cauda equina nerve roots in this region. L3-L4: Severe circumferential disc bulge with a centrally located   slight inferior directed disc extrusion. Severe facet   osteoarthropathy. Moderate spinal canal stenosis. Severe left and   right neural foraminal narrowing. Abutment/impingement exiting   bilateral L3 spinal nerve roots. Moderately severe thecal sac neural   foraminal narrowing and spinal canal stenosis secondary to a   combination of the facet osteoarthropathy and posterior epidural   hematoma stenosis with crowding and compression of cauda equina nerve   roots. L4-L5: Moderately large circumferential disc bulge. Severe bilateral   facet osteoarthropathy. Severe left and moderately severe right neural   foraminal narrowing with abutment/impingement of exiting bilateral L4   spinal nerve roots. Extensive facet osteoarthropathy on the left   obliterates the neural foramen. No marco spinal canal stenosis is   seen. L5-S1: Severe bilateral facet osteoarthropathy. Moderate   circumferential disc bulge. Severe left and right neural foraminal   narrowing. Abutment/impingement exiting bilateral L5 spinal nerve   roots. No marco spinal canal stenosis. 1. Multilevel degenerative disc disease and facet osteoarthropathy   T11-T12 and L1-S1. Abutment/impingement exiting right L1, exiting left   L2, exiting bilateral L3, exiting bilateral L4 and exiting bilateral   L5 spinal nerve roots as described above. Severe bilateral neural   foraminal narrowing at L3-L4 and L5-S1 with severe left and moderately   severe right neural foraminal narrowing at L4-L5. Moderate spinal   canal stenosis at L3-L4 and narrowing of the thecal sac at L2-L3. 2. There is a questionable abnormal periods of spinal nerve roots at   the L2-L3 motion segment levels of indeterminate etiology and   significance, clinical correlation for any for infectious parameters   recommended. Consider further evaluation with pre and postcontrast   lumbar spine for further investigation with consideration given to   patient's renal status and any potential safety or allergenic   concerns. 3. Crowding/compression of cauda equina nerve roots greatest at L3-L4   but to a lesser degree at L1-L2.                                          Potential Aberrant Drug-Related Behavior: no     Urine Drug Screenin2022 consistent with OARRS     OARRS report:  2022 consistent to 2023 consistent    Opioid Agreement:  10/27/2022     Past Medical History:   Diagnosis Date    Asthma     Bell palsy     drooping    CHF (congestive heart failure) (HCC)     DDD (degenerative disc disease), cervical     with spinal stenosis    Diabetes mellitus (HCC)     DJD (degenerative joint disease)     both hips    GERD without esophagitis     HTN (hypertension)     Hyperlipidemia     Left hip pain     Meningitis 2009    Morbid obesity due to excess calories (HCC)     Neuropathy     JODI treated with BiPAP     Scoliosis     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[facility])        Past Surgical History:   Procedure Laterality Date    CHOLECYSTECTOMY  2009    CYSTOSCOPY Left 2018    left stent placement    DILATION AND CURETTAGE OF UTERUS      younger age    HIP SURGERY Left 2022    LEFT HIP INJECTION performed by Estela Houston DO at [address] [facility]    LITHOTRIPSY Right 02/15/2018    Cystoscopy;Stent Removal    NERVE BLOCK Bilateral 2022    BILATERAL L5 TRANSFORAMINAL EPIDURAL STEROID INJECTION performed by Estela Houston DO at [address] Bilateral 10/27/2022    BILATERAL L5 TRANSFORAMINAL EPIDURAL STEROID INJECTION performed by Estela Houston DO at [address] [facility] N/A 2022    GASTRIC BYPASS MARGUERITE-EN-Y LAPAROSCOPIC performed by Melody Turner MD at [address] [facility] 2018    EGD BIOPSY performed by Melody Turenr MD at [address] [facility] 2021    EGD BIOPSY performed by Melody Turner MD at [place] [address]       Prior to Admission medications    Medication Sig Start Date End Date Taking?  Authorizing Provider   fluconazole (DIFLUCAN) 150 MG tablet  23  Yes Historical Provider, MD   pantoprazole (PROTONIX) 20 MG tablet TAKE 1 TABLET BY MOUTH DAILY 3/8/23  Yes Nicole Light PA-C metoprolol succinate (TOPROL XL) 100 MG extended release tablet TAKE 1 TABLET BY MOUTH TWICE DAILY 3/8/23  Yes Nany Fletcher PA-C   cetirizine (ZYRTEC) 10 MG tablet TAKE 1 TABLET BY MOUTH DAILY 3/8/23  Yes Nany Fletcher PA-C   fluticasone [facility]) 50 MCG/ACT nasal spray SHAKE LIQUID AND USE 2 SPRAYS IN [facility] NOSTRIL DAILY 3/8/23  Yes Nany Fletcher PA-C   promethazine-dextromethorphan (PROMETHAZINE-DM) 6.25-15 MG/5ML syrup TAKE 5 ML BY MOUTH FOUR TIMES DAILY AS NEEDED FOR COUGH 3/8/23  Yes Nany Fletcher PA-C   atorvastatin (LIPITOR) 80 MG tablet TAKE 1 TABLET BY MOUTH EVERY NIGHT 3/8/23  Yes Nany Fletcher PA-C   VENTOLIN  (90 Base) MCG/ACT inhaler INHALE 2 PUFFS INTO THE LUNGS EVERY 4 HOURS AS NEEDED FOR WHEEZING OR SHORTNESS OF BREATH 3/8/23  Yes Nany Fletcher PA-C   nystatin [facility]) 779155 UNIT/GM powder APPLY EXTERNALLY TO THE AFFECTED AREA THREE TIMES DAILY 3/7/23  Yes SANJUANA Asher CNP   oxyCODONE-acetaminophen (PERCOCET) 5-325 MG per tablet Take 1 tablet by mouth 3 times daily as needed for Pain for up to 30 days. Intended supply: 30 days. Take lowest dose possible to manage pain 2/12/23 3/14/23 Yes GREGORIO Alex   Elastic Bandages & Supports (WRIST BRACE/RIGHT LARGE) MISC 1 Device by Does not apply route daily as needed (CTS) 2/10/23  Yes GREGORIO Alex   gabapentin (NEURONTIN) 800 MG tablet Take 1 tablet by mouth 3 times daily for 30 days. 2/10/23 3/12/23 Yes GREGORIO Alex   clotrimazole-betamethasone (LOTRISONE) 1-0.05 % cream Apply topically 2 times daily.  1/26/23  Yes SANJUANA Asher CNP   potassium chloride (KLOR-CON M) 20 MEQ extended release tablet TAKE 1 TABLET BY MOUTH DAILY WITH BREAKFAST 1/9/23  Yes Nany Fletcher PA-C   TRULICITY 1.16 YD/7.0AV SOPN INJECT 0.75 MG INTO THE SKIN ONCE A WEEK ON MONDAYS 1/6/23  Yes Nany Fletcher PA-C   spironolactone (ALDACTONE) 25 MG tablet TAKE 1 TABLET BY MOUTH EVERY DAY 12/5/22  Yes Nany Fletcher PA-C bumetanide (BUMEX) 1 MG tablet TAKE 1 TABLET BY MOUTH EVERY OTHER DAY ALTERNATING WITH 2 TABLETS EVERY OTHER DAY 12/5/22  Yes Garima Trujillo PA-C   isosorbide dinitrate (ISORDIL) 20 MG tablet TAKE 1 TABLET BY MOUTH THREE TIMES DAILY 12/1/22  Yes Amelia Watson MD   [facility] [place] (ROLLER WALKER) MISC 1 each by Does not apply route daily Wheeled walker with seat. 10/26/22  Yes GREGORIO Coughlin   Ferrous Sulfate (IRON PO) Take by mouth daily   Yes Historical Provider, MD   vitamin B-12 (CYANOCOBALAMIN) 1000 MCG tablet Take 1,000 mcg by mouth daily   Yes Historical Provider, MD   citalopram (CELEXA) 40 MG tablet TAKE 1 TABLET BY MOUTH DAILY 10/24/22  Yes Garima Trujillo PA-C   hydrALAZINE (APRESOLINE) 50 MG tablet TAKE 1 TABLET BY MOUTH EVERY 8 HOURS 7/25/22  Yes Garima Trujillo PA-C   sacubitril-valsartan (ENTRESTO)  MG per tablet Take 1 tablet by mouth 2 times daily 6/13/22  Yes Garima Trujillo PA-C   mineral oil-hydrophilic petrolatum (AQUAPHOR) ointment Apply topically as needed.  6/13/22  Yes Garima Trujillo PA-C   omeprazole (PRILOSEC) 20 MG delayed release capsule Take 1 capsule by mouth Daily  Patient taking differently: Take 20 mg by mouth daily as needed 5/18/22 5/18/23 Yes Vielka Rodriguez MD   ondansetron (ZOFRAN-ODT) 4 MG disintegrating tablet Place 2 tablets under the tongue every 8 hours as needed for Nausea or Vomiting 4/13/22  Yes Garima Trujillo PA-C   Cholecalciferol (VITAMIN D) 50 MCG (2000 UT) CAPS capsule Take 1 capsule by mouth daily 10/25/21  Yes Garima Trujillo PA-C   mometasone-formoterol [facility]) 100-5 MCG/ACT inhaler Inhale 2 puffs into the lungs 2 times daily 6/17/21  Yes Rocco Stein DO   Continuous Blood Gluc  (FREESTYLE MARY READER) MARIJA 4 Devices by Does not apply route daily 5/14/21  Yes Garima Trujillo PA-C   Continuous Blood Gluc Sensor ([address] [facility]) [address] 4 Devices by Does not apply route daily 5/14/21  Yes Garima Trujillo PA-C   albuterol (PROVENTIL) (2.5 MG/3ML) 0.083% nebulizer solution Take 3 mLs by nebulization every 6 hours as needed for Wheezing 3/23/21  Yes Chika Parson PA-C   Continuous Blood Gluc Sensor (FREESTYLE MARY 2 SENSOR SYSTM) MISC 1 Device by Does not apply route daily 21  Yes Chika Parson PA-C   Multiple Vitamin (DAILY-DOUGLAS) TABS TAKE 1 TABLET BY MOUTH EVERY DAY 20  Yes Brad Bonilla DO   ipratropium-albuterol (DUONEB) 0.5-2.5 (3) MG/3ML SOLN nebulizer solution Take 3 mLs by nebulization every 4 hours as needed for Shortness of Breath 20  Yes Kera Neal MD   ONE TOUCH ULTRA TEST strip 1 each by Does not apply route daily 19  Yes Chika Parson PA-C   Handicap [place] [address] [facility] by Does not apply route Patient cannot walk 200 ft without stopping to rest.    Expiration 5 yrs 19  Yes Chika Parson PA-C   Methylnaltrexone Bromide 150 MG TABS Take 450 mg by mouth every morning  Patient not taking: Reported on 2022 9/1/22 10/1/22  Nazario Luna DO   aspirin 81 MG chewable tablet Take 1 tablet by mouth daily  Patient not taking: Reported on 3/10/2023 6/13/22   Chika Parson PA-C       Allergies   Allergen Reactions    Food Anaphylaxis     Food allergy - Fresh Water Fish, Lamesa's and Tree Nuts    Ultram [Tramadol Hcl]      Per pt had a seizure    Fish-Derived Products     Norco [Hydrocodone-Acetaminophen] Hives    Pcn [Penicillins]      Not known    Cashews [Macadamia Nut Oil] Nausea And Vomiting       Social History     Socioeconomic History    Marital status: Legally      Spouse name: Not on file    Number of children: Not on file    Years of education: Not on file    Highest education level: Not on file   Occupational History    Occupation: direct care staff/counselor   Tobacco Use    Smoking status: Some Days     Packs/day: 0.25     Years: 17.00     Pack years: 4.25     Types: Cigarettes     Start date: 3/27/2001     Last attempt to quit: 10/5/2018     Years since quittin.4    Smokeless tobacco: Never   Vaping Use    Vaping Use: Former   Substance and Sexual Activity    Alcohol use: No    Drug use: No    Sexual activity: Not on file   Other Topics Concern    Not on file   Social History Narrative    Not on file     Social Determinants of Health     Financial Resource Strain: Low Risk     Difficulty of Paying Living Expenses: Not hard at all   Food Insecurity: No Food Insecurity    Worried About Running Out of Food in the Last Year: Never true    Ran Out of Food in the Last Year: Never true   Transportation Needs: Not on file   Physical Activity: Not on file   Stress: Not on file   Social Connections: Not on file   Intimate Partner Violence: Not on file   Housing Stability: Not on file       Family History   Problem Relation Age of Onset    Stroke Mother     Arthritis Mother     Hypertension Mother     Asthma Mother     Fibromyalgia Mother     Cancer Father     Hypertension Father     Heart Attack Father     Asthma Father        REVIEW OF SYSTEMS:     Carlette Cowden denies fever/chills, chest pain, shortness of breath, new bowel or bladder complaints. All other review of systems was negative. PHYSICAL EXAMINATION:      /79   Pulse 69   Temp 97.2 °F (36.2 °C) (Infrared)   Resp 16   Ht 5' 8\" (1.727 m)   Wt (!) 302 lb (137 kg)   SpO2 91%   BMI 45.92 kg/m²     General:      General appearance:   pleasant and well-hydrated. , in mild discomfort and A & O x3  Build: Morbidly obese    HEENT:    Head:normocephalic and atraumatic  Sclera: icterus absent,    Lungs:    Breathing:Normal expansion. No wheezing. Abdomen:    Shape:non-distended and normal    Lumbar spine:    Spine inspection:normal   Range of motion:abnormal moderately Lateral bending, flexion, extension rotation bilateral and is  painful. Extremities:    Tremors:None bilaterally upper and lower  Range of motion:Generally normal shoulders, pain with internal rotation of hips not done.   Intact:Yes  Edema:Normal  Negative Tinel's sign.    Neurological:    Gait: in wheelchair    Dermatology:    Skin:no unusual rashes, no skin lesions, no palpable subcutaneous nodules, and good skin turgor    Impression:    Previously seen in 2019 for LBP and LLE pain (nondermatomal)  Upon 2022 consultation pt reports diffuse pain consistent with hyperalgesia, LBP BLE, and severe L hip pain  Lumbar MRI shows significant degenerative changes with multilevel stenosis  L hip xray shows significant OA changes  PMHx: JODI (compliant with cpap), asthma, morbid obesity s/p gastric bypass 5/2022 and as of 7/2022 has lost 30 lbs, CHF, DM, GERD, DLD, HTN     She re-presented recently after having not been seen in almost 3 years as referred by Lakia Knowles given Dr. Jose Fields change in practice status  Overall patient is nonfunctional, spending a significant amount of time in a wheelchair. She has lost 60 lbs since her gastric bypass     The opioid induced constipation is resolved with the lactulose along with Benefiber      OARRS report reviewed  UDS next visit  EMG UEs reviewed for b/l hand numbness/tingling x 1 year. Moderate CTS. Discussed referral to ortho. She declines. Right wrist brace ordered last visit. She cannot find the script. Requests wrist brace for both sides. Discussed how to wear. Discussed long term effects if she does not eventually see ortho. Discussed negative SE's associated with chronic opioid therapy, given her comorbidities (morbid obesity, JODI, minimizing opioid dosing will be paramount)  RF gabapentin 600 mg TID but change to 800 mg TID due to continued significant pain. RF percocet 5/325 #90 - no increases  No RF lactulose  Aqua therapy - Has stopped due to pain. States that she knows that she needs to go back and this was stressed to her this visit. .    L hip injection with 80% relief x 1.5 weeks only. Ordered today. To be spaced out from her LESI. Annie Pippins Consider referral to ortho, however, she would like to hold off.   B/l L5 TFESI -  scheduled. Patient encouraged to stay active and to lose weight, encouraged to continue with bariatric program  Treatment plan discussed with the patient including medication and procedure side effects    Controlled Substance Monitoring:    Acute and Chronic Pain Monitoring:   RX Monitoring 3/10/2023   Attestation -   Acute Pain Prescriptions -   Periodic Controlled Substance Monitoring Possible medication side effects, risk of tolerance/dependence & alternative treatments discussed. ;No signs of potential drug abuse or diversion identified. ;Assessed functional status. ;Obtaining appropriate analgesic effect of treatment. Chronic Pain > 80 MEDD -                               We discussed with the patient that combining opioids, benzodiazepines, alcohol, illicit drugs or sleep aids increases the risk of respiratory depression including death. We discussed that these medications may cause drowsiness, sedation or dizziness and have counseled the patient not to drive or operate machinery. We have discussed that these medications will be prescribed only by one provider. We have discussed with the patient about age related risk factors and have thoroughly discussed the importance of taking these medications as prescribed. The patient verbalizes understanding.     ccreferring physic

## 2023-03-10 NOTE — PROGRESS NOTES
Do you currently have any of the following:    Fever: No  Headache:  No  Cough: No  Shortness of breath: No  Exposed to anyone with these symptoms: No         Kathleen Aguero presents to the [facility] on 3/10/2023. Levi Esparza is complaining of pain in her low back. The pain is constant. The pain is described as aching and miserable. Pain is rated on her best day at a 7, on her worst day at a 10, and on average at a 8 on the VAS scale. She took her last dose of Percocet today. Any procedures since your last visit: No    Pacemaker or defibrillator: No    She is not on NSAIDS and is on anticoagulation medications ASA. Medication Contract and Consent for Opioid Use Documents Filed       Patient Documents       Type of Document Status Date Received Received By Description    Medication Contract Received 7/1/2019 11:37 AM Rainer Blue PAIN MGMT CONTRACT DR. VILLANUEVA    Medication Contract Signed 7/24/2019 10:15 AM KALIA BYRD medication contract    Medication Contract Received 10/27/2022  3:10 PM Clarissa Delgado [employer] Agreement                    /79   Pulse 69   Temp 97.2 °F (36.2 °C) (Infrared)   Resp 16   Ht 5' 8\" (1.727 m)   Wt (!) 302 lb (137 kg)   SpO2 91%   BMI 45.92 kg/m²      No LMP recorded.

## 2023-03-22 ENCOUNTER — TELEPHONE (OUTPATIENT)
Dept: PAIN MANAGEMENT | Age: 48
End: 2023-03-22

## 2023-03-22 NOTE — TELEPHONE ENCOUNTER
Call to Aydin Rangels that procedure was approved for 3/23/2023 and that Leodansmiley should call her a few days before for the pre op call and between 2:00 PM and 4:00 PM  the business day before with the arrival time. Instructed Monique to hold ibuprofen for 24 hours, naprosyn for 4 days and any aspirin containing products or fish oil for 7 days, she has not been taking her aspirin . Instructed to call office back if any questions. Sandra Hendrickson verbalized understanding.     Electronically signed by Teddy Finnegan RN on 3/22/2023 at 9:11 AM

## 2023-03-22 NOTE — PROGRESS NOTES
[facility]  INSTRUCTIONS  . .......................................................................................................................................... [x] Parking the day of Surgery is located in the [facility].   Upon entering the door, make immediate right into the surgery reception room    [x]  Bring photo ID and insurance card     [x] You may have a light breakfast day of procedure    [x]  Wear loose comfortable clothing    [x]  Please follow instructions for medications as given per Dr's office    [x] You can expect a call the business day prior to procedure to notify you of your arrival time     [x] Please arrange for     []  Other instructions

## 2023-03-23 ENCOUNTER — HOSPITAL ENCOUNTER (OUTPATIENT)
Age: 48
Setting detail: OUTPATIENT SURGERY
Discharge: HOME | End: 2023-03-23
Attending: PAIN MEDICINE | Admitting: PAIN MEDICINE
Payer: MEDICAID

## 2023-03-23 ENCOUNTER — HOSPITAL ENCOUNTER (OUTPATIENT)
Dept: GENERAL RADIOLOGY | Age: 48
Discharge: HOME | End: 2023-03-25
Attending: PAIN MEDICINE
Payer: MEDICAID

## 2023-03-23 VITALS
OXYGEN SATURATION: 94 % | TEMPERATURE: 97.5 F | HEIGHT: 69 IN | WEIGHT: 293 LBS | SYSTOLIC BLOOD PRESSURE: 147 MMHG | HEART RATE: 68 BPM | RESPIRATION RATE: 16 BRPM | BODY MASS INDEX: 43.4 KG/M2 | DIASTOLIC BLOOD PRESSURE: 74 MMHG

## 2023-03-23 DIAGNOSIS — R52 PAIN MANAGEMENT: ICD-10-CM

## 2023-03-23 LAB
HCG, URINE, POC: NEGATIVE
Lab: NORMAL
NEGATIVE QC PASS/FAIL: NORMAL
POSITIVE QC PASS/FAIL: NORMAL

## 2023-03-23 PROCEDURE — 7100000011 HC PHASE II RECOVERY - ADDTL 15 MIN: Performed by: PAIN MEDICINE

## 2023-03-23 PROCEDURE — 6360000002 HC RX W HCPCS: Performed by: PAIN MEDICINE

## 2023-03-23 PROCEDURE — 2709999900 HC NON-CHARGEABLE SUPPLY: Performed by: PAIN MEDICINE

## 2023-03-23 PROCEDURE — 3600000002 HC SURGERY LEVEL 2 BASE: Performed by: PAIN MEDICINE

## 2023-03-23 PROCEDURE — 7100000010 HC PHASE II RECOVERY - FIRST 15 MIN: Performed by: PAIN MEDICINE

## 2023-03-23 PROCEDURE — 2500000003 HC RX 250 WO HCPCS: Performed by: PAIN MEDICINE

## 2023-03-23 PROCEDURE — 6360000004 HC RX CONTRAST MEDICATION: Performed by: PAIN MEDICINE

## 2023-03-23 PROCEDURE — 20610 DRAIN/INJ JOINT/BURSA W/O US: CPT | Performed by: PAIN MEDICINE

## 2023-03-23 PROCEDURE — 77002 NEEDLE LOCALIZATION BY XRAY: CPT | Performed by: PAIN MEDICINE

## 2023-03-23 PROCEDURE — 3209999900 FLUORO FOR SURGICAL PROCEDURES

## 2023-03-23 RX ORDER — BUPIVACAINE HYDROCHLORIDE 2.5 MG/ML
INJECTION, SOLUTION EPIDURAL; INFILTRATION; INTRACAUDAL PRN
Status: DISCONTINUED | OUTPATIENT
Start: 2023-03-23 | End: 2023-03-23 | Stop reason: ALTCHOICE

## 2023-03-23 RX ORDER — LIDOCAINE HYDROCHLORIDE 5 MG/ML
INJECTION, SOLUTION INFILTRATION; INTRAVENOUS PRN
Status: DISCONTINUED | OUTPATIENT
Start: 2023-03-23 | End: 2023-03-23 | Stop reason: ALTCHOICE

## 2023-03-23 RX ORDER — METHYLPREDNISOLONE ACETATE 40 MG/ML
INJECTION, SUSPENSION INTRA-ARTICULAR; INTRALESIONAL; INTRAMUSCULAR; SOFT TISSUE PRN
Status: DISCONTINUED | OUTPATIENT
Start: 2023-03-23 | End: 2023-03-23 | Stop reason: ALTCHOICE

## 2023-03-23 ASSESSMENT — PAIN DESCRIPTION - DESCRIPTORS: DESCRIPTORS: ACHING;CRUSHING;DISCOMFORT

## 2023-03-23 ASSESSMENT — PAIN DESCRIPTION - ORIENTATION: ORIENTATION: LEFT;RIGHT;LOWER;MID;UPPER

## 2023-03-23 ASSESSMENT — PAIN DESCRIPTION - LOCATION: LOCATION: ARM;HIP

## 2023-03-23 ASSESSMENT — PAIN DESCRIPTION - ONSET: ONSET: ON-GOING

## 2023-03-23 ASSESSMENT — PAIN DESCRIPTION - PAIN TYPE: TYPE: CHRONIC PAIN

## 2023-03-23 ASSESSMENT — PAIN SCALES - GENERAL
PAINLEVEL_OUTOF10: 6
PAINLEVEL_OUTOF10: 10

## 2023-03-23 ASSESSMENT — PAIN - FUNCTIONAL ASSESSMENT: PAIN_FUNCTIONAL_ASSESSMENT: PREVENTS OR INTERFERES WITH ALL ACTIVE AND SOME PASSIVE ACTIVITIES

## 2023-03-23 ASSESSMENT — PAIN DESCRIPTION - FREQUENCY: FREQUENCY: CONTINUOUS

## 2023-03-23 NOTE — H&P
[facility]        [address] [facility], [address]  Dept: [phone]    Procedure History & Physical      Aishwarya Barber     HPI:    Patient  is here for left hip pain for left hip injection  Labs/imaging studies reviewed   All question and concerns addressed including R/B/A associated with the procedure    Past Medical History:   Diagnosis Date    Asthma     Bell palsy     drooping    CHF (congestive heart failure) ([address]     DDD (degenerative disc disease), cervical     with spinal stenosis    Diabetes mellitus ([address]     DJD (degenerative joint disease)     both hips    GERD without esophagitis     HTN (hypertension)     Hydradenitis     recently, currently resolved    Hyperlipidemia     Left hip pain     Meningitis 2009    Morbid obesity due to excess calories ([facility]     Neuropathy     JODI treated with BiPAP     Scoliosis     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[facility])        Past Surgical History:   Procedure Laterality Date    CHOLECYSTECTOMY  2009    CYSTOSCOPY Left 01/14/2018    left stent placement    DILATION AND CURETTAGE OF UTERUS      younger age    HIP SURGERY Left 9/22/2022    LEFT HIP INJECTION performed by Jose Shepard DO at [address] [facility]    LITHOTRIPSY Right 02/15/2018    Cystoscopy;Stent Removal    NERVE BLOCK Bilateral 12/22/2022    BILATERAL L5 TRANSFORAMINAL EPIDURAL STEROID INJECTION performed by Jose Shepard DO at [address] Bilateral 10/27/2022    BILATERAL L5 TRANSFORAMINAL EPIDURAL STEROID INJECTION performed by Jose Shepard DO at [address] [facility] N/A 5/31/2022    GASTRIC BYPASS MARGUERITE-EN-Y LAPAROSCOPIC performed by Uday Renae MD at [address] [facility] 6/22/2018    EGD BIOPSY performed by Uday Renae MD at [address] [place] [address] 8/20/2021    EGD BIOPSY performed by Uday Renae MD at [place] [address]       Prior to Admission medications    Medication Sig negative      PHYSICAL EXAM:    VITALS:  /82   Pulse 72   Temp 97.5 °F (36.4 °C)   Resp 18   Ht 5' 8.5\" (1.74 m)   Wt (!) 302 lb (137 kg)   LMP 03/13/2023 Comment: Instructed to bring urine spec DOS  SpO2 97%   BMI 45.25 kg/m²     CONSTITUTIONAL:  awake, alert, cooperative, no apparent distress, and appears stated age    EYES: PERRLA, EOMI    LUNGS:  No increased work of breathing, no audible wheezing    CARDIOVASCULAR:  regular rate and rhythm    ABDOMEN:  Soft non tender non distended     EXTREMITIES: no signs of clubbing or cyanosis. MUSCULOSKELETAL: negative for flaccid muscle tone or spastic movements. SKIN: gross examination reveals no signs of rashes, or diaphoresis. NEURO: Cranial nerves II-XII grossly intact. No signs of agitated mood.        Assessment/Plan:    Left hip pain for left hip injection

## 2023-03-23 NOTE — DISCHARGE INSTRUCTIONS
Ernestina Decker [facility]/[employer]  Home Going Instructions    1-Go home, rest for the remainder of the day  2-Please do not lift over 20 pounds the day of the injection  3-If you received sedation No: alcohol, driving, operating lawn mowers, plows, tractors or other dangerous equipment until next morning. Do not make important decisions or sign legal documents for 24 hours. You may experience light headedness, dizziness, nausea or sleepiness after sedation. Do not stay alone. A responsible adult must be with you for 24 hours. You could be nauseated from the medications you have received. Your IV site may be sore and bruised. 4-No dietary restrictions     5-Resume all medications the same day, blood thinners to be resumed 24 hours after injection if you were instructed to stop any. 6-Keep the surgical site clean and dry, you may shower the next morning and remove the      dressing. 7- No sitz baths, tub baths or hot tubs/swimming for 24 hours. 8- If you have any pain at the injection site(s), application of an ice pack to the area should be       helpful, 20 minutes on/20 minutes off for next 48 hours. 9- Call [facility] immediately at if you develop.   Fever greater than 100.4 F  Have bleeding or drainage from the puncture site  Have progressive Leg/arm numbness and or weakness  Loss of control of bowel and or bladder (wet/soil yourself)  Severe headache with inability to lift head  10-You may return to work the next day

## 2023-03-23 NOTE — OP NOTE
3/23/2023    Patient: Francois Lowe  :  1975  Age:  41 y.o. Sex:  female     PRE-OPERATIVE DIAGNOSIS: Left Hip osteoarthritis, hip pain. POST-OPERATIVE DIAGNOSIS: Same. PROCEDURE PERFORMED: Left Hip steroid injection under fluoroscopic guidance. SURGEON: BARBRA Leon. ANESTHESIA: local    ESTIMATED BLOOD LOSS: None. BRIEF HISTORY: Francois Lowe comes in today for Left hip steroid injection under fluoroscopic guidance. After discussing the potential risks and benefits of the procedure with the patient  Lisa calvo request that we proceed. A complete History & Physical was reviewed and it is unchanged. DESCRIPTION OF PROCEDURE:     After confirming written and informed consent, a time-out was performed and Moniques name and date of birth, the procedure to be performed as well as the plan for the location of the needle insertion were confirmed. Patient was brought into the procedure room and was placed in the lateral decubitus position on the fluoroscopy table. Standard monitors were placed and vital signs were observed throughout the procedure  The area of the Left hip was prepped with chloraprep and draped in a sterile manner. The overlying skin and subcutaneous tissues were anesthetized with 0.5% lidocaine. At this time, a 22 gauge spinal 3 1/2 inch spinal needle was advanced in the lateral fluoroscopic view until bone was contacted. Then, the PA fluoroscopic view was utilized and the needle was slightly advanced into the hip joint. 2 cc of Isovue-M 300 was injected with appropiate contrast spread under live fluoroscopy. A solution of 0.25 % marcaine 4 cc and 40 mg DepoMedrol was injected easily after negative aspiration without complications. The needle was then removed and Band-Aid applied. Disposition the patient tolerated the procedure well and there were no complications . Vital signs remained stable throughout the procedure.  The patient was escorted to the recovery area where they remained until discharge and written discharge instructions for the procedure were given. Plan: Elizabeth Walters will return to our pain management center as scheduled.      Laurent Watson DO

## 2023-03-28 ENCOUNTER — TELEPHONE (OUTPATIENT)
Dept: PAIN MANAGEMENT | Age: 48
End: 2023-03-28

## 2023-03-28 NOTE — TELEPHONE ENCOUNTER
Call to Ryder Balderas that procedure was approved for 4/4/2023 and that Delicia should call her a few days before for the pre op call and between 2:00 PM and 4:00 PM  the business day before with the arrival time. Instructed Monique to hold ibuprofen for 24 hours, naprosyn for 4 days and any aspirin containing products or fish oil for 7 days. She has been holding ASA for a few weeks she says. Instructed to call office back if any questions. Jeffrey Knows verbalized understanding.     Electronically signed by Florinda Cushing, RN on 3/28/2023 at 10:08 AM

## 2023-03-31 NOTE — PROGRESS NOTES
[facility]  INSTRUCTIONS  . .......................................................................................................................................... [x] Parking the day of Surgery is located in the [facility].   Upon entering the door, make immediate right into the surgery reception room    [x]  Bring photo ID and insurance card     [x] You may have a light breakfast day of procedure    [x]  Wear loose comfortable clothing    [x]  Please follow instructions for medications as given per Dr's office    [x] You can expect a call the business day prior to procedure to notify you of your arrival time     [x] Please arrange for     []  Other instructions

## 2023-04-04 ENCOUNTER — HOSPITAL ENCOUNTER (OUTPATIENT)
Dept: GENERAL RADIOLOGY | Age: 48
Discharge: HOME | End: 2023-04-06
Attending: PAIN MEDICINE
Payer: MEDICAID

## 2023-04-04 ENCOUNTER — HOSPITAL ENCOUNTER (OUTPATIENT)
Age: 48
Setting detail: OUTPATIENT SURGERY
Discharge: HOME | End: 2023-04-04
Attending: PAIN MEDICINE | Admitting: PAIN MEDICINE
Payer: MEDICAID

## 2023-04-04 VITALS
OXYGEN SATURATION: 95 % | TEMPERATURE: 97.7 F | HEART RATE: 52 BPM | WEIGHT: 293 LBS | BODY MASS INDEX: 44.41 KG/M2 | SYSTOLIC BLOOD PRESSURE: 126 MMHG | HEIGHT: 68 IN | RESPIRATION RATE: 18 BRPM | DIASTOLIC BLOOD PRESSURE: 68 MMHG

## 2023-04-04 DIAGNOSIS — R52 PAIN MANAGEMENT: ICD-10-CM

## 2023-04-04 LAB
HCG, URINE, POC: NEGATIVE
Lab: NORMAL
METER GLUCOSE: 95 MG/DL (ref 74–99)
NEGATIVE QC PASS/FAIL: NORMAL
POSITIVE QC PASS/FAIL: NORMAL

## 2023-04-04 PROCEDURE — 7100000011 HC PHASE II RECOVERY - ADDTL 15 MIN: Performed by: PAIN MEDICINE

## 2023-04-04 PROCEDURE — 6360000004 HC RX CONTRAST MEDICATION: Performed by: PAIN MEDICINE

## 2023-04-04 PROCEDURE — 3209999900 FLUORO FOR SURGICAL PROCEDURES

## 2023-04-04 PROCEDURE — 2709999900 HC NON-CHARGEABLE SUPPLY: Performed by: PAIN MEDICINE

## 2023-04-04 PROCEDURE — 3600000002 HC SURGERY LEVEL 2 BASE: Performed by: PAIN MEDICINE

## 2023-04-04 PROCEDURE — 6360000002 HC RX W HCPCS: Performed by: PAIN MEDICINE

## 2023-04-04 PROCEDURE — 7100000010 HC PHASE II RECOVERY - FIRST 15 MIN: Performed by: PAIN MEDICINE

## 2023-04-04 PROCEDURE — 82962 GLUCOSE BLOOD TEST: CPT

## 2023-04-04 PROCEDURE — 2500000003 HC RX 250 WO HCPCS: Performed by: PAIN MEDICINE

## 2023-04-04 PROCEDURE — 64483 NJX AA&/STRD TFRM EPI L/S 1: CPT | Performed by: PAIN MEDICINE

## 2023-04-04 RX ORDER — LIDOCAINE HYDROCHLORIDE 5 MG/ML
INJECTION, SOLUTION INFILTRATION; INTRAVENOUS PRN
Status: DISCONTINUED | OUTPATIENT
Start: 2023-04-04 | End: 2023-04-04 | Stop reason: ALTCHOICE

## 2023-04-04 RX ORDER — METHYLPREDNISOLONE ACETATE 40 MG/ML
INJECTION, SUSPENSION INTRA-ARTICULAR; INTRALESIONAL; INTRAMUSCULAR; SOFT TISSUE PRN
Status: DISCONTINUED | OUTPATIENT
Start: 2023-04-04 | End: 2023-04-04 | Stop reason: ALTCHOICE

## 2023-04-04 ASSESSMENT — PAIN DESCRIPTION - DESCRIPTORS: DESCRIPTORS: ACHING;DISCOMFORT;SHARP;SHOOTING

## 2023-04-04 ASSESSMENT — PAIN DESCRIPTION - PAIN TYPE: TYPE: CHRONIC PAIN

## 2023-04-04 ASSESSMENT — PAIN SCALES - GENERAL: PAINLEVEL_OUTOF10: 9

## 2023-04-04 NOTE — OP NOTE
. A lateral fluoroscopic view was then used to place the needle tip in the middle to anterior aspect of the foramen. Negative aspiration was confirmed for blood and CSF and 1 cc of Isovue-M 300 contrast was injected at each level under live AP fluoroscopy. Appropriate neurograms were observed under live AP fluoroscopy. Then after negative aspiration, a solution of the 2 cc of 0.5% lidocaine and 40 mg DepoMedrol was easily injected between each level. The needles were removed with constant aspiration technique. The patient's back was cleaned and a bandage was placed over the needle insertion points    Disposition the patient tolerated the procedure well and there were no complications . Vital signs remained stable throughout the procedure. The patient was escorted to the recovery area where they remained until discharge and written discharge instructions for the procedure were given. Plan: Michelle Mcconnell will return to our pain management center as scheduled.      Bacilio Gill DO

## 2023-04-04 NOTE — DISCHARGE INSTRUCTIONS
[facility]  Home Going Instructions    1-Go home, rest for the remainder of the day  2-Please do not lift over 20 pounds the day of the injection  3-If you received sedation No: alcohol, driving, operating lawn mowers, plows, tractors or other dangerous equipment until next morning. Do not make important decisions or sign legal documents for 24 hours. You may experience light headedness, dizziness, nausea or sleepiness after sedation. Do not stay alone. A responsible adult must be with you for 24 hours. You could be nauseated from the medications you have received. Your IV site may be sore and bruised. 4-No dietary restrictions     5-Resume all medications the same day, blood thinners to be resumed 24 hours after injection if you were instructed to stop any. 6-Keep the surgical site clean and dry, you may shower the next morning and remove the      dressing. 7- No sitz baths, tub baths or hot tubs/swimming for 24 hours. 8- If you have any pain at the injection site(s), application of an ice pack to the area should be       helpful, 20 minutes on/20 minutes off for next 48 hours. 9- Call [facility] immediately at if you develop.   Fever greater than 100.4 F  Have bleeding or drainage from the puncture site  Have progressive Leg/arm numbness and or weakness  Loss of control of bowel and or bladder (wet/soil yourself)  Severe headache with inability to lift head  10-You may return to work the next day

## 2023-04-04 NOTE — H&P
EGD BIOPSY performed by Genet Ruiz MD at [facility]       Prior to Admission medications    Medication Sig Start Date End Date Taking? Authorizing Provider   Elastic Bandages & Supports (WRIST BRACE/RIGHT LARGE) MISC 1 Device by Does not apply route daily as needed (CTS) 3/10/23   GREGORIO Olvera   Elastic Bandages & Supports (WRIST BRACE//LEFT LARGE) MISC 1 Device by Does not apply route daily as needed (CTS) 3/10/23   GREGORIO Olvera   oxyCODONE-acetaminophen (PERCOCET) 5-325 MG per tablet Take 1 tablet by mouth 3 times daily as needed for Pain for up to 30 days. Intended supply: 30 days. Take lowest dose possible to manage pain 3/14/23 4/13/23  GREGORIO Olvera   gabapentin (NEURONTIN) 800 MG tablet Take 1 tablet by mouth 3 times daily for 30 days.  3/10/23 4/9/23  GREGORIO Olvera   pantoprazole (PROTONIX) 20 MG tablet TAKE 1 TABLET BY MOUTH DAILY 3/8/23   Santosh Woods PA-C   metoprolol succinate (TOPROL XL) 100 MG extended release tablet TAKE 1 TABLET BY MOUTH TWICE DAILY 3/8/23   Santosh Woods PA-C   cetirizine (ZYRTEC) 10 MG tablet TAKE 1 TABLET BY MOUTH DAILY 3/8/23   Santosh Woods PA-C   fluticasone [facility]) 50 MCG/ACT nasal spray SHAKE LIQUID AND USE 2 SPRAYS IN [facility] NOSTRIL DAILY 3/8/23   Santosh Woods PA-C   promethazine-dextromethorphan (PROMETHAZINE-DM) 6.25-15 MG/5ML syrup TAKE 5 ML BY MOUTH FOUR TIMES DAILY AS NEEDED FOR COUGH 3/8/23   Santosh Woods PA-C   atorvastatin (LIPITOR) 80 MG tablet TAKE 1 TABLET BY MOUTH EVERY NIGHT 3/8/23   Santosh Woods PA-C   VENTOLIN  (90 Base) MCG/ACT inhaler INHALE 2 PUFFS INTO THE LUNGS EVERY 4 HOURS AS NEEDED FOR WHEEZING OR SHORTNESS OF BREATH 3/8/23   Santosh Woods PA-C   nystatin [facility]) 406897 UNIT/GM powder APPLY EXTERNALLY TO THE AFFECTED AREA THREE TIMES DAILY 3/7/23   Andriy Spicer, APRN - CNP   Elastic Bandages & Supports (WRIST BRACE/RIGHT LARGE) MISC 1 Device by Does not apply route daily as needed (CTS)

## 2023-04-19 ENCOUNTER — OFFICE VISIT (OUTPATIENT)
Dept: PRIMARY CARE CLINIC | Age: 48
End: 2023-04-19
Payer: MEDICAID

## 2023-04-19 VITALS
HEIGHT: 68 IN | DIASTOLIC BLOOD PRESSURE: 60 MMHG | WEIGHT: 293 LBS | SYSTOLIC BLOOD PRESSURE: 110 MMHG | RESPIRATION RATE: 18 BRPM | TEMPERATURE: 97.9 F | BODY MASS INDEX: 44.41 KG/M2

## 2023-04-19 DIAGNOSIS — I42.9 CARDIOMYOPATHY, UNSPECIFIED TYPE (HCC): Primary | ICD-10-CM

## 2023-04-19 DIAGNOSIS — M16.0 PRIMARY OSTEOARTHRITIS OF BOTH HIPS: ICD-10-CM

## 2023-04-19 DIAGNOSIS — R82.90 ABNORMAL URINE ODOR: ICD-10-CM

## 2023-04-19 DIAGNOSIS — E11.9 TYPE 2 DIABETES MELLITUS WITHOUT COMPLICATION, WITHOUT LONG-TERM CURRENT USE OF INSULIN (HCC): ICD-10-CM

## 2023-04-19 DIAGNOSIS — M48.061 SPINAL STENOSIS OF LUMBAR REGION, UNSPECIFIED WHETHER NEUROGENIC CLAUDICATION PRESENT: ICD-10-CM

## 2023-04-19 LAB
BILIRUBIN, POC: NORMAL
BLOOD URINE, POC: NORMAL
CLARITY, POC: CLEAR
COLOR, POC: YELLOW
GLUCOSE URINE, POC: NORMAL
HBA1C MFR BLD: 5.4 %
KETONES, POC: NORMAL
LEUKOCYTE EST, POC: NORMAL
NITRITE, POC: NORMAL
PH, POC: 6
PROTEIN, POC: NORMAL
SPECIFIC GRAVITY, POC: 1.02
UROBILINOGEN, POC: 1

## 2023-04-19 PROCEDURE — 99214 OFFICE O/P EST MOD 30 MIN: CPT | Performed by: PHYSICIAN ASSISTANT

## 2023-04-19 PROCEDURE — 3078F DIAST BP <80 MM HG: CPT | Performed by: PHYSICIAN ASSISTANT

## 2023-04-19 PROCEDURE — 3074F SYST BP LT 130 MM HG: CPT | Performed by: PHYSICIAN ASSISTANT

## 2023-04-19 PROCEDURE — 81002 URINALYSIS NONAUTO W/O SCOPE: CPT | Performed by: PHYSICIAN ASSISTANT

## 2023-04-19 PROCEDURE — 83036 HEMOGLOBIN GLYCOSYLATED A1C: CPT | Performed by: PHYSICIAN ASSISTANT

## 2023-04-19 PROCEDURE — 3044F HG A1C LEVEL LT 7.0%: CPT | Performed by: PHYSICIAN ASSISTANT

## 2023-04-19 SDOH — ECONOMIC STABILITY: FOOD INSECURITY: WITHIN THE PAST 12 MONTHS, THE FOOD YOU BOUGHT JUST DIDN'T LAST AND YOU DIDN'T HAVE MONEY TO GET MORE.: NEVER TRUE

## 2023-04-19 SDOH — ECONOMIC STABILITY: FOOD INSECURITY: WITHIN THE PAST 12 MONTHS, YOU WORRIED THAT YOUR FOOD WOULD RUN OUT BEFORE YOU GOT MONEY TO BUY MORE.: NEVER TRUE

## 2023-04-19 SDOH — ECONOMIC STABILITY: INCOME INSECURITY: HOW HARD IS IT FOR YOU TO PAY FOR THE VERY BASICS LIKE FOOD, HOUSING, MEDICAL CARE, AND HEATING?: NOT HARD AT ALL

## 2023-04-19 SDOH — ECONOMIC STABILITY: HOUSING INSECURITY
IN THE LAST 12 MONTHS, WAS THERE A TIME WHEN YOU DID NOT HAVE A STEADY PLACE TO SLEEP OR SLEPT IN A SHELTER (INCLUDING NOW)?: NO

## 2023-04-19 ASSESSMENT — PATIENT HEALTH QUESTIONNAIRE - PHQ9
2. FEELING DOWN, DEPRESSED OR HOPELESS: 0
3. TROUBLE FALLING OR STAYING ASLEEP: 0
SUM OF ALL RESPONSES TO PHQ QUESTIONS 1-9: 0
4. FEELING TIRED OR HAVING LITTLE ENERGY: 0
9. THOUGHTS THAT YOU WOULD BE BETTER OFF DEAD, OR OF HURTING YOURSELF: 0
SUM OF ALL RESPONSES TO PHQ QUESTIONS 1-9: 0
8. MOVING OR SPEAKING SO SLOWLY THAT OTHER PEOPLE COULD HAVE NOTICED. OR THE OPPOSITE, BEING SO FIGETY OR RESTLESS THAT YOU HAVE BEEN MOVING AROUND A LOT MORE THAN USUAL: 0
6. FEELING BAD ABOUT YOURSELF - OR THAT YOU ARE A FAILURE OR HAVE LET YOURSELF OR YOUR FAMILY DOWN: 0
7. TROUBLE CONCENTRATING ON THINGS, SUCH AS READING THE NEWSPAPER OR WATCHING TELEVISION: 0
1. LITTLE INTEREST OR PLEASURE IN DOING THINGS: 0
SUM OF ALL RESPONSES TO PHQ9 QUESTIONS 1 & 2: 0
10. IF YOU CHECKED OFF ANY PROBLEMS, HOW DIFFICULT HAVE THESE PROBLEMS MADE IT FOR YOU TO DO YOUR WORK, TAKE CARE OF THINGS AT HOME, OR GET ALONG WITH OTHER PEOPLE: 0
5. POOR APPETITE OR OVEREATING: 0

## 2023-04-19 NOTE — PROGRESS NOTES
above diagnosis, including possible risks and complications,  especially if left uncontrolled. Counseled regarding the possible side effects, risks, benefits and alternatives to treatment; patient and/or guardian verbalizes understanding, agrees, feels comfortable with and wishes to proceed with above treatment plan. Advised patient to call with any new medication issues, and read all Rx info from pharmacy to assure aware of all possible risks and side effects of medication before taking. Reviewed age and gender appropriate health screening exams and vaccinations. Advised patient regarding importance of keeping up with recommended health maintenance and to schedule as soon as possible if overdue, as this is important in assessing for undiagnosed pathology, especially cancer, as well as protecting against potentially harmful/life threatening disease. Patient and/or guardian verbalizes understanding and agrees with above counseling, assessment and plan. All questions answered. Elaine Biggs PA-C  4/21/2023    I have personally reviewed and updated the chief complaint, HPI, Past Medical, Family and Social History, as well as the above Review of Systems.

## 2023-04-26 ENCOUNTER — HOSPITAL ENCOUNTER (OUTPATIENT)
Dept: PHYSICAL THERAPY | Age: 48
Setting detail: THERAPIES SERIES
Discharge: HOME | End: 2023-04-26

## 2023-04-26 NOTE — PROGRESS NOTES
Spoke with [place] to schedule her FCE.   She is to be FCE tested Tue May 9th 10AM.

## 2023-05-03 ENCOUNTER — OFFICE VISIT (OUTPATIENT)
Dept: ORTHOPEDIC SURGERY | Age: 48
End: 2023-05-03

## 2023-05-03 VITALS — HEIGHT: 67 IN | WEIGHT: 293 LBS | BODY MASS INDEX: 45.99 KG/M2

## 2023-05-03 DIAGNOSIS — M25.552 LEFT HIP PAIN: Primary | ICD-10-CM

## 2023-05-03 SDOH — HEALTH STABILITY: PHYSICAL HEALTH: ON AVERAGE, HOW MANY MINUTES DO YOU ENGAGE IN EXERCISE AT THIS LEVEL?: 0 MIN

## 2023-05-03 SDOH — HEALTH STABILITY: PHYSICAL HEALTH: ON AVERAGE, HOW MANY DAYS PER WEEK DO YOU ENGAGE IN MODERATE TO STRENUOUS EXERCISE (LIKE A BRISK WALK)?: 0 DAYS

## 2023-05-03 NOTE — PROGRESS NOTES
[facility]   ORTHOPAEDIC SURGERY AND SPORTS MEDICINE  DATE OF VISIT: 05/03/23  New Hip Patient     Referring Provider:   Errol Owusu DO  [address] [facility]    CHIEF COMPLAINT:   Chief Complaint   Patient presents with    Hip Pain     Referred by Dr. Christopher Rob, herlinda hip pain. L > R. Pain x years, has progressed with symptoms over the last 2 years. Patient states that she did have injection on 3/23 in the L hip inj, pain was elevated for 1 week. Pain is all lateral, denies groin pain. Pain is constant, difficulty ambulating and sleeping. Ambulating with a walker. HPI:      Giuseppe Rodrigues is a 57y.o. year old {Desc; male/female:19346} who is seen today  for evaluation of {DESC; LEFT/RIGHT/BI:78307} hip pain. She reports the pain has been ongoing for the past {NUMBERS 1-10:68393} {days/wks/mos/yrs:594869}. She   {DOES:19736} recall a specific injury which started the pain.  ***. She   reports the pain is worse with ***, better with ***. The patient {DOES/DOES NOT:82332} have mechanical symptoms. She denies a feeling of instability. The prior treatments have been {prev rx:320338}. The patient   {HAS/HAS NOT:839427669} responded to the treatment. The patient is not working.      PAST MEDICAL HISTORY  Past Medical History:   Diagnosis Date    Asthma     Bell palsy     drooping    CHF (congestive heart failure) (HCC)     DDD (degenerative disc disease), cervical     with spinal stenosis    Diabetes mellitus (HCC)     DJD (degenerative joint disease)     both hips    GERD without esophagitis     HTN (hypertension)     Hyperlipidemia     Left hip pain     Meningitis 2009    Morbid obesity due to excess calories ([facility]     Neuropathy     JODI treated with BiPAP     Scoliosis     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[facility])        PAST SURGICAL HISTORY  Past Surgical History:   Procedure Laterality Date    CHOLECYSTECTOMY  2009    CYSTOSCOPY Left 01/14/2018    left stent placement
a seizure    Fish-Derived Products     Norco [Hydrocodone-Acetaminophen] Hives    Pcn [Penicillins]      Not known    Cashews [Macadamia Nut Oil] Nausea And Vomiting       Controlled Substances Monitoring:      REVIEW OF SYSTEMS:     Constitutional:  Negative for weight loss, fevers, chills, fatigue  Cardiovascular: Negative for chest pain, palpitations  Pulmonary: Negative for shortness of breath, labored breathing, cough  GI: negative for abdominal pain, nausea, vomitting   MSK: per HPI  Skin: negative for rash, open wounds    All other systems reviewed and are negative     PHYSICAL EXAM     VITALS: Ht 5' 7\" (1.702 m)   Wt 299 lb (135.6 kg) Comment: ACCURATE  LMP 04/13/2023   BMI 46.83 kg/m²     General: The patient is alert and oriented x 3, appears to be stated age and in no distress. HEENT: head is normocephalic, atraumatic. EOMI. Neck: supple, trachea midline, no thyromegaly   Cardiovascular: peripheral pulses palpable. Normal Capillary refill   Respiratory: breathing unlabored, chest expansion symmetric   Skin: no rash, no open wounds, no erythema  Psych: normal affect; mood stable  Neurologic: gait normal, sensation grossly intact in extremities  MSK:     Hip:  Left hip  Pain in her SI joint with DAVE, no pain with FADIR. She does have decreased passive internal and external rotation of the hip. Significant tenderness over the greater trochanter with palpation. The patient is able to straight leg raise. Right hip  Pain in her SI joint with DAVE, no pain with FADIR. She does have decreased passive internal and external rotation of the hip. The patient is able to straight leg raise. IMAGING:    XR: AP pelvis, AP and Lateral of the involved hip display osteophytes of the acetabulum bilaterally with some decreased joint space. There is a cam deformity of the femoral necks. No acute fractures or dislocations.   She does have significant arthritis of her lumbar spine that is visualized in these

## 2023-05-09 ENCOUNTER — PREP FOR PROCEDURE (OUTPATIENT)
Dept: PAIN MANAGEMENT | Age: 48
End: 2023-05-09

## 2023-05-09 ENCOUNTER — HOSPITAL ENCOUNTER (OUTPATIENT)
Dept: PHYSICAL THERAPY | Age: 48
Setting detail: THERAPIES SERIES
Discharge: HOME | End: 2023-05-09
Payer: MEDICAID

## 2023-05-09 ENCOUNTER — OFFICE VISIT (OUTPATIENT)
Dept: PAIN MANAGEMENT | Age: 48
End: 2023-05-09
Payer: MEDICAID

## 2023-05-09 VITALS
BODY MASS INDEX: 44.41 KG/M2 | SYSTOLIC BLOOD PRESSURE: 115 MMHG | RESPIRATION RATE: 16 BRPM | WEIGHT: 293 LBS | DIASTOLIC BLOOD PRESSURE: 70 MMHG | OXYGEN SATURATION: 93 % | TEMPERATURE: 98.2 F | HEIGHT: 68 IN | HEART RATE: 62 BPM

## 2023-05-09 DIAGNOSIS — M54.16 LUMBAR RADICULOPATHY: Primary | ICD-10-CM

## 2023-05-09 DIAGNOSIS — M25.552 LEFT HIP PAIN: ICD-10-CM

## 2023-05-09 DIAGNOSIS — M16.12 PRIMARY OSTEOARTHRITIS OF LEFT HIP: ICD-10-CM

## 2023-05-09 DIAGNOSIS — G89.4 CHRONIC PAIN SYNDROME: Primary | ICD-10-CM

## 2023-05-09 DIAGNOSIS — Z79.891 LONG TERM PRESCRIPTION OPIATE USE: ICD-10-CM

## 2023-05-09 DIAGNOSIS — G89.4 CHRONIC PAIN SYNDROME: ICD-10-CM

## 2023-05-09 DIAGNOSIS — K59.03 DRUG-INDUCED CONSTIPATION: ICD-10-CM

## 2023-05-09 DIAGNOSIS — M54.41 CHRONIC BILATERAL LOW BACK PAIN WITH BILATERAL SCIATICA: ICD-10-CM

## 2023-05-09 DIAGNOSIS — G89.29 CHRONIC BILATERAL LOW BACK PAIN WITH BILATERAL SCIATICA: ICD-10-CM

## 2023-05-09 DIAGNOSIS — M48.062 SPINAL STENOSIS OF LUMBAR REGION WITH NEUROGENIC CLAUDICATION: ICD-10-CM

## 2023-05-09 DIAGNOSIS — M54.42 CHRONIC BILATERAL LOW BACK PAIN WITH BILATERAL SCIATICA: ICD-10-CM

## 2023-05-09 DIAGNOSIS — M54.16 LUMBAR RADICULOPATHY: ICD-10-CM

## 2023-05-09 PROCEDURE — 3074F SYST BP LT 130 MM HG: CPT | Performed by: PAIN MEDICINE

## 2023-05-09 PROCEDURE — 99213 OFFICE O/P EST LOW 20 MIN: CPT | Performed by: PAIN MEDICINE

## 2023-05-09 PROCEDURE — 97750 PHYSICAL PERFORMANCE TEST: CPT

## 2023-05-09 PROCEDURE — 3078F DIAST BP <80 MM HG: CPT | Performed by: PAIN MEDICINE

## 2023-05-09 RX ORDER — OXYCODONE HYDROCHLORIDE AND ACETAMINOPHEN 5; 325 MG/1; MG/1
1 TABLET ORAL 3 TIMES DAILY PRN
Qty: 90 TABLET | Refills: 0 | Status: SHIPPED | OUTPATIENT
Start: 2023-05-13 | End: 2023-06-12

## 2023-05-09 RX ORDER — LACTULOSE 10 G/15ML
10 SOLUTION ORAL DAILY PRN
Qty: 450 ML | Refills: 1 | Status: SHIPPED | OUTPATIENT
Start: 2023-05-09 | End: 2023-06-08

## 2023-05-09 RX ORDER — PREGABALIN 150 MG/1
150 CAPSULE ORAL 3 TIMES DAILY
Qty: 90 CAPSULE | Refills: 0 | Status: SHIPPED | OUTPATIENT
Start: 2023-05-09 | End: 2023-06-08

## 2023-05-09 RX ORDER — GABAPENTIN 800 MG/1
800 TABLET ORAL 3 TIMES DAILY
Qty: 90 TABLET | Refills: 0 | Status: CANCELLED | OUTPATIENT
Start: 2023-05-09 | End: 2023-06-08

## 2023-05-09 NOTE — PROGRESS NOTES
Mitul Andersen presents to the [facility] on 5/9/2023. Penny Oneal is complaining of pain bilateral hip. The pain is constant. The pain is described as aching, throbbing, and burning. Pain is rated on her best day at a 7, on her worst day at a 10, and on average at a 9 on the VAS scale. She took her last dose of Percocet today. Any procedures since your last visit: No  Pacemaker or defibrillator: No.    She is not on NSAIDS and is  on anticoagulation medications to include ASA and is managed by Berkeley Curling, PA-C  . Medication Contract and Consent for Opioid Use Documents Filed       Patient Documents       Type of Document Status Date Received Received By Description    Medication Contract Received 7/1/2019 11:37 AM Romy Kiser PAIN MGMT CONTRACT DR. VILLANUEVA    Medication Contract Signed 7/24/2019 10:15 AM KALIA BYRD medication contract    Medication Contract Received 10/27/2022  3:10 PM Haile E[employer] Agreement                    /70   Pulse 62   Temp 98.2 °F (36.8 °C) (Infrared)   Resp 16   Ht 5' 8\" (1.727 m)   Wt 297 lb (134.7 kg)   LMP 04/13/2023   SpO2 93%   BMI 45.16 kg/m²      Patient's last menstrual period was 04/13/2023.
constipation is resolved with the lactulose along with Benefiber although at times she does feel constipated    The hip injections are now short-lived  Lumbar injections continue to be helpful, pain is more so on the left at this time    She had a FCE today     Reviewed Dr. Donte Waters note from his consultation, he feels pain is coming from lumbar spine at this time  OARRS report reviewed  UDS today  EMG UEs reviewed for b/l hand numbness/tingling x 1 year. Moderate CTS. Referral to ortho as she also describes trigger finger symptoms. Bilateral wrist brace ordered prior visit, she was given one  Discussed negative SE's associated with chronic opioid therapy, given her comorbidities (morbid obesity, JODI, minimizing opioid dosing will be paramount)  Stop gabapentin 800 mg TID due to continued significant pain  Start pregabalin 150 mg titration up to TID as she titrates down on the gabapentin  RF percocet 5/325 #90 - no increases  RF lactulose  Aqua therapy - Has stopped due to pain. States that she knows that she needs to go back and this was stressed to her this visit. .    L hip injection with 80% relief x 1 weeks only. B/l L5 TFESI with 75% relief, repeat prn  Left L5 TFESI ordered - r/b and procedure discussed  Referral to ortho for consideration for THR  - Saw Dr. Benancio Soulier  Pt would like to avoid lumbar surgery  Patient encouraged to stay active and to lose weight, encouraged to continue with bariatric program  Treatment plan discussed with the patient including medication and procedure side effects    We discussed with the patient that combining opioids, benzodiazepines, alcohol, illicit drugs or sleep aids increases the risk of respiratory depression including death. We discussed that these medications may cause drowsiness, sedation or dizziness and have counseled the patient not to drive or operate machinery. We have discussed that these medications will be prescribed only by one provider.  We have

## 2023-05-10 LAB
6-MAM, QUANTITATIVE, URINE: <10
6-MONOACETYLMORPHINE, URINE: NOT DETECTED
7-AMINOCLONAZEPAM, QUANTITATIVE, URINE: <50
ALCOHOL URINE: NOT DETECTED
ALPHA-HYDROXYALPRAZOLAM, QUANTITATIVE, URINE: <50
ALPHA-HYDROXYMIDAZOLAM, QUANTITATIVE, URINE: <50
ALPHA-HYDROXYTRIAZOLAM, QUANTITATIVE, URINE: <50
ALPRAZOLAM URINE QUANT: <50
AMPHETAMINE SCREEN, URINE: NOT DETECTED
BARBITURATE SCREEN URINE: NOT DETECTED
BENZODIAZEPINE SCREEN, URINE: NOT DETECTED
BUPRENORPHINE URINE: NOT DETECTED
CANNABINOID SCREEN URINE: NOT DETECTED
CHLORDIAZEPOXIDE, QUANTITATIVE, URINE: <50
CLONAZEPAM, QUANTITATIVE, URINE: <50
COCAINE METABOLITE SCREEN URINE: NOT DETECTED
CODEINE, QUANTITATIVE, URINE: <50
COMMENT: NORMAL
DIAZEPAM URINE QUANT: <50
DRUG SCREEN COMMENT UR-IMP: ABNORMAL
FENTANYL SCREEN, URINE: NOT DETECTED
FLUNITRAZEPAM, QUANTITATIVE, URINE: <50
FLURAZEPAM, QUANTITATIVE, URINE: <50
HYDROCODONE, QUANTITATIVE, URINE: <50
HYDROMORPHONE, QUANTITATIVE, URINE: <50
INTEGRITY CHECK, CREATININE, URINE: 118.3
INTEGRITY CHECK, OXIDANT, URINE: 42
INTEGRITY CHECK, PH, URINE: 6.4 (ref 4.5–9)
INTEGRITY CHECK, SPECIFIC GRAVITY, URINE: 1.02 (ref 1–1.03)
INTEGRITY CHECK, SPECIMEN INTEGRITY, URINE: ABNORMAL
LORAZEPAM URINE QUANT: <50
METHADONE SCREEN, URINE: NOT DETECTED
MIDAZOLAM URINE QUANT: <50
MORPHINE, QUANTITATIVE, URINE: <50
NORDIAZEPAM URINE QUANT: <50
NORHYDROCODONE, QUANTITATIVE, URINE: <50
NOROXYCODONE, QUANTITATIVE, URINE: >1000
OPIATE SCREEN URINE: NOT DETECTED
OXAZEPAM URINE QUANT: <50
OXYCODONE URINE, QUANTITATIVE: >1000
OXYCODONE URINE: POSITIVE
OXYMORPHONE, QUANTITATIVE, URINE: 555.8
PHENCYCLIDINE SCREEN URINE: NOT DETECTED
TEMAZEPAM, QUANTITATIVE, URINE: <50
TRAMADOL SCREEN URINE: NOT DETECTED

## 2023-05-11 NOTE — PROGRESS NOTES
FCE Summary Report         Functional Capacity Evaluations aim to measure functional abilities and activity limitations. To measure and quantify/qualify a client's functional level, the client must perform to his/her maximal level of physical ability. Performance based FCE testing is multi-faceted and depends on current ability to perform as well as motivation/willingness to perform. In addition, because FCE's are behavioral assessments, one must also consider clients pain, perceptions of pain, perceptions of disability/self-efficacy, physical strength, age, sex, socioeconomic variables, duration of injury and financial gains. These variables should all be considered when making a disability determination or for RTW decisions. The performance today was that portion of capacity the client was willing or able to produce. If submaximal self-limiting efforts are given, testing results then reflect his/her minimal functional capacities and not maximal functional capacity levels. Name:  Caryn Dewey     :   [birth year]       Dates:   25      Physician (CHRISTINE)/Referral: Samia Hensley PA-C      Claim #:  NA    Employer: [employer]         Reasonable Accommodation: No reasonable accommodations requested. Primary Diagnosis:  OA B/L Hips  Spinal stenosis lumbar     Reason for Testing: To determine current physical abilities and limitations for disability determination/client. PMH/PSH:  Asthma  CHF  Cervical DDD  DJD  HTN  GERD  B/L hip OA See recent X-rays  Obesity  Neuropathy  JODI  Scoliosis lumbar  Multiple lumbar nerve blocks  Gastric bypass  Chronic pain syndrome  Tobacco abuse  Meningitis  Lumbar radic with B/L sciatica    Physical Exam:  Height 5'7  Weight 300#  RHD    Musculoskeletal:  Cervical AROM WFL all planes. Slow delayed all Jts/planes  Lumbar AROM WFL all planes. Slow delayed all planes/magnified  AROM LTUE/LE magnified/misleading delayed AROM/weakness behaviors LTUE and LE.   AROM LTUE 50-75%

## 2023-05-15 ENCOUNTER — APPOINTMENT (OUTPATIENT)
Dept: GENERAL RADIOLOGY | Age: 48
End: 2023-05-15
Payer: MEDICAID

## 2023-05-15 LAB
ALBUMIN SERPL-MCNC: 3.3 G/DL (ref 3.5–5.2)
ALP SERPL-CCNC: 92 U/L (ref 35–104)
ALT SERPL-CCNC: 13 U/L (ref 0–32)
ANION GAP SERPL CALCULATED.3IONS-SCNC: 9 MMOL/L (ref 7–16)
AST SERPL-CCNC: 30 U/L (ref 0–31)
BASOPHILS # BLD: 0.06 E9/L (ref 0–0.2)
BASOPHILS NFR BLD: 0.5 % (ref 0–2)
BILIRUB SERPL-MCNC: 0.5 MG/DL (ref 0–1.2)
BNP BLD-MCNC: 1999 PG/ML (ref 0–125)
BUN SERPL-MCNC: 13 MG/DL (ref 6–20)
CALCIUM SERPL-MCNC: 9.1 MG/DL (ref 8.6–10.2)
CHLORIDE SERPL-SCNC: 105 MMOL/L (ref 98–107)
CO2 SERPL-SCNC: 24 MMOL/L (ref 22–29)
CREAT SERPL-MCNC: 0.9 MG/DL (ref 0.5–1)
EOSINOPHIL # BLD: 0.11 E9/L (ref 0.05–0.5)
EOSINOPHIL NFR BLD: 0.9 % (ref 0–6)
ERYTHROCYTE [DISTWIDTH] IN BLOOD BY AUTOMATED COUNT: 15.3 FL (ref 11.5–15)
GLUCOSE SERPL-MCNC: 94 MG/DL (ref 74–99)
HCT VFR BLD AUTO: 44.5 % (ref 34–48)
HGB BLD-MCNC: 14.5 G/DL (ref 11.5–15.5)
IMM GRANULOCYTES # BLD: 0.04 E9/L
IMM GRANULOCYTES NFR BLD: 0.3 % (ref 0–5)
INFLUENZA A BY PCR: NOT DETECTED
INFLUENZA B BY PCR: NOT DETECTED
LACTATE BLDV-SCNC: 0.7 MMOL/L (ref 0.5–2.2)
LIPASE: 15 U/L (ref 13–60)
LYMPHOCYTES # BLD: 4.22 E9/L (ref 1.5–4)
LYMPHOCYTES NFR BLD: 33.4 % (ref 20–42)
MCH RBC QN AUTO: 31.2 PG (ref 26–35)
MCHC RBC AUTO-ENTMCNC: 32.6 % (ref 32–34.5)
MCV RBC AUTO: 95.7 FL (ref 80–99.9)
MONOCYTES # BLD: 1.01 E9/L (ref 0.1–0.95)
MONOCYTES NFR BLD: 8 % (ref 2–12)
NEUTROPHILS # BLD: 7.21 E9/L (ref 1.8–7.3)
NEUTS SEG NFR BLD: 56.9 % (ref 43–80)
PLATELET # BLD AUTO: 240 E9/L (ref 130–450)
PMV BLD AUTO: 10.9 FL (ref 7–12)
POTASSIUM SERPL-SCNC: 3.9 MMOL/L (ref 3.5–5)
PROT SERPL-MCNC: 6.6 G/DL (ref 6.4–8.3)
RBC # BLD AUTO: 4.65 E12/L (ref 3.5–5.5)
SARS-COV-2 RDRP RESP QL NAA+PROBE: NOT DETECTED
SODIUM SERPL-SCNC: 138 MMOL/L (ref 132–146)
STREP GRP A PCR: NEGATIVE
TROPONIN, HIGH SENSITIVITY: 741 NG/L (ref 0–9)
WBC # BLD: 12.7 E9/L (ref 4.5–11.5)

## 2023-05-15 PROCEDURE — 84484 ASSAY OF TROPONIN QUANT: CPT

## 2023-05-15 PROCEDURE — 87502 INFLUENZA DNA AMP PROBE: CPT

## 2023-05-15 PROCEDURE — 83880 ASSAY OF NATRIURETIC PEPTIDE: CPT

## 2023-05-15 PROCEDURE — 85025 COMPLETE CBC W/AUTO DIFF WBC: CPT

## 2023-05-15 PROCEDURE — 36415 COLL VENOUS BLD VENIPUNCTURE: CPT

## 2023-05-15 PROCEDURE — 96366 THER/PROPH/DIAG IV INF ADDON: CPT

## 2023-05-15 PROCEDURE — 83605 ASSAY OF LACTIC ACID: CPT

## 2023-05-15 PROCEDURE — 93005 ELECTROCARDIOGRAM TRACING: CPT | Performed by: NURSE PRACTITIONER

## 2023-05-15 PROCEDURE — 99285 EMERGENCY DEPT VISIT HI MDM: CPT

## 2023-05-15 PROCEDURE — 87635 SARS-COV-2 COVID-19 AMP PRB: CPT

## 2023-05-15 PROCEDURE — 96365 THER/PROPH/DIAG IV INF INIT: CPT

## 2023-05-15 PROCEDURE — 71045 X-RAY EXAM CHEST 1 VIEW: CPT

## 2023-05-15 PROCEDURE — 96376 TX/PRO/DX INJ SAME DRUG ADON: CPT

## 2023-05-15 PROCEDURE — 87880 STREP A ASSAY W/OPTIC: CPT

## 2023-05-15 PROCEDURE — 83690 ASSAY OF LIPASE: CPT

## 2023-05-15 PROCEDURE — 80053 COMPREHEN METABOLIC PANEL: CPT

## 2023-05-15 ASSESSMENT — PAIN DESCRIPTION - LOCATION: LOCATION: OTHER (COMMENT)

## 2023-05-15 ASSESSMENT — PAIN - FUNCTIONAL ASSESSMENT: PAIN_FUNCTIONAL_ASSESSMENT: 0-10

## 2023-05-15 ASSESSMENT — PAIN DESCRIPTION - FREQUENCY: FREQUENCY: CONTINUOUS

## 2023-05-15 ASSESSMENT — LIFESTYLE VARIABLES: HOW MANY STANDARD DRINKS CONTAINING ALCOHOL DO YOU HAVE ON A TYPICAL DAY: PATIENT DOES NOT DRINK

## 2023-05-15 ASSESSMENT — PAIN SCALES - GENERAL: PAINLEVEL_OUTOF10: 10

## 2023-05-15 ASSESSMENT — PAIN DESCRIPTION - PAIN TYPE: TYPE: ACUTE PAIN

## 2023-05-16 ENCOUNTER — HOSPITAL ENCOUNTER (INPATIENT)
Age: 48
LOS: 3 days | Discharge: HOME HEALTH | End: 2023-05-19
Attending: STUDENT IN AN ORGANIZED HEALTH CARE EDUCATION/TRAINING PROGRAM | Admitting: INTERNAL MEDICINE
Payer: MEDICAID

## 2023-05-16 ENCOUNTER — APPOINTMENT (OUTPATIENT)
Dept: CT IMAGING | Age: 48
End: 2023-05-16
Payer: MEDICAID

## 2023-05-16 DIAGNOSIS — I21.4 NSTEMI (NON-ST ELEVATED MYOCARDIAL INFARCTION) (HCC): Primary | ICD-10-CM

## 2023-05-16 DIAGNOSIS — I50.22 CHRONIC SYSTOLIC HEART FAILURE (HCC): ICD-10-CM

## 2023-05-16 DIAGNOSIS — I28.8 ENLARGED PULMONARY ARTERY (HCC): ICD-10-CM

## 2023-05-16 DIAGNOSIS — J81.0 ACUTE PULMONARY EDEMA (HCC): ICD-10-CM

## 2023-05-16 DIAGNOSIS — E78.5 HYPERLIPIDEMIA LDL GOAL <70: ICD-10-CM

## 2023-05-16 PROBLEM — R79.89 ELEVATED TROPONIN: Status: ACTIVE | Noted: 2023-05-16

## 2023-05-16 PROBLEM — R77.8 ELEVATED TROPONIN: Status: ACTIVE | Noted: 2023-05-16

## 2023-05-16 LAB
ALBUMIN SERPL-MCNC: 3.1 G/DL (ref 3.5–5.2)
ALP SERPL-CCNC: 82 U/L (ref 35–104)
ALT SERPL-CCNC: 13 U/L (ref 0–32)
AMPHET UR QL SCN: NOT DETECTED
ANION GAP SERPL CALCULATED.3IONS-SCNC: 10 MMOL/L (ref 7–16)
APTT BLD: 30.5 SEC (ref 24.5–35.1)
APTT BLD: 36 SEC (ref 24.5–35.1)
APTT BLD: 42.2 SEC (ref 24.5–35.1)
APTT BLD: 52 SEC (ref 24.5–35.1)
AST SERPL-CCNC: 25 U/L (ref 0–31)
BACTERIA URNS QL MICRO: ABNORMAL /HPF
BARBITURATES UR QL SCN: NOT DETECTED
BENZODIAZ UR QL SCN: NOT DETECTED
BILIRUB SERPL-MCNC: 0.4 MG/DL (ref 0–1.2)
BILIRUB UR QL STRIP: NEGATIVE
BUN SERPL-MCNC: 12 MG/DL (ref 6–20)
CALCIUM SERPL-MCNC: 8.9 MG/DL (ref 8.6–10.2)
CANNABINOIDS UR QL SCN: NOT DETECTED
CHLORIDE SERPL-SCNC: 103 MMOL/L (ref 98–107)
CHOLESTEROL, FASTING: 185 MG/DL (ref 0–199)
CK SERPL-CCNC: 195 U/L (ref 20–180)
CLARITY UR: NORMAL
CO2 SERPL-SCNC: 23 MMOL/L (ref 22–29)
COCAINE UR QL SCN: NOT DETECTED
COLOR UR: YELLOW
CREAT SERPL-MCNC: 0.8 MG/DL (ref 0.5–1)
D DIMER: 308 NG/ML DDU
DRUG SCREEN COMMENT UR-IMP: ABNORMAL
EKG ATRIAL RATE: 65 BPM
EKG P AXIS: 54 DEGREES
EKG P AXIS: 56 DEGREES
EKG P AXIS: 58 DEGREES
EKG P-R INTERVAL: 158 MS
EKG P-R INTERVAL: 160 MS
EKG P-R INTERVAL: 170 MS
EKG Q-T INTERVAL: 478 MS
EKG Q-T INTERVAL: 482 MS
EKG Q-T INTERVAL: 486 MS
EKG QRS DURATION: 158 MS
EKG QTC CALCULATION (BAZETT): 497 MS
EKG QTC CALCULATION (BAZETT): 501 MS
EKG QTC CALCULATION (BAZETT): 505 MS
EKG R AXIS: -51 DEGREES
EKG R AXIS: -59 DEGREES
EKG R AXIS: -60 DEGREES
EKG T AXIS: 29 DEGREES
EKG T AXIS: 31 DEGREES
EKG T AXIS: 62 DEGREES
EKG VENTRICULAR RATE: 65 BPM
EPI CELLS #/AREA URNS HPF: ABNORMAL /HPF
FENTANYL SCREEN, URINE: NOT DETECTED
GLUCOSE SERPL-MCNC: 76 MG/DL (ref 74–99)
GLUCOSE UR STRIP-MCNC: NEGATIVE MG/DL
HBA1C MFR BLD: 5.2 % (ref 4–5.6)
HCG UR QL: NEGATIVE
HDLC SERPL-MCNC: 47 MG/DL
HGB UR QL STRIP: NEGATIVE
KETONES UR STRIP-MCNC: NEGATIVE MG/DL
LDL CHOLESTEROL CALCULATED: 105 MG/DL (ref 0–99)
LEUKOCYTE ESTERASE UR QL STRIP: NEGATIVE
METHADONE UR QL SCN: NOT DETECTED
NITRITE UR QL STRIP: NEGATIVE
OPIATES UR QL SCN: NOT DETECTED
OXYCODONE URINE: POSITIVE
PCP UR QL SCN: NOT DETECTED
PH UR STRIP: 6 [PH] (ref 5–9)
POTASSIUM SERPL-SCNC: 3.8 MMOL/L (ref 3.5–5)
PROT SERPL-MCNC: 6.1 G/DL (ref 6.4–8.3)
PROT UR STRIP-MCNC: NEGATIVE MG/DL
RBC #/AREA URNS HPF: ABNORMAL /HPF (ref 0–2)
SODIUM SERPL-SCNC: 136 MMOL/L (ref 132–146)
SP GR UR STRIP: 1.02 (ref 1–1.03)
T4 FREE SERPL-MCNC: 0.97 NG/DL (ref 0.93–1.7)
TRIGLYCERIDE, FASTING: 167 MG/DL (ref 0–149)
TROPONIN, HIGH SENSITIVITY: 839 NG/L (ref 0–9)
TROPONIN, HIGH SENSITIVITY: 841 NG/L (ref 0–9)
TROPONIN, HIGH SENSITIVITY: 844 NG/L (ref 0–9)
TSH SERPL-MCNC: 1.01 UIU/ML (ref 0.27–4.2)
UROBILINOGEN UR STRIP-ACNC: 0.2 E.U./DL
VLDLC SERPL CALC-MCNC: 33 MG/DL
WBC #/AREA URNS HPF: ABNORMAL /HPF (ref 0–5)

## 2023-05-16 PROCEDURE — 81025 URINE PREGNANCY TEST: CPT

## 2023-05-16 PROCEDURE — 84443 ASSAY THYROID STIM HORMONE: CPT

## 2023-05-16 PROCEDURE — 6370000000 HC RX 637 (ALT 250 FOR IP): Performed by: INTERNAL MEDICINE

## 2023-05-16 PROCEDURE — 2500000003 HC RX 250 WO HCPCS: Performed by: STUDENT IN AN ORGANIZED HEALTH CARE EDUCATION/TRAINING PROGRAM

## 2023-05-16 PROCEDURE — 84484 ASSAY OF TROPONIN QUANT: CPT

## 2023-05-16 PROCEDURE — 82550 ASSAY OF CK (CPK): CPT

## 2023-05-16 PROCEDURE — 6360000002 HC RX W HCPCS: Performed by: STUDENT IN AN ORGANIZED HEALTH CARE EDUCATION/TRAINING PROGRAM

## 2023-05-16 PROCEDURE — 93005 ELECTROCARDIOGRAM TRACING: CPT | Performed by: STUDENT IN AN ORGANIZED HEALTH CARE EDUCATION/TRAINING PROGRAM

## 2023-05-16 PROCEDURE — 93010 ELECTROCARDIOGRAM REPORT: CPT | Performed by: INTERNAL MEDICINE

## 2023-05-16 PROCEDURE — 6360000004 HC RX CONTRAST MEDICATION: Performed by: RADIOLOGY

## 2023-05-16 PROCEDURE — 36415 COLL VENOUS BLD VENIPUNCTURE: CPT

## 2023-05-16 PROCEDURE — 80053 COMPREHEN METABOLIC PANEL: CPT

## 2023-05-16 PROCEDURE — APPSS60 APP SPLIT SHARED TIME 46-60 MINUTES: Performed by: NURSE PRACTITIONER

## 2023-05-16 PROCEDURE — 85730 THROMBOPLASTIN TIME PARTIAL: CPT

## 2023-05-16 PROCEDURE — 88112 CYTOPATH CELL ENHANCE TECH: CPT

## 2023-05-16 PROCEDURE — 84439 ASSAY OF FREE THYROXINE: CPT

## 2023-05-16 PROCEDURE — 71275 CT ANGIOGRAPHY CHEST: CPT

## 2023-05-16 PROCEDURE — 83036 HEMOGLOBIN GLYCOSYLATED A1C: CPT

## 2023-05-16 PROCEDURE — 80307 DRUG TEST PRSMV CHEM ANLYZR: CPT

## 2023-05-16 PROCEDURE — 81001 URINALYSIS AUTO W/SCOPE: CPT

## 2023-05-16 PROCEDURE — 6370000000 HC RX 637 (ALT 250 FOR IP): Performed by: STUDENT IN AN ORGANIZED HEALTH CARE EDUCATION/TRAINING PROGRAM

## 2023-05-16 PROCEDURE — 2580000003 HC RX 258: Performed by: INTERNAL MEDICINE

## 2023-05-16 PROCEDURE — 80061 LIPID PANEL: CPT

## 2023-05-16 PROCEDURE — 85378 FIBRIN DEGRADE SEMIQUANT: CPT

## 2023-05-16 PROCEDURE — 99254 IP/OBS CNSLTJ NEW/EST MOD 60: CPT | Performed by: INTERNAL MEDICINE

## 2023-05-16 PROCEDURE — 94664 DEMO&/EVAL PT USE INHALER: CPT

## 2023-05-16 PROCEDURE — 94640 AIRWAY INHALATION TREATMENT: CPT

## 2023-05-16 PROCEDURE — 2140000000 HC CCU INTERMEDIATE R&B

## 2023-05-16 PROCEDURE — 2580000003 HC RX 258: Performed by: STUDENT IN AN ORGANIZED HEALTH CARE EDUCATION/TRAINING PROGRAM

## 2023-05-16 RX ORDER — HEPARIN SODIUM 1000 [USP'U]/ML
4000 INJECTION, SOLUTION INTRAVENOUS; SUBCUTANEOUS PRN
Status: DISCONTINUED | OUTPATIENT
Start: 2023-05-16 | End: 2023-05-19 | Stop reason: HOSPADM

## 2023-05-16 RX ORDER — SODIUM CHLORIDE 9 MG/ML
INJECTION, SOLUTION INTRAVENOUS PRN
Status: DISCONTINUED | OUTPATIENT
Start: 2023-05-16 | End: 2023-05-19 | Stop reason: HOSPADM

## 2023-05-16 RX ORDER — DEXTROMETHORPHAN HYDROBROMIDE AND PROMETHAZINE HYDROCHLORIDE 15; 6.25 MG/5ML; MG/5ML
2.5 SYRUP ORAL EVERY 4 HOURS PRN
Status: DISCONTINUED | OUTPATIENT
Start: 2023-05-16 | End: 2023-05-16 | Stop reason: RX

## 2023-05-16 RX ORDER — ISOSORBIDE DINITRATE 10 MG/1
20 TABLET ORAL 3 TIMES DAILY
Status: DISCONTINUED | OUTPATIENT
Start: 2023-05-16 | End: 2023-05-18

## 2023-05-16 RX ORDER — HEPARIN SODIUM 1000 [USP'U]/ML
2000 INJECTION, SOLUTION INTRAVENOUS; SUBCUTANEOUS PRN
Status: DISCONTINUED | OUTPATIENT
Start: 2023-05-16 | End: 2023-05-19 | Stop reason: HOSPADM

## 2023-05-16 RX ORDER — ATORVASTATIN CALCIUM 80 MG/1
80 TABLET, FILM COATED ORAL NIGHTLY
Status: DISCONTINUED | OUTPATIENT
Start: 2023-05-16 | End: 2023-05-19 | Stop reason: HOSPADM

## 2023-05-16 RX ORDER — ACETAMINOPHEN 650 MG/1
650 SUPPOSITORY RECTAL EVERY 6 HOURS PRN
Status: DISCONTINUED | OUTPATIENT
Start: 2023-05-16 | End: 2023-05-19 | Stop reason: HOSPADM

## 2023-05-16 RX ORDER — FLUTICASONE PROPIONATE 50 MCG
1 SPRAY, SUSPENSION (ML) NASAL DAILY PRN
Status: DISCONTINUED | OUTPATIENT
Start: 2023-05-16 | End: 2023-05-19 | Stop reason: HOSPADM

## 2023-05-16 RX ORDER — METOPROLOL SUCCINATE 100 MG/1
100 TABLET, EXTENDED RELEASE ORAL 2 TIMES DAILY
Status: DISCONTINUED | OUTPATIENT
Start: 2023-05-16 | End: 2023-05-18

## 2023-05-16 RX ORDER — 0.9 % SODIUM CHLORIDE 0.9 %
500 INTRAVENOUS SOLUTION INTRAVENOUS ONCE
Status: COMPLETED | OUTPATIENT
Start: 2023-05-16 | End: 2023-05-16

## 2023-05-16 RX ORDER — ALBUTEROL SULFATE 90 UG/1
2 AEROSOL, METERED RESPIRATORY (INHALATION) EVERY 4 HOURS PRN
Status: DISCONTINUED | OUTPATIENT
Start: 2023-05-16 | End: 2023-05-16 | Stop reason: CLARIF

## 2023-05-16 RX ORDER — CLOTRIMAZOLE AND BETAMETHASONE DIPROPIONATE 10; .64 MG/G; MG/G
CREAM TOPICAL 2 TIMES DAILY
Status: DISCONTINUED | OUTPATIENT
Start: 2023-05-16 | End: 2023-05-19 | Stop reason: HOSPADM

## 2023-05-16 RX ORDER — CITALOPRAM 20 MG/1
40 TABLET ORAL DAILY
Status: DISCONTINUED | OUTPATIENT
Start: 2023-05-17 | End: 2023-05-19 | Stop reason: HOSPADM

## 2023-05-16 RX ORDER — OXYCODONE HYDROCHLORIDE AND ACETAMINOPHEN 5; 325 MG/1; MG/1
1 TABLET ORAL ONCE
Status: COMPLETED | OUTPATIENT
Start: 2023-05-16 | End: 2023-05-16

## 2023-05-16 RX ORDER — HYDRALAZINE HYDROCHLORIDE 50 MG/1
50 TABLET, FILM COATED ORAL EVERY 8 HOURS SCHEDULED
Status: DISCONTINUED | OUTPATIENT
Start: 2023-05-16 | End: 2023-05-18

## 2023-05-16 RX ORDER — ASPIRIN 81 MG/1
81 TABLET, CHEWABLE ORAL DAILY
Status: DISCONTINUED | OUTPATIENT
Start: 2023-05-17 | End: 2023-05-17

## 2023-05-16 RX ORDER — BUDESONIDE 0.25 MG/2ML
0.25 INHALANT ORAL 2 TIMES DAILY
Status: DISCONTINUED | OUTPATIENT
Start: 2023-05-16 | End: 2023-05-19 | Stop reason: HOSPADM

## 2023-05-16 RX ORDER — HEPARIN SODIUM 1000 [USP'U]/ML
4000 INJECTION, SOLUTION INTRAVENOUS; SUBCUTANEOUS ONCE
Status: COMPLETED | OUTPATIENT
Start: 2023-05-16 | End: 2023-05-16

## 2023-05-16 RX ORDER — ACETAMINOPHEN 325 MG/1
650 TABLET ORAL EVERY 6 HOURS PRN
Status: DISCONTINUED | OUTPATIENT
Start: 2023-05-16 | End: 2023-05-19 | Stop reason: HOSPADM

## 2023-05-16 RX ORDER — LANOLIN ALCOHOL/MO/W.PET/CERES
1000 CREAM (GRAM) TOPICAL DAILY
Status: DISCONTINUED | OUTPATIENT
Start: 2023-05-16 | End: 2023-05-19 | Stop reason: HOSPADM

## 2023-05-16 RX ORDER — SPIRONOLACTONE 25 MG/1
25 TABLET ORAL DAILY
Status: DISCONTINUED | OUTPATIENT
Start: 2023-05-16 | End: 2023-05-19

## 2023-05-16 RX ORDER — ASPIRIN 81 MG/1
324 TABLET, CHEWABLE ORAL ONCE
Status: COMPLETED | OUTPATIENT
Start: 2023-05-16 | End: 2023-05-16

## 2023-05-16 RX ORDER — LACTULOSE 10 G/15ML
10 SOLUTION ORAL DAILY PRN
Status: DISCONTINUED | OUTPATIENT
Start: 2023-05-16 | End: 2023-05-19 | Stop reason: HOSPADM

## 2023-05-16 RX ORDER — IPRATROPIUM BROMIDE AND ALBUTEROL SULFATE 2.5; .5 MG/3ML; MG/3ML
1 SOLUTION RESPIRATORY (INHALATION) EVERY 4 HOURS PRN
Status: DISCONTINUED | OUTPATIENT
Start: 2023-05-16 | End: 2023-05-19 | Stop reason: HOSPADM

## 2023-05-16 RX ORDER — ONDANSETRON 4 MG/1
4 TABLET, ORALLY DISINTEGRATING ORAL EVERY 8 HOURS PRN
Status: DISCONTINUED | OUTPATIENT
Start: 2023-05-16 | End: 2023-05-16

## 2023-05-16 RX ORDER — PREGABALIN 75 MG/1
150 CAPSULE ORAL 3 TIMES DAILY
Status: DISCONTINUED | OUTPATIENT
Start: 2023-05-16 | End: 2023-05-19 | Stop reason: HOSPADM

## 2023-05-16 RX ORDER — HEPARIN SODIUM 10000 [USP'U]/100ML
5-30 INJECTION, SOLUTION INTRAVENOUS CONTINUOUS
Status: DISCONTINUED | OUTPATIENT
Start: 2023-05-16 | End: 2023-05-19 | Stop reason: HOSPADM

## 2023-05-16 RX ORDER — PANTOPRAZOLE SODIUM 20 MG/1
20 TABLET, DELAYED RELEASE ORAL DAILY
Status: DISCONTINUED | OUTPATIENT
Start: 2023-05-16 | End: 2023-05-19 | Stop reason: HOSPADM

## 2023-05-16 RX ORDER — BUMETANIDE 1 MG/1
1 TABLET ORAL DAILY
Status: DISCONTINUED | OUTPATIENT
Start: 2023-05-16 | End: 2023-05-19 | Stop reason: HOSPADM

## 2023-05-16 RX ORDER — SODIUM CHLORIDE 0.9 % (FLUSH) 0.9 %
5-40 SYRINGE (ML) INJECTION PRN
Status: DISCONTINUED | OUTPATIENT
Start: 2023-05-16 | End: 2023-05-19 | Stop reason: HOSPADM

## 2023-05-16 RX ORDER — ARFORMOTEROL TARTRATE 15 UG/2ML
15 SOLUTION RESPIRATORY (INHALATION) 2 TIMES DAILY
Status: DISCONTINUED | OUTPATIENT
Start: 2023-05-16 | End: 2023-05-19 | Stop reason: HOSPADM

## 2023-05-16 RX ORDER — MULTIVITAMIN WITH IRON
1 TABLET ORAL DAILY
Status: DISCONTINUED | OUTPATIENT
Start: 2023-05-16 | End: 2023-05-19 | Stop reason: HOSPADM

## 2023-05-16 RX ORDER — SODIUM CHLORIDE 0.9 % (FLUSH) 0.9 %
5-40 SYRINGE (ML) INJECTION EVERY 12 HOURS SCHEDULED
Status: DISCONTINUED | OUTPATIENT
Start: 2023-05-16 | End: 2023-05-19 | Stop reason: HOSPADM

## 2023-05-16 RX ORDER — CETIRIZINE HYDROCHLORIDE 10 MG/1
10 TABLET ORAL DAILY
Status: DISCONTINUED | OUTPATIENT
Start: 2023-05-16 | End: 2023-05-19 | Stop reason: HOSPADM

## 2023-05-16 RX ORDER — OXYCODONE HYDROCHLORIDE AND ACETAMINOPHEN 5; 325 MG/1; MG/1
1 TABLET ORAL EVERY 8 HOURS PRN
Status: DISCONTINUED | OUTPATIENT
Start: 2023-05-16 | End: 2023-05-19 | Stop reason: HOSPADM

## 2023-05-16 RX ORDER — ALBUTEROL SULFATE 2.5 MG/3ML
2.5 SOLUTION RESPIRATORY (INHALATION) EVERY 6 HOURS PRN
Status: DISCONTINUED | OUTPATIENT
Start: 2023-05-16 | End: 2023-05-19 | Stop reason: HOSPADM

## 2023-05-16 RX ORDER — PREGABALIN 75 MG/1
150 CAPSULE ORAL ONCE
Status: COMPLETED | OUTPATIENT
Start: 2023-05-16 | End: 2023-05-16

## 2023-05-16 RX ORDER — POLYETHYLENE GLYCOL 3350 17 G/17G
17 POWDER, FOR SOLUTION ORAL DAILY PRN
Status: DISCONTINUED | OUTPATIENT
Start: 2023-05-16 | End: 2023-05-19 | Stop reason: HOSPADM

## 2023-05-16 RX ORDER — ONDANSETRON 2 MG/ML
4 INJECTION INTRAMUSCULAR; INTRAVENOUS EVERY 6 HOURS PRN
Status: DISCONTINUED | OUTPATIENT
Start: 2023-05-16 | End: 2023-05-16

## 2023-05-16 RX ORDER — POTASSIUM CHLORIDE 20 MEQ/1
20 TABLET, EXTENDED RELEASE ORAL DAILY
Status: DISCONTINUED | OUTPATIENT
Start: 2023-05-16 | End: 2023-05-18

## 2023-05-16 RX ORDER — CHOLECALCIFEROL (VITAMIN D3) 50 MCG
2000 TABLET ORAL DAILY
Status: DISCONTINUED | OUTPATIENT
Start: 2023-05-16 | End: 2023-05-19 | Stop reason: HOSPADM

## 2023-05-16 RX ADMIN — PANTOPRAZOLE SODIUM 20 MG: 20 TABLET, DELAYED RELEASE ORAL at 13:11

## 2023-05-16 RX ADMIN — MULTIVITAMIN TABLET 1 TABLET: TABLET at 10:22

## 2023-05-16 RX ADMIN — HEPARIN SODIUM 2000 UNITS: 1000 INJECTION INTRAVENOUS; SUBCUTANEOUS at 02:37

## 2023-05-16 RX ADMIN — PREGABALIN 150 MG: 75 CAPSULE ORAL at 01:53

## 2023-05-16 RX ADMIN — HYDRALAZINE HYDROCHLORIDE 50 MG: 50 TABLET, FILM COATED ORAL at 15:10

## 2023-05-16 RX ADMIN — ACETAMINOPHEN 650 MG: 325 TABLET ORAL at 23:52

## 2023-05-16 RX ADMIN — Medication 2000 UNITS: at 10:23

## 2023-05-16 RX ADMIN — SACUBITRIL AND VALSARTAN 1 TABLET: 97; 103 TABLET, FILM COATED ORAL at 13:10

## 2023-05-16 RX ADMIN — SODIUM CHLORIDE, PRESERVATIVE FREE 10 ML: 5 INJECTION INTRAVENOUS at 20:59

## 2023-05-16 RX ADMIN — ARFORMOTEROL TARTRATE 15 MCG: 15 SOLUTION RESPIRATORY (INHALATION) at 21:18

## 2023-05-16 RX ADMIN — ASPIRIN 81 MG 324 MG: 81 TABLET ORAL at 01:52

## 2023-05-16 RX ADMIN — ARFORMOTEROL TARTRATE 15 MCG: 15 SOLUTION RESPIRATORY (INHALATION) at 10:57

## 2023-05-16 RX ADMIN — OXYCODONE HYDROCHLORIDE AND ACETAMINOPHEN 1 TABLET: 5; 325 TABLET ORAL at 01:52

## 2023-05-16 RX ADMIN — OXYCODONE AND ACETAMINOPHEN 1 TABLET: 5; 325 TABLET ORAL at 18:40

## 2023-05-16 RX ADMIN — POTASSIUM CHLORIDE 20 MEQ: 1500 TABLET, EXTENDED RELEASE ORAL at 10:23

## 2023-05-16 RX ADMIN — PREGABALIN 150 MG: 75 CAPSULE ORAL at 20:59

## 2023-05-16 RX ADMIN — ISOSORBIDE DINITRATE 20 MG: 10 TABLET ORAL at 10:22

## 2023-05-16 RX ADMIN — CETIRIZINE HYDROCHLORIDE 10 MG: 10 TABLET, FILM COATED ORAL at 10:22

## 2023-05-16 RX ADMIN — HEPARIN SODIUM 4000 UNITS: 1000 INJECTION INTRAVENOUS; SUBCUTANEOUS at 01:54

## 2023-05-16 RX ADMIN — SPIRONOLACTONE 25 MG: 25 TABLET ORAL at 10:23

## 2023-05-16 RX ADMIN — HEPARIN SODIUM 7.4 UNITS/KG/HR: 10000 INJECTION, SOLUTION INTRAVENOUS at 02:01

## 2023-05-16 RX ADMIN — HEPARIN SODIUM 2000 UNITS: 1000 INJECTION INTRAVENOUS; SUBCUTANEOUS at 10:01

## 2023-05-16 RX ADMIN — CYANOCOBALAMIN TAB 1000 MCG 1000 MCG: 1000 TAB at 10:23

## 2023-05-16 RX ADMIN — IOPAMIDOL 75 ML: 755 INJECTION, SOLUTION INTRAVENOUS at 04:39

## 2023-05-16 RX ADMIN — METOPROLOL SUCCINATE 100 MG: 100 TABLET, EXTENDED RELEASE ORAL at 10:22

## 2023-05-16 RX ADMIN — PREGABALIN 150 MG: 75 CAPSULE ORAL at 15:10

## 2023-05-16 RX ADMIN — ATORVASTATIN CALCIUM 80 MG: 80 TABLET, FILM COATED ORAL at 20:59

## 2023-05-16 RX ADMIN — BUDESONIDE 250 MCG: 0.25 SUSPENSION RESPIRATORY (INHALATION) at 10:57

## 2023-05-16 RX ADMIN — SODIUM CHLORIDE 500 ML: 9 INJECTION, SOLUTION INTRAVENOUS at 01:13

## 2023-05-16 RX ADMIN — BUDESONIDE 250 MCG: 0.25 SUSPENSION RESPIRATORY (INHALATION) at 21:18

## 2023-05-16 RX ADMIN — HEPARIN SODIUM 11.4 UNITS/KG/HR: 10000 INJECTION, SOLUTION INTRAVENOUS at 18:44

## 2023-05-16 RX ADMIN — ISOSORBIDE DINITRATE 20 MG: 10 TABLET ORAL at 15:10

## 2023-05-16 RX ADMIN — CLOTRIMAZOLE AND BETAMETHASONE DIPROPIONATE: 10; .5 CREAM TOPICAL at 21:00

## 2023-05-16 RX ADMIN — OXYCODONE AND ACETAMINOPHEN 1 TABLET: 5; 325 TABLET ORAL at 10:23

## 2023-05-16 RX ADMIN — BUMETANIDE 1 MG: 1 TABLET ORAL at 10:23

## 2023-05-16 RX ADMIN — ISOSORBIDE DINITRATE 20 MG: 10 TABLET ORAL at 20:59

## 2023-05-16 RX ADMIN — HEPARIN SODIUM 2000 UNITS: 1000 INJECTION INTRAVENOUS; SUBCUTANEOUS at 23:46

## 2023-05-16 RX ADMIN — MICONAZOLE NITRATE: 20.6 POWDER TOPICAL at 21:00

## 2023-05-16 ASSESSMENT — PAIN SCALES - GENERAL
PAINLEVEL_OUTOF10: 10

## 2023-05-16 ASSESSMENT — PAIN DESCRIPTION - LOCATION
LOCATION: OTHER (COMMENT)
LOCATION: HAND;HIP
LOCATION: GENERALIZED

## 2023-05-16 ASSESSMENT — PAIN DESCRIPTION - ORIENTATION
ORIENTATION: RIGHT;LEFT
ORIENTATION: OTHER (COMMENT)

## 2023-05-16 ASSESSMENT — PAIN - FUNCTIONAL ASSESSMENT: PAIN_FUNCTIONAL_ASSESSMENT: ACTIVITIES ARE NOT PREVENTED

## 2023-05-16 ASSESSMENT — PAIN DESCRIPTION - DESCRIPTORS: DESCRIPTORS: ACHING;GNAWING;JABBING

## 2023-05-16 NOTE — ED NOTES
Department of Emergency Medicine  FIRST PROVIDER TRIAGE NOTE             Independent MLP           5/15/23  10:07 PM EDT    Date of Encounter: 5/15/23   MRN: [id number]      HPI: Alessandro Chand is a 57 y.o. female who presents to the ED for Generalized Body Aches, Nausea, Emesis, and Fatigue       ROS: Negative for headache or dizziness. PE: Gen Appearance/Constitutional: alert  HEENT: NC/NT. PERRLA,  Airway patent. Initial Plan of Care: All treatment areas with department are currently occupied. Plan to order/Initiate the following while awaiting opening in ED: labs, EKG, and imaging studies.   Initiate Treatment-Testing, Proceed toTreatment Area When Bed Available for ED Attending/MLP to Continue Care    Electronically signed by SANJUANA Chavarria CNP   DD: 5/15/23       SANJUANA Chavarria CNP  05/15/23 [id number]

## 2023-05-16 NOTE — HOME CARE
[facility] referral received, awaiting final orders. Spoke with patient and verified demographics. Will follow. Isaac Altman LPN [facility].

## 2023-05-16 NOTE — ACP (ADVANCE CARE PLANNING)
Advance Care Planning   The patient has the following advanced directives on file:  Advance Directives       Power of [address] Daphnie Conroy Will ACP-Advance Directive ACP-Power of     Not on File Not on File Not on File Not on File            The patient has appointed the following active healthcare agents:       The Patient has the following current code status:    Code Status: Full Code      Yin Rodriguez RN  5/16/2023

## 2023-05-16 NOTE — CARE COORDINATION
Chart reviewed for transition of care at discharge. Patient was admitted with elevated troponin's and chest pain. Per rounds, patient is for a heart cath, and patient stated she was told it was tomorrow. She currently is on a heparin drip. Met with patient and cousin Tyree Baptiste in her room. She stated that she lives alone in an apartment, with a few steps in. She uses a cane and walker as needed. Her PCP is Vicky PERKINS, and her pharmacy is [facility]. She has no history with HHC, or any inpatient rehab. She stated that she would like to have [place] [id number] for PT/OT and if skilled nursing is needed, and a  home health aide. She has no preference for HHC. Referral called to [facility] at [facility]. Will need HHC orders once disposition given after procedure. CM/SW will continue to follow. Electronically signed by Cheli Pickett RN on 5/16/2023 at 2:41 PM    Case Management Assessment  Initial Evaluation    Date/Time of Evaluation: 5/16/2023 2:43 PM  Assessment Completed by: Cheli Pickett RN    If patient is discharged prior to next notation, then this note serves as note for discharge by case management. Patient Name: Amparo George                   YOB: 1975  Diagnosis: Elevated troponin [R77.8]  NSTEMI (non-ST elevated myocardial infarction) [facility]) [I21.4]                   Date / Time: 5/16/2023 12:27 AM    Patient Admission Status: Inpatient   Readmission Risk (Low < 19, Mod (19-27), High > 27): Readmission Risk Score: 12.7    Current PCP: Malinda Schwab PA-C  PCP verified by CM? Yes    Chart Reviewed: Yes      History Provided by: Patient, Child/Family  Patient Orientation: Alert and Oriented, Person, Place, Situation    Patient Cognition: Alert    Hospitalization in the last 30 days (Readmission):  No    If yes, Readmission Assessment in CM Navigator will be completed.     Advance Directives:      Code Status: Full Code   Patient's Primary Decision Maker is: Patient Declined (Legal Next of Ilumya Counseling: I discussed with the patient the risks of tildrakizumab including but not limited to immunosuppression, malignancy, posterior leukoencephalopathy syndrome, and serious infections.  The patient understands that monitoring is required including a PPD at baseline and must alert us or the primary physician if symptoms of infection or other concerning signs are noted.

## 2023-05-16 NOTE — DISCHARGE INSTRUCTIONS
HEART FAILURE  / CONGESTIVE HEART FAILURE  DISCHARGE INSTRUCTIONS:  GUIDELINES TO FOLLOW AT HOME    Self- Managed Care:     MEDICATIONS:  Take your medication as directed. If you are experiencing any side effects, inform your doctor, Do not stop taking any of your medications without letting your doctor know. Check with your doctor before taking any over-the-counter medications / herbal / or dietary supplements. They may interfere with your other medications. Do not take ibuprofen (Advil or Motrin) and naproxen (Aleve) without talking to your doctor first. They could make your heart failure worse. WEIGHT MONITORING:   Weigh yourself everyday (with the same scale) around the same time of the day and write it down. (you can chart them on a calendar or keep track of them on paper. Notify your doctor of a weight gain of 3 pounds or more in 1 day   OR a total of 5 pounds or more in 1 week    Take your weight record to your doctor visits  Also, the same goes if you loose more than 3# in one day, let your heart doctor know. DIET:   Cardiac heart healthy diet- Low saturated / low trans fat, no added salt, caffeine restricted, Low sodium diet-   No more than 2,000mg (2 grams) of salt / sodium per day (which equals to a little less than  a teaspoon of salt)  If your doctor wants you on a fluid restriction. ..it is usually recommended a fluid limit of 2,000cc -  Fluid restriction- 2,000 ml (milliliters) = 64 ounces = you can have 8 glasses of fluid per day (each glass 8 ounces)    Follow a low salt diet - avoid using salt at the table, avoid / limit use of canned soups, processed / packaged foods, salted snacks, olives and pickles. Do not use a salt substitute without checking with your doctor, they may contain a high amount of potassioum. (Mrs. Echeverria Her is safe to use).     Limit the use of alcohol       CALL YOUR DOCTOR THE FIRST DAY YOU NOTICE ANY OF THESE   SYMPTOMS:  You have a weight

## 2023-05-16 NOTE — ED PROVIDER NOTES
Cuate Rivera is a 57year old female who presented to ED with concern for fatigue and chest tightness. Patient has had symptoms now for several days. Patient stated that she began to have worsening chest pain today that was coming and going patient presented to emergency department for evaluation. Patient states that she has been feeling very fatigued and has been having intermittent chest tightness. Patient states she is very easily fatigued with any activity. Patient denies shortness of breath. Patient denies nausea, vomiting, abdominal pain. Patient does have significant comorbidities. Patient's not tried anything for symptoms and nothing make symptoms better or worse    The history is provided by the patient, medical records and a relative. Review of Systems   Constitutional:  Positive for fatigue. Negative for chills, diaphoresis and fever. Eyes:  Negative for photophobia and visual disturbance. Respiratory:  Negative for cough, chest tightness and shortness of breath. Cardiovascular:  Positive for chest pain. Negative for palpitations and leg swelling. Gastrointestinal:  Negative for abdominal distention, abdominal pain, diarrhea, nausea and vomiting. Genitourinary:  Negative for dysuria. Musculoskeletal:  Positive for myalgias. Negative for neck pain and neck stiffness. Skin:  Negative for pallor and rash. Neurological:  Negative for headaches. Psychiatric/Behavioral:  Negative for confusion. Physical Exam  Vitals and nursing note reviewed. Constitutional:       General: She is not in acute distress. Appearance: Normal appearance. She is not ill-appearing. HENT:      Head: Normocephalic and atraumatic. Eyes:      General: No scleral icterus. Conjunctiva/sclera: Conjunctivae normal.      Pupils: Pupils are equal, round, and reactive to light. Cardiovascular:      Rate and Rhythm: Normal rate and regular rhythm.    Pulmonary:      Effort: Pulmonary effort

## 2023-05-16 NOTE — PLAN OF CARE
Patient's chart updated to reflect:      . - HF care plan, HF education points and HF discharge instructions.  -Orders: 2 gram sodium diet, daily weights, I/O.  -PCP and cardiology follow up appointments to be scheduled within 7 days of hospital discharge. -CHF education session will be provided to the patient prior to hospital discharge.     Emily Trivedi RN RN, BSN  Heart Failure Navigator

## 2023-05-16 NOTE — PATIENT CARE CONFERENCE
[facility] Quality Flow/Interdisciplinary Rounds Progress Note        Quality Flow Rounds held on May 16, 2023    Disciplines Attending:  Bedside Nurse, , , and Nursing Unit Leadership    Ebenezer Mcneil was admitted on 5/16/2023 12:27 AM    Anticipated Discharge Date:       Disposition:    Alvin Score:  Alvin Scale Score: 20    Readmission Risk              Risk of Unplanned Readmission:  14           Discussed patient goal for the day, patient clinical progression, and barriers to discharge.   The following Goal(s) of the Day/Commitment(s) have been identified:  Diagnostics - Report Results and Labs - Report Results      Ben Calderón RN  May 16, 2023

## 2023-05-16 NOTE — ED TRIAGE NOTES
Patient to triage with complaints of all over body aches and pains, n/v, chest pain, + covid for household member, one week ago. Vitals stable, nad.

## 2023-05-16 NOTE — H&P
Hospital Medicine History & Physical      PCP: Heladio Garcia PA-C    Date of Admission: 5/16/2023    Date of Service: Pt seen/examined on 5/16/2023 and Admitted to Inpatient with expected LOS greater than two midnights due to medical therapy. Chief Complaint:  Chest tightness      History Of Present Illness:      Ms. Giuseppe Barrett is a 57year old  female PMHx medical history includes Morbid Obesity, Asthma, HTN, T2DM, Bell's Palsy, DJD with chronic back pain, JODI on BiPAP, and nonobstructive CAD per cardiac catheterization 05/2020 presented to [facility] on 5/15/2023 with complaints of midsternal chest \"burning like heartburn\" that occurred with rest and lasted 10 minutes in duration. She states that during this time she had accompanying diaphoresis and radiation of her chest pain to her bilateral neck, bilateral ears, and bilateral elbows. She states that she was not having associated dyspnea. Of note, she states over the past 2-3 days prior to this event she was having body aches with chills without cough. She states that she was also having midsternal heartburn sensation occurring with rest lasting 10 minutes in duration and unchanged with exertion or deep inspiration. Of note, she states that her chest discomfort would worsen at times with food ingestion. Upon arrival to the ED her VS oral temperature 97.4, 96% RA-99/57. EKG SR with PVCs, left axis deviation, RBBB. Troponin 741, 839. proBNP 1999. D-dimer 308. CTA of the chest was negative for pulmonary embolism and did show mild pulmonary edema with cardiomegaly and pulmonary hypertension. She received a 500 cc NS bolus,  mg, heparin 4000 unit bolus and was placed on IV heparin infusion, Lyrica 150 mg, and Percocet. She was admitted to a telemetry unit. An echocardiogram was ordered. Cardiology was consulted for management of NSTEMI. Currently,  in her room on IV heparin, She has some chest burnign.  She also denies

## 2023-05-17 LAB
ABO + RH BLD: NORMAL
ANION GAP SERPL CALCULATED.3IONS-SCNC: 13 MMOL/L (ref 7–16)
APTT BLD: 58.1 SEC (ref 24.5–35.1)
BLD GP AB SCN SERPL QL: NORMAL
BUN SERPL-MCNC: 14 MG/DL (ref 6–20)
CALCIUM SERPL-MCNC: 8.6 MG/DL (ref 8.6–10.2)
CHLORIDE SERPL-SCNC: 106 MMOL/L (ref 98–107)
CO2 SERPL-SCNC: 22 MMOL/L (ref 22–29)
CREAT SERPL-MCNC: 0.8 MG/DL (ref 0.5–1)
ERYTHROCYTE [DISTWIDTH] IN BLOOD BY AUTOMATED COUNT: 15.2 FL (ref 11.5–15)
GLUCOSE SERPL-MCNC: 83 MG/DL (ref 74–99)
HCT VFR BLD AUTO: 42.2 % (ref 34–48)
HGB BLD-MCNC: 13.8 G/DL (ref 11.5–15.5)
MCH RBC QN AUTO: 31.2 PG (ref 26–35)
MCHC RBC AUTO-ENTMCNC: 32.7 % (ref 32–34.5)
MCV RBC AUTO: 95.5 FL (ref 80–99.9)
PLATELET # BLD AUTO: 235 E9/L (ref 130–450)
PMV BLD AUTO: 11.5 FL (ref 7–12)
POTASSIUM SERPL-SCNC: 4 MMOL/L (ref 3.5–5)
RBC # BLD AUTO: 4.42 E12/L (ref 3.5–5.5)
SODIUM SERPL-SCNC: 141 MMOL/L (ref 132–146)
WBC # BLD: 10.1 E9/L (ref 4.5–11.5)

## 2023-05-17 PROCEDURE — 027134Z DILATION OF CORONARY ARTERY, TWO ARTERIES WITH DRUG-ELUTING INTRALUMINAL DEVICE, PERCUTANEOUS APPROACH: ICD-10-PCS | Performed by: INTERNAL MEDICINE

## 2023-05-17 PROCEDURE — 94640 AIRWAY INHALATION TREATMENT: CPT

## 2023-05-17 PROCEDURE — C1887 CATHETER, GUIDING: HCPCS

## 2023-05-17 PROCEDURE — 6360000002 HC RX W HCPCS: Performed by: INTERNAL MEDICINE

## 2023-05-17 PROCEDURE — 99233 SBSQ HOSP IP/OBS HIGH 50: CPT | Performed by: INTERNAL MEDICINE

## 2023-05-17 PROCEDURE — C1769 GUIDE WIRE: HCPCS

## 2023-05-17 PROCEDURE — 85027 COMPLETE CBC AUTOMATED: CPT

## 2023-05-17 PROCEDURE — 2580000003 HC RX 258: Performed by: INTERNAL MEDICINE

## 2023-05-17 PROCEDURE — 80048 BASIC METABOLIC PNL TOTAL CA: CPT

## 2023-05-17 PROCEDURE — C1874 STENT, COATED/COV W/DEL SYS: HCPCS

## 2023-05-17 PROCEDURE — C1760 CLOSURE DEV, VASC: HCPCS

## 2023-05-17 PROCEDURE — B2151ZZ FLUOROSCOPY OF LEFT HEART USING LOW OSMOLAR CONTRAST: ICD-10-PCS | Performed by: INTERNAL MEDICINE

## 2023-05-17 PROCEDURE — 4A023N7 MEASUREMENT OF CARDIAC SAMPLING AND PRESSURE, LEFT HEART, PERCUTANEOUS APPROACH: ICD-10-PCS | Performed by: INTERNAL MEDICINE

## 2023-05-17 PROCEDURE — 6370000000 HC RX 637 (ALT 250 FOR IP): Performed by: INTERNAL MEDICINE

## 2023-05-17 PROCEDURE — 92928 PRQ TCAT PLMT NTRAC ST 1 LES: CPT

## 2023-05-17 PROCEDURE — 86900 BLOOD TYPING SEROLOGIC ABO: CPT

## 2023-05-17 PROCEDURE — 2140000000 HC CCU INTERMEDIATE R&B

## 2023-05-17 PROCEDURE — 86901 BLOOD TYPING SEROLOGIC RH(D): CPT

## 2023-05-17 PROCEDURE — 6370000000 HC RX 637 (ALT 250 FOR IP): Performed by: STUDENT IN AN ORGANIZED HEALTH CARE EDUCATION/TRAINING PROGRAM

## 2023-05-17 PROCEDURE — 92943 PRQ TRLUML REVSC CH OCC ANT: CPT | Performed by: INTERNAL MEDICINE

## 2023-05-17 PROCEDURE — C1725 CATH, TRANSLUMIN NON-LASER: HCPCS

## 2023-05-17 PROCEDURE — 6360000002 HC RX W HCPCS

## 2023-05-17 PROCEDURE — 86850 RBC ANTIBODY SCREEN: CPT

## 2023-05-17 PROCEDURE — B2111ZZ FLUOROSCOPY OF MULTIPLE CORONARY ARTERIES USING LOW OSMOLAR CONTRAST: ICD-10-PCS | Performed by: INTERNAL MEDICINE

## 2023-05-17 PROCEDURE — 36415 COLL VENOUS BLD VENIPUNCTURE: CPT

## 2023-05-17 PROCEDURE — 85730 THROMBOPLASTIN TIME PARTIAL: CPT

## 2023-05-17 PROCEDURE — 93458 L HRT ARTERY/VENTRICLE ANGIO: CPT

## 2023-05-17 PROCEDURE — 2709999900 HC NON-CHARGEABLE SUPPLY

## 2023-05-17 PROCEDURE — C1894 INTRO/SHEATH, NON-LASER: HCPCS

## 2023-05-17 PROCEDURE — 93458 L HRT ARTERY/VENTRICLE ANGIO: CPT | Performed by: INTERNAL MEDICINE

## 2023-05-17 PROCEDURE — 6370000000 HC RX 637 (ALT 250 FOR IP)

## 2023-05-17 PROCEDURE — 2500000003 HC RX 250 WO HCPCS

## 2023-05-17 RX ORDER — LORAZEPAM 2 MG/ML
1 INJECTION INTRAMUSCULAR ONCE
Status: COMPLETED | OUTPATIENT
Start: 2023-05-17 | End: 2023-05-17

## 2023-05-17 RX ORDER — ASPIRIN 81 MG/1
81 TABLET ORAL DAILY
Status: DISCONTINUED | OUTPATIENT
Start: 2023-05-18 | End: 2023-05-19 | Stop reason: HOSPADM

## 2023-05-17 RX ORDER — SODIUM CHLORIDE 9 MG/ML
INJECTION, SOLUTION INTRAVENOUS CONTINUOUS
Status: ACTIVE | OUTPATIENT
Start: 2023-05-17 | End: 2023-05-17

## 2023-05-17 RX ADMIN — CETIRIZINE HYDROCHLORIDE 10 MG: 10 TABLET, FILM COATED ORAL at 10:38

## 2023-05-17 RX ADMIN — CYANOCOBALAMIN TAB 1000 MCG 1000 MCG: 1000 TAB at 10:40

## 2023-05-17 RX ADMIN — TICAGRELOR 90 MG: 90 TABLET ORAL at 21:14

## 2023-05-17 RX ADMIN — LORAZEPAM 1 MG: 2 INJECTION INTRAMUSCULAR; INTRAVENOUS at 11:21

## 2023-05-17 RX ADMIN — SACUBITRIL AND VALSARTAN 1 TABLET: 97; 103 TABLET, FILM COATED ORAL at 10:43

## 2023-05-17 RX ADMIN — PREGABALIN 150 MG: 75 CAPSULE ORAL at 21:14

## 2023-05-17 RX ADMIN — PREGABALIN 150 MG: 75 CAPSULE ORAL at 10:38

## 2023-05-17 RX ADMIN — SPIRONOLACTONE 25 MG: 25 TABLET ORAL at 10:37

## 2023-05-17 RX ADMIN — MICONAZOLE NITRATE: 20.6 POWDER TOPICAL at 21:19

## 2023-05-17 RX ADMIN — ARFORMOTEROL TARTRATE 15 MCG: 15 SOLUTION RESPIRATORY (INHALATION) at 20:55

## 2023-05-17 RX ADMIN — SODIUM CHLORIDE, PRESERVATIVE FREE 10 ML: 5 INJECTION INTRAVENOUS at 10:43

## 2023-05-17 RX ADMIN — OXYCODONE AND ACETAMINOPHEN 1 TABLET: 5; 325 TABLET ORAL at 05:31

## 2023-05-17 RX ADMIN — ISOSORBIDE DINITRATE 20 MG: 10 TABLET ORAL at 10:39

## 2023-05-17 RX ADMIN — MULTIVITAMIN TABLET 1 TABLET: TABLET at 10:38

## 2023-05-17 RX ADMIN — OXYCODONE AND ACETAMINOPHEN 1 TABLET: 5; 325 TABLET ORAL at 13:30

## 2023-05-17 RX ADMIN — SODIUM CHLORIDE, PRESERVATIVE FREE 10 ML: 5 INJECTION INTRAVENOUS at 21:14

## 2023-05-17 RX ADMIN — OXYCODONE AND ACETAMINOPHEN 1 TABLET: 5; 325 TABLET ORAL at 21:13

## 2023-05-17 RX ADMIN — MICONAZOLE NITRATE: 20.6 POWDER TOPICAL at 13:32

## 2023-05-17 RX ADMIN — Medication 2000 UNITS: at 10:39

## 2023-05-17 RX ADMIN — BUMETANIDE 1 MG: 1 TABLET ORAL at 10:40

## 2023-05-17 RX ADMIN — PREGABALIN 150 MG: 75 CAPSULE ORAL at 15:09

## 2023-05-17 RX ADMIN — HYDRALAZINE HYDROCHLORIDE 50 MG: 50 TABLET, FILM COATED ORAL at 13:31

## 2023-05-17 RX ADMIN — SODIUM CHLORIDE: 9 INJECTION, SOLUTION INTRAVENOUS at 15:11

## 2023-05-17 RX ADMIN — ATORVASTATIN CALCIUM 80 MG: 80 TABLET, FILM COATED ORAL at 21:13

## 2023-05-17 RX ADMIN — METOPROLOL SUCCINATE 100 MG: 100 TABLET, EXTENDED RELEASE ORAL at 10:38

## 2023-05-17 RX ADMIN — CLOTRIMAZOLE AND BETAMETHASONE DIPROPIONATE: 10; .5 CREAM TOPICAL at 21:18

## 2023-05-17 RX ADMIN — BUDESONIDE 250 MCG: 0.25 SUSPENSION RESPIRATORY (INHALATION) at 20:56

## 2023-05-17 RX ADMIN — SODIUM CHLORIDE: 9 INJECTION, SOLUTION INTRAVENOUS at 10:46

## 2023-05-17 RX ADMIN — TICAGRELOR 90 MG: 90 TABLET ORAL at 10:42

## 2023-05-17 RX ADMIN — SACUBITRIL AND VALSARTAN 1 TABLET: 97; 103 TABLET, FILM COATED ORAL at 21:13

## 2023-05-17 RX ADMIN — PANTOPRAZOLE SODIUM 20 MG: 20 TABLET, DELAYED RELEASE ORAL at 10:44

## 2023-05-17 RX ADMIN — POTASSIUM CHLORIDE 20 MEQ: 1500 TABLET, EXTENDED RELEASE ORAL at 15:28

## 2023-05-17 RX ADMIN — ISOSORBIDE DINITRATE 20 MG: 10 TABLET ORAL at 13:31

## 2023-05-17 RX ADMIN — CITALOPRAM HYDROBROMIDE 40 MG: 20 TABLET ORAL at 10:37

## 2023-05-17 RX ADMIN — ASPIRIN 81 MG: 81 TABLET, CHEWABLE ORAL at 07:15

## 2023-05-17 ASSESSMENT — PAIN DESCRIPTION - ORIENTATION
ORIENTATION: RIGHT;LEFT
ORIENTATION: RIGHT;LEFT
ORIENTATION: LEFT;RIGHT

## 2023-05-17 ASSESSMENT — ENCOUNTER SYMPTOMS
CHEST TIGHTNESS: 0
ABDOMINAL PAIN: 0
NAUSEA: 0
SHORTNESS OF BREATH: 0
DIARRHEA: 0
COUGH: 0
VOMITING: 0
PHOTOPHOBIA: 0
ABDOMINAL DISTENTION: 0

## 2023-05-17 ASSESSMENT — PAIN DESCRIPTION - LOCATION
LOCATION: BACK;HIP
LOCATION: HIP
LOCATION: HAND;HIP

## 2023-05-17 ASSESSMENT — PAIN DESCRIPTION - ONSET: ONSET: ON-GOING

## 2023-05-17 ASSESSMENT — PAIN DESCRIPTION - PAIN TYPE
TYPE: CHRONIC PAIN;ACUTE PAIN
TYPE: CHRONIC PAIN

## 2023-05-17 ASSESSMENT — PAIN - FUNCTIONAL ASSESSMENT
PAIN_FUNCTIONAL_ASSESSMENT: ACTIVITIES ARE NOT PREVENTED
PAIN_FUNCTIONAL_ASSESSMENT: PREVENTS OR INTERFERES SOME ACTIVE ACTIVITIES AND ADLS
PAIN_FUNCTIONAL_ASSESSMENT: PREVENTS OR INTERFERES SOME ACTIVE ACTIVITIES AND ADLS

## 2023-05-17 ASSESSMENT — PAIN SCALES - GENERAL
PAINLEVEL_OUTOF10: 10

## 2023-05-17 ASSESSMENT — PAIN DESCRIPTION - DESCRIPTORS
DESCRIPTORS: ACHING;DISCOMFORT;SORE
DESCRIPTORS: ACHING;SORE
DESCRIPTORS: ACHING;SORE

## 2023-05-17 ASSESSMENT — PAIN DESCRIPTION - FREQUENCY: FREQUENCY: CONTINUOUS

## 2023-05-17 NOTE — PROCEDURES
Procedure:    1. Left heart catheterization with coronary angiography  2. Percutaneous coronary artery intervention of the distal RCA with a sequential and overlapping 3.5 x 38 and 3.5 x 8 mm drug-eluting stents. Complications: None    Physician: Yulissa Egan DO. Assistant: none    Indication: NSTEMI  AUC: 8  AUC indication: 4    PCI AUC: 9  PCI AUC incication: 16    Anesthesia: 2% Xylocaine, intravenous fentanyl     Sedation: Intravenous Versed    Sedation time: I was present for sedation administration at 08 10. I ended sedation at 0932 for a total face-to-face sedation time of 104 minutes. Estimated blood loss: Minimal    Specimens: none    Contrast used: 280 cc    Hemodynamics:  Opening Aortic pressure: 340/19  LV systolic pressure: 927  LVEDP: 7  No gradient across the aortic valve. Angiographic Results/findings:  Left Main: No intraoperative significant stenosis. LAD: No angiographically significant stenosis. Wraparound vessel supplying distal inferior wall  D1: No atrophy significant stenosis. D2: Small vessel. Jestine Courts Cx: Mild diffuse luminal irregularities. OM1: Very small vessel. .  OM2: Normal distal arteries. OM 3: Proximal diffuse mild irregularities. .  Ramus: Absent. .  RCA: Mid chronic total occlusion with left-to-right collaterals. LV: Global hypokinesis. Ejection fraction 30%. Interventional results:  Distal RCA lesion  PrePCI ROB 0 flow. Successful predilatation of the distal RCA lesion with a 2.0 mm and 2.5 mm balloon. This lesion was then crossed and stented with sequential and overlapping 3.5 x 38 and 3.5 x 8 mm drug-eluting stents to 15 atmospheres of pressure. This resulted in 0 percent residual stenosis with ROB-3 flow. This was then post dilatated with a 3.5 mm stent balloon to 17 mm. Resulting in 0% residual stenosis with ROB-3 flow.       Summary of the procedure:  After obtaining informed consent they were taken to the cardiac Cath Lab where the area over the

## 2023-05-17 NOTE — PATIENT CARE CONFERENCE
[facility] Quality Flow/Interdisciplinary Rounds Progress Note        Quality Flow Rounds held on May 17, 2023    Disciplines Attending:  Bedside Nurse, , , and Nursing Unit Leadership    Irving Meléndez was admitted on 5/16/2023 12:27 AM    Anticipated Discharge Date:       Disposition:    Alvin Score:  Alvin Scale Score: 23    Readmission Risk              Risk of Unplanned Readmission:  19           Discussed patient goal for the day, patient clinical progression, and barriers to discharge.   The following Goal(s) of the Day/Commitment(s) have been identified:   have no bleeding from  groin site and educate about new medication      Te Hou RN  May 17, 2023

## 2023-05-17 NOTE — CARE COORDINATION
5/17/23  Transition of Care Update. Patient admitted for elevated troponin and NSTEMI. Patient had a heart cath with 1 stent placed. Patient has requested home health care at discharge with referral called to [facility] by previous . Elvi for [facility] is Erica Morocho. 5/23. Patient will need home health orders placed. Discharge goal is home alone when medically stable. SW/CM to follow.     Electronically signed by SYLVIE Vasquez on 5/17/2023 at 1:27 PM

## 2023-05-18 LAB
ANION GAP SERPL CALCULATED.3IONS-SCNC: 11 MMOL/L (ref 7–16)
BUN SERPL-MCNC: 13 MG/DL (ref 6–20)
CALCIUM SERPL-MCNC: 8.8 MG/DL (ref 8.6–10.2)
CHLORIDE SERPL-SCNC: 106 MMOL/L (ref 98–107)
CO2 SERPL-SCNC: 24 MMOL/L (ref 22–29)
CREAT SERPL-MCNC: 0.9 MG/DL (ref 0.5–1)
EKG ATRIAL RATE: 72 BPM
EKG P AXIS: 43 DEGREES
EKG P-R INTERVAL: 144 MS
EKG Q-T INTERVAL: 524 MS
EKG QRS DURATION: 154 MS
EKG QTC CALCULATION (BAZETT): 573 MS
EKG R AXIS: -65 DEGREES
EKG T AXIS: 28 DEGREES
EKG VENTRICULAR RATE: 72 BPM
ERYTHROCYTE [DISTWIDTH] IN BLOOD BY AUTOMATED COUNT: 15.1 FL (ref 11.5–15)
GLUCOSE SERPL-MCNC: 85 MG/DL (ref 74–99)
HCT VFR BLD AUTO: 44 % (ref 34–48)
HGB BLD-MCNC: 14.3 G/DL (ref 11.5–15.5)
LV EF: 58 %
LVEF MODALITY: NORMAL
MCH RBC QN AUTO: 31.2 PG (ref 26–35)
MCHC RBC AUTO-ENTMCNC: 32.5 % (ref 32–34.5)
MCV RBC AUTO: 96.1 FL (ref 80–99.9)
PLATELET # BLD AUTO: 250 E9/L (ref 130–450)
PMV BLD AUTO: 11.3 FL (ref 7–12)
POTASSIUM SERPL-SCNC: 3.9 MMOL/L (ref 3.5–5)
RBC # BLD AUTO: 4.58 E12/L (ref 3.5–5.5)
SODIUM SERPL-SCNC: 141 MMOL/L (ref 132–146)
WBC # BLD: 11 E9/L (ref 4.5–11.5)

## 2023-05-18 PROCEDURE — 97530 THERAPEUTIC ACTIVITIES: CPT

## 2023-05-18 PROCEDURE — 6370000000 HC RX 637 (ALT 250 FOR IP): Performed by: INTERNAL MEDICINE

## 2023-05-18 PROCEDURE — 2140000000 HC CCU INTERMEDIATE R&B

## 2023-05-18 PROCEDURE — 80048 BASIC METABOLIC PNL TOTAL CA: CPT

## 2023-05-18 PROCEDURE — 36415 COLL VENOUS BLD VENIPUNCTURE: CPT

## 2023-05-18 PROCEDURE — 6360000002 HC RX W HCPCS: Performed by: INTERNAL MEDICINE

## 2023-05-18 PROCEDURE — 93010 ELECTROCARDIOGRAM REPORT: CPT | Performed by: INTERNAL MEDICINE

## 2023-05-18 PROCEDURE — 97161 PT EVAL LOW COMPLEX 20 MIN: CPT

## 2023-05-18 PROCEDURE — 93005 ELECTROCARDIOGRAM TRACING: CPT | Performed by: INTERNAL MEDICINE

## 2023-05-18 PROCEDURE — 99233 SBSQ HOSP IP/OBS HIGH 50: CPT | Performed by: INTERNAL MEDICINE

## 2023-05-18 PROCEDURE — 97165 OT EVAL LOW COMPLEX 30 MIN: CPT

## 2023-05-18 PROCEDURE — 93306 TTE W/DOPPLER COMPLETE: CPT

## 2023-05-18 PROCEDURE — 2580000003 HC RX 258: Performed by: INTERNAL MEDICINE

## 2023-05-18 PROCEDURE — 94640 AIRWAY INHALATION TREATMENT: CPT

## 2023-05-18 PROCEDURE — 97535 SELF CARE MNGMENT TRAINING: CPT

## 2023-05-18 PROCEDURE — 85027 COMPLETE CBC AUTOMATED: CPT

## 2023-05-18 RX ORDER — CLOPIDOGREL 300 MG/1
300 TABLET, FILM COATED ORAL ONCE
Status: COMPLETED | OUTPATIENT
Start: 2023-05-19 | End: 2023-05-19

## 2023-05-18 RX ORDER — METOPROLOL SUCCINATE 50 MG/1
50 TABLET, EXTENDED RELEASE ORAL DAILY
Status: DISCONTINUED | OUTPATIENT
Start: 2023-05-19 | End: 2023-05-19

## 2023-05-18 RX ORDER — HYDRALAZINE HYDROCHLORIDE 25 MG/1
25 TABLET, FILM COATED ORAL 2 TIMES DAILY
Status: DISCONTINUED | OUTPATIENT
Start: 2023-05-19 | End: 2023-05-19

## 2023-05-18 RX ORDER — CLOPIDOGREL BISULFATE 75 MG/1
75 TABLET ORAL DAILY
Status: DISCONTINUED | OUTPATIENT
Start: 2023-05-20 | End: 2023-05-19 | Stop reason: HOSPADM

## 2023-05-18 RX ORDER — ISOSORBIDE DINITRATE 10 MG/1
20 TABLET ORAL 2 TIMES DAILY
Status: DISCONTINUED | OUTPATIENT
Start: 2023-05-18 | End: 2023-05-19

## 2023-05-18 RX ADMIN — SODIUM CHLORIDE, PRESERVATIVE FREE 10 ML: 5 INJECTION INTRAVENOUS at 22:05

## 2023-05-18 RX ADMIN — CLOTRIMAZOLE AND BETAMETHASONE DIPROPIONATE: 10; .5 CREAM TOPICAL at 08:22

## 2023-05-18 RX ADMIN — CETIRIZINE HYDROCHLORIDE 10 MG: 10 TABLET, FILM COATED ORAL at 08:17

## 2023-05-18 RX ADMIN — METOPROLOL SUCCINATE 100 MG: 100 TABLET, EXTENDED RELEASE ORAL at 08:17

## 2023-05-18 RX ADMIN — OXYCODONE AND ACETAMINOPHEN 1 TABLET: 5; 325 TABLET ORAL at 22:12

## 2023-05-18 RX ADMIN — TICAGRELOR 90 MG: 90 TABLET ORAL at 08:17

## 2023-05-18 RX ADMIN — BUDESONIDE 250 MCG: 0.25 SUSPENSION RESPIRATORY (INHALATION) at 09:01

## 2023-05-18 RX ADMIN — ATORVASTATIN CALCIUM 80 MG: 80 TABLET, FILM COATED ORAL at 22:04

## 2023-05-18 RX ADMIN — ISOSORBIDE DINITRATE 20 MG: 10 TABLET ORAL at 08:17

## 2023-05-18 RX ADMIN — PANTOPRAZOLE SODIUM 20 MG: 20 TABLET, DELAYED RELEASE ORAL at 08:17

## 2023-05-18 RX ADMIN — Medication 2000 UNITS: at 08:17

## 2023-05-18 RX ADMIN — OXYCODONE AND ACETAMINOPHEN 1 TABLET: 5; 325 TABLET ORAL at 05:53

## 2023-05-18 RX ADMIN — PREGABALIN 150 MG: 75 CAPSULE ORAL at 22:04

## 2023-05-18 RX ADMIN — SPIRONOLACTONE 25 MG: 25 TABLET ORAL at 08:17

## 2023-05-18 RX ADMIN — ARFORMOTEROL TARTRATE 15 MCG: 15 SOLUTION RESPIRATORY (INHALATION) at 20:30

## 2023-05-18 RX ADMIN — CITALOPRAM HYDROBROMIDE 40 MG: 20 TABLET ORAL at 08:18

## 2023-05-18 RX ADMIN — ISOSORBIDE DINITRATE 20 MG: 10 TABLET ORAL at 14:11

## 2023-05-18 RX ADMIN — PREGABALIN 150 MG: 75 CAPSULE ORAL at 14:11

## 2023-05-18 RX ADMIN — TICAGRELOR 90 MG: 90 TABLET ORAL at 22:04

## 2023-05-18 RX ADMIN — SODIUM CHLORIDE, PRESERVATIVE FREE 10 ML: 5 INJECTION INTRAVENOUS at 08:16

## 2023-05-18 RX ADMIN — OXYCODONE AND ACETAMINOPHEN 1 TABLET: 5; 325 TABLET ORAL at 14:11

## 2023-05-18 RX ADMIN — ACETAMINOPHEN 650 MG: 325 TABLET ORAL at 18:20

## 2023-05-18 RX ADMIN — BUDESONIDE 250 MCG: 0.25 SUSPENSION RESPIRATORY (INHALATION) at 20:30

## 2023-05-18 RX ADMIN — MICONAZOLE NITRATE: 20.6 POWDER TOPICAL at 08:22

## 2023-05-18 RX ADMIN — MULTIVITAMIN TABLET 1 TABLET: TABLET at 08:18

## 2023-05-18 RX ADMIN — SACUBITRIL AND VALSARTAN 1 TABLET: 97; 103 TABLET, FILM COATED ORAL at 08:17

## 2023-05-18 RX ADMIN — ARFORMOTEROL TARTRATE 15 MCG: 15 SOLUTION RESPIRATORY (INHALATION) at 09:01

## 2023-05-18 RX ADMIN — ASPIRIN 81 MG: 81 TABLET, COATED ORAL at 08:17

## 2023-05-18 RX ADMIN — PREGABALIN 150 MG: 75 CAPSULE ORAL at 08:18

## 2023-05-18 RX ADMIN — ISOSORBIDE DINITRATE 20 MG: 10 TABLET ORAL at 22:04

## 2023-05-18 RX ADMIN — BUMETANIDE 1 MG: 1 TABLET ORAL at 08:17

## 2023-05-18 RX ADMIN — POTASSIUM CHLORIDE 20 MEQ: 1500 TABLET, EXTENDED RELEASE ORAL at 08:17

## 2023-05-18 RX ADMIN — ACETAMINOPHEN 650 MG: 325 TABLET ORAL at 11:24

## 2023-05-18 RX ADMIN — SACUBITRIL AND VALSARTAN 1 TABLET: 97; 103 TABLET, FILM COATED ORAL at 22:04

## 2023-05-18 RX ADMIN — CYANOCOBALAMIN TAB 1000 MCG 1000 MCG: 1000 TAB at 08:17

## 2023-05-18 ASSESSMENT — PAIN - FUNCTIONAL ASSESSMENT: PAIN_FUNCTIONAL_ASSESSMENT: PREVENTS OR INTERFERES SOME ACTIVE ACTIVITIES AND ADLS

## 2023-05-18 ASSESSMENT — PAIN DESCRIPTION - DESCRIPTORS
DESCRIPTORS: ACHING
DESCRIPTORS: ACHING;SORE

## 2023-05-18 ASSESSMENT — PAIN SCALES - GENERAL
PAINLEVEL_OUTOF10: 4
PAINLEVEL_OUTOF10: 10
PAINLEVEL_OUTOF10: 9
PAINLEVEL_OUTOF10: 10
PAINLEVEL_OUTOF10: 9

## 2023-05-18 ASSESSMENT — PAIN DESCRIPTION - FREQUENCY: FREQUENCY: CONTINUOUS

## 2023-05-18 ASSESSMENT — PAIN DESCRIPTION - LOCATION
LOCATION: GENERALIZED
LOCATION: GENERALIZED

## 2023-05-18 ASSESSMENT — PAIN DESCRIPTION - ONSET: ONSET: ON-GOING

## 2023-05-18 ASSESSMENT — PAIN DESCRIPTION - PAIN TYPE: TYPE: CHRONIC PAIN

## 2023-05-18 NOTE — PATIENT CARE CONFERENCE
P Quality Flow/Interdisciplinary Rounds Progress Note        Quality Flow Rounds held on May 18, 2023    Disciplines Attending:  Bedside Nurse, , , and Nursing Unit Leadership    Radha Mcmillan was admitted on 5/16/2023 12:27 AM    Anticipated Discharge Date:       Disposition:    Alvin Score:  Alvin Scale Score: 20    Readmission Risk              Risk of Unplanned Readmission:  19           Discussed patient goal for the day, patient clinical progression, and barriers to discharge.   The following Goal(s) of the Day/Commitment(s) have been identified:  Report labs/diagnostics      Jose Luis Salazar RN  May 18, 2023

## 2023-05-18 NOTE — CARE COORDINATION
5/18/23  Transition of Care Update. Patient admitted for elevated troponin and NSTEMI. Patient had a heart cath with 2 stents placed. Patient is planned for a ECHO today to assess for potential life vest.  Patient is on room air. Patient has requested home health care at discharge with referral called to [address] [facility]. [facility] [address] [facility] 5/[facility]. Home Health orders have been placed. Discharge goal is home alone when medically stable. SW/DOROTHY to follow.     Electronically signed by SYLVIE Buenrostro on 5/18/2023 at 12:15 PM

## 2023-05-18 NOTE — CONSULTS
Met with patient and discussed that their physician has ordered a referral to our outpatient Phase II Cardiac Rehabilitation program. Reviewed the benefits of cardiac rehabilitation based on their diagnosis and personal risk factors. Patient demonstrates strong interest in Cardiac Rehabilitation at this time. Cardiac Rehabilitation brochure provided to patient/family. The Cardiac Rehabilitation Program has been provided the patient's referral information and pertinent patient details and history. The patient may call [facility] at [phone] for additional information or questions. Contact information for [facility] and other choices close to the patient's residence have been provided in the discharge instructions so that the patient may call and schedule an appointment when cleared by their physician.  Thank you for the referral.
Day      Instructed  to call 911 if you have any of the following symptoms:       Struggling to breathe unrelieved with rest,     Having chest pain     Have confusion or cant think clearly          Philippe Bernstein RN BSN, RN  Heart Failure [address] [facility] [facility] (CHF) AHA GUIDELINES  (Must be completed for Primary Diagnosis CHF or History of CHF)    Discharge Plan:  I placed the Heart Failure Home Instructions in patient's discharge instructions. Per Heart Failure GWTG, the patient should have a follow-up appointment made within 7 days of discharge.     New Diagnosis No    ECHO Results most recent:  Lab Results   Component Value Date    LVEF 53 10/04/2021                                        Social History     Tobacco Use   Smoking Status Former    Packs/day: 0.25    Years: 17.00    Pack years: 4.25    Types: Cigarettes    Start date: 3/27/2001    Quit date: 10/5/2018    Years since quittin.6   Smokeless Tobacco Never        Immunization History   Administered Date(s) Administered    Influenza, FLUARIX, FLULAVAL, FLUZONE (age 10 mo+) AND AFLURIA, (age 1 y+), PF, 0.5mL 2021    Pneumococcal, PPSV23, PNEUMOVAX 21, (age 2y+), SC/IM, 0.5mL 2018          Angiotensin-Converting-Enzyme (ACE) inhibitor ordered:  [] Yes  [x] No (specify contraindication):    [] Contraindicated  [] Hypotensive patient who was at immediate risk of cardiogenic shock  [] Hospitalized patient who experienced marked azotemia  [] Other Contraindications  [] Not Eligible  [] Not Tolerant  [] Patient Reason  [] System Reason  [] Other Reason    Angiotensin II receptor blockers (ARB) ordered:  [] Yes  [x] No (specify contraindication):    [] Contraindicated  [] Hypotensive patient who was at immediate risk of cardiogenic shock  [] Hospitalized patient who experienced marked azotemia  [] Other Contraindications    ARNI - Angiotensin Receptor Neprilysin Inhibitor ordered:  [x] Yes  [] No (specify
[penicillins], and Cashews [macadamia nut oil]    Social History:    reports that she quit smoking about 4 years ago. Her smoking use included cigarettes. She started smoking about 22 years ago. She has a 4.25 pack-year smoking history. She has never used smokeless tobacco. She reports that she does not drink alcohol and does not use drugs. She is legally  and has no children. She is currently unemployed    Family History:   Non-contributory to this Urological problem  family history includes Arthritis in her mother; Asthma in her father and mother; Cancer in her father; Fibromyalgia in her mother; Heart Attack in her father; Hypertension in her father and mother; Stroke in her mother. Review of Systems:  Constitutional: No fever or chills   Respiratory: negative for cough and hemoptysis  Cardiovascular: positive for chest pain   Gastrointestinal: negative for abdominal pain, diarrhea, nausea and vomiting   Derm: negative for rash and skin lesion(s)  Neurological: negative for seizures and tremors  Musculoskeletal: Negative    Psychiatric: Negative   : As above in the HPI, otherwise negative  All other reviews are negative    Physical Exam:     Vitals:  /64   Pulse 80   Temp 98.3 °F (36.8 °C) (Temporal)   Resp 18   Ht 5' 8\" (1.727 m)   Wt 293 lb 12.8 oz (133.3 kg)   LMP 05/12/2023 (Exact Date)   SpO2 97%   BMI 44.67 kg/m²     General:  Awake, alert, oriented X 3. No apparent distress. HEENT:  Normocephalic, atraumatic. Lungs:  Respirations symmetric and non-labored. Abdomen:  soft, nontender, no masses  Extremities:  No clubbing, cyanosis, or edema  Skin:  Warm and dry, no open lesions or rashes  Neuro:  There are no motor or sensory deficits in the 4 quadrant extremities   Rectal: deferred  Genitourinary:  no boothe     Labs:     Recent Labs     05/15/23  2224   WBC 12.7*   RBC 4.65   HGB 14.5   HCT 44.5   MCV 95.7   MCH 31.2   MCHC 32.6   RDW 15.3*      MPV 10.9         Recent
Continue on heparin drip for NSTEMI  Continue on aspirin, statin and beta-blocker for iischemic heart disease  Continue Isordil ischemic heart disease  Continue Entresto, beta-blocker and spironolactone for history of heart failure with depressed solid function currently recovered based on echocardiogram in December 2021  Awaiting echo to guide therapy  N.p.o. after midnight for cardiac transition            Bria Schwab MD  [facility]) Cardiology

## 2023-05-18 NOTE — PLAN OF CARE
Problem: Chronic Conditions and Co-morbidities  Goal: Patient's chronic conditions and co-morbidity symptoms are monitored and maintained or improved  Outcome: Progressing  Flowsheets (Taken 5/17/2023 2000)  Care Plan - Patient's Chronic Conditions and Co-Morbidity Symptoms are Monitored and Maintained or Improved: Monitor and assess patient's chronic conditions and comorbid symptoms for stability, deterioration, or improvement     Problem: Discharge Planning  Goal: Discharge to home or other facility with appropriate resources  Outcome: Progressing  Flowsheets (Taken 5/17/2023 2000)  Discharge to home or other facility with appropriate resources: Identify barriers to discharge with patient and caregiver     Problem: Safety - Adult  Goal: Free from fall injury  Outcome: Progressing  Flowsheets  Taken 5/18/2023 0257  Free From Fall Injury: Instruct family/caregiver on patient safety  Taken 5/17/2023 2111  Free From Fall Injury: Instruct family/caregiver on patient safety     Problem: ABCDS Injury Assessment  Goal: Absence of physical injury  Outcome: Progressing  Flowsheets  Taken 5/18/2023 0257  Absence of Physical Injury: Implement safety measures based on patient assessment  Taken 5/17/2023 2111  Absence of Physical Injury: Implement safety measures based on patient assessment     Problem: Cardiovascular - Adult  Goal: Maintains optimal cardiac output and hemodynamic stability  Outcome: Progressing  Flowsheets (Taken 5/17/2023 2000)  Maintains optimal cardiac output and hemodynamic stability: Monitor blood pressure and heart rate     Problem: Metabolic/Fluid and Electrolytes - Adult  Goal: Hemodynamic stability and optimal renal function maintained  Outcome: Progressing  Flowsheets (Taken 5/17/2023 2000)  Hemodynamic stability and optimal renal function maintained: Monitor labs and assess for signs and symptoms of volume excess or deficit     Problem: Pain  Goal: Verbalizes/displays adequate comfort level or

## 2023-05-19 VITALS
RESPIRATION RATE: 16 BRPM | TEMPERATURE: 97.6 F | WEIGHT: 290.2 LBS | SYSTOLIC BLOOD PRESSURE: 105 MMHG | HEIGHT: 68 IN | OXYGEN SATURATION: 96 % | HEART RATE: 60 BPM | BODY MASS INDEX: 43.98 KG/M2 | DIASTOLIC BLOOD PRESSURE: 55 MMHG

## 2023-05-19 DIAGNOSIS — F41.9 ANXIETY: ICD-10-CM

## 2023-05-19 PROBLEM — I50.22 CHRONIC SYSTOLIC HEART FAILURE (HCC): Status: ACTIVE | Noted: 2023-05-19

## 2023-05-19 LAB
EKG ATRIAL RATE: 56 BPM
EKG P AXIS: 55 DEGREES
EKG P-R INTERVAL: 166 MS
EKG Q-T INTERVAL: 528 MS
EKG QRS DURATION: 160 MS
EKG QTC CALCULATION (BAZETT): 509 MS
EKG R AXIS: -62 DEGREES
EKG T AXIS: 57 DEGREES
EKG VENTRICULAR RATE: 56 BPM

## 2023-05-19 PROCEDURE — 6360000002 HC RX W HCPCS: Performed by: INTERNAL MEDICINE

## 2023-05-19 PROCEDURE — 6370000000 HC RX 637 (ALT 250 FOR IP): Performed by: INTERNAL MEDICINE

## 2023-05-19 PROCEDURE — 94640 AIRWAY INHALATION TREATMENT: CPT

## 2023-05-19 PROCEDURE — 93005 ELECTROCARDIOGRAM TRACING: CPT | Performed by: INTERNAL MEDICINE

## 2023-05-19 PROCEDURE — 93010 ELECTROCARDIOGRAM REPORT: CPT | Performed by: INTERNAL MEDICINE

## 2023-05-19 PROCEDURE — 2580000003 HC RX 258: Performed by: INTERNAL MEDICINE

## 2023-05-19 PROCEDURE — 99233 SBSQ HOSP IP/OBS HIGH 50: CPT | Performed by: INTERNAL MEDICINE

## 2023-05-19 RX ORDER — METOPROLOL SUCCINATE 25 MG/1
25 TABLET, EXTENDED RELEASE ORAL DAILY
Qty: 30 TABLET | Refills: 0 | Status: SHIPPED | OUTPATIENT
Start: 2023-05-20 | End: 2023-05-25

## 2023-05-19 RX ORDER — CITALOPRAM 40 MG/1
40 TABLET ORAL DAILY
Qty: 30 TABLET | Refills: 5 | Status: SHIPPED | OUTPATIENT
Start: 2023-05-19

## 2023-05-19 RX ORDER — ATORVASTATIN CALCIUM 80 MG/1
80 TABLET, FILM COATED ORAL NIGHTLY
Qty: 30 TABLET | Refills: 0 | Status: SHIPPED | OUTPATIENT
Start: 2023-05-19

## 2023-05-19 RX ORDER — HYDRALAZINE HYDROCHLORIDE 10 MG/1
10 TABLET, FILM COATED ORAL 2 TIMES DAILY
Status: DISCONTINUED | OUTPATIENT
Start: 2023-05-19 | End: 2023-05-19 | Stop reason: HOSPADM

## 2023-05-19 RX ORDER — METOPROLOL SUCCINATE 25 MG/1
25 TABLET, EXTENDED RELEASE ORAL DAILY
Status: DISCONTINUED | OUTPATIENT
Start: 2023-05-20 | End: 2023-05-19 | Stop reason: HOSPADM

## 2023-05-19 RX ORDER — SPIRONOLACTONE 25 MG/1
12.5 TABLET ORAL DAILY
Status: DISCONTINUED | OUTPATIENT
Start: 2023-05-20 | End: 2023-05-19 | Stop reason: HOSPADM

## 2023-05-19 RX ORDER — HYDRALAZINE HYDROCHLORIDE 10 MG/1
10 TABLET, FILM COATED ORAL 2 TIMES DAILY
Qty: 60 TABLET | Refills: 0 | Status: SHIPPED | OUTPATIENT
Start: 2023-05-19 | End: 2023-05-25

## 2023-05-19 RX ORDER — ISOSORBIDE DINITRATE 10 MG/1
10 TABLET ORAL 2 TIMES DAILY
Status: DISCONTINUED | OUTPATIENT
Start: 2023-05-19 | End: 2023-05-19 | Stop reason: HOSPADM

## 2023-05-19 RX ORDER — ISOSORBIDE DINITRATE 10 MG/1
10 TABLET ORAL 2 TIMES DAILY
Qty: 60 TABLET | Refills: 0 | Status: ON HOLD | OUTPATIENT
Start: 2023-05-19 | End: 2023-05-27 | Stop reason: HOSPADM

## 2023-05-19 RX ORDER — CLOPIDOGREL BISULFATE 75 MG/1
75 TABLET ORAL DAILY
Qty: 30 TABLET | Refills: 0 | Status: SHIPPED | OUTPATIENT
Start: 2023-05-20

## 2023-05-19 RX ADMIN — SODIUM CHLORIDE, PRESERVATIVE FREE 10 ML: 5 INJECTION INTRAVENOUS at 09:06

## 2023-05-19 RX ADMIN — SPIRONOLACTONE 25 MG: 25 TABLET ORAL at 09:00

## 2023-05-19 RX ADMIN — CETIRIZINE HYDROCHLORIDE 10 MG: 10 TABLET, FILM COATED ORAL at 08:55

## 2023-05-19 RX ADMIN — ASPIRIN 81 MG: 81 TABLET, COATED ORAL at 08:55

## 2023-05-19 RX ADMIN — CLOPIDOGREL BISULFATE 300 MG: 300 TABLET, FILM COATED ORAL at 08:53

## 2023-05-19 RX ADMIN — PREGABALIN 150 MG: 75 CAPSULE ORAL at 08:55

## 2023-05-19 RX ADMIN — BUMETANIDE 1 MG: 1 TABLET ORAL at 09:01

## 2023-05-19 RX ADMIN — OXYCODONE AND ACETAMINOPHEN 1 TABLET: 5; 325 TABLET ORAL at 06:00

## 2023-05-19 RX ADMIN — CITALOPRAM HYDROBROMIDE 40 MG: 20 TABLET ORAL at 08:54

## 2023-05-19 RX ADMIN — PANTOPRAZOLE SODIUM 20 MG: 20 TABLET, DELAYED RELEASE ORAL at 08:56

## 2023-05-19 RX ADMIN — CLOTRIMAZOLE AND BETAMETHASONE DIPROPIONATE: 10; .5 CREAM TOPICAL at 09:05

## 2023-05-19 RX ADMIN — Medication 2000 UNITS: at 08:54

## 2023-05-19 RX ADMIN — ISOSORBIDE DINITRATE 20 MG: 10 TABLET ORAL at 09:01

## 2023-05-19 RX ADMIN — ARFORMOTEROL TARTRATE 15 MCG: 15 SOLUTION RESPIRATORY (INHALATION) at 10:07

## 2023-05-19 RX ADMIN — CYANOCOBALAMIN TAB 1000 MCG 1000 MCG: 1000 TAB at 08:54

## 2023-05-19 RX ADMIN — MICONAZOLE NITRATE: 20.6 POWDER TOPICAL at 09:04

## 2023-05-19 RX ADMIN — METOPROLOL SUCCINATE 50 MG: 50 TABLET, EXTENDED RELEASE ORAL at 09:01

## 2023-05-19 RX ADMIN — SACUBITRIL AND VALSARTAN 1 TABLET: 97; 103 TABLET, FILM COATED ORAL at 08:56

## 2023-05-19 RX ADMIN — OXYCODONE AND ACETAMINOPHEN 1 TABLET: 5; 325 TABLET ORAL at 14:06

## 2023-05-19 RX ADMIN — BUDESONIDE 250 MCG: 0.25 SUSPENSION RESPIRATORY (INHALATION) at 10:07

## 2023-05-19 RX ADMIN — MULTIVITAMIN TABLET 1 TABLET: TABLET at 08:54

## 2023-05-19 ASSESSMENT — PAIN DESCRIPTION - DESCRIPTORS
DESCRIPTORS: ACHING;SORE
DESCRIPTORS: ACHING
DESCRIPTORS: ACHING;STABBING

## 2023-05-19 ASSESSMENT — PAIN DESCRIPTION - LOCATION
LOCATION: GENERALIZED

## 2023-05-19 ASSESSMENT — PAIN SCALES - GENERAL
PAINLEVEL_OUTOF10: 10
PAINLEVEL_OUTOF10: 8
PAINLEVEL_OUTOF10: 7
PAINLEVEL_OUTOF10: 5

## 2023-05-19 ASSESSMENT — PAIN DESCRIPTION - ORIENTATION
ORIENTATION: RIGHT;LEFT;LOWER
ORIENTATION: LOWER;RIGHT;LEFT

## 2023-05-19 ASSESSMENT — PAIN - FUNCTIONAL ASSESSMENT: PAIN_FUNCTIONAL_ASSESSMENT: ACTIVITIES ARE NOT PREVENTED

## 2023-05-19 ASSESSMENT — PAIN DESCRIPTION - PAIN TYPE: TYPE: CHRONIC PAIN

## 2023-05-19 NOTE — PLAN OF CARE
Problem: Chronic Conditions and Co-morbidities  Goal: Patient's chronic conditions and co-morbidity symptoms are monitored and maintained or improved  Outcome: Progressing  Flowsheets (Taken 5/18/2023 2000)  Care Plan - Patient's Chronic Conditions and Co-Morbidity Symptoms are Monitored and Maintained or Improved: Monitor and assess patient's chronic conditions and comorbid symptoms for stability, deterioration, or improvement     Problem: Discharge Planning  Goal: Discharge to home or other facility with appropriate resources  Outcome: Progressing  Flowsheets (Taken 5/18/2023 2000)  Discharge to home or other facility with appropriate resources: Identify barriers to discharge with patient and caregiver     Problem: Safety - Adult  Goal: Free from fall injury  Outcome: Progressing  Flowsheets  Taken 5/19/2023 0454  Free From Fall Injury: Instruct family/caregiver on patient safety  Taken 5/18/2023 2000  Free From Fall Injury: Instruct family/caregiver on patient safety     Problem: ABCDS Injury Assessment  Goal: Absence of physical injury  Outcome: Progressing  Flowsheets  Taken 5/19/2023 0454  Absence of Physical Injury: Implement safety measures based on patient assessment  Taken 5/18/2023 2000  Absence of Physical Injury: Implement safety measures based on patient assessment     Problem: Cardiovascular - Adult  Goal: Maintains optimal cardiac output and hemodynamic stability  Outcome: Progressing  Flowsheets (Taken 5/18/2023 2000)  Maintains optimal cardiac output and hemodynamic stability: Monitor blood pressure and heart rate     Problem: Metabolic/Fluid and Electrolytes - Adult  Goal: Hemodynamic stability and optimal renal function maintained  Outcome: Progressing  Flowsheets (Taken 5/18/2023 2000)  Hemodynamic stability and optimal renal function maintained: Monitor labs and assess for signs and symptoms of volume excess or deficit     Problem: Pain  Goal: Verbalizes/displays adequate comfort level or

## 2023-05-19 NOTE — CARE COORDINATION
5/19/23  Transition of Care Update. Patient admitted for elevated troponin and NSTEMI. Patient had a heart cath with 2 stents placed. Patients ECHO complete and will need a life vest.  Deonte Kaye called and referral given and currently awaiting approval.  Patient is on room air. Patient has requested home health care at discharge with referral called to [facility] for [facility] is Erica Morocho. 5/23. Home Health orders have been placed. Discharge goal is home alone when medically stable. SW/CM to follow. Patients follow up ECHO showed EF of 55-60% patient will not need a Life Vest at discharge.     Electronically signed by SYLVIE Martinez on 5/19/2023 at 9:12 AM

## 2023-05-19 NOTE — DISCHARGE SUMMARY
THE PELVIS AND TWO XRAY VIEWS LEFT HIP 5/3/2023 10:42 am COMPARISON: None. HISTORY: ORDERING SYSTEM PROVIDED HISTORY: Left hip pain FINDINGS: The hip demonstrates normal alignment. No evidence of acute fracture. No focal osseus lesion. Pelvis is intact. Large acetabular roof spurs and mild degenerative changes involving both hips. No acute abnormality of the hip. Large bilateral acetabular roof spurs and mild degenerative changes involving both hip joints. Disposition:  Home     Discharge Instructions/Follow-up:    Follow up in CHF clinic    Code Status:  Full Code     Activity: activity as tolerated    Diet: cardiac diet    Labs: For convenience and continuity at follow-up the following most recent labs are provided:      CBC:    Lab Results   Component Value Date/Time    WBC 11.0 05/18/2023 05:49 AM    HGB 14.3 05/18/2023 05:49 AM    HCT 44.0 05/18/2023 05:49 AM     05/18/2023 05:49 AM       Renal:    Lab Results   Component Value Date/Time     05/18/2023 05:49 AM    K 3.9 05/18/2023 05:49 AM    K 4.0 05/17/2023 05:26 AM     05/18/2023 05:49 AM    CO2 24 05/18/2023 05:49 AM    BUN 13 05/18/2023 05:49 AM    CREATININE 0.9 05/18/2023 05:49 AM    CALCIUM 8.8 05/18/2023 05:49 AM       Discharge Medications:     Current Discharge Medication List             Details   clopidogrel (PLAVIX) 75 MG tablet Take 1 tablet by mouth daily  Qty: 30 tablet, Refills: 0                Details   isosorbide dinitrate (ISORDIL) 10 MG tablet Take 1 tablet by mouth 2 times daily  Qty: 60 tablet, Refills: 0      atorvastatin (LIPITOR) 80 MG tablet Take 1 tablet by mouth nightly  Qty: 30 tablet, Refills: 0    Associated Diagnoses: Hyperlipidemia LDL goal <70      hydrALAZINE (APRESOLINE) 10 MG tablet Take 1 tablet by mouth in the morning and 1 tablet in the evening.   Qty: 60 tablet, Refills: 0      metoprolol succinate (TOPROL XL) 25 MG extended release tablet Take 1 tablet by mouth daily  Qty: 30 tablet,

## 2023-05-19 NOTE — PLAN OF CARE
Problem: Chronic Conditions and Co-morbidities  Goal: Patient's chronic conditions and co-morbidity symptoms are monitored and maintained or improved  5/19/2023 1347 by David Acuña RN  Outcome: Progressing     Problem: Discharge Planning  Goal: Discharge to home or other facility with appropriate resources  5/19/2023 1347 by David Acuña RN  Outcome: Progressing     Problem: Safety - Adult  Goal: Free from fall injury  5/19/2023 1347 by David Acuña RN  Outcome: Progressing     Problem: ABCDS Injury Assessment  Goal: Absence of physical injury  5/19/2023 1347 by David Acuña RN  Outcome: Progressing

## 2023-05-20 NOTE — PROGRESS NOTES
Cardiology notified Pt. was very anxious after heart cath because she is not used to laying flat on her back. .5mg ativan ordered for her. She seems to be calming down now, but she is very cold/shivering, states she is having a hard time breathing and is complaining of heart burn \"like she had before the heartcath\" Vitals are 136/85, oxygen 94%;HR 75.
Cardiology notified via perfect serve of new patient consult.
Dr Nikki Muñoz notified of Bp 94/52. Pt has hydralazine,Isordil,metoprolol,Entresto due tonight with no holding parameters.      Per Dr Nikki Muñoz hold all except Isordil
Dr. Wang Shanks notified regarding patient's SBP and HS medications. Per Dr. Wang Shanks, hold metoprolol, hydralazine, and isordil tonight and give only entresto if SBP>100 and Dr. Wang Shanks will reassess medications tomorrow.
Hospitalist Progress Note      SYNOPSIS: Patient admitted on 2023 for Elevated troponin    Ms. Agnieszka Camacho is a 57year old  female PMHx medical history includes Morbid Obesity, Asthma, HTN, T2DM, Bell's Palsy, DJD with chronic back pain, JODI on BiPAP, and nonobstructive CAD per cardiac catheterization 2020 presented to [facility] on 5/15/2023 with complaints of midsternal chest \"burning like heartburn\" that occurred with rest and lasted 10 minutes in duration. Upon arrival to the ED her VS oral temperature 97.4, 96% RA-99/57. EKG SR with PVCs, left axis deviation, RBBB. Troponin 741, 839. proBNP 1999. D-dimer 308. CTA of the chest was negative for pulmonary embolism and did show mild pulmonary edema with cardiomegaly and pulmonary hypertension. She received a 500 cc NS bolus,  mg, heparin 4000 unit bolus and was placed on IV heparin infusion, Lyrica 150 mg, and Percocet. Cardiology was consulted for management of NSTEMI. LHC today showed distal RCA lesion with subsequent drug eluting stents x 2 placed. Started on DAPT and ECHO is pending. SUBJECTIVE:  Stable overnight. No other overnight issues reported. Patient seen and examined  Records reviewed. Temp (24hrs), Av.8 °F (36.6 °C), Min:97 °F (36.1 °C), Max:98.2 °F (36.8 °C)    DIET: ADULT DIET; Regular; 5 carb choices (75 gm/meal); Low Fat/Low Chol/High Fiber/CLAUDIA  CODE: Full Code    Intake/Output Summary (Last 24 hours) at 2023 1204  Last data filed at 2023 2234  Gross per 24 hour   Intake 660 ml   Output 3350 ml   Net -2690 ml         Review of Systems  All bolded are positive; please see HPI  General:  Fever, chills, diaphoresis, fatigue, malaise, night sweats, weight loss  Psychological:  Anxiety, disorientation, hallucinations. ENT:  Epistaxis, headaches, vertigo, visual changes. Cardiovascular:  Chest pain, irregular heartbeats, palpitations, paroxysmal nocturnal dyspnea.   Respiratory:  Shortness of
Inpatient Cardiology Consultation      Reason for Consult:  NSTEMI     Consulting Physician: Dr. Parish Feliciano    Requesting Physician:  Dr. Danika Mendoza    Date of Consultation: 5/20/2023    Interim:  Had drug-eluting stent  to the RCA. She preferred Plavix instead of Brilinta as she report shortness of breath with Thyra Huber was switched to Plavix per patient request  Due to hypotension, her cardiac medication doses were decreased and patient will follow-up with cardiology for close monitoring. There is discrepancy between echocardiogram LV ejection fraction  and LV gram EF as shown below  Will arrange for limited echocardiogram with definitive in the discrepancy       Transthoracic echocardiogram May 19, 2023    Summary   Estimated left ventricle ejection fraction 55 to 60 %. Moderate left ventricular concentric hypertrophy noted. Normal right ventricular size and function. Mild mitral regurgitation is present. Physiologic and/or trace tricuspid regurgitation. RVSP is 27 mmHg. [facility] 5/17/23  Procedure:    1. Left heart catheterization with coronary angiography  2. Percutaneous coronary artery intervention of the distal RCA with a sequential and overlapping 3.5 x 38 and 3.5 x 8 mm drug-eluting stents. Complications: None     Physician: Kimi Nick. Julisa JOINER Assistant: none     Indication: NSTEMI  AUC: 8  AUC indication: 4     PCI AUC: 9  PCI AUC incication: 16       Hemodynamics:  Opening Aortic pressure: 563/19  LV systolic pressure: 291  LVEDP: 7  No gradient across the aortic valve. Angiographic Results/findings:  Left Main: No intraoperative significant stenosis. LAD: No angiographically significant stenosis. Wraparound vessel supplying distal inferior wall  D1: No atrophy significant stenosis. D2: Small vessel. Valentín Mering Cx: Mild diffuse luminal irregularities. OM1: Very small vessel. .  OM2: Normal distal arteries. OM 3: Proximal diffuse mild irregularities. .  Ramus:
Messaged Dr. Carol Huggins in regards to pt requesting Percocet.  She takes at home for chronic pain 3x/day PRN
Occupational Therapy  OT SESSION ATTEMPT     Date:2023  Patient Name: Angelia Marcial  MRN: [id number]  : 1975  Room: [address] [place] [address]A     Attempted OT session this date:    [] unavailable due to other medical staff currently with pt   [] on hold per nursing staff   [] on hold per nursing staff secondary to lab / radiology results    [] declined treatment  this date due to ____. Benefits of participation in therapy reviewed with pt. [x] off unit: Cardiac catheterization ()   [] Other:     Will reattempt OT eval at a later time.     Libia Edwards, S/OT  Raji, OTR/L #[id number]
Physical Therapy    PT evaluation orders received and chart review completed. Pt at cath lab at time of attempt. Will follow and re-attempt as appropriate. Thank you.     Regina Ford PT, DPT  [id number]
Pt arrived to unit from ED with her shirt, pants, slippers, purse, phone, , headphones, wallet, and glasses at bedside
Pt. just got back from her heart cath via groin site. Pt. states she is not used to laying flat on her back which is protocol for groin sites for 5 hours. She is starting to have a panic attack and saying she cant breathe. Asked for something to calm her down.
Urology consult called.
spironolactone     ?  Hematuria   - Urology consulted, signed off   - no gross or microscopic hematuria  - Cytology ordered  - might need OP cystoscopy  - hx recent ESWL     HTN  - c/w home meds     DM w/ neuropathy  - c/w home meds  - ISS, accuchecks  - check A1c  - c/w lyrica     HLD  - c/w statins      COPD- stable on RA, c/w home treatment      Nicotine dependence- current smoker(~3-4 \"black and mild\"/day), NRT refused  Counselled on cessation      GERD- c/w PPI     Morbid obesity with recent gastric bypass surgery   - supplement vitamins as  appropriate     JODI- c/w BiPAP     Spinal Stenosis  DJD  OA  - resume home pain meds  - pain control as needed     Depression - on citalopram        DVT Prophylaxis [] Lovenox, [x]  Heparin, [] SCDs, [] Ambulation   GI Prophylaxis [] PPI,  [] H2 Blocker,  [] Carafate,  [x] Diet/Tube Feeds   Disposition Patient requires continued admission due to awaiting ECHO and cardiology clearance    MDM [] Low, [x] Moderate,[]  High       Medications:  REVIEWED DAILY    Infusion Medications    sodium chloride 75 mL/hr at 05/17/23 1046    heparin (PORCINE) Infusion Stopped (05/17/23 0757)    sodium chloride       Scheduled Medications    [START ON 5/18/2023] aspirin  81 mg Oral Daily    ticagrelor  90 mg Oral BID    sodium chloride flush  5-40 mL IntraVENous 2 times per day    atorvastatin  80 mg Oral Nightly    bumetanide  1 mg Oral Daily    cetirizine  10 mg Oral Daily    vitamin D  2,000 Units Oral Daily    citalopram  40 mg Oral Daily    clotrimazole-betamethasone   Topical BID    hydrALAZINE  50 mg Oral 3 times per day    isosorbide dinitrate  20 mg Oral TID    metoprolol succinate  100 mg Oral BID    multivitamin  1 tablet Oral Daily    miconazole   Topical BID    pantoprazole  20 mg Oral Daily    potassium chloride  20 mEq Oral Daily    pregabalin  150 mg Oral TID    sacubitril-valsartan  1 tablet Oral BID    spironolactone  25 mg Oral Daily    vitamin B-12  1,000 mcg Oral Daily
0 minutes  [] Manual therapy 14664 0 minutes  [x] Therapeutic activities 23023 10 minutes  [] Therapeutic exercises 12880 0 minutes  [] Neuromuscular reeducation 04454 0 minutes     Faye Fry, PT, DPT  [id number]
IntraVENous, PRN  acetaminophen (TYLENOL) tablet 650 mg, 650 mg, Oral, Q6H PRN **OR** acetaminophen (TYLENOL) suppository 650 mg, 650 mg, Rectal, Q6H PRN  polyethylene glycol (GLYCOLAX) packet 17 g, 17 g, Oral, Daily PRN  atorvastatin (LIPITOR) tablet 80 mg, 80 mg, Oral, Nightly  trimethobenzamide (TIGAN) injection 200 mg, 200 mg, IntraMUSCular, Q6H PRN  perflutren lipid microspheres (DEFINITY) injection 1.5 mL, 1.5 mL, IntraVENous, ONCE PRN  albuterol (PROVENTIL) (2.5 MG/3ML) 0.083% nebulizer solution 2.5 mg, 2.5 mg, Nebulization, Q6H PRN  bumetanide (BUMEX) tablet 1 mg, 1 mg, Oral, Daily  cetirizine (ZYRTEC) tablet 10 mg, 10 mg, Oral, Daily  vitamin D (CHOLECALCIFEROL) tablet 2,000 Units, 2,000 Units, Oral, Daily  citalopram (CELEXA) tablet 40 mg, 40 mg, Oral, Daily  clotrimazole-betamethasone (LOTRISONE) cream, , Topical, BID  fluticasone (FLONASE) 50 MCG/ACT nasal spray 1 spray, 1 spray, Each Nostril, Daily PRN  hydrALAZINE (APRESOLINE) tablet 50 mg, 50 mg, Oral, 3 times per day  ipratropium-albuterol (DUONEB) nebulizer solution 3 mL, 1 vial, Nebulization, Q4H PRN  isosorbide dinitrate (ISORDIL) tablet 20 mg, 20 mg, Oral, TID  lactulose (CHRONULAC) 10 GM/15ML solution 10 g, 10 g, Oral, Daily PRN  metoprolol succinate (TOPROL XL) extended release tablet 100 mg, 100 mg, Oral, BID  white petrolatum ointment, , Topical, Daily PRN  multivitamin 1 tablet, 1 tablet, Oral, Daily  miconazole (MICOTIN) 2 % powder, , Topical, BID  pantoprazole (PROTONIX) tablet 20 mg, 20 mg, Oral, Daily  oxyCODONE-acetaminophen (PERCOCET) 5-325 MG per tablet 1 tablet, 1 tablet, Oral, Q8H PRN  potassium chloride (KLOR-CON M) extended release tablet 20 mEq, 20 mEq, Oral, Daily  pregabalin (LYRICA) capsule 150 mg, 150 mg, Oral, TID  sacubitril-valsartan (ENTRESTO)  MG per tablet 1 tablet, 1 tablet, Oral, BID  spironolactone (ALDACTONE) tablet 25 mg, 25 mg, Oral, Daily  vitamin B-12 (CYANOCOBALAMIN) tablet 1,000 mcg, 1,000 mcg, Oral,
on energy conservation techs (EC)/Work Simplification for completion of ADLs:     Neuromuscular Reeducation to facilitate balance/righting reactions for increased function with ADLs:   Activity tolerance - Sitting/Standing to improve endurance w/ functional ax   Cognitive retraining -  Cues for safety/safety awareness  Skilled monitoring of Vitals during session and pt's response to tx ax      Consulted RN      Made all appropriate Environmental Modifications to facilitate pt's level of IND and safety. Recommendations for Continued Participation in OT services during Hospitalization    Pt and/or Family verbalized/demonstrated a Good understanding of education provided. Will Review PRN. Rehab Potential: Good(-) for established goals     Patient / Family Goal: Return home at d/c w/ continued family assist       Patient and/or family were instructed on functional diagnosis, prognosis/goals and OT plan of care. Demonstrated Good understanding. Eval Complexity: Low    Time In: 0935  Time Out: 1000  Total Treatment Time: 10 minutes    Min Units   OT Eval Low 97165  X  1   OT Eval Medium 77873      OT Eval High 95367      OT Re-Eval [id number]       Therapeutic Ex 98596       Therapeutic Activities 81831       ADL/Self Care 52983  10  1   Orthotic Management 51419       Manual 12302     Neuro Re-Ed 62500       Non-Billable Time              Evaluation Time additionally includes thorough review of current medical information, gathering information on past medical history/social history and prior level of function, completion of standardized testing/informal observation of tasks, assessment of data and education on plan of care and goals.             Antonette Che, MILAGROS, OTR/L  # [id number]
complains of hematuria as of today-see HPI  Hematologic: Denies excessive bruising or bleeding  Lymphatic: Denies lumps and bumps to neck, axilla, breast, and groin  Endocrine: Denies excessive thirst. Denies intolerance to hot and cold  GYN: LMP 05/06/2023. Psychiatric: Denies anxiety and depression. PHYSICAL EXAM:   /83   Pulse 64   Temp 97.2 °F (36.2 °C) (Temporal)   Resp 18   Ht 5' 8\" (1.727 m)   Wt 290 lb (131.5 kg)   LMP 05/12/2023 (Exact Date)   SpO2 95%   BMI 44.09 kg/m²   CONST:  Well developed, morbidly obese middle-aged -American female who appears stated age. Awake, alert, cooperative, no apparent distress  HEENT:   Head- Normocephalic, atraumatic   Eyes- Conjunctivae pink, anicteric  Throat- Oral mucosa pink and moist  Neck-  No stridor, trachea midline, no jugular venous distention. No adenopathy. Short, thick neck  CHEST: Chest symmetrical and non-tender to palpation. No accessory muscle use or intercostal retractions  RESPIRATORY: Lung sounds - clear throughout fields   CARDIOVASCULAR:     No carotid bruit  Heart Inspection- shows no noted pulsations  Heart Palpation- no heaves or thrills; PMI is non-displaced   Heart Ausculation- Regular rate and rhythm, no murmur. No s3, s4 or rub   PV: No lower extremity edema. No varicosities. Pedal pulses palpable, no clubbing or cyanosis   ABDOMEN: Soft, obese, non-tender to light palpation. Bowel sounds present. No palpable masses no organomegaly; no abdominal bruit  MS: Good muscle strength and tone. No atrophy or abnormal movements. : Hematuria noted  SKIN: Warm and dry no statis dermatitis or ulcers   NEURO / PSYCH: Oriented to person, place and time. Speech clear and appropriate. Follows all commands. Pleasant affect     DATA:    ECG: As above  Tele strips: As above.   Currently SR with occasional PVCs  Diagnostic:      Intake/Output Summary (Last 24 hours) at 5/18/2023 2001  Last data filed at 5/18/2023 1838  Gross per 24 hour

## 2023-05-22 DIAGNOSIS — I25.10 CORONARY ARTERY DISEASE INVOLVING NATIVE CORONARY ARTERY OF NATIVE HEART WITHOUT ANGINA PECTORIS: ICD-10-CM

## 2023-05-22 DIAGNOSIS — I42.9 CARDIOMYOPATHY, UNSPECIFIED TYPE (HCC): Primary | ICD-10-CM

## 2023-05-22 DIAGNOSIS — I50.22 CHRONIC SYSTOLIC HEART FAILURE (HCC): ICD-10-CM

## 2023-05-24 ENCOUNTER — TELEPHONE (OUTPATIENT)
Dept: CARDIOLOGY CLINIC | Age: 48
End: 2023-05-24

## 2023-05-24 NOTE — TELEPHONE ENCOUNTER
Echo scheduled 6/28/2023. Patient to be seen in CHF Clinic 5/31 and by Jose Salazar on 6/23/2023. Patient scheduled with Dr. Elisha Cui on 7/13/2023.    ----- Message from Liang Neal MD sent at 5/20/2023 10:26 AM EDT -----  Regarding: Limited echo with contrast  Please arrange for limited echocardiogram with contrast to determine left ventricular ejection fraction due to discrepancy between echo and heart cath LV EF.   Please arrange for patient to follow-up in 4 to 12 weeks

## 2023-05-25 ENCOUNTER — TELEPHONE (OUTPATIENT)
Dept: OTHER | Facility: CLINIC | Age: 48
End: 2023-05-25

## 2023-05-25 ENCOUNTER — APPOINTMENT (OUTPATIENT)
Dept: CT IMAGING | Age: 48
DRG: 201 | End: 2023-05-25
Payer: MEDICAID

## 2023-05-25 ENCOUNTER — HOSPITAL ENCOUNTER (INPATIENT)
Age: 48
LOS: 2 days | Discharge: HOME | DRG: 201 | End: 2023-05-27
Attending: EMERGENCY MEDICINE | Admitting: INTERNAL MEDICINE
Payer: MEDICAID

## 2023-05-25 ENCOUNTER — APPOINTMENT (OUTPATIENT)
Dept: GENERAL RADIOLOGY | Age: 48
DRG: 201 | End: 2023-05-25
Payer: MEDICAID

## 2023-05-25 DIAGNOSIS — I49.8 BIGEMINY: Primary | ICD-10-CM

## 2023-05-25 DIAGNOSIS — R91.8 PULMONARY INFILTRATES: ICD-10-CM

## 2023-05-25 PROBLEM — I42.8 NONISCHEMIC CARDIOMYOPATHY (HCC): Status: ACTIVE | Noted: 2020-05-03

## 2023-05-25 PROBLEM — R93.89 ABNORMAL CXR (CHEST X-RAY): Status: ACTIVE | Noted: 2023-05-25

## 2023-05-25 PROBLEM — R00.1 BRADYCARDIA: Status: ACTIVE | Noted: 2023-05-25

## 2023-05-25 PROBLEM — I25.10 CORONARY ARTERY DISEASE INVOLVING NATIVE CORONARY ARTERY OF NATIVE HEART WITHOUT ANGINA PECTORIS: Status: ACTIVE | Noted: 2023-05-25

## 2023-05-25 PROBLEM — J44.9 CHRONIC OBSTRUCTIVE PULMONARY DISEASE (HCC): Status: ACTIVE | Noted: 2023-05-25

## 2023-05-25 PROBLEM — E78.00 PURE HYPERCHOLESTEROLEMIA: Status: ACTIVE | Noted: 2023-05-25

## 2023-05-25 LAB
ALBUMIN SERPL-MCNC: 3.8 G/DL (ref 3.5–5.2)
ALP SERPL-CCNC: 104 U/L (ref 35–104)
ALT SERPL-CCNC: 17 U/L (ref 0–32)
ANION GAP SERPL CALCULATED.3IONS-SCNC: 9 MMOL/L (ref 7–16)
APTT BLD: 32.3 SEC (ref 24.5–35.1)
AST SERPL-CCNC: 13 U/L (ref 0–31)
B PARAP IS1001 DNA NPH QL NAA+NON-PROBE: NOT DETECTED
B PERT.PT PRMT NPH QL NAA+NON-PROBE: NOT DETECTED
BASOPHILS # BLD: 0.07 E9/L (ref 0–0.2)
BASOPHILS NFR BLD: 0.7 % (ref 0–2)
BILIRUB SERPL-MCNC: 0.5 MG/DL (ref 0–1.2)
BNP BLD-MCNC: 515 PG/ML (ref 0–125)
BUN SERPL-MCNC: 15 MG/DL (ref 6–20)
C PNEUM DNA NPH QL NAA+NON-PROBE: NOT DETECTED
CALCIUM SERPL-MCNC: 9.2 MG/DL (ref 8.6–10.2)
CHLORIDE SERPL-SCNC: 104 MMOL/L (ref 98–107)
CO2 SERPL-SCNC: 27 MMOL/L (ref 22–29)
CREAT SERPL-MCNC: 0.9 MG/DL (ref 0.5–1)
EKG ATRIAL RATE: 65 BPM
EKG P AXIS: 61 DEGREES
EKG P-R INTERVAL: 158 MS
EKG Q-T INTERVAL: 514 MS
EKG QRS DURATION: 158 MS
EKG QTC CALCULATION (BAZETT): 534 MS
EKG R AXIS: -63 DEGREES
EKG T AXIS: 33 DEGREES
EKG VENTRICULAR RATE: 65 BPM
EOSINOPHIL # BLD: 0.32 E9/L (ref 0.05–0.5)
EOSINOPHIL NFR BLD: 3.4 % (ref 0–6)
ERYTHROCYTE [DISTWIDTH] IN BLOOD BY AUTOMATED COUNT: 14.1 FL (ref 11.5–15)
FLUAV RNA NPH QL NAA+NON-PROBE: NOT DETECTED
FLUBV RNA NPH QL NAA+NON-PROBE: NOT DETECTED
GLUCOSE SERPL-MCNC: 84 MG/DL (ref 74–99)
HADV DNA NPH QL NAA+NON-PROBE: NOT DETECTED
HCOV 229E RNA NPH QL NAA+NON-PROBE: NOT DETECTED
HCOV HKU1 RNA NPH QL NAA+NON-PROBE: NOT DETECTED
HCOV NL63 RNA NPH QL NAA+NON-PROBE: NOT DETECTED
HCOV OC43 RNA NPH QL NAA+NON-PROBE: NOT DETECTED
HCT VFR BLD AUTO: 47 % (ref 34–48)
HGB BLD-MCNC: 15.2 G/DL (ref 11.5–15.5)
HMPV RNA NPH QL NAA+NON-PROBE: NOT DETECTED
HPIV1 RNA NPH QL NAA+NON-PROBE: NOT DETECTED
HPIV2 RNA NPH QL NAA+NON-PROBE: NOT DETECTED
HPIV3 RNA NPH QL NAA+NON-PROBE: NOT DETECTED
HPIV4 RNA NPH QL NAA+NON-PROBE: NOT DETECTED
IMM GRANULOCYTES # BLD: 0.02 E9/L
IMM GRANULOCYTES NFR BLD: 0.2 % (ref 0–5)
INR BLD: 1.1
LYMPHOCYTES # BLD: 5.09 E9/L (ref 1.5–4)
LYMPHOCYTES NFR BLD: 53.7 % (ref 20–42)
M PNEUMO DNA NPH QL NAA+NON-PROBE: NOT DETECTED
MAGNESIUM SERPL-MCNC: 1.9 MG/DL (ref 1.6–2.6)
MCH RBC QN AUTO: 31.2 PG (ref 26–35)
MCHC RBC AUTO-ENTMCNC: 32.3 % (ref 32–34.5)
MCV RBC AUTO: 96.5 FL (ref 80–99.9)
MONOCYTES # BLD: 0.56 E9/L (ref 0.1–0.95)
MONOCYTES NFR BLD: 5.9 % (ref 2–12)
NEUTROPHILS # BLD: 3.42 E9/L (ref 1.8–7.3)
NEUTS SEG NFR BLD: 36.1 % (ref 43–80)
PLATELET # BLD AUTO: 256 E9/L (ref 130–450)
PMV BLD AUTO: 11.9 FL (ref 7–12)
POTASSIUM SERPL-SCNC: 4.8 MMOL/L (ref 3.5–5)
PROCALCITONIN: 0.02 NG/ML (ref 0–0.08)
PROT SERPL-MCNC: 7 G/DL (ref 6.4–8.3)
PROTHROMBIN TIME: 12.1 SEC (ref 9.3–12.4)
RBC # BLD AUTO: 4.87 E12/L (ref 3.5–5.5)
RSV RNA NPH QL NAA+NON-PROBE: NOT DETECTED
RV+EV RNA NPH QL NAA+NON-PROBE: NOT DETECTED
SARS-COV-2 RNA NPH QL NAA+NON-PROBE: NOT DETECTED
SODIUM SERPL-SCNC: 140 MMOL/L (ref 132–146)
TROPONIN, HIGH SENSITIVITY: 100 NG/L (ref 0–9)
TROPONIN, HIGH SENSITIVITY: 101 NG/L (ref 0–9)
TSH SERPL-MCNC: 0.59 UIU/ML (ref 0.27–4.2)
WBC # BLD: 9.5 E9/L (ref 4.5–11.5)

## 2023-05-25 PROCEDURE — 85610 PROTHROMBIN TIME: CPT

## 2023-05-25 PROCEDURE — 36415 COLL VENOUS BLD VENIPUNCTURE: CPT

## 2023-05-25 PROCEDURE — 71045 X-RAY EXAM CHEST 1 VIEW: CPT

## 2023-05-25 PROCEDURE — 2140000000 HC CCU INTERMEDIATE R&B

## 2023-05-25 PROCEDURE — 99223 1ST HOSP IP/OBS HIGH 75: CPT | Performed by: INTERNAL MEDICINE

## 2023-05-25 PROCEDURE — 85730 THROMBOPLASTIN TIME PARTIAL: CPT

## 2023-05-25 PROCEDURE — 93005 ELECTROCARDIOGRAM TRACING: CPT | Performed by: EMERGENCY MEDICINE

## 2023-05-25 PROCEDURE — 99285 EMERGENCY DEPT VISIT HI MDM: CPT

## 2023-05-25 PROCEDURE — APPSS60 APP SPLIT SHARED TIME 46-60 MINUTES

## 2023-05-25 PROCEDURE — 71250 CT THORAX DX C-: CPT

## 2023-05-25 PROCEDURE — 2580000003 HC RX 258: Performed by: INTERNAL MEDICINE

## 2023-05-25 PROCEDURE — 6360000002 HC RX W HCPCS: Performed by: INTERNAL MEDICINE

## 2023-05-25 PROCEDURE — 80053 COMPREHEN METABOLIC PANEL: CPT

## 2023-05-25 PROCEDURE — 6370000000 HC RX 637 (ALT 250 FOR IP): Performed by: INTERNAL MEDICINE

## 2023-05-25 PROCEDURE — 85025 COMPLETE CBC W/AUTO DIFF WBC: CPT

## 2023-05-25 PROCEDURE — 93010 ELECTROCARDIOGRAM REPORT: CPT | Performed by: INTERNAL MEDICINE

## 2023-05-25 PROCEDURE — 84484 ASSAY OF TROPONIN QUANT: CPT

## 2023-05-25 PROCEDURE — 83735 ASSAY OF MAGNESIUM: CPT

## 2023-05-25 PROCEDURE — 84443 ASSAY THYROID STIM HORMONE: CPT

## 2023-05-25 PROCEDURE — 0202U NFCT DS 22 TRGT SARS-COV-2: CPT

## 2023-05-25 PROCEDURE — 84145 PROCALCITONIN (PCT): CPT

## 2023-05-25 PROCEDURE — 83880 ASSAY OF NATRIURETIC PEPTIDE: CPT

## 2023-05-25 RX ORDER — BISACODYL 10 MG
10 SUPPOSITORY, RECTAL RECTAL DAILY PRN
Status: DISCONTINUED | OUTPATIENT
Start: 2023-05-25 | End: 2023-05-27 | Stop reason: HOSPADM

## 2023-05-25 RX ORDER — BUMETANIDE 1 MG/1
2 TABLET ORAL EVERY OTHER DAY
Status: DISCONTINUED | OUTPATIENT
Start: 2023-05-26 | End: 2023-05-27 | Stop reason: HOSPADM

## 2023-05-25 RX ORDER — OXYCODONE HYDROCHLORIDE AND ACETAMINOPHEN 5; 325 MG/1; MG/1
1 TABLET ORAL 3 TIMES DAILY PRN
COMMUNITY

## 2023-05-25 RX ORDER — ONDANSETRON 2 MG/ML
4 INJECTION INTRAMUSCULAR; INTRAVENOUS EVERY 6 HOURS PRN
Status: DISCONTINUED | OUTPATIENT
Start: 2023-05-25 | End: 2023-05-25

## 2023-05-25 RX ORDER — NYSTATIN 100000 [USP'U]/G
POWDER TOPICAL 3 TIMES DAILY PRN
COMMUNITY
End: 2023-05-31

## 2023-05-25 RX ORDER — BUMETANIDE 1 MG/1
2 TABLET ORAL EVERY OTHER DAY
COMMUNITY

## 2023-05-25 RX ORDER — HYDRALAZINE HYDROCHLORIDE 10 MG/1
10 TABLET, FILM COATED ORAL 2 TIMES DAILY
Status: DISCONTINUED | OUTPATIENT
Start: 2023-05-25 | End: 2023-05-26

## 2023-05-25 RX ORDER — ALBUTEROL SULFATE 90 UG/1
2 AEROSOL, METERED RESPIRATORY (INHALATION) EVERY 4 HOURS PRN
COMMUNITY

## 2023-05-25 RX ORDER — SODIUM CHLORIDE 0.9 % (FLUSH) 0.9 %
10 SYRINGE (ML) INJECTION PRN
Status: DISCONTINUED | OUTPATIENT
Start: 2023-05-25 | End: 2023-05-27 | Stop reason: HOSPADM

## 2023-05-25 RX ORDER — BUMETANIDE 1 MG/1
1 TABLET ORAL EVERY OTHER DAY
Status: DISCONTINUED | OUTPATIENT
Start: 2023-05-27 | End: 2023-05-27 | Stop reason: HOSPADM

## 2023-05-25 RX ORDER — MULTIVITAMIN
1 TABLET ORAL DAILY
COMMUNITY

## 2023-05-25 RX ORDER — POTASSIUM CHLORIDE 20 MEQ/1
20 TABLET, EXTENDED RELEASE ORAL DAILY
Status: DISCONTINUED | OUTPATIENT
Start: 2023-05-26 | End: 2023-05-26

## 2023-05-25 RX ORDER — FERROUS SULFATE 325(65) MG
325 TABLET ORAL DAILY
Status: DISCONTINUED | OUTPATIENT
Start: 2023-05-25 | End: 2023-05-27 | Stop reason: HOSPADM

## 2023-05-25 RX ORDER — BUMETANIDE 1 MG/1
1 TABLET ORAL EVERY OTHER DAY
COMMUNITY

## 2023-05-25 RX ORDER — ISOSORBIDE DINITRATE 10 MG/1
10 TABLET ORAL 2 TIMES DAILY
Status: DISCONTINUED | OUTPATIENT
Start: 2023-05-25 | End: 2023-05-26

## 2023-05-25 RX ORDER — ACETAMINOPHEN 650 MG/1
650 SUPPOSITORY RECTAL EVERY 6 HOURS PRN
Status: DISCONTINUED | OUTPATIENT
Start: 2023-05-25 | End: 2023-05-27 | Stop reason: HOSPADM

## 2023-05-25 RX ORDER — FLUTICASONE PROPIONATE 50 MCG
2 SPRAY, SUSPENSION (ML) NASAL DAILY PRN
Status: DISCONTINUED | OUTPATIENT
Start: 2023-05-25 | End: 2023-05-27 | Stop reason: HOSPADM

## 2023-05-25 RX ORDER — SODIUM CHLORIDE 9 MG/ML
INJECTION, SOLUTION INTRAVENOUS PRN
Status: DISCONTINUED | OUTPATIENT
Start: 2023-05-25 | End: 2023-05-27 | Stop reason: HOSPADM

## 2023-05-25 RX ORDER — ACETAMINOPHEN 325 MG/1
650 TABLET ORAL EVERY 6 HOURS PRN
Status: DISCONTINUED | OUTPATIENT
Start: 2023-05-25 | End: 2023-05-27 | Stop reason: HOSPADM

## 2023-05-25 RX ORDER — ONDANSETRON 4 MG/1
4 TABLET, ORALLY DISINTEGRATING ORAL EVERY 8 HOURS PRN
Status: DISCONTINUED | OUTPATIENT
Start: 2023-05-25 | End: 2023-05-25

## 2023-05-25 RX ORDER — POTASSIUM CHLORIDE 20 MEQ/1
20 TABLET, EXTENDED RELEASE ORAL DAILY
COMMUNITY

## 2023-05-25 RX ORDER — SODIUM CHLORIDE 0.9 % (FLUSH) 0.9 %
10 SYRINGE (ML) INJECTION EVERY 12 HOURS SCHEDULED
Status: DISCONTINUED | OUTPATIENT
Start: 2023-05-25 | End: 2023-05-27 | Stop reason: HOSPADM

## 2023-05-25 RX ORDER — ATORVASTATIN CALCIUM 40 MG/1
80 TABLET, FILM COATED ORAL NIGHTLY
Status: DISCONTINUED | OUTPATIENT
Start: 2023-05-25 | End: 2023-05-27 | Stop reason: HOSPADM

## 2023-05-25 RX ORDER — SPIRONOLACTONE 25 MG/1
25 TABLET ORAL DAILY
Status: DISCONTINUED | OUTPATIENT
Start: 2023-05-26 | End: 2023-05-27 | Stop reason: HOSPADM

## 2023-05-25 RX ORDER — OXYCODONE HYDROCHLORIDE AND ACETAMINOPHEN 5; 325 MG/1; MG/1
1 TABLET ORAL 3 TIMES DAILY PRN
Status: DISCONTINUED | OUTPATIENT
Start: 2023-05-25 | End: 2023-05-27 | Stop reason: HOSPADM

## 2023-05-25 RX ORDER — CLOPIDOGREL BISULFATE 75 MG/1
75 TABLET ORAL DAILY
Status: DISCONTINUED | OUTPATIENT
Start: 2023-05-26 | End: 2023-05-27 | Stop reason: HOSPADM

## 2023-05-25 RX ORDER — CITALOPRAM 20 MG/1
40 TABLET ORAL DAILY
Status: DISCONTINUED | OUTPATIENT
Start: 2023-05-26 | End: 2023-05-27 | Stop reason: HOSPADM

## 2023-05-25 RX ORDER — ALBUTEROL SULFATE 2.5 MG/3ML
2.5 SOLUTION RESPIRATORY (INHALATION) EVERY 6 HOURS PRN
Status: DISCONTINUED | OUTPATIENT
Start: 2023-05-25 | End: 2023-05-27 | Stop reason: HOSPADM

## 2023-05-25 RX ORDER — PANTOPRAZOLE SODIUM 20 MG/1
20 TABLET, DELAYED RELEASE ORAL DAILY
Status: DISCONTINUED | OUTPATIENT
Start: 2023-05-26 | End: 2023-05-27 | Stop reason: HOSPADM

## 2023-05-25 RX ORDER — ASPIRIN 81 MG/1
81 TABLET, CHEWABLE ORAL DAILY
Status: DISCONTINUED | OUTPATIENT
Start: 2023-05-26 | End: 2023-05-27 | Stop reason: HOSPADM

## 2023-05-25 RX ORDER — HYDRALAZINE HYDROCHLORIDE 10 MG/1
10 TABLET, FILM COATED ORAL 2 TIMES DAILY
Status: ON HOLD | COMMUNITY
End: 2023-05-27 | Stop reason: HOSPADM

## 2023-05-25 RX ORDER — PREGABALIN 75 MG/1
150 CAPSULE ORAL 3 TIMES DAILY
Status: DISCONTINUED | OUTPATIENT
Start: 2023-05-25 | End: 2023-05-27 | Stop reason: HOSPADM

## 2023-05-25 RX ORDER — FERROUS SULFATE 325(65) MG
325 TABLET ORAL DAILY
COMMUNITY

## 2023-05-25 RX ORDER — FLUTICASONE PROPIONATE 50 MCG
2 SPRAY, SUSPENSION (ML) NASAL DAILY PRN
COMMUNITY

## 2023-05-25 RX ORDER — SENNA PLUS 8.6 MG/1
1 TABLET ORAL DAILY PRN
Status: DISCONTINUED | OUTPATIENT
Start: 2023-05-25 | End: 2023-05-27 | Stop reason: HOSPADM

## 2023-05-25 RX ORDER — METOPROLOL SUCCINATE 25 MG/1
25 TABLET, EXTENDED RELEASE ORAL DAILY
COMMUNITY

## 2023-05-25 RX ORDER — ENOXAPARIN SODIUM 100 MG/ML
40 INJECTION SUBCUTANEOUS DAILY
Status: DISCONTINUED | OUTPATIENT
Start: 2023-05-25 | End: 2023-05-27 | Stop reason: DRUGHIGH

## 2023-05-25 RX ORDER — SPIRONOLACTONE 25 MG/1
25 TABLET ORAL DAILY
COMMUNITY

## 2023-05-25 RX ADMIN — SACUBITRIL AND VALSARTAN 1 TABLET: 97; 103 TABLET, FILM COATED ORAL at 20:23

## 2023-05-25 RX ADMIN — ISOSORBIDE DINITRATE 10 MG: 10 TABLET ORAL at 20:17

## 2023-05-25 RX ADMIN — PREGABALIN 150 MG: 75 CAPSULE ORAL at 20:17

## 2023-05-25 RX ADMIN — Medication 10 ML: at 21:32

## 2023-05-25 RX ADMIN — HYDRALAZINE HYDROCHLORIDE 10 MG: 10 TABLET, FILM COATED ORAL at 20:17

## 2023-05-25 RX ADMIN — ENOXAPARIN SODIUM 40 MG: 100 INJECTION SUBCUTANEOUS at 20:23

## 2023-05-25 RX ADMIN — ATORVASTATIN CALCIUM 80 MG: 40 TABLET, FILM COATED ORAL at 20:17

## 2023-05-25 RX ADMIN — FERROUS SULFATE TAB 325 MG (65 MG ELEMENTAL FE) 325 MG: 325 (65 FE) TAB at 20:22

## 2023-05-25 RX ADMIN — OXYCODONE HYDROCHLORIDE AND ACETAMINOPHEN 1 TABLET: 5; 325 TABLET ORAL at 18:31

## 2023-05-25 ASSESSMENT — PAIN DESCRIPTION - ORIENTATION: ORIENTATION: LEFT;RIGHT

## 2023-05-25 ASSESSMENT — PAIN SCALES - GENERAL: PAINLEVEL_OUTOF10: 10

## 2023-05-25 ASSESSMENT — PAIN DESCRIPTION - LOCATION: LOCATION: HIP;LEG

## 2023-05-25 ASSESSMENT — PAIN - FUNCTIONAL ASSESSMENT: PAIN_FUNCTIONAL_ASSESSMENT: NONE - DENIES PAIN

## 2023-05-25 NOTE — CONSULTS
the patient appears compensated from a cardiovascular standpoint with no evidence of hypervolemia to account for the findings of her chest x-ray and with no evidence of present electrocardiographic abnormalities since her discharge and with reduction on a regular basis of both her troponin and proBNP levels compared to that of previous hospitalization. Presently, the continuation of her existing medical regimen at least on a short-term basis appears advisable with potential consideration of the discontinuation of her hydralazine and oral nitrates in the face of her existing high-dose sacubitril/valsartan and is appropriate modification of her beta-blocker dosage for stabilization of her coronary atherosclerosis as well as that of her previously identified cardiomyopathy. Careful monitoring for volume status will be necessary with additional suggestion of a procalcitonin level and the basis of her diffuse interstitial infiltrates in addition to that of a noncontrast CT scan and viral respiratory panel with potential additional benefits of a formal pulmonary assessment based on these findings. Ongoing appropriate lifestyle modification to achieve weight reduction will be further beneficial to diastolic components of heart failure management in addition to management of her obstructive sleep apnea will need to improve compliance with the use of nocturnal CPAP to reduce risk of adverse cardiovascular effects. Ongoing aggressive risk factor modification of blood pressure, diabetes and serum lipids remain essential to reducing risk of future atherosclerotic development. At time evaluation, her condition was extensively discussed with she and family by transtelephonic communication in addition to that of the emergency room staff.     I have participated in a substantive portion of the present encounter inclusive of a component of independent history and examination in addition to that of medical decision making

## 2023-05-25 NOTE — ED PROVIDER NOTES
Mycoplasma pneumoniae by PCR Not Detected Not Detected    Parainfluenza Virus 1 by PCR Not Detected Not Detected    Parainfluenza Virus 2 by PCR Not Detected Not Detected    Parainfluenza Virus 3 by PCR Not Detected Not Detected    Parainfluenza Virus 4 by PCR Not Detected Not Detected    Respiratory Syncytial Virus by PCR Not Detected Not Detected   CMP   Result Value Ref Range    Sodium 140 132 - 146 mmol/L    Potassium 4.8 3.5 - 5.0 mmol/L    Chloride 104 98 - 107 mmol/L    CO2 27 22 - 29 mmol/L    Anion Gap 9 7 - 16 mmol/L    Glucose 84 74 - 99 mg/dL    BUN 15 6 - 20 mg/dL    Creatinine 0.9 0.5 - 1.0 mg/dL    Est, Glom Filt Rate >60 >=60 mL/min/1.73    Calcium 9.2 8.6 - 10.2 mg/dL    Total Protein 7.0 6.4 - 8.3 g/dL    Albumin 3.8 3.5 - 5.2 g/dL    Total Bilirubin 0.5 0.0 - 1.2 mg/dL    Alkaline Phosphatase 104 35 - 104 U/L    ALT 17 0 - 32 U/L    AST 13 0 - 31 U/L   CBC with Auto Differential   Result Value Ref Range    WBC 9.5 4.5 - 11.5 E9/L    RBC 4.87 3.50 - 5.50 E12/L    Hemoglobin 15.2 11.5 - 15.5 g/dL    Hematocrit 47.0 34.0 - 48.0 %    MCV 96.5 80.0 - 99.9 fL    MCH 31.2 26.0 - 35.0 pg    MCHC 32.3 32.0 - 34.5 %    RDW 14.1 11.5 - 15.0 fL    Platelets 394 175 - 377 E9/L    MPV 11.9 7.0 - 12.0 fL    Neutrophils % 36.1 (L) 43.0 - 80.0 %    Immature Granulocytes % 0.2 0.0 - 5.0 %    Lymphocytes % 53.7 (H) 20.0 - 42.0 %    Monocytes % 5.9 2.0 - 12.0 %    Eosinophils % 3.4 0.0 - 6.0 %    Basophils % 0.7 0.0 - 2.0 %    Neutrophils Absolute 3.42 1.80 - 7.30 E9/L    Immature Granulocytes # 0.02 E9/L    Lymphocytes Absolute 5.09 (H) 1.50 - 4.00 E9/L    Monocytes Absolute 0.56 0.10 - 0.95 E9/L    Eosinophils Absolute 0.32 0.05 - 0.50 E9/L    Basophils Absolute 0.07 0.00 - 0.20 E9/L   Troponin   Result Value Ref Range    Troponin, High Sensitivity 100 (H) 0 - 9 ng/L   Magnesium   Result Value Ref Range    Magnesium 1.9 1.6 - 2.6 mg/dL   Brain Natriuretic Peptide   Result Value Ref Range    Pro- (H) 0 - 125

## 2023-05-25 NOTE — H&P
Alert and oriented, thought content appropriate, normal insight, flat affect      DATA:     Diagnostic tests reviewed for today's visit:    Most recent labs and imaging results reviewed.    Labs:   Recent Results (from the past 72 hour(s))   EKG 12 Lead    Collection Time: 05/25/23 12:53 PM   Result Value Ref Range    Ventricular Rate 65 BPM    Atrial Rate 65 BPM    P-R Interval 158 ms    QRS Duration 158 ms    Q-T Interval 514 ms    QTc Calculation (Bazett) 534 ms    P Axis 61 degrees    R Axis -63 degrees    T Axis 33 degrees   CMP    Collection Time: 05/25/23 12:56 PM   Result Value Ref Range    Sodium 140 132 - 146 mmol/L    Potassium 4.8 3.5 - 5.0 mmol/L    Chloride 104 98 - 107 mmol/L    CO2 27 22 - 29 mmol/L    Anion Gap 9 7 - 16 mmol/L    Glucose 84 74 - 99 mg/dL    BUN 15 6 - 20 mg/dL    Creatinine 0.9 0.5 - 1.0 mg/dL    Est, Glom Filt Rate >60 >=60 mL/min/1.73    Calcium 9.2 8.6 - 10.2 mg/dL    Total Protein 7.0 6.4 - 8.3 g/dL    Albumin 3.8 3.5 - 5.2 g/dL    Total Bilirubin 0.5 0.0 - 1.2 mg/dL    Alkaline Phosphatase 104 35 - 104 U/L    ALT 17 0 - 32 U/L    AST 13 0 - 31 U/L   CBC with Auto Differential    Collection Time: 05/25/23 12:56 PM   Result Value Ref Range    WBC 9.5 4.5 - 11.5 E9/L    RBC 4.87 3.50 - 5.50 E12/L    Hemoglobin 15.2 11.5 - 15.5 g/dL    Hematocrit 47.0 34.0 - 48.0 %    MCV 96.5 80.0 - 99.9 fL    MCH 31.2 26.0 - 35.0 pg    MCHC 32.3 32.0 - 34.5 %    RDW 14.1 11.5 - 15.0 fL    Platelets 981 134 - 703 E9/L    MPV 11.9 7.0 - 12.0 fL    Neutrophils % 36.1 (L) 43.0 - 80.0 %    Immature Granulocytes % 0.2 0.0 - 5.0 %    Lymphocytes % 53.7 (H) 20.0 - 42.0 %    Monocytes % 5.9 2.0 - 12.0 %    Eosinophils % 3.4 0.0 - 6.0 %    Basophils % 0.7 0.0 - 2.0 %    Neutrophils Absolute 3.42 1.80 - 7.30 E9/L    Immature Granulocytes # 0.02 E9/L    Lymphocytes Absolute 5.09 (H) 1.50 - 4.00 E9/L    Monocytes Absolute 0.56 0.10 - 0.95 E9/L    Eosinophils Absolute 0.32 0.05 - 0.50 E9/L    Basophils Absolute

## 2023-05-25 NOTE — TELEPHONE ENCOUNTER
Writer contacted ED provider Nasim David to inform of 30 day readmission risk. Writer's attempt to contact ED provider Nasim David was unsuccessful. No Decision on disposition at this time.       Call Back: If you need to call back to inform of disposition you can contact me at [phone]

## 2023-05-26 LAB
ALBUMIN SERPL-MCNC: 3.3 G/DL (ref 3.5–5.2)
ALP SERPL-CCNC: 94 U/L (ref 35–104)
ALT SERPL-CCNC: 14 U/L (ref 0–32)
ANION GAP SERPL CALCULATED.3IONS-SCNC: 13 MMOL/L (ref 7–16)
AST SERPL-CCNC: 13 U/L (ref 0–31)
BASOPHILS # BLD: 0.08 E9/L (ref 0–0.2)
BASOPHILS NFR BLD: 0.9 % (ref 0–2)
BILIRUB SERPL-MCNC: 0.5 MG/DL (ref 0–1.2)
BUN SERPL-MCNC: 17 MG/DL (ref 6–20)
CALCIUM SERPL-MCNC: 9 MG/DL (ref 8.6–10.2)
CHLORIDE SERPL-SCNC: 105 MMOL/L (ref 98–107)
CO2 SERPL-SCNC: 23 MMOL/L (ref 22–29)
CREAT SERPL-MCNC: 0.9 MG/DL (ref 0.5–1)
EOSINOPHIL # BLD: 0.36 E9/L (ref 0.05–0.5)
EOSINOPHIL NFR BLD: 4.1 % (ref 0–6)
ERYTHROCYTE [DISTWIDTH] IN BLOOD BY AUTOMATED COUNT: 14.2 FL (ref 11.5–15)
GLUCOSE SERPL-MCNC: 109 MG/DL (ref 74–99)
HCT VFR BLD AUTO: 45.4 % (ref 34–48)
HGB BLD-MCNC: 14.6 G/DL (ref 11.5–15.5)
IMM GRANULOCYTES # BLD: 0.03 E9/L
IMM GRANULOCYTES NFR BLD: 0.3 % (ref 0–5)
LV EF: 53 %
LVEF MODALITY: NORMAL
LYMPHOCYTES # BLD: 4 E9/L (ref 1.5–4)
LYMPHOCYTES NFR BLD: 45.5 % (ref 20–42)
MCH RBC QN AUTO: 31.3 PG (ref 26–35)
MCHC RBC AUTO-ENTMCNC: 32.2 % (ref 32–34.5)
MCV RBC AUTO: 97.2 FL (ref 80–99.9)
MONOCYTES # BLD: 0.56 E9/L (ref 0.1–0.95)
MONOCYTES NFR BLD: 6.4 % (ref 2–12)
NEUTROPHILS # BLD: 3.77 E9/L (ref 1.8–7.3)
NEUTS SEG NFR BLD: 42.8 % (ref 43–80)
PLATELET # BLD AUTO: 244 E9/L (ref 130–450)
PMV BLD AUTO: 11.9 FL (ref 7–12)
POTASSIUM SERPL-SCNC: 4.5 MMOL/L (ref 3.5–5)
PROT SERPL-MCNC: 6.9 G/DL (ref 6.4–8.3)
RBC # BLD AUTO: 4.67 E12/L (ref 3.5–5.5)
SODIUM SERPL-SCNC: 141 MMOL/L (ref 132–146)
TROPONIN, HIGH SENSITIVITY: 98 NG/L (ref 0–9)
WBC # BLD: 8.8 E9/L (ref 4.5–11.5)

## 2023-05-26 PROCEDURE — 2140000000 HC CCU INTERMEDIATE R&B

## 2023-05-26 PROCEDURE — 6360000002 HC RX W HCPCS: Performed by: INTERNAL MEDICINE

## 2023-05-26 PROCEDURE — 2580000003 HC RX 258: Performed by: INTERNAL MEDICINE

## 2023-05-26 PROCEDURE — 6370000000 HC RX 637 (ALT 250 FOR IP): Performed by: INTERNAL MEDICINE

## 2023-05-26 PROCEDURE — 80053 COMPREHEN METABOLIC PANEL: CPT

## 2023-05-26 PROCEDURE — 85025 COMPLETE CBC W/AUTO DIFF WBC: CPT

## 2023-05-26 PROCEDURE — 6360000004 HC RX CONTRAST MEDICATION

## 2023-05-26 PROCEDURE — 36415 COLL VENOUS BLD VENIPUNCTURE: CPT

## 2023-05-26 PROCEDURE — 93306 TTE W/DOPPLER COMPLETE: CPT

## 2023-05-26 PROCEDURE — 99233 SBSQ HOSP IP/OBS HIGH 50: CPT | Performed by: INTERNAL MEDICINE

## 2023-05-26 RX ORDER — METOPROLOL SUCCINATE 25 MG/1
12.5 TABLET, EXTENDED RELEASE ORAL 2 TIMES DAILY
Status: DISCONTINUED | OUTPATIENT
Start: 2023-05-26 | End: 2023-05-27

## 2023-05-26 RX ADMIN — METOPROLOL SUCCINATE 12.5 MG: 25 TABLET, EXTENDED RELEASE ORAL at 09:35

## 2023-05-26 RX ADMIN — ASPIRIN 81 MG 81 MG: 81 TABLET ORAL at 09:36

## 2023-05-26 RX ADMIN — PANTOPRAZOLE SODIUM 20 MG: 20 TABLET, DELAYED RELEASE ORAL at 09:35

## 2023-05-26 RX ADMIN — ATORVASTATIN CALCIUM 80 MG: 40 TABLET, FILM COATED ORAL at 20:46

## 2023-05-26 RX ADMIN — OXYCODONE HYDROCHLORIDE AND ACETAMINOPHEN 1 TABLET: 5; 325 TABLET ORAL at 13:06

## 2023-05-26 RX ADMIN — METOPROLOL SUCCINATE 12.5 MG: 25 TABLET, EXTENDED RELEASE ORAL at 20:45

## 2023-05-26 RX ADMIN — CLOPIDOGREL BISULFATE 75 MG: 75 TABLET ORAL at 09:35

## 2023-05-26 RX ADMIN — PREGABALIN 150 MG: 75 CAPSULE ORAL at 09:35

## 2023-05-26 RX ADMIN — CITALOPRAM HYDROBROMIDE 40 MG: 20 TABLET ORAL at 09:35

## 2023-05-26 RX ADMIN — SPIRONOLACTONE 25 MG: 25 TABLET ORAL at 09:35

## 2023-05-26 RX ADMIN — Medication 10 ML: at 09:36

## 2023-05-26 RX ADMIN — ENOXAPARIN SODIUM 40 MG: 100 INJECTION SUBCUTANEOUS at 09:35

## 2023-05-26 RX ADMIN — Medication 10 ML: at 20:47

## 2023-05-26 RX ADMIN — PREGABALIN 150 MG: 75 CAPSULE ORAL at 16:46

## 2023-05-26 RX ADMIN — OXYCODONE HYDROCHLORIDE AND ACETAMINOPHEN 1 TABLET: 5; 325 TABLET ORAL at 04:45

## 2023-05-26 RX ADMIN — SACUBITRIL AND VALSARTAN 1 TABLET: 97; 103 TABLET, FILM COATED ORAL at 09:35

## 2023-05-26 RX ADMIN — SACUBITRIL AND VALSARTAN 1 TABLET: 97; 103 TABLET, FILM COATED ORAL at 20:45

## 2023-05-26 RX ADMIN — PREGABALIN 150 MG: 75 CAPSULE ORAL at 20:44

## 2023-05-26 RX ADMIN — OXYCODONE HYDROCHLORIDE AND ACETAMINOPHEN 1 TABLET: 5; 325 TABLET ORAL at 20:46

## 2023-05-26 RX ADMIN — BUMETANIDE 2 MG: 1 TABLET ORAL at 09:36

## 2023-05-26 RX ADMIN — ACETAMINOPHEN 650 MG: 325 TABLET ORAL at 12:19

## 2023-05-26 RX ADMIN — FERROUS SULFATE TAB 325 MG (65 MG ELEMENTAL FE) 325 MG: 325 (65 FE) TAB at 09:36

## 2023-05-26 ASSESSMENT — PAIN DESCRIPTION - ORIENTATION
ORIENTATION: MID
ORIENTATION: LOWER
ORIENTATION: MID
ORIENTATION: RIGHT;LEFT

## 2023-05-26 ASSESSMENT — PAIN DESCRIPTION - LOCATION
LOCATION: GENERALIZED
LOCATION: BACK
LOCATION: GENERALIZED

## 2023-05-26 ASSESSMENT — PAIN SCALES - GENERAL
PAINLEVEL_OUTOF10: 10
PAINLEVEL_OUTOF10: 3
PAINLEVEL_OUTOF10: 10
PAINLEVEL_OUTOF10: 7

## 2023-05-26 ASSESSMENT — PAIN DESCRIPTION - DESCRIPTORS
DESCRIPTORS: ACHING;DISCOMFORT;SORE
DESCRIPTORS: ACHING;DISCOMFORT;THROBBING
DESCRIPTORS: ACHING;DISCOMFORT;THROBBING
DESCRIPTORS: ACHING;DISCOMFORT;SORE

## 2023-05-26 NOTE — PATIENT CARE CONFERENCE
P Quality Flow/Interdisciplinary Rounds Progress Note        Quality Flow Rounds held on May 26, 2023    Disciplines Attending:  Bedside Nurse, , , and Nursing Unit Leadership    Jemma Thorne was admitted on 5/25/2023 12:46 PM    Anticipated Discharge Date:       Disposition:    Alvin Score:  Alvin Scale Score: 20    Readmission Risk              Risk of Unplanned Readmission:  19           Discussed patient goal for the day, patient clinical progression, and barriers to discharge.   The following Goal(s) of the Day/Commitment(s) have been identified:  Labs - Report Results      Luis A Arvizu RN  May 26, 2023

## 2023-05-26 NOTE — HOME CARE
Patient current with [facility] for SN,PT/OT. Will need CALI orders if appropriate at discharge. Kamar Strickland LPN  [facility].

## 2023-05-26 NOTE — CARE COORDINATION
5/26/23  Spoke with patient regarding transition of care. Patient readmitted to the hospital by [facility] health care staff who noted patient to have a heart rate of 30. Patient is being followed by Cardiology. Patient had an ECHO today and is pending results. Patient is from home where she lives alone in and apartment. Patient is active with [facility] for PT/OT and SN and would like to resume services post discharge. Patient owns a cane and a walker. Patient is currently on room air. PCP is Dr. Judge Garrison and Pharmacy choice is [facility]. Discharge goal is home with Home Health. SW/CM to follow. Electronically signed by SYLVIE Rosales on 5/26/2023 at 1:17 PM     Case Management Assessment  Initial Evaluation    Date/Time of Evaluation: 5/26/2023 1:17 PM  Assessment Completed by: SYLVIE Rosales    If patient is discharged prior to next notation, then this note serves as note for discharge by case management. Patient Name: Van Moe                   YOB: 1975  Diagnosis: Bradycardia [R00.1]  Bigeminy [I49.8]  Pulmonary infiltrates [R91.8]                   Date / Time: 5/25/2023 12:46 PM    Patient Admission Status: Inpatient   Readmission Risk (Low < 19, Mod (19-27), High > 27): Readmission Risk Score: 20    Current PCP: Mary Grace Stroud PA-C  PCP verified by CM? Yes    Chart Reviewed: Yes      History Provided by: Patient  Patient Orientation: Alert and Oriented, Person, Place, Situation    Patient Cognition: Alert    Hospitalization in the last 30 days (Readmission):  Yes    If yes, Readmission Assessment in  Navigator will be completed.     Advance Directives:      Code Status: Full Code   Patient's Primary Decision Maker is:        Discharge Planning:    Patient lives with: Alone Type of Home: Apartment  Primary Care Giver: Self  Patient Support Systems include: Family Members   Current Financial resources:    Current community

## 2023-05-27 VITALS
SYSTOLIC BLOOD PRESSURE: 112 MMHG | DIASTOLIC BLOOD PRESSURE: 70 MMHG | WEIGHT: 287.2 LBS | OXYGEN SATURATION: 96 % | RESPIRATION RATE: 16 BRPM | BODY MASS INDEX: 43.53 KG/M2 | HEIGHT: 68 IN | TEMPERATURE: 97.5 F | HEART RATE: 64 BPM

## 2023-05-27 LAB
ANION GAP SERPL CALCULATED.3IONS-SCNC: 8 MMOL/L (ref 7–16)
BUN SERPL-MCNC: 20 MG/DL (ref 6–20)
CALCIUM SERPL-MCNC: 9.1 MG/DL (ref 8.6–10.2)
CHLORIDE SERPL-SCNC: 105 MMOL/L (ref 98–107)
CO2 SERPL-SCNC: 26 MMOL/L (ref 22–29)
CREAT SERPL-MCNC: 0.9 MG/DL (ref 0.5–1)
GLUCOSE SERPL-MCNC: 83 MG/DL (ref 74–99)
POTASSIUM SERPL-SCNC: 4.1 MMOL/L (ref 3.5–5)
SODIUM SERPL-SCNC: 139 MMOL/L (ref 132–146)

## 2023-05-27 PROCEDURE — 36415 COLL VENOUS BLD VENIPUNCTURE: CPT

## 2023-05-27 PROCEDURE — 99233 SBSQ HOSP IP/OBS HIGH 50: CPT | Performed by: INTERNAL MEDICINE

## 2023-05-27 PROCEDURE — 6370000000 HC RX 637 (ALT 250 FOR IP): Performed by: INTERNAL MEDICINE

## 2023-05-27 PROCEDURE — 6360000002 HC RX W HCPCS: Performed by: INTERNAL MEDICINE

## 2023-05-27 PROCEDURE — 2580000003 HC RX 258: Performed by: INTERNAL MEDICINE

## 2023-05-27 PROCEDURE — 80048 BASIC METABOLIC PNL TOTAL CA: CPT

## 2023-05-27 RX ORDER — METOPROLOL SUCCINATE 25 MG/1
25 TABLET, EXTENDED RELEASE ORAL 2 TIMES DAILY
Status: DISCONTINUED | OUTPATIENT
Start: 2023-05-27 | End: 2023-05-27 | Stop reason: HOSPADM

## 2023-05-27 RX ORDER — ENOXAPARIN SODIUM 100 MG/ML
30 INJECTION SUBCUTANEOUS 2 TIMES DAILY
Status: DISCONTINUED | OUTPATIENT
Start: 2023-05-28 | End: 2023-05-27 | Stop reason: HOSPADM

## 2023-05-27 RX ADMIN — OXYCODONE HYDROCHLORIDE AND ACETAMINOPHEN 1 TABLET: 5; 325 TABLET ORAL at 02:59

## 2023-05-27 RX ADMIN — PANTOPRAZOLE SODIUM 20 MG: 20 TABLET, DELAYED RELEASE ORAL at 08:36

## 2023-05-27 RX ADMIN — ASPIRIN 81 MG 81 MG: 81 TABLET ORAL at 08:36

## 2023-05-27 RX ADMIN — Medication 10 ML: at 08:37

## 2023-05-27 RX ADMIN — PREGABALIN 150 MG: 75 CAPSULE ORAL at 08:35

## 2023-05-27 RX ADMIN — METOPROLOL SUCCINATE 25 MG: 25 TABLET, EXTENDED RELEASE ORAL at 08:36

## 2023-05-27 RX ADMIN — SPIRONOLACTONE 25 MG: 25 TABLET ORAL at 08:36

## 2023-05-27 RX ADMIN — ENOXAPARIN SODIUM 40 MG: 100 INJECTION SUBCUTANEOUS at 08:35

## 2023-05-27 RX ADMIN — CLOPIDOGREL BISULFATE 75 MG: 75 TABLET ORAL at 08:36

## 2023-05-27 RX ADMIN — ACETAMINOPHEN 650 MG: 325 TABLET ORAL at 14:13

## 2023-05-27 RX ADMIN — SACUBITRIL AND VALSARTAN 1 TABLET: 97; 103 TABLET, FILM COATED ORAL at 08:35

## 2023-05-27 RX ADMIN — CITALOPRAM HYDROBROMIDE 40 MG: 20 TABLET ORAL at 08:35

## 2023-05-27 RX ADMIN — BUMETANIDE 1 MG: 1 TABLET ORAL at 08:36

## 2023-05-27 RX ADMIN — OXYCODONE HYDROCHLORIDE AND ACETAMINOPHEN 1 TABLET: 5; 325 TABLET ORAL at 10:53

## 2023-05-27 RX ADMIN — FERROUS SULFATE TAB 325 MG (65 MG ELEMENTAL FE) 325 MG: 325 (65 FE) TAB at 08:35

## 2023-05-27 RX ADMIN — ACETAMINOPHEN 650 MG: 325 TABLET ORAL at 06:57

## 2023-05-27 RX ADMIN — PREGABALIN 150 MG: 75 CAPSULE ORAL at 14:52

## 2023-05-27 ASSESSMENT — PAIN DESCRIPTION - FREQUENCY: FREQUENCY: CONTINUOUS

## 2023-05-27 ASSESSMENT — PAIN DESCRIPTION - ORIENTATION
ORIENTATION: MID
ORIENTATION: RIGHT;LEFT
ORIENTATION: RIGHT;LEFT
ORIENTATION: MID

## 2023-05-27 ASSESSMENT — PAIN SCALES - GENERAL
PAINLEVEL_OUTOF10: 8
PAINLEVEL_OUTOF10: 2
PAINLEVEL_OUTOF10: 10
PAINLEVEL_OUTOF10: 3
PAINLEVEL_OUTOF10: 7

## 2023-05-27 ASSESSMENT — PAIN DESCRIPTION - DESCRIPTORS
DESCRIPTORS: ACHING;DISCOMFORT;SORE
DESCRIPTORS: ACHING;DISCOMFORT;SORE
DESCRIPTORS: ACHING;DISCOMFORT;THROBBING
DESCRIPTORS: ACHING;DISCOMFORT;THROBBING

## 2023-05-27 ASSESSMENT — PAIN - FUNCTIONAL ASSESSMENT: PAIN_FUNCTIONAL_ASSESSMENT: PREVENTS OR INTERFERES SOME ACTIVE ACTIVITIES AND ADLS

## 2023-05-27 ASSESSMENT — PAIN DESCRIPTION - ONSET: ONSET: ON-GOING

## 2023-05-27 ASSESSMENT — PAIN DESCRIPTION - LOCATION
LOCATION: HIP;BACK
LOCATION: BACK
LOCATION: BACK;GENERALIZED
LOCATION: BACK;HIP

## 2023-05-27 ASSESSMENT — PAIN DESCRIPTION - PAIN TYPE: TYPE: CHRONIC PAIN;NEUROPATHIC PAIN

## 2023-05-27 NOTE — DISCHARGE SUMMARY
Hospital Medicine Discharge Summary    Patient ID: Alon Valencia      Patient's PCP: Michoacano Pennington PA-C    Admit Date: 5/25/2023     Discharge Date:   5/27/2023    Admitting Physician: Lenin Gauthier MD     Discharge Physician: Mirtha Perla MD     Discharge condition: Stable    Discharge Diagnoses: Active Hospital Problems    Diagnosis Date Noted    Bigeminy [I49.8] 05/25/2023    Bradycardia [R00.1] 05/25/2023    Coronary artery disease involving native coronary artery of native heart without angina pectoris [I25.10] 05/25/2023    Pure hypercholesterolemia [E78.00] 05/25/2023    Chronic obstructive pulmonary disease ([facility] [J44.9] 05/25/2023    Abnormal CXR (chest x-ray) [R93.89] 05/25/2023    Non-ST elevation myocardial infarction (NSTEMI) ([facility] [I21.4] 05/16/2023    Nonischemic [facility] [I42.8] 05/03/2020    Obstructive sleep apnea [G47.33] 01/29/2019    Ventricular ectopy [I49.3] 03/20/2018    Primary hypertension [I10] 03/27/2017       The patient was seen and examined on day of discharge and this discharge summary is in conjunction with any daily progress note from day of discharge. Hospital Course:   Patient recently admitted with NSTEMI and PCI presented to the ER after being sent by her home visiting nurse of bradycardia. Patient reported no symptoms with decreased heart rate. No fever chills chest pain nausea vomiting worsening leg swelling. Cardiology consulted. Limited echo ordered unchanged from previous echocardiogram.  Discontinue hydralazine and Imdur. Patient is cleared for discharge. Plan of care discussed in detail with patient. She will follow-up with primary care provider, cardiology outpatient. Exam:     /70   Pulse 64   Temp 97.5 °F (36.4 °C) (Temporal)   Resp 16   Ht 5' 8\" (1.727 m)   Wt 287 lb 3.2 oz (130.3 kg)   LMP 05/12/2023 (Exact Date)   SpO2 96%   BMI 43.67 kg/m²     General appearance:  Morbidly obese patient not in any

## 2023-05-27 NOTE — PLAN OF CARE
Problem: Chronic Conditions and Co-morbidities  Goal: Patient's chronic conditions and co-morbidity symptoms are monitored and maintained or improved  5/27/2023 0751 by Elizabeth Boston RN  Outcome: Progressing     Problem: Discharge Planning  Goal: Discharge to home or other facility with appropriate resources  5/27/2023 0751 by Elizabeth Boston RN  Outcome: Progressing     Problem: ABCDS Injury Assessment  Goal: Absence of physical injury  5/27/2023 0751 by Elizabeth Boston RN  Outcome: Progressing     Problem: Pain  Goal: Verbalizes/displays adequate comfort level or baseline comfort level  5/27/2023 0751 by Elizabeth Boston RN  Outcome: Progressing

## 2023-05-27 NOTE — PROGRESS NOTES
2 d echo completed along with 2 cc definity given by the rn NLowery RCS
DVT Prophylaxis Adjustment Policy (DVT Prophylaxis)     This patient is on DVT Prophylaxis medication that requires a dose adjustment      Date Body Weight IBW  Adjusted BW SCr  CrCl Dialysis status   5/27/2023 287 lb 3.2 oz (130.3 kg) Ideal body weight: 63.9 kg (140 lb 14 oz)  Adjusted ideal body weight: 90.5 kg (199 lb 6.5 oz) Serum creatinine: 0.9 mg/dL 05/27/23 5261  Estimated creatinine clearance: 112 mL/min N/a       Pharmacy has dose-adjusted the DVT Prophylaxis regimen to match   the recommendations from the following table        Ordered Medication:Lovenox 40mg daily    Order Changed/converted to: Lovenox 30mg BID      These changes were made per protocol according to the [facility] Pharmacist   Review for Appropriate Use and Automatic Dose Adjustments of   Subcutaneous Anticoagulants Policy     *Please note this dose may need readjusted if patient's condition changes. Please contact pharmacy with any questions regarding these changes.     [id number], [facility]  5/27/2023  1:50 PM
Dr. Dom Moran via [employer] regarding ECHO. Dr. Asa Juarez is deferring to primary for decision of dc. Dr. Asa Juarez stated ECHO and stress show no significant cardiac issues.
Garr Boeck made aware via [employer] of Pts 5 beats of Vtach. Pt asymptomatic resting and vitals are stable.
Hospitalist Progress Note      Synopsis: Patient admitted on 5/25/2023     Subjective    Patient seen at bedside. No acute events overnight. States that she was told about bradycardia by her home nurse. Asymptomatic. She currently denies any chest pain, palpitation, shortness of breath. Exam:  /66   Pulse 60   Temp 97.5 °F (36.4 °C) (Temporal)   Resp 18   Ht 5' 8\" (1.727 m)   Wt 289 lb 12.8 oz (131.5 kg)   LMP 05/12/2023 (Exact Date)   SpO2 95%   BMI 44.06 kg/m²   General appearance: Morbidly obese patient not in any obvious distress. HEENT: Pupils equal, round, and reactive to light. Conjunctivae/corneas clear. Neck: Supple. No jugular venous distention. Trachea midline. Respiratory: Clear on auscultation. .  Cardiovascular: RRR, heart sounds 1 and 2 only. Abdomen: Soft, non-tender, non-distended with normal bowel sounds. Musculoskeletal: No clubbing, cyanosis or edema bilaterally. Brisk capillary refill. 2+ lower extremity pulses (dorsalis pedis).    Skin:  No rashes    Neurologic: awake, alert and following commands     Medications:  Reviewed    Infusion Medications    sodium chloride       Scheduled Medications    metoprolol succinate  12.5 mg Oral BID    aspirin  81 mg Oral Daily    atorvastatin  80 mg Oral Nightly    [START ON 5/27/2023] bumetanide  1 mg Oral Every Other Day    clopidogrel  75 mg Oral Daily    citalopram  40 mg Oral Daily    bumetanide  2 mg Oral Every Other Day    ferrous sulfate  325 mg Oral Daily    pantoprazole  20 mg Oral Daily    pregabalin  150 mg Oral TID    sacubitril-valsartan  1 tablet Oral BID    spironolactone  25 mg Oral Daily    sodium chloride flush  10 mL IntraVENous 2 times per day    enoxaparin  40 mg SubCUTAneous Daily     PRN Meds: albuterol, fluticasone, oxyCODONE-acetaminophen, sodium chloride flush, sodium chloride, senna, bisacodyl, acetaminophen **OR** acetaminophen, perflutren lipid microspheres    I/O    Intake/Output Summary (Last 24
INPATIENT CARDIOLOGY FOLLOW-UP    Name: Alon Valencia    Age: 57 y.o. Date of Admission: 5/25/2023 12:46 PM    Date of Service: 5/27/2023    Chief Complaint: Follow-up for nonischemic cardiomyopathy, coronary atherosclerosis, non-ST elevation myocardial infarction, chronic combined systolic diastolic heart failure, chronic obstructive lung disease, hypertension, ventricular tachycardia, morbid obesity, obstructive sleep apnea    Interim History: The patient present relates exclusively on cardiac symptomatology with discomfort of her lower extremities and feet. She denies dyspnea in spite of her previously related pulmonary infiltrates and no documentation of hypoxia present. Her echocardiogram demonstrates regional wall motion abnormalities as previously noted upon personal review with left ventricular systolic function at the lower limits of normal.  Arrhythmia monitoring demonstrates persistent ventricular ectopy inclusive of a single episode of asymptomatic transient nonsustained ventricular tachycardia. Review of Systems: The remainder of a complete multisystem review including consitutional, central nervous, respiratory, circulatory, gastrointestinal, genitourinary, endocrinologic, hematologic, musculoskeletal and psychiatric are negative.     Problem List:  Patient Active Problem List   Diagnosis    Primary hypertension    Mild intermittent asthma without complication    Morbid obesity ([address]    Reactive depression    Anxiety    Pyelonephritis    Ureteric stone    Tobacco dependence    Hydronephrosis with urinary obstruction due to renal calculus    Pancreatic cyst    Ventricular ectopy    Hyperkalemia    Spinal stenosis of lumbar region with neurogenic claudication    Primary osteoarthritis of both hips    Gastroesophageal reflux disease without esophagitis    Vitamin D deficiency    Diabetes mellitus ([address]    Hyperlipidemia LDL goal <70    Obstructive sleep apnea    Chronic pain
(134.7 kg)   05/03/23 299 lb (135.6 kg)     The patient is awake, alert and in no discomfort or distress. No gross musculoskeletal deformity is present. No significant skin or nail changes are present. Gross examination of head, eyes, nose and throat are negative. Jugular venous pressure is normal and no carotid bruits are present. Normal respiratory effort is noted with no accessory muscle usage present. Lung fields are clear to ascultation. Cardiac examination is notable for a regular rate and rhythm with occasional no palpable thrill. No gallop rhythm or cardiac murmur are identified. A benign abdominal examination is present with the exception of obesity and no masses or organomegaly. Intact pulses are present throughout all extremities and no peripheral edema is present. No focal neurologic deficits are present. Intake/Output:    Intake/Output Summary (Last 24 hours) at 5/26/2023 0547  Last data filed at 5/25/2023 2320  Gross per 24 hour   Intake 480 ml   Output 0 ml   Net 480 ml     I/O this shift: In: 480 [P.O.:480]  Out: 0     Laboratory Tests:  Lab Results   Component Value Date    CREATININE 0.9 05/25/2023    BUN 15 05/25/2023     05/25/2023    K 4.8 05/25/2023     05/25/2023    CO2 27 05/25/2023     No results for input(s): CKTOTAL, CKMB in the last 72 hours.     Invalid input(s): TROPONONI  No results found for: BNP  Lab Results   Component Value Date/Time    WBC 9.5 05/25/2023 12:56 PM    RBC 4.87 05/25/2023 12:56 PM    HGB 15.2 05/25/2023 12:56 PM    HCT 47.0 05/25/2023 12:56 PM    MCV 96.5 05/25/2023 12:56 PM    MCH 31.2 05/25/2023 12:56 PM    MCHC 32.3 05/25/2023 12:56 PM    RDW 14.1 05/25/2023 12:56 PM     05/25/2023 12:56 PM    MPV 11.9 05/25/2023 12:56 PM     Recent Labs     05/25/23  1256   ALKPHOS 104   ALT 17   AST 13   PROT 7.0   BILITOT 0.5   LABALBU 3.8     Lab Results   Component Value Date/Time    MG 1.9 05/25/2023 12:56 PM     Lab Results   Component Value Date/Time

## 2023-05-27 NOTE — DISCHARGE INSTRUCTIONS
Pt is stable and will be discharged today  Please note that hydralazine and imdur were discontinued  Please continue other medications as prescribed  PLEASE TAKE METOPROLOL XL DAILY NOT BID  Please follow up with Cardiology as scheduled  Please follow up PCP on discharge  Please go to the nearest ER for worsening of symptoms

## 2023-05-31 ENCOUNTER — HOSPITAL ENCOUNTER (OUTPATIENT)
Dept: OTHER | Age: 48
Setting detail: THERAPIES SERIES
Discharge: HOME | End: 2023-05-31
Payer: MEDICAID

## 2023-05-31 ENCOUNTER — OFFICE VISIT (OUTPATIENT)
Dept: PRIMARY CARE CLINIC | Age: 48
End: 2023-05-31
Payer: MEDICAID

## 2023-05-31 VITALS
DIASTOLIC BLOOD PRESSURE: 73 MMHG | RESPIRATION RATE: 16 BRPM | SYSTOLIC BLOOD PRESSURE: 128 MMHG | OXYGEN SATURATION: 95 % | HEART RATE: 63 BPM | WEIGHT: 289 LBS | BODY MASS INDEX: 43.94 KG/M2

## 2023-05-31 VITALS
DIASTOLIC BLOOD PRESSURE: 88 MMHG | SYSTOLIC BLOOD PRESSURE: 138 MMHG | RESPIRATION RATE: 17 BRPM | HEIGHT: 68 IN | BODY MASS INDEX: 43.95 KG/M2 | TEMPERATURE: 98 F | WEIGHT: 290 LBS | HEART RATE: 93 BPM | OXYGEN SATURATION: 98 %

## 2023-05-31 DIAGNOSIS — I49.8 BIGEMINY: ICD-10-CM

## 2023-05-31 DIAGNOSIS — I21.4 NON-ST ELEVATION MYOCARDIAL INFARCTION (NSTEMI) (HCC): Primary | ICD-10-CM

## 2023-05-31 DIAGNOSIS — I50.22 CHRONIC SYSTOLIC HEART FAILURE (HCC): ICD-10-CM

## 2023-05-31 DIAGNOSIS — L73.2 HIDRADENITIS SUPPURATIVA: ICD-10-CM

## 2023-05-31 DIAGNOSIS — I42.9 CARDIOMYOPATHY, UNSPECIFIED TYPE (HCC): ICD-10-CM

## 2023-05-31 LAB
ANION GAP SERPL CALCULATED.3IONS-SCNC: 10 MMOL/L (ref 7–16)
BNP BLD-MCNC: 471 PG/ML (ref 0–125)
BUN SERPL-MCNC: 16 MG/DL (ref 6–20)
CALCIUM SERPL-MCNC: 9.2 MG/DL (ref 8.6–10.2)
CHLORIDE SERPL-SCNC: 104 MMOL/L (ref 98–107)
CO2 SERPL-SCNC: 26 MMOL/L (ref 22–29)
CREAT SERPL-MCNC: 0.9 MG/DL (ref 0.5–1)
GLUCOSE SERPL-MCNC: 107 MG/DL (ref 74–99)
POTASSIUM SERPL-SCNC: 4.5 MMOL/L (ref 3.5–5)
SODIUM SERPL-SCNC: 140 MMOL/L (ref 132–146)

## 2023-05-31 PROCEDURE — 36415 COLL VENOUS BLD VENIPUNCTURE: CPT

## 2023-05-31 PROCEDURE — 99204 OFFICE O/P NEW MOD 45 MIN: CPT

## 2023-05-31 PROCEDURE — 80048 BASIC METABOLIC PNL TOTAL CA: CPT

## 2023-05-31 PROCEDURE — 83880 ASSAY OF NATRIURETIC PEPTIDE: CPT

## 2023-05-31 PROCEDURE — 3074F SYST BP LT 130 MM HG: CPT | Performed by: PHYSICIAN ASSISTANT

## 2023-05-31 PROCEDURE — 99214 OFFICE O/P EST MOD 30 MIN: CPT | Performed by: PHYSICIAN ASSISTANT

## 2023-05-31 PROCEDURE — 3078F DIAST BP <80 MM HG: CPT | Performed by: PHYSICIAN ASSISTANT

## 2023-05-31 RX ORDER — SULFAMETHOXAZOLE AND TRIMETHOPRIM 800; 160 MG/1; MG/1
1 TABLET ORAL 2 TIMES DAILY
Qty: 20 TABLET | Refills: 0 | Status: SHIPPED | OUTPATIENT
Start: 2023-05-31 | End: 2023-06-10

## 2023-05-31 NOTE — PROGRESS NOTES
Congestive Heart Failure [address] [facility]   1975      Referring Provider: -  Primary Care Physician: Dr. Adriana Quezada  Cardiologist:  Dr. Elsie Arevalo / Ruben Jones  Nephrologist: n/a      History of Present Illness:     Mitul Andersen is a 57 y.o. female with a history of HFpEF, most recent EF 50-55%, 10/4/21. Patient Story:    She does  have some dyspnea with exertion, shortness of breath, or decline in overall functional capacity. She does not have orthopnea, PND, nocturnal cough or hemoptysis. She does not have abdominal distention or bloating, early satiety, anorexia/change in appetite. She does have a good urinary response to  oral diuretic. She does not have  lower extremity edema. She denies lightheadedness, dizziness. She denies palpitations, syncope or near syncope. She does not complain of chest pain, pressure, discomfort. Allergies   Allergen Reactions    Food Anaphylaxis     Food allergy - Fresh Water Fish, [employer] and Tree Nuts    Ultram [Tramadol Hcl]      Per pt had a seizure    Fish-Derived Products     Norco [Hydrocodone-Acetaminophen] Hives    Pcn [Penicillins]      Not known    Cashews [Macadamia Nut Oil] Nausea And Vomiting         No outpatient medications have been marked as taking for the 5/31/23 encounter [facility] HOSPITAL Encounter) with [facility] ROOM 1.      Current Outpatient Medications on File Prior to Encounter   Medication Sig Dispense Refill    fluticasone (FLONASE) 50 MCG/ACT nasal spray 2 sprays by Nasal route daily as needed for Rhinitis      ferrous sulfate (IRON 325) 325 (65 Fe) MG tablet Take 1 tablet by mouth daily      bumetanide (BUMEX) 1 MG tablet Take 1 tablet by mouth every other day      bumetanide (BUMEX) 1 MG tablet Take 2 tablets by mouth every other day      mineral oil-hydrophilic petrolatum (AQUAPHOR) ointment Apply topically as needed for Dry Skin      mometasone-formoterol (DULERA)

## 2023-05-31 NOTE — RESULT ENCOUNTER NOTE
Labs and CHF clinic note reviewed  Please have her start SGLT2i 10 mg   Follow up as scheduled    Thank you

## 2023-05-31 NOTE — PLAN OF CARE
Problem: Chronic Conditions and Co-morbidities  Goal: Patient's chronic conditions and co-morbidity symptoms are monitored and maintained or improved  Flowsheets (Taken 5/31/2023 0212)  Care Plan - Patient's Chronic Conditions and Co-Morbidity Symptoms are Monitored and Maintained or Improved: Monitor and assess patient's chronic conditions and comorbid symptoms for stability, deterioration, or improvement

## 2023-05-31 NOTE — PROGRESS NOTES
not in acute distress. Appearance: Normal appearance. She is well-developed. She is obese. HENT:      Head: Normocephalic and atraumatic. Right Ear: External ear normal.      Left Ear: External ear normal.      Nose: Nose normal.   Eyes:      General: No scleral icterus. Conjunctiva/sclera: Conjunctivae normal.      Pupils: Pupils are equal, round, and reactive to light. Neck:      Thyroid: No thyromegaly. Cardiovascular:      Rate and Rhythm: Normal rate and regular rhythm. Heart sounds: Normal heart sounds. No murmur heard. Pulmonary:      Effort: Pulmonary effort is normal. No accessory muscle usage or respiratory distress. Breath sounds: Normal breath sounds. No wheezing. Musculoskeletal:         General: Normal range of motion. Cervical back: Normal range of motion and neck supple. Skin:     General: Skin is warm and dry. Findings: No rash. Neurological:      Mental Status: She is alert and oriented to person, place, and time. Deep Tendon Reflexes: Reflexes are normal and symmetric. Psychiatric:         Mood and Affect: Mood and affect normal.         Speech: Speech normal.         Behavior: Behavior normal.       Assessment/Plan:      Lucille Navarrete was seen today for follow-up from hospital.    Diagnoses and all orders for this visit:    Non-ST elevation myocardial infarction (NSTEMI) ([facility]  -doing well, records reviewed, meds reviewed    Cardiomyopathy, unspecified type ([facility] [address]  -going to chf clinic    Chronic systolic heart failure (HCC)    Bigeminy    Hidradenitis suppurativa  -     sulfamethoxazole-trimethoprim (BACTRIM DS) 800-160 MG per tablet; Take 1 tablet by mouth 2 times daily for 10 days      As above. Call or go to ED immediately if symptoms worsen or persist.  Return in about 4 weeks (around 6/28/2023). , or sooner if necessary.       Educational materials and/or home exercises printed for patient's review and were included in patient instructions on

## 2023-06-01 ENCOUNTER — TELEPHONE (OUTPATIENT)
Dept: PRIMARY CARE CLINIC | Age: 48
End: 2023-06-01

## 2023-06-01 RX ORDER — DIPHENHYDRAMINE HYDROCHLORIDE 25 MG/1
CAPSULE, LIQUID FILLED ORAL
COMMUNITY

## 2023-06-05 ENCOUNTER — TELEPHONE (OUTPATIENT)
Dept: OTHER | Age: 48
End: 2023-06-05

## 2023-06-05 DIAGNOSIS — I50.22 CHRONIC SYSTOLIC HEART FAILURE (HCC): Primary | ICD-10-CM

## 2023-06-05 DIAGNOSIS — Z79.899 NEW MEDICATION ADDED: ICD-10-CM

## 2023-06-05 NOTE — TELEPHONE ENCOUNTER
she was called with MIKIE Hills CNP instructions regarding starting Jardiance 10mg daily. Questions answered. She will ask CHF Clinic 6/9 /23 for education handout on the Jardiance and meet with pharmacist for education.             Echo 5/26/23   with 50-55% EF                Future Appointments   Date Time Provider Jesus Mcfarland   6/9/2023  1:15 PM [facility] ROOM 1 [facility]   6/13/2023 11:45 AM GREGORIO Kathleen BDCONCEPCION PAIN MAR [facility]   6/23/2023  9:15 AM SANJUANA Jones CNP [facility]   6/28/2023 11:00 AM [facility] ECHO RM 3 SEYZ [facility]   6/30/2023 11:45 AM Lara Gonzalez PA-C TRAIL PC [facility]   7/13/2023 10:00 AM Agustin Curran MD [address] [facility]

## 2023-06-05 NOTE — TELEPHONE ENCOUNTER
----- Message from SANJUANA Perry CNP sent at 5/31/2023  2:28 PM EDT -----  Labs and CHF clinic note reviewed  Please have her start SGLT2i 10 mg   Follow up as scheduled    Thank you

## 2023-06-06 ENCOUNTER — TELEPHONE (OUTPATIENT)
Dept: CARDIOLOGY CLINIC | Age: 48
End: 2023-06-06

## 2023-06-06 ENCOUNTER — TELEPHONE (OUTPATIENT)
Dept: PAIN MANAGEMENT | Age: 48
End: 2023-06-06

## 2023-06-06 NOTE — PROGRESS NOTES
WEIGHT:  Weight: (!) 381 lb (172.8 kg)      Pt was in th office for Her pre-surgical weight Loss appointment. Pt lost 10 lbs since Her last appointment and has lost 1% of her EBW. Pt is down 3 lbs since starting. Rd / Ld educated the pt on diet and lifestyle changes needed for bariatric surgery. Pt is aware She must meet Her pre-op weight loss goal of 371 lb, per Dr Beltran Needs, in order to proceed with weight loss surgery. Rd / Ld faxed a copy of what was reviewed with the pt to her PCP. Pt verbalized understanding. Rd / Ld reviewed insurance company weight loss requirement on 4/29/19. Pt verbalized understanding. Please be aware at each visit you have been instructed that in order for your insurance company to approve your surgery you must show a consistent weight loss of 2 lbs or greater at each visit. We can not guarantee an approval by your insurance company we can only provide the information given to us it is up to you the patient to show compliancy to your insurance company.   If you do gain weight during your supervised weight loss counseling sessions insurance companies starting in 2018 are denying patients for not showing consistent weight loss results when part of a supervised weight loss counseling program. 0.14

## 2023-06-06 NOTE — TELEPHONE ENCOUNTER
Dr. Heydi Villanueva note faxed to [facility] at [address] [facility] on 6/6/2023.

## 2023-06-06 NOTE — TELEPHONE ENCOUNTER
Covering for Dr. Pankaj Myers. Patient had an NSTEMI and had 2 overlapping drug-eluting stents to the RCA on 5/17/2023. It is not safe to be off aspirin or clopidogrel right now for any duration, and would recommend waiting 6 months after PCI for elective procedures if both need to be discontinued.

## 2023-06-06 NOTE — TELEPHONE ENCOUNTER
Patient to have lumbar epidural steroid injection by Dr. Chanel Zarate. Delores Reid, from his office, is asking how long before it is safe for patient to be office Plavix and aspirin since she just had stent placement on 5/16/2023. Please advise.

## 2023-06-08 DIAGNOSIS — I42.9 CARDIOMYOPATHY, UNSPECIFIED TYPE (HCC): ICD-10-CM

## 2023-06-08 RX ORDER — SACUBITRIL AND VALSARTAN 97; 103 MG/1; MG/1
TABLET, FILM COATED ORAL
Qty: 60 TABLET | Refills: 5 | Status: SHIPPED | OUTPATIENT
Start: 2023-06-08

## 2023-06-08 NOTE — TELEPHONE ENCOUNTER
I would defer this until her upcoming appt (scheduled with Tressa Petty). We did some medication changes at her last visit and the results of that will need to be discussed. She cannot hold the blood thinners for a procedure for one year's time since she had stent placement. Temo Hammond.     Macie Brennan, DO

## 2023-06-09 ENCOUNTER — HOSPITAL ENCOUNTER (OUTPATIENT)
Dept: OTHER | Age: 48
Setting detail: THERAPIES SERIES
Discharge: HOME | End: 2023-06-09
Payer: MEDICAID

## 2023-06-09 VITALS
BODY MASS INDEX: 43.33 KG/M2 | WEIGHT: 285 LBS | SYSTOLIC BLOOD PRESSURE: 109 MMHG | HEART RATE: 64 BPM | RESPIRATION RATE: 18 BRPM | OXYGEN SATURATION: 95 % | DIASTOLIC BLOOD PRESSURE: 53 MMHG

## 2023-06-09 LAB
ANION GAP SERPL CALCULATED.3IONS-SCNC: 7 MMOL/L (ref 7–16)
BNP BLD-MCNC: 330 PG/ML (ref 0–125)
BUN SERPL-MCNC: 13 MG/DL (ref 6–20)
CALCIUM SERPL-MCNC: 9.2 MG/DL (ref 8.6–10.2)
CHLORIDE SERPL-SCNC: 103 MMOL/L (ref 98–107)
CO2 SERPL-SCNC: 29 MMOL/L (ref 22–29)
CREAT SERPL-MCNC: 1 MG/DL (ref 0.5–1)
GLUCOSE SERPL-MCNC: 85 MG/DL (ref 74–99)
POTASSIUM SERPL-SCNC: 4.6 MMOL/L (ref 3.5–5)
SODIUM SERPL-SCNC: 139 MMOL/L (ref 132–146)

## 2023-06-09 PROCEDURE — 99214 OFFICE O/P EST MOD 30 MIN: CPT

## 2023-06-09 PROCEDURE — 83880 ASSAY OF NATRIURETIC PEPTIDE: CPT

## 2023-06-09 PROCEDURE — 80048 BASIC METABOLIC PNL TOTAL CA: CPT

## 2023-06-09 PROCEDURE — 36415 COLL VENOUS BLD VENIPUNCTURE: CPT

## 2023-06-09 NOTE — PLAN OF CARE
Problem: Chronic Conditions and Co-morbidities  Goal: Patient's chronic conditions and co-morbidity symptoms are monitored and maintained or improved  Flowsheets (Taken 6/9/2023 7063)  Care Plan - Patient's Chronic Conditions and Co-Morbidity Symptoms are Monitored and Maintained or Improved: Monitor and assess patient's chronic conditions and comorbid symptoms for stability, deterioration, or improvement

## 2023-06-09 NOTE — PROGRESS NOTES
medications with patient, educated on importance of compliance and answered any questions regarding their medication  Educated on signs and symptoms of HF  Educated on low sodium diet    PLAN:  Scheduled to follow up in CHF clinic on   Future Appointments   Date Time Provider Jesus Mcfarland   6/13/2023 11:45 AM GREGORIO Galvin BDM PAIN [facility]   6/23/2023  9:15 AM SANJUANA Williamson - CNP [facility]   6/28/2023 11:00 AM [facility] HAM ECHO [facility]   6/30/2023 11:45 AM Watson Keen PA-C TRAIL PC [facility]   6/30/2023 12:15 PM [facility] CHF ROOM 1 [facility]   7/13/2023 10:00 AM Jenny Curran MD [address] [facility]     Given clinic phone number and aware of signs and symptoms to call with any HF change in symptoms.

## 2023-06-15 PROBLEM — R79.89 ELEVATED TROPONIN: Status: RESOLVED | Noted: 2023-05-16 | Resolved: 2023-06-15

## 2023-06-15 PROBLEM — R77.8 ELEVATED TROPONIN: Status: RESOLVED | Noted: 2023-05-16 | Resolved: 2023-06-15

## 2023-06-19 ASSESSMENT — EJECTION FRACTION
EF_SOURCE: 2D ECHO
EF_VALUE: 55-60

## 2023-06-19 NOTE — DISCHARGE INSTRUCTIONS
HEART FAILURE ZONES   GREEN ZONE: All Clear- Your Symptoms Are Under Control    No shortness of breath   No weight gain of 3 pounds in 1 day or 5 pounds in 1 week   No increase in your usual amount of swelling   No chest pain    No decreased ability to maintain usual activity                                         This Means You Should:   Continue taking your medications as prescribed   Continue daily weights   Continue low salt diet   Keep all doctor appointments   YELLOW ZONE: Caution   Weight increases 3 pounds in 1 day or 5 pounds in 1 week   Increased cough, especially at night  Increased shortness of breath with activity   Increased shortness of breath laying down   Have any wheezing or chest tightness at rest  Need to sleep sitting in a chair or require more pillows when lying down  Increased swelling in feet, ankles, or legs   Feeling more tired or lack of energy   Uneasy feeling or that something is wrong   This Means You Should:   Call your doctor for further instructions  Dr Jovani An [phone]   RED ZONE: Medical Alert   Unrelieved shortness of breath with rest   Unrelieved chest pain   Confusion or you can't think clearly Fainting  Fainting   This Means You Should Call 911 Immediately                            Heart Failure: Care Instructions  Overview     Heart failure occurs when your heart does not pump as much blood as the body needs. Failure does not mean that the heart has stopped pumping but rather that it is not pumping as well as it should. Over time, this causes fluid buildup in your lungs and other parts of your body. Fluid buildup can cause shortness of breath, fatigue, swollen ankles, and other problems. Heart failure is treated with medicines, a heart-healthy lifestyle, and the steps you take to check your symptoms. Treatment can slow the disease, help you feel better, and help keep you out of the hospital. Treatment may also help you live longer.   Follow-up care is a key part of

## 2023-06-20 DIAGNOSIS — I42.9 CARDIOMYOPATHY, UNSPECIFIED TYPE (HCC): ICD-10-CM

## 2023-06-20 DIAGNOSIS — L02.211 ABSCESS OF ABDOMINAL WALL: ICD-10-CM

## 2023-06-20 RX ORDER — ASPIRIN 81 MG/1
TABLET, CHEWABLE ORAL
Qty: 30 TABLET | Refills: 5 | Status: SHIPPED | OUTPATIENT
Start: 2023-06-20

## 2023-06-20 RX ORDER — CLINDAMYCIN HYDROCHLORIDE 300 MG/1
CAPSULE ORAL
Qty: 21 CAPSULE | Refills: 0 | Status: SHIPPED | OUTPATIENT
Start: 2023-06-20

## 2023-06-22 RX ORDER — CLOPIDOGREL BISULFATE 75 MG/1
75 TABLET ORAL DAILY
Qty: 30 TABLET | Refills: 5 | Status: SHIPPED | OUTPATIENT
Start: 2023-06-22

## 2023-06-23 ENCOUNTER — HOSPITAL ENCOUNTER (OUTPATIENT)
Dept: OTHER | Age: 48
Setting detail: THERAPIES SERIES
End: 2023-06-23
Payer: MEDICAID

## 2023-06-23 RX ORDER — METOPROLOL SUCCINATE 25 MG/1
25 TABLET, EXTENDED RELEASE ORAL DAILY
Qty: 30 TABLET | Refills: 3 | Status: SHIPPED | OUTPATIENT
Start: 2023-06-23

## 2023-06-23 NOTE — PLAN OF CARE
Problem: Chronic Conditions and Co-morbidities  Goal: Patient's chronic conditions and co-morbidity symptoms are monitored and maintained or improved  Flowsheets (Taken 6/23/2023 7411)  Care Plan - Patient's Chronic Conditions and Co-Morbidity Symptoms are Monitored and Maintained or Improved: Monitor and assess patient's chronic conditions and comorbid symptoms for stability, deterioration, or improvement

## 2023-06-30 ENCOUNTER — TELEMEDICINE (OUTPATIENT)
Dept: PRIMARY CARE CLINIC | Age: 48
End: 2023-06-30
Payer: MEDICAID

## 2023-06-30 ENCOUNTER — PATIENT MESSAGE (OUTPATIENT)
Dept: PAIN MANAGEMENT | Age: 48
End: 2023-06-30

## 2023-06-30 ENCOUNTER — APPOINTMENT (OUTPATIENT)
Dept: OTHER | Age: 48
End: 2023-06-30
Payer: MEDICAID

## 2023-06-30 DIAGNOSIS — B37.31 YEAST VAGINITIS: ICD-10-CM

## 2023-06-30 DIAGNOSIS — F41.9 ANXIETY: ICD-10-CM

## 2023-06-30 DIAGNOSIS — I50.22 CHRONIC SYSTOLIC HEART FAILURE (HCC): ICD-10-CM

## 2023-06-30 DIAGNOSIS — R05.1 ACUTE COUGH: ICD-10-CM

## 2023-06-30 DIAGNOSIS — F32.1 CURRENT MODERATE EPISODE OF MAJOR DEPRESSIVE DISORDER WITHOUT PRIOR EPISODE (HCC): ICD-10-CM

## 2023-06-30 DIAGNOSIS — L02.211 ABSCESS OF ABDOMINAL WALL: Primary | ICD-10-CM

## 2023-06-30 DIAGNOSIS — K21.9 GASTROESOPHAGEAL REFLUX DISEASE WITHOUT ESOPHAGITIS: ICD-10-CM

## 2023-06-30 DIAGNOSIS — J45.20 MILD INTERMITTENT ASTHMA WITHOUT COMPLICATION: ICD-10-CM

## 2023-06-30 DIAGNOSIS — L73.2 HIDRADENITIS: ICD-10-CM

## 2023-06-30 PROCEDURE — 99214 OFFICE O/P EST MOD 30 MIN: CPT | Performed by: PHYSICIAN ASSISTANT

## 2023-06-30 RX ORDER — CETIRIZINE HYDROCHLORIDE 10 MG/1
10 TABLET ORAL DAILY
Qty: 30 TABLET | Refills: 5 | Status: SHIPPED | OUTPATIENT
Start: 2023-06-30

## 2023-06-30 RX ORDER — DEXTROMETHORPHAN HYDROBROMIDE AND PROMETHAZINE HYDROCHLORIDE 15; 6.25 MG/5ML; MG/5ML
SYRUP ORAL
Qty: 240 ML | Refills: 1 | Status: SHIPPED | OUTPATIENT
Start: 2023-06-30

## 2023-06-30 RX ORDER — PANTOPRAZOLE SODIUM 20 MG/1
20 TABLET, DELAYED RELEASE ORAL DAILY
Qty: 90 TABLET | Refills: 1 | Status: SHIPPED | OUTPATIENT
Start: 2023-06-30

## 2023-06-30 RX ORDER — BUPROPION HYDROCHLORIDE 150 MG/1
150 TABLET ORAL EVERY MORNING
Qty: 30 TABLET | Refills: 3 | Status: SHIPPED | OUTPATIENT
Start: 2023-06-30

## 2023-06-30 RX ORDER — FLUCONAZOLE 150 MG/1
150 TABLET ORAL ONCE
Qty: 2 TABLET | Refills: 0 | Status: SHIPPED | OUTPATIENT
Start: 2023-06-30 | End: 2023-06-30

## 2023-06-30 RX ORDER — POTASSIUM CHLORIDE 20 MEQ/1
20 TABLET, EXTENDED RELEASE ORAL DAILY
Qty: 60 TABLET | Refills: 5 | Status: SHIPPED | OUTPATIENT
Start: 2023-06-30

## 2023-06-30 RX ORDER — SPIRONOLACTONE 25 MG/1
25 TABLET ORAL DAILY
Qty: 90 TABLET | Refills: 1 | Status: SHIPPED | OUTPATIENT
Start: 2023-06-30

## 2023-07-03 ENCOUNTER — HOSPITAL ENCOUNTER (OUTPATIENT)
Dept: OTHER | Age: 48
Setting detail: THERAPIES SERIES
Discharge: HOME | End: 2023-07-03

## 2023-07-05 NOTE — TELEPHONE ENCOUNTER
Please call her and give her the phone number for [facility] ([facility]) financial assistance. Thank you.

## 2023-07-06 NOTE — TELEPHONE ENCOUNTER
Call to Delia Query advised of changes with insurance. She stated her nuisance have approved her to continue with her providers.

## 2023-07-12 RX ORDER — FLUTICASONE PROPIONATE 50 MCG
SPRAY, SUSPENSION (ML) NASAL
Qty: 16 G | Refills: 1 | Status: SHIPPED | OUTPATIENT
Start: 2023-07-12

## 2023-07-13 ENCOUNTER — OFFICE VISIT (OUTPATIENT)
Dept: PAIN MANAGEMENT | Age: 48
End: 2023-07-13
Payer: COMMERCIAL

## 2023-07-13 ENCOUNTER — OFFICE VISIT (OUTPATIENT)
Dept: CARDIOLOGY CLINIC | Age: 48
End: 2023-07-13
Payer: COMMERCIAL

## 2023-07-13 VITALS
RESPIRATION RATE: 18 BRPM | WEIGHT: 293 LBS | HEART RATE: 79 BPM | BODY MASS INDEX: 44.41 KG/M2 | DIASTOLIC BLOOD PRESSURE: 58 MMHG | SYSTOLIC BLOOD PRESSURE: 100 MMHG | HEIGHT: 68 IN

## 2023-07-13 VITALS
BODY MASS INDEX: 44.41 KG/M2 | DIASTOLIC BLOOD PRESSURE: 57 MMHG | RESPIRATION RATE: 18 BRPM | SYSTOLIC BLOOD PRESSURE: 104 MMHG | OXYGEN SATURATION: 96 % | HEIGHT: 68 IN | TEMPERATURE: 98.1 F | HEART RATE: 76 BPM | WEIGHT: 293 LBS

## 2023-07-13 DIAGNOSIS — F32.1 CURRENT MODERATE EPISODE OF MAJOR DEPRESSIVE DISORDER WITHOUT PRIOR EPISODE (HCC): ICD-10-CM

## 2023-07-13 DIAGNOSIS — I49.3 PVC (PREMATURE VENTRICULAR CONTRACTION): ICD-10-CM

## 2023-07-13 DIAGNOSIS — M16.12 PRIMARY OSTEOARTHRITIS OF LEFT HIP: ICD-10-CM

## 2023-07-13 DIAGNOSIS — M54.16 LUMBAR RADICULOPATHY: ICD-10-CM

## 2023-07-13 DIAGNOSIS — I49.3 VENTRICULAR ECTOPY: ICD-10-CM

## 2023-07-13 DIAGNOSIS — I42.8 NONISCHEMIC CARDIOMYOPATHY (HCC): ICD-10-CM

## 2023-07-13 DIAGNOSIS — G47.33 OBSTRUCTIVE SLEEP APNEA: ICD-10-CM

## 2023-07-13 DIAGNOSIS — J45.20 MILD INTERMITTENT ASTHMA WITHOUT COMPLICATION: ICD-10-CM

## 2023-07-13 DIAGNOSIS — E78.00 PURE HYPERCHOLESTEROLEMIA: ICD-10-CM

## 2023-07-13 DIAGNOSIS — M79.641 PAIN IN BOTH HANDS: ICD-10-CM

## 2023-07-13 DIAGNOSIS — I10 PRIMARY HYPERTENSION: ICD-10-CM

## 2023-07-13 DIAGNOSIS — M54.42 CHRONIC BILATERAL LOW BACK PAIN WITH BILATERAL SCIATICA: Primary | ICD-10-CM

## 2023-07-13 DIAGNOSIS — K59.03 DRUG-INDUCED CONSTIPATION: ICD-10-CM

## 2023-07-13 DIAGNOSIS — Z98.84 S/P GASTRIC BYPASS: ICD-10-CM

## 2023-07-13 DIAGNOSIS — I50.22 CHRONIC SYSTOLIC HEART FAILURE (HCC): ICD-10-CM

## 2023-07-13 DIAGNOSIS — M65.30 TRIGGER FINGER, UNSPECIFIED FINGER, UNSPECIFIED LATERALITY: ICD-10-CM

## 2023-07-13 DIAGNOSIS — F17.200 TOBACCO DEPENDENCE: ICD-10-CM

## 2023-07-13 DIAGNOSIS — E66.01 MORBID OBESITY (HCC): ICD-10-CM

## 2023-07-13 DIAGNOSIS — M79.642 PAIN IN BOTH HANDS: ICD-10-CM

## 2023-07-13 DIAGNOSIS — G89.29 CHRONIC BILATERAL LOW BACK PAIN WITH BILATERAL SCIATICA: Primary | ICD-10-CM

## 2023-07-13 DIAGNOSIS — J41.8 MIXED SIMPLE AND MUCOPURULENT CHRONIC BRONCHITIS (HCC): ICD-10-CM

## 2023-07-13 DIAGNOSIS — M48.062 SPINAL STENOSIS OF LUMBAR REGION WITH NEUROGENIC CLAUDICATION: ICD-10-CM

## 2023-07-13 DIAGNOSIS — Z79.891 LONG TERM PRESCRIPTION OPIATE USE: ICD-10-CM

## 2023-07-13 DIAGNOSIS — I25.10 CORONARY ARTERY DISEASE INVOLVING NATIVE CORONARY ARTERY OF NATIVE HEART WITHOUT ANGINA PECTORIS: Primary | ICD-10-CM

## 2023-07-13 DIAGNOSIS — G56.03 BILATERAL CARPAL TUNNEL SYNDROME: ICD-10-CM

## 2023-07-13 DIAGNOSIS — I21.4 NON-ST ELEVATION MYOCARDIAL INFARCTION (NSTEMI) (HCC): ICD-10-CM

## 2023-07-13 DIAGNOSIS — M16.0 PRIMARY OSTEOARTHRITIS OF BOTH HIPS: ICD-10-CM

## 2023-07-13 DIAGNOSIS — M54.41 CHRONIC BILATERAL LOW BACK PAIN WITH BILATERAL SCIATICA: Primary | ICD-10-CM

## 2023-07-13 DIAGNOSIS — K86.2 PANCREATIC CYST: ICD-10-CM

## 2023-07-13 DIAGNOSIS — M25.552 LEFT HIP PAIN: ICD-10-CM

## 2023-07-13 DIAGNOSIS — K21.9 GASTROESOPHAGEAL REFLUX DISEASE WITHOUT ESOPHAGITIS: ICD-10-CM

## 2023-07-13 DIAGNOSIS — G89.4 CHRONIC PAIN SYNDROME: ICD-10-CM

## 2023-07-13 PROCEDURE — 99213 OFFICE O/P EST LOW 20 MIN: CPT | Performed by: PHYSICIAN ASSISTANT

## 2023-07-13 PROCEDURE — 99214 OFFICE O/P EST MOD 30 MIN: CPT | Performed by: INTERNAL MEDICINE

## 2023-07-13 PROCEDURE — G8417 CALC BMI ABV UP PARAM F/U: HCPCS | Performed by: PHYSICIAN ASSISTANT

## 2023-07-13 PROCEDURE — 3074F SYST BP LT 130 MM HG: CPT | Performed by: PHYSICIAN ASSISTANT

## 2023-07-13 PROCEDURE — G8427 DOCREV CUR MEDS BY ELIG CLIN: HCPCS | Performed by: PHYSICIAN ASSISTANT

## 2023-07-13 PROCEDURE — 3074F SYST BP LT 130 MM HG: CPT | Performed by: INTERNAL MEDICINE

## 2023-07-13 PROCEDURE — 3078F DIAST BP <80 MM HG: CPT | Performed by: PHYSICIAN ASSISTANT

## 2023-07-13 PROCEDURE — 4004F PT TOBACCO SCREEN RCVD TLK: CPT | Performed by: PHYSICIAN ASSISTANT

## 2023-07-13 PROCEDURE — 93000 ELECTROCARDIOGRAM COMPLETE: CPT | Performed by: INTERNAL MEDICINE

## 2023-07-13 PROCEDURE — 3078F DIAST BP <80 MM HG: CPT | Performed by: INTERNAL MEDICINE

## 2023-07-13 RX ORDER — OXYCODONE HYDROCHLORIDE AND ACETAMINOPHEN 5; 325 MG/1; MG/1
1 TABLET ORAL 3 TIMES DAILY PRN
Qty: 90 TABLET | Refills: 0 | Status: SHIPPED | OUTPATIENT
Start: 2023-07-13 | End: 2023-08-12

## 2023-07-13 RX ORDER — PREGABALIN 150 MG/1
150 CAPSULE ORAL 3 TIMES DAILY
Qty: 90 CAPSULE | Refills: 0 | Status: SHIPPED | OUTPATIENT
Start: 2023-07-13 | End: 2023-08-12

## 2023-07-13 RX ORDER — METHOCARBAMOL 500 MG/1
500 TABLET, FILM COATED ORAL DAILY PRN
Qty: 7 TABLET | Refills: 0 | Status: SHIPPED | OUTPATIENT
Start: 2023-07-13 | End: 2023-07-20

## 2023-07-13 NOTE — PROGRESS NOTES
Patient was seen today and a 14 day  monitor was placed. Monitor was ordered by Dr. Javon Segovia. The monitor was applied, instructions were given to the patient. Patient stated understanding and gave verbalize readback.    Monitor company: [employer]   Serial number: [id number]
Patient had Percutaneous coronary artery intervention of the distal RCA with a sequential and overlapping 3.5 x 38 and 3.5 x 8 mm drug-eluting stents on May 17, 2023. Had drug-eluting stent  to the RCA. She preferred Plavix instead of Brilinta as she report shortness of breath with Sravani Roes was switched to Plavix per patient request  Due to hypotension, her cardiac medication doses were decreased and patient will follow-up with cardiology for close monitoring. There is discrepancy between echocardiogram LV ejection fraction  and LV gram EF as shown below  Will arrange for limited echocardiogram with definitive in the discrepancy      Continue aspirin statin, Plavix and bb  Also continue Entresto and spironolactone   Add 10 mg Jardiance daily if electrolytes are stable and kidney function is stable  Outpatient limited echocardiogram with definitive to further reassess ventricular systolic function       FOLLOW-UP in 3 months        Thank you for allowing me to participate in your patient's care. Please feel free to contact me if you have any questions or concerns.     Marlon Hui MD  [facility]) Cardiology

## 2023-07-13 NOTE — PROGRESS NOTES
Jessica Denny presents to the [facility] on 7/13/2023. 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 The pain is constant. The pain is described as aching, throbbing, stabbing, and burning. Pain is rated on her best day at a 9, on her worst day at a 10, and on average at a 10 on the VAS scale. She took her last dose of Lyrica and oxycodone this morning. What number best describes how, during the past week, pain has interfered with your enjoyment of life 10    What number best describes how, during the past week, pain has interfered with your general activity 10    Any procedures since your last visit: No.    Pacemaker or defibrillator: No.    She is not on NSAIDS and is  on anticoagulation medications to include ASA and Plavix and is managed by Cardiology. Medication Contract and Consent for Opioid Use Documents Filed       Patient Documents       Type of Document Status Date Received Received By Description    Medication Contract Received 7/1/2019 11:37 AM [place] PAIN MGMT CONTRACT DR. VILLANUEVA    Medication Contract Signed 7/24/2019 10:15 AM KALIA BYRD medication contract    Medication Contract Received 10/27/2022  3:10 PM Nae [place] [employer] Agreement                    There were no vitals taken for this visit. No LMP recorded.
MEDD -                                     We discussed with the patient that combining opioids, benzodiazepines, alcohol, illicit drugs or sleep aids increases the risk of respiratory depression including death. We discussed that these medications may cause drowsiness, sedation or dizziness and have counseled the patient not to drive or operate machinery. We have discussed that these medications will be prescribed only by one provider. We have discussed with the patient about age related risk factors and have thoroughly discussed the importance of taking these medications as prescribed. The patient verbalizes understanding.     ccreferring physic

## 2023-07-14 ENCOUNTER — OFFICE VISIT (OUTPATIENT)
Dept: BARIATRICS/WEIGHT MGMT | Age: 48
End: 2023-07-14
Payer: COMMERCIAL

## 2023-07-14 VITALS
RESPIRATION RATE: 20 BRPM | WEIGHT: 293 LBS | DIASTOLIC BLOOD PRESSURE: 64 MMHG | HEART RATE: 77 BPM | BODY MASS INDEX: 43.4 KG/M2 | TEMPERATURE: 97.7 F | SYSTOLIC BLOOD PRESSURE: 110 MMHG | HEIGHT: 69 IN

## 2023-07-14 DIAGNOSIS — L98.7 EXCESS SKIN: ICD-10-CM

## 2023-07-14 DIAGNOSIS — G47.33 OBSTRUCTIVE SLEEP APNEA: ICD-10-CM

## 2023-07-14 DIAGNOSIS — L30.4 INTERTRIGO: Primary | ICD-10-CM

## 2023-07-14 DIAGNOSIS — K91.2 MALNUTRITION FOLLOWING GASTROINTESTINAL SURGERY: ICD-10-CM

## 2023-07-14 PROCEDURE — 99213 OFFICE O/P EST LOW 20 MIN: CPT | Performed by: NURSE PRACTITIONER

## 2023-07-14 PROCEDURE — 3074F SYST BP LT 130 MM HG: CPT | Performed by: NURSE PRACTITIONER

## 2023-07-14 PROCEDURE — G8427 DOCREV CUR MEDS BY ELIG CLIN: HCPCS | Performed by: NURSE PRACTITIONER

## 2023-07-14 PROCEDURE — 3078F DIAST BP <80 MM HG: CPT | Performed by: NURSE PRACTITIONER

## 2023-07-14 PROCEDURE — G8417 CALC BMI ABV UP PARAM F/U: HCPCS | Performed by: NURSE PRACTITIONER

## 2023-07-14 PROCEDURE — 4004F PT TOBACCO SCREEN RCVD TLK: CPT | Performed by: NURSE PRACTITIONER

## 2023-07-14 NOTE — PROGRESS NOTES
Adonis Michaels  7/14/2023  [address]    Kendall-en- Y Gastric Bypass  1 year Post-Operative Follow-up     Chief Complaint   Patient presents with    Follow Up After Procedure     1 yr LRYGB. Water intake is around 64 oz daily. Protein through mostly foods. Vitamin intake is good. Bowel movements are good. Ciara Brian is a 57 y.o. female who is 1 year  post Laparoscopic Kendall-en-Y Gastric Bypass surgery. Reports that she had an MI 5/16/2023. She is not having swallowing difficulty. Patient denies nausea and vomiting. Patient reports occasional gastric reflux symptoms. Bowel movements are not normal per patient. Occasionally constipated. She is taking lactulose. Patient is  taking fiber supplements, which include fiber bars. She reports that she has been having issues with her excess skin. She has yeast and hydradenitis. Patient is compliant most of the time with the multivitamins and calcium. She is meeting fluid recommendations of at least 64 ounces per day and is unsure if she is meeting protein recommendations. She  is exercising:  she is getting home physical therapy for her heart . [place]=604 lb (132.9 kg)  Today's weight represents a total weight loss of 89 pounds since surgery with 0 pounds regained since the lowest weight. Prior to Admission medications    Medication Sig Start Date End Date Taking? Authorizing Provider   methocarbamol (ROBAXIN) 500 MG tablet Take 1 tablet by mouth daily as needed (pain) 7/13/23 7/20/23 Yes GREGORIO Marie   oxyCODONE-acetaminophen (PERCOCET) 5-325 MG per tablet Take 1 tablet by mouth 3 times daily as needed for Pain for up to 30 days. Intended supply: 30 days. Take lowest dose possible to manage pain 7/13/23 8/12/23 Yes GREGORIO Marie   pregabalin (LYRICA) 150 MG capsule Take 1 capsule by mouth in the morning, at noon, and at bedtime for 30 days.  Max Daily Amount: 450 mg 7/13/23 8/12/23 Yes

## 2023-07-17 ENCOUNTER — TELEPHONE (OUTPATIENT)
Dept: PRIMARY CARE CLINIC | Age: 48
End: 2023-07-17

## 2023-07-17 NOTE — TELEPHONE ENCOUNTER
Pt would like to have sleep study, her cpap machine does not seem to be working that well . She has not had any testing or new machine in over 5 years.

## 2023-07-17 NOTE — TELEPHONE ENCOUNTER
Pt will be continuing with home care PT, pt states she fell at home in bathroom. She declines going to [facility] or for xrays at this time but states the pain is 8-9 in her foot if it gets worse she is again advise to go to express care to be evaluated.

## 2023-07-20 ENCOUNTER — TELEPHONE (OUTPATIENT)
Dept: PRIMARY CARE CLINIC | Age: 48
End: 2023-07-20

## 2023-07-20 NOTE — TELEPHONE ENCOUNTER
Baljeet Vidal [facility] called in states she will be ordering another round of in home health care and orders will be sent here to be signed

## 2023-07-21 RX ORDER — METHOCARBAMOL 500 MG/1
TABLET, FILM COATED ORAL
Qty: 7 TABLET | Refills: 0 | OUTPATIENT
Start: 2023-07-21

## 2023-07-31 RX ORDER — TIZANIDINE 2 MG/1
TABLET ORAL
Qty: 7 TABLET | Refills: 0 | OUTPATIENT
Start: 2023-07-31

## 2023-08-03 ENCOUNTER — TELEPHONE (OUTPATIENT)
Dept: BARIATRICS/WEIGHT MGMT | Age: 48
End: 2023-08-03

## 2023-08-03 NOTE — TELEPHONE ENCOUNTER
Rd / Ld called patient due to patient being a no show for his / her scheduled dietary appointment on 8/3/23. Patient was not available. Rd / Ld left a message to have the patient call the [facility] to reschedule at [phone].

## 2023-08-11 ENCOUNTER — OFFICE VISIT (OUTPATIENT)
Dept: PRIMARY CARE CLINIC | Age: 48
End: 2023-08-11
Payer: COMMERCIAL

## 2023-08-11 ENCOUNTER — TELEPHONE (OUTPATIENT)
Dept: CARDIOLOGY CLINIC | Age: 48
End: 2023-08-11

## 2023-08-11 VITALS
OXYGEN SATURATION: 97 % | DIASTOLIC BLOOD PRESSURE: 60 MMHG | SYSTOLIC BLOOD PRESSURE: 120 MMHG | HEIGHT: 69 IN | WEIGHT: 293 LBS | TEMPERATURE: 97.9 F | BODY MASS INDEX: 43.4 KG/M2 | RESPIRATION RATE: 18 BRPM | HEART RATE: 48 BPM

## 2023-08-11 DIAGNOSIS — M54.16 LUMBAR RADICULOPATHY: ICD-10-CM

## 2023-08-11 DIAGNOSIS — G89.29 CHRONIC BILATERAL LOW BACK PAIN WITH BILATERAL SCIATICA: ICD-10-CM

## 2023-08-11 DIAGNOSIS — I49.3 PVC (PREMATURE VENTRICULAR CONTRACTION): Primary | ICD-10-CM

## 2023-08-11 DIAGNOSIS — L73.2 HIDRADENITIS SUPPURATIVA: ICD-10-CM

## 2023-08-11 DIAGNOSIS — M54.41 CHRONIC BILATERAL LOW BACK PAIN WITH BILATERAL SCIATICA: ICD-10-CM

## 2023-08-11 DIAGNOSIS — M25.552 LEFT HIP PAIN: ICD-10-CM

## 2023-08-11 DIAGNOSIS — I10 PRIMARY HYPERTENSION: ICD-10-CM

## 2023-08-11 DIAGNOSIS — M16.12 PRIMARY OSTEOARTHRITIS OF LEFT HIP: ICD-10-CM

## 2023-08-11 DIAGNOSIS — M48.062 SPINAL STENOSIS OF LUMBAR REGION WITH NEUROGENIC CLAUDICATION: ICD-10-CM

## 2023-08-11 DIAGNOSIS — G89.4 CHRONIC PAIN SYNDROME: ICD-10-CM

## 2023-08-11 DIAGNOSIS — M54.42 CHRONIC BILATERAL LOW BACK PAIN WITH BILATERAL SCIATICA: ICD-10-CM

## 2023-08-11 DIAGNOSIS — I50.22 CHRONIC SYSTOLIC HEART FAILURE (HCC): ICD-10-CM

## 2023-08-11 DIAGNOSIS — E11.9 TYPE 2 DIABETES MELLITUS WITHOUT COMPLICATION, WITHOUT LONG-TERM CURRENT USE OF INSULIN (HCC): ICD-10-CM

## 2023-08-11 DIAGNOSIS — I25.10 CORONARY ARTERY DISEASE INVOLVING NATIVE CORONARY ARTERY OF NATIVE HEART WITHOUT ANGINA PECTORIS: ICD-10-CM

## 2023-08-11 DIAGNOSIS — I49.3 PVC (PREMATURE VENTRICULAR CONTRACTION): ICD-10-CM

## 2023-08-11 DIAGNOSIS — B37.31 YEAST VAGINITIS: ICD-10-CM

## 2023-08-11 DIAGNOSIS — G47.33 OSA TREATED WITH BIPAP: Primary | ICD-10-CM

## 2023-08-11 PROCEDURE — 99214 OFFICE O/P EST MOD 30 MIN: CPT | Performed by: PHYSICIAN ASSISTANT

## 2023-08-11 PROCEDURE — G8417 CALC BMI ABV UP PARAM F/U: HCPCS | Performed by: PHYSICIAN ASSISTANT

## 2023-08-11 PROCEDURE — 4004F PT TOBACCO SCREEN RCVD TLK: CPT | Performed by: PHYSICIAN ASSISTANT

## 2023-08-11 PROCEDURE — G8427 DOCREV CUR MEDS BY ELIG CLIN: HCPCS | Performed by: PHYSICIAN ASSISTANT

## 2023-08-11 PROCEDURE — 3074F SYST BP LT 130 MM HG: CPT | Performed by: PHYSICIAN ASSISTANT

## 2023-08-11 PROCEDURE — 2022F DILAT RTA XM EVC RTNOPTHY: CPT | Performed by: PHYSICIAN ASSISTANT

## 2023-08-11 PROCEDURE — 3078F DIAST BP <80 MM HG: CPT | Performed by: PHYSICIAN ASSISTANT

## 2023-08-11 PROCEDURE — 3044F HG A1C LEVEL LT 7.0%: CPT | Performed by: PHYSICIAN ASSISTANT

## 2023-08-11 RX ORDER — METHOCARBAMOL 500 MG/1
500 TABLET, FILM COATED ORAL DAILY PRN
Qty: 7 TABLET | Refills: 0 | Status: SHIPPED | OUTPATIENT
Start: 2023-08-11 | End: 2023-08-16

## 2023-08-11 RX ORDER — PREGABALIN 150 MG/1
150 CAPSULE ORAL 3 TIMES DAILY
Qty: 15 CAPSULE | Refills: 0 | Status: SHIPPED | OUTPATIENT
Start: 2023-08-11 | End: 2023-08-17

## 2023-08-11 RX ORDER — FLUCONAZOLE 150 MG/1
150 TABLET ORAL ONCE
Qty: 2 TABLET | Refills: 0 | Status: SHIPPED | OUTPATIENT
Start: 2023-08-11 | End: 2023-08-11

## 2023-08-11 RX ORDER — CLINDAMYCIN HYDROCHLORIDE 300 MG/1
CAPSULE ORAL
Qty: 14 CAPSULE | Refills: 0 | Status: SHIPPED | OUTPATIENT
Start: 2023-08-11

## 2023-08-11 RX ORDER — OXYCODONE HYDROCHLORIDE AND ACETAMINOPHEN 5; 325 MG/1; MG/1
1 TABLET ORAL 3 TIMES DAILY PRN
Qty: 12 TABLET | Refills: 0 | Status: SHIPPED | OUTPATIENT
Start: 2023-08-12 | End: 2023-08-16

## 2023-08-11 NOTE — TELEPHONE ENCOUNTER
Contacted patient with monitor results and recommendations per Dr. Rosalba Daily. She verbalized understanding. Appointment scheduled with Dr. Rosalba Daily and referral forwarded to [address] [place] [address].    ----- Message from Meryle Marin, MD sent at 8/11/2023  3:04 PM EDT -----  Please notify patient [address] patch heart monitor reveals severe and frequent PVCs for which I would like her to see EP. Please arrange to have patient follow-up with me next week Wednesday or Thursday to discuss heart monitor. normal

## 2023-08-16 ENCOUNTER — OFFICE VISIT (OUTPATIENT)
Dept: PAIN MANAGEMENT | Age: 48
End: 2023-08-16
Payer: COMMERCIAL

## 2023-08-16 VITALS
BODY MASS INDEX: 44.41 KG/M2 | RESPIRATION RATE: 16 BRPM | OXYGEN SATURATION: 91 % | WEIGHT: 293 LBS | SYSTOLIC BLOOD PRESSURE: 100 MMHG | HEIGHT: 68 IN | TEMPERATURE: 96.9 F | DIASTOLIC BLOOD PRESSURE: 66 MMHG | HEART RATE: 88 BPM

## 2023-08-16 DIAGNOSIS — G89.29 CHRONIC BILATERAL LOW BACK PAIN WITH BILATERAL SCIATICA: ICD-10-CM

## 2023-08-16 DIAGNOSIS — G89.4 CHRONIC PAIN SYNDROME: ICD-10-CM

## 2023-08-16 DIAGNOSIS — M79.641 PAIN IN BOTH HANDS: ICD-10-CM

## 2023-08-16 DIAGNOSIS — M48.062 SPINAL STENOSIS OF LUMBAR REGION WITH NEUROGENIC CLAUDICATION: ICD-10-CM

## 2023-08-16 DIAGNOSIS — M16.0 PRIMARY OSTEOARTHRITIS OF BOTH HIPS: ICD-10-CM

## 2023-08-16 DIAGNOSIS — M79.642 PAIN IN BOTH HANDS: ICD-10-CM
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
## 2023-08-16 DIAGNOSIS — M54.42 CHRONIC BILATERAL LOW BACK PAIN WITH BILATERAL SCIATICA: ICD-10-CM

## 2023-08-16 DIAGNOSIS — M16.12 PRIMARY OSTEOARTHRITIS OF LEFT HIP: ICD-10-CM

## 2023-08-16 DIAGNOSIS — M25.552 LEFT HIP PAIN: ICD-10-CM

## 2023-08-16 DIAGNOSIS — M54.16 LUMBAR RADICULOPATHY: Primary | ICD-10-CM

## 2023-08-16 DIAGNOSIS — M54.41 CHRONIC BILATERAL LOW BACK PAIN WITH BILATERAL SCIATICA: ICD-10-CM

## 2023-08-16 DIAGNOSIS — Z79.891 LONG TERM PRESCRIPTION OPIATE USE: ICD-10-CM

## 2023-08-16 PROCEDURE — 3078F DIAST BP <80 MM HG: CPT | Performed by: PHYSICIAN ASSISTANT

## 2023-08-16 PROCEDURE — 99213 OFFICE O/P EST LOW 20 MIN: CPT | Performed by: PHYSICIAN ASSISTANT

## 2023-08-16 PROCEDURE — G8417 CALC BMI ABV UP PARAM F/U: HCPCS | Performed by: PHYSICIAN ASSISTANT

## 2023-08-16 PROCEDURE — 99213 OFFICE O/P EST LOW 20 MIN: CPT

## 2023-08-16 PROCEDURE — 3074F SYST BP LT 130 MM HG: CPT | Performed by: PHYSICIAN ASSISTANT

## 2023-08-16 PROCEDURE — 4004F PT TOBACCO SCREEN RCVD TLK: CPT | Performed by: PHYSICIAN ASSISTANT

## 2023-08-16 PROCEDURE — G8427 DOCREV CUR MEDS BY ELIG CLIN: HCPCS | Performed by: PHYSICIAN ASSISTANT

## 2023-08-16 RX ORDER — METHOCARBAMOL 500 MG/1
500 TABLET, FILM COATED ORAL DAILY PRN
Qty: 30 TABLET | Refills: 0 | Status: SHIPPED | OUTPATIENT
Start: 2023-08-16 | End: 2023-09-15

## 2023-08-16 RX ORDER — GABAPENTIN 800 MG/1
800 TABLET ORAL 3 TIMES DAILY
Qty: 90 TABLET | Refills: 0 | Status: SHIPPED | OUTPATIENT
Start: 2023-08-31 | End: 2023-09-30

## 2023-08-16 RX ORDER — OXYCODONE HYDROCHLORIDE AND ACETAMINOPHEN 5; 325 MG/1; MG/1
1 TABLET ORAL 3 TIMES DAILY PRN
Qty: 90 TABLET | Refills: 0 | Status: SHIPPED | OUTPATIENT
Start: 2023-08-16 | End: 2023-09-15

## 2023-08-16 RX ORDER — GABAPENTIN 600 MG/1
600 TABLET ORAL 3 TIMES DAILY
Qty: 45 TABLET | Refills: 0 | Status: SHIPPED | OUTPATIENT
Start: 2023-08-16 | End: 2023-08-31

## 2023-08-16 NOTE — PROGRESS NOTES
Chula Zuñiga presents to the [facility] on 8/16/2023. Marbella Bagley is complaining of pain back, hands, feet. The pain is constant. The pain is described as sharp, aching, numb. Pain is rated on her best day at a 8, on her worst day at a 10, and on average at a 8 on the VAS scale. She took her last dose of Lyrica and percocet today. Any procedures since your last visit: No  Did have a fall. Would like to go back to gabapentin. Pacemaker or defibrillator: No   She is not on NSAIDS and is  on anticoagulation medications to include Plavix and is managed by [facility]. Medication Contract and Consent for Opioid Use Documents Filed       Patient Documents       Type of Document Status Date Received Received By Description    Medication Contract Received 7/1/2019 11:37 AM [facility] [place] PAIN MGMT CONTRACT DR. VILLANUEVA    Medication Contract Signed 7/24/2019 10:15 AM KALIA BYRD medication contract    Medication Contract Received 10/27/2022  3:10 PM Cesar Houser [employer] Agreement                    There were no vitals taken for this visit. No LMP recorded.
foraminal narrowing and spinal canal stenosis secondary to a   combination of the facet osteoarthropathy and posterior epidural   hematoma stenosis with crowding and compression of cauda equina nerve   roots. L4-L5: Moderately large circumferential disc bulge. Severe bilateral   facet osteoarthropathy. Severe left and moderately severe right neural   foraminal narrowing with abutment/impingement of exiting bilateral L4   spinal nerve roots. Extensive facet osteoarthropathy on the left   obliterates the neural foramen. No marco spinal canal stenosis is   seen. L5-S1: Severe bilateral facet osteoarthropathy. Moderate   circumferential disc bulge. Severe left and right neural foraminal   narrowing. Abutment/impingement exiting bilateral L5 spinal nerve   roots. No marco spinal canal stenosis. 1. Multilevel degenerative disc disease and facet osteoarthropathy   T11-T12 and L1-S1. Abutment/impingement exiting right L1, exiting left   L2, exiting bilateral L3, exiting bilateral L4 and exiting bilateral   L5 spinal nerve roots as described above. Severe bilateral neural   foraminal narrowing at L3-L4 and L5-S1 with severe left and moderately   severe right neural foraminal narrowing at L4-L5. Moderate spinal   canal stenosis at L3-L4 and narrowing of the thecal sac at L2-L3. 2. There is a questionable abnormal periods of spinal nerve roots at   the L2-L3 motion segment levels of indeterminate etiology and   significance, clinical correlation for any for infectious parameters   recommended. Consider further evaluation with pre and postcontrast   lumbar spine for further investigation with consideration given to   patient's renal status and any potential safety or allergenic   concerns. 3. Crowding/compression of cauda equina nerve roots greatest at L3-L4   but to a lesser degree at L1-L2.                                          Potential Aberrant Drug-Related Behavior: no     Urine Drug
good balance/with RW

## 2023-08-17 ENCOUNTER — OFFICE VISIT (OUTPATIENT)
Dept: CARDIOLOGY CLINIC | Age: 48
End: 2023-08-17
Payer: COMMERCIAL

## 2023-08-17 VITALS
WEIGHT: 293 LBS | HEIGHT: 68 IN | SYSTOLIC BLOOD PRESSURE: 138 MMHG | RESPIRATION RATE: 16 BRPM | DIASTOLIC BLOOD PRESSURE: 77 MMHG | BODY MASS INDEX: 44.41 KG/M2 | HEART RATE: 84 BPM

## 2023-08-17 DIAGNOSIS — I25.10 CORONARY ARTERY DISEASE INVOLVING NATIVE CORONARY ARTERY OF NATIVE HEART WITHOUT ANGINA PECTORIS: Primary | ICD-10-CM

## 2023-08-17 DIAGNOSIS — E08.00 DIABETES MELLITUS DUE TO UNDERLYING CONDITION WITH HYPEROSMOLARITY WITHOUT COMA, WITHOUT LONG-TERM CURRENT USE OF INSULIN (HCC): ICD-10-CM

## 2023-08-17 DIAGNOSIS — I50.22 CHRONIC SYSTOLIC HEART FAILURE (HCC): ICD-10-CM

## 2023-08-17 DIAGNOSIS — I10 PRIMARY HYPERTENSION: ICD-10-CM

## 2023-08-17 DIAGNOSIS — R00.1 BRADYCARDIA: ICD-10-CM

## 2023-08-17 DIAGNOSIS — K21.9 GASTROESOPHAGEAL REFLUX DISEASE WITHOUT ESOPHAGITIS: ICD-10-CM

## 2023-08-17 DIAGNOSIS — I49.9 VENTRICULAR ARRHYTHMIA: ICD-10-CM

## 2023-08-17 DIAGNOSIS — Z98.84 S/P GASTRIC BYPASS: ICD-10-CM

## 2023-08-17 DIAGNOSIS — I42.8 NONISCHEMIC CARDIOMYOPATHY (HCC): ICD-10-CM

## 2023-08-17 DIAGNOSIS — I49.3 VENTRICULAR ECTOPY: ICD-10-CM

## 2023-08-17 DIAGNOSIS — E66.01 MORBID OBESITY (HCC): ICD-10-CM

## 2023-08-17 DIAGNOSIS — I21.4 NON-ST ELEVATION MYOCARDIAL INFARCTION (NSTEMI) (HCC): ICD-10-CM

## 2023-08-17 DIAGNOSIS — F41.9 ANXIETY: ICD-10-CM

## 2023-08-17 DIAGNOSIS — I49.3 PVC (PREMATURE VENTRICULAR CONTRACTION): ICD-10-CM

## 2023-08-17 PROCEDURE — G8427 DOCREV CUR MEDS BY ELIG CLIN: HCPCS | Performed by: INTERNAL MEDICINE

## 2023-08-17 PROCEDURE — 4004F PT TOBACCO SCREEN RCVD TLK: CPT | Performed by: INTERNAL MEDICINE

## 2023-08-17 PROCEDURE — 3075F SYST BP GE 130 - 139MM HG: CPT | Performed by: INTERNAL MEDICINE

## 2023-08-17 PROCEDURE — G8417 CALC BMI ABV UP PARAM F/U: HCPCS | Performed by: INTERNAL MEDICINE

## 2023-08-17 PROCEDURE — 99214 OFFICE O/P EST MOD 30 MIN: CPT | Performed by: INTERNAL MEDICINE

## 2023-08-17 PROCEDURE — 93000 ELECTROCARDIOGRAM COMPLETE: CPT | Performed by: INTERNAL MEDICINE

## 2023-08-17 PROCEDURE — 3078F DIAST BP <80 MM HG: CPT | Performed by: INTERNAL MEDICINE

## 2023-08-17 RX ORDER — METOPROLOL SUCCINATE 50 MG/1
50 TABLET, EXTENDED RELEASE ORAL DAILY
Qty: 90 TABLET | Refills: 4 | Status: SHIPPED | OUTPATIENT
Start: 2023-08-17

## 2023-08-17 NOTE — PROGRESS NOTES
Heart Attack Father     Asthma Father        Social History:  Social History     Socioeconomic History    Marital status: Legally      Spouse name: Not on file    Number of children: Not on file    Years of education: Not on file    Highest education level: Not on file   Occupational History    Occupation: direct care staff/counselor   Tobacco Use    Smoking status: Some Days     Packs/day: 0.25     Years: 17.00     Pack years: 4.25     Types: Cigarettes, Cigars     Start date: 3/27/2001     Last attempt to quit: 10/5/2018     Years since quittin.8    Smokeless tobacco: Never   Vaping Use    Vaping Use: Former   Substance and Sexual Activity    Alcohol use: No    Drug use: No    Sexual activity: Not on file   Other Topics Concern    Not on file   Social History Narrative    Not on file     Social Determinants of Health     Financial Resource Strain: Low Risk     Difficulty of Paying Living Expenses: Not hard at all   Food Insecurity: No Food Insecurity    Worried About [place] in the Last Year: Never true    [address] in the Last Year: Never true   Transportation Needs: Unknown    Lack of Transportation (Medical): Not on file    Lack of Transportation (Non-Medical): No   Physical Activity: Inactive    Days of Exercise per Week: 0 days    Minutes of Exercise per Session: 0 min   Stress: Not on file   Social Connections: Not on file   Intimate Partner Violence: Not At Risk    Fear of Current or Ex-Partner: No    Emotionally Abused: No    Physically Abused: No    Sexually Abused: No   Housing Stability: Unknown    Unable to Pay for Housing in the Last Year: Not on file    Number of Places Lived in the Last Year: Not on file    Unstable Housing in the Last Year: No       Allergies:   Allergies   Allergen Reactions    Food Anaphylaxis     Food allergy - Fresh Water Fish, [employer] and Tree Nuts    Ultram [Tramadol Hcl]      Per pt had a seizure    Fish-Derived Products     Norco

## 2023-08-18 ENCOUNTER — TELEPHONE (OUTPATIENT)
Dept: CARDIOLOGY CLINIC | Age: 48
End: 2023-08-18

## 2023-08-18 NOTE — TELEPHONE ENCOUNTER
Patient to have cardiac MRI for nonischemic cardiomyopathy. All notes in EPIC. Please obtain authorization and advise. Thank you.     CPT  59062  ICD  I42.9

## 2023-08-21 DIAGNOSIS — I42.9 CARDIOMYOPATHY, UNSPECIFIED TYPE (HCC): ICD-10-CM

## 2023-08-22 RX ORDER — BUMETANIDE 1 MG/1
TABLET ORAL
Qty: 60 TABLET | Refills: 1 | Status: SHIPPED | OUTPATIENT
Start: 2023-08-22

## 2023-09-05 RX ORDER — FLUTICASONE PROPIONATE 50 MCG
SPRAY, SUSPENSION (ML) NASAL
Qty: 16 G | Refills: 1 | Status: SHIPPED | OUTPATIENT
Start: 2023-09-05

## 2023-09-06 DIAGNOSIS — E78.5 HYPERLIPIDEMIA LDL GOAL <70: ICD-10-CM

## 2023-09-08 RX ORDER — ATORVASTATIN CALCIUM 80 MG/1
TABLET, FILM COATED ORAL
Qty: 30 TABLET | Refills: 3 | Status: SHIPPED | OUTPATIENT
Start: 2023-09-08

## 2023-09-09 DIAGNOSIS — K59.03 DRUG-INDUCED CONSTIPATION: ICD-10-CM

## 2023-09-11 RX ORDER — ALBUTEROL SULFATE 90 UG/1
2 AEROSOL, METERED RESPIRATORY (INHALATION) EVERY 4 HOURS PRN
Qty: 18 G | Refills: 1 | Status: SHIPPED | OUTPATIENT
Start: 2023-09-11

## 2023-09-12 RX ORDER — LACTULOSE 10 G/15ML
SOLUTION ORAL
Qty: 450 ML | Refills: 1 | OUTPATIENT
Start: 2023-09-12

## 2023-09-14 ENCOUNTER — TELEPHONE (OUTPATIENT)
Dept: PAIN MANAGEMENT | Age: 48
End: 2023-09-14

## 2023-09-14 DIAGNOSIS — G89.4 CHRONIC PAIN SYNDROME: ICD-10-CM

## 2023-09-14 DIAGNOSIS — M25.552 LEFT HIP PAIN: ICD-10-CM

## 2023-09-14 DIAGNOSIS — M54.16 LUMBAR RADICULOPATHY: ICD-10-CM

## 2023-09-14 DIAGNOSIS — M48.062 SPINAL STENOSIS OF LUMBAR REGION WITH NEUROGENIC CLAUDICATION: ICD-10-CM

## 2023-09-14 DIAGNOSIS — G89.29 CHRONIC BILATERAL LOW BACK PAIN WITH BILATERAL SCIATICA: ICD-10-CM

## 2023-09-14 DIAGNOSIS — M54.41 CHRONIC BILATERAL LOW BACK PAIN WITH BILATERAL SCIATICA: ICD-10-CM

## 2023-09-14 DIAGNOSIS — M54.42 CHRONIC BILATERAL LOW BACK PAIN WITH BILATERAL SCIATICA: ICD-10-CM

## 2023-09-14 DIAGNOSIS — M16.12 PRIMARY OSTEOARTHRITIS OF LEFT HIP: ICD-10-CM

## 2023-09-14 RX ORDER — OXYCODONE HYDROCHLORIDE AND ACETAMINOPHEN 5; 325 MG/1; MG/1
1 TABLET ORAL 3 TIMES DAILY PRN
Qty: 9 TABLET | Refills: 0 | Status: SHIPPED | OUTPATIENT
Start: 2023-09-15 | End: 2023-09-18

## 2023-09-14 NOTE — TELEPHONE ENCOUNTER
Tim Cordoba called she had to reschedule her appointment due to transportation being canceled. Her next office visit is 9/18/23, she is requesting a refill on Percocet.

## 2023-09-15 ENCOUNTER — TELEPHONE (OUTPATIENT)
Dept: PRIMARY CARE CLINIC | Age: 48
End: 2023-09-15

## 2023-09-15 NOTE — TELEPHONE ENCOUNTER
Dom Mcnair with [facility] home care called in states pt has been discharged on 9-13 summary will be sent over

## 2023-09-19 ENCOUNTER — OFFICE VISIT (OUTPATIENT)
Dept: PAIN MANAGEMENT | Age: 48
End: 2023-09-19
Payer: COMMERCIAL

## 2023-09-19 VITALS
SYSTOLIC BLOOD PRESSURE: 124 MMHG | OXYGEN SATURATION: 94 % | WEIGHT: 288 LBS | BODY MASS INDEX: 43.65 KG/M2 | HEIGHT: 68 IN | DIASTOLIC BLOOD PRESSURE: 78 MMHG | TEMPERATURE: 97.6 F | RESPIRATION RATE: 16 BRPM | HEART RATE: 50 BPM

## 2023-09-19 DIAGNOSIS — M54.16 LUMBAR RADICULOPATHY: ICD-10-CM

## 2023-09-19 DIAGNOSIS — M25.552 LEFT HIP PAIN: ICD-10-CM

## 2023-09-19 DIAGNOSIS — M48.062 SPINAL STENOSIS OF LUMBAR REGION WITH NEUROGENIC CLAUDICATION: ICD-10-CM

## 2023-09-19 DIAGNOSIS — G89.4 CHRONIC PAIN SYNDROME: ICD-10-CM

## 2023-09-19 DIAGNOSIS — M54.41 CHRONIC BILATERAL LOW BACK PAIN WITH BILATERAL SCIATICA: Primary | ICD-10-CM

## 2023-09-19 DIAGNOSIS — M54.42 CHRONIC BILATERAL LOW BACK PAIN WITH BILATERAL SCIATICA: Primary | ICD-10-CM

## 2023-09-19 DIAGNOSIS — M16.0 PRIMARY OSTEOARTHRITIS OF BOTH HIPS: ICD-10-CM

## 2023-09-19 DIAGNOSIS — G89.29 CHRONIC BILATERAL LOW BACK PAIN WITH BILATERAL SCIATICA: Primary | ICD-10-CM

## 2023-09-19 DIAGNOSIS — K59.03 DRUG-INDUCED CONSTIPATION: ICD-10-CM

## 2023-09-19 DIAGNOSIS — G89.29 CHRONIC BILATERAL LOW BACK PAIN WITH BILATERAL SCIATICA: ICD-10-CM

## 2023-09-19 DIAGNOSIS — M16.12 PRIMARY OSTEOARTHRITIS OF LEFT HIP: ICD-10-CM

## 2023-09-19 DIAGNOSIS — M79.641 PAIN IN BOTH HANDS: ICD-10-CM

## 2023-09-19 DIAGNOSIS — M79.642 PAIN IN BOTH HANDS: ICD-10-CM

## 2023-09-19 DIAGNOSIS — M54.41 CHRONIC BILATERAL LOW BACK PAIN WITH BILATERAL SCIATICA: ICD-10-CM

## 2023-09-19 DIAGNOSIS — Z79.891 LONG TERM PRESCRIPTION OPIATE USE: ICD-10-CM

## 2023-09-19 DIAGNOSIS — M54.42 CHRONIC BILATERAL LOW BACK PAIN WITH BILATERAL SCIATICA: ICD-10-CM

## 2023-09-19 PROCEDURE — 3078F DIAST BP <80 MM HG: CPT | Performed by: PHYSICIAN ASSISTANT

## 2023-09-19 PROCEDURE — 99213 OFFICE O/P EST LOW 20 MIN: CPT | Performed by: PHYSICIAN ASSISTANT

## 2023-09-19 PROCEDURE — 4004F PT TOBACCO SCREEN RCVD TLK: CPT | Performed by: PHYSICIAN ASSISTANT

## 2023-09-19 PROCEDURE — G8417 CALC BMI ABV UP PARAM F/U: HCPCS | Performed by: PHYSICIAN ASSISTANT

## 2023-09-19 PROCEDURE — 3074F SYST BP LT 130 MM HG: CPT | Performed by: PHYSICIAN ASSISTANT

## 2023-09-19 PROCEDURE — G8427 DOCREV CUR MEDS BY ELIG CLIN: HCPCS | Performed by: PHYSICIAN ASSISTANT

## 2023-09-19 RX ORDER — GABAPENTIN 800 MG/1
800 TABLET ORAL 3 TIMES DAILY
Qty: 90 TABLET | Refills: 0 | Status: SHIPPED | OUTPATIENT
Start: 2023-09-19 | End: 2023-10-19

## 2023-09-19 RX ORDER — OXYCODONE HYDROCHLORIDE AND ACETAMINOPHEN 5; 325 MG/1; MG/1
1 TABLET ORAL 3 TIMES DAILY PRN
Qty: 90 TABLET | Refills: 0 | Status: SHIPPED | OUTPATIENT
Start: 2023-09-19 | End: 2023-10-19

## 2023-09-19 RX ORDER — CYCLOBENZAPRINE HCL 5 MG
5 TABLET ORAL 2 TIMES DAILY PRN
Qty: 14 TABLET | Refills: 0 | Status: SHIPPED | OUTPATIENT
Start: 2023-09-19 | End: 2023-09-26

## 2023-09-19 NOTE — PROGRESS NOTES
Funmi Rhoades presents to the [facility] on 9/19/2023. Ammy Hamilton is complaining of pain back. The pain is constant. The pain is described as aching, throbbing, shooting, and stabbing. Pain is rated on her best day at a 8, on her worst day at a 10, and on average at a 9 on the VAS scale. She took her last dose of Percocet and Neurontin today. Any procedures since your last visit: No  Pacemaker or defibrillator: No  She is not on NSAIDS and is  on anticoagulation medications to include ASA and Plavix and is managed by Dr. Moo Alves. Medication Contract and Consent for Opioid Use Documents Filed       Patient Documents       Type of Document Status Date Received Received By Description    Medication Contract Received 7/1/2019 11:37 AM [place] PAIN MGMT CONTRACT DR. VILLANUEVA    Medication Contract Signed 7/24/2019 10:15 AM KALIA BYRD medication contract    Medication Contract Received 10/27/2022  3:10 PM Cristin Farris [employer] Agreement                    There were no vitals taken for this visit. No LMP recorded.
Behavior: no     Urine Drug Screenin2022 consistent with OARRS  2023 consistent     OARRS report:  2022 consistent to 2023 consistent    Opioid Agreement:  10/27/2022 - update today    Past Medical History:   Diagnosis Date    Asthma     Bell palsy     drooping    CHF (congestive heart failure) (HCC)     Chronic obstructive pulmonary disease ([address]) 2023    Coronary artery disease involving native coronary artery of native heart without angina pectoris 2023    DDD (degenerative disc disease), cervical     with spinal stenosis    Diabetes mellitus (HCC)     DJD (degenerative joint disease)     both hips    GERD without esophagitis     HTN (hypertension)     Hyperlipidemia     Left hip pain     Meningitis 2009    Morbid obesity due to excess calories ([facility])     Neuropathy     JODI treated with BiPAP     Scoliosis     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[facility])        Past Surgical History:   Procedure Laterality Date    CHOLECYSTECTOMY      CORONARY ANGIOPLASTY WITH STENT PLACEMENT  2023    Adriel Graves 3.5 x 38 and 3.5 x 8 deployed distal RCA by Dr Mukund Leblanc Left 2018    left stent placement    DILATION AND CURETTAGE OF UTERUS      younger age    HIP SURGERY Left 2022    LEFT HIP INJECTION performed by Felipa Redmond DO at [facility] Left 2023    LEFT HIP STEROID INJECT performed by Felipa Redmond DO at [address] [place] [address]    LITHOTRIPSY Right 02/15/2018    Cystoscopy;Stent Removal    NERVE BLOCK Bilateral 2022    BILATERAL L5 TRANSFORAMINAL EPIDURAL STEROID INJECTION performed by Felipa Redmond DO at [address] [place] [address] Bilateral 10/27/2022    BILATERAL L5 TRANSFORAMINAL EPIDURAL STEROID INJECTION performed by Felipa Redmond DO at [address] [place] [address] Bilateral 2023    BILATERAL L5 TRANSFORAMINAL EPIDURAL STEROID INJECTION performed by Felipa Redmond DO at [facility] N/A 2022    GASTRIC BYPASS

## 2023-09-21 NOTE — ED NOTES
Radiology Procedure Waiver   Name: Alicia Howell  : 1975  MRN: [id number]    Date:  23    Time: 4:40 PM EDT    Benefits of immediately proceeding with Radiology exam(s) without pre-testing outweigh the risks or are not indicated as specified below and therefore the following is/are being waived:    [x] Pregnancy test   [x] Patients LMP on-time and regular.   [] Patient had Tubal Ligation or has other Contraception Device. [] Patient  is Menopausal or Premenarcheal.    [] Patient had Full or Partial Hysterectomy. [] Protocol for Iodine allergy    [] MRI Questionnaire     [] BUN/Creatinine   [] Patient age w/no hx of renal dysfunction. [] Patient on Dialysis. [] Recent Normal Labs.   Electronically signed by Liliya Vanegas MD on 23 at 4:40 PM EDT               Liliya Vanegas MD  23 1640 no

## 2023-09-26 ENCOUNTER — TELEPHONE (OUTPATIENT)
Dept: PRIMARY CARE CLINIC | Age: 48
End: 2023-09-26

## 2023-09-26 RX ORDER — NITROFURANTOIN 25; 75 MG/1; MG/1
100 CAPSULE ORAL 2 TIMES DAILY
Qty: 14 CAPSULE | Refills: 0 | Status: SHIPPED | OUTPATIENT
Start: 2023-09-26 | End: 2023-10-03

## 2023-09-26 NOTE — TELEPHONE ENCOUNTER
Pt phoned c/o of frequency pressure and cloudy urine. Pt would like something called in for her. Please advise?

## 2023-10-07 DIAGNOSIS — B37.31 YEAST VAGINITIS: ICD-10-CM

## 2023-10-09 RX ORDER — ALBUTEROL SULFATE 90 UG/1
2 AEROSOL, METERED RESPIRATORY (INHALATION) EVERY 4 HOURS PRN
Qty: 18 G | Refills: 1 | Status: SHIPPED | OUTPATIENT
Start: 2023-10-09

## 2023-10-09 RX ORDER — CYCLOBENZAPRINE HCL 5 MG
TABLET ORAL
Qty: 14 TABLET | Refills: 0 | OUTPATIENT
Start: 2023-10-09

## 2023-10-09 RX ORDER — FLUCONAZOLE 150 MG/1
TABLET ORAL
Qty: 2 TABLET | Refills: 0 | Status: SHIPPED | OUTPATIENT
Start: 2023-10-09

## 2023-10-15 DIAGNOSIS — R05.1 ACUTE COUGH: ICD-10-CM

## 2023-10-16 RX ORDER — DEXTROMETHORPHAN HYDROBROMIDE AND PROMETHAZINE HYDROCHLORIDE 15; 6.25 MG/5ML; MG/5ML
SYRUP ORAL
Qty: 240 ML | Refills: 1 | OUTPATIENT
Start: 2023-10-16

## 2023-10-19 ENCOUNTER — OFFICE VISIT (OUTPATIENT)
Dept: PAIN MANAGEMENT | Age: 48
End: 2023-10-19
Payer: COMMERCIAL

## 2023-10-19 ENCOUNTER — HOSPITAL ENCOUNTER (OUTPATIENT)
Dept: GENERAL RADIOLOGY | Age: 48
Discharge: HOME | End: 2023-10-21
Payer: COMMERCIAL

## 2023-10-19 ENCOUNTER — HOSPITAL ENCOUNTER (OUTPATIENT)
Age: 48
Discharge: HOME | End: 2023-10-21
Payer: COMMERCIAL

## 2023-10-19 VITALS
BODY MASS INDEX: 43.64 KG/M2 | OXYGEN SATURATION: 95 % | SYSTOLIC BLOOD PRESSURE: 107 MMHG | WEIGHT: 287.92 LBS | HEART RATE: 80 BPM | RESPIRATION RATE: 18 BRPM | TEMPERATURE: 97.7 F | HEIGHT: 68 IN | DIASTOLIC BLOOD PRESSURE: 58 MMHG

## 2023-10-19 DIAGNOSIS — M54.41 CHRONIC BILATERAL LOW BACK PAIN WITH BILATERAL SCIATICA: ICD-10-CM

## 2023-10-19 DIAGNOSIS — Z79.891 LONG TERM PRESCRIPTION OPIATE USE: ICD-10-CM
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
## 2023-10-19 DIAGNOSIS — R22.32 MASS OF LEFT UPPER EXTREMITY: ICD-10-CM

## 2023-10-19 DIAGNOSIS — M54.16 LUMBAR RADICULOPATHY: Primary | ICD-10-CM

## 2023-10-19 DIAGNOSIS — M25.552 LEFT HIP PAIN: ICD-10-CM

## 2023-10-19 DIAGNOSIS — M54.42 CHRONIC BILATERAL LOW BACK PAIN WITH BILATERAL SCIATICA: ICD-10-CM

## 2023-10-19 DIAGNOSIS — G89.4 CHRONIC PAIN SYNDROME: ICD-10-CM

## 2023-10-19 DIAGNOSIS — G89.29 CHRONIC BILATERAL LOW BACK PAIN WITH BILATERAL SCIATICA: ICD-10-CM

## 2023-10-19 DIAGNOSIS — M54.16 LUMBAR RADICULOPATHY: ICD-10-CM

## 2023-10-19 DIAGNOSIS — M16.12 PRIMARY OSTEOARTHRITIS OF LEFT HIP: ICD-10-CM

## 2023-10-19 DIAGNOSIS — M16.0 PRIMARY OSTEOARTHRITIS OF BOTH HIPS: ICD-10-CM

## 2023-10-19 DIAGNOSIS — M48.062 SPINAL STENOSIS OF LUMBAR REGION WITH NEUROGENIC CLAUDICATION: ICD-10-CM

## 2023-10-19 PROCEDURE — G8417 CALC BMI ABV UP PARAM F/U: HCPCS | Performed by: PHYSICIAN ASSISTANT

## 2023-10-19 PROCEDURE — 99213 OFFICE O/P EST LOW 20 MIN: CPT | Performed by: PHYSICIAN ASSISTANT

## 2023-10-19 PROCEDURE — 73562 X-RAY EXAM OF KNEE 3: CPT

## 2023-10-19 PROCEDURE — G8427 DOCREV CUR MEDS BY ELIG CLIN: HCPCS | Performed by: PHYSICIAN ASSISTANT

## 2023-10-19 PROCEDURE — 3078F DIAST BP <80 MM HG: CPT | Performed by: PHYSICIAN ASSISTANT

## 2023-10-19 PROCEDURE — 4004F PT TOBACCO SCREEN RCVD TLK: CPT | Performed by: PHYSICIAN ASSISTANT

## 2023-10-19 PROCEDURE — G8484 FLU IMMUNIZE NO ADMIN: HCPCS | Performed by: PHYSICIAN ASSISTANT

## 2023-10-19 PROCEDURE — 72100 X-RAY EXAM L-S SPINE 2/3 VWS: CPT

## 2023-10-19 PROCEDURE — 3074F SYST BP LT 130 MM HG: CPT | Performed by: PHYSICIAN ASSISTANT

## 2023-10-19 PROCEDURE — 73630 X-RAY EXAM OF FOOT: CPT

## 2023-10-19 RX ORDER — GABAPENTIN 800 MG/1
800 TABLET ORAL 3 TIMES DAILY
Qty: 90 TABLET | Refills: 0 | Status: SHIPPED | OUTPATIENT
Start: 2023-10-19 | End: 2023-11-18

## 2023-10-19 RX ORDER — CYCLOBENZAPRINE HCL 5 MG
5 TABLET ORAL 2 TIMES DAILY PRN
Qty: 60 TABLET | Refills: 0 | Status: SHIPPED | OUTPATIENT
Start: 2023-10-19 | End: 2023-11-18

## 2023-10-19 RX ORDER — OXYCODONE HYDROCHLORIDE AND ACETAMINOPHEN 5; 325 MG/1; MG/1
1 TABLET ORAL 3 TIMES DAILY PRN
Qty: 90 TABLET | Refills: 0 | Status: SHIPPED | OUTPATIENT
Start: 2023-10-19 | End: 2023-11-18

## 2023-10-19 RX ORDER — POTASSIUM CHLORIDE 1500 MG/1
TABLET, EXTENDED RELEASE ORAL
COMMUNITY
Start: 2023-09-20

## 2023-10-19 NOTE — PROGRESS NOTES
Belinda Mcmahan presents to the [address] [place] [address] on 10/19/2023. Kristie Alberto is complaining of pain back, left hip, hands. The pain is constant. The pain is described as aching, throbbing, burning, penetrating, nagging, and miserable. Pain is rated on her best day at a 10, on her worst day at a 7, and on average at a 10 on the VAS scale. She took her last dose of Percocet, Neurontin, and Flexeril Tylenol 8 hour today. Any procedures since your last visit: No She is  on NSAIDS and  is  on anticoagulation medications to include ASA and Plavix and is managed by Dr. Tari Collins. Pacemaker or defibrillator: No   Medication Contract and Consent for Opioid Use Documents Filed       Patient Documents       Type of Document Status Date Received Received By Description    Medication Contract Received 7/1/2019 11:37 AM Smita Howard PAIN MGMT CONTRACT DR. VILLANUEVA    Medication Contract Signed 7/24/2019 10:15 AM KALIA BYRD medication contract    Medication Contract Received 10/27/2022  3:10 PM Niles Rojas Paint Agreement    Medication Contract Received 9/19/2023 11:58 AM MELITA BARONE PAIN AGREEMENT                       There were no vitals taken for this visit. No LMP recorded.

## 2023-10-19 NOTE — PROGRESS NOTES
[facility]  [address], [address] [facility]    Follow up Note      Marie Da     Date of Visit:  10/19/2023    CC:  Patient presents for follow up   Chief Complaint   Patient presents with    Follow-up    Back Pain         HPI:    Pain is unchanged. Appropriate analgesia with current medications regimen: yes. Change in quality of symptoms:no. Medication side effects:none. Recent diagnostic testing:none. Recent interventional procedures: None. She has been on anticoagulation medications to include ASA and has not been on herbal supplements. She is diabetic. Imagin2023 EMG UEs    Diagnostic Interpretation: This study was abnormal.      1. 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 It is chronic in duration. This is consistent with the clinical diagnosis of carpal tunnel syndrome. Prognosis for recovery of demyelinating lesions is good. 10/2021 lumbar MRI -      BONES/ALIGNMENT: There is normal alignment of the spine. Mild-to-moderate   levoscoliosis of the lumbar spine is again noted. The vertebral body heights   are maintained. There is spurring and disc desiccation at multiple levels   with mild-to-moderate disc space narrowing at L1-2, L2-3, L3-4 and L4-5. Slight subchondral signal change is seen, most notably at L2-3 and L4-5. This is most likely related to degenerative disc disease. Otherwise, the   bone marrow signal appears unremarkable. SPINAL CORD: The conus terminates normally. SOFT TISSUES: No paraspinal mass identified. The spinal canal is somewhat congenitally narrowed.        L1-L2: There is disc bulge and severe right and moderate left posterior facet   degenerative change with ligamentum flavum hypertrophy causing moderate   central

## 2023-10-26 ENCOUNTER — OFFICE VISIT (OUTPATIENT)
Dept: PRIMARY CARE CLINIC | Age: 48
End: 2023-10-26
Payer: COMMERCIAL

## 2023-10-26 VITALS
SYSTOLIC BLOOD PRESSURE: 118 MMHG | WEIGHT: 293 LBS | DIASTOLIC BLOOD PRESSURE: 68 MMHG | RESPIRATION RATE: 18 BRPM | HEART RATE: 73 BPM | HEIGHT: 68 IN | TEMPERATURE: 97.3 F | BODY MASS INDEX: 44.41 KG/M2 | OXYGEN SATURATION: 98 %

## 2023-10-26 DIAGNOSIS — E66.01 MORBID OBESITY (HCC): ICD-10-CM

## 2023-10-26 DIAGNOSIS — E11.9 CONTROLLED TYPE 2 DIABETES MELLITUS WITHOUT COMPLICATION, WITHOUT LONG-TERM CURRENT USE OF INSULIN (HCC): Primary | ICD-10-CM

## 2023-10-26 DIAGNOSIS — N64.3 GALACTORRHEA: ICD-10-CM

## 2023-10-26 DIAGNOSIS — Z12.31 BREAST CANCER SCREENING BY MAMMOGRAM: ICD-10-CM

## 2023-10-26 DIAGNOSIS — N92.6 MISSED PERIOD: ICD-10-CM

## 2023-10-26 DIAGNOSIS — E11.9 CONTROLLED TYPE 2 DIABETES MELLITUS WITHOUT COMPLICATION, WITHOUT LONG-TERM CURRENT USE OF INSULIN (HCC): ICD-10-CM

## 2023-10-26 DIAGNOSIS — Z98.84 S/P GASTRIC BYPASS: ICD-10-CM

## 2023-10-26 PROBLEM — K08.89 TOOTHACHE: Status: RESOLVED | Noted: 2019-11-06 | Resolved: 2023-10-26

## 2023-10-26 LAB
ALBUMIN SERPL-MCNC: 3.6 G/DL (ref 3.5–5.2)
ALP BLD-CCNC: 91 U/L (ref 35–104)
ALT SERPL-CCNC: 8 U/L (ref 0–32)
ANION GAP SERPL CALCULATED.3IONS-SCNC: 13 MMOL/L (ref 7–16)
AST SERPL-CCNC: 14 U/L (ref 0–31)
BILIRUB SERPL-MCNC: 0.3 MG/DL (ref 0–1.2)
BUN BLDV-MCNC: 12 MG/DL (ref 6–20)
CALCIUM SERPL-MCNC: 9 MG/DL (ref 8.6–10.2)
CHLORIDE BLD-SCNC: 102 MMOL/L (ref 98–107)
CO2: 22 MMOL/L (ref 22–29)
CONTROL: NORMAL
CREAT SERPL-MCNC: 0.9 MG/DL (ref 0.5–1)
CREATININE URINE POCT: 200
GFR SERPL CREATININE-BSD FRML MDRD: >60 ML/MIN/1.73M2
GLUCOSE BLD-MCNC: 85 MG/DL (ref 74–99)
HBA1C MFR BLD: 5.7 %
HCG QUANTITATIVE: <0.1 MIU/ML (ref 0–7)
MICROALBUMIN/CREAT 24H UR: 30 MG/G{CREAT}
MICROALBUMIN/CREAT UR-RTO: <30
POTASSIUM SERPL-SCNC: 4.9 MMOL/L (ref 3.5–5)
PREGNANCY TEST URINE, POC: NORMAL
PROLACTIN: 4.28 NG/ML
SODIUM BLD-SCNC: 137 MMOL/L (ref 132–146)
T4 FREE: 0.8 NG/DL (ref 0.9–1.7)
TOTAL PROTEIN: 6.6 G/DL (ref 6.4–8.3)
TSH SERPL DL<=0.05 MIU/L-ACNC: 0.67 UIU/ML (ref 0.27–4.2)

## 2023-10-26 PROCEDURE — 81025 URINE PREGNANCY TEST: CPT | Performed by: PHYSICIAN ASSISTANT

## 2023-10-26 PROCEDURE — 99214 OFFICE O/P EST MOD 30 MIN: CPT | Performed by: PHYSICIAN ASSISTANT

## 2023-10-26 PROCEDURE — 3044F HG A1C LEVEL LT 7.0%: CPT | Performed by: PHYSICIAN ASSISTANT

## 2023-10-26 PROCEDURE — G8417 CALC BMI ABV UP PARAM F/U: HCPCS | Performed by: PHYSICIAN ASSISTANT

## 2023-10-26 PROCEDURE — G8484 FLU IMMUNIZE NO ADMIN: HCPCS | Performed by: PHYSICIAN ASSISTANT

## 2023-10-26 PROCEDURE — 3074F SYST BP LT 130 MM HG: CPT | Performed by: PHYSICIAN ASSISTANT

## 2023-10-26 PROCEDURE — 82044 UR ALBUMIN SEMIQUANTITATIVE: CPT | Performed by: PHYSICIAN ASSISTANT

## 2023-10-26 PROCEDURE — G8427 DOCREV CUR MEDS BY ELIG CLIN: HCPCS | Performed by: PHYSICIAN ASSISTANT

## 2023-10-26 PROCEDURE — 4004F PT TOBACCO SCREEN RCVD TLK: CPT | Performed by: PHYSICIAN ASSISTANT

## 2023-10-26 PROCEDURE — 83036 HEMOGLOBIN GLYCOSYLATED A1C: CPT | Performed by: PHYSICIAN ASSISTANT

## 2023-10-26 PROCEDURE — 3078F DIAST BP <80 MM HG: CPT | Performed by: PHYSICIAN ASSISTANT

## 2023-10-26 PROCEDURE — 2022F DILAT RTA XM EVC RTNOPTHY: CPT | Performed by: PHYSICIAN ASSISTANT

## 2023-10-27 ENCOUNTER — TELEPHONE (OUTPATIENT)
Dept: CARDIOLOGY CLINIC | Age: 48
End: 2023-10-27

## 2023-10-27 DIAGNOSIS — B37.31 YEAST VAGINITIS: ICD-10-CM

## 2023-10-27 RX ORDER — NITROFURANTOIN 25; 75 MG/1; MG/1
CAPSULE ORAL
Qty: 14 CAPSULE | Refills: 0 | OUTPATIENT
Start: 2023-10-27

## 2023-10-27 RX ORDER — FLUCONAZOLE 150 MG/1
TABLET ORAL
Qty: 2 TABLET | Refills: 0 | Status: SHIPPED | OUTPATIENT
Start: 2023-10-27

## 2023-10-27 NOTE — TELEPHONE ENCOUNTER
Patient scheduled for a Cardiac MRI to  be done at [facility] on 10/31/23. Prior Auth has  10/17/23 can you see if the Auth can be extended.  Thank you

## 2023-10-28 DIAGNOSIS — L73.2 HIDRADENITIS SUPPURATIVA: ICD-10-CM

## 2023-10-30 RX ORDER — SULFAMETHOXAZOLE AND TRIMETHOPRIM 800; 160 MG/1; MG/1
TABLET ORAL
Qty: 20 TABLET | Refills: 0 | OUTPATIENT
Start: 2023-10-30

## 2023-10-30 RX ORDER — FLUTICASONE PROPIONATE 50 MCG
SPRAY, SUSPENSION (ML) NASAL
Qty: 16 G | Refills: 1 | OUTPATIENT
Start: 2023-10-30

## 2023-10-30 NOTE — TELEPHONE ENCOUNTER
Called [facility], authorization extended for cardiac MRI, 10/30/23-12/29/23, Kennedi Batista #[id number], tracking #[id number].

## 2023-10-31 ENCOUNTER — TELEPHONE (OUTPATIENT)
Dept: CARDIOLOGY CLINIC | Age: 48
End: 2023-10-31

## 2023-10-31 ENCOUNTER — HOSPITAL ENCOUNTER (OUTPATIENT)
Dept: MRI IMAGING | Age: 48
Discharge: HOME | End: 2023-11-02
Attending: INTERNAL MEDICINE

## 2023-10-31 DIAGNOSIS — I25.10 CORONARY ARTERY DISEASE INVOLVING NATIVE CORONARY ARTERY OF NATIVE HEART WITHOUT ANGINA PECTORIS: ICD-10-CM

## 2023-10-31 DIAGNOSIS — K21.9 GASTROESOPHAGEAL REFLUX DISEASE WITHOUT ESOPHAGITIS: ICD-10-CM

## 2023-10-31 DIAGNOSIS — I42.8 NONISCHEMIC CARDIOMYOPATHY (HCC): ICD-10-CM

## 2023-10-31 DIAGNOSIS — I49.3 VENTRICULAR ECTOPY: ICD-10-CM

## 2023-10-31 DIAGNOSIS — Z98.84 S/P GASTRIC BYPASS: ICD-10-CM

## 2023-10-31 DIAGNOSIS — F41.9 ANXIETY: ICD-10-CM

## 2023-10-31 DIAGNOSIS — E66.01 MORBID OBESITY (HCC): ICD-10-CM

## 2023-10-31 DIAGNOSIS — I10 PRIMARY HYPERTENSION: ICD-10-CM

## 2023-10-31 DIAGNOSIS — I21.4 NON-ST ELEVATION MYOCARDIAL INFARCTION (NSTEMI) (HCC): ICD-10-CM

## 2023-10-31 DIAGNOSIS — I49.3 PVC (PREMATURE VENTRICULAR CONTRACTION): ICD-10-CM

## 2023-10-31 DIAGNOSIS — I50.22 CHRONIC SYSTOLIC HEART FAILURE (HCC): ICD-10-CM

## 2023-10-31 DIAGNOSIS — E08.00 DIABETES MELLITUS DUE TO UNDERLYING CONDITION WITH HYPEROSMOLARITY WITHOUT COMA, WITHOUT LONG-TERM CURRENT USE OF INSULIN (HCC): ICD-10-CM

## 2023-10-31 DIAGNOSIS — R00.1 BRADYCARDIA: ICD-10-CM

## 2023-10-31 NOTE — TELEPHONE ENCOUNTER
Patient was seen at [facility] for MRI today. Patient was not able to get MRI done.  The patient states the machine was to small for her to complete test. Please advise

## 2023-10-31 NOTE — PROGRESS NOTES
Unable to perform mri, the patient is too large for our in house scanner. Pt instructed to contact her ordering physician.

## 2023-11-01 ENCOUNTER — OFFICE VISIT (OUTPATIENT)
Dept: CARDIOLOGY CLINIC | Age: 48
End: 2023-11-01

## 2023-11-01 VITALS
RESPIRATION RATE: 16 BRPM | SYSTOLIC BLOOD PRESSURE: 110 MMHG | OXYGEN SATURATION: 96 % | HEIGHT: 68 IN | HEART RATE: 71 BPM | BODY MASS INDEX: 44.41 KG/M2 | WEIGHT: 293 LBS | DIASTOLIC BLOOD PRESSURE: 80 MMHG

## 2023-11-01 DIAGNOSIS — Z98.84 S/P GASTRIC BYPASS: ICD-10-CM

## 2023-11-01 DIAGNOSIS — E08.00 DIABETES MELLITUS DUE TO UNDERLYING CONDITION WITH HYPEROSMOLARITY WITHOUT COMA, WITHOUT LONG-TERM CURRENT USE OF INSULIN (HCC): ICD-10-CM

## 2023-11-01 DIAGNOSIS — K21.9 GASTROESOPHAGEAL REFLUX DISEASE WITHOUT ESOPHAGITIS: ICD-10-CM

## 2023-11-01 DIAGNOSIS — I21.4 NON-ST ELEVATION MYOCARDIAL INFARCTION (NSTEMI) (HCC): ICD-10-CM

## 2023-11-01 DIAGNOSIS — R00.1 BRADYCARDIA: ICD-10-CM

## 2023-11-01 DIAGNOSIS — I25.10 CORONARY ARTERY DISEASE INVOLVING NATIVE CORONARY ARTERY OF NATIVE HEART WITHOUT ANGINA PECTORIS: Primary | ICD-10-CM

## 2023-11-01 DIAGNOSIS — F17.200 TOBACCO DEPENDENCE: ICD-10-CM

## 2023-11-01 DIAGNOSIS — I49.3 PVC (PREMATURE VENTRICULAR CONTRACTION): ICD-10-CM

## 2023-11-01 DIAGNOSIS — I10 PRIMARY HYPERTENSION: ICD-10-CM

## 2023-11-01 DIAGNOSIS — I42.8 NONISCHEMIC CARDIOMYOPATHY (HCC): ICD-10-CM

## 2023-11-01 DIAGNOSIS — I50.22 CHRONIC SYSTOLIC HEART FAILURE (HCC): ICD-10-CM

## 2023-11-01 NOTE — PROGRESS NOTES
heart catheterization with coronary angiography  2. Percutaneous coronary artery intervention of the distal RCA with a sequential and overlapping 3.5 x 38 and 3.5 x 8 mm drug-eluting stents. Complications: None     Physician: Raghu Holly. Julisa JOINER Assistant: none     Indication: NSTEMI  AUC: 8  AUC indication: 4     PCI AUC: 9  PCI AUC incication: 16        Hemodynamics:  Opening Aortic pressure: 121/05  LV systolic pressure: 988  LVEDP: 7  No gradient across the aortic valve. Angiographic Results/findings:  Left Main: No intraoperative significant stenosis. LAD: No angiographically significant stenosis. Wraparound vessel supplying distal inferior wall  D1: No atrophy significant stenosis. D2: Small vessel. Dinah Zambrano Cx: Mild diffuse luminal irregularities. OM1: Very small vessel. .  OM2: Normal distal arteries. OM 3: Proximal diffuse mild irregularities. .  Ramus: Absent. .  RCA: Mid chronic total occlusion with left-to-right collaterals. LV: Global hypokinesis. Ejection fraction 30%. CXR reviewed:       Erick Malhotra 8/2023  Patient had a min HR of 57 bpm, max HR of 193 bpm, and avg HR of 75 bpm.  Predominant underlying rhythm was Sinus Rhythm. Bundle Branch Block/IVCD was  present. 89 Ventricular Tachycardia runs occurred, the run with the fastest interval  lasting 9 beats with a max rate of 193 bpm, the longest lasting 13.3 secs with an  avg rate of 124 bpm. 2 Supraventricular Tachycardia runs occurred, the run with the  fastest interval lasting 4 beats with a max rate of 164 bpm, the longest lasting 5  beats with an avg rate of 128 bpm. Isolated SVEs were occasional (1.0%, 49049),  SVE Couplets were rare (<1.0%, 321), and SVE Triplets were rare (<1.0%, 17). Isolated VEs were frequent (18.5%, [id number]), VE Couplets were occasional (2.7%,  40276), and VE Triplets were rare (<1.0%, 1691). Ventricular Bigeminy and  Trigeminy were present.     The ASCVD Risk score (Conrad DK, et al., 2019) failed to calculate

## 2023-11-06 DIAGNOSIS — F41.9 ANXIETY: ICD-10-CM

## 2023-11-06 DIAGNOSIS — I42.9 CARDIOMYOPATHY, UNSPECIFIED TYPE (HCC): ICD-10-CM

## 2023-11-06 RX ORDER — BUMETANIDE 1 MG/1
TABLET ORAL
Qty: 60 TABLET | Refills: 1 | Status: SHIPPED | OUTPATIENT
Start: 2023-11-06

## 2023-11-06 RX ORDER — CITALOPRAM 40 MG/1
40 TABLET ORAL DAILY
Qty: 30 TABLET | Refills: 5 | Status: SHIPPED | OUTPATIENT
Start: 2023-11-06

## 2023-11-08 NOTE — PROGRESS NOTES
face-to-face time providing counseling and or coordination of care with the other providers, preparation for the clinic visit, reviewing records/tests, counseling/education of the patient, ordering medications/tests/procedures, coordinating care, and documenting clinical information in the EHR. Thank you for allowing me to participate in your patient's care. Please call me if there are any questions or concerns.       Richard Qiu MD  Cardiac Electrophysiology  [facility]) Physicians  [facility] [place]: [facility] Electrophysiology  11/9/23   10:57 AM

## 2023-11-09 ENCOUNTER — OFFICE VISIT (OUTPATIENT)
Dept: NON INVASIVE DIAGNOSTICS | Age: 48
End: 2023-11-09
Payer: COMMERCIAL

## 2023-11-09 ENCOUNTER — TELEPHONE (OUTPATIENT)
Dept: NON INVASIVE DIAGNOSTICS | Age: 48
End: 2023-11-09

## 2023-11-09 VITALS
OXYGEN SATURATION: 100 % | RESPIRATION RATE: 16 BRPM | HEART RATE: 73 BPM | SYSTOLIC BLOOD PRESSURE: 120 MMHG | BODY MASS INDEX: 44.41 KG/M2 | HEIGHT: 68 IN | DIASTOLIC BLOOD PRESSURE: 60 MMHG | WEIGHT: 293 LBS

## 2023-11-09 DIAGNOSIS — I42.8 NONISCHEMIC CARDIOMYOPATHY (HCC): Primary | ICD-10-CM

## 2023-11-09 PROCEDURE — G8484 FLU IMMUNIZE NO ADMIN: HCPCS | Performed by: INTERNAL MEDICINE

## 2023-11-09 PROCEDURE — G8427 DOCREV CUR MEDS BY ELIG CLIN: HCPCS | Performed by: INTERNAL MEDICINE

## 2023-11-09 PROCEDURE — 93000 ELECTROCARDIOGRAM COMPLETE: CPT | Performed by: INTERNAL MEDICINE

## 2023-11-09 PROCEDURE — 3078F DIAST BP <80 MM HG: CPT | Performed by: INTERNAL MEDICINE

## 2023-11-09 PROCEDURE — 99205 OFFICE O/P NEW HI 60 MIN: CPT | Performed by: INTERNAL MEDICINE

## 2023-11-09 PROCEDURE — 3074F SYST BP LT 130 MM HG: CPT | Performed by: INTERNAL MEDICINE

## 2023-11-09 PROCEDURE — G8417 CALC BMI ABV UP PARAM F/U: HCPCS | Performed by: INTERNAL MEDICINE

## 2023-11-09 PROCEDURE — 4004F PT TOBACCO SCREEN RCVD TLK: CPT | Performed by: INTERNAL MEDICINE

## 2023-11-09 NOTE — TELEPHONE ENCOUNTER
Patient notified the referral has been faxed to [facility] for cardiac MRI. Patient verbalized understanding.     Electronically signed by Yee Hercules MA on 11/9/2023 at 1:06 PM

## 2023-11-09 NOTE — TELEPHONE ENCOUNTER
----- Message from Cornell Hahn MD sent at 11/9/2023 10:49 AM EST -----  Spoke to the patient and she wishes to proceed with cardiac MRI at [facility], unable to do at [facility]. There is already a cardiac MRI ordered by Dr. Adama Chilel in [address] [place] [address], not sure if the same order could be used. If not, I can place another one. Thank you.

## 2023-11-17 ENCOUNTER — OFFICE VISIT (OUTPATIENT)
Dept: PAIN MANAGEMENT | Age: 48
End: 2023-11-17
Payer: COMMERCIAL

## 2023-11-17 VITALS
WEIGHT: 293 LBS | TEMPERATURE: 98.1 F | RESPIRATION RATE: 16 BRPM | HEART RATE: 69 BPM | DIASTOLIC BLOOD PRESSURE: 61 MMHG | HEIGHT: 68 IN | BODY MASS INDEX: 44.41 KG/M2 | SYSTOLIC BLOOD PRESSURE: 120 MMHG | OXYGEN SATURATION: 93 %

## 2023-11-17 DIAGNOSIS — M54.42 CHRONIC BILATERAL LOW BACK PAIN WITH BILATERAL SCIATICA: ICD-10-CM

## 2023-11-17 DIAGNOSIS — K59.03 DRUG-INDUCED CONSTIPATION: ICD-10-CM

## 2023-11-17 DIAGNOSIS — M25.552 LEFT HIP PAIN: ICD-10-CM

## 2023-11-17 DIAGNOSIS — G89.4 CHRONIC PAIN SYNDROME: ICD-10-CM

## 2023-11-17 DIAGNOSIS — G89.4 CHRONIC PAIN SYNDROME: Primary | ICD-10-CM

## 2023-11-17 DIAGNOSIS — M54.16 LUMBAR RADICULOPATHY: ICD-10-CM

## 2023-11-17 DIAGNOSIS — M48.062 SPINAL STENOSIS OF LUMBAR REGION WITH NEUROGENIC CLAUDICATION: ICD-10-CM

## 2023-11-17 DIAGNOSIS — M54.41 CHRONIC BILATERAL LOW BACK PAIN WITH BILATERAL SCIATICA: ICD-10-CM

## 2023-11-17 DIAGNOSIS — G89.29 CHRONIC BILATERAL LOW BACK PAIN WITH BILATERAL SCIATICA: ICD-10-CM

## 2023-11-17 DIAGNOSIS — M16.12 PRIMARY OSTEOARTHRITIS OF LEFT HIP: ICD-10-CM

## 2023-11-17 PROCEDURE — G8417 CALC BMI ABV UP PARAM F/U: HCPCS | Performed by: PAIN MEDICINE

## 2023-11-17 PROCEDURE — 3078F DIAST BP <80 MM HG: CPT | Performed by: PAIN MEDICINE

## 2023-11-17 PROCEDURE — 4004F PT TOBACCO SCREEN RCVD TLK: CPT | Performed by: PAIN MEDICINE

## 2023-11-17 PROCEDURE — G8427 DOCREV CUR MEDS BY ELIG CLIN: HCPCS | Performed by: PAIN MEDICINE

## 2023-11-17 PROCEDURE — 3074F SYST BP LT 130 MM HG: CPT | Performed by: PAIN MEDICINE

## 2023-11-17 PROCEDURE — 99213 OFFICE O/P EST LOW 20 MIN: CPT | Performed by: PAIN MEDICINE

## 2023-11-17 PROCEDURE — 99214 OFFICE O/P EST MOD 30 MIN: CPT | Performed by: PAIN MEDICINE

## 2023-11-17 PROCEDURE — G8484 FLU IMMUNIZE NO ADMIN: HCPCS | Performed by: PAIN MEDICINE

## 2023-11-17 RX ORDER — LACTULOSE 10 G/15ML
10 SOLUTION ORAL EVERY EVENING
Qty: 450 ML | Refills: 5 | Status: SHIPPED | OUTPATIENT
Start: 2023-11-17 | End: 2024-05-15

## 2023-11-17 RX ORDER — OXYCODONE HYDROCHLORIDE AND ACETAMINOPHEN 5; 325 MG/1; MG/1
1 TABLET ORAL 3 TIMES DAILY PRN
Qty: 90 TABLET | Refills: 0 | Status: SHIPPED | OUTPATIENT
Start: 2023-11-18 | End: 2023-12-18

## 2023-11-17 RX ORDER — GABAPENTIN 800 MG/1
800 TABLET ORAL 3 TIMES DAILY
Qty: 90 TABLET | Refills: 0 | Status: SHIPPED | OUTPATIENT
Start: 2023-11-17 | End: 2023-12-17

## 2023-11-17 RX ORDER — CYCLOBENZAPRINE HCL 5 MG
5 TABLET ORAL 2 TIMES DAILY PRN
Qty: 60 TABLET | Refills: 0 | Status: SHIPPED | OUTPATIENT
Start: 2023-11-17 | End: 2023-12-17

## 2023-11-20 LAB
6-MONOACETYLMORPHINE, URINE: NEGATIVE
ABNORMAL SPECIMEN VALIDITY TEST: ABNORMAL
ALCOHOL URINE: NOT DETECTED MG/DL
AMPHETAMINE SCREEN URINE: NEGATIVE
BARBITURATE SCREEN URINE: NEGATIVE
BENZODIAZEPINE SCREEN, URINE: NEGATIVE
BUPRENORPHINE URINE: NEGATIVE
CANNABINOID SCREEN URINE: NEGATIVE
COCAINE METABOLITE, URINE: NEGATIVE
FENTANYL URINE: NEGATIVE
INTEGRITY CHECK, CREATININE, URINE: 79.1 MG/DL (ref 22–250)
INTEGRITY CHECK, OXIDANT, URINE: <40 MG/L
INTEGRITY CHECK, PH, URINE: 6 (ref 4.5–9)
INTEGRITY CHECK, SPECIFIC GRAVITY, URINE: 1.01 (ref 1–1.03)
METHADONE SCREEN, URINE: NEGATIVE
OPIATES, URINE: NEGATIVE
OXYCODONE SCREEN URINE: POSITIVE
PHENCYCLIDINE, URINE: NEGATIVE
TEST INFORMATION: ABNORMAL
TRAMADOL, URINE: NEGATIVE

## 2023-11-21 LAB
6-MAM, QUANTITATIVE, URINE: <10 NG/ML
7-AMINOCLONAZEPAM, QUANTITATIVE, URINE: <50 NG/ML
ALPHA-HYDROXYALPRAZOLAM, QUANTITATIVE, URINE: <50 NG/ML
ALPHA-HYDROXYMIDAZOLAM, QUANTITATIVE, URINE: <50 NG/ML
ALPHA-HYDROXYTRIAZOLAM, QUANTITATIVE, URINE: <50 NG/ML
ALPRAZOLAM URINE QUANT: <50 NG/ML
CHLORDIAZEPOXIDE, QUANTITATIVE, URINE: <50 NG/ML
CLONAZEPAM, QUANTITATIVE, URINE: <50 NG/ML
CODEINE, QUANTITATIVE, URINE: <50 NG/ML
COMPLIANCE DRUG ANALYSIS, URINE: NORMAL
DIAZEPAM URINE QUANT: <50 NG/ML
FLUNITRAZEPAM, QUANTITATIVE, URINE: <50 NG/ML
FLURAZEPAM, QUANTITATIVE, URINE: <50 NG/ML
HYDROCODONE, QUANTITATIVE, URINE: <50 NG/ML
HYDROMORPHONE, QUANTITATIVE, URINE: <50 NG/ML
LORAZEPAM URINE QUANT: <50 NG/ML
MIDAZOLAM URINE QUANT: <50 NG/ML
MORPHINE, QUANTITATIVE, URINE: <50 NG/ML
NORDIAZEPAM URINE QUANT: <50 NG/ML
NORHYDROCODONE, QUANTITATIVE, URINE: <50 NG/ML
NOROXYCODONE, QUANTITATIVE, URINE: >1000 NG/ML
OXAZEPAM URINE QUANT: <50 NG/ML
OXYCODONE URINE, QUANTITATIVE: 632.9 NG/ML
OXYMORPHONE, QUANTITATIVE, URINE: 192.6 NG/ML
TEMAZEPAM, QUANTITATIVE, URINE: <50 NG/ML

## 2023-11-25 DIAGNOSIS — I50.22 CHRONIC SYSTOLIC HEART FAILURE (HCC): ICD-10-CM

## 2023-11-25 DIAGNOSIS — F32.1 CURRENT MODERATE EPISODE OF MAJOR DEPRESSIVE DISORDER WITHOUT PRIOR EPISODE (HCC): ICD-10-CM

## 2023-11-25 DIAGNOSIS — Z79.899 NEW MEDICATION ADDED: ICD-10-CM

## 2023-11-27 ENCOUNTER — OFFICE VISIT (OUTPATIENT)
Dept: SURGERY | Age: 48
End: 2023-11-27
Payer: COMMERCIAL

## 2023-11-27 VITALS
HEART RATE: 99 BPM | TEMPERATURE: 97.7 F | HEIGHT: 68 IN | OXYGEN SATURATION: 91 % | DIASTOLIC BLOOD PRESSURE: 67 MMHG | BODY MASS INDEX: 44.41 KG/M2 | SYSTOLIC BLOOD PRESSURE: 130 MMHG | WEIGHT: 293 LBS

## 2023-11-27 DIAGNOSIS — E65 ABDOMINAL PANNICULUS: Primary | ICD-10-CM

## 2023-11-27 PROCEDURE — G8427 DOCREV CUR MEDS BY ELIG CLIN: HCPCS | Performed by: PHYSICIAN ASSISTANT

## 2023-11-27 PROCEDURE — 99203 OFFICE O/P NEW LOW 30 MIN: CPT | Performed by: PHYSICIAN ASSISTANT

## 2023-11-27 PROCEDURE — 3074F SYST BP LT 130 MM HG: CPT | Performed by: PHYSICIAN ASSISTANT

## 2023-11-27 PROCEDURE — G8484 FLU IMMUNIZE NO ADMIN: HCPCS | Performed by: PHYSICIAN ASSISTANT

## 2023-11-27 PROCEDURE — 3078F DIAST BP <80 MM HG: CPT | Performed by: PHYSICIAN ASSISTANT

## 2023-11-27 PROCEDURE — 4004F PT TOBACCO SCREEN RCVD TLK: CPT | Performed by: PHYSICIAN ASSISTANT

## 2023-11-27 PROCEDURE — G8417 CALC BMI ABV UP PARAM F/U: HCPCS | Performed by: PHYSICIAN ASSISTANT

## 2023-11-27 RX ORDER — EMPAGLIFLOZIN 10 MG/1
10 TABLET, FILM COATED ORAL DAILY
Qty: 30 TABLET | Refills: 5 | Status: SHIPPED | OUTPATIENT
Start: 2023-11-27

## 2023-11-27 RX ORDER — BUPROPION HYDROCHLORIDE 150 MG/1
150 TABLET ORAL EVERY MORNING
Qty: 30 TABLET | Refills: 3 | OUTPATIENT
Start: 2023-11-27

## 2023-11-27 RX ORDER — METOPROLOL SUCCINATE 25 MG/1
25 TABLET, EXTENDED RELEASE ORAL DAILY
Qty: 30 TABLET | Refills: 5 | Status: SHIPPED
Start: 2023-11-27 | End: 2023-11-30

## 2023-11-27 RX ORDER — NYSTATIN 100000 [USP'U]/G
POWDER TOPICAL
Qty: 60 G | Refills: 5 | Status: SHIPPED | OUTPATIENT
Start: 2023-11-27

## 2023-11-30 ENCOUNTER — OFFICE VISIT (OUTPATIENT)
Dept: PRIMARY CARE CLINIC | Age: 48
End: 2023-11-30
Payer: COMMERCIAL

## 2023-11-30 VITALS
WEIGHT: 293 LBS | HEART RATE: 90 BPM | SYSTOLIC BLOOD PRESSURE: 128 MMHG | DIASTOLIC BLOOD PRESSURE: 82 MMHG | BODY MASS INDEX: 44.41 KG/M2 | TEMPERATURE: 98 F | OXYGEN SATURATION: 98 % | RESPIRATION RATE: 17 BRPM | HEIGHT: 68 IN

## 2023-11-30 DIAGNOSIS — B37.31 YEAST VAGINITIS: ICD-10-CM

## 2023-11-30 DIAGNOSIS — L73.2 HIDRADENITIS SUPPURATIVA: ICD-10-CM

## 2023-11-30 DIAGNOSIS — F17.200 SMOKER: ICD-10-CM

## 2023-11-30 DIAGNOSIS — R94.6 ABNORMAL THYROID FUNCTION TEST: Primary | ICD-10-CM

## 2023-11-30 DIAGNOSIS — R94.6 ABNORMAL THYROID FUNCTION TEST: ICD-10-CM

## 2023-11-30 DIAGNOSIS — Z12.12 SCREENING FOR COLORECTAL CANCER: ICD-10-CM

## 2023-11-30 DIAGNOSIS — Z12.11 SCREENING FOR COLORECTAL CANCER: ICD-10-CM

## 2023-11-30 PROCEDURE — 99214 OFFICE O/P EST MOD 30 MIN: CPT | Performed by: PHYSICIAN ASSISTANT

## 2023-11-30 PROCEDURE — G8417 CALC BMI ABV UP PARAM F/U: HCPCS | Performed by: PHYSICIAN ASSISTANT

## 2023-11-30 PROCEDURE — G8427 DOCREV CUR MEDS BY ELIG CLIN: HCPCS | Performed by: PHYSICIAN ASSISTANT

## 2023-11-30 PROCEDURE — 3078F DIAST BP <80 MM HG: CPT | Performed by: PHYSICIAN ASSISTANT

## 2023-11-30 PROCEDURE — 4004F PT TOBACCO SCREEN RCVD TLK: CPT | Performed by: PHYSICIAN ASSISTANT

## 2023-11-30 PROCEDURE — 3074F SYST BP LT 130 MM HG: CPT | Performed by: PHYSICIAN ASSISTANT

## 2023-11-30 PROCEDURE — G8484 FLU IMMUNIZE NO ADMIN: HCPCS | Performed by: PHYSICIAN ASSISTANT

## 2023-11-30 RX ORDER — FERROUS SULFATE 325(65) MG
325 TABLET ORAL
Qty: 30 TABLET | Refills: 5 | Status: SHIPPED | OUTPATIENT
Start: 2023-11-30

## 2023-11-30 RX ORDER — SULFAMETHOXAZOLE AND TRIMETHOPRIM 800; 160 MG/1; MG/1
1 TABLET ORAL 2 TIMES DAILY
Qty: 20 TABLET | Refills: 0 | Status: SHIPPED | OUTPATIENT
Start: 2023-11-30 | End: 2023-12-10

## 2023-11-30 RX ORDER — NITROFURANTOIN 25; 75 MG/1; MG/1
100 CAPSULE ORAL 2 TIMES DAILY
Qty: 14 CAPSULE | Refills: 0 | Status: CANCELLED | OUTPATIENT
Start: 2023-11-30 | End: 2023-12-07

## 2023-11-30 RX ORDER — FLUCONAZOLE 150 MG/1
TABLET ORAL
Qty: 2 TABLET | Refills: 0 | Status: CANCELLED | OUTPATIENT
Start: 2023-11-30

## 2023-11-30 RX ORDER — MULTIVITAMIN
1 TABLET ORAL DAILY
Qty: 90 TABLET | Refills: 3 | Status: SHIPPED | OUTPATIENT
Start: 2023-11-30

## 2023-11-30 RX ORDER — NICOTINE 21 MG/24HR
1 PATCH, TRANSDERMAL 24 HOURS TRANSDERMAL DAILY
Qty: 42 PATCH | Refills: 0 | Status: SHIPPED | OUTPATIENT
Start: 2023-11-30 | End: 2024-01-11

## 2023-12-01 LAB
T4 FREE: 1.1 NG/DL (ref 0.9–1.7)
TSH SERPL DL<=0.05 MIU/L-ACNC: 0.42 UIU/ML (ref 0.27–4.2)

## 2023-12-02 LAB — THYROID PEROXIDASE (TPO) AB: <4 IU/ML (ref 0–25)

## 2023-12-05 DIAGNOSIS — I50.22 CHRONIC SYSTOLIC HEART FAILURE (HCC): ICD-10-CM

## 2023-12-05 RX ORDER — SPIRONOLACTONE 25 MG/1
25 TABLET ORAL DAILY
Qty: 90 TABLET | Refills: 1 | Status: SHIPPED | OUTPATIENT
Start: 2023-12-05

## 2024-01-03 ENCOUNTER — TELEPHONE (OUTPATIENT)
Dept: PAIN MANAGEMENT | Age: 49
End: 2024-01-03

## 2024-01-03 DIAGNOSIS — M54.42 CHRONIC BILATERAL LOW BACK PAIN WITH BILATERAL SCIATICA: ICD-10-CM

## 2024-01-03 DIAGNOSIS — G89.4 CHRONIC PAIN SYNDROME: Primary | ICD-10-CM

## 2024-01-03 DIAGNOSIS — M16.0 PRIMARY OSTEOARTHRITIS OF BOTH HIPS: ICD-10-CM

## 2024-01-03 DIAGNOSIS — G89.29 CHRONIC BILATERAL LOW BACK PAIN WITH BILATERAL SCIATICA: ICD-10-CM

## 2024-01-03 DIAGNOSIS — M54.41 CHRONIC BILATERAL LOW BACK PAIN WITH BILATERAL SCIATICA: ICD-10-CM

## 2024-01-03 NOTE — TELEPHONE ENCOUNTER
Patient is asking for an order for a Rollator Walker.    She would like this faxed to [facility] - [phone]    Can you please place an order for this?

## 2024-01-08 RX ORDER — ALBUTEROL SULFATE 90 UG/1
2 AEROSOL, METERED RESPIRATORY (INHALATION) EVERY 4 HOURS PRN
Qty: 18 G | Refills: 1 | Status: SHIPPED | OUTPATIENT
Start: 2024-01-08

## 2024-01-12 DIAGNOSIS — E78.5 HYPERLIPIDEMIA LDL GOAL <70: ICD-10-CM

## 2024-01-15 RX ORDER — ATORVASTATIN CALCIUM 80 MG/1
TABLET, FILM COATED ORAL
Qty: 30 TABLET | Refills: 3 | Status: SHIPPED | OUTPATIENT
Start: 2024-01-15

## 2024-01-17 ENCOUNTER — OFFICE VISIT (OUTPATIENT)
Dept: PAIN MANAGEMENT | Age: 49
End: 2024-01-17
Payer: MEDICAID

## 2024-01-17 VITALS
RESPIRATION RATE: 18 BRPM | OXYGEN SATURATION: 95 % | WEIGHT: 293 LBS | HEART RATE: 67 BPM | DIASTOLIC BLOOD PRESSURE: 51 MMHG | TEMPERATURE: 96.6 F | SYSTOLIC BLOOD PRESSURE: 101 MMHG | HEIGHT: 67 IN | BODY MASS INDEX: 45.99 KG/M2

## 2024-01-17 DIAGNOSIS — K59.03 DRUG-INDUCED CONSTIPATION: ICD-10-CM

## 2024-01-17 DIAGNOSIS — Z79.891 LONG TERM PRESCRIPTION OPIATE USE: ICD-10-CM

## 2024-01-17 DIAGNOSIS — G89.4 CHRONIC PAIN SYNDROME: ICD-10-CM

## 2024-01-17 DIAGNOSIS — M54.16 LUMBAR RADICULOPATHY: ICD-10-CM

## 2024-01-17 DIAGNOSIS — M16.12 PRIMARY OSTEOARTHRITIS OF LEFT HIP: ICD-10-CM

## 2024-01-17 DIAGNOSIS — G89.29 CHRONIC BILATERAL LOW BACK PAIN WITH BILATERAL SCIATICA: ICD-10-CM

## 2024-01-17 DIAGNOSIS — G89.4 CHRONIC PAIN SYNDROME: Primary | ICD-10-CM

## 2024-01-17 DIAGNOSIS — M48.062 SPINAL STENOSIS OF LUMBAR REGION WITH NEUROGENIC CLAUDICATION: ICD-10-CM

## 2024-01-17 DIAGNOSIS — M54.42 CHRONIC BILATERAL LOW BACK PAIN WITH BILATERAL SCIATICA: ICD-10-CM

## 2024-01-17 DIAGNOSIS — M25.552 LEFT HIP PAIN: ICD-10-CM

## 2024-01-17 DIAGNOSIS — M16.0 PRIMARY OSTEOARTHRITIS OF BOTH HIPS: ICD-10-CM

## 2024-01-17 DIAGNOSIS — M54.41 CHRONIC BILATERAL LOW BACK PAIN WITH BILATERAL SCIATICA: ICD-10-CM

## 2024-01-17 PROCEDURE — G8417 CALC BMI ABV UP PARAM F/U: HCPCS | Performed by: PHYSICIAN ASSISTANT

## 2024-01-17 PROCEDURE — 3078F DIAST BP <80 MM HG: CPT | Performed by: PHYSICIAN ASSISTANT

## 2024-01-17 PROCEDURE — G8484 FLU IMMUNIZE NO ADMIN: HCPCS | Performed by: PHYSICIAN ASSISTANT

## 2024-01-17 PROCEDURE — G8427 DOCREV CUR MEDS BY ELIG CLIN: HCPCS | Performed by: PHYSICIAN ASSISTANT

## 2024-01-17 PROCEDURE — 4004F PT TOBACCO SCREEN RCVD TLK: CPT | Performed by: PHYSICIAN ASSISTANT

## 2024-01-17 PROCEDURE — 99213 OFFICE O/P EST LOW 20 MIN: CPT | Performed by: PHYSICIAN ASSISTANT

## 2024-01-17 PROCEDURE — 3074F SYST BP LT 130 MM HG: CPT | Performed by: PHYSICIAN ASSISTANT

## 2024-01-17 RX ORDER — BLOOD SUGAR DIAGNOSTIC
STRIP MISCELLANEOUS
COMMUNITY
Start: 2023-11-27

## 2024-01-17 RX ORDER — OXYCODONE HYDROCHLORIDE AND ACETAMINOPHEN 5; 325 MG/1; MG/1
1 TABLET ORAL 3 TIMES DAILY PRN
Qty: 90 TABLET | Refills: 0 | Status: SHIPPED | OUTPATIENT
Start: 2024-01-17 | End: 2024-02-16

## 2024-01-17 RX ORDER — POTASSIUM CHLORIDE 1500 MG/1
TABLET, EXTENDED RELEASE ORAL
COMMUNITY
Start: 2024-01-08

## 2024-01-17 RX ORDER — BUPROPION HYDROCHLORIDE 150 MG/1
TABLET ORAL
COMMUNITY
Start: 2023-10-21

## 2024-01-17 RX ORDER — CYCLOBENZAPRINE HCL 5 MG
5 TABLET ORAL 2 TIMES DAILY PRN
Qty: 60 TABLET | Refills: 0 | Status: SHIPPED | OUTPATIENT
Start: 2024-01-17 | End: 2024-02-16

## 2024-01-17 RX ORDER — GABAPENTIN 800 MG/1
800 TABLET ORAL 3 TIMES DAILY
Qty: 90 TABLET | Refills: 0 | Status: SHIPPED | OUTPATIENT
Start: 2024-01-17 | End: 2024-02-16

## 2024-01-17 NOTE — PROGRESS NOTES
[facility]  [address] [place] [address]    Follow up Note      Monique Voss     Date of Visit:  2024    CC:  Patient presents for follow up   Chief Complaint   Patient presents with    Follow-up     Back, bilateral hips         HPI:    Pain is unchanged.    Appropriate analgesia with current medications regimen: yes.    Change in quality of symptoms:no.    Medication side effects:none.   Recent diagnostic testing:none.   Recent interventional procedures: None.     She has been on anticoagulation medications to include ASA and has not been on herbal supplements.  She is diabetic.     Imagin2023 EMG UEs    Diagnostic Interpretation:      This study was abnormal.      1. 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 It is chronic in duration. This is consistent with the clinical diagnosis of carpal tunnel syndrome. Prognosis for recovery of demyelinating lesions is good.          10/2021 lumbar MRI -      BONES/ALIGNMENT: There is normal alignment of the spine.  Mild-to-moderate   levoscoliosis of the lumbar spine is again noted.  The vertebral body heights   are maintained.  There is spurring and disc desiccation at multiple levels   with mild-to-moderate disc space narrowing at L1-2, L2-3, L3-4 and L4-5.   Slight subchondral signal change is seen, most notably at L2-3 and L4-5.   This is most likely related to degenerative disc disease.  Otherwise, the   bone marrow signal appears unremarkable.       SPINAL CORD: The conus terminates normally.       SOFT TISSUES: No paraspinal mass identified.       The spinal canal is somewhat congenitally narrowed.       L1-L2: There is disc bulge and severe right and moderate left posterior facet   degenerative change with ligamentum flavum hypertrophy causing moderate

## 2024-01-17 NOTE — PROGRESS NOTES
Monique Voss presents to the [facility] on 1/17/2024. Monique is complaining of pain back, bilateral hips. The pain is constant. The pain is described as aching, shooting, and sharp. Pain is rated on her best day at a 8, on her worst day at a 10, and on average at a 9 on the VAS scale. She took her last dose of Percocet and Neurontin today.     Any procedures since your last visit: No    Pacemaker or defibrillator: No.    She is not on NSAIDS and is  on anticoagulation medications to include ASA and Plavix and is managed by Madison Amin PA-C  .     Medication Contract and Consent for Opioid Use Documents Filed       Patient Documents       Type of Document Status Date Received Received By Description    Medication Contract Received 7/1/2019 11:37 AM JONAH ANAND PAIN MGMT CONTRACT DR. VILLANUEVA    Medication Contract Signed 7/24/2019 10:15 AM KALIA BYRD medication contract    Medication Contract Received 10/27/2022  3:10 PM GRACIE HEATON Paint Agreement    Medication Contract Received 9/19/2023 11:58 AM MELITA BARONE PAIN AGREEMENT                    BP (!) 101/51   Pulse 67   Temp (!) 96.6 °F (35.9 °C) (Infrared)   Resp 18   Ht 1.702 m (5' 7\")   Wt (!) 137 kg (302 lb 0.5 oz)   SpO2 95%   BMI 47.30 kg/m²      No LMP recorded.

## 2024-01-24 ENCOUNTER — TELEPHONE (OUTPATIENT)
Dept: PRIMARY CARE CLINIC | Age: 49
End: 2024-01-24

## 2024-01-24 DIAGNOSIS — L73.2 HIDRADENITIS SUPPURATIVA: Primary | ICD-10-CM

## 2024-01-30 ENCOUNTER — TELEPHONE (OUTPATIENT)
Dept: PRIMARY CARE CLINIC | Age: 49
End: 2024-01-30

## 2024-01-30 NOTE — TELEPHONE ENCOUNTER
Phoned pt she needs to see physical therapy or pain management for assessment Madison does not do functional capacity testing for disability

## 2024-02-11 DIAGNOSIS — J45.20 MILD INTERMITTENT ASTHMA WITHOUT COMPLICATION: ICD-10-CM

## 2024-02-12 RX ORDER — CETIRIZINE HYDROCHLORIDE 10 MG/1
10 TABLET ORAL DAILY
Qty: 30 TABLET | Refills: 5 | Status: SHIPPED | OUTPATIENT
Start: 2024-02-12

## 2024-02-15 RX ORDER — ALBUTEROL SULFATE 90 UG/1
2 AEROSOL, METERED RESPIRATORY (INHALATION) EVERY 4 HOURS PRN
Qty: 18 G | Refills: 1 | Status: SHIPPED | OUTPATIENT
Start: 2024-02-15

## 2024-02-19 ENCOUNTER — TELEPHONE (OUTPATIENT)
Dept: PAIN MANAGEMENT | Age: 49
End: 2024-02-19

## 2024-02-19 DIAGNOSIS — M54.42 CHRONIC BILATERAL LOW BACK PAIN WITH BILATERAL SCIATICA: ICD-10-CM

## 2024-02-19 DIAGNOSIS — M16.12 PRIMARY OSTEOARTHRITIS OF LEFT HIP: ICD-10-CM

## 2024-02-19 DIAGNOSIS — G89.29 CHRONIC BILATERAL LOW BACK PAIN WITH BILATERAL SCIATICA: ICD-10-CM

## 2024-02-19 DIAGNOSIS — M48.062 SPINAL STENOSIS OF LUMBAR REGION WITH NEUROGENIC CLAUDICATION: ICD-10-CM

## 2024-02-19 DIAGNOSIS — M54.41 CHRONIC BILATERAL LOW BACK PAIN WITH BILATERAL SCIATICA: ICD-10-CM

## 2024-02-19 DIAGNOSIS — M25.552 LEFT HIP PAIN: ICD-10-CM

## 2024-02-19 DIAGNOSIS — M54.16 LUMBAR RADICULOPATHY: ICD-10-CM

## 2024-02-19 DIAGNOSIS — G89.4 CHRONIC PAIN SYNDROME: ICD-10-CM

## 2024-02-19 RX ORDER — GUAIFENESIN 600 MG/1
600 TABLET, EXTENDED RELEASE ORAL 2 TIMES DAILY
Qty: 30 TABLET | Refills: 0 | Status: SHIPPED | OUTPATIENT
Start: 2024-02-19 | End: 2024-03-05

## 2024-02-19 RX ORDER — OXYCODONE HYDROCHLORIDE AND ACETAMINOPHEN 5; 325 MG/1; MG/1
1 TABLET ORAL 3 TIMES DAILY PRN
Qty: 9 TABLET | Refills: 0 | Status: SHIPPED
Start: 2024-02-19 | End: 2024-02-22

## 2024-02-19 NOTE — TELEPHONE ENCOUNTER
Pt calling c/o congestion with yellow mucus and wants to know if you can call something in for her .  Please advise

## 2024-02-19 NOTE — TELEPHONE ENCOUNTER
Monique Voss called into the office requesting a refill of oxycodone.  Her next office visit is scheduled for 2/22/24.

## 2024-02-20 RX ORDER — CALCIUM CARBONATE 160(400)MG
1 TABLET,CHEWABLE ORAL DAILY
Qty: 1 EACH | Refills: 0 | Status: SHIPPED
Start: 2024-02-20 | End: 2024-02-20

## 2024-02-21 RX ORDER — CALCIUM CARBONATE 160(400)MG
1 TABLET,CHEWABLE ORAL DAILY
Qty: 1 EACH | Refills: 0 | Status: SHIPPED | OUTPATIENT
Start: 2024-02-21

## 2024-02-22 ENCOUNTER — OFFICE VISIT (OUTPATIENT)
Dept: PAIN MANAGEMENT | Age: 49
End: 2024-02-22
Payer: MEDICAID

## 2024-02-22 VITALS
HEART RATE: 87 BPM | TEMPERATURE: 98.1 F | HEIGHT: 67 IN | DIASTOLIC BLOOD PRESSURE: 70 MMHG | WEIGHT: 293 LBS | BODY MASS INDEX: 45.99 KG/M2 | RESPIRATION RATE: 18 BRPM | OXYGEN SATURATION: 95 % | SYSTOLIC BLOOD PRESSURE: 105 MMHG

## 2024-02-22 DIAGNOSIS — M25.552 LEFT HIP PAIN: ICD-10-CM

## 2024-02-22 DIAGNOSIS — M54.42 CHRONIC BILATERAL LOW BACK PAIN WITH BILATERAL SCIATICA: ICD-10-CM

## 2024-02-22 DIAGNOSIS — G89.4 CHRONIC PAIN SYNDROME: ICD-10-CM

## 2024-02-22 DIAGNOSIS — K59.03 DRUG-INDUCED CONSTIPATION: ICD-10-CM

## 2024-02-22 DIAGNOSIS — Z79.891 LONG TERM PRESCRIPTION OPIATE USE: ICD-10-CM

## 2024-02-22 DIAGNOSIS — M54.16 LUMBAR RADICULOPATHY: ICD-10-CM

## 2024-02-22 DIAGNOSIS — M54.41 CHRONIC BILATERAL LOW BACK PAIN WITH BILATERAL SCIATICA: ICD-10-CM

## 2024-02-22 DIAGNOSIS — G89.4 CHRONIC PAIN SYNDROME: Primary | ICD-10-CM

## 2024-02-22 DIAGNOSIS — G89.29 CHRONIC BILATERAL LOW BACK PAIN WITH BILATERAL SCIATICA: ICD-10-CM

## 2024-02-22 DIAGNOSIS — M16.12 PRIMARY OSTEOARTHRITIS OF LEFT HIP: ICD-10-CM

## 2024-02-22 DIAGNOSIS — M48.062 SPINAL STENOSIS OF LUMBAR REGION WITH NEUROGENIC CLAUDICATION: ICD-10-CM

## 2024-02-22 DIAGNOSIS — M16.0 PRIMARY OSTEOARTHRITIS OF BOTH HIPS: ICD-10-CM

## 2024-02-22 PROCEDURE — G8484 FLU IMMUNIZE NO ADMIN: HCPCS | Performed by: PHYSICIAN ASSISTANT

## 2024-02-22 PROCEDURE — 3078F DIAST BP <80 MM HG: CPT | Performed by: PHYSICIAN ASSISTANT

## 2024-02-22 PROCEDURE — 4004F PT TOBACCO SCREEN RCVD TLK: CPT | Performed by: PHYSICIAN ASSISTANT

## 2024-02-22 PROCEDURE — 99204 OFFICE O/P NEW MOD 45 MIN: CPT | Performed by: PHYSICIAN ASSISTANT

## 2024-02-22 PROCEDURE — 3074F SYST BP LT 130 MM HG: CPT | Performed by: PHYSICIAN ASSISTANT

## 2024-02-22 PROCEDURE — 99213 OFFICE O/P EST LOW 20 MIN: CPT | Performed by: PHYSICIAN ASSISTANT

## 2024-02-22 PROCEDURE — G8417 CALC BMI ABV UP PARAM F/U: HCPCS | Performed by: PHYSICIAN ASSISTANT

## 2024-02-22 PROCEDURE — G8427 DOCREV CUR MEDS BY ELIG CLIN: HCPCS | Performed by: PHYSICIAN ASSISTANT

## 2024-02-22 RX ORDER — OXYCODONE HYDROCHLORIDE AND ACETAMINOPHEN 5; 325 MG/1; MG/1
1 TABLET ORAL 3 TIMES DAILY PRN
Qty: 90 TABLET | Refills: 0 | Status: SHIPPED | OUTPATIENT
Start: 2024-02-22 | End: 2024-03-23

## 2024-02-22 RX ORDER — CYCLOBENZAPRINE HCL 5 MG
5 TABLET ORAL 2 TIMES DAILY PRN
Qty: 60 TABLET | Refills: 0 | Status: SHIPPED | OUTPATIENT
Start: 2024-02-22 | End: 2024-03-23

## 2024-02-22 RX ORDER — GABAPENTIN 800 MG/1
TABLET ORAL
Qty: 90 TABLET | Refills: 0 | OUTPATIENT
Start: 2024-02-22

## 2024-02-22 NOTE — PROGRESS NOTES
Monique Voss presents to the [facility] on 2/22/2024. Monique is complaining of pain back, radiates to bilateral hips. The pain is constant. The pain is described as aching, throbbing, sharp, and penetrating. Pain is rated on her best day at a 10, on her worst day at a 10, and on average at a 10 on the VAS scale. She took her last dose of Percocet, Neurontin, Motrin, and Flexeril today.     Any procedures since your last visit: No  Pacemaker or defibrillator: No.    She is  on NSAIDS and is  on anticoagulation medications to include ASA and Plavix and is managed by Dr. Washington. Cardiology    Medication Contract and Consent for Opioid Use Documents Filed       Patient Documents       Type of Document Status Date Received Received By Description    Medication Contract Received 7/1/2019 11:37 AM JONAH ANAND PAIN MGMT CONTRACT DR. VILLANUEVA    Medication Contract Signed 7/24/2019 10:15 AM KALIA BYRD medication contract    Medication Contract Received 10/27/2022  3:10 PM GRACIE HEATON Paint Agreement    Medication Contract Received 9/19/2023 11:58 AM MELITA BARONE PAIN AGREEMENT                    There were no vitals taken for this visit.     No LMP recorded.    
(LIPITOR) 80 MG tablet TAKE 1 TABLET BY MOUTH EVERY NIGHT 1/15/24  Yes Madison Amin PA-C   ENTRESTO  MG per tablet TAKE 1 TABLET BY MOUTH TWICE DAILY 12/22/23  Yes Madison Amin PA-C   clopidogrel (PLAVIX) 75 MG tablet TAKE 1 TABLET BY MOUTH DAILY 12/22/23  Yes Rocky Curran MD   pantoprazole (PROTONIX) 20 MG tablet TAKE 1 TABLET BY MOUTH DAILY 12/22/23  Yes Madison Amin PA-C   spironolactone (ALDACTONE) 25 MG tablet Take 1 tablet by mouth daily 12/5/23  Yes Madison Amin PA-C   Multiple Vitamin (DAILY VITES) TABS Take 1 tablet by mouth daily 11/30/23  Yes Madison Amin PA-C   ferrous sulfate (IRON 325) 325 (65 Fe) MG tablet Take 1 tablet by mouth daily (with breakfast) 11/30/23  Yes Madison Amin PA-C   JARDIANCE 10 MG tablet TAKE 1 TABLET BY MOUTH DAILY 11/27/23  Yes Rocky Curran MD   nystatin (MYCOSTATIN) 911458 UNIT/GM powder Apply 3 times daily. 11/27/23  Yes Ludwin Fowler PA   lactulose (CHRONULAC) 10 GM/15ML solution Take 15 mLs by mouth every evening  Patient taking differently: Take 15 mLs by mouth every evening As needed 11/17/23 5/15/24 Yes Jordyn Austin DO   citalopram (CELEXA) 40 MG tablet TAKE 1 TABLET BY MOUTH DAILY 11/6/23  Yes Madison Amin PA-C   bumetanide (BUMEX) 1 MG tablet TAKE 1 TABLET BY MOUTH EVERY OTHER DAY ALTERNATING WITH 2 TABLETS EVERY OTHER DAY 11/6/23  Yes Madison Amin PA-C   fluconazole (DIFLUCAN) 150 MG tablet TAKE 1 TABLET BY MOUTH 1 TIME FOR 1 DOSE. MAY REPEAT IN 3 TO 5 DAYS, IF SYMPTOMS PERSIST OR RECUR 10/27/23  Yes Madison Amin PA-C   fluticasone (FLONASE) 50 MCG/ACT nasal spray SHAKE LIQUID AND USE 2 SPRAYS IN EACH NOSTRIL DAILY 9/5/23  Yes Madison Amin PA-C   metoprolol succinate (TOPROL XL) 50 MG extended release tablet Take 1 tablet by mouth daily Taking once daily 8/17/23  Yes Rocky Curran MD   potassium chloride (KLOR-CON M) 20 MEQ extended release tablet Take 1 tablet by mouth daily 6/30/23  Yes Madison Amin PA-C

## 2024-02-25 DIAGNOSIS — I42.9 CARDIOMYOPATHY, UNSPECIFIED TYPE (HCC): ICD-10-CM

## 2024-02-26 RX ORDER — ASPIRIN 81 MG
81 TABLET,CHEWABLE ORAL
Qty: 90 TABLET | Refills: 1 | Status: SHIPPED | OUTPATIENT
Start: 2024-02-26

## 2024-03-15 DIAGNOSIS — I42.9 CARDIOMYOPATHY, UNSPECIFIED TYPE (HCC): ICD-10-CM

## 2024-03-15 RX ORDER — BUMETANIDE 1 MG/1
TABLET ORAL
Qty: 60 TABLET | Refills: 1 | Status: SHIPPED | OUTPATIENT
Start: 2024-03-15

## 2024-03-21 ENCOUNTER — OFFICE VISIT (OUTPATIENT)
Dept: PAIN MANAGEMENT | Age: 49
End: 2024-03-21
Payer: MEDICAID

## 2024-03-21 VITALS
DIASTOLIC BLOOD PRESSURE: 64 MMHG | WEIGHT: 293 LBS | OXYGEN SATURATION: 97 % | HEART RATE: 68 BPM | SYSTOLIC BLOOD PRESSURE: 101 MMHG | TEMPERATURE: 97.3 F | HEIGHT: 67 IN | RESPIRATION RATE: 18 BRPM | BODY MASS INDEX: 45.99 KG/M2

## 2024-03-21 DIAGNOSIS — M25.552 LEFT HIP PAIN: ICD-10-CM

## 2024-03-21 DIAGNOSIS — M54.16 LUMBAR RADICULOPATHY: ICD-10-CM

## 2024-03-21 DIAGNOSIS — G89.4 CHRONIC PAIN SYNDROME: Primary | ICD-10-CM

## 2024-03-21 DIAGNOSIS — Z79.891 LONG TERM PRESCRIPTION OPIATE USE: ICD-10-CM

## 2024-03-21 DIAGNOSIS — M16.0 PRIMARY OSTEOARTHRITIS OF BOTH HIPS: ICD-10-CM

## 2024-03-21 DIAGNOSIS — M54.41 CHRONIC BILATERAL LOW BACK PAIN WITH BILATERAL SCIATICA: ICD-10-CM

## 2024-03-21 DIAGNOSIS — G89.4 CHRONIC PAIN SYNDROME: ICD-10-CM

## 2024-03-21 DIAGNOSIS — M16.12 PRIMARY OSTEOARTHRITIS OF LEFT HIP: ICD-10-CM

## 2024-03-21 DIAGNOSIS — M48.062 SPINAL STENOSIS OF LUMBAR REGION WITH NEUROGENIC CLAUDICATION: ICD-10-CM

## 2024-03-21 DIAGNOSIS — M54.42 CHRONIC BILATERAL LOW BACK PAIN WITH BILATERAL SCIATICA: ICD-10-CM

## 2024-03-21 DIAGNOSIS — G89.29 CHRONIC BILATERAL LOW BACK PAIN WITH BILATERAL SCIATICA: ICD-10-CM

## 2024-03-21 PROCEDURE — 3078F DIAST BP <80 MM HG: CPT | Performed by: PHYSICIAN ASSISTANT

## 2024-03-21 PROCEDURE — 4004F PT TOBACCO SCREEN RCVD TLK: CPT | Performed by: PHYSICIAN ASSISTANT

## 2024-03-21 PROCEDURE — G8484 FLU IMMUNIZE NO ADMIN: HCPCS | Performed by: PHYSICIAN ASSISTANT

## 2024-03-21 PROCEDURE — 99213 OFFICE O/P EST LOW 20 MIN: CPT | Performed by: PHYSICIAN ASSISTANT

## 2024-03-21 PROCEDURE — 3074F SYST BP LT 130 MM HG: CPT | Performed by: PHYSICIAN ASSISTANT

## 2024-03-21 PROCEDURE — G8417 CALC BMI ABV UP PARAM F/U: HCPCS | Performed by: PHYSICIAN ASSISTANT

## 2024-03-21 PROCEDURE — G8427 DOCREV CUR MEDS BY ELIG CLIN: HCPCS | Performed by: PHYSICIAN ASSISTANT

## 2024-03-21 RX ORDER — OXYCODONE HYDROCHLORIDE AND ACETAMINOPHEN 5; 325 MG/1; MG/1
1 TABLET ORAL 3 TIMES DAILY PRN
Qty: 90 TABLET | Refills: 0 | Status: SHIPPED | OUTPATIENT
Start: 2024-03-21 | End: 2024-04-20

## 2024-03-21 RX ORDER — CYCLOBENZAPRINE HCL 5 MG
5 TABLET ORAL 2 TIMES DAILY PRN
Qty: 60 TABLET | Refills: 0 | Status: SHIPPED | OUTPATIENT
Start: 2024-03-21 | End: 2024-04-20

## 2024-03-21 RX ORDER — GABAPENTIN 800 MG/1
800 TABLET ORAL 3 TIMES DAILY
Qty: 90 TABLET | Refills: 0 | Status: SHIPPED | OUTPATIENT
Start: 2024-03-21 | End: 2024-04-20

## 2024-03-21 NOTE — PROGRESS NOTES
Monique Voss presents to the [facility] on 3/21/2024. Monique is complaining of pain back pain, radiates to bilateral hips. The pain is constant. The pain is described as aching, throbbing, and penetrating. Pain is rated on her best day at a 8, on her worst day at a 10, and on average at a 9 on the VAS scale. She took her last dose of Percocet, Neurontin, and Flexeril today.     Any procedures since your last visit: No  Pacemaker or defibrillator: No.    She is  on NSAIDS and is  on anticoagulation medications to include ASA and Plavix and is managed by Dr. Curran.     Medication Contract and Consent for Opioid Use Documents Filed       Patient Documents       Type of Document Status Date Received Received By Description    Medication Contract Received 7/1/2019 11:37 AM JONAH ANAND PAIN MGMT CONTRACT DR. VILLANUEVA    Medication Contract Signed 7/24/2019 10:15 AM KALIA BYRD medication contract    Medication Contract Received 10/27/2022  3:10 PM GRACIE HEATON Paint Agreement    Medication Contract Received 9/19/2023 11:58 AM MELITA BARONE PAIN AGREEMENT                    There were no vitals taken for this visit.     No LMP recorded.    
nodules, and good skin turgor    Impression:    Previously seen in 2019 for LBP and LLE pain (nondermatomal)  Upon 2022 consultation pt reports diffuse pain consistent with hyperalgesia, LBP BLE, and severe L hip pain  Lumbar MRI shows significant degenerative changes with multilevel stenosis  L hip xray shows significant OA changes  PMHx: JODI (compliant with cpap), asthma, morbid obesity s/p gastric bypass 5/2022 and as of 7/2022 has lost 113 lbs, CHF, DM, GERD, DLD, HTN        Patient had an MI and has 2 stents.  Pending [facility] MRI in [place].    Currently has hidradenitis suppurativa            Prior visit: Reviewed Dr. Aragon's note from his consultation, he feels pain is coming from lumbar spine at this time  OARRS report reviewed  EMG UEs reviewed for b/l hand numbness/tingling x 1 year. Moderate CTS. Referral to ortho hand. Would like to hold off for now.     Discussed negative SE's associated with chronic opioid therapy, given her comorbidities (morbid obesity, JODI, minimizing opioid dosing will be paramount)  RF gabapentin 800 mg TID  RF percocet 5/325 #90 - no increases  continue lactulose - no RF needed  RF flexeril 5 mg BID prn   Aqua therapy - on hold due to wound.   L hip injection with 80% relief x 1 weeks only.  B/l L5 TFESI with 75% relief, repeat prn  Left L5 TFESI  - ON  HOLD   Referral to ortho for consideration for THR  - Saw Dr. Aragon  Pt would like to avoid lumbar surgery  Patient encouraged to stay active and to lose weight, encouraged to continue with bariatric program  Treatment plan discussed with the patient including medication and side effects      Controlled Substance Monitoring:    Acute and Chronic Pain Monitoring:   RX Monitoring Periodic Controlled Substance Monitoring   3/21/2024   3:09 PM Possible medication side effects, risk of tolerance/dependence & alternative treatments discussed.;No signs of potential drug abuse or diversion identified.;Assessed functional status

## 2024-03-22 ENCOUNTER — OFFICE VISIT (OUTPATIENT)
Dept: NON INVASIVE DIAGNOSTICS | Age: 49
End: 2024-03-22
Payer: MEDICAID

## 2024-03-22 VITALS
DIASTOLIC BLOOD PRESSURE: 60 MMHG | RESPIRATION RATE: 16 BRPM | SYSTOLIC BLOOD PRESSURE: 90 MMHG | HEIGHT: 68 IN | WEIGHT: 293 LBS | HEART RATE: 80 BPM | OXYGEN SATURATION: 96 % | BODY MASS INDEX: 44.41 KG/M2

## 2024-03-22 DIAGNOSIS — Z98.84 S/P GASTRIC BYPASS: ICD-10-CM

## 2024-03-22 DIAGNOSIS — I50.30 DIASTOLIC HEART FAILURE, UNSPECIFIED HF CHRONICITY (HCC): ICD-10-CM

## 2024-03-22 DIAGNOSIS — I49.3 PVC'S (PREMATURE VENTRICULAR CONTRACTIONS): Primary | ICD-10-CM

## 2024-03-22 DIAGNOSIS — I10 PRIMARY HYPERTENSION: ICD-10-CM

## 2024-03-22 DIAGNOSIS — E66.01 MORBID OBESITY (HCC): ICD-10-CM

## 2024-03-22 PROCEDURE — 3074F SYST BP LT 130 MM HG: CPT | Performed by: NURSE PRACTITIONER

## 2024-03-22 PROCEDURE — 93000 ELECTROCARDIOGRAM COMPLETE: CPT | Performed by: INTERNAL MEDICINE

## 2024-03-22 PROCEDURE — G8427 DOCREV CUR MEDS BY ELIG CLIN: HCPCS | Performed by: NURSE PRACTITIONER

## 2024-03-22 PROCEDURE — 3078F DIAST BP <80 MM HG: CPT | Performed by: NURSE PRACTITIONER

## 2024-03-22 PROCEDURE — G8417 CALC BMI ABV UP PARAM F/U: HCPCS | Performed by: NURSE PRACTITIONER

## 2024-03-22 PROCEDURE — 99214 OFFICE O/P EST MOD 30 MIN: CPT | Performed by: NURSE PRACTITIONER

## 2024-03-22 PROCEDURE — G8484 FLU IMMUNIZE NO ADMIN: HCPCS | Performed by: NURSE PRACTITIONER

## 2024-03-22 PROCEDURE — 4004F PT TOBACCO SCREEN RCVD TLK: CPT | Performed by: NURSE PRACTITIONER

## 2024-03-22 NOTE — PROGRESS NOTES
100-5 MCG/ACT inhaler Inhale 1 puff into the lungs daily as needed      vitamin B-12 (CYANOCOBALAMIN) 1000 MCG tablet Take 1 tablet by mouth daily       No current facility-administered medications for this visit.        Allergies   Allergen Reactions    Fish-Derived Products Anaphylaxis     Airway closing, rash, increased body temp    Food Anaphylaxis     Food allergy - Fresh Water Fish, Bapchule's and Tree Nuts    Ultram [Tramadol Hcl]      Per pt had a seizure    Cashews [Macadamia Nut Oil] Nausea And Vomiting    Norco [Hydrocodone-Acetaminophen] Hives    Pcn [Penicillins]      Not known       ROS:   Constitutional: Negative for fever, activity change and appetite change.   HENT: Negative for epistaxis.   Eyes: Negative for diploplia, blurred vision.   Respiratory: Negative for cough, chest tightness, shortness of breath and wheezing.   Cardiovascular: pertinent positives in HPI  Gastrointestinal: Negative for abdominal pain and blood in stool.     PHYSICAL EXAM:      Vitals:    03/22/24 1409   BP: 90/60   Pulse: 80   Resp: 16   SpO2: 96%   Weight: 133.4 kg (294 lb)   Height: 1.727 m (5' 8\")     Constitutional: well-developed, no acute distress  Eyes: conjunctivae normal, no xanthelasma   Ears, Nose, Throat: oral mucosa moist, no cyanosis   CV: no JVD. Regular rate and rhythm. Normal S1S2 and no S3. No murmurs, rubs, or gallops. PMI is nondisplaced  Lungs: clear to auscultation bilaterally, normal respiratory effort without used of accessory muscles  Abdomen: soft, non-tender, bowel sounds present, no masses or hepatomegaly   Musculoskeletal: no digital clubbing, no edema   Skin: warm, no rashes     Data:    Lab Results   Component Value Date    WBC 8.8 05/26/2023    HGB 14.6 05/26/2023    HCT 45.4 05/26/2023    MCV 97.2 05/26/2023     05/26/2023     Lab Results   Component Value Date/Time     10/26/2023 01:59 PM    K 4.9 10/26/2023 01:59 PM    K 4.5 05/26/2023 08:16 AM     10/26/2023 01:59 PM

## 2024-03-25 ENCOUNTER — OFFICE VISIT (OUTPATIENT)
Dept: SURGERY | Age: 49
End: 2024-03-25
Payer: MEDICAID

## 2024-03-25 VITALS — WEIGHT: 293 LBS | TEMPERATURE: 97.2 F | HEIGHT: 68 IN | BODY MASS INDEX: 44.41 KG/M2

## 2024-03-25 DIAGNOSIS — E65 ABDOMINAL PANNICULUS: Primary | ICD-10-CM

## 2024-03-25 PROCEDURE — 99211 OFF/OP EST MAY X REQ PHY/QHP: CPT | Performed by: PHYSICIAN ASSISTANT

## 2024-03-25 PROCEDURE — G8427 DOCREV CUR MEDS BY ELIG CLIN: HCPCS | Performed by: PHYSICIAN ASSISTANT

## 2024-03-25 PROCEDURE — G8417 CALC BMI ABV UP PARAM F/U: HCPCS | Performed by: PHYSICIAN ASSISTANT

## 2024-03-25 NOTE — PROGRESS NOTES
contraction)    Hyperkalemia    Spinal stenosis of lumbar region with neurogenic claudication    Primary osteoarthritis of both hips    Gastroesophageal reflux disease without esophagitis    Vitamin D deficiency    Diabetes mellitus (HCC)    Hyperlipidemia LDL goal <70    Obstructive sleep apnea    Chronic pain syndrome    Lumbar radiculopathy    Chronic bilateral low back pain with bilateral sciatica    Hidradenitis suppurativa    Nonischemic cardiomyopathy (HCC)    Malnutrition (HCC)    Current moderate episode of major depressive disorder without prior episode (HCC)    Heart failure (HCC)    S/P gastric bypass    Left hip pain    Non-ST elevation myocardial infarction (NSTEMI) (HCC)    Chronic systolic heart failure (HCC)    Bigeminy    Bradycardia    Coronary artery disease involving native coronary artery of native heart without angina pectoris    Pure hypercholesterolemia    Chronic obstructive pulmonary disease (HCC)    Abnormal CXR (chest x-ray)          Weight : 310lbs      Plan:     Symptomatic abdominal panniculus.    Continue with nystatin powder to opposing skin surfaces of abdominal panniculus.  Continue healthy diet and exercise.      I discussed with the patient today that they will need further BMI optimization prior to surgical planning.  Once the patient has a more optimized body mass index they will need to maintain this weight stability for a 6-month duration prior to surgical planning.  Patient voices understanding     As the patient has been following with our office and is gaining weight and I encouraged her to continue follow-up care with her family doctor as well as her dermatologist for any further complaints of hidradenitis to her abdominal skin folds or for further weight loss options.  I advised that as we are a surgical management option and she is not a candidate for surgery she does not need to follow-up with our office at this time.  I encouraged patient that when she has had a more

## 2024-04-18 ENCOUNTER — OFFICE VISIT (OUTPATIENT)
Dept: PAIN MANAGEMENT | Age: 49
End: 2024-04-18
Payer: MEDICAID

## 2024-04-18 VITALS
WEIGHT: 293 LBS | TEMPERATURE: 97.3 F | DIASTOLIC BLOOD PRESSURE: 83 MMHG | SYSTOLIC BLOOD PRESSURE: 114 MMHG | HEART RATE: 63 BPM | RESPIRATION RATE: 18 BRPM | HEIGHT: 68 IN | OXYGEN SATURATION: 95 % | BODY MASS INDEX: 44.41 KG/M2

## 2024-04-18 DIAGNOSIS — M16.0 PRIMARY OSTEOARTHRITIS OF BOTH HIPS: ICD-10-CM

## 2024-04-18 DIAGNOSIS — M54.41 CHRONIC BILATERAL LOW BACK PAIN WITH BILATERAL SCIATICA: ICD-10-CM

## 2024-04-18 DIAGNOSIS — M16.12 PRIMARY OSTEOARTHRITIS OF LEFT HIP: ICD-10-CM

## 2024-04-18 DIAGNOSIS — M48.062 SPINAL STENOSIS OF LUMBAR REGION WITH NEUROGENIC CLAUDICATION: ICD-10-CM

## 2024-04-18 DIAGNOSIS — G89.4 CHRONIC PAIN SYNDROME: Primary | ICD-10-CM

## 2024-04-18 DIAGNOSIS — M25.552 LEFT HIP PAIN: ICD-10-CM

## 2024-04-18 DIAGNOSIS — G89.29 CHRONIC BILATERAL LOW BACK PAIN WITH BILATERAL SCIATICA: ICD-10-CM

## 2024-04-18 DIAGNOSIS — Z79.891 LONG TERM PRESCRIPTION OPIATE USE: ICD-10-CM

## 2024-04-18 DIAGNOSIS — M54.16 LUMBAR RADICULOPATHY: ICD-10-CM

## 2024-04-18 DIAGNOSIS — G89.4 CHRONIC PAIN SYNDROME: ICD-10-CM

## 2024-04-18 DIAGNOSIS — M54.42 CHRONIC BILATERAL LOW BACK PAIN WITH BILATERAL SCIATICA: ICD-10-CM

## 2024-04-18 PROCEDURE — 99213 OFFICE O/P EST LOW 20 MIN: CPT | Performed by: PHYSICIAN ASSISTANT

## 2024-04-18 PROCEDURE — 3079F DIAST BP 80-89 MM HG: CPT | Performed by: PHYSICIAN ASSISTANT

## 2024-04-18 PROCEDURE — G8417 CALC BMI ABV UP PARAM F/U: HCPCS | Performed by: PHYSICIAN ASSISTANT

## 2024-04-18 PROCEDURE — G8427 DOCREV CUR MEDS BY ELIG CLIN: HCPCS | Performed by: PHYSICIAN ASSISTANT

## 2024-04-18 PROCEDURE — 4004F PT TOBACCO SCREEN RCVD TLK: CPT | Performed by: PHYSICIAN ASSISTANT

## 2024-04-18 PROCEDURE — 3074F SYST BP LT 130 MM HG: CPT | Performed by: PHYSICIAN ASSISTANT

## 2024-04-18 RX ORDER — GABAPENTIN 800 MG/1
800 TABLET ORAL 3 TIMES DAILY
Qty: 90 TABLET | Refills: 0 | Status: SHIPPED | OUTPATIENT
Start: 2024-04-18 | End: 2024-05-18

## 2024-04-18 RX ORDER — CYCLOBENZAPRINE HCL 5 MG
5 TABLET ORAL 2 TIMES DAILY PRN
Qty: 60 TABLET | Refills: 0 | Status: SHIPPED | OUTPATIENT
Start: 2024-04-18 | End: 2024-05-18

## 2024-04-18 RX ORDER — OXYCODONE HYDROCHLORIDE AND ACETAMINOPHEN 5; 325 MG/1; MG/1
1 TABLET ORAL 3 TIMES DAILY PRN
Qty: 90 TABLET | Refills: 0 | Status: SHIPPED | OUTPATIENT
Start: 2024-04-18 | End: 2024-05-18

## 2024-04-18 NOTE — PROGRESS NOTES
Monique Voss presents to the [facility] on 4/18/2024. Monique is complaining of pain back, radiates to bilateral hips. The pain is constant. The pain is described as aching, throbbing, stabbing, sharp, burning, and miserable. Pain is rated on her best day at a 8, on her worst day at a 10, and on average at a 10 on the VAS scale. She took her last dose of Percocet, Neurontin, and Flexeril today.     Any procedures since your last visit: No,  Pacemaker or defibrillator: No   She is  on NSAIDS and is  on anticoagulation medications to include ASA and Plavix and is managed by Dr. Harmon.     Medication Contract and Consent for Opioid Use Documents Filed       Patient Documents       Type of Document Status Date Received Received By Description    Medication Contract Received 7/1/2019 11:37 AM JONAH ANAND PAIN MGMT CONTRACT DR. VILLANUEVA    Medication Contract Signed 7/24/2019 10:15 AM KALIA BYRD medication contract    Medication Contract Received 10/27/2022  3:10 PM GRACIE HEATON Paint Agreement    Medication Contract Received 9/19/2023 11:58 AM MELITA BARONE PAIN AGREEMENT                    There were no vitals taken for this visit.     No LMP recorded.    
the patient that combining opioids, benzodiazepines, alcohol, illicit drugs or sleep aids increases the risk of respiratory depression including death. We discussed that these medications may cause drowsiness, sedation or dizziness and have counseled the patient not to drive or operate machinery. We have discussed that these medications will be prescribed only by one provider. We have discussed with the patient about age related risk factors and have thoroughly discussed the importance of taking these medications as prescribed. The patient verbalizes understanding.    ccreferring physic

## 2024-05-14 ENCOUNTER — OFFICE VISIT (OUTPATIENT)
Dept: PAIN MANAGEMENT | Age: 49
End: 2024-05-14
Payer: MEDICAID

## 2024-05-14 ENCOUNTER — PREP FOR PROCEDURE (OUTPATIENT)
Dept: PAIN MANAGEMENT | Age: 49
End: 2024-05-14

## 2024-05-14 VITALS
HEART RATE: 64 BPM | TEMPERATURE: 98.4 F | BODY MASS INDEX: 44.41 KG/M2 | DIASTOLIC BLOOD PRESSURE: 69 MMHG | RESPIRATION RATE: 16 BRPM | OXYGEN SATURATION: 93 % | SYSTOLIC BLOOD PRESSURE: 115 MMHG | HEIGHT: 68 IN | WEIGHT: 293 LBS

## 2024-05-14 DIAGNOSIS — M54.42 CHRONIC BILATERAL LOW BACK PAIN WITH BILATERAL SCIATICA: ICD-10-CM

## 2024-05-14 DIAGNOSIS — G89.29 CHRONIC BILATERAL LOW BACK PAIN WITH BILATERAL SCIATICA: ICD-10-CM

## 2024-05-14 DIAGNOSIS — G89.4 CHRONIC PAIN SYNDROME: ICD-10-CM

## 2024-05-14 DIAGNOSIS — M54.16 LUMBAR RADICULOPATHY: ICD-10-CM

## 2024-05-14 DIAGNOSIS — M54.41 CHRONIC BILATERAL LOW BACK PAIN WITH BILATERAL SCIATICA: ICD-10-CM

## 2024-05-14 DIAGNOSIS — G89.4 CHRONIC PAIN SYNDROME: Primary | ICD-10-CM

## 2024-05-14 DIAGNOSIS — Z79.891 LONG TERM PRESCRIPTION OPIATE USE: ICD-10-CM

## 2024-05-14 DIAGNOSIS — M25.552 LEFT HIP PAIN: ICD-10-CM

## 2024-05-14 DIAGNOSIS — M16.0 PRIMARY OSTEOARTHRITIS OF BOTH HIPS: ICD-10-CM

## 2024-05-14 DIAGNOSIS — M48.062 SPINAL STENOSIS OF LUMBAR REGION WITH NEUROGENIC CLAUDICATION: ICD-10-CM

## 2024-05-14 DIAGNOSIS — M16.12 PRIMARY OSTEOARTHRITIS OF LEFT HIP: ICD-10-CM

## 2024-05-14 PROCEDURE — 99213 OFFICE O/P EST LOW 20 MIN: CPT | Performed by: PHYSICIAN ASSISTANT

## 2024-05-14 PROCEDURE — 4004F PT TOBACCO SCREEN RCVD TLK: CPT | Performed by: PHYSICIAN ASSISTANT

## 2024-05-14 PROCEDURE — 3078F DIAST BP <80 MM HG: CPT | Performed by: PHYSICIAN ASSISTANT

## 2024-05-14 PROCEDURE — 3074F SYST BP LT 130 MM HG: CPT | Performed by: PHYSICIAN ASSISTANT

## 2024-05-14 PROCEDURE — G8427 DOCREV CUR MEDS BY ELIG CLIN: HCPCS | Performed by: PHYSICIAN ASSISTANT

## 2024-05-14 PROCEDURE — 99213 OFFICE O/P EST LOW 20 MIN: CPT

## 2024-05-14 PROCEDURE — G8417 CALC BMI ABV UP PARAM F/U: HCPCS | Performed by: PHYSICIAN ASSISTANT

## 2024-05-14 RX ORDER — CYCLOBENZAPRINE HCL 5 MG
5 TABLET ORAL 2 TIMES DAILY PRN
Qty: 60 TABLET | Refills: 0 | Status: SHIPPED | OUTPATIENT
Start: 2024-05-14 | End: 2024-06-13

## 2024-05-14 RX ORDER — GABAPENTIN 800 MG/1
800 TABLET ORAL 3 TIMES DAILY
Qty: 90 TABLET | Refills: 0 | Status: SHIPPED | OUTPATIENT
Start: 2024-05-14 | End: 2024-06-13

## 2024-05-14 RX ORDER — OXYCODONE HYDROCHLORIDE AND ACETAMINOPHEN 5; 325 MG/1; MG/1
1 TABLET ORAL 3 TIMES DAILY PRN
Qty: 90 TABLET | Refills: 0 | Status: SHIPPED | OUTPATIENT
Start: 2024-05-18 | End: 2024-06-17

## 2024-05-14 NOTE — PROGRESS NOTES
Monique Voss presents to the [facility] on 5/14/2024. Monique is complaining of pain low back radiating to bilateral hips. The pain is constant. The pain is described as aching, throbbing, stabbing, sharp, and burning. Pain is rated on her best day at a 8, on her worst day at a 10, and on average at a 8 on the VAS scale. She took her last dose of Percocet, Neurontin, and Flexeril today.     Any procedures since your last visit: No.    Pacemaker or defibrillator: No.    She is not on NSAIDS and is  on anticoagulation medications to include ASA and plavix and is managed by Dr. Harmon.     Medication Contract and Consent for Opioid Use Documents Filed       Patient Documents       Type of Document Status Date Received Received By Description    Medication Contract Received 7/1/2019 11:37 AM JONAH ANAND PAIN MGMT CONTRACT DR. VILLANUEVA    Medication Contract Signed 7/24/2019 10:15 AM KALIA BYRD medication contract    Medication Contract Received 10/27/2022  3:10 PM GRACIE HEATON Paint Agreement    Medication Contract Received 9/19/2023 11:58 AM MELITA BARONE PAIN AGREEMENT                    /69   Pulse 64   Temp 98.4 °F (36.9 °C) (Infrared)   Resp 16   Ht 1.727 m (5' 8\")   Wt (!) 140.6 kg (309 lb 15.5 oz)   SpO2 93%   BMI 47.13 kg/m²      No LMP recorded.

## 2024-05-14 NOTE — PROGRESS NOTES
[facility]  [address] [place] [address]    Follow up Note      Monique Voss     Date of Visit:  2024    CC:  Patient presents for follow up   Chief Complaint   Patient presents with    Follow-up     Low back pain radiating to bilateral hips         HPI:    Pain is unchanged.    Appropriate analgesia with current medications regimen: yes.    Change in quality of symptoms:no.    Medication side effects:none.   Recent diagnostic testing:none.   Recent interventional procedures: None.     She has been on anticoagulation medications to include ASA and has not been on herbal supplements.  She is diabetic.     Imagin2023 EMG UEs    Diagnostic Interpretation:      This study was abnormal.      1. 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 It is chronic in duration. This is consistent with the clinical diagnosis of carpal tunnel syndrome. Prognosis for recovery of demyelinating lesions is good.          10/2021 lumbar MRI -      BONES/ALIGNMENT: There is normal alignment of the spine.  Mild-to-moderate   levoscoliosis of the lumbar spine is again noted.  The vertebral body heights   are maintained.  There is spurring and disc desiccation at multiple levels   with mild-to-moderate disc space narrowing at L1-2, L2-3, L3-4 and L4-5.   Slight subchondral signal change is seen, most notably at L2-3 and L4-5.   This is most likely related to degenerative disc disease.  Otherwise, the   bone marrow signal appears unremarkable.       SPINAL CORD: The conus terminates normally.       SOFT TISSUES: No paraspinal mass identified.       The spinal canal is somewhat congenitally narrowed.       L1-L2: There is disc bulge and severe right and moderate left posterior facet   degenerative change with ligamentum flavum

## 2024-05-15 LAB
6-MAM, QUANTITATIVE, URINE: <10 NG/ML
6-MONOACETYLMORPHINE, URINE: NEGATIVE
7-AMINOCLONAZEPAM, QUANTITATIVE, URINE: <50 NG/ML
ABNORMAL SPECIMEN VALIDITY TEST: ABNORMAL
ALCOHOL URINE: NOT DETECTED MG/DL
ALPHA-HYDROXYALPRAZOLAM, QUANTITATIVE, URINE: <50 NG/ML
ALPHA-HYDROXYMIDAZOLAM, QUANTITATIVE, URINE: <50 NG/ML
ALPHA-HYDROXYTRIAZOLAM, QUANTITATIVE, URINE: <50 NG/ML
ALPRAZOLAM URINE QUANT: <50 NG/ML
AMPHETAMINE SCREEN URINE: NEGATIVE
BARBITURATE SCREEN URINE: NEGATIVE
BENZODIAZEPINE SCREEN, URINE: NEGATIVE
BUPRENORPHINE URINE: NEGATIVE
CANNABINOID SCREEN URINE: NEGATIVE
CHLORDIAZEPOXIDE, QUANTITATIVE, URINE: <50 NG/ML
CLONAZEPAM, QUANTITATIVE, URINE: <50 NG/ML
COCAINE METABOLITE, URINE: NEGATIVE
CODEINE, QUANTITATIVE, URINE: <50 NG/ML
COMPLIANCE DRUG ANALYSIS, URINE: NORMAL
DIAZEPAM URINE QUANT: <50 NG/ML
FENTANYL URINE: NEGATIVE
FLUNITRAZEPAM, QUANTITATIVE, URINE: <50 NG/ML
FLURAZEPAM, QUANTITATIVE, URINE: <50 NG/ML
HYDROCODONE, QUANTITATIVE, URINE: <50 NG/ML
HYDROMORPHONE, QUANTITATIVE, URINE: <50 NG/ML
INTEGRITY CHECK, CREATININE, URINE: 148.4 MG/DL (ref 22–250)
INTEGRITY CHECK, OXIDANT, URINE: <40 MG/L
INTEGRITY CHECK, PH, URINE: 6.4 (ref 4.5–9)
INTEGRITY CHECK, SPECIFIC GRAVITY, URINE: 1.01 (ref 1–1.03)
LORAZEPAM URINE QUANT: <50 NG/ML
METHADONE SCREEN, URINE: NEGATIVE
MIDAZOLAM URINE QUANT: <50 NG/ML
MORPHINE, QUANTITATIVE, URINE: <50 NG/ML
NORDIAZEPAM URINE QUANT: <50 NG/ML
NORHYDROCODONE, QUANTITATIVE, URINE: <50 NG/ML
NOROXYCODONE, QUANTITATIVE, URINE: >1000 NG/ML
OPIATES, URINE: NEGATIVE
OXAZEPAM URINE QUANT: <50 NG/ML
OXYCODONE SCREEN URINE: POSITIVE
OXYCODONE URINE, QUANTITATIVE: >1000 NG/ML
OXYMORPHONE, QUANTITATIVE, URINE: >1000 NG/ML
PCP,URINE: NEGATIVE
TEMAZEPAM, QUANTITATIVE, URINE: <50 NG/ML
TEST INFORMATION: ABNORMAL
TRAMADOL, URINE: NEGATIVE

## 2024-05-16 ENCOUNTER — TELEPHONE (OUTPATIENT)
Dept: PAIN MANAGEMENT | Age: 49
End: 2024-05-16

## 2024-05-16 NOTE — TELEPHONE ENCOUNTER
Monique is scheduled to have a lumbar epidural steroid injection with Dr. Austin on 5/28/2024.  Would it be acceptable for her to hold plavix 7 days prior and aspirin 7 days prior to injection?    Please advise, thanks.

## 2024-05-18 DIAGNOSIS — B37.31 YEAST VAGINITIS: ICD-10-CM

## 2024-05-18 DIAGNOSIS — I50.22 CHRONIC SYSTOLIC HEART FAILURE (HCC): ICD-10-CM

## 2024-05-18 DIAGNOSIS — E78.5 HYPERLIPIDEMIA LDL GOAL <70: ICD-10-CM

## 2024-05-18 DIAGNOSIS — Z79.899 NEW MEDICATION ADDED: ICD-10-CM

## 2024-05-18 DIAGNOSIS — F41.9 ANXIETY: ICD-10-CM

## 2024-05-20 DIAGNOSIS — E78.5 HYPERLIPIDEMIA LDL GOAL <70: ICD-10-CM

## 2024-05-20 RX ORDER — ATORVASTATIN CALCIUM 80 MG/1
80 TABLET, FILM COATED ORAL NIGHTLY
Qty: 30 TABLET | Refills: 3 | Status: SHIPPED | OUTPATIENT
Start: 2024-05-20

## 2024-05-20 RX ORDER — ATORVASTATIN CALCIUM 80 MG/1
TABLET, FILM COATED ORAL
Qty: 30 TABLET | Refills: 3 | OUTPATIENT
Start: 2024-05-20

## 2024-05-20 RX ORDER — CITALOPRAM 40 MG/1
40 TABLET ORAL DAILY
Qty: 30 TABLET | Refills: 5 | Status: SHIPPED | OUTPATIENT
Start: 2024-05-20

## 2024-05-20 RX ORDER — EMPAGLIFLOZIN 10 MG/1
10 TABLET, FILM COATED ORAL DAILY
Qty: 30 TABLET | Refills: 11 | Status: SHIPPED | OUTPATIENT
Start: 2024-05-20

## 2024-05-20 RX ORDER — FLUCONAZOLE 150 MG/1
TABLET ORAL
Qty: 2 TABLET | Refills: 0 | Status: SHIPPED | OUTPATIENT
Start: 2024-05-20

## 2024-05-21 ENCOUNTER — TELEPHONE (OUTPATIENT)
Dept: PAIN MANAGEMENT | Age: 49
End: 2024-05-21

## 2024-05-21 NOTE — TELEPHONE ENCOUNTER
'S office called our office, staying that Monique was last seen in February. She canceled her last two appointments, so they can give any information regarding her having an epidural done.

## 2024-05-23 ENCOUNTER — TELEPHONE (OUTPATIENT)
Dept: PAIN MANAGEMENT | Age: 49
End: 2024-05-23

## 2024-05-23 NOTE — TELEPHONE ENCOUNTER
Monique is scheduled to have a lumbar epidural steroid injection with Dr. Austin.  Our PA, Alvina Monroe is asking for clearance for Monique to have the injection due to her history of hidradenitis suppurativa.     Please advise.

## 2024-06-11 ENCOUNTER — TELEPHONE (OUTPATIENT)
Dept: PAIN MANAGEMENT | Age: 49
End: 2024-06-11

## 2024-06-11 NOTE — TELEPHONE ENCOUNTER
OLAF Amaya.  We are going to have to cancel her injection due to being placed on antibiotics for a minimum of 16 weeks.  Advised her to call the office back and make an appointment to see Dr. Austin to discuss further options.

## 2024-06-13 ENCOUNTER — OFFICE VISIT (OUTPATIENT)
Dept: PAIN MANAGEMENT | Age: 49
End: 2024-06-13
Payer: MEDICAID

## 2024-06-13 VITALS
HEIGHT: 68 IN | SYSTOLIC BLOOD PRESSURE: 111 MMHG | DIASTOLIC BLOOD PRESSURE: 74 MMHG | HEART RATE: 89 BPM | RESPIRATION RATE: 18 BRPM | BODY MASS INDEX: 44.41 KG/M2 | WEIGHT: 293 LBS | TEMPERATURE: 98.1 F | OXYGEN SATURATION: 96 %

## 2024-06-13 DIAGNOSIS — M54.42 CHRONIC BILATERAL LOW BACK PAIN WITH BILATERAL SCIATICA: ICD-10-CM

## 2024-06-13 DIAGNOSIS — G89.29 CHRONIC BILATERAL LOW BACK PAIN WITH BILATERAL SCIATICA: ICD-10-CM

## 2024-06-13 DIAGNOSIS — M25.552 LEFT HIP PAIN: ICD-10-CM

## 2024-06-13 DIAGNOSIS — K59.03 DRUG-INDUCED CONSTIPATION: ICD-10-CM

## 2024-06-13 DIAGNOSIS — F41.9 ANXIETY: ICD-10-CM

## 2024-06-13 DIAGNOSIS — M54.41 CHRONIC BILATERAL LOW BACK PAIN WITH BILATERAL SCIATICA: ICD-10-CM

## 2024-06-13 DIAGNOSIS — M16.12 PRIMARY OSTEOARTHRITIS OF LEFT HIP: ICD-10-CM

## 2024-06-13 DIAGNOSIS — G89.4 CHRONIC PAIN SYNDROME: Primary | ICD-10-CM

## 2024-06-13 DIAGNOSIS — M54.16 LUMBAR RADICULOPATHY: ICD-10-CM

## 2024-06-13 DIAGNOSIS — M48.062 SPINAL STENOSIS OF LUMBAR REGION WITH NEUROGENIC CLAUDICATION: ICD-10-CM

## 2024-06-13 DIAGNOSIS — G89.4 CHRONIC PAIN SYNDROME: ICD-10-CM

## 2024-06-13 PROCEDURE — 3078F DIAST BP <80 MM HG: CPT | Performed by: PHYSICIAN ASSISTANT

## 2024-06-13 PROCEDURE — 99213 OFFICE O/P EST LOW 20 MIN: CPT | Performed by: PHYSICIAN ASSISTANT

## 2024-06-13 PROCEDURE — 4004F PT TOBACCO SCREEN RCVD TLK: CPT | Performed by: PHYSICIAN ASSISTANT

## 2024-06-13 PROCEDURE — G8427 DOCREV CUR MEDS BY ELIG CLIN: HCPCS | Performed by: PHYSICIAN ASSISTANT

## 2024-06-13 PROCEDURE — G8417 CALC BMI ABV UP PARAM F/U: HCPCS | Performed by: PHYSICIAN ASSISTANT

## 2024-06-13 PROCEDURE — 3074F SYST BP LT 130 MM HG: CPT | Performed by: PHYSICIAN ASSISTANT

## 2024-06-13 RX ORDER — LACTULOSE 10 G/15ML
SOLUTION ORAL
Qty: 450 ML | Refills: 5 | OUTPATIENT
Start: 2024-06-13

## 2024-06-13 RX ORDER — CITALOPRAM 40 MG/1
40 TABLET ORAL DAILY
Qty: 30 TABLET | Refills: 5 | Status: SHIPPED | OUTPATIENT
Start: 2024-06-13

## 2024-06-13 RX ORDER — GABAPENTIN 800 MG/1
800 TABLET ORAL 3 TIMES DAILY
Qty: 90 TABLET | Refills: 0 | Status: SHIPPED | OUTPATIENT
Start: 2024-06-13 | End: 2024-07-13

## 2024-06-13 RX ORDER — CYCLOBENZAPRINE HCL 5 MG
5 TABLET ORAL 2 TIMES DAILY PRN
Qty: 60 TABLET | Refills: 0 | Status: SHIPPED | OUTPATIENT
Start: 2024-06-13 | End: 2024-07-13

## 2024-06-13 RX ORDER — OXYCODONE HYDROCHLORIDE AND ACETAMINOPHEN 5; 325 MG/1; MG/1
1 TABLET ORAL 3 TIMES DAILY PRN
Qty: 90 TABLET | Refills: 0 | Status: SHIPPED | OUTPATIENT
Start: 2024-06-17 | End: 2024-07-17

## 2024-06-13 NOTE — PROGRESS NOTES
Monique Voss presents to the [facility] on 6/13/2024. Monique is complaining of pain low back radiated to bilateral hips. The pain is constant. The pain is described as aching, throbbing, sharp, and burning. Pain is rated on her best day at a 8, on her worst day at a 10, and on average at a 8 on the VAS scale. She took her last dose of Percocet, Neurontin, and Flexeril today.     Any procedures since your last visit: No    Pacemaker or defibrillator: No  She is not on NSAIDS and is  on anticoagulation medications to include ASA and Plavix, is managed by Dr. Harmon.     Medication Contract and Consent for Opioid Use Documents Filed       Patient Documents       Type of Document Status Date Received Received By Description    Medication Contract Received 7/1/2019 11:37 AM JONAH ANAND PAIN MGMT CONTRACT DR. VILLANUEVA    Medication Contract Signed 7/24/2019 10:15 AM KALIA BYRD medication contract    Medication Contract Received 10/27/2022  3:10 PM GRACIE HEATON Paint Agreement    Medication Contract Received 9/19/2023 11:58 AM MELITA BARONE PAIN AGREEMENT                    There were no vitals taken for this visit.     No LMP recorded.

## 2024-06-13 NOTE — PROGRESS NOTES
[facility]  [address] [place] [id number]    Follow up Note      Monique Voss     Date of Visit:  2024    CC:  Patient presents for follow up   Chief Complaint   Patient presents with    Back Pain         HPI:    Pain is a little worse to left hip.   Appropriate analgesia with current medications regimen: yes.    Change in quality of symptoms:no.    Medication side effects:none.   Recent diagnostic testing:none.   Recent interventional procedures: None.     She has been on anticoagulation medications to include ASA and has not been on herbal supplements.  She is diabetic.     Imagin2023 EMG UEs    Diagnostic Interpretation:      This study was abnormal.      1. 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 It is chronic in duration. This is consistent with the clinical diagnosis of carpal tunnel syndrome. Prognosis for recovery of demyelinating lesions is good.          10/2021 lumbar MRI -      BONES/ALIGNMENT: There is normal alignment of the spine.  Mild-to-moderate   levoscoliosis of the lumbar spine is again noted.  The vertebral body heights   are maintained.  There is spurring and disc desiccation at multiple levels   with mild-to-moderate disc space narrowing at L1-2, L2-3, L3-4 and L4-5.   Slight subchondral signal change is seen, most notably at L2-3 and L4-5.   This is most likely related to degenerative disc disease.  Otherwise, the   bone marrow signal appears unremarkable.       SPINAL CORD: The conus terminates normally.       SOFT TISSUES: No paraspinal mass identified.       The spinal canal is somewhat congenitally narrowed.       L1-L2: There is disc bulge and severe right and moderate left posterior facet   degenerative change with ligamentum flavum hypertrophy causing moderate   central

## 2024-06-25 DIAGNOSIS — I42.9 CARDIOMYOPATHY, UNSPECIFIED TYPE (HCC): ICD-10-CM

## 2024-06-25 DIAGNOSIS — I50.22 CHRONIC SYSTOLIC HEART FAILURE (HCC): ICD-10-CM

## 2024-06-25 DIAGNOSIS — B37.31 YEAST VAGINITIS: ICD-10-CM

## 2024-06-25 DIAGNOSIS — K21.9 GASTROESOPHAGEAL REFLUX DISEASE WITHOUT ESOPHAGITIS: ICD-10-CM

## 2024-06-25 RX ORDER — CLOPIDOGREL BISULFATE 75 MG/1
75 TABLET ORAL DAILY
Qty: 30 TABLET | Refills: 11 | Status: SHIPPED | OUTPATIENT
Start: 2024-06-25

## 2024-06-25 RX ORDER — ALBUTEROL SULFATE 90 UG/1
2 AEROSOL, METERED RESPIRATORY (INHALATION) EVERY 4 HOURS PRN
Qty: 18 G | Refills: 3 | Status: SHIPPED
Start: 2024-06-25 | End: 2024-07-02

## 2024-06-25 RX ORDER — SPIRONOLACTONE 25 MG/1
25 TABLET ORAL DAILY
Qty: 30 TABLET | Refills: 3 | Status: SHIPPED | OUTPATIENT
Start: 2024-06-25

## 2024-06-25 RX ORDER — SACUBITRIL AND VALSARTAN 97; 103 MG/1; MG/1
TABLET, FILM COATED ORAL
Qty: 60 TABLET | Refills: 3 | Status: SHIPPED
Start: 2024-06-25 | End: 2024-07-02

## 2024-06-25 RX ORDER — BLOOD-GLUCOSE METER
EACH MISCELLANEOUS
Qty: 1 KIT | Refills: 3 | Status: SHIPPED
Start: 2024-06-25 | End: 2024-07-02

## 2024-06-25 RX ORDER — PANTOPRAZOLE SODIUM 20 MG/1
20 TABLET, DELAYED RELEASE ORAL DAILY
Qty: 30 TABLET | Refills: 3 | Status: SHIPPED | OUTPATIENT
Start: 2024-06-25

## 2024-06-25 RX ORDER — FLUCONAZOLE 150 MG/1
TABLET ORAL
Qty: 2 TABLET | Refills: 3 | Status: SHIPPED
Start: 2024-06-25 | End: 2024-07-02

## 2024-07-02 ENCOUNTER — HOSPITAL ENCOUNTER (OUTPATIENT)
Age: 49
Setting detail: OBSERVATION
Discharge: HOME | End: 2024-07-05
Attending: STUDENT IN AN ORGANIZED HEALTH CARE EDUCATION/TRAINING PROGRAM | Admitting: INTERNAL MEDICINE
Payer: MEDICARE

## 2024-07-02 ENCOUNTER — APPOINTMENT (OUTPATIENT)
Dept: CT IMAGING | Age: 49
End: 2024-07-02
Payer: MEDICARE

## 2024-07-02 ENCOUNTER — APPOINTMENT (OUTPATIENT)
Dept: MRI IMAGING | Age: 49
End: 2024-07-02
Payer: MEDICARE

## 2024-07-02 DIAGNOSIS — R93.89 ABNORMAL MRI: ICD-10-CM

## 2024-07-02 DIAGNOSIS — H53.9 VISION CHANGES: ICD-10-CM

## 2024-07-02 DIAGNOSIS — J41.8 MIXED SIMPLE AND MUCOPURULENT CHRONIC BRONCHITIS (HCC): ICD-10-CM

## 2024-07-02 DIAGNOSIS — R42 DIZZINESS: Primary | ICD-10-CM

## 2024-07-02 LAB
ALBUMIN SERPL-MCNC: 3.4 G/DL (ref 3.5–5.2)
ALP SERPL-CCNC: 93 U/L (ref 35–104)
ALT SERPL-CCNC: 11 U/L (ref 0–32)
ANION GAP SERPL CALCULATED.3IONS-SCNC: 10 MMOL/L (ref 7–16)
AST SERPL-CCNC: 12 U/L (ref 0–31)
BACTERIA URNS QL MICRO: ABNORMAL
BASOPHILS # BLD: 0.06 K/UL (ref 0–0.2)
BASOPHILS NFR BLD: 1 % (ref 0–2)
BILIRUB SERPL-MCNC: 0.3 MG/DL (ref 0–1.2)
BILIRUB UR QL STRIP: NEGATIVE
BNP SERPL-MCNC: 180 PG/ML (ref 0–125)
BUN SERPL-MCNC: 9 MG/DL (ref 6–20)
CALCIUM SERPL-MCNC: 8.6 MG/DL (ref 8.6–10.2)
CHLORIDE SERPL-SCNC: 107 MMOL/L (ref 98–107)
CLARITY UR: ABNORMAL
CO2 SERPL-SCNC: 23 MMOL/L (ref 22–29)
COLOR UR: YELLOW
CREAT SERPL-MCNC: 0.9 MG/DL (ref 0.5–1)
EKG ATRIAL RATE: 52 BPM
EKG P AXIS: 82 DEGREES
EKG P-R INTERVAL: 196 MS
EKG Q-T INTERVAL: 500 MS
EKG QRS DURATION: 136 MS
EKG QTC CALCULATION (BAZETT): 465 MS
EKG R AXIS: -70 DEGREES
EKG T AXIS: 18 DEGREES
EKG VENTRICULAR RATE: 52 BPM
EOSINOPHIL # BLD: 0.28 K/UL (ref 0.05–0.5)
EOSINOPHILS RELATIVE PERCENT: 3 % (ref 0–6)
EPI CELLS #/AREA URNS HPF: ABNORMAL /HPF
ERYTHROCYTE [DISTWIDTH] IN BLOOD BY AUTOMATED COUNT: 14.3 % (ref 11.5–15)
GFR, ESTIMATED: 83 ML/MIN/1.73M2
GLUCOSE SERPL-MCNC: 96 MG/DL (ref 74–99)
GLUCOSE UR STRIP-MCNC: 100 MG/DL
HCT VFR BLD AUTO: 45.9 % (ref 34–48)
HGB BLD-MCNC: 15.1 G/DL (ref 11.5–15.5)
HGB UR QL STRIP.AUTO: NEGATIVE
IMM GRANULOCYTES # BLD AUTO: 0.03 K/UL (ref 0–0.58)
IMM GRANULOCYTES NFR BLD: 0 % (ref 0–5)
INR PPP: 1.1
KETONES UR STRIP-MCNC: NEGATIVE MG/DL
LEUKOCYTE ESTERASE UR QL STRIP: NEGATIVE
LYMPHOCYTES NFR BLD: 3.91 K/UL (ref 1.5–4)
LYMPHOCYTES RELATIVE PERCENT: 42 % (ref 20–42)
MCH RBC QN AUTO: 32.1 PG (ref 26–35)
MCHC RBC AUTO-ENTMCNC: 32.9 G/DL (ref 32–34.5)
MCV RBC AUTO: 97.7 FL (ref 80–99.9)
MONOCYTES NFR BLD: 0.67 K/UL (ref 0.1–0.95)
MONOCYTES NFR BLD: 7 % (ref 2–12)
NEUTROPHILS NFR BLD: 47 % (ref 43–80)
NEUTS SEG NFR BLD: 4.32 K/UL (ref 1.8–7.3)
NITRITE UR QL STRIP: NEGATIVE
PARTIAL THROMBOPLASTIN TIME: 31.1 SEC (ref 24.5–35.1)
PH UR STRIP: 7 [PH] (ref 5–9)
PLATELET # BLD AUTO: 294 K/UL (ref 130–450)
PMV BLD AUTO: 10.2 FL (ref 7–12)
POTASSIUM SERPL-SCNC: 4.3 MMOL/L (ref 3.5–5)
PROT SERPL-MCNC: 6.9 G/DL (ref 6.4–8.3)
PROT UR STRIP-MCNC: NEGATIVE MG/DL
PROTHROMBIN TIME: 11.9 SEC (ref 9.3–12.4)
RBC # BLD AUTO: 4.7 M/UL (ref 3.5–5.5)
RBC #/AREA URNS HPF: ABNORMAL /HPF
SODIUM SERPL-SCNC: 140 MMOL/L (ref 132–146)
SP GR UR STRIP: 1.01 (ref 1–1.03)
TROPONIN I SERPL HS-MCNC: 14 NG/L (ref 0–9)
TROPONIN I SERPL HS-MCNC: 17 NG/L (ref 0–9)
UROBILINOGEN UR STRIP-ACNC: 0.2 EU/DL (ref 0–1)
WBC #/AREA URNS HPF: ABNORMAL /HPF
WBC OTHER # BLD: 9.3 K/UL (ref 4.5–11.5)

## 2024-07-02 PROCEDURE — 70551 MRI BRAIN STEM W/O DYE: CPT

## 2024-07-02 PROCEDURE — 2580000003 HC RX 258: Performed by: STUDENT IN AN ORGANIZED HEALTH CARE EDUCATION/TRAINING PROGRAM

## 2024-07-02 PROCEDURE — 85025 COMPLETE CBC W/AUTO DIFF WBC: CPT

## 2024-07-02 PROCEDURE — 85730 THROMBOPLASTIN TIME PARTIAL: CPT

## 2024-07-02 PROCEDURE — 93005 ELECTROCARDIOGRAM TRACING: CPT | Performed by: STUDENT IN AN ORGANIZED HEALTH CARE EDUCATION/TRAINING PROGRAM

## 2024-07-02 PROCEDURE — 83880 ASSAY OF NATRIURETIC PEPTIDE: CPT

## 2024-07-02 PROCEDURE — 6360000002 HC RX W HCPCS: Performed by: STUDENT IN AN ORGANIZED HEALTH CARE EDUCATION/TRAINING PROGRAM

## 2024-07-02 PROCEDURE — 70496 CT ANGIOGRAPHY HEAD: CPT

## 2024-07-02 PROCEDURE — 0042T CT BRAIN PERFUSION: CPT

## 2024-07-02 PROCEDURE — 80053 COMPREHEN METABOLIC PANEL: CPT

## 2024-07-02 PROCEDURE — 99285 EMERGENCY DEPT VISIT HI MDM: CPT

## 2024-07-02 PROCEDURE — 93010 ELECTROCARDIOGRAM REPORT: CPT | Performed by: INTERNAL MEDICINE

## 2024-07-02 PROCEDURE — 81001 URINALYSIS AUTO W/SCOPE: CPT

## 2024-07-02 PROCEDURE — 70450 CT HEAD/BRAIN W/O DYE: CPT

## 2024-07-02 PROCEDURE — 85610 PROTHROMBIN TIME: CPT

## 2024-07-02 PROCEDURE — 6370000000 HC RX 637 (ALT 250 FOR IP): Performed by: STUDENT IN AN ORGANIZED HEALTH CARE EDUCATION/TRAINING PROGRAM

## 2024-07-02 PROCEDURE — 6360000004 HC RX CONTRAST MEDICATION: Performed by: RADIOLOGY

## 2024-07-02 PROCEDURE — 84484 ASSAY OF TROPONIN QUANT: CPT

## 2024-07-02 PROCEDURE — 96374 THER/PROPH/DIAG INJ IV PUSH: CPT

## 2024-07-02 PROCEDURE — 70498 CT ANGIOGRAPHY NECK: CPT

## 2024-07-02 RX ORDER — OXYCODONE HYDROCHLORIDE AND ACETAMINOPHEN 5; 325 MG/1; MG/1
1 TABLET ORAL ONCE
Status: COMPLETED | OUTPATIENT
Start: 2024-07-02 | End: 2024-07-02

## 2024-07-02 RX ORDER — METOPROLOL SUCCINATE 50 MG/1
50 TABLET, EXTENDED RELEASE ORAL DAILY
COMMUNITY

## 2024-07-02 RX ORDER — LORAZEPAM 2 MG/ML
1 INJECTION INTRAMUSCULAR ONCE
Status: COMPLETED | OUTPATIENT
Start: 2024-07-02 | End: 2024-07-02

## 2024-07-02 RX ORDER — FLUTICASONE PROPIONATE 50 MCG
2 SPRAY, SUSPENSION (ML) NASAL DAILY PRN
COMMUNITY

## 2024-07-02 RX ORDER — BUMETANIDE 1 MG/1
2 TABLET ORAL EVERY OTHER DAY
COMMUNITY

## 2024-07-02 RX ORDER — BUMETANIDE 1 MG/1
1 TABLET ORAL EVERY OTHER DAY
COMMUNITY

## 2024-07-02 RX ORDER — MECLIZINE HYDROCHLORIDE 25 MG/1
25 TABLET ORAL 3 TIMES DAILY PRN
Qty: 15 TABLET | Refills: 0 | Status: SHIPPED | OUTPATIENT
Start: 2024-07-02 | End: 2024-07-12

## 2024-07-02 RX ORDER — SACUBITRIL AND VALSARTAN 97; 103 MG/1; MG/1
1 TABLET, FILM COATED ORAL 2 TIMES DAILY
COMMUNITY

## 2024-07-02 RX ORDER — POLYETHYLENE GLYCOL 3350 17 G/17G
17 POWDER, FOR SOLUTION ORAL DAILY PRN
COMMUNITY

## 2024-07-02 RX ORDER — LINEZOLID 600 MG/1
600 TABLET, FILM COATED ORAL 2 TIMES DAILY
Status: ON HOLD | COMMUNITY
Start: 2024-06-18 | End: 2024-07-05 | Stop reason: HOSPADM

## 2024-07-02 RX ORDER — 0.9 % SODIUM CHLORIDE 0.9 %
1000 INTRAVENOUS SOLUTION INTRAVENOUS ONCE
Status: COMPLETED | OUTPATIENT
Start: 2024-07-02 | End: 2024-07-02

## 2024-07-02 RX ORDER — MECLIZINE HCL 12.5 MG/1
25 TABLET ORAL ONCE
Status: COMPLETED | OUTPATIENT
Start: 2024-07-02 | End: 2024-07-02

## 2024-07-02 RX ORDER — NYSTATIN 100000 [USP'U]/G
POWDER TOPICAL 3 TIMES DAILY
COMMUNITY

## 2024-07-02 RX ORDER — ASPIRIN 81 MG/1
81 TABLET, CHEWABLE ORAL DAILY
COMMUNITY

## 2024-07-02 RX ORDER — FLUCONAZOLE 150 MG/1
150 TABLET ORAL SEE ADMIN INSTRUCTIONS
COMMUNITY

## 2024-07-02 RX ORDER — OXYCODONE HYDROCHLORIDE AND ACETAMINOPHEN 5; 325 MG/1; MG/1
1 TABLET ORAL EVERY 8 HOURS PRN
COMMUNITY

## 2024-07-02 RX ORDER — LACTULOSE 10 G/15ML
10 SOLUTION ORAL DAILY PRN
COMMUNITY

## 2024-07-02 RX ORDER — ALBUTEROL SULFATE 90 UG/1
2 AEROSOL, METERED RESPIRATORY (INHALATION) EVERY 4 HOURS PRN
COMMUNITY

## 2024-07-02 RX ADMIN — SODIUM CHLORIDE 1000 ML: 9 INJECTION, SOLUTION INTRAVENOUS at 13:37

## 2024-07-02 RX ADMIN — IOPAMIDOL 105 ML: 755 INJECTION, SOLUTION INTRAVENOUS at 12:43

## 2024-07-02 RX ADMIN — OXYCODONE HYDROCHLORIDE AND ACETAMINOPHEN 1 TABLET: 5; 325 TABLET ORAL at 20:02

## 2024-07-02 RX ADMIN — MECLIZINE 25 MG: 12.5 TABLET ORAL at 13:36

## 2024-07-02 RX ADMIN — LORAZEPAM 1 MG: 2 INJECTION INTRAMUSCULAR; INTRAVENOUS at 21:56

## 2024-07-02 ASSESSMENT — PAIN - FUNCTIONAL ASSESSMENT: PAIN_FUNCTIONAL_ASSESSMENT: PREVENTS OR INTERFERES SOME ACTIVE ACTIVITIES AND ADLS

## 2024-07-02 ASSESSMENT — PAIN SCALES - GENERAL: PAINLEVEL_OUTOF10: 9

## 2024-07-02 NOTE — ED PROVIDER NOTES
(!) 137 kg (302 lb 0.5 oz)   07/03/24 1400 133/83 -- -- 69 18 98 % -- --   07/03/24 1330 127/83 -- -- 69 20 98 % -- --   07/03/24 1300 126/83 -- -- 67 18 96 % -- --   07/03/24 1230 114/64 -- -- 69 18 97 % -- --   07/03/24 1200 104/68 -- -- 73 18 99 % -- --   07/03/24 1130 109/79 -- -- 84 16 99 % -- --   07/03/24 1100 121/65 -- -- 76 20 96 % -- --   07/03/24 1030 126/74 -- -- 75 19 99 % -- --   07/03/24 0936 117/63 -- -- 68 18 98 % -- --   07/03/24 0930 117/63 -- -- 58 17 98 % -- --   07/03/24 0905 -- -- -- 57 10 -- -- --   07/03/24 0904 -- -- -- 64 14 -- -- --   07/03/24 0900 129/78 -- -- 60 15 100 % -- --   07/03/24 0855 125/77 -- -- -- -- -- -- --   07/03/24 0830 128/77 -- -- 61 16 100 % -- --   07/03/24 0800 (!) 140/118 -- -- 60 16 100 % -- --   07/03/24 0730 118/69 -- -- 63 16 97 % -- --   07/03/24 0700 129/77 -- -- 67 23 100 % -- --   07/03/24 0637 112/74 97.6 °F (36.4 °C) Oral 66 18 96 % -- --   07/03/24 0430 (!) 116/58 -- -- 64 22 94 % -- --   07/03/24 0330 122/81 -- -- 61 15 95 % -- --   07/03/24 0230 138/81 -- -- 63 15 98 % -- --   07/03/24 0207 -- -- -- -- -- -- -- (!) 140.2 kg (309 lb 1.4 oz)   07/03/24 0100 (!) 112/44 -- -- 79 25 98 % -- --       Oxygen Saturation Interpretation: Normal    ------------------------------------------ PROGRESS NOTES ------------------------------------------  Re-evaluation(s):  Time: 4p  Patient’s symptoms are improving  Repeat physical examination is improved    Counseling:  I have spoken with the patient and discussed today’s results, in addition to providing specific details for the plan of care and counseling regarding the diagnosis and prognosis.  Their questions are answered at this time and they are agreeable with the plan of admission.    --------------------------------- ADDITIONAL PROVIDER NOTES ---------------------------------  Consultations:   Spoke with [facility].  Discussed case.  They will admit the patient.  This patient's ED course included: a personal history

## 2024-07-03 PROBLEM — I50.9 HEART FAILURE (HCC): Status: RESOLVED | Noted: 2020-06-02 | Resolved: 2024-07-03

## 2024-07-03 PROBLEM — R42 VERTIGO: Status: ACTIVE | Noted: 2024-07-03

## 2024-07-03 PROBLEM — E87.5 HYPERKALEMIA: Status: RESOLVED | Noted: 2018-03-20 | Resolved: 2024-07-03

## 2024-07-03 LAB
ALBUMIN SERPL-MCNC: 3.2 G/DL (ref 3.5–5.2)
ALP SERPL-CCNC: 88 U/L (ref 35–104)
ALT SERPL-CCNC: 10 U/L (ref 0–32)
ANION GAP SERPL CALCULATED.3IONS-SCNC: 8 MMOL/L (ref 7–16)
AST SERPL-CCNC: 12 U/L (ref 0–31)
BILIRUB SERPL-MCNC: 0.3 MG/DL (ref 0–1.2)
BUN SERPL-MCNC: 10 MG/DL (ref 6–20)
CALCIUM SERPL-MCNC: 8.5 MG/DL (ref 8.6–10.2)
CHLORIDE SERPL-SCNC: 109 MMOL/L (ref 98–107)
CHOLEST SERPL-MCNC: 188 MG/DL
CO2 SERPL-SCNC: 21 MMOL/L (ref 22–29)
CREAT SERPL-MCNC: 0.7 MG/DL (ref 0.5–1)
ERYTHROCYTE [DISTWIDTH] IN BLOOD BY AUTOMATED COUNT: 14.5 % (ref 11.5–15)
GFR, ESTIMATED: >90 ML/MIN/1.73M2
GLUCOSE SERPL-MCNC: 84 MG/DL (ref 74–99)
HBA1C MFR BLD: 5.5 % (ref 4–5.6)
HCT VFR BLD AUTO: 43.4 % (ref 34–48)
HDLC SERPL-MCNC: 43 MG/DL
HGB BLD-MCNC: 14 G/DL (ref 11.5–15.5)
LDLC SERPL CALC-MCNC: 113 MG/DL
MAGNESIUM SERPL-MCNC: 2.1 MG/DL (ref 1.6–2.6)
MCH RBC QN AUTO: 31.9 PG (ref 26–35)
MCHC RBC AUTO-ENTMCNC: 32.3 G/DL (ref 32–34.5)
MCV RBC AUTO: 98.9 FL (ref 80–99.9)
PHOSPHATE SERPL-MCNC: 2.7 MG/DL (ref 2.5–4.5)
PLATELET # BLD AUTO: 249 K/UL (ref 130–450)
PMV BLD AUTO: 10 FL (ref 7–12)
POTASSIUM SERPL-SCNC: 4.3 MMOL/L (ref 3.5–5)
PROT SERPL-MCNC: 6.4 G/DL (ref 6.4–8.3)
RBC # BLD AUTO: 4.39 M/UL (ref 3.5–5.5)
SODIUM SERPL-SCNC: 138 MMOL/L (ref 132–146)
TRIGL SERPL-MCNC: 158 MG/DL
TSH SERPL DL<=0.05 MIU/L-ACNC: 2.47 UIU/ML (ref 0.27–4.2)
VLDLC SERPL CALC-MCNC: 32 MG/DL
WBC OTHER # BLD: 8.3 K/UL (ref 4.5–11.5)

## 2024-07-03 PROCEDURE — 85027 COMPLETE CBC AUTOMATED: CPT

## 2024-07-03 PROCEDURE — 94640 AIRWAY INHALATION TREATMENT: CPT

## 2024-07-03 PROCEDURE — 80053 COMPREHEN METABOLIC PANEL: CPT

## 2024-07-03 PROCEDURE — 83036 HEMOGLOBIN GLYCOSYLATED A1C: CPT

## 2024-07-03 PROCEDURE — 2580000003 HC RX 258: Performed by: INTERNAL MEDICINE

## 2024-07-03 PROCEDURE — 94664 DEMO&/EVAL PT USE INHALER: CPT

## 2024-07-03 PROCEDURE — G0378 HOSPITAL OBSERVATION PER HR: HCPCS

## 2024-07-03 PROCEDURE — 36415 COLL VENOUS BLD VENIPUNCTURE: CPT

## 2024-07-03 PROCEDURE — 84100 ASSAY OF PHOSPHORUS: CPT

## 2024-07-03 PROCEDURE — 84443 ASSAY THYROID STIM HORMONE: CPT

## 2024-07-03 PROCEDURE — 83735 ASSAY OF MAGNESIUM: CPT

## 2024-07-03 PROCEDURE — 6360000002 HC RX W HCPCS: Performed by: INTERNAL MEDICINE

## 2024-07-03 PROCEDURE — 6370000000 HC RX 637 (ALT 250 FOR IP): Performed by: INTERNAL MEDICINE

## 2024-07-03 PROCEDURE — 80061 LIPID PANEL: CPT

## 2024-07-03 RX ORDER — ACETAMINOPHEN 650 MG/1
650 SUPPOSITORY RECTAL EVERY 6 HOURS PRN
Status: DISCONTINUED | OUTPATIENT
Start: 2024-07-03 | End: 2024-07-05 | Stop reason: HOSPADM

## 2024-07-03 RX ORDER — POLYETHYLENE GLYCOL 3350 17 G/17G
17 POWDER, FOR SOLUTION ORAL DAILY PRN
Status: DISCONTINUED | OUTPATIENT
Start: 2024-07-03 | End: 2024-07-05 | Stop reason: HOSPADM

## 2024-07-03 RX ORDER — ENOXAPARIN SODIUM 100 MG/ML
40 INJECTION SUBCUTANEOUS DAILY
Status: DISCONTINUED | OUTPATIENT
Start: 2024-07-03 | End: 2024-07-03 | Stop reason: DRUGHIGH

## 2024-07-03 RX ORDER — METOPROLOL SUCCINATE 25 MG/1
25 TABLET, EXTENDED RELEASE ORAL DAILY
Status: DISCONTINUED | OUTPATIENT
Start: 2024-07-03 | End: 2024-07-05 | Stop reason: HOSPADM

## 2024-07-03 RX ORDER — SCOLOPAMINE TRANSDERMAL SYSTEM 1 MG/1
1 PATCH, EXTENDED RELEASE TRANSDERMAL
Status: DISCONTINUED | OUTPATIENT
Start: 2024-07-03 | End: 2024-07-05 | Stop reason: HOSPADM

## 2024-07-03 RX ORDER — ALBUTEROL SULFATE 90 UG/1
2 AEROSOL, METERED RESPIRATORY (INHALATION) EVERY 4 HOURS PRN
Status: DISCONTINUED | OUTPATIENT
Start: 2024-07-03 | End: 2024-07-03 | Stop reason: CLARIF

## 2024-07-03 RX ORDER — ALBUTEROL SULFATE 2.5 MG/3ML
2.5 SOLUTION RESPIRATORY (INHALATION) EVERY 4 HOURS PRN
Status: DISCONTINUED | OUTPATIENT
Start: 2024-07-03 | End: 2024-07-05 | Stop reason: HOSPADM

## 2024-07-03 RX ORDER — POTASSIUM CHLORIDE 20 MEQ/1
40 TABLET, EXTENDED RELEASE ORAL PRN
Status: DISCONTINUED | OUTPATIENT
Start: 2024-07-03 | End: 2024-07-05 | Stop reason: HOSPADM

## 2024-07-03 RX ORDER — FLUTICASONE PROPIONATE 50 MCG
2 SPRAY, SUSPENSION (ML) NASAL DAILY PRN
Status: DISCONTINUED | OUTPATIENT
Start: 2024-07-03 | End: 2024-07-05 | Stop reason: HOSPADM

## 2024-07-03 RX ORDER — SODIUM CHLORIDE 0.9 % (FLUSH) 0.9 %
5-40 SYRINGE (ML) INJECTION PRN
Status: DISCONTINUED | OUTPATIENT
Start: 2024-07-03 | End: 2024-07-05 | Stop reason: HOSPADM

## 2024-07-03 RX ORDER — MULTIVITAMIN WITH IRON
1 TABLET ORAL DAILY
Status: DISCONTINUED | OUTPATIENT
Start: 2024-07-03 | End: 2024-07-05 | Stop reason: HOSPADM

## 2024-07-03 RX ORDER — PANTOPRAZOLE SODIUM 20 MG/1
20 TABLET, DELAYED RELEASE ORAL DAILY
Status: DISCONTINUED | OUTPATIENT
Start: 2024-07-03 | End: 2024-07-05 | Stop reason: HOSPADM

## 2024-07-03 RX ORDER — SODIUM CHLORIDE 0.9 % (FLUSH) 0.9 %
5-40 SYRINGE (ML) INJECTION EVERY 12 HOURS SCHEDULED
Status: DISCONTINUED | OUTPATIENT
Start: 2024-07-03 | End: 2024-07-05 | Stop reason: HOSPADM

## 2024-07-03 RX ORDER — BUDESONIDE 0.25 MG/2ML
250 INHALANT ORAL 2 TIMES DAILY
Status: DISCONTINUED | OUTPATIENT
Start: 2024-07-03 | End: 2024-07-05 | Stop reason: HOSPADM

## 2024-07-03 RX ORDER — LACTULOSE 10 G/15ML
10 SOLUTION ORAL DAILY PRN
Status: DISCONTINUED | OUTPATIENT
Start: 2024-07-03 | End: 2024-07-05 | Stop reason: HOSPADM

## 2024-07-03 RX ORDER — SODIUM CHLORIDE 9 MG/ML
INJECTION, SOLUTION INTRAVENOUS PRN
Status: DISCONTINUED | OUTPATIENT
Start: 2024-07-03 | End: 2024-07-05 | Stop reason: HOSPADM

## 2024-07-03 RX ORDER — MECLIZINE HCL 12.5 MG/1
25 TABLET ORAL 3 TIMES DAILY PRN
Status: DISCONTINUED | OUTPATIENT
Start: 2024-07-03 | End: 2024-07-05 | Stop reason: HOSPADM

## 2024-07-03 RX ORDER — LANOLIN ALCOHOL/MO/W.PET/CERES
3 CREAM (GRAM) TOPICAL NIGHTLY PRN
Status: DISCONTINUED | OUTPATIENT
Start: 2024-07-03 | End: 2024-07-05 | Stop reason: HOSPADM

## 2024-07-03 RX ORDER — POTASSIUM CHLORIDE 7.45 MG/ML
10 INJECTION INTRAVENOUS PRN
Status: DISCONTINUED | OUTPATIENT
Start: 2024-07-03 | End: 2024-07-05 | Stop reason: HOSPADM

## 2024-07-03 RX ORDER — LANOLIN ALCOHOL/MO/W.PET/CERES
1000 CREAM (GRAM) TOPICAL DAILY
Status: DISCONTINUED | OUTPATIENT
Start: 2024-07-03 | End: 2024-07-05 | Stop reason: HOSPADM

## 2024-07-03 RX ORDER — POTASSIUM CHLORIDE 20 MEQ/1
20 TABLET, EXTENDED RELEASE ORAL DAILY
Status: DISCONTINUED | OUTPATIENT
Start: 2024-07-03 | End: 2024-07-05 | Stop reason: HOSPADM

## 2024-07-03 RX ORDER — CALCIUM CARBONATE 500 MG/1
500 TABLET, CHEWABLE ORAL 3 TIMES DAILY PRN
Status: DISCONTINUED | OUTPATIENT
Start: 2024-07-03 | End: 2024-07-05 | Stop reason: HOSPADM

## 2024-07-03 RX ORDER — ATORVASTATIN CALCIUM 40 MG/1
80 TABLET, FILM COATED ORAL NIGHTLY
Status: DISCONTINUED | OUTPATIENT
Start: 2024-07-03 | End: 2024-07-05 | Stop reason: HOSPADM

## 2024-07-03 RX ORDER — MAGNESIUM SULFATE IN WATER 40 MG/ML
2000 INJECTION, SOLUTION INTRAVENOUS PRN
Status: DISCONTINUED | OUTPATIENT
Start: 2024-07-03 | End: 2024-07-05 | Stop reason: HOSPADM

## 2024-07-03 RX ORDER — HYDRALAZINE HYDROCHLORIDE 20 MG/ML
10 INJECTION INTRAMUSCULAR; INTRAVENOUS EVERY 6 HOURS PRN
Status: DISCONTINUED | OUTPATIENT
Start: 2024-07-03 | End: 2024-07-05 | Stop reason: HOSPADM

## 2024-07-03 RX ORDER — FERROUS SULFATE 325(65) MG
325 TABLET ORAL
Status: DISCONTINUED | OUTPATIENT
Start: 2024-07-03 | End: 2024-07-05 | Stop reason: HOSPADM

## 2024-07-03 RX ORDER — ACETAMINOPHEN 325 MG/1
650 TABLET ORAL EVERY 6 HOURS PRN
Status: DISCONTINUED | OUTPATIENT
Start: 2024-07-03 | End: 2024-07-05 | Stop reason: HOSPADM

## 2024-07-03 RX ORDER — SPIRONOLACTONE 25 MG/1
25 TABLET ORAL DAILY
Status: DISCONTINUED | OUTPATIENT
Start: 2024-07-03 | End: 2024-07-05 | Stop reason: HOSPADM

## 2024-07-03 RX ORDER — OXYCODONE HYDROCHLORIDE AND ACETAMINOPHEN 5; 325 MG/1; MG/1
1 TABLET ORAL EVERY 8 HOURS PRN
Status: DISCONTINUED | OUTPATIENT
Start: 2024-07-03 | End: 2024-07-05 | Stop reason: HOSPADM

## 2024-07-03 RX ORDER — BENZONATATE 100 MG/1
100 CAPSULE ORAL 3 TIMES DAILY PRN
Status: DISCONTINUED | OUTPATIENT
Start: 2024-07-03 | End: 2024-07-05 | Stop reason: HOSPADM

## 2024-07-03 RX ORDER — ENOXAPARIN SODIUM 100 MG/ML
40 INJECTION SUBCUTANEOUS 2 TIMES DAILY
Status: DISCONTINUED | OUTPATIENT
Start: 2024-07-03 | End: 2024-07-03

## 2024-07-03 RX ORDER — BUMETANIDE 1 MG/1
2 TABLET ORAL EVERY OTHER DAY
Status: DISCONTINUED | OUTPATIENT
Start: 2024-07-04 | End: 2024-07-05 | Stop reason: HOSPADM

## 2024-07-03 RX ORDER — ONDANSETRON 4 MG/1
4 TABLET, ORALLY DISINTEGRATING ORAL EVERY 8 HOURS PRN
Status: DISCONTINUED | OUTPATIENT
Start: 2024-07-03 | End: 2024-07-05 | Stop reason: HOSPADM

## 2024-07-03 RX ORDER — ONDANSETRON 2 MG/ML
4 INJECTION INTRAMUSCULAR; INTRAVENOUS EVERY 6 HOURS PRN
Status: DISCONTINUED | OUTPATIENT
Start: 2024-07-03 | End: 2024-07-05 | Stop reason: HOSPADM

## 2024-07-03 RX ORDER — BUMETANIDE 1 MG/1
1 TABLET ORAL EVERY OTHER DAY
Status: DISCONTINUED | OUTPATIENT
Start: 2024-07-03 | End: 2024-07-05 | Stop reason: HOSPADM

## 2024-07-03 RX ORDER — CLOPIDOGREL BISULFATE 75 MG/1
75 TABLET ORAL DAILY
Status: DISCONTINUED | OUTPATIENT
Start: 2024-07-03 | End: 2024-07-05 | Stop reason: HOSPADM

## 2024-07-03 RX ORDER — ASPIRIN 81 MG/1
81 TABLET, CHEWABLE ORAL DAILY
Status: DISCONTINUED | OUTPATIENT
Start: 2024-07-03 | End: 2024-07-05 | Stop reason: HOSPADM

## 2024-07-03 RX ORDER — CYCLOBENZAPRINE HCL 5 MG
5 TABLET ORAL 2 TIMES DAILY PRN
Status: DISCONTINUED | OUTPATIENT
Start: 2024-07-03 | End: 2024-07-05 | Stop reason: HOSPADM

## 2024-07-03 RX ORDER — CETIRIZINE HYDROCHLORIDE 10 MG/1
10 TABLET ORAL DAILY
Status: DISCONTINUED | OUTPATIENT
Start: 2024-07-03 | End: 2024-07-05 | Stop reason: HOSPADM

## 2024-07-03 RX ORDER — ARFORMOTEROL TARTRATE 15 UG/2ML
15 SOLUTION RESPIRATORY (INHALATION)
Status: DISCONTINUED | OUTPATIENT
Start: 2024-07-03 | End: 2024-07-05 | Stop reason: HOSPADM

## 2024-07-03 RX ORDER — GABAPENTIN 400 MG/1
800 CAPSULE ORAL 3 TIMES DAILY
Status: DISCONTINUED | OUTPATIENT
Start: 2024-07-03 | End: 2024-07-05 | Stop reason: HOSPADM

## 2024-07-03 RX ADMIN — BUDESONIDE 250 MCG: 0.25 SUSPENSION RESPIRATORY (INHALATION) at 09:04

## 2024-07-03 RX ADMIN — ATORVASTATIN CALCIUM 80 MG: 40 TABLET, FILM COATED ORAL at 20:26

## 2024-07-03 RX ADMIN — PANTOPRAZOLE SODIUM 20 MG: 20 TABLET, DELAYED RELEASE ORAL at 08:55

## 2024-07-03 RX ADMIN — SPIRONOLACTONE 25 MG: 25 TABLET ORAL at 08:57

## 2024-07-03 RX ADMIN — BUDESONIDE 250 MCG: 0.25 SUSPENSION RESPIRATORY (INHALATION) at 21:05

## 2024-07-03 RX ADMIN — ARFORMOTEROL TARTRATE 15 MCG: 15 SOLUTION RESPIRATORY (INHALATION) at 09:04

## 2024-07-03 RX ADMIN — MULTIVITAMIN TABLET 1 TABLET: TABLET at 08:55

## 2024-07-03 RX ADMIN — SACUBITRIL AND VALSARTAN 1 TABLET: 97; 103 TABLET, FILM COATED ORAL at 09:36

## 2024-07-03 RX ADMIN — CETIRIZINE HYDROCHLORIDE 10 MG: 10 TABLET, FILM COATED ORAL at 08:55

## 2024-07-03 RX ADMIN — GABAPENTIN 800 MG: 400 CAPSULE ORAL at 13:45

## 2024-07-03 RX ADMIN — CYANOCOBALAMIN TAB 1000 MCG 1000 MCG: 1000 TAB at 08:55

## 2024-07-03 RX ADMIN — BUMETANIDE 1 MG: 1 TABLET ORAL at 08:58

## 2024-07-03 RX ADMIN — POTASSIUM CHLORIDE 20 MEQ: 1500 TABLET, EXTENDED RELEASE ORAL at 08:55

## 2024-07-03 RX ADMIN — OXYCODONE HYDROCHLORIDE AND ACETAMINOPHEN 1 TABLET: 5; 325 TABLET ORAL at 13:22

## 2024-07-03 RX ADMIN — EMPAGLIFLOZIN 10 MG: 10 TABLET, FILM COATED ORAL at 08:55

## 2024-07-03 RX ADMIN — CYCLOBENZAPRINE HYDROCHLORIDE 5 MG: 5 TABLET, FILM COATED ORAL at 20:25

## 2024-07-03 RX ADMIN — OXYCODONE HYDROCHLORIDE AND ACETAMINOPHEN 1 TABLET: 5; 325 TABLET ORAL at 04:04

## 2024-07-03 RX ADMIN — OXYCODONE HYDROCHLORIDE AND ACETAMINOPHEN 1 TABLET: 5; 325 TABLET ORAL at 21:41

## 2024-07-03 RX ADMIN — FERROUS SULFATE TAB 325 MG (65 MG ELEMENTAL FE) 325 MG: 325 (65 FE) TAB at 08:55

## 2024-07-03 RX ADMIN — GABAPENTIN 800 MG: 400 CAPSULE ORAL at 20:26

## 2024-07-03 RX ADMIN — GABAPENTIN 800 MG: 400 CAPSULE ORAL at 08:55

## 2024-07-03 RX ADMIN — SODIUM CHLORIDE, PRESERVATIVE FREE 10 ML: 5 INJECTION INTRAVENOUS at 21:59

## 2024-07-03 RX ADMIN — ARFORMOTEROL TARTRATE 15 MCG: 15 SOLUTION RESPIRATORY (INHALATION) at 21:05

## 2024-07-03 RX ADMIN — CYCLOBENZAPRINE HYDROCHLORIDE 5 MG: 5 TABLET, FILM COATED ORAL at 04:04

## 2024-07-03 ASSESSMENT — ENCOUNTER SYMPTOMS
DIARRHEA: 0
ABDOMINAL PAIN: 0
CHEST TIGHTNESS: 0
COUGH: 0
ABDOMINAL DISTENTION: 0
SHORTNESS OF BREATH: 0
PHOTOPHOBIA: 0
VOMITING: 0
NAUSEA: 0

## 2024-07-03 ASSESSMENT — PAIN SCALES - GENERAL
PAINLEVEL_OUTOF10: 10
PAINLEVEL_OUTOF10: 9
PAINLEVEL_OUTOF10: 10
PAINLEVEL_OUTOF10: 8

## 2024-07-03 ASSESSMENT — PAIN DESCRIPTION - LOCATION
LOCATION: BACK;HIP;KNEE
LOCATION: LEG;HAND;BACK

## 2024-07-03 ASSESSMENT — PAIN - FUNCTIONAL ASSESSMENT: PAIN_FUNCTIONAL_ASSESSMENT: NONE - DENIES PAIN

## 2024-07-03 ASSESSMENT — PAIN DESCRIPTION - DESCRIPTORS
DESCRIPTORS: BURNING;ACHING
DESCRIPTORS: SHARP;BURNING

## 2024-07-03 NOTE — H&P
Morbid obesity (HCC)    Diabetes mellitus (HCC)    Chronic bilateral low back pain with bilateral sciatica    Chronic systolic heart failure (HCC)    Coronary artery disease involving native coronary artery of native heart without angina pectoris    Chronic obstructive pulmonary disease (HCC)  Resolved Problems:    * No resolved hospital problems. *      Vertigo- MRI Mild scattered T2 FLAIR hyperintense signals in the central and subcortical white matter bilaterally, left more than right. On previous MRI of brain on 06/12/2009, there were three tiny foci of T2 FLAIR hyperintense signals in the white matter of left cerebral hemisphere. Currently the number of foci of T2 FLAIR hyperintense signals in the white matter appear to be increased. Considering patient's age, multiple sclerosis may not be excluded. Follow-up evaluation with CSF study, suggested. Neurology consulted from ER. Antivert, scopolamine. Aspirin and plavix held for possible LP  Chronic systolic CHF- on bumex, BB, entresto, spironolactone.   COPD, not in exacerbation- nebs  History CVA- Small lacunar infarction in the upper aspect of right thalamus, suggesting old or subacute lacunar infarction.   Nonspecific GGO-in upper lobes infectious vs inflammatory. No white count or fever, on room air. Monitor off abx for now  Chronic back pain  Obesity- BMI 47    Routine labs in the morning.  DVT prophylaxis.  Please see orders for further management and care.  If there are any questions after 7am, please contact [facility] Hospitalist that is assuming care.      Additional work up or/and treatment plan may be added today or thereafter based on clinical progression. I am managing admission portion of patient care. Some medical issues are handled by other specialists. Additional work up and treatment should be done by my colleague hospitalist who assumes care.       Suni Valladares,     2:35 AM  7/3/2024

## 2024-07-04 PROCEDURE — 2580000003 HC RX 258: Performed by: INTERNAL MEDICINE

## 2024-07-04 PROCEDURE — 99231 SBSQ HOSP IP/OBS SF/LOW 25: CPT | Performed by: STUDENT IN AN ORGANIZED HEALTH CARE EDUCATION/TRAINING PROGRAM

## 2024-07-04 PROCEDURE — 6370000000 HC RX 637 (ALT 250 FOR IP): Performed by: INTERNAL MEDICINE

## 2024-07-04 PROCEDURE — G0378 HOSPITAL OBSERVATION PER HR: HCPCS

## 2024-07-04 PROCEDURE — 6360000002 HC RX W HCPCS: Performed by: INTERNAL MEDICINE

## 2024-07-04 PROCEDURE — 94640 AIRWAY INHALATION TREATMENT: CPT

## 2024-07-04 RX ADMIN — GABAPENTIN 800 MG: 400 CAPSULE ORAL at 13:27

## 2024-07-04 RX ADMIN — FERROUS SULFATE TAB 325 MG (65 MG ELEMENTAL FE) 325 MG: 325 (65 FE) TAB at 08:32

## 2024-07-04 RX ADMIN — ATORVASTATIN CALCIUM 80 MG: 40 TABLET, FILM COATED ORAL at 19:39

## 2024-07-04 RX ADMIN — SACUBITRIL AND VALSARTAN 1 TABLET: 97; 103 TABLET, FILM COATED ORAL at 08:34

## 2024-07-04 RX ADMIN — CYANOCOBALAMIN TAB 1000 MCG 1000 MCG: 1000 TAB at 08:35

## 2024-07-04 RX ADMIN — ARFORMOTEROL TARTRATE 15 MCG: 15 SOLUTION RESPIRATORY (INHALATION) at 20:54

## 2024-07-04 RX ADMIN — SODIUM CHLORIDE, PRESERVATIVE FREE 10 ML: 5 INJECTION INTRAVENOUS at 08:36

## 2024-07-04 RX ADMIN — CYCLOBENZAPRINE HYDROCHLORIDE 5 MG: 5 TABLET, FILM COATED ORAL at 13:26

## 2024-07-04 RX ADMIN — OXYCODONE HYDROCHLORIDE AND ACETAMINOPHEN 1 TABLET: 5; 325 TABLET ORAL at 13:27

## 2024-07-04 RX ADMIN — SACUBITRIL AND VALSARTAN 1 TABLET: 97; 103 TABLET, FILM COATED ORAL at 19:39

## 2024-07-04 RX ADMIN — BUMETANIDE 2 MG: 1 TABLET ORAL at 08:32

## 2024-07-04 RX ADMIN — GABAPENTIN 800 MG: 400 CAPSULE ORAL at 08:32

## 2024-07-04 RX ADMIN — OXYCODONE HYDROCHLORIDE AND ACETAMINOPHEN 1 TABLET: 5; 325 TABLET ORAL at 21:24

## 2024-07-04 RX ADMIN — ACETAMINOPHEN 650 MG: 325 TABLET ORAL at 08:42

## 2024-07-04 RX ADMIN — SPIRONOLACTONE 25 MG: 25 TABLET ORAL at 08:33

## 2024-07-04 RX ADMIN — CYCLOBENZAPRINE HYDROCHLORIDE 5 MG: 5 TABLET, FILM COATED ORAL at 23:55

## 2024-07-04 RX ADMIN — PANTOPRAZOLE SODIUM 20 MG: 20 TABLET, DELAYED RELEASE ORAL at 08:35

## 2024-07-04 RX ADMIN — SODIUM CHLORIDE, PRESERVATIVE FREE 10 ML: 5 INJECTION INTRAVENOUS at 19:39

## 2024-07-04 RX ADMIN — OXYCODONE HYDROCHLORIDE AND ACETAMINOPHEN 1 TABLET: 5; 325 TABLET ORAL at 05:34

## 2024-07-04 RX ADMIN — ACETAMINOPHEN 650 MG: 325 TABLET ORAL at 02:13

## 2024-07-04 RX ADMIN — GABAPENTIN 800 MG: 400 CAPSULE ORAL at 19:39

## 2024-07-04 RX ADMIN — EMPAGLIFLOZIN 10 MG: 10 TABLET, FILM COATED ORAL at 08:34

## 2024-07-04 RX ADMIN — POLYETHYLENE GLYCOL 3350 17 G: 17 POWDER, FOR SOLUTION ORAL at 13:25

## 2024-07-04 RX ADMIN — MULTIVITAMIN TABLET 1 TABLET: TABLET at 08:34

## 2024-07-04 RX ADMIN — BUDESONIDE 250 MCG: 0.25 SUSPENSION RESPIRATORY (INHALATION) at 06:12

## 2024-07-04 RX ADMIN — CETIRIZINE HYDROCHLORIDE 10 MG: 10 TABLET, FILM COATED ORAL at 08:33

## 2024-07-04 RX ADMIN — BUDESONIDE 250 MCG: 0.25 SUSPENSION RESPIRATORY (INHALATION) at 20:54

## 2024-07-04 RX ADMIN — CYCLOBENZAPRINE HYDROCHLORIDE 5 MG: 5 TABLET, FILM COATED ORAL at 02:13

## 2024-07-04 RX ADMIN — POTASSIUM CHLORIDE 20 MEQ: 1500 TABLET, EXTENDED RELEASE ORAL at 08:33

## 2024-07-04 RX ADMIN — METOPROLOL SUCCINATE 25 MG: 25 TABLET, EXTENDED RELEASE ORAL at 08:32

## 2024-07-04 RX ADMIN — ARFORMOTEROL TARTRATE 15 MCG: 15 SOLUTION RESPIRATORY (INHALATION) at 06:12

## 2024-07-04 ASSESSMENT — PAIN SCALES - WONG BAKER
WONGBAKER_NUMERICALRESPONSE: NO HURT
WONGBAKER_NUMERICALRESPONSE: NO HURT

## 2024-07-04 ASSESSMENT — PAIN SCALES - GENERAL
PAINLEVEL_OUTOF10: 9
PAINLEVEL_OUTOF10: 10
PAINLEVEL_OUTOF10: 7
PAINLEVEL_OUTOF10: 8
PAINLEVEL_OUTOF10: 9
PAINLEVEL_OUTOF10: 10
PAINLEVEL_OUTOF10: 10
PAINLEVEL_OUTOF10: 9

## 2024-07-04 ASSESSMENT — PAIN DESCRIPTION - DESCRIPTORS
DESCRIPTORS: ACHING
DESCRIPTORS: ACHING

## 2024-07-04 ASSESSMENT — PAIN DESCRIPTION - LOCATION
LOCATION: BACK;LEG;HAND
LOCATION: GENERALIZED
LOCATION: GENERALIZED

## 2024-07-04 ASSESSMENT — PAIN - FUNCTIONAL ASSESSMENT
PAIN_FUNCTIONAL_ASSESSMENT: ACTIVITIES ARE NOT PREVENTED
PAIN_FUNCTIONAL_ASSESSMENT: PREVENTS OR INTERFERES SOME ACTIVE ACTIVITIES AND ADLS

## 2024-07-04 NOTE — PLAN OF CARE
Problem: Chronic Conditions and Co-morbidities  Goal: Patient's chronic conditions and co-morbidity symptoms are monitored and maintained or improved  7/4/2024 0905 by Gerda Tierney, RN  Outcome: Progressing  Flowsheets (Taken 7/4/2024 0800)  Care Plan - Patient's Chronic Conditions and Co-Morbidity Symptoms are Monitored and Maintained or Improved:   Monitor and assess patient's chronic conditions and comorbid symptoms for stability, deterioration, or improvement   Collaborate with multidisciplinary team to address chronic and comorbid conditions and prevent exacerbation or deterioration  7/3/2024 2333 by Shubham Mancilla, RN  Outcome: Progressing

## 2024-07-04 NOTE — PLAN OF CARE
Problem: Chronic Conditions and Co-morbidities  Goal: Patient's chronic conditions and co-morbidity symptoms are monitored and maintained or improved  7/3/2024 2333 by Shubham Mancilla RN  Outcome: Progressing  7/3/2024 1611 by Lora Pugh RN  Outcome: Progressing     Problem: Safety - Adult  Goal: Free from fall injury  7/3/2024 2333 by Shubham Mancilla RN  Outcome: Progressing  7/3/2024 1611 by Lora Pugh RN  Outcome: Progressing

## 2024-07-05 ENCOUNTER — TELEPHONE (OUTPATIENT)
Dept: PRIMARY CARE CLINIC | Age: 49
End: 2024-07-05

## 2024-07-05 VITALS
TEMPERATURE: 97.3 F | OXYGEN SATURATION: 97 % | WEIGHT: 293 LBS | HEIGHT: 68 IN | RESPIRATION RATE: 18 BRPM | SYSTOLIC BLOOD PRESSURE: 97 MMHG | HEART RATE: 61 BPM | BODY MASS INDEX: 44.41 KG/M2 | DIASTOLIC BLOOD PRESSURE: 52 MMHG

## 2024-07-05 PROCEDURE — 6360000002 HC RX W HCPCS: Performed by: INTERNAL MEDICINE

## 2024-07-05 PROCEDURE — 94640 AIRWAY INHALATION TREATMENT: CPT

## 2024-07-05 PROCEDURE — G0378 HOSPITAL OBSERVATION PER HR: HCPCS

## 2024-07-05 PROCEDURE — 2580000003 HC RX 258: Performed by: INTERNAL MEDICINE

## 2024-07-05 PROCEDURE — 6370000000 HC RX 637 (ALT 250 FOR IP): Performed by: INTERNAL MEDICINE

## 2024-07-05 RX ADMIN — MULTIVITAMIN TABLET 1 TABLET: TABLET at 08:07

## 2024-07-05 RX ADMIN — ASPIRIN 81 MG 81 MG: 81 TABLET ORAL at 08:15

## 2024-07-05 RX ADMIN — SODIUM CHLORIDE, PRESERVATIVE FREE 10 ML: 5 INJECTION INTRAVENOUS at 08:08

## 2024-07-05 RX ADMIN — PANTOPRAZOLE SODIUM 20 MG: 20 TABLET, DELAYED RELEASE ORAL at 08:06

## 2024-07-05 RX ADMIN — SACUBITRIL AND VALSARTAN 1 TABLET: 97; 103 TABLET, FILM COATED ORAL at 08:06

## 2024-07-05 RX ADMIN — FERROUS SULFATE TAB 325 MG (65 MG ELEMENTAL FE) 325 MG: 325 (65 FE) TAB at 08:05

## 2024-07-05 RX ADMIN — CLOPIDOGREL BISULFATE 75 MG: 75 TABLET ORAL at 08:15

## 2024-07-05 RX ADMIN — OXYCODONE HYDROCHLORIDE AND ACETAMINOPHEN 1 TABLET: 5; 325 TABLET ORAL at 05:45

## 2024-07-05 RX ADMIN — ARFORMOTEROL TARTRATE 15 MCG: 15 SOLUTION RESPIRATORY (INHALATION) at 07:54

## 2024-07-05 RX ADMIN — METOPROLOL SUCCINATE 25 MG: 25 TABLET, EXTENDED RELEASE ORAL at 08:06

## 2024-07-05 RX ADMIN — CYANOCOBALAMIN TAB 1000 MCG 1000 MCG: 1000 TAB at 08:07

## 2024-07-05 RX ADMIN — EMPAGLIFLOZIN 10 MG: 10 TABLET, FILM COATED ORAL at 08:07

## 2024-07-05 RX ADMIN — BUDESONIDE 250 MCG: 0.25 SUSPENSION RESPIRATORY (INHALATION) at 07:54

## 2024-07-05 RX ADMIN — CETIRIZINE HYDROCHLORIDE 10 MG: 10 TABLET, FILM COATED ORAL at 08:05

## 2024-07-05 RX ADMIN — POTASSIUM CHLORIDE 20 MEQ: 1500 TABLET, EXTENDED RELEASE ORAL at 08:05

## 2024-07-05 RX ADMIN — GABAPENTIN 800 MG: 400 CAPSULE ORAL at 08:05

## 2024-07-05 ASSESSMENT — PAIN SCALES - GENERAL: PAINLEVEL_OUTOF10: 7

## 2024-07-05 NOTE — DISCHARGE SUMMARY
There is no significant stenosis or aneurysm identified.  The anterior and middle cerebral arteries are normal in appearance.  No significant stenosis or aneurysm is identified. POSTERIOR CIRCULATION: The distal vertebral arteries are normal in appearance.  The basilar artery is normal.  No significant stenosis or aneurysm is identified.  The posterior cerebral arteries are normal in appearance. OTHER: No dural venous sinus thrombosis on this non-dedicated study. CT PERFUSION: EXAM QUALITY: The examination is diagnostic with appropriate arterial inflow and venous outflow curves, and diagnostic perfusion maps. CORE INFARCT: The total area of ischemic core is 0 mL (CBF<30% volume). TOTAL HYPOPERFUSION: The total area of hypoperfusion is 0 mL (Tmax>6s volume). PENUMBRA: No ischemic penumbra.     1. No acute intracranial abnormality. 2. No perfusion abnormality. 3. Unremarkable CTA of the head and neck. 4. Nonspecific patchy areas of ground-glass opacity within the upper lobes which could be inflammatory or infectious in nature.       Discharge Medications:      Medication List        START taking these medications      meclizine 25 MG tablet  Commonly known as: ANTIVERT  Take 1 tablet by mouth 3 times daily as needed for Dizziness            CONTINUE taking these medications      Aquaphor Advanced Therapy Oint     aspirin 81 MG chewable tablet     atorvastatin 80 MG tablet  Commonly known as: LIPITOR  Take 1 tablet by mouth nightly     * bumetanide 1 MG tablet  Commonly known as: BUMEX     * bumetanide 1 MG tablet  Commonly known as: BUMEX     cetirizine 10 MG tablet  Commonly known as: ZYRTEC  TAKE 1 TABLET BY MOUTH DAILY     citalopram 40 MG tablet  Commonly known as: CELEXA  Take 1 tablet by mouth daily     clopidogrel 75 MG tablet  Commonly known as: PLAVIX  TAKE 1 TABLET BY MOUTH DAILY     cyclobenzaprine 5 MG tablet  Commonly known as: FLEXERIL  Take 1 tablet by mouth 2 times daily as needed for Muscle spasms

## 2024-07-05 NOTE — HOME CARE
[facility] referral received. Spoke with patient, demographics verified.  Plan to see after discharge.    Mercedes Ramirez LPN [facility].

## 2024-07-05 NOTE — CONSULTS
greater than right side. These are fairly nonspecific and without pattern to suggest multiple sclerosis. Patient with history of CAD, DM, HTN, HLD with a likelihood that this may be vascular in nature.  3. 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   4. Polyneuropathy  5. Chronic low back pain: Follow-up in pain clinic for ongoing management.  6. Gait ataxia: Multifactorial    Recommendations:  1. Continue current medical management  2. The patient neurology follow-up for polyneuropathy and possible lupus with the patient EMG/NCS.  3. Would not recommend further workup for MS unless further clinical symptoms to support diagnosis.  4. Please reconsult if further needs.    
ORDERING SYSTEM PROVIDED HISTORY: CVA TECHNOLOGIST PROVIDED HISTORY: Reason for exam:->CVA Has a \"code stroke\" or \"stroke alert\" been called?->Yes Decision Support Exception - unselect if not a suspected or confirmed emergency medical condition->Emergency Medical Condition (MA) What reading provider will be dictating this exam?->CRC; ORDERING SYSTEM PROVIDED HISTORY: Evaluate intracranial abnormality TECHNOLOGIST PROVIDED HISTORY: Has a \"code stroke\" or \"stroke alert\" been called?->Yes Reason for exam:->Evaluate intracranial abnormality Decision Support Exception - unselect if not a suspected or confirmed emergency medical condition->Emergency Medical Condition (MA) What reading provider will be dictating this exam?->CRC; ORDERING SYSTEM PROVIDED HISTORY: CVA TECHNOLOGIST PROVIDED HISTORY: Reason for exam:->CVA Has a \"code stroke\" or \"stroke alert\" been called?->Yes What reading provider will be dictating this exam?->CRC; ORDERING SYSTEM PROVIDED HISTORY: CVA TECHNOLOGIST PROVIDED HISTORY: Reason for exam:->CVA Has a \"code stroke\" or \"stroke alert\" been called?-> What reading provider will be dictating this exam?->CRC FINDINGS: CT HEAD: BRAIN/VENTRICLES:  There is no acute infarct or acute intracranial hemorrhage present.  There is no mass effect or midline shift present.  There is no ventriculomegaly or abnormal extra-axial fluid collection present. ORBITS: Limited evaluation of the orbits is unremarkable. SINUSES:  The paranasal sinuses and mastoid air cells are clear. SOFT TISSUES/SKULL: No lytic or blastic osseous lesions are identified. CTA NECK: AORTIC ARCH/ARCH VESSELS: No dissection or arterial injury.  No significant stenosis of the brachiocephalic or subclavian arteries. CAROTID ARTERIES: No dissection, arterial injury, or hemodynamically significant stenosis by NASCET criteria. VERTEBRAL ARTERIES: No dissection, arterial injury, or significant stenosis. SOFT TISSUES: There are patchy ground-glass

## 2024-07-05 NOTE — CARE COORDINATION
07/05/24 Discharge order noted:  Met with pt to discuss transition of care and discharge preferences Pt did not wish to include family in discharge planning Pt lives by herself in an apartment with 5 stairs and handrail to enter. Pt is independent with ADLs and gets help from family who also live in the apartment building when needed Pt does have Pt was previously active with [facility] no WENDI history PCP is [facility] RX is [facility] on [address] Pt is requesting PT/OT Referral made to Concha at [facility] orders are on chart Talked with Robson at [facility] pt can just call the office to order CPAP supplies pt did not get nebulizer from them so they are unable to help with that Pt does not remember how long she has the nebulizer Left message with referral to Dee at [facility] Pt notified of above Plan is home with family to transport CM/SW to follow Electronically signed by Salvador Taylor RN CM on 7/5/2024 at 10:23 AM     Case Management Assessment  Initial Evaluation    Date/Time of Evaluation: 7/5/2024 10:24 AM  Assessment Completed by: Salvador Taylor RN    If patient is discharged prior to next notation, then this note serves as note for discharge by case management.    Patient Name: Monique Voss                   YOB: 1975  Diagnosis: Dizziness [R42]  Vertigo [R42]                   Date / Time: 7/2/2024 12:26 PM    Patient Admission Status: Observation   Readmission Risk (Low < 19, Mod (19-27), High > 27): No data recorded  Current PCP: [facility] PA-C  PCP verified by CM? Yes    Chart Reviewed: Yes      History Provided by: Patient  Patient Orientation: Alert and Oriented    Patient Cognition: Alert    Hospitalization in the last 30 days (Readmission):  No    If yes, Readmission Assessment in  Navigator will be completed.    Advance Directives:      Code Status: Full Code   Patient's Primary Decision Maker is: Legal Next of Kin      Discharge Planning:    Patient lives with:

## 2024-07-05 NOTE — PROGRESS NOTES
Hospitalist Progress Note      Chief Complaint: dizziness     Admission Date: 2024     SYNOPSIS:Monique presents to the ER with dizziness.  This started yesterday afternoon and she began to have episodes of sensation of room spinning. Patient has a history of chronic facial droop.  She also states she has been having intermittent blurry vision in the left eye.  Dizziness is worse when she turns her head or stands up.  MRI of the brain was performed in ER which showed mild scattered T2 flair hyperintense signal within the central and subcortical white matter bilaterally, left more than right.  On previous MRI there were 3 tiny foci of T12 FLAIR hyperintense signals in the white matter of the left cerebral hemisphere.  Currently the number of foci of T2 flair hyperintense signals in the white matter appear to be increased.  Considering patient's age, multiple sclerosis may not be excluded. Neurology consulted from ER.     SUBJECTIVE:    Patient has not new concern, so far an episode of vertigo since admission  Records reviewed.     Stable overnight. No overnight issues reported     Temp (24hrs), Av.5 °F (36.4 °C), Min:97.3 °F (36.3 °C), Max:97.6 °F (36.4 °C)    DIET: ADULT DIET; Regular  CODE: Full Code  No intake or output data in the 24 hours ending 24 1635    OBJECTIVE:    BP (!) 106/54   Pulse 61   Temp 97.3 °F (36.3 °C) (Temporal)   Resp 18   Ht 1.727 m (5' 8\")   Wt (!) 136.2 kg (300 lb 3.2 oz)   SpO2 96%   BMI 45.65 kg/m²     General appearance: No apparent distress, appears stated age and cooperative.   HEENT:  Normocephalic. Conjunctivae/corneas clear. Moist mucosa   Neck: Supple. No jugular venous distention. Thyroid not visible, non tender   Respiratory: Normal respiratory effort. Clear to auscultation bilaterally. No stridor/wheezing/rhonchi/crackles   Cardiovascular: Regular heart beats, normal S1-S2. No M/G/R  Abdomen: Non distended, soft, non tender, no visceromegaly, no mass, 
4 Eyes Skin Assessment     NAME:  Monique Voss  YOB: 1975  MEDICAL RECORD NUMBER:  [id number]    The patient is being assessed for  Admission    I agree that at least one RN has performed a thorough Head to Toe Skin Assessment on the patient. ALL assessment sites listed below have been assessed.      Areas assessed by both nurses:    Head, Face, Ears, Shoulders, Back, Chest, Arms, Elbows, Hands, Sacrum. Buttock, Coccyx, Ischium, Legs. Feet and Heels, and Under Medical Devices         Does the Patient have a Wound? No noted wound(s)       Alvin Prevention initiated by RN: No  Wound Care Orders initiated by RN: No    Pressure Injury (Stage 3,4, Unstageable, DTI, NWPT, and Complex wounds) if present, place Wound referral order by RN under : No    New Ostomies, if present place, Ostomy referral order under : No     Nurse 1 eSignature: Electronically signed by Lora Trammell RN on 7/3/24 at 4:11 PM EDT    **SHARE this note so that the co-signing nurse can place an eSignature**    Nurse 2 eSignature: Electronically signed by Mery Pope RN on 7/3/24 at 3:53 PM EDT    
DVT Prophylaxis Adjustment Policy (DVT Prophylaxis)     This patient is on DVT Prophylaxis medication that requires a dose adjustment      Date Body Weight IBW  Adjusted BW SCr  CrCl Dialysis status   7/3/2024 (!) 140.2 kg (309 lb 1.4 oz) Ideal body weight: 63.9 kg (140 lb 14 oz)  Adjusted ideal body weight: 94.4 kg (208 lb 2.5 oz) Serum creatinine: 0.9 mg/dL 07/02/24 1225  Estimated creatinine clearance: 115 mL/min N/a       Pharmacy has dose-adjusted the DVT Prophylaxis regimen to match   the recommendations from the following table        Ordered Medication:Lovenox 40mg daily    Order Changed/converted to: Lovenox 40mg BID      These changes were made per protocol according to the [facility] Pharmacist   Review for Appropriate Use and Automatic Dose Adjustments of   Subcutaneous Anticoagulants Policy     *Please note this dose may need readjusted if patient's condition changes.    Please contact pharmacy with any questions regarding these changes.    Gomez Perez RPH  7/3/2024  2:09 AM    
Discharge instrictions reviewed with patient at the bedside. All questions answered at at this time. Advised patient to keep a diary of her blood pressures and weights and bring to her PCPs office at her follow up appointment.    
toileting/standing

## 2024-07-05 NOTE — TELEPHONE ENCOUNTER
.Care Transitions Initial Follow Up Call    Outreach made within 2 business days of discharge: Yes    Patient: Monique Voss Patient : 1975   MRN: [id number]  Reason for Admission: There are no discharge diagnoses documented for the most recent discharge.  Discharge Date: 24       Spoke with: Monique    Discharge department/facility: [facility] Interactive Patient Contact:  Was patient able to fill all prescriptions: Yes  Was patient instructed to bring all medications to the follow-up visit: Yes  Is patient taking all medications as directed in the discharge summary? Yes  Does patient understand their discharge instructions: Yes  Does patient have questions or concerns that need addressed prior to 7-14 day follow up office visit: no    Scheduled appointment with PCP within 7-14 days    Follow Up  Future Appointments   Date Time Provider Department Center   2024 12:00 PM Segun Murrell MD AFLNEOHINFDS [facility] INF   2024  2:30 PM Jordyn Austin DO BDM PAIN MAR [facility]   2024 12:30 PM Madison Amin PA-C TRAIL PC [facility]   2024  3:15 PM Ana Monroe PA BDM PAIN MAR [facility]   2024 11:00 AM Wandy Kaba, APRN - CNP Surg Weight [facility]   2024 11:45 AM Rocky Curran MD [facility]er [facility]   2024  2:30 PM Ellie Shaw MD ELECTRO PHYS [facility]       Annelise Aj

## 2024-07-11 NOTE — PROGRESS NOTES
[facility]  [address] [place] [id number]    Follow up Note      Monique Voss     Date of Visit:  2024    CC:  Patient presents for follow up   Chief Complaint   Patient presents with    Back Pain    Hip Pain    Hand Pain    Foot Pain     HPI:    Pain is unchanged.  Appropriate analgesia with current medications regimen: yes.    Change in quality of symptoms:no.    Medication side effects:none.   Recent diagnostic testing:none.   Recent interventional procedures: None.     She has been on anticoagulation medications to include ASA and has not been on herbal supplements.  She is diabetic.     Imagin2023 EMG UEs    Diagnostic Interpretation:      This study was abnormal.      1. 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 It is chronic in duration. This is consistent with the clinical diagnosis of carpal tunnel syndrome. Prognosis for recovery of demyelinating lesions is good.      10/2021 lumbar MRI -      BONES/ALIGNMENT: There is normal alignment of the spine.  Mild-to-moderate   levoscoliosis of the lumbar spine is again noted.  The vertebral body heights   are maintained.  There is spurring and disc desiccation at multiple levels   with mild-to-moderate disc space narrowing at L1-2, L2-3, L3-4 and L4-5.   Slight subchondral signal change is seen, most notably at L2-3 and L4-5.   This is most likely related to degenerative disc disease.  Otherwise, the   bone marrow signal appears unremarkable.       SPINAL CORD: The conus terminates normally.       SOFT TISSUES: No paraspinal mass identified.       The spinal canal is somewhat congenitally narrowed.       L1-L2: There is disc bulge and severe right and moderate left posterior facet   degenerative change with ligamentum flavum hypertrophy causing

## 2024-07-12 ENCOUNTER — OFFICE VISIT (OUTPATIENT)
Dept: PAIN MANAGEMENT | Age: 49
End: 2024-07-12
Payer: MEDICARE

## 2024-07-12 VITALS
BODY MASS INDEX: 44.41 KG/M2 | OXYGEN SATURATION: 98 % | SYSTOLIC BLOOD PRESSURE: 127 MMHG | HEART RATE: 65 BPM | TEMPERATURE: 98 F | HEIGHT: 68 IN | DIASTOLIC BLOOD PRESSURE: 78 MMHG | WEIGHT: 293 LBS

## 2024-07-12 DIAGNOSIS — M54.42 CHRONIC BILATERAL LOW BACK PAIN WITH BILATERAL SCIATICA: ICD-10-CM

## 2024-07-12 DIAGNOSIS — M25.552 LEFT HIP PAIN: ICD-10-CM

## 2024-07-12 DIAGNOSIS — G89.4 CHRONIC PAIN SYNDROME: ICD-10-CM

## 2024-07-12 DIAGNOSIS — G89.29 CHRONIC BILATERAL LOW BACK PAIN WITH BILATERAL SCIATICA: ICD-10-CM

## 2024-07-12 DIAGNOSIS — M16.12 PRIMARY OSTEOARTHRITIS OF LEFT HIP: ICD-10-CM

## 2024-07-12 DIAGNOSIS — M54.41 CHRONIC BILATERAL LOW BACK PAIN WITH BILATERAL SCIATICA: ICD-10-CM

## 2024-07-12 DIAGNOSIS — M48.062 SPINAL STENOSIS OF LUMBAR REGION WITH NEUROGENIC CLAUDICATION: ICD-10-CM

## 2024-07-12 DIAGNOSIS — M54.16 LUMBAR RADICULOPATHY: ICD-10-CM

## 2024-07-12 PROCEDURE — 99213 OFFICE O/P EST LOW 20 MIN: CPT

## 2024-07-12 RX ORDER — OXYCODONE HYDROCHLORIDE AND ACETAMINOPHEN 5; 325 MG/1; MG/1
1 TABLET ORAL 3 TIMES DAILY PRN
Qty: 90 TABLET | Refills: 0 | Status: SHIPPED | OUTPATIENT
Start: 2024-07-17 | End: 2024-08-16

## 2024-07-12 RX ORDER — GABAPENTIN 800 MG/1
800 TABLET ORAL 3 TIMES DAILY
Qty: 90 TABLET | Refills: 0 | Status: SHIPPED | OUTPATIENT
Start: 2024-07-12 | End: 2024-08-11

## 2024-07-12 RX ORDER — LINEZOLID 600 MG/1
TABLET, FILM COATED ORAL
COMMUNITY
Start: 2024-07-09

## 2024-07-12 RX ORDER — CYCLOBENZAPRINE HCL 5 MG
5 TABLET ORAL 2 TIMES DAILY PRN
Qty: 60 TABLET | Refills: 0 | Status: SHIPPED | OUTPATIENT
Start: 2024-07-12 | End: 2024-08-11

## 2024-07-12 NOTE — PROGRESS NOTES
Monique Voss presents to the [facility] on 7/12/2024. Monique is complaining of pain in her hands, back, hip, and feet. The pain is constant. The pain is described as aching, stabbing, and numb. Pain is rated on her best day at a 8, on her worst day at a 10, and on average at a 9 on the VAS scale. She took her last dose of Percocet, Neurontin, and Flexeril this morning.      Any procedures since your last visit: No.    She is not on NSAIDS and  is  on anticoagulation medications to include ASA and Plavix and is managed by Dr. Curran.     Pacemaker or defibrillator: No.    Medication Contract and Consent for Opioid Use Documents Filed       Patient Documents       Type of Document Status Date Received Received By Description    Medication Contract Received 7/1/2019 11:37 AM JONAH ANAND PAIN MGMT CONTRACT DR. VILLANUEVA    Medication Contract Signed 7/24/2019 10:15 AM KALIA BYRD medication contract    Medication Contract Received 10/27/2022  3:10 PM GRACIE HEATON Paint Agreement    Medication Contract Received 9/19/2023 11:58 AM MELITA BARONE PAIN AGREEMENT                       There were no vitals taken for this visit.     No LMP recorded.

## 2024-07-17 ENCOUNTER — OFFICE VISIT (OUTPATIENT)
Dept: PRIMARY CARE CLINIC | Age: 49
End: 2024-07-17

## 2024-07-17 VITALS
OXYGEN SATURATION: 96 % | WEIGHT: 293 LBS | TEMPERATURE: 97.7 F | SYSTOLIC BLOOD PRESSURE: 128 MMHG | BODY MASS INDEX: 44.41 KG/M2 | HEIGHT: 68 IN | DIASTOLIC BLOOD PRESSURE: 84 MMHG | HEART RATE: 73 BPM | RESPIRATION RATE: 16 BRPM

## 2024-07-17 DIAGNOSIS — Z09 HOSPITAL DISCHARGE FOLLOW-UP: ICD-10-CM

## 2024-07-17 DIAGNOSIS — F32.1 CURRENT MODERATE EPISODE OF MAJOR DEPRESSIVE DISORDER WITHOUT PRIOR EPISODE (HCC): ICD-10-CM

## 2024-07-17 DIAGNOSIS — E44.1 MILD PROTEIN-CALORIE MALNUTRITION (HCC): ICD-10-CM

## 2024-07-17 DIAGNOSIS — R42 VERTIGO: Primary | ICD-10-CM

## 2024-07-17 DIAGNOSIS — Z79.899 HIGH RISK MEDICATION USE: ICD-10-CM

## 2024-07-17 DIAGNOSIS — F11.20 OPIOID DEPENDENCE WITH CURRENT USE (HCC): ICD-10-CM

## 2024-07-17 PROBLEM — I47.20 VENTRICULAR TACHYCARDIA, UNSPECIFIED (HCC): Status: RESOLVED | Noted: 2024-07-17 | Resolved: 2024-07-17

## 2024-07-17 PROBLEM — R93.89 ABNORMAL CXR (CHEST X-RAY): Status: RESOLVED | Noted: 2023-05-25 | Resolved: 2024-07-17

## 2024-07-17 PROBLEM — N12 PYELONEPHRITIS: Status: RESOLVED | Noted: 2017-12-30 | Resolved: 2024-07-17

## 2024-07-17 PROBLEM — I47.20 VENTRICULAR TACHYCARDIA, UNSPECIFIED (HCC): Status: ACTIVE | Noted: 2024-07-17

## 2024-07-17 LAB
ALBUMIN: 3.4 G/DL (ref 3.5–5.2)
ALP BLD-CCNC: 89 U/L (ref 35–104)
ALT SERPL-CCNC: 6 U/L (ref 0–32)
ANION GAP SERPL CALCULATED.3IONS-SCNC: 10 MMOL/L (ref 7–16)
AST SERPL-CCNC: 13 U/L (ref 0–31)
BASOPHILS ABSOLUTE: 0.05 K/UL (ref 0–0.2)
BASOPHILS RELATIVE PERCENT: 1 % (ref 0–2)
BILIRUB SERPL-MCNC: 0.3 MG/DL (ref 0–1.2)
BUN BLDV-MCNC: 9 MG/DL (ref 6–20)
C-REACTIVE PROTEIN: 11 MG/L (ref 0–5)
CALCIUM SERPL-MCNC: 8.6 MG/DL (ref 8.6–10.2)
CHLORIDE BLD-SCNC: 105 MMOL/L (ref 98–107)
CO2: 22 MMOL/L (ref 22–29)
CREAT SERPL-MCNC: 0.7 MG/DL (ref 0.5–1)
EOSINOPHILS ABSOLUTE: 0.2 K/UL (ref 0.05–0.5)
EOSINOPHILS RELATIVE PERCENT: 2 % (ref 0–6)
GFR, ESTIMATED: >90 ML/MIN/1.73M2
GLUCOSE BLD-MCNC: 96 MG/DL (ref 74–99)
HCT VFR BLD CALC: 44.4 % (ref 34–48)
HEMOGLOBIN: 14.6 G/DL (ref 11.5–15.5)
IMMATURE GRANULOCYTES %: 0 % (ref 0–5)
IMMATURE GRANULOCYTES ABSOLUTE: 0.03 K/UL (ref 0–0.58)
LYMPHOCYTES ABSOLUTE: 4.18 K/UL (ref 1.5–4)
LYMPHOCYTES RELATIVE PERCENT: 42 % (ref 20–42)
MCH RBC QN AUTO: 32.5 PG (ref 26–35)
MCHC RBC AUTO-ENTMCNC: 32.9 G/DL (ref 32–34.5)
MCV RBC AUTO: 98.9 FL (ref 80–99.9)
MONOCYTES ABSOLUTE: 0.59 K/UL (ref 0.1–0.95)
MONOCYTES RELATIVE PERCENT: 6 % (ref 2–12)
NEUTROPHILS ABSOLUTE: 4.96 K/UL (ref 1.8–7.3)
NEUTROPHILS RELATIVE PERCENT: 50 % (ref 43–80)
PDW BLD-RTO: 14.4 % (ref 11.5–15)
PLATELET # BLD: 286 K/UL (ref 130–450)
PMV BLD AUTO: 11.1 FL (ref 7–12)
POTASSIUM SERPL-SCNC: 4.4 MMOL/L (ref 3.5–5)
RBC # BLD: 4.49 M/UL (ref 3.5–5.5)
SED RATE, AUTOMATED: 36 MM/HR (ref 0–20)
SODIUM BLD-SCNC: 137 MMOL/L (ref 132–146)
TOTAL PROTEIN: 6.9 G/DL (ref 6.4–8.3)
WBC # BLD: 10 K/UL (ref 4.5–11.5)

## 2024-07-17 SDOH — ECONOMIC STABILITY: FOOD INSECURITY: WITHIN THE PAST 12 MONTHS, THE FOOD YOU BOUGHT JUST DIDN'T LAST AND YOU DIDN'T HAVE MONEY TO GET MORE.: NEVER TRUE

## 2024-07-17 SDOH — ECONOMIC STABILITY: FOOD INSECURITY: WITHIN THE PAST 12 MONTHS, YOU WORRIED THAT YOUR FOOD WOULD RUN OUT BEFORE YOU GOT MONEY TO BUY MORE.: NEVER TRUE

## 2024-07-17 SDOH — ECONOMIC STABILITY: INCOME INSECURITY: HOW HARD IS IT FOR YOU TO PAY FOR THE VERY BASICS LIKE FOOD, HOUSING, MEDICAL CARE, AND HEATING?: NOT HARD AT ALL

## 2024-07-17 ASSESSMENT — PATIENT HEALTH QUESTIONNAIRE - PHQ9
3. TROUBLE FALLING OR STAYING ASLEEP: NOT AT ALL
6. FEELING BAD ABOUT YOURSELF - OR THAT YOU ARE A FAILURE OR HAVE LET YOURSELF OR YOUR FAMILY DOWN: NOT AT ALL
8. MOVING OR SPEAKING SO SLOWLY THAT OTHER PEOPLE COULD HAVE NOTICED. OR THE OPPOSITE, BEING SO FIGETY OR RESTLESS THAT YOU HAVE BEEN MOVING AROUND A LOT MORE THAN USUAL: NOT AT ALL
4. FEELING TIRED OR HAVING LITTLE ENERGY: NOT AT ALL
SUM OF ALL RESPONSES TO PHQ QUESTIONS 1-9: 0
5. POOR APPETITE OR OVEREATING: NOT AT ALL
1. LITTLE INTEREST OR PLEASURE IN DOING THINGS: NOT AT ALL
SUM OF ALL RESPONSES TO PHQ QUESTIONS 1-9: 0
9. THOUGHTS THAT YOU WOULD BE BETTER OFF DEAD, OR OF HURTING YOURSELF: NOT AT ALL
2. FEELING DOWN, DEPRESSED OR HOPELESS: NOT AT ALL
SUM OF ALL RESPONSES TO PHQ9 QUESTIONS 1 & 2: 0
SUM OF ALL RESPONSES TO PHQ QUESTIONS 1-9: 0
10. IF YOU CHECKED OFF ANY PROBLEMS, HOW DIFFICULT HAVE THESE PROBLEMS MADE IT FOR YOU TO DO YOUR WORK, TAKE CARE OF THINGS AT HOME, OR GET ALONG WITH OTHER PEOPLE: NOT DIFFICULT AT ALL
SUM OF ALL RESPONSES TO PHQ QUESTIONS 1-9: 0
7. TROUBLE CONCENTRATING ON THINGS, SUCH AS READING THE NEWSPAPER OR WATCHING TELEVISION: NOT AT ALL

## 2024-07-17 NOTE — PROGRESS NOTES
Butterfly left AC  hand x1 25g x 3/4  
MOUTH DAILY 30 tablet 3    pantoprazole (PROTONIX) 20 MG tablet TAKE 1 TABLET BY MOUTH DAILY 30 tablet 3    citalopram (CELEXA) 40 MG tablet Take 1 tablet by mouth daily 30 tablet 5    JARDIANCE 10 MG tablet TAKE 1 TABLET BY MOUTH DAILY 30 tablet 11    atorvastatin (LIPITOR) 80 MG tablet Take 1 tablet by mouth nightly 30 tablet 3    cetirizine (ZYRTEC) 10 MG tablet TAKE 1 TABLET BY MOUTH DAILY 30 tablet 5    potassium chloride (KLOR-CON M) 20 MEQ TBCR extended release tablet Take 1 tablet by mouth daily      Multiple Vitamin (DAILY VITES) TABS Take 1 tablet by mouth daily 90 tablet 3    ferrous sulfate (IRON 325) 325 (65 Fe) MG tablet Take 1 tablet by mouth daily (with breakfast) 30 tablet 5    mometasone-formoterol (DULERA) 100-5 MCG/ACT inhaler Inhale 1 puff into the lungs daily as needed      vitamin B-12 (CYANOCOBALAMIN) 1000 MCG tablet Take 1 tablet by mouth daily          Medications patient taking as of now reconciled against medications ordered at time of hospital discharge: Yes    A comprehensive review of systems was negative except for what was noted in the HPI.    Objective:    /84   Pulse 73   Temp 97.7 °F (36.5 °C)   Resp 16   Ht 1.727 m (5' 7.99\")   Wt 134.3 kg (296 lb)   SpO2 96%   BMI 45.02 kg/m²   General Appearance: alert and oriented to person, place and time, well developed and well- nourished, in no acute distress  Skin: warm and dry, no rash or erythema  Head: normocephalic and atraumatic  Eyes: pupils equal, round, and reactive to light, extraocular eye movements intact, conjunctivae normal  ENT: tympanic membrane, external ear and ear canal normal bilaterally, nose without deformity, nasal mucosa and turbinates normal without polyps  Neck: supple and non-tender without mass, no thyromegaly or thyroid nodules, no cervical lymphadenopathy  Pulmonary/Chest: clear to auscultation bilaterally- no wheezes, rales or rhonchi, normal air movement, no respiratory distress  Cardiovascular:

## 2024-07-18 ENCOUNTER — TELEPHONE (OUTPATIENT)
Dept: PAIN MANAGEMENT | Age: 49
End: 2024-07-18

## 2024-07-18 DIAGNOSIS — M54.16 LUMBAR RADICULOPATHY: ICD-10-CM

## 2024-07-18 DIAGNOSIS — G89.29 CHRONIC BILATERAL LOW BACK PAIN WITH BILATERAL SCIATICA: ICD-10-CM

## 2024-07-18 DIAGNOSIS — M16.12 PRIMARY OSTEOARTHRITIS OF LEFT HIP: ICD-10-CM

## 2024-07-18 DIAGNOSIS — G89.4 CHRONIC PAIN SYNDROME: ICD-10-CM

## 2024-07-18 DIAGNOSIS — M25.552 LEFT HIP PAIN: ICD-10-CM

## 2024-07-18 DIAGNOSIS — M48.062 SPINAL STENOSIS OF LUMBAR REGION WITH NEUROGENIC CLAUDICATION: ICD-10-CM

## 2024-07-18 DIAGNOSIS — M54.42 CHRONIC BILATERAL LOW BACK PAIN WITH BILATERAL SCIATICA: ICD-10-CM

## 2024-07-18 DIAGNOSIS — M54.41 CHRONIC BILATERAL LOW BACK PAIN WITH BILATERAL SCIATICA: ICD-10-CM

## 2024-07-18 RX ORDER — OXYCODONE HYDROCHLORIDE AND ACETAMINOPHEN 5; 325 MG/1; MG/1
1 TABLET ORAL 3 TIMES DAILY PRN
Qty: 69 TABLET | Refills: 0 | Status: SHIPPED | OUTPATIENT
Start: 2024-07-18 | End: 2024-08-10

## 2024-07-18 NOTE — TELEPHONE ENCOUNTER
Monique GARZA [facility] insurance only covered 1 week for her percocet. A new prescription is required for the remaninig  balance. Please advise.

## 2024-07-19 ENCOUNTER — TELEPHONE (OUTPATIENT)
Dept: BARIATRICS/WEIGHT MGMT | Age: 49
End: 2024-07-19

## 2024-07-22 ENCOUNTER — OFFICE VISIT (OUTPATIENT)
Dept: CARDIOLOGY CLINIC | Age: 49
End: 2024-07-22
Payer: MEDICARE

## 2024-07-22 VITALS
DIASTOLIC BLOOD PRESSURE: 68 MMHG | SYSTOLIC BLOOD PRESSURE: 116 MMHG | BODY MASS INDEX: 44.3 KG/M2 | WEIGHT: 292.3 LBS | RESPIRATION RATE: 16 BRPM | HEART RATE: 75 BPM | HEIGHT: 68 IN

## 2024-07-22 DIAGNOSIS — J41.8 MIXED SIMPLE AND MUCOPURULENT CHRONIC BRONCHITIS (HCC): ICD-10-CM

## 2024-07-22 DIAGNOSIS — I50.22 CHRONIC SYSTOLIC HEART FAILURE (HCC): ICD-10-CM

## 2024-07-22 DIAGNOSIS — K21.9 GASTROESOPHAGEAL REFLUX DISEASE WITHOUT ESOPHAGITIS: ICD-10-CM

## 2024-07-22 DIAGNOSIS — Z01.818 PRE-OP EVALUATION: Primary | ICD-10-CM

## 2024-07-22 DIAGNOSIS — R00.1 BRADYCARDIA: ICD-10-CM

## 2024-07-22 DIAGNOSIS — K86.2 PANCREATIC CYST: ICD-10-CM

## 2024-07-22 DIAGNOSIS — G47.33 OBSTRUCTIVE SLEEP APNEA: ICD-10-CM

## 2024-07-22 DIAGNOSIS — I10 PRIMARY HYPERTENSION: ICD-10-CM

## 2024-07-22 DIAGNOSIS — I49.3 PVC (PREMATURE VENTRICULAR CONTRACTION): ICD-10-CM

## 2024-07-22 DIAGNOSIS — Z98.84 S/P GASTRIC BYPASS: ICD-10-CM

## 2024-07-22 DIAGNOSIS — J45.20 MILD INTERMITTENT ASTHMA WITHOUT COMPLICATION: ICD-10-CM

## 2024-07-22 DIAGNOSIS — E08.00 DIABETES MELLITUS DUE TO UNDERLYING CONDITION WITH HYPEROSMOLARITY WITHOUT COMA, WITHOUT LONG-TERM CURRENT USE OF INSULIN (HCC): ICD-10-CM

## 2024-07-22 DIAGNOSIS — I42.8 NONISCHEMIC CARDIOMYOPATHY (HCC): ICD-10-CM

## 2024-07-22 DIAGNOSIS — I25.10 CORONARY ARTERY DISEASE INVOLVING NATIVE CORONARY ARTERY OF NATIVE HEART WITHOUT ANGINA PECTORIS: ICD-10-CM

## 2024-07-22 PROCEDURE — 99212 OFFICE O/P EST SF 10 MIN: CPT | Performed by: INTERNAL MEDICINE

## 2024-07-22 PROCEDURE — G8417 CALC BMI ABV UP PARAM F/U: HCPCS | Performed by: INTERNAL MEDICINE

## 2024-07-22 PROCEDURE — 3074F SYST BP LT 130 MM HG: CPT | Performed by: INTERNAL MEDICINE

## 2024-07-22 PROCEDURE — 3023F SPIROM DOC REV: CPT | Performed by: INTERNAL MEDICINE

## 2024-07-22 PROCEDURE — G8427 DOCREV CUR MEDS BY ELIG CLIN: HCPCS | Performed by: INTERNAL MEDICINE

## 2024-07-22 PROCEDURE — 93000 ELECTROCARDIOGRAM COMPLETE: CPT | Performed by: INTERNAL MEDICINE

## 2024-07-22 PROCEDURE — 3078F DIAST BP <80 MM HG: CPT | Performed by: INTERNAL MEDICINE

## 2024-07-22 PROCEDURE — 1036F TOBACCO NON-USER: CPT | Performed by: INTERNAL MEDICINE

## 2024-07-22 NOTE — PROGRESS NOTES
patient was on LifeVest.  With guideline directed medical therapy her EF on echocardiogram in October 2021 revealed EF of 50 to 55%  Recent cardiac catheterization revealed EF of 30% with LV gram but subsequent echocardiogram revealed EF of 50 to 55%  Continue on beta-blocker, spironolactone, Entresto, and Jardiance  Continue current dose of Bumex  Currently denies symptoms    3. Frequent PVCs/ventricular tachycardia  Recent Zio patch heart monitor revealed 18.5% PVC burden and 89 episode of ventricular tachyarrhythmias  Follow-up with the EP  Continue  beta-blocker  Cardiac MRI was ordered but she could not fit in the MRI machine and she declined to go to [facility]      4. Essential hypertension  Blood pressure is stable    5. Hyperlipidemia   Continue statin therapy       6.  Moderate LVH   Cardiac MRI was arranged but she could not fit in the machine as mentioned    7. Morbid obesity (HCC)  Kendall-en-Y Gastric Bypass surgery  Follow-up with your bariatrics team  8. Reactive depression  Follow-up with your PCP  9. Tobacco dependence  Smoking cessation recommended  10. Gastroesophageal reflux disease without esophagitis  Follow-up with your PCP  11. JODI (obstructive sleep apnea)  Continue CPAP  12. Chronic bilateral low back pain with bilateral sciatica  Follow-up with your pain doctors for management of chronic back pain  13. Physical deconditioning  Consider PT OT and rehab    14.  Preoperative cardiovascular evaluation  She reports she is active without any limitations and able to achieve more than 4 METS and subsequently may proceed to surgery without additional cardiac testing       FOLLOW-UP in 3 months        Thank you for allowing me to participate in your patient's care. Please feel free to contact me if you have any questions or concerns.    Rocky Curran MD  [facility]

## 2024-07-24 RX ORDER — POTASSIUM CHLORIDE 1500 MG/1
20 TABLET, EXTENDED RELEASE ORAL DAILY
Qty: 60 TABLET | Refills: 1 | Status: SHIPPED | OUTPATIENT
Start: 2024-07-24

## 2024-07-26 ENCOUNTER — TELEPHONE (OUTPATIENT)
Dept: PAIN MANAGEMENT | Age: 49
End: 2024-07-26

## 2024-07-26 NOTE — TELEPHONE ENCOUNTER
Monique Voss called she has not an outbreak or open wounds from HS for over a month now. She asking if its ok to have epidural done.

## 2024-08-05 ENCOUNTER — TELEPHONE (OUTPATIENT)
Dept: PAIN MANAGEMENT | Age: 49
End: 2024-08-05

## 2024-08-05 NOTE — TELEPHONE ENCOUNTER
Call to Monique Voss that procedure was scheduled for 08/13/2024 and that [facility] should call her a few days before for the pre op call and between 2:00 PM and 4:00 PM  the business day before with the arrival time. Instructed Monique to hold ibuprofen for 24 hours, Celebrex, Mobic, and naprosyn for 4 days and any aspirin containing products, CoQ 10, or fish oil for 7 days. Instructed to call office back if any questions. Monique verbalized understanding.      Electronically signed by Jessy Montanez RN on 8/5/2024 at 3:44 PM

## 2024-08-07 ENCOUNTER — PREP FOR PROCEDURE (OUTPATIENT)
Dept: PAIN MANAGEMENT | Age: 49
End: 2024-08-07

## 2024-08-07 DIAGNOSIS — J45.20 MILD INTERMITTENT ASTHMA WITHOUT COMPLICATION: ICD-10-CM

## 2024-08-07 RX ORDER — CETIRIZINE HYDROCHLORIDE 10 MG/1
10 TABLET ORAL DAILY
Qty: 30 TABLET | Refills: 5 | Status: SHIPPED | OUTPATIENT
Start: 2024-08-07

## 2024-08-07 RX ORDER — ASPIRIN 81 MG/1
81 TABLET, CHEWABLE ORAL
Qty: 90 TABLET | Refills: 2 | Status: SHIPPED | OUTPATIENT
Start: 2024-08-07

## 2024-08-07 NOTE — PROGRESS NOTES
[facility]  [address] [place] [address]    Follow up Note      Monique Voss     Date of Visit:  2024    CC:  Patient presents for follow up   Chief Complaint   Patient presents with    Follow-up     Lower back and hips         HPI:    Pain is a little worse to left hip.   Appropriate analgesia with current medications regimen: yes.    Change in quality of symptoms:no.    Medication side effects:none.   Recent diagnostic testing:none.   Recent interventional procedures: None.     She has been on anticoagulation medications to include ASA and has not been on herbal supplements.  She is diabetic.     Imagin2023 EMG UEs    Diagnostic Interpretation:      This study was abnormal.      1. 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 It is chronic in duration. This is consistent with the clinical diagnosis of carpal tunnel syndrome. Prognosis for recovery of demyelinating lesions is good.          10/2021 lumbar MRI -      BONES/ALIGNMENT: There is normal alignment of the spine.  Mild-to-moderate   levoscoliosis of the lumbar spine is again noted.  The vertebral body heights   are maintained.  There is spurring and disc desiccation at multiple levels   with mild-to-moderate disc space narrowing at L1-2, L2-3, L3-4 and L4-5.   Slight subchondral signal change is seen, most notably at L2-3 and L4-5.   This is most likely related to degenerative disc disease.  Otherwise, the   bone marrow signal appears unremarkable.       SPINAL CORD: The conus terminates normally.       SOFT TISSUES: No paraspinal mass identified.       The spinal canal is somewhat congenitally narrowed.       L1-L2: There is disc bulge and severe right and moderate left posterior facet   degenerative change with ligamentum flavum hypertrophy

## 2024-08-08 ENCOUNTER — OFFICE VISIT (OUTPATIENT)
Dept: PAIN MANAGEMENT | Age: 49
End: 2024-08-08
Payer: MEDICARE

## 2024-08-08 VITALS
OXYGEN SATURATION: 70 % | RESPIRATION RATE: 18 BRPM | HEIGHT: 68 IN | HEART RATE: 99 BPM | SYSTOLIC BLOOD PRESSURE: 122 MMHG | BODY MASS INDEX: 44.27 KG/M2 | WEIGHT: 292.11 LBS | TEMPERATURE: 97.9 F | DIASTOLIC BLOOD PRESSURE: 66 MMHG

## 2024-08-08 DIAGNOSIS — M48.062 SPINAL STENOSIS OF LUMBAR REGION WITH NEUROGENIC CLAUDICATION: ICD-10-CM

## 2024-08-08 DIAGNOSIS — M54.41 CHRONIC BILATERAL LOW BACK PAIN WITH BILATERAL SCIATICA: ICD-10-CM

## 2024-08-08 DIAGNOSIS — M16.12 PRIMARY OSTEOARTHRITIS OF LEFT HIP: ICD-10-CM

## 2024-08-08 DIAGNOSIS — M54.42 CHRONIC BILATERAL LOW BACK PAIN WITH BILATERAL SCIATICA: ICD-10-CM

## 2024-08-08 DIAGNOSIS — M79.642 PAIN IN BOTH HANDS: Primary | ICD-10-CM

## 2024-08-08 DIAGNOSIS — G89.29 CHRONIC BILATERAL LOW BACK PAIN WITH BILATERAL SCIATICA: ICD-10-CM

## 2024-08-08 DIAGNOSIS — M79.641 PAIN IN BOTH HANDS: Primary | ICD-10-CM

## 2024-08-08 DIAGNOSIS — M54.16 LUMBAR RADICULOPATHY: ICD-10-CM

## 2024-08-08 DIAGNOSIS — M16.0 PRIMARY OSTEOARTHRITIS OF BOTH HIPS: ICD-10-CM

## 2024-08-08 DIAGNOSIS — Z79.891 LONG TERM PRESCRIPTION OPIATE USE: ICD-10-CM

## 2024-08-08 DIAGNOSIS — G89.4 CHRONIC PAIN SYNDROME: ICD-10-CM

## 2024-08-08 DIAGNOSIS — M25.552 LEFT HIP PAIN: ICD-10-CM

## 2024-08-08 PROCEDURE — G8417 CALC BMI ABV UP PARAM F/U: HCPCS | Performed by: PHYSICIAN ASSISTANT

## 2024-08-08 PROCEDURE — 99213 OFFICE O/P EST LOW 20 MIN: CPT | Performed by: PHYSICIAN ASSISTANT

## 2024-08-08 PROCEDURE — G8427 DOCREV CUR MEDS BY ELIG CLIN: HCPCS | Performed by: PHYSICIAN ASSISTANT

## 2024-08-08 PROCEDURE — 99213 OFFICE O/P EST LOW 20 MIN: CPT

## 2024-08-08 PROCEDURE — 3078F DIAST BP <80 MM HG: CPT | Performed by: PHYSICIAN ASSISTANT

## 2024-08-08 PROCEDURE — 1036F TOBACCO NON-USER: CPT | Performed by: PHYSICIAN ASSISTANT

## 2024-08-08 PROCEDURE — 3074F SYST BP LT 130 MM HG: CPT | Performed by: PHYSICIAN ASSISTANT

## 2024-08-08 RX ORDER — CYCLOBENZAPRINE HCL 5 MG
5 TABLET ORAL 2 TIMES DAILY PRN
Qty: 60 TABLET | Refills: 0 | Status: SHIPPED | OUTPATIENT
Start: 2024-08-08 | End: 2024-09-07

## 2024-08-08 RX ORDER — OXYCODONE HYDROCHLORIDE AND ACETAMINOPHEN 5; 325 MG/1; MG/1
1 TABLET ORAL 3 TIMES DAILY PRN
Qty: 90 TABLET | Refills: 0 | Status: SHIPPED | OUTPATIENT
Start: 2024-08-08 | End: 2024-09-07

## 2024-08-08 RX ORDER — GABAPENTIN 800 MG/1
800 TABLET ORAL 3 TIMES DAILY
Qty: 90 TABLET | Refills: 0 | Status: SHIPPED | OUTPATIENT
Start: 2024-08-08 | End: 2024-09-07

## 2024-08-08 NOTE — PROGRESS NOTES
Monique Voss presents to the [facility] on 8/8/2024. Monique is complaining of pain in her lower back and hips. The pain is constant. The pain is described as aching, throbbing, shooting, and stabbing. Pain is rated on her best day at a 8, on her worst day at a 10, and on average at a 8 on the VAS scale. She took her last dose of Norco and Neurontin today.      Any procedures since your last visit: No    She is  on NSAIDS and  is  on anticoagulation medications to include ASA and is managed by Madison Amin PA-C.     Pacemaker or defibrillator: No    Medication Contract and Consent for Opioid Use Documents Filed       Patient Documents       Type of Document Status Date Received Received By Description    Medication Contract Received 7/1/2019 11:37 AM JONAH ANAND PAIN MGMT CONTRACT DR. VILLANUEVA    Medication Contract Signed 7/24/2019 10:15 AM KALIA BYRD medication contract    Medication Contract Received 10/27/2022  3:10 PM GRACIE HEATON Paint Agreement    Medication Contract Received 9/19/2023 11:58 AM MELITA BARONE PAIN AGREEMENT                       Resp 18   Ht 1.727 m (5' 7.99\")   Wt 132.5 kg (292 lb 1.8 oz)   LMP 07/29/2024   BMI 44.43 kg/m²      Patient's last menstrual period was 07/29/2024.

## 2024-08-11 ENCOUNTER — HOSPITAL ENCOUNTER (EMERGENCY)
Age: 49
Discharge: HOME | End: 2024-08-12
Attending: STUDENT IN AN ORGANIZED HEALTH CARE EDUCATION/TRAINING PROGRAM
Payer: MEDICARE

## 2024-08-11 ENCOUNTER — APPOINTMENT (OUTPATIENT)
Dept: CT IMAGING | Age: 49
End: 2024-08-11
Attending: STUDENT IN AN ORGANIZED HEALTH CARE EDUCATION/TRAINING PROGRAM
Payer: MEDICARE

## 2024-08-11 DIAGNOSIS — N30.00 ACUTE CYSTITIS WITHOUT HEMATURIA: Primary | ICD-10-CM

## 2024-08-11 LAB
ALBUMIN SERPL-MCNC: 3.7 G/DL (ref 3.5–5.2)
ALP SERPL-CCNC: 115 U/L (ref 35–104)
ALT SERPL-CCNC: 7 U/L (ref 0–32)
ANION GAP SERPL CALCULATED.3IONS-SCNC: 13 MMOL/L (ref 7–16)
AST SERPL-CCNC: 10 U/L (ref 0–31)
BACTERIA URNS QL MICRO: ABNORMAL
BASOPHILS # BLD: 0.06 K/UL (ref 0–0.2)
BASOPHILS NFR BLD: 1 % (ref 0–2)
BILIRUB SERPL-MCNC: 0.4 MG/DL (ref 0–1.2)
BILIRUB UR QL STRIP: ABNORMAL
BUN SERPL-MCNC: 8 MG/DL (ref 6–20)
CALCIUM SERPL-MCNC: 9.3 MG/DL (ref 8.6–10.2)
CHLORIDE SERPL-SCNC: 104 MMOL/L (ref 98–107)
CLARITY UR: CLEAR
CO2 SERPL-SCNC: 23 MMOL/L (ref 22–29)
COLOR UR: YELLOW
CREAT SERPL-MCNC: 0.9 MG/DL (ref 0.5–1)
EOSINOPHIL # BLD: 0.16 K/UL (ref 0.05–0.5)
EOSINOPHILS RELATIVE PERCENT: 1 % (ref 0–6)
EPI CELLS #/AREA URNS HPF: ABNORMAL /HPF
ERYTHROCYTE [DISTWIDTH] IN BLOOD BY AUTOMATED COUNT: 14.6 % (ref 11.5–15)
GFR, ESTIMATED: 85 ML/MIN/1.73M2
GLUCOSE SERPL-MCNC: 110 MG/DL (ref 74–99)
GLUCOSE UR STRIP-MCNC: 250 MG/DL
HCG, URINE, POC: NEGATIVE
HCT VFR BLD AUTO: 49.6 % (ref 34–48)
HGB BLD-MCNC: 17 G/DL (ref 11.5–15.5)
HGB UR QL STRIP.AUTO: NEGATIVE
IMM GRANULOCYTES # BLD AUTO: 0.03 K/UL (ref 0–0.58)
IMM GRANULOCYTES NFR BLD: 0 % (ref 0–5)
INFLUENZA A BY PCR: NOT DETECTED
INFLUENZA B BY PCR: NOT DETECTED
KETONES UR STRIP-MCNC: 15 MG/DL
LACTATE BLDV-SCNC: 0.9 MMOL/L (ref 0.5–2.2)
LEUKOCYTE ESTERASE UR QL STRIP: ABNORMAL
LIPASE SERPL-CCNC: 21 U/L (ref 13–60)
LYMPHOCYTES NFR BLD: 3.38 K/UL (ref 1.5–4)
LYMPHOCYTES RELATIVE PERCENT: 29 % (ref 20–42)
Lab: NORMAL
MCH RBC QN AUTO: 32.8 PG (ref 26–35)
MCHC RBC AUTO-ENTMCNC: 34.3 G/DL (ref 32–34.5)
MCV RBC AUTO: 95.6 FL (ref 80–99.9)
MONOCYTES NFR BLD: 0.71 K/UL (ref 0.1–0.95)
MONOCYTES NFR BLD: 6 % (ref 2–12)
MUCOUS THREADS URNS QL MICRO: PRESENT
NEGATIVE QC PASS/FAIL: NORMAL
NEUTROPHILS NFR BLD: 63 % (ref 43–80)
NEUTS SEG NFR BLD: 7.32 K/UL (ref 1.8–7.3)
NITRITE UR QL STRIP: NEGATIVE
PH UR STRIP: 6 [PH] (ref 5–9)
PLATELET # BLD AUTO: 334 K/UL (ref 130–450)
PMV BLD AUTO: 10.3 FL (ref 7–12)
POSITIVE QC PASS/FAIL: NORMAL
POTASSIUM SERPL-SCNC: 3.8 MMOL/L (ref 3.5–5)
PROT SERPL-MCNC: 7.6 G/DL (ref 6.4–8.3)
PROT UR STRIP-MCNC: NEGATIVE MG/DL
RBC # BLD AUTO: 5.19 M/UL (ref 3.5–5.5)
RBC #/AREA URNS HPF: ABNORMAL /HPF
SARS-COV-2 RDRP RESP QL NAA+PROBE: NOT DETECTED
SODIUM SERPL-SCNC: 140 MMOL/L (ref 132–146)
SP GR UR STRIP: 1.02 (ref 1–1.03)
SPECIMEN DESCRIPTION: NORMAL
UROBILINOGEN UR STRIP-ACNC: 0.2 EU/DL (ref 0–1)
WBC #/AREA URNS HPF: ABNORMAL /HPF
WBC OTHER # BLD: 11.7 K/UL (ref 4.5–11.5)

## 2024-08-11 PROCEDURE — 81001 URINALYSIS AUTO W/SCOPE: CPT

## 2024-08-11 PROCEDURE — 87635 SARS-COV-2 COVID-19 AMP PRB: CPT

## 2024-08-11 PROCEDURE — 87502 INFLUENZA DNA AMP PROBE: CPT

## 2024-08-11 PROCEDURE — 6370000000 HC RX 637 (ALT 250 FOR IP): Performed by: STUDENT IN AN ORGANIZED HEALTH CARE EDUCATION/TRAINING PROGRAM

## 2024-08-11 PROCEDURE — 99285 EMERGENCY DEPT VISIT HI MDM: CPT

## 2024-08-11 PROCEDURE — 80053 COMPREHEN METABOLIC PANEL: CPT

## 2024-08-11 PROCEDURE — 83605 ASSAY OF LACTIC ACID: CPT

## 2024-08-11 PROCEDURE — 2580000003 HC RX 258: Performed by: RADIOLOGY

## 2024-08-11 PROCEDURE — 96374 THER/PROPH/DIAG INJ IV PUSH: CPT

## 2024-08-11 PROCEDURE — 87086 URINE CULTURE/COLONY COUNT: CPT

## 2024-08-11 PROCEDURE — 6360000002 HC RX W HCPCS: Performed by: STUDENT IN AN ORGANIZED HEALTH CARE EDUCATION/TRAINING PROGRAM

## 2024-08-11 PROCEDURE — 6360000004 HC RX CONTRAST MEDICATION: Performed by: RADIOLOGY

## 2024-08-11 PROCEDURE — 85025 COMPLETE CBC W/AUTO DIFF WBC: CPT

## 2024-08-11 PROCEDURE — 87077 CULTURE AEROBIC IDENTIFY: CPT

## 2024-08-11 PROCEDURE — 83690 ASSAY OF LIPASE: CPT

## 2024-08-11 PROCEDURE — 74177 CT ABD & PELVIS W/CONTRAST: CPT

## 2024-08-11 PROCEDURE — 70450 CT HEAD/BRAIN W/O DYE: CPT

## 2024-08-11 PROCEDURE — 93005 ELECTROCARDIOGRAM TRACING: CPT | Performed by: STUDENT IN AN ORGANIZED HEALTH CARE EDUCATION/TRAINING PROGRAM

## 2024-08-11 RX ORDER — 0.9 % SODIUM CHLORIDE 0.9 %
1000 INTRAVENOUS SOLUTION INTRAVENOUS ONCE
Status: COMPLETED | OUTPATIENT
Start: 2024-08-12 | End: 2024-08-12

## 2024-08-11 RX ORDER — ACETAMINOPHEN 500 MG
1000 TABLET ORAL ONCE
Status: COMPLETED | OUTPATIENT
Start: 2024-08-11 | End: 2024-08-11

## 2024-08-11 RX ORDER — PROCHLORPERAZINE EDISYLATE 5 MG/ML
10 INJECTION INTRAMUSCULAR; INTRAVENOUS ONCE
Status: COMPLETED | OUTPATIENT
Start: 2024-08-11 | End: 2024-08-11

## 2024-08-11 RX ORDER — SODIUM CHLORIDE 0.9 % (FLUSH) 0.9 %
10 SYRINGE (ML) INJECTION PRN
Status: COMPLETED | OUTPATIENT
Start: 2024-08-11 | End: 2024-08-11

## 2024-08-11 RX ADMIN — SODIUM CHLORIDE, PRESERVATIVE FREE 10 ML: 5 INJECTION INTRAVENOUS at 23:35

## 2024-08-11 RX ADMIN — PROCHLORPERAZINE EDISYLATE 10 MG: 5 INJECTION INTRAMUSCULAR; INTRAVENOUS at 21:54

## 2024-08-11 RX ADMIN — ACETAMINOPHEN 1000 MG: 500 TABLET ORAL at 21:54

## 2024-08-11 RX ADMIN — IOPAMIDOL 80 ML: 755 INJECTION, SOLUTION INTRAVENOUS at 23:35

## 2024-08-11 ASSESSMENT — PAIN DESCRIPTION - LOCATION: LOCATION: GENERALIZED

## 2024-08-11 ASSESSMENT — PAIN DESCRIPTION - FREQUENCY: FREQUENCY: CONTINUOUS

## 2024-08-11 ASSESSMENT — LIFESTYLE VARIABLES
HOW MANY STANDARD DRINKS CONTAINING ALCOHOL DO YOU HAVE ON A TYPICAL DAY: PATIENT DOES NOT DRINK
HOW OFTEN DO YOU HAVE A DRINK CONTAINING ALCOHOL: NEVER

## 2024-08-11 ASSESSMENT — PAIN - FUNCTIONAL ASSESSMENT: PAIN_FUNCTIONAL_ASSESSMENT: 0-10

## 2024-08-11 ASSESSMENT — PAIN SCALES - GENERAL: PAINLEVEL_OUTOF10: 10

## 2024-08-11 ASSESSMENT — PAIN DESCRIPTION - DESCRIPTORS: DESCRIPTORS: ACHING;DISCOMFORT;SORE

## 2024-08-12 VITALS
WEIGHT: 292 LBS | DIASTOLIC BLOOD PRESSURE: 85 MMHG | OXYGEN SATURATION: 96 % | BODY MASS INDEX: 44.25 KG/M2 | HEIGHT: 68 IN | HEART RATE: 61 BPM | SYSTOLIC BLOOD PRESSURE: 132 MMHG | TEMPERATURE: 98.2 F | RESPIRATION RATE: 16 BRPM

## 2024-08-12 LAB
EKG ATRIAL RATE: 78 BPM
EKG P AXIS: 54 DEGREES
EKG P-R INTERVAL: 172 MS
EKG Q-T INTERVAL: 450 MS
EKG QRS DURATION: 154 MS
EKG QTC CALCULATION (BAZETT): 513 MS
EKG R AXIS: -93 DEGREES
EKG T AXIS: 41 DEGREES
EKG VENTRICULAR RATE: 78 BPM

## 2024-08-12 PROCEDURE — 93010 ELECTROCARDIOGRAM REPORT: CPT | Performed by: INTERNAL MEDICINE

## 2024-08-12 PROCEDURE — 96375 TX/PRO/DX INJ NEW DRUG ADDON: CPT

## 2024-08-12 PROCEDURE — 6360000002 HC RX W HCPCS: Performed by: STUDENT IN AN ORGANIZED HEALTH CARE EDUCATION/TRAINING PROGRAM

## 2024-08-12 PROCEDURE — 2580000003 HC RX 258: Performed by: STUDENT IN AN ORGANIZED HEALTH CARE EDUCATION/TRAINING PROGRAM

## 2024-08-12 RX ORDER — CEFDINIR 300 MG/1
300 CAPSULE ORAL 2 TIMES DAILY
Qty: 14 CAPSULE | Refills: 0 | Status: SHIPPED | OUTPATIENT
Start: 2024-08-12 | End: 2024-08-19

## 2024-08-12 RX ADMIN — SODIUM CHLORIDE 1000 ML: 9 INJECTION, SOLUTION INTRAVENOUS at 00:59

## 2024-08-12 RX ADMIN — WATER 1000 MG: 1 INJECTION INTRAMUSCULAR; INTRAVENOUS; SUBCUTANEOUS at 00:58

## 2024-08-12 NOTE — ED PROVIDER NOTES
[facility] EMERGENCY DEPARTMENT  EMERGENCY DEPARTMENT ENCOUNTER        Pt Name: Monique Voss  MRN: [id number]  Birthdate 1975  Date of evaluation: 8/11/2024  Provider: Brianne Cooley MD  PCP: Madison Amin PA-C  Note Started: 9:09 PM EDT 8/11/24    HPI  49 y.o. female presenting for headache, body aches.  Patient complains of headache for the past 2 weeks.  She states these do feel like previous headaches.  She complains of generalized bodyaches for the past couple days.  She complains of chills and nausea.  She complains of less abdominal pain.  She complains of urinary pressure.  No known fevers.  Bowel movements have been normal.      --------------------------------------------- PAST HISTORY ---------------------------------------------  Past Medical History:  has a past medical history of Asthma, Bell palsy, Carpal tunnel syndrome, CHF (congestive heart failure) (Formerly [facility]), Chronic obstructive pulmonary disease (HCC), Coronary artery disease involving native coronary artery of native heart without angina pectoris, DDD (degenerative disc disease), cervical, Diabetes mellitus (HCC), DJD (degenerative joint disease), GERD without esophagitis, HTN (hypertension), Hyperlipidemia, Left hip pain and right, Meningitis, Morbid obesity due to excess calories (Formerly [facility]), Neuropathy, JODI treated with BiPAP, Scoliosis, and 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    Past Surgical History:  has a past surgical history that includes Cystocopy (Left, 01/14/2018); Cholecystectomy (2009); Lithotripsy (Right, 02/15/2018); Upper gastrointestinal endoscopy (N/A, 06/22/2018); Dilation and curettage of uterus; Upper gastrointestinal endoscopy (N/A, 08/20/2021); Kendall-en-Y Gastric Bypass (N/A, 05/31/2022); hip surgery (Left, 09/22/2022); Pain management procedure (Bilateral, 10/27/2022); Nerve Block (Bilateral, 12/22/2022); hip surgery (Left, 03/23/2023); Pain management procedure (Bilateral, 04/04/2023);

## 2024-08-13 ENCOUNTER — TELEPHONE (OUTPATIENT)
Dept: BARIATRICS/WEIGHT MGMT | Age: 49
End: 2024-08-13

## 2024-08-15 ENCOUNTER — TELEPHONE (OUTPATIENT)
Dept: PRIMARY CARE CLINIC | Age: 49
End: 2024-08-15

## 2024-08-15 DIAGNOSIS — K59.00 CONSTIPATION, UNSPECIFIED CONSTIPATION TYPE: Primary | ICD-10-CM

## 2024-08-15 LAB
MICROORGANISM SPEC CULT: ABNORMAL
SERVICE CMNT-IMP: ABNORMAL
SPECIMEN DESCRIPTION: ABNORMAL

## 2024-08-15 NOTE — TELEPHONE ENCOUNTER
Patient went to the ER 8- for uti and they treated her with Cefdinir and she feels it is not working.  She still has lower abd pain, pressure and frequency.  She is also complaining of constipation which they didn't give her anything for.    Please advise.

## 2024-08-16 RX ORDER — POLYETHYLENE GLYCOL 3350 17 G/17G
17 POWDER, FOR SOLUTION ORAL DAILY PRN
Qty: 1530 G | Refills: 0 | Status: SHIPPED | OUTPATIENT
Start: 2024-08-16 | End: 2024-09-15

## 2024-08-19 DIAGNOSIS — L73.2 HIDRADENITIS SUPPURATIVA: ICD-10-CM

## 2024-08-20 ENCOUNTER — TELEPHONE (OUTPATIENT)
Dept: PRIMARY CARE CLINIC | Age: 49
End: 2024-08-20

## 2024-08-20 RX ORDER — CIPROFLOXACIN 500 MG/1
500 TABLET, FILM COATED ORAL 2 TIMES DAILY
Qty: 14 TABLET | Refills: 0 | Status: SHIPPED
Start: 2024-08-20 | End: 2024-08-20 | Stop reason: ALTCHOICE

## 2024-08-20 RX ORDER — SULFAMETHOXAZOLE/TRIMETHOPRIM 800-160 MG
TABLET ORAL
Qty: 20 TABLET | Refills: 0 | OUTPATIENT
Start: 2024-08-20

## 2024-08-20 NOTE — TELEPHONE ENCOUNTER
Patient phoned in stating she finished the antibiotic from the ER for her UTI.  She feels better but its not completely gone.  She still has the lower abd pain, pressure and frequency.    Please advise.

## 2024-08-21 PROBLEM — Z01.818 PRE-OP EVALUATION: Status: RESOLVED | Noted: 2024-07-22 | Resolved: 2024-08-21

## 2024-08-26 ENCOUNTER — APPOINTMENT (OUTPATIENT)
Dept: GENERAL RADIOLOGY | Age: 49
End: 2024-08-26
Payer: MEDICARE

## 2024-08-26 ENCOUNTER — HOSPITAL ENCOUNTER (EMERGENCY)
Age: 49
Discharge: HOME | End: 2024-08-26
Attending: STUDENT IN AN ORGANIZED HEALTH CARE EDUCATION/TRAINING PROGRAM
Payer: MEDICARE

## 2024-08-26 ENCOUNTER — APPOINTMENT (OUTPATIENT)
Dept: CT IMAGING | Age: 49
End: 2024-08-26
Payer: MEDICARE

## 2024-08-26 VITALS
OXYGEN SATURATION: 96 % | TEMPERATURE: 98.4 F | DIASTOLIC BLOOD PRESSURE: 85 MMHG | HEART RATE: 96 BPM | SYSTOLIC BLOOD PRESSURE: 148 MMHG | RESPIRATION RATE: 18 BRPM

## 2024-08-26 DIAGNOSIS — R10.84 GENERALIZED ABDOMINAL PAIN: Primary | ICD-10-CM

## 2024-08-26 LAB
ALBUMIN SERPL-MCNC: 3.9 G/DL (ref 3.5–5.2)
ALP SERPL-CCNC: 126 U/L (ref 35–104)
ALT SERPL-CCNC: 12 U/L (ref 0–32)
ANION GAP SERPL CALCULATED.3IONS-SCNC: 15 MMOL/L (ref 7–16)
AST SERPL-CCNC: 18 U/L (ref 0–31)
BASOPHILS # BLD: 0.08 K/UL (ref 0–0.2)
BASOPHILS NFR BLD: 1 % (ref 0–2)
BILIRUB SERPL-MCNC: 0.4 MG/DL (ref 0–1.2)
BILIRUB UR QL STRIP: ABNORMAL
BUN SERPL-MCNC: 8 MG/DL (ref 6–20)
CALCIUM SERPL-MCNC: 9.5 MG/DL (ref 8.6–10.2)
CHLORIDE SERPL-SCNC: 100 MMOL/L (ref 98–107)
CLARITY UR: CLEAR
CO2 SERPL-SCNC: 22 MMOL/L (ref 22–29)
COLOR UR: YELLOW
CREAT SERPL-MCNC: 0.7 MG/DL (ref 0.5–1)
EKG ATRIAL RATE: 101 BPM
EKG P AXIS: 67 DEGREES
EKG P-R INTERVAL: 166 MS
EKG Q-T INTERVAL: 420 MS
EKG QRS DURATION: 132 MS
EKG QTC CALCULATION (BAZETT): 544 MS
EKG R AXIS: -86 DEGREES
EKG T AXIS: 41 DEGREES
EKG VENTRICULAR RATE: 101 BPM
EOSINOPHIL # BLD: 0.16 K/UL (ref 0.05–0.5)
EOSINOPHILS RELATIVE PERCENT: 1 % (ref 0–6)
ERYTHROCYTE [DISTWIDTH] IN BLOOD BY AUTOMATED COUNT: 15 % (ref 11.5–15)
GFR, ESTIMATED: >90 ML/MIN/1.73M2
GLUCOSE SERPL-MCNC: 100 MG/DL (ref 74–99)
GLUCOSE UR STRIP-MCNC: >=1000 MG/DL
HCT VFR BLD AUTO: 51.7 % (ref 34–48)
HGB BLD-MCNC: 17.4 G/DL (ref 11.5–15.5)
HGB UR QL STRIP.AUTO: NEGATIVE
IMM GRANULOCYTES # BLD AUTO: 0.06 K/UL (ref 0–0.58)
IMM GRANULOCYTES NFR BLD: 0 % (ref 0–5)
KETONES UR STRIP-MCNC: >80 MG/DL
LACTATE BLDV-SCNC: 1.3 MMOL/L (ref 0.5–2.2)
LEUKOCYTE ESTERASE UR QL STRIP: NEGATIVE
LIPASE SERPL-CCNC: 38 U/L (ref 13–60)
LYMPHOCYTES NFR BLD: 3.53 K/UL (ref 1.5–4)
LYMPHOCYTES RELATIVE PERCENT: 26 % (ref 20–42)
MCH RBC QN AUTO: 31.5 PG (ref 26–35)
MCHC RBC AUTO-ENTMCNC: 33.7 G/DL (ref 32–34.5)
MCV RBC AUTO: 93.7 FL (ref 80–99.9)
MONOCYTES NFR BLD: 0.65 K/UL (ref 0.1–0.95)
MONOCYTES NFR BLD: 5 % (ref 2–12)
NEUTROPHILS NFR BLD: 67 % (ref 43–80)
NEUTS SEG NFR BLD: 9.15 K/UL (ref 1.8–7.3)
NITRITE UR QL STRIP: NEGATIVE
PH UR STRIP: 6.5 [PH] (ref 5–9)
PLATELET # BLD AUTO: 360 K/UL (ref 130–450)
PMV BLD AUTO: 10.2 FL (ref 7–12)
POTASSIUM SERPL-SCNC: 4.3 MMOL/L (ref 3.5–5)
PROT SERPL-MCNC: 8.4 G/DL (ref 6.4–8.3)
PROT UR STRIP-MCNC: ABNORMAL MG/DL
RBC # BLD AUTO: 5.52 M/UL (ref 3.5–5.5)
RBC #/AREA URNS HPF: ABNORMAL /HPF
SODIUM SERPL-SCNC: 137 MMOL/L (ref 132–146)
SP GR UR STRIP: 1.02 (ref 1–1.03)
TROPONIN I SERPL HS-MCNC: 11 NG/L (ref 0–9)
TROPONIN I SERPL HS-MCNC: 21 NG/L (ref 0–9)
UROBILINOGEN UR STRIP-ACNC: 0.2 EU/DL (ref 0–1)
WBC #/AREA URNS HPF: ABNORMAL /HPF
WBC OTHER # BLD: 13.6 K/UL (ref 4.5–11.5)

## 2024-08-26 PROCEDURE — 84703 CHORIONIC GONADOTROPIN ASSAY: CPT

## 2024-08-26 PROCEDURE — 80053 COMPREHEN METABOLIC PANEL: CPT

## 2024-08-26 PROCEDURE — 83690 ASSAY OF LIPASE: CPT

## 2024-08-26 PROCEDURE — 93010 ELECTROCARDIOGRAM REPORT: CPT | Performed by: INTERNAL MEDICINE

## 2024-08-26 PROCEDURE — 6360000004 HC RX CONTRAST MEDICATION: Performed by: RADIOLOGY

## 2024-08-26 PROCEDURE — 81001 URINALYSIS AUTO W/SCOPE: CPT

## 2024-08-26 PROCEDURE — 84484 ASSAY OF TROPONIN QUANT: CPT

## 2024-08-26 PROCEDURE — 96375 TX/PRO/DX INJ NEW DRUG ADDON: CPT

## 2024-08-26 PROCEDURE — 85025 COMPLETE CBC W/AUTO DIFF WBC: CPT

## 2024-08-26 PROCEDURE — 99285 EMERGENCY DEPT VISIT HI MDM: CPT

## 2024-08-26 PROCEDURE — 2580000003 HC RX 258: Performed by: STUDENT IN AN ORGANIZED HEALTH CARE EDUCATION/TRAINING PROGRAM

## 2024-08-26 PROCEDURE — 96374 THER/PROPH/DIAG INJ IV PUSH: CPT

## 2024-08-26 PROCEDURE — 93005 ELECTROCARDIOGRAM TRACING: CPT | Performed by: STUDENT IN AN ORGANIZED HEALTH CARE EDUCATION/TRAINING PROGRAM

## 2024-08-26 PROCEDURE — 6360000002 HC RX W HCPCS: Performed by: STUDENT IN AN ORGANIZED HEALTH CARE EDUCATION/TRAINING PROGRAM

## 2024-08-26 PROCEDURE — 71046 X-RAY EXAM CHEST 2 VIEWS: CPT

## 2024-08-26 PROCEDURE — 96376 TX/PRO/DX INJ SAME DRUG ADON: CPT

## 2024-08-26 PROCEDURE — 87086 URINE CULTURE/COLONY COUNT: CPT

## 2024-08-26 PROCEDURE — 74177 CT ABD & PELVIS W/CONTRAST: CPT

## 2024-08-26 PROCEDURE — 83605 ASSAY OF LACTIC ACID: CPT

## 2024-08-26 PROCEDURE — 87077 CULTURE AEROBIC IDENTIFY: CPT

## 2024-08-26 PROCEDURE — 2500000003 HC RX 250 WO HCPCS: Performed by: STUDENT IN AN ORGANIZED HEALTH CARE EDUCATION/TRAINING PROGRAM

## 2024-08-26 RX ORDER — POLYETHYLENE GLYCOL 3350 17 G/17G
17 POWDER, FOR SOLUTION ORAL DAILY PRN
Qty: 510 G | Refills: 0 | Status: SHIPPED | OUTPATIENT
Start: 2024-08-26 | End: 2024-09-25

## 2024-08-26 RX ORDER — IOPAMIDOL 755 MG/ML
18 INJECTION, SOLUTION INTRAVASCULAR
Status: COMPLETED | OUTPATIENT
Start: 2024-08-26 | End: 2024-08-26

## 2024-08-26 RX ORDER — MORPHINE SULFATE 8 MG/ML
6 INJECTION, SOLUTION INTRAMUSCULAR; INTRAVENOUS ONCE
Status: COMPLETED | OUTPATIENT
Start: 2024-08-26 | End: 2024-08-26

## 2024-08-26 RX ORDER — SENNA AND DOCUSATE SODIUM 50; 8.6 MG/1; MG/1
1 TABLET, FILM COATED ORAL DAILY
Qty: 30 TABLET | Refills: 0 | Status: SHIPPED | OUTPATIENT
Start: 2024-08-26 | End: 2024-09-25

## 2024-08-26 RX ORDER — 0.9 % SODIUM CHLORIDE 0.9 %
1000 INTRAVENOUS SOLUTION INTRAVENOUS ONCE
Status: COMPLETED | OUTPATIENT
Start: 2024-08-26 | End: 2024-08-26

## 2024-08-26 RX ORDER — IOPAMIDOL 755 MG/ML
75 INJECTION, SOLUTION INTRAVASCULAR
Status: COMPLETED | OUTPATIENT
Start: 2024-08-26 | End: 2024-08-26

## 2024-08-26 RX ORDER — MORPHINE SULFATE 4 MG/ML
4 INJECTION, SOLUTION INTRAMUSCULAR; INTRAVENOUS ONCE
Status: COMPLETED | OUTPATIENT
Start: 2024-08-26 | End: 2024-08-26

## 2024-08-26 RX ORDER — ONDANSETRON 2 MG/ML
4 INJECTION INTRAMUSCULAR; INTRAVENOUS ONCE
Status: COMPLETED | OUTPATIENT
Start: 2024-08-26 | End: 2024-08-26

## 2024-08-26 RX ADMIN — IOPAMIDOL 18 ML: 755 INJECTION, SOLUTION INTRAVENOUS at 14:21

## 2024-08-26 RX ADMIN — MORPHINE SULFATE 4 MG: 4 INJECTION, SOLUTION INTRAMUSCULAR; INTRAVENOUS at 12:17

## 2024-08-26 RX ADMIN — IOPAMIDOL 75 ML: 755 INJECTION, SOLUTION INTRAVENOUS at 14:22

## 2024-08-26 RX ADMIN — FAMOTIDINE 20 MG: 10 INJECTION INTRAVENOUS at 11:47

## 2024-08-26 RX ADMIN — ONDANSETRON 4 MG: 2 INJECTION INTRAMUSCULAR; INTRAVENOUS at 11:47

## 2024-08-26 RX ADMIN — MORPHINE SULFATE 6 MG: 8 INJECTION, SOLUTION INTRAMUSCULAR; INTRAVENOUS at 16:13

## 2024-08-26 RX ADMIN — SODIUM CHLORIDE 1000 ML: 9 INJECTION, SOLUTION INTRAVENOUS at 11:51

## 2024-08-26 ASSESSMENT — PAIN SCALES - GENERAL
PAINLEVEL_OUTOF10: 10
PAINLEVEL_OUTOF10: 8
PAINLEVEL_OUTOF10: 10

## 2024-08-26 ASSESSMENT — PAIN DESCRIPTION - ORIENTATION: ORIENTATION: LEFT

## 2024-08-26 ASSESSMENT — PAIN - FUNCTIONAL ASSESSMENT: PAIN_FUNCTIONAL_ASSESSMENT: 0-10

## 2024-08-26 ASSESSMENT — PAIN DESCRIPTION - LOCATION: LOCATION: ABDOMEN;FLANK

## 2024-08-26 NOTE — DISCHARGE INSTRUCTIONS
CT ABDOMEN PELVIS W IV CONTRAST Additional Contrast? Oral   Final Result   1. Interval enlargement of cystic lesion in the region the pancreatic head   likely reflecting a chronic pseudocyst.  No evidence of acute pancreatitis.   2. Grossly stable bilateral nonobstructive nephrolithiasis.   3. Moderate increased fecal material throughout the ascending and proximal   transverse colon with no gross evidence of colitis.   4. Fat containing periumbilical hernia.         XR CHEST (2 VW)   Final Result   No acute process.

## 2024-08-26 NOTE — ED PROVIDER NOTES
[facility] EMERGENCY DEPARTMENT  EMERGENCY DEPARTMENT ENCOUNTER    Pt Name: Monique Voss  MRN: [id number]  Birthdate 1975  Date of evaluation: 8/26/2024  Provider: Aamir Moran MD  PCP: Madison Amin PA-C  Note Started: 10:44 AM EDT 8/26/24    HPI     Patient is a 49 y.o. female presents with a chief complaint of   Chief Complaint   Patient presents with    Abdominal Pain     Abd pain and left flank pain for the last month    Flank Pain   .    Patient follows with pain management for chronic back pain.  Patient is post be on Percocet at home.  Patient states that she has been having abdominal pain and flank pain for the past month but over the past week has stopped passing gas and has not had a bowel movement.  Patient is nauseous but not vomiting.  Patient states that she chronically has some chest pain and shortness of breath.  Patient denies any fevers or chills.  Patient has a history of a gastric bypass with Dr. Sandhu 2 years ago.    Nursing Notes were all reviewed and agreed with or any disagreements were addressed in the HPI.    History From: Patient    Review of Systems   Pertinent positives and negatives as per HPI.     Physical Exam  Vitals and nursing note reviewed.   Constitutional:       Appearance: She is well-developed.   HENT:      Head: Normocephalic and atraumatic.   Eyes:      Conjunctiva/sclera: Conjunctivae normal.   Cardiovascular:      Rate and Rhythm: Normal rate and regular rhythm.      Heart sounds: Normal heart sounds. No murmur heard.  Pulmonary:      Effort: Pulmonary effort is normal. No respiratory distress.      Breath sounds: Normal breath sounds. No wheezing or rales.   Abdominal:      General: Bowel sounds are normal.      Palpations: Abdomen is soft.      Tenderness: There is abdominal tenderness. There is no guarding or rebound.      Comments: Diffuse abdominal tenderness   Musculoskeletal:      Cervical back: Normal range  primary care provider by calling their office as soon as possible.      --------------------------------- ADDITIONAL PROVIDER NOTES ---------------------------------  At this time the patient is without objective evidence of an acute process requiring hospitalization or inpatient management.  They have remained hemodynamically stable throughout their entire ED visit and are stable for discharge with outpatient follow-up.     The plan has been discussed in detail and they are aware of the specific conditions for emergent return, as well as the importance of follow-up.      New Prescriptions    POLYETHYLENE GLYCOL (GLYCOLAX) 17 GM/SCOOP POWDER    Take 17 g by mouth daily as needed (constipation)    SENNOSIDES-DOCUSATE SODIUM (SENOKOT-S) 8.6-50 MG TABLET    Take 1 tablet by mouth daily       Diagnosis:  1. Generalized abdominal pain        Disposition:  Patient's disposition: Discharge to home  Patient's condition is stable.                                             Aamir Moran MD  08/26/24 1943

## 2024-08-27 ENCOUNTER — APPOINTMENT (OUTPATIENT)
Dept: GENERAL RADIOLOGY | Age: 49
End: 2024-08-27
Attending: PAIN MEDICINE
Payer: MEDICARE

## 2024-08-27 ENCOUNTER — HOSPITAL ENCOUNTER (OUTPATIENT)
Age: 49
Setting detail: OUTPATIENT SURGERY
Discharge: HOME | End: 2024-08-27
Attending: PAIN MEDICINE | Admitting: PAIN MEDICINE
Payer: MEDICARE

## 2024-08-27 ENCOUNTER — PREP FOR PROCEDURE (OUTPATIENT)
Dept: PAIN MANAGEMENT | Age: 49
End: 2024-08-27

## 2024-08-27 VITALS
OXYGEN SATURATION: 96 % | RESPIRATION RATE: 18 BRPM | HEART RATE: 84 BPM | SYSTOLIC BLOOD PRESSURE: 138 MMHG | DIASTOLIC BLOOD PRESSURE: 81 MMHG | TEMPERATURE: 97.8 F

## 2024-08-27 DIAGNOSIS — M54.16 LUMBAR RADICULOPATHY: Primary | ICD-10-CM

## 2024-08-27 LAB
GLUCOSE BLD-MCNC: 103 MG/DL (ref 74–99)
HCG UR QL: NEGATIVE

## 2024-08-27 PROCEDURE — 3600000002 HC SURGERY LEVEL 2 BASE: Performed by: PAIN MEDICINE

## 2024-08-27 PROCEDURE — 2500000003 HC RX 250 WO HCPCS: Performed by: PAIN MEDICINE

## 2024-08-27 PROCEDURE — 7100000011 HC PHASE II RECOVERY - ADDTL 15 MIN: Performed by: PAIN MEDICINE

## 2024-08-27 PROCEDURE — 2709999900 HC NON-CHARGEABLE SUPPLY: Performed by: PAIN MEDICINE

## 2024-08-27 PROCEDURE — 6360000002 HC RX W HCPCS: Performed by: PAIN MEDICINE

## 2024-08-27 PROCEDURE — 82962 GLUCOSE BLOOD TEST: CPT

## 2024-08-27 PROCEDURE — 6360000004 HC RX CONTRAST MEDICATION: Performed by: PAIN MEDICINE

## 2024-08-27 PROCEDURE — 64483 NJX AA&/STRD TFRM EPI L/S 1: CPT | Performed by: PAIN MEDICINE

## 2024-08-27 PROCEDURE — 7100000010 HC PHASE II RECOVERY - FIRST 15 MIN: Performed by: PAIN MEDICINE

## 2024-08-27 RX ORDER — LIDOCAINE HYDROCHLORIDE 5 MG/ML
INJECTION, SOLUTION INFILTRATION; INTRAVENOUS PRN
Status: DISCONTINUED | OUTPATIENT
Start: 2024-08-27 | End: 2024-08-27 | Stop reason: ALTCHOICE

## 2024-08-27 RX ORDER — METHYLPREDNISOLONE ACETATE 40 MG/ML
INJECTION, SUSPENSION INTRA-ARTICULAR; INTRALESIONAL; INTRAMUSCULAR; SOFT TISSUE PRN
Status: DISCONTINUED | OUTPATIENT
Start: 2024-08-27 | End: 2024-08-27 | Stop reason: ALTCHOICE

## 2024-08-27 RX ORDER — IOPAMIDOL 612 MG/ML
INJECTION, SOLUTION INTRATHECAL PRN
Status: DISCONTINUED | OUTPATIENT
Start: 2024-08-27 | End: 2024-08-27 | Stop reason: ALTCHOICE

## 2024-08-27 ASSESSMENT — PAIN DESCRIPTION - DESCRIPTORS: DESCRIPTORS: ACHING;DISCOMFORT

## 2024-08-27 ASSESSMENT — PAIN - FUNCTIONAL ASSESSMENT: PAIN_FUNCTIONAL_ASSESSMENT: 0-10

## 2024-08-27 ASSESSMENT — PAIN SCALES - GENERAL
PAINLEVEL_OUTOF10: 0
PAINLEVEL_OUTOF10: 0

## 2024-08-27 NOTE — PROGRESS NOTES
Patient unable to provide urine sample for Upreg. She was in ER less that 12 hours ago and  is okay waiving it for her procedure today.

## 2024-08-27 NOTE — DISCHARGE INSTRUCTIONS
[facility] Pain Management Department  Shelby Byvsat-[phone]  Dr. Kenisha Austin   Post-Pain Block/Radiofrequency  Home Going Instructions    1-Go home, rest for the remainder of the day  2-Please do not lift over 20 pounds the day of the injection  3-If you received sedation No: alcohol, driving, operating lawn mowers, plows, tractors or other dangerous equipment until next morning. Do not make important decisions or sign legal documents for 24 hours. You may experience light headedness, dizziness, nausea or sleepiness after sedation. Do not stay alone. A responsible adult must be with you for 24 hours. You could be nauseated from the medications you have received. Your IV site may be sore and bruised.    4-No dietary restrictions     5-Resume all medications the same day, blood thinners to be resumed 24 hours after injection if you were instructed to stop any.    6-Keep the surgical site clean and dry, you may shower the next morning and remove the      dressing.     7- No sitz baths, tub baths or hot tubs/swimming for 24 hours.       8- If you have any pain at the injection site(s), application of an ice pack to the area should be       helpful, 20 minutes on/20 minutes off for next 48 hours.  9- Call [facility] immediately at if you develop.  Fever greater than 100.4 F  Have bleeding or drainage from the puncture site  Have progressive Leg/arm numbness and or weakness  Loss of control of bowel and or bladder (wet/soil yourself)  Severe headache with inability to lift head  10-You may return to work the next day

## 2024-08-27 NOTE — OP NOTE
2024    Patient: Monique Voss  :  1975  Age:  49 y.o.  Sex:  female     PRE-OPERATIVE DIAGNOSIS: Lumbar disc displacement, lumbar neural foraminal stenosis, lumbar radiculopathy.     POST-OPERATIVE DIAGNOSIS: Same.    PROCEDURE: Bilateral Transforaminal epidural steroid injection under fluoroscopic guidance at foraminal level L5.    SURGEON: BARBRA Austin D.O.    ANESTHESIA: local    ESTIMATED BLOOD LOSS: None.  ______________________________________________________________________  BRIEF HISTORY: Monique Voss comes in today for the Bilateral transforaminal epidural steroid injection under fluoroscopic guidance at foraminal level L5. The potential complications of this procedure were discussed with her again today.  She has elected to undergo the aforementioned procedure.     Monique’s complete History & Physical examination were reviewed in depth, a copy of which is in the chart.      DESCRIPTION OF PROCEDURE:    After confirming written and informed consent, a time-out was performed and Monique’s name and date of birth, the procedure to be performed as well as the plan for the location of the needle insertion were confirmed.    The patient was brought into the procedure room and placed in the prone position on the fluoroscopy table. Standard monitors were placed and vital signs were observed throughout the procedure. The area of the lumbar spine was prepped with chloraprep and draped in a sterile manner. The vertebral body was identified with AP fluoroscopy. An oblique view was obtained to better visualize the inferior junction of the pedicle and transverse process . The 6 o'clock position of the pedicle was marked and identified. The skin and subcutaneous tissue were anesthetized with 0.5% lidocaine. A # 22 gauge pencil point needle was directed toward the targeted point under fluoroscopy until bone was contacted. The needle was then walked inferiorly until the neural foramen was

## 2024-08-27 NOTE — H&P
[facility]        [address] [place] [address]  Dept: [phone]    Procedure History & Physical      Monique F Emilee Voss     HPI:    Patient  is here for LBP BLE pain for b/l L5 TFESI  Labs/imaging studies reviewed   All question and concerns addressed including R/B/A associated with the procedure    Past Medical History:   Diagnosis Date    Asthma     Bell palsy     drooping    Carpal tunnel syndrome     bilateral    CHF (congestive heart failure) ([facility])     Chronic obstructive pulmonary disease (HCC) 05/25/2023    Coronary artery disease involving native coronary artery of native heart without angina pectoris 05/25/2023    DDD (degenerative disc disease), cervical     with spinal stenosis    Diabetes mellitus ([facility])     DJD (degenerative joint disease)     both hips    GERD without esophagitis     HTN (hypertension)     Hyperlipidemia     Left hip pain and right     Meningitis 2009    Morbid obesity due to excess calories ([facility])     Neuropathy     JODI treated with BiPAP     Scoliosis     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[facility])        Past Surgical History:   Procedure Laterality Date    CHOLECYSTECTOMY  2009    CORONARY ANGIOPLASTY WITH STENT PLACEMENT  05/17/2023    [place] 3.5 x 38 and 3.5 x 8 deployed distal RCA by Dr Egan    CYSTOSCOPY Left 01/14/2018    left stent placement    DILATION AND CURETTAGE OF UTERUS      younger age    HIP SURGERY Left 09/22/2022    LEFT HIP INJECTION performed by DO bozena Lua OR    HIP SURGERY Left 03/23/2023    LEFT HIP STEROID INJECT performed by DO bozena Lua OR    LITHOTRIPSY Right 02/15/2018    Cystoscopy;Stent Removal    NERVE BLOCK Bilateral 12/22/2022    BILATERAL L5 TRANSFORAMINAL EPIDURAL STEROID INJECTION performed by DO bozena Lua OR    PAIN MANAGEMENT PROCEDURE Bilateral 10/27/2022    BILATERAL L5 TRANSFORAMINAL EPIDURAL STEROID INJECTION performed by DO bozena Lua OR    PAIN MANAGEMENT

## 2024-08-28 DIAGNOSIS — E11.9 TYPE 2 DIABETES MELLITUS WITHOUT COMPLICATION, WITHOUT LONG-TERM CURRENT USE OF INSULIN (HCC): ICD-10-CM

## 2024-08-28 RX ORDER — BLOOD SUGAR DIAGNOSTIC
STRIP MISCELLANEOUS
Qty: 100 STRIP | OUTPATIENT
Start: 2024-08-28

## 2024-08-28 RX ORDER — BLOOD SUGAR DIAGNOSTIC
STRIP MISCELLANEOUS
Qty: 100 STRIP | Refills: 3 | Status: SHIPPED | OUTPATIENT
Start: 2024-08-28

## 2024-08-29 LAB
MICROORGANISM SPEC CULT: ABNORMAL
MICROORGANISM SPEC CULT: ABNORMAL
SERVICE CMNT-IMP: ABNORMAL
SPECIMEN DESCRIPTION: ABNORMAL

## 2024-08-29 NOTE — PROGRESS NOTES
A urine culture drawn during patients recent ED visit on 8/26 resulted positive for e coli. Negative for urinary symptoms during visit. Culture results and treatment discussed with Dr. Husam Hightower. No changes recommended at this time.      Electronically signed by Jessica Schuster Pharm.D. 8/29/2024 1:12 PM   Ext: [phone]

## 2024-09-04 ENCOUNTER — OFFICE VISIT (OUTPATIENT)
Dept: PRIMARY CARE CLINIC | Age: 49
End: 2024-09-04
Payer: MEDICARE

## 2024-09-04 VITALS
RESPIRATION RATE: 16 BRPM | BODY MASS INDEX: 39.86 KG/M2 | WEIGHT: 263 LBS | HEIGHT: 68 IN | OXYGEN SATURATION: 98 % | TEMPERATURE: 97.2 F | DIASTOLIC BLOOD PRESSURE: 84 MMHG | SYSTOLIC BLOOD PRESSURE: 138 MMHG | HEART RATE: 54 BPM

## 2024-09-04 DIAGNOSIS — M54.16 LUMBAR RADICULOPATHY: ICD-10-CM

## 2024-09-04 DIAGNOSIS — Z09 HOSPITAL DISCHARGE FOLLOW-UP: ICD-10-CM

## 2024-09-04 DIAGNOSIS — R31.9 URINARY TRACT INFECTION WITH HEMATURIA, SITE UNSPECIFIED: ICD-10-CM

## 2024-09-04 DIAGNOSIS — Z00.00 WELCOME TO MEDICARE PREVENTIVE VISIT: Primary | ICD-10-CM

## 2024-09-04 DIAGNOSIS — K21.9 GASTROESOPHAGEAL REFLUX DISEASE WITHOUT ESOPHAGITIS: ICD-10-CM

## 2024-09-04 DIAGNOSIS — G89.4 CHRONIC PAIN SYNDROME: ICD-10-CM

## 2024-09-04 DIAGNOSIS — N39.0 URINARY TRACT INFECTION WITH HEMATURIA, SITE UNSPECIFIED: ICD-10-CM

## 2024-09-04 DIAGNOSIS — K59.03 DRUG-INDUCED CONSTIPATION: ICD-10-CM

## 2024-09-04 LAB
BILIRUBIN, POC: NORMAL
BLOOD URINE, POC: NORMAL
CLARITY, POC: NORMAL
COLOR, POC: NORMAL
GLUCOSE URINE, POC: 100 MG/DL
KETONES, POC: 15 MG/DL
LEUKOCYTE EST, POC: NEGATIVE
NITRITE, POC: NORMAL
PH, POC: 7
PROTEIN, POC: 30 MG/DL
SPECIFIC GRAVITY, POC: >=1.03
UROBILINOGEN, POC: 0.2 MG/DL

## 2024-09-04 PROCEDURE — 81002 URINALYSIS NONAUTO W/O SCOPE: CPT | Performed by: PHYSICIAN ASSISTANT

## 2024-09-04 PROCEDURE — G0402 INITIAL PREVENTIVE EXAM: HCPCS | Performed by: PHYSICIAN ASSISTANT

## 2024-09-04 PROCEDURE — G8417 CALC BMI ABV UP PARAM F/U: HCPCS | Performed by: PHYSICIAN ASSISTANT

## 2024-09-04 PROCEDURE — 3079F DIAST BP 80-89 MM HG: CPT | Performed by: PHYSICIAN ASSISTANT

## 2024-09-04 PROCEDURE — G8427 DOCREV CUR MEDS BY ELIG CLIN: HCPCS | Performed by: PHYSICIAN ASSISTANT

## 2024-09-04 PROCEDURE — 4004F PT TOBACCO SCREEN RCVD TLK: CPT | Performed by: PHYSICIAN ASSISTANT

## 2024-09-04 PROCEDURE — 99214 OFFICE O/P EST MOD 30 MIN: CPT | Performed by: PHYSICIAN ASSISTANT

## 2024-09-04 PROCEDURE — 3075F SYST BP GE 130 - 139MM HG: CPT | Performed by: PHYSICIAN ASSISTANT

## 2024-09-04 RX ORDER — SENNOSIDES A AND B 8.6 MG/1
1 TABLET, FILM COATED ORAL DAILY
Qty: 30 TABLET | Refills: 2 | Status: SHIPPED | OUTPATIENT
Start: 2024-09-04 | End: 2024-12-03

## 2024-09-04 RX ORDER — PANTOPRAZOLE SODIUM 20 MG/1
20 TABLET, DELAYED RELEASE ORAL DAILY
Qty: 30 TABLET | Refills: 3 | Status: SHIPPED | OUTPATIENT
Start: 2024-09-04

## 2024-09-04 ASSESSMENT — PATIENT HEALTH QUESTIONNAIRE - PHQ9
SUM OF ALL RESPONSES TO PHQ QUESTIONS 1-9: 0
7. TROUBLE CONCENTRATING ON THINGS, SUCH AS READING THE NEWSPAPER OR WATCHING TELEVISION: NOT AT ALL
SUM OF ALL RESPONSES TO PHQ QUESTIONS 1-9: 0
3. TROUBLE FALLING OR STAYING ASLEEP: NOT AT ALL
6. FEELING BAD ABOUT YOURSELF - OR THAT YOU ARE A FAILURE OR HAVE LET YOURSELF OR YOUR FAMILY DOWN: NOT AT ALL
2. FEELING DOWN, DEPRESSED OR HOPELESS: NOT AT ALL
5. POOR APPETITE OR OVEREATING: NOT AT ALL
10. IF YOU CHECKED OFF ANY PROBLEMS, HOW DIFFICULT HAVE THESE PROBLEMS MADE IT FOR YOU TO DO YOUR WORK, TAKE CARE OF THINGS AT HOME, OR GET ALONG WITH OTHER PEOPLE: NOT DIFFICULT AT ALL
SUM OF ALL RESPONSES TO PHQ QUESTIONS 1-9: 0
4. FEELING TIRED OR HAVING LITTLE ENERGY: NOT AT ALL
SUM OF ALL RESPONSES TO PHQ QUESTIONS 1-9: 0
9. THOUGHTS THAT YOU WOULD BE BETTER OFF DEAD, OR OF HURTING YOURSELF: NOT AT ALL
8. MOVING OR SPEAKING SO SLOWLY THAT OTHER PEOPLE COULD HAVE NOTICED. OR THE OPPOSITE, BEING SO FIGETY OR RESTLESS THAT YOU HAVE BEEN MOVING AROUND A LOT MORE THAN USUAL: NOT AT ALL
1. LITTLE INTEREST OR PLEASURE IN DOING THINGS: NOT AT ALL
SUM OF ALL RESPONSES TO PHQ9 QUESTIONS 1 & 2: 0

## 2024-09-04 NOTE — PROGRESS NOTES
(High risk score ?55) Opioid risk score: 85      Last PDMP [place] as Reviewed:  Review User Review Instant Review Result   SENA QUARLES 8/8/2024 11:25 AM @   Reviewed PDMP [1]     Last Controlled Substance Monitoring Documentation      Flowsheet Row Office Visit from 8/8/2024 in [facility]   Periodic Controlled Substance Monitoring Possible medication side effects, risk of tolerance/dependence & alternative treatments discussed., No signs of potential drug abuse or diversion identified., Assessed functional status (ability to engage in work or other purposeful activities, the pain intensity and its interference with activities of daily living, quality of family life and social activities, and the physical activity), Obtaining appropriate analgesic effect of treatment. filed at 08/08/2024 0783             General HRA Questions:  Select all that apply: (!) New or Increased Pain  Interventions - Pain:  See above      Inactivity:  On average, how many days per week do you engage in moderate to strenuous exercise (like a brisk walk)?: 0 days (!) Abnormal  On average, how many minutes do you engage in exercise at this level?: 0 min  Interventions:  Recommendations: patient agrees to exercise for at least 150 minutes/week    Poor Eating Habits/Diet:  Do you eat balanced/healthy meals regularly?: (!) No  Interventions:  Patient comments: consistently losing weight    Abnormal BMI (obese):  Body mass index is 40 kg/m². (!) Abnormal  Interventions:  Patient comments: seeing bariatrics        Dentist Screen:  Have you seen the dentist within the past year?: (!) No    Intervention:  Advised to schedule with their oral surgeon      Safety:  Do you always fasten your seatbelt when you are in a car?: (!) No  Interventions:  Patient declined any further interventions or treatment     Advanced Directives:  Do you have a Living Will?: (!) No    Intervention:  has NO advanced directive - not interested in

## 2024-09-06 RX ORDER — GUAIFENESIN 600 MG/1
TABLET, EXTENDED RELEASE ORAL
Qty: 30 TABLET | Refills: 3 | Status: ON HOLD
Start: 2024-09-06 | End: 2024-10-08 | Stop reason: HOSPADM

## 2024-09-13 ENCOUNTER — PREP FOR PROCEDURE (OUTPATIENT)
Dept: PAIN MANAGEMENT | Age: 49
End: 2024-09-13

## 2024-09-13 ENCOUNTER — OFFICE VISIT (OUTPATIENT)
Dept: PAIN MANAGEMENT | Age: 49
End: 2024-09-13
Payer: MEDICARE

## 2024-09-13 VITALS
SYSTOLIC BLOOD PRESSURE: 110 MMHG | BODY MASS INDEX: 39.86 KG/M2 | HEIGHT: 68 IN | HEART RATE: 94 BPM | DIASTOLIC BLOOD PRESSURE: 77 MMHG | RESPIRATION RATE: 18 BRPM | OXYGEN SATURATION: 98 % | WEIGHT: 263 LBS | TEMPERATURE: 97.2 F

## 2024-09-13 DIAGNOSIS — M16.12 PRIMARY OSTEOARTHRITIS OF LEFT HIP: ICD-10-CM

## 2024-09-13 DIAGNOSIS — G89.4 CHRONIC PAIN SYNDROME: Primary | ICD-10-CM

## 2024-09-13 DIAGNOSIS — M48.062 SPINAL STENOSIS OF LUMBAR REGION WITH NEUROGENIC CLAUDICATION: ICD-10-CM

## 2024-09-13 DIAGNOSIS — M25.552 LEFT HIP PAIN: ICD-10-CM

## 2024-09-13 DIAGNOSIS — M16.0 PRIMARY OSTEOARTHRITIS OF BOTH HIPS: ICD-10-CM

## 2024-09-13 DIAGNOSIS — M16.0 PRIMARY OSTEOARTHRITIS OF BOTH HIPS: Primary | ICD-10-CM

## 2024-09-13 DIAGNOSIS — G89.29 CHRONIC BILATERAL LOW BACK PAIN WITH BILATERAL SCIATICA: ICD-10-CM

## 2024-09-13 DIAGNOSIS — M54.42 CHRONIC BILATERAL LOW BACK PAIN WITH BILATERAL SCIATICA: ICD-10-CM

## 2024-09-13 DIAGNOSIS — M54.16 LUMBAR RADICULOPATHY: ICD-10-CM

## 2024-09-13 DIAGNOSIS — M54.41 CHRONIC BILATERAL LOW BACK PAIN WITH BILATERAL SCIATICA: ICD-10-CM

## 2024-09-13 PROCEDURE — 3078F DIAST BP <80 MM HG: CPT | Performed by: PAIN MEDICINE

## 2024-09-13 PROCEDURE — G8427 DOCREV CUR MEDS BY ELIG CLIN: HCPCS | Performed by: PAIN MEDICINE

## 2024-09-13 PROCEDURE — G8417 CALC BMI ABV UP PARAM F/U: HCPCS | Performed by: PAIN MEDICINE

## 2024-09-13 PROCEDURE — 99214 OFFICE O/P EST MOD 30 MIN: CPT | Performed by: PAIN MEDICINE

## 2024-09-13 PROCEDURE — 3074F SYST BP LT 130 MM HG: CPT | Performed by: PAIN MEDICINE

## 2024-09-13 PROCEDURE — 4004F PT TOBACCO SCREEN RCVD TLK: CPT | Performed by: PAIN MEDICINE

## 2024-09-13 PROCEDURE — 99214 OFFICE O/P EST MOD 30 MIN: CPT

## 2024-09-13 RX ORDER — GABAPENTIN 800 MG/1
800 TABLET ORAL 3 TIMES DAILY
Qty: 90 TABLET | Refills: 0 | Status: SHIPPED | OUTPATIENT
Start: 2024-09-13 | End: 2024-10-13

## 2024-09-13 RX ORDER — CYCLOBENZAPRINE HCL 5 MG
TABLET ORAL
COMMUNITY
Start: 2024-08-15

## 2024-09-13 RX ORDER — CIPROFLOXACIN 500 MG/1
TABLET, FILM COATED ORAL
COMMUNITY
Start: 2024-08-20 | End: 2024-09-13 | Stop reason: ALTCHOICE

## 2024-09-13 RX ORDER — OXYCODONE AND ACETAMINOPHEN 5; 325 MG/1; MG/1
1 TABLET ORAL 3 TIMES DAILY PRN
Qty: 90 TABLET | Refills: 0 | Status: SHIPPED | OUTPATIENT
Start: 2024-09-15 | End: 2024-10-15

## 2024-09-13 RX ORDER — FLUCONAZOLE 150 MG/1
TABLET ORAL
COMMUNITY
Start: 2024-08-20 | End: 2024-09-13 | Stop reason: ALTCHOICE

## 2024-09-13 RX ORDER — MECLIZINE HCL 12.5 MG 12.5 MG/1
12.5 TABLET ORAL 3 TIMES DAILY PRN
COMMUNITY

## 2024-09-20 ENCOUNTER — TELEPHONE (OUTPATIENT)
Dept: PAIN MANAGEMENT | Age: 49
End: 2024-09-20

## 2024-09-25 DIAGNOSIS — E78.5 HYPERLIPIDEMIA LDL GOAL <70: ICD-10-CM

## 2024-09-26 ENCOUNTER — HOSPITAL ENCOUNTER (OUTPATIENT)
Age: 49
Setting detail: OUTPATIENT SURGERY
Discharge: HOME | End: 2024-09-26
Attending: PAIN MEDICINE | Admitting: PAIN MEDICINE
Payer: MEDICARE

## 2024-09-26 ENCOUNTER — HOSPITAL ENCOUNTER (OUTPATIENT)
Dept: GENERAL RADIOLOGY | Age: 49
Setting detail: OUTPATIENT SURGERY
Discharge: HOME | End: 2024-09-28
Attending: PAIN MEDICINE
Payer: MEDICARE

## 2024-09-26 VITALS
DIASTOLIC BLOOD PRESSURE: 71 MMHG | WEIGHT: 263 LBS | RESPIRATION RATE: 18 BRPM | HEIGHT: 68 IN | TEMPERATURE: 97.7 F | BODY MASS INDEX: 39.86 KG/M2 | OXYGEN SATURATION: 95 % | SYSTOLIC BLOOD PRESSURE: 135 MMHG | HEART RATE: 82 BPM

## 2024-09-26 DIAGNOSIS — R52 PAIN MANAGEMENT: ICD-10-CM

## 2024-09-26 LAB
GLUCOSE BLD-MCNC: 107 MG/DL (ref 74–99)
HCG, URINE, POC: NEGATIVE
Lab: NORMAL
NEGATIVE QC PASS/FAIL: NORMAL
POSITIVE QC PASS/FAIL: NORMAL

## 2024-09-26 PROCEDURE — 7100000010 HC PHASE II RECOVERY - FIRST 15 MIN: Performed by: PAIN MEDICINE

## 2024-09-26 PROCEDURE — 7100000011 HC PHASE II RECOVERY - ADDTL 15 MIN: Performed by: PAIN MEDICINE

## 2024-09-26 PROCEDURE — 3600000002 HC SURGERY LEVEL 2 BASE: Performed by: PAIN MEDICINE

## 2024-09-26 PROCEDURE — 2500000003 HC RX 250 WO HCPCS: Performed by: PAIN MEDICINE

## 2024-09-26 PROCEDURE — 6360000004 HC RX CONTRAST MEDICATION: Performed by: PAIN MEDICINE

## 2024-09-26 PROCEDURE — 6360000002 HC RX W HCPCS: Performed by: PAIN MEDICINE

## 2024-09-26 PROCEDURE — 82962 GLUCOSE BLOOD TEST: CPT

## 2024-09-26 PROCEDURE — 77002 NEEDLE LOCALIZATION BY XRAY: CPT | Performed by: PAIN MEDICINE

## 2024-09-26 PROCEDURE — 2709999900 HC NON-CHARGEABLE SUPPLY: Performed by: PAIN MEDICINE

## 2024-09-26 PROCEDURE — 20610 DRAIN/INJ JOINT/BURSA W/O US: CPT | Performed by: PAIN MEDICINE

## 2024-09-26 RX ORDER — BUPIVACAINE HYDROCHLORIDE 2.5 MG/ML
INJECTION, SOLUTION EPIDURAL; INFILTRATION; INTRACAUDAL PRN
Status: DISCONTINUED | OUTPATIENT
Start: 2024-09-26 | End: 2024-09-26 | Stop reason: ALTCHOICE

## 2024-09-26 RX ORDER — IOPAMIDOL 612 MG/ML
INJECTION, SOLUTION INTRATHECAL PRN
Status: DISCONTINUED | OUTPATIENT
Start: 2024-09-26 | End: 2024-09-26 | Stop reason: ALTCHOICE

## 2024-09-26 RX ORDER — ATORVASTATIN CALCIUM 80 MG/1
80 TABLET, FILM COATED ORAL NIGHTLY
Qty: 30 TABLET | Refills: 3 | Status: SHIPPED | OUTPATIENT
Start: 2024-09-26

## 2024-09-26 RX ORDER — METHYLPREDNISOLONE ACETATE 40 MG/ML
INJECTION, SUSPENSION INTRA-ARTICULAR; INTRALESIONAL; INTRAMUSCULAR; SOFT TISSUE PRN
Status: DISCONTINUED | OUTPATIENT
Start: 2024-09-26 | End: 2024-09-26 | Stop reason: ALTCHOICE

## 2024-09-26 RX ORDER — ALBUTEROL SULFATE 90 UG/1
2 INHALANT RESPIRATORY (INHALATION) EVERY 4 HOURS PRN
Qty: 18 G | Refills: 3 | Status: SHIPPED | OUTPATIENT
Start: 2024-09-26

## 2024-09-26 RX ORDER — LIDOCAINE HYDROCHLORIDE 5 MG/ML
INJECTION, SOLUTION INFILTRATION; INTRAVENOUS PRN
Status: DISCONTINUED | OUTPATIENT
Start: 2024-09-26 | End: 2024-09-26 | Stop reason: ALTCHOICE

## 2024-09-26 ASSESSMENT — PAIN DESCRIPTION - DESCRIPTORS: DESCRIPTORS: ACHING

## 2024-09-26 ASSESSMENT — PAIN - FUNCTIONAL ASSESSMENT
PAIN_FUNCTIONAL_ASSESSMENT: NONE - DENIES PAIN
PAIN_FUNCTIONAL_ASSESSMENT: NONE - DENIES PAIN
PAIN_FUNCTIONAL_ASSESSMENT: 0-10

## 2024-09-27 RX ORDER — METOPROLOL SUCCINATE 50 MG/1
50 TABLET, EXTENDED RELEASE ORAL DAILY
Qty: 90 TABLET | Refills: 3 | Status: SHIPPED | OUTPATIENT
Start: 2024-09-27

## 2024-09-30 ENCOUNTER — TELEPHONE (OUTPATIENT)
Dept: BARIATRICS/WEIGHT MGMT | Age: 49
End: 2024-09-30

## 2024-10-01 ENCOUNTER — APPOINTMENT (OUTPATIENT)
Dept: GENERAL RADIOLOGY | Age: 49
DRG: 439 | End: 2024-10-01
Payer: MEDICARE

## 2024-10-01 ENCOUNTER — APPOINTMENT (OUTPATIENT)
Dept: CT IMAGING | Age: 49
DRG: 439 | End: 2024-10-01
Payer: MEDICARE

## 2024-10-01 ENCOUNTER — HOSPITAL ENCOUNTER (INPATIENT)
Age: 49
LOS: 7 days | Discharge: HOME | DRG: 439 | End: 2024-10-08
Attending: EMERGENCY MEDICINE | Admitting: INTERNAL MEDICINE
Payer: MEDICARE

## 2024-10-01 DIAGNOSIS — K85.90 ACUTE PANCREATITIS, UNSPECIFIED COMPLICATION STATUS, UNSPECIFIED PANCREATITIS TYPE: Primary | ICD-10-CM

## 2024-10-01 DIAGNOSIS — A41.9 SEPSIS, DUE TO UNSPECIFIED ORGANISM, UNSPECIFIED WHETHER ACUTE ORGAN DYSFUNCTION PRESENT (HCC): ICD-10-CM

## 2024-10-01 DIAGNOSIS — J18.9 PNEUMONIA DUE TO INFECTIOUS ORGANISM, UNSPECIFIED LATERALITY, UNSPECIFIED PART OF LUNG: ICD-10-CM

## 2024-10-01 LAB
ALBUMIN SERPL-MCNC: 3.3 G/DL (ref 3.5–5.2)
ALP SERPL-CCNC: 113 U/L (ref 35–104)
ALT SERPL-CCNC: 9 U/L (ref 0–32)
ANION GAP SERPL CALCULATED.3IONS-SCNC: 17 MMOL/L (ref 7–16)
AST SERPL-CCNC: 12 U/L (ref 0–31)
BASOPHILS # BLD: 0.08 K/UL (ref 0–0.2)
BASOPHILS NFR BLD: 0 % (ref 0–2)
BILIRUB SERPL-MCNC: 0.8 MG/DL (ref 0–1.2)
BILIRUB UR QL STRIP: ABNORMAL
BUN SERPL-MCNC: 15 MG/DL (ref 6–20)
CALCIUM SERPL-MCNC: 9.2 MG/DL (ref 8.6–10.2)
CHLORIDE SERPL-SCNC: 98 MMOL/L (ref 98–107)
CLARITY UR: CLEAR
CO2 SERPL-SCNC: 21 MMOL/L (ref 22–29)
COLOR UR: YELLOW
CREAT SERPL-MCNC: 0.9 MG/DL (ref 0.5–1)
EKG ATRIAL RATE: 104 BPM
EKG P AXIS: 76 DEGREES
EKG P-R INTERVAL: 140 MS
EKG Q-T INTERVAL: 398 MS
EKG QRS DURATION: 138 MS
EKG QTC CALCULATION (BAZETT): 523 MS
EKG R AXIS: -80 DEGREES
EKG T AXIS: 41 DEGREES
EKG VENTRICULAR RATE: 104 BPM
EOSINOPHIL # BLD: 0.08 K/UL (ref 0.05–0.5)
EOSINOPHILS RELATIVE PERCENT: 0 % (ref 0–6)
ERYTHROCYTE [DISTWIDTH] IN BLOOD BY AUTOMATED COUNT: 16.3 % (ref 11.5–15)
FLUAV RNA RESP QL NAA+PROBE: NOT DETECTED
FLUBV RNA RESP QL NAA+PROBE: NOT DETECTED
GFR, ESTIMATED: 83 ML/MIN/1.73M2
GLUCOSE SERPL-MCNC: 126 MG/DL (ref 74–99)
GLUCOSE UR STRIP-MCNC: 500 MG/DL
HCG SERPL QL: NEGATIVE
HCT VFR BLD AUTO: 50 % (ref 34–48)
HGB BLD-MCNC: 17.1 G/DL (ref 11.5–15.5)
HGB UR QL STRIP.AUTO: ABNORMAL
IMM GRANULOCYTES # BLD AUTO: 0.25 K/UL (ref 0–0.58)
IMM GRANULOCYTES NFR BLD: 1 % (ref 0–5)
KETONES UR STRIP-MCNC: 40 MG/DL
LACTATE BLDV-SCNC: 1.5 MMOL/L (ref 0.5–1.9)
LACTATE BLDV-SCNC: 2.4 MMOL/L (ref 0.5–2.2)
LEUKOCYTE ESTERASE UR QL STRIP: NEGATIVE
LIPASE SERPL-CCNC: 208 U/L (ref 13–60)
LYMPHOCYTES NFR BLD: 2.38 K/UL (ref 1.5–4)
LYMPHOCYTES RELATIVE PERCENT: 11 % (ref 20–42)
MCH RBC QN AUTO: 31.9 PG (ref 26–35)
MCHC RBC AUTO-ENTMCNC: 34.2 G/DL (ref 32–34.5)
MCV RBC AUTO: 93.3 FL (ref 80–99.9)
MONOCYTES NFR BLD: 1.32 K/UL (ref 0.1–0.95)
MONOCYTES NFR BLD: 6 % (ref 2–12)
NEUTROPHILS NFR BLD: 81 % (ref 43–80)
NEUTS SEG NFR BLD: 18.07 K/UL (ref 1.8–7.3)
NITRITE UR QL STRIP: NEGATIVE
PH UR STRIP: 6.5 [PH] (ref 5–9)
PLATELET # BLD AUTO: 352 K/UL (ref 130–450)
PMV BLD AUTO: 10.7 FL (ref 7–12)
POTASSIUM SERPL-SCNC: 4.1 MMOL/L (ref 3.5–5)
PROT SERPL-MCNC: 7.1 G/DL (ref 6.4–8.3)
PROT UR STRIP-MCNC: ABNORMAL MG/DL
RBC # BLD AUTO: 5.36 M/UL (ref 3.5–5.5)
RBC #/AREA URNS HPF: ABNORMAL /HPF
SARS-COV-2 RNA RESP QL NAA+PROBE: NOT DETECTED
SODIUM SERPL-SCNC: 136 MMOL/L (ref 132–146)
SOURCE: NORMAL
SP GR UR STRIP: 1.01 (ref 1–1.03)
SPECIMEN DESCRIPTION: NORMAL
UROBILINOGEN UR STRIP-ACNC: 1 EU/DL (ref 0–1)
WBC #/AREA URNS HPF: ABNORMAL /HPF
WBC OTHER # BLD: 22.2 K/UL (ref 4.5–11.5)

## 2024-10-01 PROCEDURE — 87040 BLOOD CULTURE FOR BACTERIA: CPT

## 2024-10-01 PROCEDURE — 83690 ASSAY OF LIPASE: CPT

## 2024-10-01 PROCEDURE — 6360000002 HC RX W HCPCS

## 2024-10-01 PROCEDURE — 2140000000 HC CCU INTERMEDIATE R&B

## 2024-10-01 PROCEDURE — 93005 ELECTROCARDIOGRAM TRACING: CPT

## 2024-10-01 PROCEDURE — 87636 SARSCOV2 & INF A&B AMP PRB: CPT

## 2024-10-01 PROCEDURE — 80053 COMPREHEN METABOLIC PANEL: CPT

## 2024-10-01 PROCEDURE — 71045 X-RAY EXAM CHEST 1 VIEW: CPT

## 2024-10-01 PROCEDURE — 93010 ELECTROCARDIOGRAM REPORT: CPT | Performed by: INTERNAL MEDICINE

## 2024-10-01 PROCEDURE — 6360000002 HC RX W HCPCS: Performed by: INTERNAL MEDICINE

## 2024-10-01 PROCEDURE — 83605 ASSAY OF LACTIC ACID: CPT

## 2024-10-01 PROCEDURE — 96375 TX/PRO/DX INJ NEW DRUG ADDON: CPT

## 2024-10-01 PROCEDURE — 85025 COMPLETE CBC W/AUTO DIFF WBC: CPT

## 2024-10-01 PROCEDURE — 74177 CT ABD & PELVIS W/CONTRAST: CPT

## 2024-10-01 PROCEDURE — 84703 CHORIONIC GONADOTROPIN ASSAY: CPT

## 2024-10-01 PROCEDURE — 2580000003 HC RX 258

## 2024-10-01 PROCEDURE — 96365 THER/PROPH/DIAG IV INF INIT: CPT

## 2024-10-01 PROCEDURE — 81001 URINALYSIS AUTO W/SCOPE: CPT

## 2024-10-01 PROCEDURE — 6360000004 HC RX CONTRAST MEDICATION: Performed by: RADIOLOGY

## 2024-10-01 PROCEDURE — 96361 HYDRATE IV INFUSION ADD-ON: CPT

## 2024-10-01 PROCEDURE — 2580000003 HC RX 258: Performed by: NURSE PRACTITIONER

## 2024-10-01 PROCEDURE — 96374 THER/PROPH/DIAG INJ IV PUSH: CPT

## 2024-10-01 PROCEDURE — 99285 EMERGENCY DEPT VISIT HI MDM: CPT

## 2024-10-01 RX ORDER — CITALOPRAM HYDROBROMIDE 20 MG/1
40 TABLET ORAL DAILY
Status: DISCONTINUED | OUTPATIENT
Start: 2024-10-01 | End: 2024-10-08 | Stop reason: HOSPADM

## 2024-10-01 RX ORDER — PROCHLORPERAZINE EDISYLATE 5 MG/ML
10 INJECTION INTRAMUSCULAR; INTRAVENOUS ONCE
Status: COMPLETED | OUTPATIENT
Start: 2024-10-01 | End: 2024-10-01

## 2024-10-01 RX ORDER — POTASSIUM CHLORIDE 7.45 MG/ML
10 INJECTION INTRAVENOUS PRN
Status: DISCONTINUED | OUTPATIENT
Start: 2024-10-01 | End: 2024-10-08 | Stop reason: HOSPADM

## 2024-10-01 RX ORDER — PROCHLORPERAZINE EDISYLATE 5 MG/ML
10 INJECTION INTRAMUSCULAR; INTRAVENOUS EVERY 6 HOURS PRN
Status: DISCONTINUED | OUTPATIENT
Start: 2024-10-01 | End: 2024-10-08 | Stop reason: HOSPADM

## 2024-10-01 RX ORDER — MORPHINE SULFATE 4 MG/ML
8 INJECTION, SOLUTION INTRAMUSCULAR; INTRAVENOUS ONCE
Status: DISCONTINUED | OUTPATIENT
Start: 2024-10-01 | End: 2024-10-01

## 2024-10-01 RX ORDER — HYDROMORPHONE HYDROCHLORIDE 1 MG/ML
1 INJECTION, SOLUTION INTRAMUSCULAR; INTRAVENOUS; SUBCUTANEOUS ONCE
Status: COMPLETED | OUTPATIENT
Start: 2024-10-01 | End: 2024-10-01

## 2024-10-01 RX ORDER — ASPIRIN 81 MG/1
81 TABLET, CHEWABLE ORAL DAILY
COMMUNITY

## 2024-10-01 RX ORDER — IOPAMIDOL 755 MG/ML
75 INJECTION, SOLUTION INTRAVASCULAR
Status: COMPLETED | OUTPATIENT
Start: 2024-10-01 | End: 2024-10-01

## 2024-10-01 RX ORDER — 0.9 % SODIUM CHLORIDE 0.9 %
1000 INTRAVENOUS SOLUTION INTRAVENOUS ONCE
Status: COMPLETED | OUTPATIENT
Start: 2024-10-01 | End: 2024-10-01

## 2024-10-01 RX ORDER — BUMETANIDE 1 MG/1
2 TABLET ORAL EVERY OTHER DAY
Status: DISCONTINUED | OUTPATIENT
Start: 2024-10-02 | End: 2024-10-08 | Stop reason: HOSPADM

## 2024-10-01 RX ORDER — PANTOPRAZOLE SODIUM 20 MG/1
20 TABLET, DELAYED RELEASE ORAL DAILY
Status: DISCONTINUED | OUTPATIENT
Start: 2024-10-02 | End: 2024-10-08 | Stop reason: HOSPADM

## 2024-10-01 RX ORDER — SODIUM CHLORIDE, SODIUM LACTATE, POTASSIUM CHLORIDE, CALCIUM CHLORIDE 600; 310; 30; 20 MG/100ML; MG/100ML; MG/100ML; MG/100ML
INJECTION, SOLUTION INTRAVENOUS CONTINUOUS
Status: DISCONTINUED | OUTPATIENT
Start: 2024-10-01 | End: 2024-10-01 | Stop reason: ALTCHOICE

## 2024-10-01 RX ORDER — ASPIRIN 81 MG/1
81 TABLET, CHEWABLE ORAL DAILY
Status: DISCONTINUED | OUTPATIENT
Start: 2024-10-01 | End: 2024-10-08 | Stop reason: HOSPADM

## 2024-10-01 RX ORDER — SODIUM CHLORIDE 0.9 % (FLUSH) 0.9 %
10 SYRINGE (ML) INJECTION PRN
Status: DISCONTINUED | OUTPATIENT
Start: 2024-10-01 | End: 2024-10-08 | Stop reason: HOSPADM

## 2024-10-01 RX ORDER — SPIRONOLACTONE 25 MG/1
25 TABLET ORAL DAILY
Status: DISCONTINUED | OUTPATIENT
Start: 2024-10-01 | End: 2024-10-08 | Stop reason: HOSPADM

## 2024-10-01 RX ORDER — ONDANSETRON 4 MG/1
4 TABLET, ORALLY DISINTEGRATING ORAL EVERY 8 HOURS PRN
Status: DISCONTINUED | OUTPATIENT
Start: 2024-10-01 | End: 2024-10-01 | Stop reason: CLARIF

## 2024-10-01 RX ORDER — DIPHENHYDRAMINE HYDROCHLORIDE 50 MG/ML
25 INJECTION INTRAMUSCULAR; INTRAVENOUS ONCE
Status: DISCONTINUED | OUTPATIENT
Start: 2024-10-01 | End: 2024-10-01

## 2024-10-01 RX ORDER — METOPROLOL SUCCINATE 50 MG/1
50 TABLET, EXTENDED RELEASE ORAL DAILY
Status: DISCONTINUED | OUTPATIENT
Start: 2024-10-01 | End: 2024-10-08 | Stop reason: HOSPADM

## 2024-10-01 RX ORDER — PROCHLORPERAZINE MALEATE 10 MG
10 TABLET ORAL EVERY 8 HOURS PRN
Status: DISCONTINUED | OUTPATIENT
Start: 2024-10-01 | End: 2024-10-08 | Stop reason: HOSPADM

## 2024-10-01 RX ORDER — ONDANSETRON 2 MG/ML
4 INJECTION INTRAMUSCULAR; INTRAVENOUS EVERY 6 HOURS PRN
Status: DISCONTINUED | OUTPATIENT
Start: 2024-10-01 | End: 2024-10-01 | Stop reason: CLARIF

## 2024-10-01 RX ORDER — MORPHINE SULFATE 4 MG/ML
4 INJECTION, SOLUTION INTRAMUSCULAR; INTRAVENOUS ONCE
Status: COMPLETED | OUTPATIENT
Start: 2024-10-01 | End: 2024-10-01

## 2024-10-01 RX ORDER — ATORVASTATIN CALCIUM 80 MG/1
80 TABLET, FILM COATED ORAL NIGHTLY
Status: DISCONTINUED | OUTPATIENT
Start: 2024-10-01 | End: 2024-10-08 | Stop reason: HOSPADM

## 2024-10-01 RX ORDER — MAGNESIUM SULFATE 1 G/100ML
1000 INJECTION INTRAVENOUS PRN
Status: DISCONTINUED | OUTPATIENT
Start: 2024-10-01 | End: 2024-10-08 | Stop reason: HOSPADM

## 2024-10-01 RX ORDER — GUAIFENESIN 600 MG/1
600 TABLET, EXTENDED RELEASE ORAL 2 TIMES DAILY
Status: ON HOLD | COMMUNITY
Start: 2024-09-30 | End: 2024-10-08 | Stop reason: HOSPADM

## 2024-10-01 RX ORDER — SENNOSIDES A AND B 8.6 MG/1
1 TABLET, FILM COATED ORAL DAILY
Status: DISCONTINUED | OUTPATIENT
Start: 2024-10-01 | End: 2024-10-08 | Stop reason: HOSPADM

## 2024-10-01 RX ORDER — 0.9 % SODIUM CHLORIDE 0.9 %
30 INTRAVENOUS SOLUTION INTRAVENOUS ONCE
Status: DISCONTINUED | OUTPATIENT
Start: 2024-10-01 | End: 2024-10-01

## 2024-10-01 RX ORDER — DIPHENHYDRAMINE HYDROCHLORIDE 50 MG/ML
12.5 INJECTION INTRAMUSCULAR; INTRAVENOUS ONCE
Status: COMPLETED | OUTPATIENT
Start: 2024-10-01 | End: 2024-10-01

## 2024-10-01 RX ORDER — SODIUM CHLORIDE, SODIUM LACTATE, POTASSIUM CHLORIDE, CALCIUM CHLORIDE 600; 310; 30; 20 MG/100ML; MG/100ML; MG/100ML; MG/100ML
INJECTION, SOLUTION INTRAVENOUS CONTINUOUS
Status: DISCONTINUED | OUTPATIENT
Start: 2024-10-01 | End: 2024-10-05

## 2024-10-01 RX ORDER — MORPHINE SULFATE 2 MG/ML
2 INJECTION, SOLUTION INTRAMUSCULAR; INTRAVENOUS
Status: DISCONTINUED | OUTPATIENT
Start: 2024-10-01 | End: 2024-10-02

## 2024-10-01 RX ORDER — 0.9 % SODIUM CHLORIDE 0.9 %
2000 INTRAVENOUS SOLUTION INTRAVENOUS ONCE
Status: COMPLETED | OUTPATIENT
Start: 2024-10-01 | End: 2024-10-01

## 2024-10-01 RX ORDER — METRONIDAZOLE 500 MG/100ML
500 INJECTION, SOLUTION INTRAVENOUS ONCE
Status: COMPLETED | OUTPATIENT
Start: 2024-10-01 | End: 2024-10-01

## 2024-10-01 RX ORDER — BUMETANIDE 1 MG/1
1 TABLET ORAL EVERY OTHER DAY
Status: DISCONTINUED | OUTPATIENT
Start: 2024-10-01 | End: 2024-10-08 | Stop reason: HOSPADM

## 2024-10-01 RX ORDER — SODIUM CHLORIDE 0.9 % (FLUSH) 0.9 %
5-40 SYRINGE (ML) INJECTION EVERY 12 HOURS SCHEDULED
Status: DISCONTINUED | OUTPATIENT
Start: 2024-10-01 | End: 2024-10-08 | Stop reason: HOSPADM

## 2024-10-01 RX ORDER — SODIUM CHLORIDE 9 MG/ML
INJECTION, SOLUTION INTRAVENOUS PRN
Status: DISCONTINUED | OUTPATIENT
Start: 2024-10-01 | End: 2024-10-08 | Stop reason: HOSPADM

## 2024-10-01 RX ORDER — GABAPENTIN 400 MG/1
800 CAPSULE ORAL 3 TIMES DAILY
Status: DISCONTINUED | OUTPATIENT
Start: 2024-10-01 | End: 2024-10-02

## 2024-10-01 RX ORDER — CLOPIDOGREL BISULFATE 75 MG/1
75 TABLET ORAL DAILY
Status: DISCONTINUED | OUTPATIENT
Start: 2024-10-01 | End: 2024-10-08 | Stop reason: HOSPADM

## 2024-10-01 RX ADMIN — WATER 2000 MG: 1 INJECTION INTRAMUSCULAR; INTRAVENOUS; SUBCUTANEOUS at 07:32

## 2024-10-01 RX ADMIN — METRONIDAZOLE 500 MG: 500 INJECTION, SOLUTION INTRAVENOUS at 07:32

## 2024-10-01 RX ADMIN — HYDROMORPHONE HYDROCHLORIDE 1 MG: 1 INJECTION, SOLUTION INTRAMUSCULAR; INTRAVENOUS; SUBCUTANEOUS at 10:19

## 2024-10-01 RX ADMIN — SODIUM CHLORIDE, POTASSIUM CHLORIDE, SODIUM LACTATE AND CALCIUM CHLORIDE: 600; 310; 30; 20 INJECTION, SOLUTION INTRAVENOUS at 16:36

## 2024-10-01 RX ADMIN — MORPHINE SULFATE 2 MG: 2 INJECTION, SOLUTION INTRAMUSCULAR; INTRAVENOUS at 18:22

## 2024-10-01 RX ADMIN — SODIUM CHLORIDE, POTASSIUM CHLORIDE, SODIUM LACTATE AND CALCIUM CHLORIDE: 600; 310; 30; 20 INJECTION, SOLUTION INTRAVENOUS at 11:10

## 2024-10-01 RX ADMIN — MORPHINE SULFATE 2 MG: 2 INJECTION, SOLUTION INTRAMUSCULAR; INTRAVENOUS at 21:23

## 2024-10-01 RX ADMIN — DIPHENHYDRAMINE HYDROCHLORIDE 12.5 MG: 50 INJECTION INTRAMUSCULAR; INTRAVENOUS at 06:47

## 2024-10-01 RX ADMIN — PROCHLORPERAZINE EDISYLATE 10 MG: 5 INJECTION INTRAMUSCULAR; INTRAVENOUS at 06:47

## 2024-10-01 RX ADMIN — MORPHINE SULFATE 4 MG: 4 INJECTION, SOLUTION INTRAMUSCULAR; INTRAVENOUS at 07:11

## 2024-10-01 RX ADMIN — MORPHINE SULFATE 4 MG: 4 INJECTION, SOLUTION INTRAMUSCULAR; INTRAVENOUS at 09:08

## 2024-10-01 RX ADMIN — SODIUM CHLORIDE 2000 ML: 9 INJECTION, SOLUTION INTRAVENOUS at 06:42

## 2024-10-01 RX ADMIN — IOPAMIDOL 75 ML: 755 INJECTION, SOLUTION INTRAVENOUS at 09:39

## 2024-10-01 RX ADMIN — SODIUM CHLORIDE 1000 ML: 9 INJECTION, SOLUTION INTRAVENOUS at 07:32

## 2024-10-01 RX ADMIN — SODIUM CHLORIDE, PRESERVATIVE FREE 10 ML: 5 INJECTION INTRAVENOUS at 21:23

## 2024-10-01 ASSESSMENT — PAIN SCALES - GENERAL
PAINLEVEL_OUTOF10: 6
PAINLEVEL_OUTOF10: 10
PAINLEVEL_OUTOF10: 8
PAINLEVEL_OUTOF10: 10
PAINLEVEL_OUTOF10: 9
PAINLEVEL_OUTOF10: 10
PAINLEVEL_OUTOF10: 9
PAINLEVEL_OUTOF10: 10

## 2024-10-01 ASSESSMENT — PAIN - FUNCTIONAL ASSESSMENT
PAIN_FUNCTIONAL_ASSESSMENT: 0-10
PAIN_FUNCTIONAL_ASSESSMENT: ACTIVITIES ARE NOT PREVENTED

## 2024-10-01 ASSESSMENT — PAIN DESCRIPTION - ORIENTATION
ORIENTATION: LEFT;RIGHT
ORIENTATION: LEFT;MID
ORIENTATION: LEFT

## 2024-10-01 ASSESSMENT — PAIN DESCRIPTION - DESCRIPTORS
DESCRIPTORS: SHARP
DESCRIPTORS: SHARP
DESCRIPTORS: ACHING;CRAMPING;DISCOMFORT
DESCRIPTORS: SHOOTING
DESCRIPTORS: ACHING;SHARP;DISCOMFORT

## 2024-10-01 ASSESSMENT — PAIN DESCRIPTION - LOCATION
LOCATION: ABDOMEN

## 2024-10-01 NOTE — ED PROVIDER NOTES
ATTENDING PROVIDER ATTESTATION:     Monique Voss presented to the emergency department for evaluation of Abdominal Pain (Abdominal pain for the past 3-4 days, patient states 10/10 abdominal cramping in LLQ radiating to mid epigastric region. Pt has not had bowel movement in 4 days.)   and was initially evaluated by the Medical Resident. See Original ED Note for H&P and ED course above.     I have reviewed and discussed the case, including pertinent history (medical, surgical, family and social) and exam findings with the Medical Resident assigned to Monique Voss.  I have personally performed and/or participated in the history, exam, medical decision making, and procedures and agree with all pertinent clinical information and any additional changes or corrections are noted below in my assessment and plan. I have discussed this patient in detail with the resident, and provided the instruction and education,       I have reviewed my findings and recommendations with the assigned Medical Resident, Monique Voss and members of family present at the time of disposition.      I have performed a history and physical examination of this patient and directly supervised the resident caring for this patient              [facility]  EMERGENCY DEPARTMENT ENCOUNTER        Pt Name: Monique Voss  MRN: [id number]  Birthdate 1975  Date of evaluation: 10/1/2024  Provider: Gino Sterling MD  PCP: Madison Amin PA-C  Note Started: 6:33 AM EDT 10/1/24    CHIEF COMPLAINT       Chief Complaint   Patient presents with    Abdominal Pain     Abdominal pain for the past 3-4 days, patient states 10/10 abdominal cramping in LLQ radiating to mid epigastric region. Pt has not had bowel movement in 4 days.       HISTORY OF PRESENT ILLNESS: 1 or more Elements       Monique Voss is a 49 y.o. female who presents for abdominal pain.  Patient reports generalized abdominal pain for the last 3 to 4

## 2024-10-01 NOTE — ED PROVIDER NOTES
[facility]  EMERGENCY DEPARTMENT ENCOUNTER        Pt Name: Monique Voss  MRN: [id number]  Birthdate 1975  Date of evaluation: 10/1/2024  Provider: Shahriar Dominguez DO  PCP: Madison Amin PA-C  Note Started: 6:40 AM EDT 10/1/24    CHIEF COMPLAINT       Chief Complaint   Patient presents with    Abdominal Pain     Abdominal pain for the past 3-4 days, patient states 10/10 abdominal cramping in LLQ radiating to mid epigastric region. Pt has not had bowel movement in 4 days.       HISTORY OF PRESENT ILLNESS: 1 or more Elements   History From: patient    Limitations to history : None    Monique Voss is a 49 y.o. female who presents to the emergency department for generalized abdominal pain that she localizes worse to the left side.  She reports has had central abdominal pain for 4 days complaint, last vomiting was 4 days ago.  She reports is normal for her.  She is a history of gastric bypass 2 years ago by Dr. Sandhu.  She also reported some nausea with no episodes of vomiting.  Took Zofran this morning with some improvement. Patient denies fever, chills, headache, shortness of breath, chest pain, vomiting, diarrhea, lightheadedness, dysuria, hematuria, hematochezia, and melena.  She is currently on her period.     Nursing Notes were all reviewed and agreed with or any disagreements were addressed in the HPI.        REVIEW OF SYSTEMS :           Positives and Pertinent negatives as per HPI.     SURGICAL HISTORY     Past Surgical History:   Procedure Laterality Date    CHOLECYSTECTOMY  2009    CORONARY ANGIOPLASTY WITH STENT PLACEMENT  05/17/2023    [place] 3.5 x 38 and 3.5 x 8 deployed distal RCA by Dr Egan    CYSTOSCOPY Left 01/14/2018    left stent placement    DILATION AND CURETTAGE OF UTERUS      younger age    HIP SURGERY Left 09/22/2022    LEFT HIP INJECTION performed by Jordyn Austin DO at [facility] OR    HIP SURGERY Left 03/23/2023    LEFT HIP STEROID INJECT performed by

## 2024-10-01 NOTE — ED NOTES
Radiology Procedure Waiver   Name: Monique Voss  : 1975  MRN: [id number]    Date:  10/1/24    Time: 8:42 AM EDT    Benefits of immediately proceeding with Radiology exam(s) without pre-testing outweigh the risks or are not indicated as specified below and therefore the following is/are being waived:    [x] Pregnancy test   [] Patients LMP on-time and regular.   [] Patient had Tubal Ligation or has other Contraception Device.   [] Patient  is Menopausal or Premenarcheal.    [] Patient had Full or Partial Hysterectomy.    [] Protocol for Iodine allergy    [] MRI Questionnaire     [] BUN/Creatinine   [] Patient age w/no hx of renal dysfunction.   [] Patient on Dialysis.   [] Recent Normal Labs.  Electronically signed by Shahriar Dominguez DO on 10/1/24 at 8:42 AM EDT

## 2024-10-02 PROBLEM — A41.9 SEPSIS WITH ACUTE ORGAN DYSFUNCTION (HCC): Status: ACTIVE | Noted: 2024-10-02

## 2024-10-02 PROBLEM — R65.20 SEPSIS WITH ACUTE ORGAN DYSFUNCTION (HCC): Status: ACTIVE | Noted: 2024-10-02

## 2024-10-02 LAB
ALBUMIN SERPL-MCNC: 2.6 G/DL (ref 3.5–5.2)
ALP SERPL-CCNC: 109 U/L (ref 35–104)
ALT SERPL-CCNC: 10 U/L (ref 0–32)
ANION GAP SERPL CALCULATED.3IONS-SCNC: 15 MMOL/L (ref 7–16)
AST SERPL-CCNC: 13 U/L (ref 0–31)
BILIRUB SERPL-MCNC: 0.7 MG/DL (ref 0–1.2)
BUN SERPL-MCNC: 9 MG/DL (ref 6–20)
CALCIUM SERPL-MCNC: 8.7 MG/DL (ref 8.6–10.2)
CHLORIDE SERPL-SCNC: 105 MMOL/L (ref 98–107)
CO2 SERPL-SCNC: 20 MMOL/L (ref 22–29)
CREAT SERPL-MCNC: 0.6 MG/DL (ref 0.5–1)
GFR, ESTIMATED: >90 ML/MIN/1.73M2
GLUCOSE BLD-MCNC: 111 MG/DL (ref 74–99)
GLUCOSE SERPL-MCNC: 102 MG/DL (ref 74–99)
HBA1C MFR BLD: 5.6 % (ref 4–5.6)
LIPASE SERPL-CCNC: 113 U/L (ref 13–60)
MAGNESIUM SERPL-MCNC: 1.7 MG/DL (ref 1.6–2.6)
PHOSPHATE SERPL-MCNC: 2.3 MG/DL (ref 2.5–4.5)
POTASSIUM SERPL-SCNC: 3.6 MMOL/L (ref 3.5–5)
PROT SERPL-MCNC: 5.9 G/DL (ref 6.4–8.3)
SODIUM SERPL-SCNC: 140 MMOL/L (ref 132–146)

## 2024-10-02 PROCEDURE — 2580000003 HC RX 258: Performed by: INTERNAL MEDICINE

## 2024-10-02 PROCEDURE — 2140000000 HC CCU INTERMEDIATE R&B

## 2024-10-02 PROCEDURE — 97535 SELF CARE MNGMENT TRAINING: CPT

## 2024-10-02 PROCEDURE — 83690 ASSAY OF LIPASE: CPT

## 2024-10-02 PROCEDURE — 97530 THERAPEUTIC ACTIVITIES: CPT

## 2024-10-02 PROCEDURE — 82962 GLUCOSE BLOOD TEST: CPT

## 2024-10-02 PROCEDURE — 36415 COLL VENOUS BLD VENIPUNCTURE: CPT

## 2024-10-02 PROCEDURE — 97161 PT EVAL LOW COMPLEX 20 MIN: CPT

## 2024-10-02 PROCEDURE — 80053 COMPREHEN METABOLIC PANEL: CPT

## 2024-10-02 PROCEDURE — 83735 ASSAY OF MAGNESIUM: CPT

## 2024-10-02 PROCEDURE — 83036 HEMOGLOBIN GLYCOSYLATED A1C: CPT

## 2024-10-02 PROCEDURE — 99223 1ST HOSP IP/OBS HIGH 75: CPT | Performed by: SURGERY

## 2024-10-02 PROCEDURE — 97165 OT EVAL LOW COMPLEX 30 MIN: CPT

## 2024-10-02 PROCEDURE — 6360000002 HC RX W HCPCS: Performed by: INTERNAL MEDICINE

## 2024-10-02 PROCEDURE — 2580000003 HC RX 258: Performed by: NURSE PRACTITIONER

## 2024-10-02 PROCEDURE — 6370000000 HC RX 637 (ALT 250 FOR IP): Performed by: NURSE PRACTITIONER

## 2024-10-02 PROCEDURE — 84100 ASSAY OF PHOSPHORUS: CPT

## 2024-10-02 RX ORDER — MAGNESIUM SULFATE IN WATER 40 MG/ML
2000 INJECTION, SOLUTION INTRAVENOUS ONCE
Status: COMPLETED | OUTPATIENT
Start: 2024-10-02 | End: 2024-10-02

## 2024-10-02 RX ORDER — INSULIN LISPRO 100 [IU]/ML
0-4 INJECTION, SOLUTION INTRAVENOUS; SUBCUTANEOUS
Status: DISCONTINUED | OUTPATIENT
Start: 2024-10-02 | End: 2024-10-08 | Stop reason: HOSPADM

## 2024-10-02 RX ORDER — OXYCODONE AND ACETAMINOPHEN 5; 325 MG/1; MG/1
1 TABLET ORAL EVERY 8 HOURS PRN
Status: DISCONTINUED | OUTPATIENT
Start: 2024-10-02 | End: 2024-10-08 | Stop reason: HOSPADM

## 2024-10-02 RX ORDER — GABAPENTIN 400 MG/1
800 CAPSULE ORAL 3 TIMES DAILY
Status: DISCONTINUED | OUTPATIENT
Start: 2024-10-02 | End: 2024-10-08 | Stop reason: HOSPADM

## 2024-10-02 RX ORDER — METOPROLOL TARTRATE 1 MG/ML
5 INJECTION, SOLUTION INTRAVENOUS EVERY 6 HOURS PRN
Status: DISCONTINUED | OUTPATIENT
Start: 2024-10-02 | End: 2024-10-08 | Stop reason: HOSPADM

## 2024-10-02 RX ORDER — ENOXAPARIN SODIUM 100 MG/ML
40 INJECTION SUBCUTANEOUS DAILY
Status: DISCONTINUED | OUTPATIENT
Start: 2024-10-02 | End: 2024-10-02 | Stop reason: DRUGHIGH

## 2024-10-02 RX ORDER — INSULIN LISPRO 100 [IU]/ML
0-4 INJECTION, SOLUTION INTRAVENOUS; SUBCUTANEOUS NIGHTLY
Status: DISCONTINUED | OUTPATIENT
Start: 2024-10-02 | End: 2024-10-08 | Stop reason: HOSPADM

## 2024-10-02 RX ORDER — CYCLOBENZAPRINE HCL 10 MG
5 TABLET ORAL DAILY PRN
Status: DISCONTINUED | OUTPATIENT
Start: 2024-10-02 | End: 2024-10-08 | Stop reason: HOSPADM

## 2024-10-02 RX ORDER — ENOXAPARIN SODIUM 100 MG/ML
30 INJECTION SUBCUTANEOUS 2 TIMES DAILY
Status: DISCONTINUED | OUTPATIENT
Start: 2024-10-02 | End: 2024-10-08 | Stop reason: HOSPADM

## 2024-10-02 RX ORDER — KETOROLAC TROMETHAMINE 30 MG/ML
15 INJECTION, SOLUTION INTRAMUSCULAR; INTRAVENOUS EVERY 6 HOURS PRN
Status: DISPENSED | OUTPATIENT
Start: 2024-10-02 | End: 2024-10-07

## 2024-10-02 RX ORDER — MORPHINE SULFATE 2 MG/ML
2 INJECTION, SOLUTION INTRAMUSCULAR; INTRAVENOUS
Status: DISCONTINUED | OUTPATIENT
Start: 2024-10-02 | End: 2024-10-02

## 2024-10-02 RX ORDER — DEXTROSE MONOHYDRATE 100 MG/ML
INJECTION, SOLUTION INTRAVENOUS CONTINUOUS PRN
Status: DISCONTINUED | OUTPATIENT
Start: 2024-10-02 | End: 2024-10-08 | Stop reason: HOSPADM

## 2024-10-02 RX ORDER — LORAZEPAM 2 MG/ML
0.5 INJECTION INTRAMUSCULAR EVERY 6 HOURS PRN
Status: DISCONTINUED | OUTPATIENT
Start: 2024-10-02 | End: 2024-10-02

## 2024-10-02 RX ORDER — GLUCAGON 1 MG/ML
1 KIT INJECTION PRN
Status: DISCONTINUED | OUTPATIENT
Start: 2024-10-02 | End: 2024-10-08 | Stop reason: HOSPADM

## 2024-10-02 RX ADMIN — MORPHINE SULFATE 2 MG: 2 INJECTION, SOLUTION INTRAMUSCULAR; INTRAVENOUS at 13:34

## 2024-10-02 RX ADMIN — MORPHINE SULFATE 2 MG: 2 INJECTION, SOLUTION INTRAMUSCULAR; INTRAVENOUS at 09:26

## 2024-10-02 RX ADMIN — PANTOPRAZOLE SODIUM 40 MG: 40 INJECTION, POWDER, FOR SOLUTION INTRAVENOUS at 09:25

## 2024-10-02 RX ADMIN — MORPHINE SULFATE 2 MG: 2 INJECTION, SOLUTION INTRAMUSCULAR; INTRAVENOUS at 16:16

## 2024-10-02 RX ADMIN — MORPHINE SULFATE 2 MG: 2 INJECTION, SOLUTION INTRAMUSCULAR; INTRAVENOUS at 00:15

## 2024-10-02 RX ADMIN — GABAPENTIN 800 MG: 400 CAPSULE ORAL at 22:27

## 2024-10-02 RX ADMIN — OXYCODONE HYDROCHLORIDE AND ACETAMINOPHEN 1 TABLET: 5; 325 TABLET ORAL at 22:27

## 2024-10-02 RX ADMIN — SODIUM CHLORIDE, PRESERVATIVE FREE 10 ML: 5 INJECTION INTRAVENOUS at 09:38

## 2024-10-02 RX ADMIN — SODIUM CHLORIDE, POTASSIUM CHLORIDE, SODIUM LACTATE AND CALCIUM CHLORIDE: 600; 310; 30; 20 INJECTION, SOLUTION INTRAVENOUS at 13:34

## 2024-10-02 RX ADMIN — MORPHINE SULFATE 2 MG: 2 INJECTION, SOLUTION INTRAMUSCULAR; INTRAVENOUS at 20:34

## 2024-10-02 RX ADMIN — MORPHINE SULFATE 2 MG: 2 INJECTION, SOLUTION INTRAMUSCULAR; INTRAVENOUS at 06:15

## 2024-10-02 RX ADMIN — ENOXAPARIN SODIUM 30 MG: 100 INJECTION SUBCUTANEOUS at 20:35

## 2024-10-02 RX ADMIN — SODIUM CHLORIDE, PRESERVATIVE FREE 10 ML: 5 INJECTION INTRAVENOUS at 21:02

## 2024-10-02 RX ADMIN — MORPHINE SULFATE 2 MG: 2 INJECTION, SOLUTION INTRAMUSCULAR; INTRAVENOUS at 03:15

## 2024-10-02 RX ADMIN — KETOROLAC TROMETHAMINE 15 MG: 30 INJECTION, SOLUTION INTRAMUSCULAR at 18:42

## 2024-10-02 RX ADMIN — MAGNESIUM SULFATE HEPTAHYDRATE 2000 MG: 40 INJECTION, SOLUTION INTRAVENOUS at 09:32

## 2024-10-02 RX ADMIN — SODIUM CHLORIDE, POTASSIUM CHLORIDE, SODIUM LACTATE AND CALCIUM CHLORIDE: 600; 310; 30; 20 INJECTION, SOLUTION INTRAVENOUS at 06:18

## 2024-10-02 ASSESSMENT — PAIN SCALES - GENERAL
PAINLEVEL_OUTOF10: 10
PAINLEVEL_OUTOF10: 8
PAINLEVEL_OUTOF10: 9
PAINLEVEL_OUTOF10: 7
PAINLEVEL_OUTOF10: 10
PAINLEVEL_OUTOF10: 7
PAINLEVEL_OUTOF10: 10
PAINLEVEL_OUTOF10: 10
PAINLEVEL_OUTOF10: 7
PAINLEVEL_OUTOF10: 10

## 2024-10-02 ASSESSMENT — PAIN DESCRIPTION - DESCRIPTORS
DESCRIPTORS: ACHING
DESCRIPTORS: ACHING;SHARP;SHOOTING
DESCRIPTORS: ACHING;THROBBING
DESCRIPTORS: ACHING;THROBBING
DESCRIPTORS: THROBBING;ACHING
DESCRIPTORS: SHOOTING;ACHING
DESCRIPTORS: ACHING;DISCOMFORT
DESCRIPTORS: ACHING;SHARP;SORE
DESCRIPTORS: ACHING;DISCOMFORT;SHARP

## 2024-10-02 ASSESSMENT — PAIN DESCRIPTION - LOCATION
LOCATION: ABDOMEN

## 2024-10-02 ASSESSMENT — PAIN DESCRIPTION - ORIENTATION
ORIENTATION: RIGHT
ORIENTATION: RIGHT;LEFT
ORIENTATION: MID
ORIENTATION: LEFT;MID
ORIENTATION: RIGHT;LEFT
ORIENTATION: LEFT
ORIENTATION: MID

## 2024-10-02 ASSESSMENT — PAIN - FUNCTIONAL ASSESSMENT: PAIN_FUNCTIONAL_ASSESSMENT: ACTIVITIES ARE NOT PREVENTED

## 2024-10-02 NOTE — H&P
Hospital Medicine History & Physical      PCP: Madison Amin PA-C    Date of Admission: 10/1/2024    Date of Service: OCT 2, 2024    Chief Complaint:   ABD PAIN      History Of Present Illness:     49 y.o. female presented with ABD PAIN, ONSET MONTHS AGO, BECOMING PROGRESSIVELY WORSE.  SHE HAD A GASTRIC BYPASS SEVERAL YEARS AGO AND LOST OVER 100 LBS.  .  SHE HAD NAUSEA, NO VOMITING AND CHILLS.   PAIN LOCATED LEFT UPPER ABD THAT RADIATES TO THE RIGHT MID ABD.  SHE ALSO HAD CONSTIPATION     Past Medical History:          Diagnosis Date    Asthma     Bell palsy     drooping    Carpal tunnel syndrome     bilateral    CHF (congestive heart failure) ([facility])     Chronic obstructive pulmonary disease ([facility]) 05/25/2023    Coronary artery disease involving native coronary artery of native heart without angina pectoris 05/25/2023    DDD (degenerative disc disease), cervical     with spinal stenosis    Diabetes mellitus ([facility])     DJD (degenerative joint disease)     both hips    GERD without esophagitis     HTN (hypertension)     Hyperlipidemia     Left hip pain and right     Meningitis 2009    Morbid obesity due to excess calories     Neuropathy     JODI treated with BiPAP     Scoliosis     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[facility])        Past Surgical History:          Procedure Laterality Date    CHOLECYSTECTOMY  2009    CORONARY ANGIOPLASTY WITH STENT PLACEMENT  05/17/2023    [place] 3.5 x 38 and 3.5 x 8 deployed distal RCA by Dr Egan    CYSTOSCOPY Left 01/14/2018    left stent placement    DILATION AND CURETTAGE OF UTERUS      younger age    HIP SURGERY Left 09/22/2022    LEFT HIP INJECTION performed by Jordyn Austin DO at [facility] OR    HIP SURGERY Left 03/23/2023    LEFT HIP STEROID INJECT performed by DO bozena Lua OR    HIP SURGERY Right 9/26/2024    RIGHT HIP INJECTION UNDER FLUOROSCOPIC GUIDANCE

## 2024-10-02 NOTE — ACP (ADVANCE CARE PLANNING)
Advance Care Planning   The patient has the following advanced directives on file:  Advance Directives       Power of  Living Will ACP-Advance Directive ACP-Power of     Not on File Not on File Not on File Not on File            The patient has appointed the following active healthcare agents:    Primary Decision Maker: Rosalinda Kaba - Brother/Sister - [phone]    Primary Decision Maker: Charito Hebert - Brother/Sister - [phone]    The Patient has the following current code status:    Code Status: Full Code      Phuong Rice, SYLVIE  10/2/2024

## 2024-10-03 LAB
ALBUMIN SERPL-MCNC: 2.5 G/DL (ref 3.5–5.2)
ALP SERPL-CCNC: 141 U/L (ref 35–104)
ALT SERPL-CCNC: 8 U/L (ref 0–32)
ANION GAP SERPL CALCULATED.3IONS-SCNC: 15 MMOL/L (ref 7–16)
AST SERPL-CCNC: 9 U/L (ref 0–31)
BILIRUB SERPL-MCNC: 0.6 MG/DL (ref 0–1.2)
BUN SERPL-MCNC: 10 MG/DL (ref 6–20)
CALCIUM SERPL-MCNC: 8.4 MG/DL (ref 8.6–10.2)
CHLORIDE SERPL-SCNC: 103 MMOL/L (ref 98–107)
CO2 SERPL-SCNC: 23 MMOL/L (ref 22–29)
CREAT SERPL-MCNC: 0.6 MG/DL (ref 0.5–1)
GFR, ESTIMATED: >90 ML/MIN/1.73M2
GLUCOSE BLD-MCNC: 113 MG/DL (ref 74–99)
GLUCOSE BLD-MCNC: 137 MG/DL (ref 74–99)
GLUCOSE SERPL-MCNC: 96 MG/DL (ref 74–99)
LIPASE SERPL-CCNC: 111 U/L (ref 13–60)
MAGNESIUM SERPL-MCNC: 2 MG/DL (ref 1.6–2.6)
POTASSIUM SERPL-SCNC: 3.5 MMOL/L (ref 3.5–5)
PROT SERPL-MCNC: 5.7 G/DL (ref 6.4–8.3)
SODIUM SERPL-SCNC: 141 MMOL/L (ref 132–146)

## 2024-10-03 PROCEDURE — 6370000000 HC RX 637 (ALT 250 FOR IP): Performed by: NURSE PRACTITIONER

## 2024-10-03 PROCEDURE — 82962 GLUCOSE BLOOD TEST: CPT

## 2024-10-03 PROCEDURE — 83735 ASSAY OF MAGNESIUM: CPT

## 2024-10-03 PROCEDURE — 83690 ASSAY OF LIPASE: CPT

## 2024-10-03 PROCEDURE — 80053 COMPREHEN METABOLIC PANEL: CPT

## 2024-10-03 PROCEDURE — 2580000003 HC RX 258: Performed by: NURSE PRACTITIONER

## 2024-10-03 PROCEDURE — 6360000002 HC RX W HCPCS: Performed by: INTERNAL MEDICINE

## 2024-10-03 PROCEDURE — 99232 SBSQ HOSP IP/OBS MODERATE 35: CPT | Performed by: SURGERY

## 2024-10-03 PROCEDURE — 2140000000 HC CCU INTERMEDIATE R&B

## 2024-10-03 PROCEDURE — 36415 COLL VENOUS BLD VENIPUNCTURE: CPT

## 2024-10-03 RX ADMIN — CYCLOBENZAPRINE 5 MG: 10 TABLET, FILM COATED ORAL at 16:06

## 2024-10-03 RX ADMIN — KETOROLAC TROMETHAMINE 15 MG: 30 INJECTION, SOLUTION INTRAMUSCULAR at 04:55

## 2024-10-03 RX ADMIN — SODIUM CHLORIDE, POTASSIUM CHLORIDE, SODIUM LACTATE AND CALCIUM CHLORIDE: 600; 310; 30; 20 INJECTION, SOLUTION INTRAVENOUS at 16:01

## 2024-10-03 RX ADMIN — OXYCODONE HYDROCHLORIDE AND ACETAMINOPHEN 1 TABLET: 5; 325 TABLET ORAL at 10:24

## 2024-10-03 RX ADMIN — OXYCODONE HYDROCHLORIDE AND ACETAMINOPHEN 1 TABLET: 5; 325 TABLET ORAL at 19:43

## 2024-10-03 RX ADMIN — KETOROLAC TROMETHAMINE 15 MG: 30 INJECTION, SOLUTION INTRAMUSCULAR at 12:54

## 2024-10-03 RX ADMIN — ENOXAPARIN SODIUM 30 MG: 100 INJECTION SUBCUTANEOUS at 10:43

## 2024-10-03 RX ADMIN — GABAPENTIN 800 MG: 400 CAPSULE ORAL at 10:24

## 2024-10-03 RX ADMIN — GABAPENTIN 800 MG: 400 CAPSULE ORAL at 13:59

## 2024-10-03 RX ADMIN — GABAPENTIN 800 MG: 400 CAPSULE ORAL at 19:43

## 2024-10-03 RX ADMIN — SODIUM CHLORIDE, PRESERVATIVE FREE 10 ML: 5 INJECTION INTRAVENOUS at 10:43

## 2024-10-03 RX ADMIN — ENOXAPARIN SODIUM 30 MG: 100 INJECTION SUBCUTANEOUS at 19:43

## 2024-10-03 ASSESSMENT — PAIN DESCRIPTION - LOCATION
LOCATION: ABDOMEN;HIP;BACK
LOCATION: BACK
LOCATION: ABDOMEN
LOCATION: ABDOMEN;GENERALIZED

## 2024-10-03 ASSESSMENT — PAIN SCALES - GENERAL
PAINLEVEL_OUTOF10: 6
PAINLEVEL_OUTOF10: 4
PAINLEVEL_OUTOF10: 9
PAINLEVEL_OUTOF10: 10
PAINLEVEL_OUTOF10: 10
PAINLEVEL_OUTOF10: 8
PAINLEVEL_OUTOF10: 10

## 2024-10-03 ASSESSMENT — PAIN DESCRIPTION - ORIENTATION
ORIENTATION: LOWER
ORIENTATION: LOWER

## 2024-10-03 ASSESSMENT — PAIN DESCRIPTION - DESCRIPTORS
DESCRIPTORS: ACHING;DISCOMFORT;DULL
DESCRIPTORS: ACHING
DESCRIPTORS: ACHING;DISCOMFORT;CRAMPING
DESCRIPTORS: ACHING

## 2024-10-03 ASSESSMENT — PAIN DESCRIPTION - FREQUENCY: FREQUENCY: CONTINUOUS

## 2024-10-03 ASSESSMENT — PAIN - FUNCTIONAL ASSESSMENT: PAIN_FUNCTIONAL_ASSESSMENT: ACTIVITIES ARE NOT PREVENTED

## 2024-10-03 ASSESSMENT — PAIN DESCRIPTION - PAIN TYPE: TYPE: ACUTE PAIN

## 2024-10-04 ENCOUNTER — APPOINTMENT (OUTPATIENT)
Dept: GENERAL RADIOLOGY | Age: 49
DRG: 439 | End: 2024-10-04
Payer: MEDICARE

## 2024-10-04 LAB
ALBUMIN SERPL-MCNC: 2.5 G/DL (ref 3.5–5.2)
ALP SERPL-CCNC: 138 U/L (ref 35–104)
ALT SERPL-CCNC: 13 U/L (ref 0–32)
ANION GAP SERPL CALCULATED.3IONS-SCNC: 10 MMOL/L (ref 7–16)
AST SERPL-CCNC: 16 U/L (ref 0–31)
BASOPHILS # BLD: 0.1 K/UL (ref 0–0.2)
BASOPHILS NFR BLD: 0 % (ref 0–2)
BILIRUB SERPL-MCNC: 1.2 MG/DL (ref 0–1.2)
BUN SERPL-MCNC: 8 MG/DL (ref 6–20)
CALCIUM SERPL-MCNC: 8.7 MG/DL (ref 8.6–10.2)
CHLORIDE SERPL-SCNC: 102 MMOL/L (ref 98–107)
CO2 SERPL-SCNC: 25 MMOL/L (ref 22–29)
CREAT SERPL-MCNC: 0.6 MG/DL (ref 0.5–1)
EOSINOPHIL # BLD: 0.1 K/UL (ref 0.05–0.5)
EOSINOPHILS RELATIVE PERCENT: 0 % (ref 0–6)
ERYTHROCYTE [DISTWIDTH] IN BLOOD BY AUTOMATED COUNT: 16.9 % (ref 11.5–15)
GFR, ESTIMATED: >90 ML/MIN/1.73M2
GLUCOSE BLD-MCNC: 111 MG/DL (ref 74–99)
GLUCOSE BLD-MCNC: 149 MG/DL (ref 74–99)
GLUCOSE SERPL-MCNC: 115 MG/DL (ref 74–99)
HCT VFR BLD AUTO: 41 % (ref 34–48)
HGB BLD-MCNC: 13.3 G/DL (ref 11.5–15.5)
IMM GRANULOCYTES # BLD AUTO: 0.51 K/UL (ref 0–0.58)
IMM GRANULOCYTES NFR BLD: 2 % (ref 0–5)
LIPASE SERPL-CCNC: 147 U/L (ref 13–60)
LYMPHOCYTES NFR BLD: 2.95 K/UL (ref 1.5–4)
LYMPHOCYTES RELATIVE PERCENT: 11 % (ref 20–42)
MCH RBC QN AUTO: 31.3 PG (ref 26–35)
MCHC RBC AUTO-ENTMCNC: 32.4 G/DL (ref 32–34.5)
MCV RBC AUTO: 96.5 FL (ref 80–99.9)
MONOCYTES NFR BLD: 1.58 K/UL (ref 0.1–0.95)
MONOCYTES NFR BLD: 6 % (ref 2–12)
NEUTROPHILS NFR BLD: 80 % (ref 43–80)
NEUTS SEG NFR BLD: 20.57 K/UL (ref 1.8–7.3)
PLATELET # BLD AUTO: 276 K/UL (ref 130–450)
PMV BLD AUTO: 11.9 FL (ref 7–12)
POTASSIUM SERPL-SCNC: 3.6 MMOL/L (ref 3.5–5)
PROT SERPL-MCNC: 5.9 G/DL (ref 6.4–8.3)
RBC # BLD AUTO: 4.25 M/UL (ref 3.5–5.5)
RBC # BLD: ABNORMAL 10*6/UL
SODIUM SERPL-SCNC: 137 MMOL/L (ref 132–146)
WBC OTHER # BLD: 25.8 K/UL (ref 4.5–11.5)

## 2024-10-04 PROCEDURE — 82962 GLUCOSE BLOOD TEST: CPT

## 2024-10-04 PROCEDURE — 6360000002 HC RX W HCPCS: Performed by: NURSE PRACTITIONER

## 2024-10-04 PROCEDURE — 6360000002 HC RX W HCPCS: Performed by: INTERNAL MEDICINE

## 2024-10-04 PROCEDURE — 36415 COLL VENOUS BLD VENIPUNCTURE: CPT

## 2024-10-04 PROCEDURE — 85025 COMPLETE CBC W/AUTO DIFF WBC: CPT

## 2024-10-04 PROCEDURE — 99232 SBSQ HOSP IP/OBS MODERATE 35: CPT | Performed by: SURGERY

## 2024-10-04 PROCEDURE — 6370000000 HC RX 637 (ALT 250 FOR IP): Performed by: NURSE PRACTITIONER

## 2024-10-04 PROCEDURE — 71045 X-RAY EXAM CHEST 1 VIEW: CPT

## 2024-10-04 PROCEDURE — 2580000003 HC RX 258: Performed by: NURSE PRACTITIONER

## 2024-10-04 PROCEDURE — 80053 COMPREHEN METABOLIC PANEL: CPT

## 2024-10-04 PROCEDURE — 83690 ASSAY OF LIPASE: CPT

## 2024-10-04 PROCEDURE — 2140000000 HC CCU INTERMEDIATE R&B

## 2024-10-04 RX ORDER — SIMETHICONE 80 MG
80 TABLET,CHEWABLE ORAL ONCE
Status: COMPLETED | OUTPATIENT
Start: 2024-10-04 | End: 2024-10-04

## 2024-10-04 RX ADMIN — OXYCODONE HYDROCHLORIDE AND ACETAMINOPHEN 1 TABLET: 5; 325 TABLET ORAL at 05:54

## 2024-10-04 RX ADMIN — SODIUM CHLORIDE, POTASSIUM CHLORIDE, SODIUM LACTATE AND CALCIUM CHLORIDE: 600; 310; 30; 20 INJECTION, SOLUTION INTRAVENOUS at 05:57

## 2024-10-04 RX ADMIN — KETOROLAC TROMETHAMINE 15 MG: 30 INJECTION, SOLUTION INTRAMUSCULAR at 00:08

## 2024-10-04 RX ADMIN — GABAPENTIN 800 MG: 400 CAPSULE ORAL at 08:43

## 2024-10-04 RX ADMIN — CYCLOBENZAPRINE 5 MG: 10 TABLET, FILM COATED ORAL at 20:25

## 2024-10-04 RX ADMIN — SODIUM CHLORIDE, PRESERVATIVE FREE 10 ML: 5 INJECTION INTRAVENOUS at 20:33

## 2024-10-04 RX ADMIN — GABAPENTIN 800 MG: 400 CAPSULE ORAL at 20:08

## 2024-10-04 RX ADMIN — SODIUM CHLORIDE, PRESERVATIVE FREE 10 ML: 5 INJECTION INTRAVENOUS at 08:44

## 2024-10-04 RX ADMIN — SIMETHICONE 80 MG: 80 TABLET, CHEWABLE ORAL at 17:23

## 2024-10-04 RX ADMIN — PROCHLORPERAZINE EDISYLATE 10 MG: 5 INJECTION INTRAMUSCULAR; INTRAVENOUS at 16:37

## 2024-10-04 RX ADMIN — ENOXAPARIN SODIUM 30 MG: 100 INJECTION SUBCUTANEOUS at 20:08

## 2024-10-04 RX ADMIN — PROCHLORPERAZINE EDISYLATE 10 MG: 5 INJECTION INTRAMUSCULAR; INTRAVENOUS at 10:37

## 2024-10-04 RX ADMIN — KETOROLAC TROMETHAMINE 15 MG: 30 INJECTION, SOLUTION INTRAMUSCULAR at 20:07

## 2024-10-04 RX ADMIN — KETOROLAC TROMETHAMINE 15 MG: 30 INJECTION, SOLUTION INTRAMUSCULAR at 10:38

## 2024-10-04 RX ADMIN — SACUBITRIL AND VALSARTAN 1 TABLET: 97; 103 TABLET, FILM COATED ORAL at 20:08

## 2024-10-04 RX ADMIN — GABAPENTIN 800 MG: 400 CAPSULE ORAL at 16:27

## 2024-10-04 RX ADMIN — OXYCODONE HYDROCHLORIDE AND ACETAMINOPHEN 1 TABLET: 5; 325 TABLET ORAL at 16:27

## 2024-10-04 RX ADMIN — SODIUM CHLORIDE, POTASSIUM CHLORIDE, SODIUM LACTATE AND CALCIUM CHLORIDE: 600; 310; 30; 20 INJECTION, SOLUTION INTRAVENOUS at 16:38

## 2024-10-04 ASSESSMENT — PAIN SCALES - GENERAL
PAINLEVEL_OUTOF10: 0
PAINLEVEL_OUTOF10: 8
PAINLEVEL_OUTOF10: 0
PAINLEVEL_OUTOF10: 6
PAINLEVEL_OUTOF10: 0
PAINLEVEL_OUTOF10: 8
PAINLEVEL_OUTOF10: 0
PAINLEVEL_OUTOF10: 10
PAINLEVEL_OUTOF10: 0
PAINLEVEL_OUTOF10: 6

## 2024-10-04 ASSESSMENT — PAIN DESCRIPTION - LOCATION
LOCATION: ABDOMEN

## 2024-10-04 ASSESSMENT — PAIN DESCRIPTION - DESCRIPTORS: DESCRIPTORS: ACHING

## 2024-10-04 ASSESSMENT — PAIN DESCRIPTION - ORIENTATION: ORIENTATION: INNER;MID

## 2024-10-04 NOTE — CARE COORDINATION
Social Work/ Case Management Transition of Care Planning (Phuong Rice, ROLLYW [phone]):     Per report and chart review Pt is on room air. IV Lactated ringers gtt. Pt stated she ws scheduled to start Aqua therapy at the [facility] today. Pt stated she plans to discharge home with no needs and her family will transport her. Pt stated she did need some assistance with ADLs prior to this admission. SW/CM to follow.   SYLVIE Millan  10/3/2024    
Social Work/ Case Management Transition of Care Planning (Phuong Rice, SYLVIE [phone]):     Per report and chart review Pt is on room air. Pt on IV lactated ringers gtt. Pt to start regular diet. Per General Surgery no surgical intervention at this time and if Pt tolerates diet she can discharge from their POV. Pt stated she plans to discharge home with no needs and her family will transport her. Pt stated she did need some assistance with ADLs prior to this admission. ALLIE/DOROTHY to follow.   SYLVIE Millan  10/4/2024    
Charito Hebert - Brother/Sister - [phone]    Discharge Planning:    Patient lives with: Alone Type of Home: Apartment  Primary Care Giver: Family  Patient Support Systems include: Family Members   Current Financial resources:    Current community resources:    Current services prior to admission: None            Current DME:              Type of Home Care services:  None    ADLS  Prior functional level: Assistance with the following:, Housework, Mobility, Shopping  Current functional level: Other (see comment) (Unknown at this time)    PT AM-PAC: 16 /24  OT AM-PAC: 16 /24    Family can provide assistance at DC: Yes  Would you like Case Management to discuss the discharge plan with any other family members/significant others, and if so, who? No  Plans to Return to Present Housing: Unknown at present  Other Identified Issues/Barriers to RETURNING to current housing:   Potential Assistance needed at discharge: N/A            Potential DME:    Patient expects to discharge to: Apartment  Plan for transportation at discharge:      Financial    Payor: [employer] MEDICARE / Plan: [employer] GOLD PLUS HMO / Product Type: *No Product type* /     Does insurance require precert for SNF: Yes    Potential assistance Purchasing Medications:    Meds-to-Beds request: Yes      [id number] [place], [place] - [address] [facility] [phone] -  [phone]  [address] [place] [id number]  Phone: [phone] Fax: [phone]      Notes:    Factors facilitating achievement of predicted outcomes: Family support    Barriers to discharge: Decreased endurance    Additional Case Management Notes:     The Plan for Transition of Care is related to the following treatment goals of Acute infective pancreatitis [K85.90]    IF APPLICABLE: The Patient and/or patient representative Monique and her family were provided with a choice of provider and agrees with the discharge plan. Freedom of choice list with basic

## 2024-10-05 ENCOUNTER — APPOINTMENT (OUTPATIENT)
Dept: CT IMAGING | Age: 49
DRG: 439 | End: 2024-10-05
Attending: SURGERY
Payer: MEDICARE

## 2024-10-05 LAB
ALBUMIN SERPL-MCNC: 2.3 G/DL (ref 3.5–5.2)
ALP SERPL-CCNC: 154 U/L (ref 35–104)
ALT SERPL-CCNC: 13 U/L (ref 0–32)
ANION GAP SERPL CALCULATED.3IONS-SCNC: 13 MMOL/L (ref 7–16)
AST SERPL-CCNC: 16 U/L (ref 0–31)
BASOPHILS # BLD: 0 K/UL (ref 0–0.2)
BASOPHILS NFR BLD: 0 % (ref 0–2)
BILIRUB SERPL-MCNC: 1.1 MG/DL (ref 0–1.2)
BUN SERPL-MCNC: 9 MG/DL (ref 6–20)
CALCIUM SERPL-MCNC: 8.3 MG/DL (ref 8.6–10.2)
CHLORIDE SERPL-SCNC: 102 MMOL/L (ref 98–107)
CO2 SERPL-SCNC: 22 MMOL/L (ref 22–29)
CREAT SERPL-MCNC: 0.6 MG/DL (ref 0.5–1)
EOSINOPHIL # BLD: 0 K/UL (ref 0.05–0.5)
EOSINOPHILS RELATIVE PERCENT: 0 % (ref 0–6)
ERYTHROCYTE [DISTWIDTH] IN BLOOD BY AUTOMATED COUNT: 17 % (ref 11.5–15)
GFR, ESTIMATED: >90 ML/MIN/1.73M2
GLUCOSE BLD-MCNC: 111 MG/DL (ref 74–99)
GLUCOSE BLD-MCNC: 126 MG/DL (ref 74–99)
GLUCOSE BLD-MCNC: 96 MG/DL (ref 74–99)
GLUCOSE SERPL-MCNC: 123 MG/DL (ref 74–99)
HCT VFR BLD AUTO: 41.2 % (ref 34–48)
HGB BLD-MCNC: 13.6 G/DL (ref 11.5–15.5)
LIPASE SERPL-CCNC: 137 U/L (ref 13–60)
LYMPHOCYTES NFR BLD: 2.63 K/UL (ref 1.5–4)
LYMPHOCYTES RELATIVE PERCENT: 9 % (ref 20–42)
MAGNESIUM SERPL-MCNC: 1.9 MG/DL (ref 1.6–2.6)
MCH RBC QN AUTO: 31.6 PG (ref 26–35)
MCHC RBC AUTO-ENTMCNC: 33 G/DL (ref 32–34.5)
MCV RBC AUTO: 95.6 FL (ref 80–99.9)
MONOCYTES NFR BLD: 2.63 K/UL (ref 0.1–0.95)
MONOCYTES NFR BLD: 9 % (ref 2–12)
NEUTROPHILS NFR BLD: 83 % (ref 43–80)
NEUTS SEG NFR BLD: 24.95 K/UL (ref 1.8–7.3)
PLATELET # BLD AUTO: 295 K/UL (ref 130–450)
PMV BLD AUTO: 11.6 FL (ref 7–12)
POTASSIUM SERPL-SCNC: 3.3 MMOL/L (ref 3.5–5)
PROT SERPL-MCNC: 5.4 G/DL (ref 6.4–8.3)
RBC # BLD AUTO: 4.31 M/UL (ref 3.5–5.5)
RBC # BLD: ABNORMAL 10*6/UL
SODIUM SERPL-SCNC: 137 MMOL/L (ref 132–146)
TRIGL SERPL-MCNC: 173 MG/DL
WBC OTHER # BLD: 30.2 K/UL (ref 4.5–11.5)

## 2024-10-05 PROCEDURE — 82962 GLUCOSE BLOOD TEST: CPT

## 2024-10-05 PROCEDURE — 2580000003 HC RX 258: Performed by: NURSE PRACTITIONER

## 2024-10-05 PROCEDURE — 6360000002 HC RX W HCPCS: Performed by: INTERNAL MEDICINE

## 2024-10-05 PROCEDURE — 6360000002 HC RX W HCPCS: Performed by: STUDENT IN AN ORGANIZED HEALTH CARE EDUCATION/TRAINING PROGRAM

## 2024-10-05 PROCEDURE — 99231 SBSQ HOSP IP/OBS SF/LOW 25: CPT | Performed by: SURGERY

## 2024-10-05 PROCEDURE — 6360000002 HC RX W HCPCS: Performed by: NURSE PRACTITIONER

## 2024-10-05 PROCEDURE — 85025 COMPLETE CBC W/AUTO DIFF WBC: CPT

## 2024-10-05 PROCEDURE — 2140000000 HC CCU INTERMEDIATE R&B

## 2024-10-05 PROCEDURE — 80053 COMPREHEN METABOLIC PANEL: CPT

## 2024-10-05 PROCEDURE — 36415 COLL VENOUS BLD VENIPUNCTURE: CPT

## 2024-10-05 PROCEDURE — 83690 ASSAY OF LIPASE: CPT

## 2024-10-05 PROCEDURE — 74175 CTA ABDOMEN W/CONTRAST: CPT

## 2024-10-05 PROCEDURE — 82784 ASSAY IGA/IGD/IGG/IGM EACH: CPT

## 2024-10-05 PROCEDURE — 83735 ASSAY OF MAGNESIUM: CPT

## 2024-10-05 PROCEDURE — 6360000004 HC RX CONTRAST MEDICATION: Performed by: RADIOLOGY

## 2024-10-05 PROCEDURE — 2580000003 HC RX 258: Performed by: STUDENT IN AN ORGANIZED HEALTH CARE EDUCATION/TRAINING PROGRAM

## 2024-10-05 PROCEDURE — 6370000000 HC RX 637 (ALT 250 FOR IP): Performed by: NURSE PRACTITIONER

## 2024-10-05 PROCEDURE — 84478 ASSAY OF TRIGLYCERIDES: CPT

## 2024-10-05 RX ORDER — IOPAMIDOL 755 MG/ML
75 INJECTION, SOLUTION INTRAVASCULAR
Status: COMPLETED | OUTPATIENT
Start: 2024-10-05 | End: 2024-10-05

## 2024-10-05 RX ADMIN — SODIUM CHLORIDE, PRESERVATIVE FREE 10 ML: 5 INJECTION INTRAVENOUS at 21:00

## 2024-10-05 RX ADMIN — OXYCODONE HYDROCHLORIDE AND ACETAMINOPHEN 1 TABLET: 5; 325 TABLET ORAL at 17:32

## 2024-10-05 RX ADMIN — GABAPENTIN 800 MG: 400 CAPSULE ORAL at 09:04

## 2024-10-05 RX ADMIN — IOPAMIDOL 75 ML: 755 INJECTION, SOLUTION INTRAVENOUS at 08:09

## 2024-10-05 RX ADMIN — OXYCODONE HYDROCHLORIDE AND ACETAMINOPHEN 1 TABLET: 5; 325 TABLET ORAL at 00:37

## 2024-10-05 RX ADMIN — OXYCODONE HYDROCHLORIDE AND ACETAMINOPHEN 1 TABLET: 5; 325 TABLET ORAL at 09:06

## 2024-10-05 RX ADMIN — KETOROLAC TROMETHAMINE 15 MG: 30 INJECTION, SOLUTION INTRAMUSCULAR at 03:26

## 2024-10-05 RX ADMIN — GABAPENTIN 800 MG: 400 CAPSULE ORAL at 21:53

## 2024-10-05 RX ADMIN — PROCHLORPERAZINE EDISYLATE 10 MG: 5 INJECTION INTRAMUSCULAR; INTRAVENOUS at 03:26

## 2024-10-05 RX ADMIN — SACUBITRIL AND VALSARTAN 1 TABLET: 97; 103 TABLET, FILM COATED ORAL at 21:53

## 2024-10-05 RX ADMIN — KETOROLAC TROMETHAMINE 15 MG: 30 INJECTION, SOLUTION INTRAMUSCULAR at 12:52

## 2024-10-05 RX ADMIN — GABAPENTIN 800 MG: 400 CAPSULE ORAL at 14:35

## 2024-10-05 RX ADMIN — SODIUM CHLORIDE, PRESERVATIVE FREE 10 ML: 5 INJECTION INTRAVENOUS at 09:11

## 2024-10-05 RX ADMIN — KETOROLAC TROMETHAMINE 15 MG: 30 INJECTION, SOLUTION INTRAMUSCULAR at 21:53

## 2024-10-05 RX ADMIN — MEROPENEM 1000 MG: 1 INJECTION INTRAVENOUS at 17:06

## 2024-10-05 RX ADMIN — METOPROLOL SUCCINATE 50 MG: 50 TABLET, EXTENDED RELEASE ORAL at 09:05

## 2024-10-05 RX ADMIN — ENOXAPARIN SODIUM 30 MG: 100 INJECTION SUBCUTANEOUS at 09:06

## 2024-10-05 RX ADMIN — PANTOPRAZOLE SODIUM 20 MG: 20 TABLET, DELAYED RELEASE ORAL at 06:09

## 2024-10-05 RX ADMIN — CITALOPRAM HYDROBROMIDE 40 MG: 20 TABLET ORAL at 09:04

## 2024-10-05 RX ADMIN — SENNOSIDES 8.6 MG: 8.6 TABLET, FILM COATED ORAL at 09:05

## 2024-10-05 RX ADMIN — SACUBITRIL AND VALSARTAN 1 TABLET: 97; 103 TABLET, FILM COATED ORAL at 09:05

## 2024-10-05 ASSESSMENT — PAIN DESCRIPTION - LOCATION
LOCATION: ABDOMEN
LOCATION: LEG;ABDOMEN

## 2024-10-05 ASSESSMENT — PAIN SCALES - GENERAL
PAINLEVEL_OUTOF10: 2
PAINLEVEL_OUTOF10: 6
PAINLEVEL_OUTOF10: 5
PAINLEVEL_OUTOF10: 2
PAINLEVEL_OUTOF10: 4
PAINLEVEL_OUTOF10: 10
PAINLEVEL_OUTOF10: 8
PAINLEVEL_OUTOF10: 3
PAINLEVEL_OUTOF10: 5
PAINLEVEL_OUTOF10: 2
PAINLEVEL_OUTOF10: 5
PAINLEVEL_OUTOF10: 9
PAINLEVEL_OUTOF10: 5
PAINLEVEL_OUTOF10: 5
PAINLEVEL_OUTOF10: 8

## 2024-10-05 ASSESSMENT — PAIN SCALES - WONG BAKER
WONGBAKER_NUMERICALRESPONSE: HURTS LITTLE MORE

## 2024-10-05 ASSESSMENT — PAIN DESCRIPTION - DESCRIPTORS
DESCRIPTORS: ACHING;SHARP
DESCRIPTORS: SORE;PRESSURE

## 2024-10-06 ENCOUNTER — APPOINTMENT (OUTPATIENT)
Dept: MRI IMAGING | Age: 49
DRG: 439 | End: 2024-10-06
Payer: MEDICARE

## 2024-10-06 PROBLEM — K85.90 ACUTE PANCREATITIS: Status: ACTIVE | Noted: 2024-10-06

## 2024-10-06 LAB
ALBUMIN SERPL-MCNC: 2 G/DL (ref 3.5–5.2)
ALP SERPL-CCNC: 170 U/L (ref 35–104)
ALT SERPL-CCNC: 13 U/L (ref 0–32)
ANION GAP SERPL CALCULATED.3IONS-SCNC: 12 MMOL/L (ref 7–16)
AST SERPL-CCNC: 13 U/L (ref 0–31)
ATYPICAL LYMPHOCYTE ABSOLUTE COUNT: 1.13 K/UL (ref 0–0.46)
ATYPICAL LYMPHOCYTES: 4 % (ref 0–4)
BASOPHILS # BLD: 0 K/UL (ref 0–0.2)
BASOPHILS NFR BLD: 0 % (ref 0–2)
BILIRUB SERPL-MCNC: 1.2 MG/DL (ref 0–1.2)
BUN SERPL-MCNC: 10 MG/DL (ref 6–20)
CALCIUM SERPL-MCNC: 8.7 MG/DL (ref 8.6–10.2)
CHLORIDE SERPL-SCNC: 101 MMOL/L (ref 98–107)
CO2 SERPL-SCNC: 24 MMOL/L (ref 22–29)
CREAT SERPL-MCNC: 0.6 MG/DL (ref 0.5–1)
EOSINOPHIL # BLD: 0 K/UL (ref 0.05–0.5)
EOSINOPHILS RELATIVE PERCENT: 0 % (ref 0–6)
ERYTHROCYTE [DISTWIDTH] IN BLOOD BY AUTOMATED COUNT: 16.4 % (ref 11.5–15)
GFR, ESTIMATED: >90 ML/MIN/1.73M2
GLUCOSE BLD-MCNC: 100 MG/DL (ref 74–99)
GLUCOSE BLD-MCNC: 117 MG/DL (ref 74–99)
GLUCOSE BLD-MCNC: 136 MG/DL (ref 74–99)
GLUCOSE SERPL-MCNC: 127 MG/DL (ref 74–99)
HCT VFR BLD AUTO: 43.1 % (ref 34–48)
HGB BLD-MCNC: 14.5 G/DL (ref 11.5–15.5)
IGG SERPL-MCNC: 925 MG/DL (ref 700–1600)
LIPASE SERPL-CCNC: 138 U/L (ref 13–60)
LYMPHOCYTES NFR BLD: 1.13 K/UL (ref 1.5–4)
LYMPHOCYTES RELATIVE PERCENT: 4 % (ref 20–42)
MCH RBC QN AUTO: 31.4 PG (ref 26–35)
MCHC RBC AUTO-ENTMCNC: 33.6 G/DL (ref 32–34.5)
MCV RBC AUTO: 93.3 FL (ref 80–99.9)
METAMYELOCYTES ABSOLUTE COUNT: 0.28 K/UL (ref 0–0.12)
METAMYELOCYTES: 1 % (ref 0–1)
MICROORGANISM SPEC CULT: NORMAL
MICROORGANISM SPEC CULT: NORMAL
MONOCYTES NFR BLD: 2.54 K/UL (ref 0.1–0.95)
MONOCYTES NFR BLD: 8 % (ref 2–12)
NEUTROPHILS NFR BLD: 84 % (ref 43–80)
NEUTS SEG NFR BLD: 27.41 K/UL (ref 1.8–7.3)
PLATELET # BLD AUTO: 308 K/UL (ref 130–450)
PMV BLD AUTO: 11.7 FL (ref 7–12)
POTASSIUM SERPL-SCNC: 3.7 MMOL/L (ref 3.5–5)
PROT SERPL-MCNC: 5.7 G/DL (ref 6.4–8.3)
RBC # BLD AUTO: 4.62 M/UL (ref 3.5–5.5)
RBC # BLD: ABNORMAL 10*6/UL
SERVICE CMNT-IMP: NORMAL
SERVICE CMNT-IMP: NORMAL
SODIUM SERPL-SCNC: 137 MMOL/L (ref 132–146)
SPECIMEN DESCRIPTION: NORMAL
SPECIMEN DESCRIPTION: NORMAL
WBC OTHER # BLD: 32.5 K/UL (ref 4.5–11.5)

## 2024-10-06 PROCEDURE — 74181 MRI ABDOMEN W/O CONTRAST: CPT

## 2024-10-06 PROCEDURE — 36415 COLL VENOUS BLD VENIPUNCTURE: CPT

## 2024-10-06 PROCEDURE — 6360000002 HC RX W HCPCS: Performed by: INTERNAL MEDICINE

## 2024-10-06 PROCEDURE — 85025 COMPLETE CBC W/AUTO DIFF WBC: CPT

## 2024-10-06 PROCEDURE — 2580000003 HC RX 258: Performed by: STUDENT IN AN ORGANIZED HEALTH CARE EDUCATION/TRAINING PROGRAM

## 2024-10-06 PROCEDURE — 2580000003 HC RX 258: Performed by: NURSE PRACTITIONER

## 2024-10-06 PROCEDURE — 6370000000 HC RX 637 (ALT 250 FOR IP): Performed by: NURSE PRACTITIONER

## 2024-10-06 PROCEDURE — 82962 GLUCOSE BLOOD TEST: CPT

## 2024-10-06 PROCEDURE — 2140000000 HC CCU INTERMEDIATE R&B

## 2024-10-06 PROCEDURE — 6360000002 HC RX W HCPCS: Performed by: STUDENT IN AN ORGANIZED HEALTH CARE EDUCATION/TRAINING PROGRAM

## 2024-10-06 PROCEDURE — 83690 ASSAY OF LIPASE: CPT

## 2024-10-06 PROCEDURE — 80053 COMPREHEN METABOLIC PANEL: CPT

## 2024-10-06 RX ORDER — 0.9 % SODIUM CHLORIDE 0.9 %
250 INTRAVENOUS SOLUTION INTRAVENOUS ONCE
Status: COMPLETED | OUTPATIENT
Start: 2024-10-06 | End: 2024-10-06

## 2024-10-06 RX ADMIN — KETOROLAC TROMETHAMINE 15 MG: 30 INJECTION, SOLUTION INTRAMUSCULAR at 04:58

## 2024-10-06 RX ADMIN — CITALOPRAM HYDROBROMIDE 40 MG: 20 TABLET ORAL at 09:23

## 2024-10-06 RX ADMIN — MEROPENEM 1000 MG: 1 INJECTION INTRAVENOUS at 02:07

## 2024-10-06 RX ADMIN — SENNOSIDES 8.6 MG: 8.6 TABLET, FILM COATED ORAL at 09:23

## 2024-10-06 RX ADMIN — MEROPENEM 1000 MG: 1 INJECTION INTRAVENOUS at 23:59

## 2024-10-06 RX ADMIN — SODIUM CHLORIDE, PRESERVATIVE FREE 10 ML: 5 INJECTION INTRAVENOUS at 20:14

## 2024-10-06 RX ADMIN — SODIUM CHLORIDE 250 ML: 9 INJECTION, SOLUTION INTRAVENOUS at 19:16

## 2024-10-06 RX ADMIN — GABAPENTIN 800 MG: 400 CAPSULE ORAL at 09:23

## 2024-10-06 RX ADMIN — GABAPENTIN 800 MG: 400 CAPSULE ORAL at 15:29

## 2024-10-06 RX ADMIN — OXYCODONE HYDROCHLORIDE AND ACETAMINOPHEN 1 TABLET: 5; 325 TABLET ORAL at 10:12

## 2024-10-06 RX ADMIN — KETOROLAC TROMETHAMINE 15 MG: 30 INJECTION, SOLUTION INTRAMUSCULAR at 15:29

## 2024-10-06 RX ADMIN — OXYCODONE HYDROCHLORIDE AND ACETAMINOPHEN 1 TABLET: 5; 325 TABLET ORAL at 20:13

## 2024-10-06 RX ADMIN — SODIUM CHLORIDE, PRESERVATIVE FREE 10 ML: 5 INJECTION INTRAVENOUS at 10:04

## 2024-10-06 RX ADMIN — MEROPENEM 1000 MG: 1 INJECTION INTRAVENOUS at 15:33

## 2024-10-06 RX ADMIN — PANTOPRAZOLE SODIUM 20 MG: 20 TABLET, DELAYED RELEASE ORAL at 09:23

## 2024-10-06 RX ADMIN — MEROPENEM 1000 MG: 1 INJECTION INTRAVENOUS at 09:24

## 2024-10-06 RX ADMIN — GABAPENTIN 800 MG: 400 CAPSULE ORAL at 20:13

## 2024-10-06 RX ADMIN — OXYCODONE HYDROCHLORIDE AND ACETAMINOPHEN 1 TABLET: 5; 325 TABLET ORAL at 02:06

## 2024-10-06 ASSESSMENT — PAIN DESCRIPTION - ORIENTATION: ORIENTATION: RIGHT

## 2024-10-06 ASSESSMENT — PAIN SCALES - WONG BAKER
WONGBAKER_NUMERICALRESPONSE: HURTS LITTLE MORE

## 2024-10-06 ASSESSMENT — PAIN SCALES - GENERAL
PAINLEVEL_OUTOF10: 10
PAINLEVEL_OUTOF10: 2
PAINLEVEL_OUTOF10: 7
PAINLEVEL_OUTOF10: 10
PAINLEVEL_OUTOF10: 4
PAINLEVEL_OUTOF10: 10
PAINLEVEL_OUTOF10: 4
PAINLEVEL_OUTOF10: 4

## 2024-10-06 ASSESSMENT — PAIN DESCRIPTION - FREQUENCY: FREQUENCY: CONTINUOUS

## 2024-10-06 ASSESSMENT — PAIN DESCRIPTION - LOCATION
LOCATION: ABDOMEN
LOCATION: ABDOMEN;HIP

## 2024-10-06 ASSESSMENT — PAIN DESCRIPTION - DESCRIPTORS: DESCRIPTORS: ACHING;DISCOMFORT;SORE

## 2024-10-06 ASSESSMENT — PAIN DESCRIPTION - PAIN TYPE: TYPE: ACUTE PAIN;CHRONIC PAIN

## 2024-10-06 ASSESSMENT — PAIN DESCRIPTION - ONSET: ONSET: ON-GOING

## 2024-10-06 NOTE — CONSULTS
General Surgery Consult    Patient's Name/Date of Birth: Monique Voss / 1975    Date: October 2, 2024     Consulting Surgeon: Barry Sandhu M.D.    PCP: Madison Amin PA-C     Chief Complaint: abd pain     HPI:   Monique Voss is a 49 y.o. female who presents for  evaluation of abd pain and came to ED for this reason and CT showed acute pancreatitis and pseudocyst. Timing is constant, radiation to epigastrium, alleviated by nothing and started weeks ago, severity 2-9/10      Past Medical History:   Diagnosis Date    Asthma     Bell palsy     drooping    Carpal tunnel syndrome     bilateral    CHF (congestive heart failure) ([facility])     Chronic obstructive pulmonary disease (HCC) 05/25/2023    Coronary artery disease involving native coronary artery of native heart without angina pectoris 05/25/2023    DDD (degenerative disc disease), cervical     with spinal stenosis    Diabetes mellitus (HCC)     DJD (degenerative joint disease)     both hips    GERD without esophagitis     HTN (hypertension)     Hyperlipidemia     Left hip pain and right     Meningitis 2009    Morbid obesity due to excess calories     Neuropathy     JODI treated with BiPAP     Scoliosis     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[facility])        Past Surgical History:   Procedure Laterality Date    CHOLECYSTECTOMY  2009    CORONARY ANGIOPLASTY WITH STENT PLACEMENT  05/17/2023    [place] 3.5 x 38 and 3.5 x 8 deployed distal RCA by Dr Egan    CYSTOSCOPY Left 01/14/2018    left stent placement    DILATION AND CURETTAGE OF UTERUS      younger age    HIP SURGERY Left 09/22/2022    [facility]    HIP SURGERY Left 03/23/2023    LEFT HIP STEROID INJECT performed by DO bozena Lua [facility] OR    HIP SURGERY Right 9/26/2024    RIGHT HIP INJECTION UNDER FLUOROSCOPIC GUIDANCE                +++IODINE ALLERGY+++ performed by Jordyn Austin DO at [facility] OR    LITHOTRIPSY Right 02/15/2018    
[facility] CONSULT NOTE    Reason for Consult: Leukocytosis  Requested by: Chelle Damon APRN - CNP     Chief complaint: Abdominal pain    History Obtained From: Patient and EMR    HISTORY OFPRESENT ILLNESS              The patient is a 49 y.o. morbidly obese female with history of COPD, CAD, DM, hypertension, hyperlipidemia, hidradenitis suppurativa right hip osteoarthritis on steroid injections, meningocele, gastric bypass surgery in 2022, presented on 10/01 with generalized abdominal pain worse on the left side for 4 days accompanied by vomiting.  On admission, she had intermittent low-grade fever up to 100.8 °F with leukocytosis up to 25,000.  Urinalysis showed insignificant pyuria of 0-5 WBC.  CT abdomen and pelvis showed findings consistent with acute pancreatitis with pseudocyst in the region of the uncinate process measuring 3.4 cm in diameter decreased in size from prior 4.8 cm.  Blood cultures showed no growth to date.  SARS-CoV-2 PCR and influenza AMB PCR were negative.  She received a dose of ceftriaxone and metronidazole on admission.  ID service was subsequently consulted for further recommendations.    Past Medical History  Past Medical History:   Diagnosis Date    Asthma     Bell palsy     drooping    Carpal tunnel syndrome     bilateral    CHF (congestive heart failure) ([facility])     Chronic obstructive pulmonary disease (HCC) 05/25/2023    Coronary artery disease involving native coronary artery of native heart without angina pectoris 05/25/2023    DDD (degenerative disc disease), cervical     with spinal stenosis    Diabetes mellitus (HCC)     DJD (degenerative joint disease)     both hips    GERD without esophagitis     HTN (hypertension)     Hyperlipidemia     Left hip pain and right     Meningitis 2009    Morbid obesity due to excess calories     Neuropathy     JODI treated with BiPAP     Scoliosis     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 
hip pain and right     Meningitis 2009    Morbid obesity due to excess calories     Neuropathy     JODI treated with BiPAP     Scoliosis     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HCC)        Past Surgical History:   Procedure Laterality Date    CHOLECYSTECTOMY  2009    CORONARY ANGIOPLASTY WITH STENT PLACEMENT  05/17/2023    [place] 3.5 x 38 and 3.5 x 8 deployed distal RCA by Dr Egan    CYSTOSCOPY Left 01/14/2018    left stent placement    DILATION AND CURETTAGE OF UTERUS      younger age    HIP SURGERY Left 09/22/2022    LEFT HIP INJECTION performed by Jordyn Ausitn DO at [facility] OR    HIP SURGERY Left 03/23/2023    LEFT HIP STEROID INJECT performed by Jordyn Austin DO at [facility] OR    HIP SURGERY Right 9/26/2024    RIGHT HIP INJECTION UNDER FLUOROSCOPIC GUIDANCE                +++IODINE ALLERGY+++ performed by Jordyn Austin DO at [facility] OR    LITHOTRIPSY Right 02/15/2018    Cystoscopy;Stent Removal    NERVE BLOCK Bilateral 12/22/2022    BILATERAL L5 TRANSFORAMINAL EPIDURAL STEROID INJECTION performed by Jordyn Austin DO [facility] OR    PAIN MANAGEMENT PROCEDURE Bilateral 10/27/2022    BILATERAL L5 TRANSFORAMINAL EPIDURAL STEROID INJECTION performed by Jordyn Austin DO at [facility] OR    PAIN MANAGEMENT PROCEDURE Bilateral 04/04/2023    BILATERAL L5 TRANSFORAMINAL EPIDURAL STEROID INJECTION performed by Jordyn Austin DO [facility] OR    PAIN MANAGEMENT PROCEDURE Bilateral 8/27/2024    BILATERAL LUMBAR L5 TRANSFORAMINAL EPIDURAL STEROID INJECTION performed by Jordyn Austin DO at [facility] OR    MARGUERITE-EN-Y GASTRIC BYPASS N/A 05/31/2022    GASTRIC BYPASS MARGUERITE-EN-Y LAPAROSCOPIC performed by Barry Sandhu MD at [facility] OR    UPPER GASTROINTESTINAL ENDOSCOPY N/A 06/22/2018    EGD BIOPSY performed by Barry Sandhu MD at [facility] ENDOSCOPY    UPPER GASTROINTESTINAL ENDOSCOPY N/A 08/20/2021    EGD BIOPSY performed by Barry Sandhu MD at [facility] ENDOSCOPY       Medications Prior to Admission    Prior to Admission medications    Medication Sig 
Alternating taking 1 tablet and 2 tablets every other day.    Provider, MD Odalis   clopidogrel (PLAVIX) 75 MG tablet TAKE 1 TABLET BY MOUTH DAILY 24   Rocky Curran MD   spironolactone (ALDACTONE) 25 MG tablet TAKE 1 TABLET BY MOUTH DAILY 24   Madison Amin PA-C   citalopram (CELEXA) 40 MG tablet Take 1 tablet by mouth daily 24   Madison Amin PA-C   JARDIANCE 10 MG tablet TAKE 1 TABLET BY MOUTH DAILY 24   Rocky Curran MD   Multiple Vitamin (DAILY VITES) TABS Take 1 tablet by mouth daily 23   Madison Amin PA-C       Allergies   Allergen Reactions    Fish-Derived Products Anaphylaxis     Airway closing, rash, increased body temp    Food Anaphylaxis     Food allergy - Fresh Water Fish, [place]'s and Tree Nuts    Ultram [Tramadol Hcl]      Per pt had a seizure    Cashews [Macadamia Nut Oil] Nausea And Vomiting    Norco [Hydrocodone-Acetaminophen] Hives    Pcn [Penicillins]      Not known       Family History   Problem Relation Age of Onset    Stroke Mother     Arthritis Mother     Hypertension Mother     Asthma Mother     Fibromyalgia Mother     Cancer Father     Hypertension Father     Heart Attack Father     Asthma Father        Social History     Tobacco Use    Smoking status: Every Day     Current packs/day: 0.00     Average packs/day: 0.3 packs/day for 17.5 years (4.4 ttl pk-yrs)     Types: Cigarettes, Cigars     Start date: 3/27/2001     Last attempt to quit: 10/5/2018     Years since quittin.0    Smokeless tobacco: Never   Vaping Use    Vaping status: Former   Substance Use Topics    Alcohol use: No    Drug use: No         Review of Systems:   Review of Systems   Constitutional:  Negative for chills and fever.   HENT: Negative.     Eyes: Negative.    Respiratory: Negative.     Cardiovascular: Negative.    Gastrointestinal:  Positive for abdominal pain. Negative for nausea and vomiting.   Endocrine: Negative.            PHYSICAL EXAM:    Vitals:    10/05/24

## 2024-10-07 LAB
ALBUMIN SERPL-MCNC: 1.8 G/DL (ref 3.5–5.2)
ALP SERPL-CCNC: 149 U/L (ref 35–104)
ALT SERPL-CCNC: 10 U/L (ref 0–32)
ANION GAP SERPL CALCULATED.3IONS-SCNC: 13 MMOL/L (ref 7–16)
AST SERPL-CCNC: 10 U/L (ref 0–31)
BACTERIA URNS QL MICRO: ABNORMAL
BASOPHILS # BLD: 0 K/UL (ref 0–0.2)
BASOPHILS NFR BLD: 0 % (ref 0–2)
BILIRUB SERPL-MCNC: 0.7 MG/DL (ref 0–1.2)
BILIRUB UR QL STRIP: ABNORMAL
BUN SERPL-MCNC: 13 MG/DL (ref 6–20)
CALCIUM SERPL-MCNC: 8.6 MG/DL (ref 8.6–10.2)
CHLORIDE SERPL-SCNC: 104 MMOL/L (ref 98–107)
CLARITY UR: CLEAR
CO2 SERPL-SCNC: 22 MMOL/L (ref 22–29)
COLOR UR: ABNORMAL
CREAT SERPL-MCNC: 0.6 MG/DL (ref 0.5–1)
EOSINOPHIL # BLD: 0 K/UL (ref 0.05–0.5)
EOSINOPHILS RELATIVE PERCENT: 0 % (ref 0–6)
EPI CELLS #/AREA URNS HPF: ABNORMAL /HPF
ERYTHROCYTE [DISTWIDTH] IN BLOOD BY AUTOMATED COUNT: 16.6 % (ref 11.5–15)
GFR, ESTIMATED: >90 ML/MIN/1.73M2
GLUCOSE BLD-MCNC: 121 MG/DL (ref 74–99)
GLUCOSE BLD-MCNC: 128 MG/DL (ref 74–99)
GLUCOSE BLD-MCNC: 95 MG/DL (ref 74–99)
GLUCOSE SERPL-MCNC: 125 MG/DL (ref 74–99)
GLUCOSE UR STRIP-MCNC: NEGATIVE MG/DL
HCT VFR BLD AUTO: 37.6 % (ref 34–48)
HGB BLD-MCNC: 12.7 G/DL (ref 11.5–15.5)
HGB UR QL STRIP.AUTO: NEGATIVE
KETONES UR STRIP-MCNC: ABNORMAL MG/DL
LEUKOCYTE ESTERASE UR QL STRIP: NEGATIVE
LIPASE SERPL-CCNC: 97 U/L (ref 13–60)
LYMPHOCYTES NFR BLD: 1.51 K/UL (ref 1.5–4)
LYMPHOCYTES RELATIVE PERCENT: 4 % (ref 20–42)
MAGNESIUM SERPL-MCNC: 1.7 MG/DL (ref 1.6–2.6)
MCH RBC QN AUTO: 31.5 PG (ref 26–35)
MCHC RBC AUTO-ENTMCNC: 33.8 G/DL (ref 32–34.5)
MCV RBC AUTO: 93.3 FL (ref 80–99.9)
MONOCYTES NFR BLD: 1.21 K/UL (ref 0.1–0.95)
MONOCYTES NFR BLD: 4 % (ref 2–12)
NEUTROPHILS NFR BLD: 92 % (ref 43–80)
NEUTS SEG NFR BLD: 31.78 K/UL (ref 1.8–7.3)
NITRITE UR QL STRIP: NEGATIVE
PH UR STRIP: 6.5 [PH] (ref 5–9)
PLATELET # BLD AUTO: 304 K/UL (ref 130–450)
PMV BLD AUTO: 12 FL (ref 7–12)
POTASSIUM SERPL-SCNC: 3.4 MMOL/L (ref 3.5–5)
PROT SERPL-MCNC: 5.4 G/DL (ref 6.4–8.3)
PROT UR STRIP-MCNC: 30 MG/DL
RBC # BLD AUTO: 4.03 M/UL (ref 3.5–5.5)
RBC # BLD: ABNORMAL 10*6/UL
RBC #/AREA URNS HPF: ABNORMAL /HPF
SODIUM SERPL-SCNC: 139 MMOL/L (ref 132–146)
SP GR UR STRIP: 1.01 (ref 1–1.03)
UROBILINOGEN UR STRIP-ACNC: 4 EU/DL (ref 0–1)
WBC #/AREA URNS HPF: ABNORMAL /HPF
WBC OTHER # BLD: 34.5 K/UL (ref 4.5–11.5)

## 2024-10-07 PROCEDURE — 2580000003 HC RX 258: Performed by: NURSE PRACTITIONER

## 2024-10-07 PROCEDURE — 6370000000 HC RX 637 (ALT 250 FOR IP)

## 2024-10-07 PROCEDURE — 6370000000 HC RX 637 (ALT 250 FOR IP): Performed by: NURSE PRACTITIONER

## 2024-10-07 PROCEDURE — 81001 URINALYSIS AUTO W/SCOPE: CPT

## 2024-10-07 PROCEDURE — 2140000000 HC CCU INTERMEDIATE R&B

## 2024-10-07 PROCEDURE — 80053 COMPREHEN METABOLIC PANEL: CPT

## 2024-10-07 PROCEDURE — 82787 IGG 1 2 3 OR 4 EACH: CPT

## 2024-10-07 PROCEDURE — 82962 GLUCOSE BLOOD TEST: CPT

## 2024-10-07 PROCEDURE — 36415 COLL VENOUS BLD VENIPUNCTURE: CPT

## 2024-10-07 PROCEDURE — 6360000002 HC RX W HCPCS: Performed by: INTERNAL MEDICINE

## 2024-10-07 PROCEDURE — 2580000003 HC RX 258: Performed by: STUDENT IN AN ORGANIZED HEALTH CARE EDUCATION/TRAINING PROGRAM

## 2024-10-07 PROCEDURE — 83690 ASSAY OF LIPASE: CPT

## 2024-10-07 PROCEDURE — 85025 COMPLETE CBC W/AUTO DIFF WBC: CPT

## 2024-10-07 PROCEDURE — 6360000002 HC RX W HCPCS: Performed by: NURSE PRACTITIONER

## 2024-10-07 PROCEDURE — 83735 ASSAY OF MAGNESIUM: CPT

## 2024-10-07 PROCEDURE — 6360000002 HC RX W HCPCS: Performed by: STUDENT IN AN ORGANIZED HEALTH CARE EDUCATION/TRAINING PROGRAM

## 2024-10-07 RX ORDER — MAGNESIUM SULFATE IN WATER 40 MG/ML
2000 INJECTION, SOLUTION INTRAVENOUS ONCE
Status: COMPLETED | OUTPATIENT
Start: 2024-10-07 | End: 2024-10-07

## 2024-10-07 RX ORDER — 0.9 % SODIUM CHLORIDE 0.9 %
500 INTRAVENOUS SOLUTION INTRAVENOUS ONCE
Status: COMPLETED | OUTPATIENT
Start: 2024-10-07 | End: 2024-10-07

## 2024-10-07 RX ORDER — POTASSIUM CHLORIDE 1500 MG/1
40 TABLET, EXTENDED RELEASE ORAL ONCE
Status: COMPLETED | OUTPATIENT
Start: 2024-10-07 | End: 2024-10-07

## 2024-10-07 RX ORDER — FLUCONAZOLE 100 MG/1
200 TABLET ORAL ONCE
Status: COMPLETED | OUTPATIENT
Start: 2024-10-07 | End: 2024-10-07

## 2024-10-07 RX ORDER — DOCUSATE SODIUM 100 MG/1
100 CAPSULE, LIQUID FILLED ORAL DAILY
Status: DISCONTINUED | OUTPATIENT
Start: 2024-10-07 | End: 2024-10-08 | Stop reason: HOSPADM

## 2024-10-07 RX ADMIN — POTASSIUM CHLORIDE 40 MEQ: 1500 TABLET, EXTENDED RELEASE ORAL at 10:42

## 2024-10-07 RX ADMIN — GABAPENTIN 800 MG: 400 CAPSULE ORAL at 20:37

## 2024-10-07 RX ADMIN — MEROPENEM 1000 MG: 1 INJECTION INTRAVENOUS at 08:49

## 2024-10-07 RX ADMIN — GABAPENTIN 800 MG: 400 CAPSULE ORAL at 08:49

## 2024-10-07 RX ADMIN — SODIUM CHLORIDE, PRESERVATIVE FREE 10 ML: 5 INJECTION INTRAVENOUS at 20:38

## 2024-10-07 RX ADMIN — CYCLOBENZAPRINE 5 MG: 10 TABLET, FILM COATED ORAL at 04:12

## 2024-10-07 RX ADMIN — SODIUM CHLORIDE, PRESERVATIVE FREE 10 ML: 5 INJECTION INTRAVENOUS at 08:53

## 2024-10-07 RX ADMIN — OXYCODONE HYDROCHLORIDE AND ACETAMINOPHEN 1 TABLET: 5; 325 TABLET ORAL at 04:11

## 2024-10-07 RX ADMIN — CITALOPRAM HYDROBROMIDE 40 MG: 20 TABLET ORAL at 08:49

## 2024-10-07 RX ADMIN — DOCUSATE SODIUM 100 MG: 100 CAPSULE, LIQUID FILLED ORAL at 14:25

## 2024-10-07 RX ADMIN — FLUCONAZOLE 200 MG: 100 TABLET ORAL at 14:25

## 2024-10-07 RX ADMIN — MAGNESIUM SULFATE HEPTAHYDRATE 2000 MG: 40 INJECTION, SOLUTION INTRAVENOUS at 10:46

## 2024-10-07 RX ADMIN — MEROPENEM 1000 MG: 1 INJECTION INTRAVENOUS at 15:52

## 2024-10-07 RX ADMIN — GABAPENTIN 800 MG: 400 CAPSULE ORAL at 13:08

## 2024-10-07 RX ADMIN — KETOROLAC TROMETHAMINE 15 MG: 30 INJECTION, SOLUTION INTRAMUSCULAR at 00:29

## 2024-10-07 RX ADMIN — SENNOSIDES 8.6 MG: 8.6 TABLET, FILM COATED ORAL at 08:51

## 2024-10-07 RX ADMIN — KETOROLAC TROMETHAMINE 15 MG: 30 INJECTION, SOLUTION INTRAMUSCULAR at 08:50

## 2024-10-07 RX ADMIN — SODIUM CHLORIDE 500 ML: 9 INJECTION, SOLUTION INTRAVENOUS at 13:13

## 2024-10-07 RX ADMIN — OXYCODONE HYDROCHLORIDE AND ACETAMINOPHEN 1 TABLET: 5; 325 TABLET ORAL at 16:33

## 2024-10-07 ASSESSMENT — PAIN DESCRIPTION - ORIENTATION
ORIENTATION: RIGHT
ORIENTATION: RIGHT;MID
ORIENTATION: RIGHT;LOWER;MID
ORIENTATION: RIGHT

## 2024-10-07 ASSESSMENT — PAIN DESCRIPTION - FREQUENCY
FREQUENCY: CONTINUOUS
FREQUENCY: CONTINUOUS

## 2024-10-07 ASSESSMENT — PAIN DESCRIPTION - DESCRIPTORS
DESCRIPTORS: ACHING;SORE;DISCOMFORT
DESCRIPTORS: ACHING;DISCOMFORT;SORE
DESCRIPTORS: ACHING;NAGGING;DISCOMFORT
DESCRIPTORS: ACHING;DISCOMFORT;NAGGING

## 2024-10-07 ASSESSMENT — PAIN SCALES - GENERAL
PAINLEVEL_OUTOF10: 10
PAINLEVEL_OUTOF10: 7
PAINLEVEL_OUTOF10: 8
PAINLEVEL_OUTOF10: 9
PAINLEVEL_OUTOF10: 7

## 2024-10-07 ASSESSMENT — PAIN DESCRIPTION - LOCATION
LOCATION: ABDOMEN
LOCATION: ABDOMEN
LOCATION: HIP;ABDOMEN
LOCATION: ABDOMEN;HIP

## 2024-10-07 ASSESSMENT — PAIN - FUNCTIONAL ASSESSMENT: PAIN_FUNCTIONAL_ASSESSMENT: ACTIVITIES ARE NOT PREVENTED

## 2024-10-07 ASSESSMENT — PAIN DESCRIPTION - ONSET
ONSET: ON-GOING
ONSET: ON-GOING

## 2024-10-07 ASSESSMENT — PAIN SCALES - WONG BAKER: WONGBAKER_NUMERICALRESPONSE: HURTS LITTLE MORE

## 2024-10-07 ASSESSMENT — PAIN DESCRIPTION - PAIN TYPE
TYPE: ACUTE PAIN;CHRONIC PAIN
TYPE: ACUTE PAIN

## 2024-10-08 VITALS
WEIGHT: 260 LBS | BODY MASS INDEX: 39.4 KG/M2 | OXYGEN SATURATION: 100 % | SYSTOLIC BLOOD PRESSURE: 98 MMHG | HEART RATE: 91 BPM | HEIGHT: 68 IN | DIASTOLIC BLOOD PRESSURE: 53 MMHG | RESPIRATION RATE: 21 BRPM | TEMPERATURE: 98.2 F

## 2024-10-08 LAB
ALBUMIN SERPL-MCNC: 1.9 G/DL (ref 3.5–5.2)
ALP SERPL-CCNC: 179 U/L (ref 35–104)
ALT SERPL-CCNC: 16 U/L (ref 0–32)
ANION GAP SERPL CALCULATED.3IONS-SCNC: 10 MMOL/L (ref 7–16)
AST SERPL-CCNC: 25 U/L (ref 0–31)
BASOPHILS # BLD: 0 K/UL (ref 0–0.2)
BASOPHILS NFR BLD: 0 % (ref 0–2)
BILIRUB SERPL-MCNC: 0.6 MG/DL (ref 0–1.2)
BUN SERPL-MCNC: 12 MG/DL (ref 6–20)
CALCIUM SERPL-MCNC: 8.6 MG/DL (ref 8.6–10.2)
CHLORIDE SERPL-SCNC: 104 MMOL/L (ref 98–107)
CO2 SERPL-SCNC: 24 MMOL/L (ref 22–29)
CREAT SERPL-MCNC: 0.5 MG/DL (ref 0.5–1)
EOSINOPHIL # BLD: 0 K/UL (ref 0.05–0.5)
EOSINOPHILS RELATIVE PERCENT: 0 % (ref 0–6)
ERYTHROCYTE [DISTWIDTH] IN BLOOD BY AUTOMATED COUNT: 16.5 % (ref 11.5–15)
GFR, ESTIMATED: >90 ML/MIN/1.73M2
GLUCOSE BLD-MCNC: 130 MG/DL (ref 74–99)
GLUCOSE SERPL-MCNC: 89 MG/DL (ref 74–99)
HCT VFR BLD AUTO: 35.9 % (ref 34–48)
HGB BLD-MCNC: 12.2 G/DL (ref 11.5–15.5)
LIPASE SERPL-CCNC: 57 U/L (ref 13–60)
LYMPHOCYTES NFR BLD: 2.26 K/UL (ref 1.5–4)
LYMPHOCYTES RELATIVE PERCENT: 8 % (ref 20–42)
MAGNESIUM SERPL-MCNC: 1.8 MG/DL (ref 1.6–2.6)
MCH RBC QN AUTO: 31.6 PG (ref 26–35)
MCHC RBC AUTO-ENTMCNC: 34 G/DL (ref 32–34.5)
MCV RBC AUTO: 93 FL (ref 80–99.9)
MONOCYTES NFR BLD: 1.76 K/UL (ref 0.1–0.95)
MONOCYTES NFR BLD: 6 % (ref 2–12)
MYELOCYTES ABSOLUTE COUNT: 0.5 K/UL
MYELOCYTES: 2 %
NEUTROPHILS NFR BLD: 84 % (ref 43–80)
NEUTS SEG NFR BLD: 24.38 K/UL (ref 1.8–7.3)
PLATELET # BLD AUTO: 293 K/UL (ref 130–450)
PMV BLD AUTO: 11.9 FL (ref 7–12)
POTASSIUM SERPL-SCNC: 3.5 MMOL/L (ref 3.5–5)
PROT SERPL-MCNC: 5.5 G/DL (ref 6.4–8.3)
RBC # BLD AUTO: 3.86 M/UL (ref 3.5–5.5)
RBC # BLD: ABNORMAL 10*6/UL
SODIUM SERPL-SCNC: 138 MMOL/L (ref 132–146)
WBC OTHER # BLD: 28.9 K/UL (ref 4.5–11.5)

## 2024-10-08 PROCEDURE — 2580000003 HC RX 258: Performed by: NURSE PRACTITIONER

## 2024-10-08 PROCEDURE — 2580000003 HC RX 258: Performed by: STUDENT IN AN ORGANIZED HEALTH CARE EDUCATION/TRAINING PROGRAM

## 2024-10-08 PROCEDURE — 83735 ASSAY OF MAGNESIUM: CPT

## 2024-10-08 PROCEDURE — 82962 GLUCOSE BLOOD TEST: CPT

## 2024-10-08 PROCEDURE — 85025 COMPLETE CBC W/AUTO DIFF WBC: CPT

## 2024-10-08 PROCEDURE — 83690 ASSAY OF LIPASE: CPT

## 2024-10-08 PROCEDURE — 6370000000 HC RX 637 (ALT 250 FOR IP)

## 2024-10-08 PROCEDURE — 36415 COLL VENOUS BLD VENIPUNCTURE: CPT

## 2024-10-08 PROCEDURE — 6370000000 HC RX 637 (ALT 250 FOR IP): Performed by: NURSE PRACTITIONER

## 2024-10-08 PROCEDURE — 6360000002 HC RX W HCPCS: Performed by: STUDENT IN AN ORGANIZED HEALTH CARE EDUCATION/TRAINING PROGRAM

## 2024-10-08 PROCEDURE — 80053 COMPREHEN METABOLIC PANEL: CPT

## 2024-10-08 RX ADMIN — MEROPENEM 1000 MG: 1 INJECTION INTRAVENOUS at 00:13

## 2024-10-08 RX ADMIN — GABAPENTIN 800 MG: 400 CAPSULE ORAL at 08:37

## 2024-10-08 RX ADMIN — CITALOPRAM HYDROBROMIDE 40 MG: 20 TABLET ORAL at 08:37

## 2024-10-08 RX ADMIN — OXYCODONE HYDROCHLORIDE AND ACETAMINOPHEN 1 TABLET: 5; 325 TABLET ORAL at 00:37

## 2024-10-08 RX ADMIN — DOCUSATE SODIUM 100 MG: 100 CAPSULE, LIQUID FILLED ORAL at 08:37

## 2024-10-08 RX ADMIN — GABAPENTIN 800 MG: 400 CAPSULE ORAL at 13:21

## 2024-10-08 RX ADMIN — PANTOPRAZOLE SODIUM 20 MG: 20 TABLET, DELAYED RELEASE ORAL at 06:16

## 2024-10-08 RX ADMIN — SENNOSIDES 8.6 MG: 8.6 TABLET, FILM COATED ORAL at 08:37

## 2024-10-08 RX ADMIN — OXYCODONE HYDROCHLORIDE AND ACETAMINOPHEN 1 TABLET: 5; 325 TABLET ORAL at 08:37

## 2024-10-08 RX ADMIN — SODIUM CHLORIDE, PRESERVATIVE FREE 10 ML: 5 INJECTION INTRAVENOUS at 08:39

## 2024-10-08 RX ADMIN — CYCLOBENZAPRINE 5 MG: 10 TABLET, FILM COATED ORAL at 06:16

## 2024-10-08 RX ADMIN — AMOXICILLIN AND CLAVULANATE POTASSIUM 1 TABLET: 875; 125 TABLET, FILM COATED ORAL at 10:13

## 2024-10-08 ASSESSMENT — PAIN SCALES - GENERAL
PAINLEVEL_OUTOF10: 6
PAINLEVEL_OUTOF10: 10
PAINLEVEL_OUTOF10: 6
PAINLEVEL_OUTOF10: 8
PAINLEVEL_OUTOF10: 6
PAINLEVEL_OUTOF10: 10
PAINLEVEL_OUTOF10: 8

## 2024-10-08 ASSESSMENT — PAIN DESCRIPTION - LOCATION
LOCATION: BACK;HIP;ABDOMEN
LOCATION: ABDOMEN
LOCATION: ABDOMEN

## 2024-10-08 ASSESSMENT — PAIN DESCRIPTION - DESCRIPTORS
DESCRIPTORS: ACHING;DISCOMFORT
DESCRIPTORS: DISCOMFORT;ACHING
DESCRIPTORS: THROBBING;BURNING;PRESSURE

## 2024-10-08 ASSESSMENT — PAIN DESCRIPTION - ORIENTATION: ORIENTATION: MID

## 2024-10-08 NOTE — DISCHARGE SUMMARY
[facility]   Discharge summary   Patient ID:  Monique Voss  [id number]  49 y.o.  1975    Admit date: 10/1/2024    Discharge date and time: 10/8/2024    Admission Diagnoses:   Patient Active Problem List   Diagnosis    Primary hypertension    Mild intermittent asthma without complication    Morbid obesity    Reactive depression    Anxiety    Ureteric stone    Tobacco dependence    Hydronephrosis with urinary obstruction due to renal calculus    Pancreatic cyst    PVC (premature ventricular contraction)    Spinal stenosis of lumbar region with neurogenic claudication    Primary osteoarthritis of both hips    Gastroesophageal reflux disease without esophagitis    Vitamin D deficiency    Diabetes mellitus (HCC)    Hyperlipidemia LDL goal <70    Obstructive sleep apnea    Chronic pain syndrome    Lumbar radiculopathy    Chronic bilateral low back pain with bilateral sciatica    Hidradenitis suppurativa    Nonischemic cardiomyopathy (HCC)    Malnutrition (HCC)    Current moderate episode of major depressive disorder without prior episode (HCC)    S/P gastric bypass    Left hip pain    Non-ST elevation myocardial infarction (NSTEMI) (HCC)    Chronic systolic heart failure (HCC)    Bigeminy    Bradycardia    Coronary artery disease involving native coronary artery of native heart without angina pectoris    Pure hypercholesterolemia    Chronic obstructive pulmonary disease (HCC)    Opioid dependence with current use (HCC)    Acute infective pancreatitis    Sepsis with acute organ dysfunction ([facility])    Acute pancreatitis       Discharge Diagnoses: Acute pancreatitis with necrosis-?  Drug-induced    Consults: MRCP with stable pseudocyst    Procedures: None    Hospital Course:   Patient is a 49-year-old female admitted to [facility] for  Acute infective pancreatitis  -Monitor labs  -Worsening leukocytosis today 25.8  -Mildly worsening lipase at 147  -Diet advanced to regular per GS today, not 
narrow base of support/decreased strength

## 2024-10-08 NOTE — PLAN OF CARE
Problem: Discharge Planning  Goal: Discharge to home or other facility with appropriate resources  10/3/2024 1207 by Donya Cesar RN  Outcome: Progressing  10/3/2024 0512 by Anika Orellana RN  Outcome: Progressing  Flowsheets (Taken 10/2/2024 1900)  Discharge to home or other facility with appropriate resources: Identify barriers to discharge with patient and caregiver     Problem: Pain  Goal: Verbalizes/displays adequate comfort level or baseline comfort level  10/3/2024 1207 by Donya Cesar RN  Outcome: Progressing  10/3/2024 0512 by Anika Orellana RN  Outcome: Progressing     Problem: Safety - Adult  Goal: Free from fall injury  10/3/2024 1207 by Donya Cesar RN  Outcome: Progressing  10/3/2024 0512 by Anika Orellana RN  Outcome: Progressing     Problem: ABCDS Injury Assessment  Goal: Absence of physical injury  10/3/2024 1207 by Donya Cesar RN  Outcome: Progressing  10/3/2024 0512 by Anika Orellana RN  Outcome: Progressing     
  Problem: Discharge Planning  Goal: Discharge to home or other facility with appropriate resources  10/4/2024 1704 by Segun Reyez RN  Outcome: Progressing     Problem: Pain  Goal: Verbalizes/displays adequate comfort level or baseline comfort level  10/5/2024 0110 by Mercedes Yepez RN  Outcome: Progressing  10/4/2024 1704 by Segun Reyez RN  Outcome: Progressing     Problem: Safety - Adult  Goal: Free from fall injury  10/5/2024 0110 by Mercedes Yepez RN  Outcome: Progressing  10/4/2024 1704 by Segun Reyez RN  Outcome: Progressing     Problem: ABCDS Injury Assessment  Goal: Absence of physical injury  10/4/2024 1704 by Segun Reyez RN  Outcome: Progressing     
  Problem: Discharge Planning  Goal: Discharge to home or other facility with appropriate resources  10/6/2024 2222 by David Kelley RN  Outcome: Progressing  10/6/2024 1154 by Jennifer Jones RN  Outcome: Progressing     Problem: Pain  Goal: Verbalizes/displays adequate comfort level or baseline comfort level  10/6/2024 2222 by David Kelley RN  Outcome: Progressing  10/6/2024 1154 by Jennifer Jones RN  Outcome: Progressing     Problem: Safety - Adult  Goal: Free from fall injury  10/6/2024 2222 by David Kelley RN  Outcome: Progressing  10/6/2024 1154 by Jennifer Jones RN  Outcome: Progressing     Problem: ABCDS Injury Assessment  Goal: Absence of physical injury  Outcome: Progressing     
  Problem: Discharge Planning  Goal: Discharge to home or other facility with appropriate resources  10/7/2024 0955 by Orville Kearns RN  Outcome: Progressing  10/6/2024 2222 by David Kelley RN  Outcome: Progressing     Problem: Pain  Goal: Verbalizes/displays adequate comfort level or baseline comfort level  10/7/2024 0955 by Orville Kearns RN  Outcome: Progressing  10/6/2024 2222 by David Kelley RN  Outcome: Progressing     Problem: Safety - Adult  Goal: Free from fall injury  10/7/2024 0955 by Orville Kearns RN  Outcome: Progressing  10/6/2024 2222 by David Kelley RN  Outcome: Progressing     Problem: ABCDS Injury Assessment  Goal: Absence of physical injury  10/7/2024 0955 by Orville Kearns RN  Outcome: Progressing  10/6/2024 2222 by David Kelley RN  Outcome: Progressing     
  Problem: Discharge Planning  Goal: Discharge to home or other facility with appropriate resources  Outcome: Progressing     Problem: Pain  Goal: Verbalizes/displays adequate comfort level or baseline comfort level  10/5/2024 0920 by Jennifer Jones RN  Outcome: Progressing  10/5/2024 0110 by Mercedes Yepez RN  Outcome: Progressing     Problem: Safety - Adult  Goal: Free from fall injury  10/5/2024 0920 by Jennifer Jones RN  Outcome: Progressing  10/5/2024 0110 by Mercedes Yepez RN  Outcome: Progressing     
  Problem: Discharge Planning  Goal: Discharge to home or other facility with appropriate resources  Outcome: Progressing     Problem: Pain  Goal: Verbalizes/displays adequate comfort level or baseline comfort level  Outcome: Progressing     Problem: Safety - Adult  Goal: Free from fall injury  Outcome: Progressing     
  Problem: Discharge Planning  Goal: Discharge to home or other facility with appropriate resources  Outcome: Progressing     Problem: Pain  Goal: Verbalizes/displays adequate comfort level or baseline comfort level  Outcome: Progressing     Problem: Safety - Adult  Goal: Free from fall injury  Outcome: Progressing     
  Problem: Discharge Planning  Goal: Discharge to home or other facility with appropriate resources  Outcome: Progressing     Problem: Pain  Goal: Verbalizes/displays adequate comfort level or baseline comfort level  Outcome: Progressing     Problem: Safety - Adult  Goal: Free from fall injury  Outcome: Progressing     Problem: ABCDS Injury Assessment  Goal: Absence of physical injury  Outcome: Progressing     
  Problem: Discharge Planning  Goal: Discharge to home or other facility with appropriate resources  Outcome: Progressing     Problem: Pain  Goal: Verbalizes/displays adequate comfort level or baseline comfort level  Outcome: Progressing     Problem: Safety - Adult  Goal: Free from fall injury  Outcome: Progressing     Problem: ABCDS Injury Assessment  Goal: Absence of physical injury  Outcome: Progressing     
  Problem: Discharge Planning  Goal: Discharge to home or other facility with appropriate resources  Outcome: Progressing  Flowsheets (Taken 10/2/2024 1900)  Discharge to home or other facility with appropriate resources: Identify barriers to discharge with patient and caregiver     Problem: Pain  Goal: Verbalizes/displays adequate comfort level or baseline comfort level  Outcome: Progressing     Problem: Safety - Adult  Goal: Free from fall injury  Outcome: Progressing     Problem: ABCDS Injury Assessment  Goal: Absence of physical injury  Outcome: Progressing     
  Problem: Discharge Planning  Goal: Discharge to home or other facility with appropriate resources  Outcome: Progressing  Flowsheets (Taken 10/8/2024 0749)  Discharge to home or other facility with appropriate resources:   Identify barriers to discharge with patient and caregiver   Arrange for needed discharge resources and transportation as appropriate   Identify discharge learning needs (meds, wound care, etc)     Problem: Pain  Goal: Verbalizes/displays adequate comfort level or baseline comfort level  10/8/2024 1135 by Bridget Winters RN  Outcome: Progressing  Flowsheets (Taken 10/8/2024 0749)  Verbalizes/displays adequate comfort level or baseline comfort level:   Encourage patient to monitor pain and request assistance   Assess pain using appropriate pain scale   Administer analgesics based on type and severity of pain and evaluate response   Implement non-pharmacological measures as appropriate and evaluate response     
  Problem: Pain  Goal: Verbalizes/displays adequate comfort level or baseline comfort level  Outcome: Progressing     Problem: Pain  Goal: Verbalizes/displays adequate comfort level or baseline comfort level  Outcome: Progressing     
Bridget RN  Outcome: Progressing

## 2024-10-09 ENCOUNTER — TELEPHONE (OUTPATIENT)
Dept: PRIMARY CARE CLINIC | Age: 49
End: 2024-10-09

## 2024-10-09 LAB
IGG 1: 405 MG/DL (ref 240–1118)
IGG 2: 213 MG/DL (ref 124–549)
IGG 3: 40 MG/DL (ref 21–134)
IGG4 SER-MCNC: 26 MG/DL (ref 1–123)

## 2024-10-09 NOTE — PROGRESS NOTES
Hospitalist Progress Note      PCP: Madison Amin PA-C    Date of Admission: 10/1/2024        Hospital Course:  49 y.o. female presented with ABD PAIN, ONSET MONTHS AGO, BECOMING PROGRESSIVELY WORSE.  SHE HAD A GASTRIC BYPASS SEVERAL YEARS AGO AND LOST OVER 100 LBS.  .  SHE HAD NAUSEA, NO VOMITING AND CHILLS.   PAIN LOCATED LEFT UPPER ABD THAT RADIATES TO THE RIGHT MID ABD.  SHE ALSO HAD CONSTIPATION         Subjective:     Resting comfortably in bed  Just returned from MRI  States she feels about the same    Medications:  Reviewed    Infusion Medications    dextrose      sodium chloride       Scheduled Medications    meropenem  1,000 mg IntraVENous Q8H    insulin lispro  0-4 Units SubCUTAneous TID WC    insulin lispro  0-4 Units SubCUTAneous Nightly    enoxaparin  30 mg SubCUTAneous BID    gabapentin  800 mg Oral TID    [Held by provider] aspirin  81 mg Oral Daily    [Held by provider] atorvastatin  80 mg Oral Nightly    bumetanide  1 mg Oral Every Other Day    bumetanide  2 mg Oral Every Other Day    citalopram  40 mg Oral Daily    [Held by provider] clopidogrel  75 mg Oral Daily    metoprolol succinate  50 mg Oral Daily    pantoprazole  20 mg Oral Daily    sacubitril-valsartan  1 tablet Oral BID    senna  1 tablet Oral Daily    [Held by provider] spironolactone  25 mg Oral Daily    sodium chloride flush  5-40 mL IntraVENous 2 times per day     PRN Meds: metoprolol, glucose, dextrose bolus **OR** dextrose bolus, glucagon (rDNA), dextrose, ketorolac, oxyCODONE-acetaminophen, cyclobenzaprine, sodium chloride flush, sodium chloride, potassium chloride, magnesium sulfate, prochlorperazine **OR** prochlorperazine    No intake or output data in the 24 hours ending 10/06/24 1242      Exam:    BP (!) 90/52   Pulse 95   Temp 97.9 °F (36.6 °C) (Oral)   Resp 18   Ht 1.727 m (5' 8\")   Wt 117.9 kg (260 lb)   LMP 09/01/2024 (Approximate)   SpO2 98%   BMI 39.53 kg/m²       General appearance:  No apparent 
      Hospitalist Progress Note      PCP: Madison Amin PA-C    Date of Admission: 10/1/2024        Hospital Course:  49 y.o. female presented with ABD PAIN, ONSET MONTHS AGO, BECOMING PROGRESSIVELY WORSE.  SHE HAD A GASTRIC BYPASS SEVERAL YEARS AGO AND LOST OVER 100 LBS.  .  SHE HAD NAUSEA, NO VOMITING AND CHILLS.   PAIN LOCATED LEFT UPPER ABD THAT RADIATES TO THE RIGHT MID ABD.  SHE ALSO HAD CONSTIPATION         Subjective:     Resting comfortably in bed  Seen that she feels better than she did yesterday however still has some discomfort      Medications:  Reviewed    Infusion Medications    dextrose      sodium chloride       Scheduled Medications    insulin lispro  0-4 Units SubCUTAneous TID WC    insulin lispro  0-4 Units SubCUTAneous Nightly    enoxaparin  30 mg SubCUTAneous BID    gabapentin  800 mg Oral TID    [Held by provider] aspirin  81 mg Oral Daily    [Held by provider] atorvastatin  80 mg Oral Nightly    [Held by provider] bumetanide  1 mg Oral Every Other Day    [Held by provider] bumetanide  2 mg Oral Every Other Day    citalopram  40 mg Oral Daily    [Held by provider] clopidogrel  75 mg Oral Daily    metoprolol succinate  50 mg Oral Daily    pantoprazole  20 mg Oral Daily    sacubitril-valsartan  1 tablet Oral BID    senna  1 tablet Oral Daily    [Held by provider] spironolactone  25 mg Oral Daily    sodium chloride flush  5-40 mL IntraVENous 2 times per day     PRN Meds: metoprolol, glucose, dextrose bolus **OR** dextrose bolus, glucagon (rDNA), dextrose, ketorolac, oxyCODONE-acetaminophen, cyclobenzaprine, sodium chloride flush, sodium chloride, potassium chloride, magnesium sulfate, prochlorperazine **OR** prochlorperazine    No intake or output data in the 24 hours ending 10/05/24 1046      Exam:    /79   Pulse (!) 106   Temp 97.7 °F (36.5 °C) (Oral)   Resp 16   Ht 1.727 m (5' 8\")   Wt 117.9 kg (260 lb)   LMP 09/01/2024 (Approximate)   SpO2 96%   BMI 39.53 kg/m²       General 
      Hospitalist Progress Note      PCP: Madison Amin PA-C    Date of Admission: 10/1/2024        Hospital Course:  49 y.o. female presented with ABD PAIN, ONSET MONTHS AGO, BECOMING PROGRESSIVELY WORSE.  SHE HAD A GASTRIC BYPASS SEVERAL YEARS AGO AND LOST OVER 100 LBS.  .  SHE HAD NAUSEA, NO VOMITING AND CHILLS.   PAIN LOCATED LEFT UPPER ABD THAT RADIATES TO THE RIGHT MID ABD.  SHE ALSO HAD CONSTIPATION         Subjective:     She states she was feeling fine until she ate a regular diet today  Complaining of nausea and abdominal discomfort on assessment        Medications:  Reviewed    Infusion Medications    dextrose      sodium chloride      lactated ringers IV soln 100 mL/hr at 10/04/24 0557     Scheduled Medications    insulin lispro  0-4 Units SubCUTAneous TID WC    insulin lispro  0-4 Units SubCUTAneous Nightly    enoxaparin  30 mg SubCUTAneous BID    gabapentin  800 mg Oral TID    [Held by provider] aspirin  81 mg Oral Daily    [Held by provider] atorvastatin  80 mg Oral Nightly    [Held by provider] bumetanide  1 mg Oral Every Other Day    [Held by provider] bumetanide  2 mg Oral Every Other Day    citalopram  40 mg Oral Daily    [Held by provider] clopidogrel  75 mg Oral Daily    metoprolol succinate  50 mg Oral Daily    pantoprazole  20 mg Oral Daily    sacubitril-valsartan  1 tablet Oral BID    senna  1 tablet Oral Daily    [Held by provider] spironolactone  25 mg Oral Daily    sodium chloride flush  5-40 mL IntraVENous 2 times per day     PRN Meds: metoprolol, glucose, dextrose bolus **OR** dextrose bolus, glucagon (rDNA), dextrose, ketorolac, oxyCODONE-acetaminophen, cyclobenzaprine, sodium chloride flush, sodium chloride, potassium chloride, magnesium sulfate, prochlorperazine **OR** prochlorperazine      Intake/Output Summary (Last 24 hours) at 10/4/2024 1303  Last data filed at 10/3/2024 1846  Gross per 24 hour   Intake 800 ml   Output --   Net 800 ml       Exam:    /68   Pulse 92   Temp 
      Hospitalist Progress Note      PCP: Madison Amin PA-C    Date of Admission: 10/1/2024        Hospital Course:  49 y.o. female presented with ABD PAIN, ONSET MONTHS AGO, BECOMING PROGRESSIVELY WORSE.  SHE HAD A GASTRIC BYPASS SEVERAL YEARS AGO AND LOST OVER 100 LBS.  .  SHE HAD NAUSEA, NO VOMITING AND CHILLS.   PAIN LOCATED LEFT UPPER ABD THAT RADIATES TO THE RIGHT MID ABD.  SHE ALSO HAD CONSTIPATION         Subjective:   Denies chest pain and dyspnea.   Complains of left abdominal pain   Complains of constipation  Denies nausea and vomiting    Medications:  Reviewed    Infusion Medications    dextrose      sodium chloride       Scheduled Medications    meropenem  1,000 mg IntraVENous Q8H    insulin lispro  0-4 Units SubCUTAneous TID WC    insulin lispro  0-4 Units SubCUTAneous Nightly    enoxaparin  30 mg SubCUTAneous BID    gabapentin  800 mg Oral TID    [Held by provider] aspirin  81 mg Oral Daily    [Held by provider] atorvastatin  80 mg Oral Nightly    [Held by provider] bumetanide  1 mg Oral Every Other Day    [Held by provider] bumetanide  2 mg Oral Every Other Day    citalopram  40 mg Oral Daily    [Held by provider] clopidogrel  75 mg Oral Daily    [Held by provider] metoprolol succinate  50 mg Oral Daily    pantoprazole  20 mg Oral Daily    [Held by provider] sacubitril-valsartan  1 tablet Oral BID    senna  1 tablet Oral Daily    [Held by provider] spironolactone  25 mg Oral Daily    sodium chloride flush  5-40 mL IntraVENous 2 times per day     PRN Meds: metoprolol, glucose, dextrose bolus **OR** dextrose bolus, glucagon (rDNA), dextrose, ketorolac, oxyCODONE-acetaminophen, cyclobenzaprine, sodium chloride flush, sodium chloride, potassium chloride, magnesium sulfate, prochlorperazine **OR** prochlorperazine      Intake/Output Summary (Last 24 hours) at 10/7/2024 0902  Last data filed at 10/7/2024 0412  Gross per 24 hour   Intake 470 ml   Output 300 ml   Net 170 ml         Exam:    BP (!) 101/44  
    Pharmacist Review and Automatic Dose Adjustment of Prophylactic Enoxaparin         The reviewing pharmacist has made an adjustment to the ordered enoxaparin dose or converted to UFH per the approved [facility] protocol and table as identified below.        Monique Voss is a 49 y.o. female.     Recent Labs     10/01/24  0641 10/02/24  0504   CREATININE 0.9 0.6       Estimated Creatinine Clearance: 156 mL/min (based on SCr of 0.6 mg/dL).    Recent Labs     10/01/24  0641   HGB 17.1*   HCT 50.0*        No results for input(s): \"INR\" in the last 72 hours.    Height:   Ht Readings from Last 1 Encounters:   10/01/24 1.727 m (5' 8\")     Weight:  Wt Readings from Last 1 Encounters:   10/01/24 117.9 kg (260 lb)               Plan: Based upon the patient's weight and renal function    Ordered: Enoxaparin 40mg SUBQ Daily    Changed/converted to    New Order: Enoxaparin 30mg SUBQ BID      Thank you,  Georgie Clarke [facility]  10/2/2024, 9:06 AM    
  [facility]  [facility]  Progress Note    SUBJECTIVE:  Chief Complaint   Patient presents with    Abdominal Pain     Abdominal pain for the past 3-4 days, patient states 10/10 abdominal cramping in LLQ radiating to mid epigastric region. Pt has not had bowel movement in 4 days.     Patient resting in bed. A febrile. Pain is improving but still present. No diarrhea  Review of systems:  As stated above in the chief complaint, otherwise negative.    Medications:  Scheduled Meds:   insulin lispro  0-4 Units SubCUTAneous TID WC    insulin lispro  0-4 Units SubCUTAneous Nightly    enoxaparin  30 mg SubCUTAneous BID    gabapentin  800 mg Oral TID    [Held by provider] aspirin  81 mg Oral Daily    [Held by provider] atorvastatin  80 mg Oral Nightly    [Held by provider] bumetanide  1 mg Oral Every Other Day    [Held by provider] bumetanide  2 mg Oral Every Other Day    citalopram  40 mg Oral Daily    [Held by provider] clopidogrel  75 mg Oral Daily    metoprolol succinate  50 mg Oral Daily    pantoprazole  20 mg Oral Daily    sacubitril-valsartan  1 tablet Oral BID    senna  1 tablet Oral Daily    [Held by provider] spironolactone  25 mg Oral Daily    sodium chloride flush  5-40 mL IntraVENous 2 times per day     Continuous Infusions:   dextrose      sodium chloride       PRN Meds:metoprolol, glucose, dextrose bolus **OR** dextrose bolus, glucagon (rDNA), dextrose, ketorolac, oxyCODONE-acetaminophen, cyclobenzaprine, sodium chloride flush, sodium chloride, potassium chloride, magnesium sulfate, prochlorperazine **OR** prochlorperazine    OBJECTIVE:  /79   Pulse (!) 106   Temp 97.7 °F (36.5 °C) (Oral)   Resp 16   Ht 1.727 m (5' 8\")   Wt 117.9 kg (260 lb)   LMP 2024 (Approximate)   SpO2 96%   BMI 39.53 kg/m²   Temp  Av.8 °F (37.1 °C)  Min: 97.7 °F (36.5 °C)  Max: 99.9 °F (37.7 °C)  Constitutional: Appears ill   Skin: Warm and dry. No rashes were noted.   Neuro: Alert and 
  Physician Progress Note      PATIENT:               OLMAN ORDAZ  [facility] #:                  [id number]  :                       1975  ADMIT DATE:       10/1/2024 6:18 AM  DISCH DATE:        10/8/2024 2:48 PM  RESPONDING  PROVIDER #:        Lisa Loving MD          QUERY TEXT:    Patient admitted with acute pancreatitis.   Noted documentation of sepsis per   H&P and SIRS per ID consult.  If possible, please document in progress notes and discharge summary if you   are evaluating and /or treating any of the following:    The medical record reflects the following:  Risk Factors: pancreatitis  Clinical Indicators: H&P- 49 y.o. female presented with abd pain, onset months   ago, becoming progressively worse. She had nausea, no vomiting and chills.   Pain located left upper abd that radiates to the right mid abd. Assessment:   Sepsis with acute organ dysfunction. Acute infective pancreatitis.  ID consult- On admission, she had intermittent low-grade fever up to 100.8 ?F   with leukocytosis up to 25,000.  Urinalysis showed insignificant pyuria of 0-5   WBC.  CT abdomen and pelvis showed findings consistent with acute   pancreatitis with pseudocyst. Blood cultures showed no growth to date.    SARS-CoV-2 PCR and influenza AMB PCR were negative. Assessment:   SIRS/fever/leukocytosis, secondary to acute pancreatitis. Monitor clinically   off antibiotics for now.  10/6 HPB consult- Severe acute pancreatitis. Does have necrotizing   pancreatitis worse in the pancreatic body.  10/7 HPB PN- Leukocytosis is secondary to necrosis - but not amenable to   drainage at this point.  10/1 CT abd/pelvis- Findings consistent with acute pancreatitis with a pseudo   cyst.  10/4 CTA pancreas- Question evolving pseudocyst versus area of pancreatic   necrosis.  WBC- 22.2-32.5  10/1 lactic acid sepsis- 1.5  10/1 VS- T max 100.8; HR max 110; BP min 76/52  10/2 VS- HR max 105  10/3 VS- T max 100.5; HR max 100  10/4 VS- HR max 
4 Eyes Skin Assessment     NAME:  Monique Voss  YOB: 1975  MEDICAL RECORD NUMBER:  [id number]    The patient is being assessed for  Admission    I agree that at least one RN has performed a thorough Head to Toe Skin Assessment on the patient. ALL assessment sites listed below have been assessed.      Areas assessed by both nurses:    Head, Face, Ears, Shoulders, Back, Chest, Arms, Elbows, Hands, Sacrum. Buttock, Coccyx, Ischium, Legs. Feet and Heels, and Under Medical Devices         Does the Patient have a Wound? No noted wound(s)       Alvin Prevention initiated by RN: Yes  Wound Care Orders initiated by RN: No    Pressure Injury (Stage 3,4, Unstageable, DTI, NWPT, and Complex wounds) if present, place Wound referral order by RN under : No    New Ostomies, if present place, Ostomy referral order under : No     Nurse 1 eSignature: Electronically signed by Donya Cesar RN on 10/1/24 at 6:38 PM EDT    Heels boggy    **SHARE this note so that the co-signing nurse can place an eSignature**    Nurse 2 eSignature: Electronically signed by Mary Anne Aguilera RN on 10/1/24 at 7:45 PM EDT   
CLINICAL PHARMACY NOTE: MEDS TO BEDS    Total # of Prescriptions Filled: 1   The following medications were delivered to the patient:  Amoxicillin - Pot Clauv 595-100    Additional Documentation:   To PT  
Comprehensive Nutrition Assessment    Type and Reason for Visit:  Initial, Consult    Nutrition Recommendations/Plan:   Recommend and start Glucerna supplement BID and Gelatein high protein supplement BID to help meet nutritional needs and d/t decreased po intake of meals.           Malnutrition Assessment:  Malnutrition Status:  At risk for malnutrition (Comment) (10/07/24 1236)    Context:  Acute Illness     Findings of the 6 clinical characteristics of malnutrition:  Energy Intake:  50% or less of estimated energy requirements for 5 or more days  Weight Loss:  Unable to assess (d/t limited actual weight history ; hx of gastric bypass surgery)     Body Fat Loss:  Unable to assess     Muscle Mass Loss:  Unable to assess    Fluid Accumulation:  No significant fluid accumulation     Strength:  Not Performed    Nutrition Assessment:    Patient adm w/ abd pain and nausea ; also adm w/ acute infective pancreatitis/sepsis/pneumonia ; noted chronic pancreatic cyst and possible pancreatic necrosis ; s/p MRCP on 10/6 ; hx of Kendall-en-Y gastric bypass surgery on 5/31/22 ; hx of DM/CAD/CHF/COPD/cardiomyopathy/Bell's palsy ; hx of laparoscopic cholecystectomy (2009) ;  will provide recommendations    Nutrition Related Findings:    +I&Os (+1.2 L), no edema, A&O x 4, obesity, active BS, abd pain, constipation PTA, hyperglycemia, elevated LFT's ; Wound Type: None       Current Nutrition Intake & Therapies:    Average Meal Intake: 26-50%     ADULT ORAL NUTRITION SUPPLEMENT; Breakfast, Lunch, Dinner; Clear Liquid Oral Supplement  BARIATRIC DIET; Bariatric Regular Texture; Low Fat (less than or equal to 50 gm/day)    Anthropometric Measures:  Height: 172.7 cm (5' 8\")  Ideal Body Weight (IBW): 140 lbs (64 kg)       Current Body Weight: 117.9 kg (260 lb) (10/1, stated ; unable to obtain bedscale weight at this time), 185.7 % IBW.    Current BMI (kg/m2): 39.5  Usual Body Weight:  (UTO ; EMR shows past weights of 263# no method on 
Consult called for ID. Sent to Dr. Thompson.   
Dr. Simeon notified that patient did not tolerate solid diet.   
Educated pt on the use of the incentive spirometer. Patient verbalized understanding.   
GENERAL SURGERY   DAILY PROGRESS NOTE  10/7/2024    Subjective:    MRCP yesterday, unable to get contrast scan as patient had pain with laying flat from prior hip and lumbar pain. Denies any N/V.       WBC 34.5 from 32.5    Objective:  BP 94/66   Pulse 78   Temp 98 °F (36.7 °C) (Temporal)   Resp 18   Ht 1.727 m (5' 8\")   Wt 117.9 kg (260 lb)   LMP 09/01/2024 (Approximate)   SpO2 98%   BMI 39.53 kg/m²     General Appearance:  awake, alert, oriented, in no acute distress  Skin:  Skin color, texture, turgor normal  Head/face:  NCAT  Eyes:  No gross abnormalities. Sclera nonicteric  Lungs/Chest:  Normal expansion. No respiratory distress. On RA  Heart: Warm throughout. Regular rate   Abdomen:  Soft, LUQ and epigastric tenderness, non distended.    Extremities: Extremities warm to touch, pink, with no edema.      I have personally reviewed all relevant labs and imaging.    Assessment/Plan:  49 y.o. female chronic pancreatic cyst, possible pancreatic necrosis and acute pancreatitis possibly medication induced     -MRCP w/o contrast  with stable size pseudocyst, no dilated noted in bile ducts or pancreatic duct, no choledocho appreciated   -Will advance to regular bariatric and low fat less than 20 gm / day  and clear liquid supplements   -prn pain and nausea control  -triglycerides 173  -IgG 925  -continue merrem given pcn allergy   -monitor exam   -strict I/Os       Electronically signed by Andi Reis DO on 10/7/2024 at 7:18 AM    
GENERAL SURGERY  DAILY PROGRESS NOTE  10/6/2024    Subjective:  No new complaints or overnight events. Feeling better. No N/V. Tolerated small amount of regular diet.    Objective:  BP 98/68   Pulse 82   Temp 98.4 °F (36.9 °C) (Oral)   Resp 18   Ht 1.727 m (5' 8\")   Wt 117.9 kg (260 lb)   LMP 09/01/2024 (Approximate)   SpO2 98%   BMI 39.53 kg/m²     General Appearance:  awake, alert, oriented, in no acute distress  Skin:  Skin color, texture, turgor normal  Head/face:  NCAT  Eyes:  No gross abnormalities. Sclera nonicteric  Lungs/Chest:  Normal expansion. No respiratory distress. On   Heart: Warm throughout. Regular rate   Abdomen:  Soft, LUQ and epigastric tenderness , non distended.    Extremities: Extremities warm to touch, pink, with no edema.      I have personally reviewed all relevant labs and imaging.    Assessment/Plan:  49 y.o. female with pseudocyst and pancreatitis, hx RNYGB     Appreciate HPB assistance  NPO for MRCP today   OK for bariatric clears after  Monitor abd exam  Electronically signed by Scott Moreno DO on 10/6/2024 at 6:47 AM    
GENERAL SURGERY  DAILY PROGRESS NOTE  10/8/2024    Subjective:    Resting comfortably in bed.  No acute events overnight.  Tolerating diet.  No increased pain with diet.  Passing flatus.    Objective:  /72   Pulse 54   Temp 98.4 °F (36.9 °C) (Oral)   Resp 19   Ht 1.727 m (5' 8\")   Wt 117.9 kg (260 lb)   LMP 09/01/2024 (Approximate)   SpO2 91%   BMI 39.53 kg/m²     General Appearance:  awake, alert, oriented, in no acute distress  Skin:  Skin color, texture, turgor normal  Head/face:  NCAT  Eyes:  No gross abnormalities. Sclera nonicteric  Lungs/Chest:  Normal expansion. No respiratory distress. On RA  Heart: Warm throughout. Regular rate   Abdomen:  Soft, LUQ and epigastric tenderness, non distended.    Extremities: Extremities warm to touch, pink, with no edema.      I have personally reviewed all relevant labs and imaging.    Assessment/Plan:  49 y.o. female chronic pancreatic cyst, severe acute pancreatitis with necrosis, possibly drug induced    -MRCP w/o contrast  with stable size pseudocyst, no dilated noted in bile ducts or pancreatic duct, no choledocho appreciated   -Cont  regular bariatric and low fat less than 20 gm / day  and clear liquid supplements   -prn pain and nausea control  -triglycerides 173  -Total IgG 925;will order subclass for IgG4 - pending   - Augmentin for 7 days.will give 1 x dose this AM  -Patient is okay to discharge from gen surg POV, will follow in the office of Dr. Martinez for repeat imaging  - Ok for ASA/Plavix from GEN SURG POV       Electronically signed by Andi Reis DO on 10/8/2024 at 7:01 AM    
General Surgery Progress Note  [facility]  Fritz Murillo MD, MS    Patient's Name/Date of Birth: Monique Voss / 1975    Date: October 6, 2024     Surgeon: MD Lidia    Chief Complaint: pancreatitis    Patient Active Problem List   Diagnosis    Primary hypertension    Mild intermittent asthma without complication    Morbid obesity    Reactive depression    Anxiety    Ureteric stone    Tobacco dependence    Hydronephrosis with urinary obstruction due to renal calculus    Pancreatic cyst    PVC (premature ventricular contraction)    Spinal stenosis of lumbar region with neurogenic claudication    Primary osteoarthritis of both hips    Gastroesophageal reflux disease without esophagitis    Vitamin D deficiency    Diabetes mellitus (HCC)    Hyperlipidemia LDL goal <70    Obstructive sleep apnea    Chronic pain syndrome    Lumbar radiculopathy    Chronic bilateral low back pain with bilateral sciatica    Hidradenitis suppurativa    Nonischemic cardiomyopathy (HCC)    Malnutrition (HCC)    Current moderate episode of major depressive disorder without prior episode (HCC)    S/P gastric bypass    Left hip pain    Non-ST elevation myocardial infarction (NSTEMI) (HCC)    Chronic systolic heart failure (HCC)    Bigeminy    Bradycardia    Coronary artery disease involving native coronary artery of native heart without angina pectoris    Pure hypercholesterolemia    Chronic obstructive pulmonary disease (HCC)    Opioid dependence with current use (HCC)    Acute infective pancreatitis    Sepsis with acute organ dysfunction (HCC)    Acute pancreatitis       Subjective: no acute changes, appears comfortable, did not finish MRI due to pain while lying flat    Objective:  BP (!) 90/52   Pulse 95   Temp 97.9 °F (36.6 °C) (Oral)   Resp 17   Ht 1.727 m (5' 8\")   Wt 117.9 kg (260 lb)   LMP 09/01/2024 (Approximate)   SpO2 98%   BMI 39.53 kg/m²   Labs:  Recent Labs     10/04/24  5616 
General Surgery Progress Note  [facility]  Fritz Murillo MD, MS    Patient's Name/Date of Birth: Monique Voss / 1975    Date: October 5, 2024     Surgeon: MD Lidia    Chief Complaint: pancreatitis    Patient Active Problem List   Diagnosis    Primary hypertension    Mild intermittent asthma without complication    Morbid obesity    Reactive depression    Anxiety    Ureteric stone    Tobacco dependence    Hydronephrosis with urinary obstruction due to renal calculus    Pancreatic cyst    PVC (premature ventricular contraction)    Spinal stenosis of lumbar region with neurogenic claudication    Primary osteoarthritis of both hips    Gastroesophageal reflux disease without esophagitis    Vitamin D deficiency    Diabetes mellitus (HCC)    Hyperlipidemia LDL goal <70    Obstructive sleep apnea    Chronic pain syndrome    Lumbar radiculopathy    Chronic bilateral low back pain with bilateral sciatica    Hidradenitis suppurativa    Nonischemic cardiomyopathy (HCC)    Malnutrition (HCC)    Current moderate episode of major depressive disorder without prior episode (HCC)    S/P gastric bypass    Left hip pain    Non-ST elevation myocardial infarction (NSTEMI) (HCC)    Chronic systolic heart failure (HCC)    Bigeminy    Bradycardia    Coronary artery disease involving native coronary artery of native heart without angina pectoris    Pure hypercholesterolemia    Chronic obstructive pulmonary disease (HCC)    Opioid dependence with current use (HCC)    Acute infective pancreatitis    Sepsis with acute organ dysfunction (HCC)       Subjective: no acute changes, sitting up, admits flatus    Objective:  /79   Pulse (!) 106   Temp 97.7 °F (36.5 °C) (Oral)   Resp 16   Ht 1.727 m (5' 8\")   Wt 117.9 kg (260 lb)   LMP 09/01/2024 (Approximate)   SpO2 96%   BMI 39.53 kg/m²   Labs:  Recent Labs     10/04/24  0503 10/05/24  0910   WBC 25.8* 30.2*   HGB 13.3 13.6   HCT 41.0 41.2     Lab 
[facility] SURGERY  DAILY PROGRESS NOTE  10/6/2024    Subjective:  No new complaints or overnight events. Feeling better. No N/V. Tolerated small amount of regular diet. MRCP today     Objective:  BP 98/68   Pulse 82   Temp 98.4 °F (36.9 °C) (Oral)   Resp 18   Ht 1.727 m (5' 8\")   Wt 117.9 kg (260 lb)   LMP 09/01/2024 (Approximate)   SpO2 98%   BMI 39.53 kg/m²     General Appearance:  awake, alert, oriented, in no acute distress  Skin:  Skin color, texture, turgor normal  Head/face:  NCAT  Eyes:  No gross abnormalities. Sclera nonicteric  Lungs/Chest:  Normal expansion. No respiratory distress. On RA  Heart: Warm throughout. Regular rate   Abdomen:  Soft, LUQ and epigastric tenderness, non distended.    Extremities: Extremities warm to touch, pink, with no edema.      I have personally reviewed all relevant labs and imaging.    Assessment/Plan:  49 y.o. female chronic pancreatic cyst, possible pancreatic necrosis and acute pancreatitis possibly medication induced     Plan  - NPO for MRCP today  -cont bariatric and clear liquid supplements after  -prn pain and nausea control  -triglycerides 173  -IGG pending  -continue merrem given pcn allergy   -monitor exam   -strict I/Os     Plan discussed with Dr. Martinez.    Electronically signed by Scott Moreno DO on 10/6/2024 at 6:52 AM    
[facility]GERY  DAILY PROGRESS NOTE  10/7/2024    Subjective:    MRCP yesterday, unable to get contrast scan as patient had pain with laying flat from prior hip and lumbar pain. Denies any N/V.     Objective:  BP 94/66   Pulse 78   Temp 98 °F (36.7 °C) (Temporal)   Resp 18   Ht 1.727 m (5' 8\")   Wt 117.9 kg (260 lb)   LMP 09/01/2024 (Approximate)   SpO2 98%   BMI 39.53 kg/m²     General Appearance:  awake, alert, oriented, in no acute distress  Skin:  Skin color, texture, turgor normal  Head/face:  NCAT  Eyes:  No gross abnormalities. Sclera nonicteric  Lungs/Chest:  Normal expansion. No respiratory distress. On RA  Heart: Warm throughout. Regular rate   Abdomen:  Soft, LUQ and epigastric tenderness, non distended.    Extremities: Extremities warm to touch, pink, with no edema.      I have personally reviewed all relevant labs and imaging.    Assessment/Plan:  49 y.o. female chronic pancreatic cyst, severe acute pancreatitis with necrosis, possibly drug induced    -MRCP w/o contrast  with stable size pseudocyst, no dilated noted in bile ducts or pancreatic duct, no choledocho appreciated   -Will advance to regular bariatric and low fat less than 20 gm / day  and clear liquid supplements   -prn pain and nausea control  -triglycerides 173  -Total IgG 925;will order subclass for IgG4   -continue merrem given pcn allergy - WBC 34.5 from 32.5  -monitor exam   -strict I/Os       Electronically signed by Andi Reis DO on 10/7/2024 at 7:14 AM    Attending Physician Statement:    Chief Complaint:   Chief Complaint   Patient presents with    Abdominal Pain     Abdominal pain for the past 3-4 days, patient states 10/10 abdominal cramping in LLQ radiating to mid epigastric region. Pt has not had bowel movement in 4 days.       I have examined the patient and performed the key aspects of physical exam, reviewed the record (including all pertinent and new radiology images and laboratory findings), and discussed the case 
[facility] SURGERY  DAILY PROGRESS NOTE  10/8/2024    Subjective:    Resting comfortably in bed.  No acute events overnight.  Tolerating diet.  No increased pain with diet.  Passing flatus.    Objective:  /72   Pulse 54   Temp 98.4 °F (36.9 °C) (Oral)   Resp 19   Ht 1.727 m (5' 8\")   Wt 117.9 kg (260 lb)   LMP 09/01/2024 (Approximate)   SpO2 91%   BMI 39.53 kg/m²     General Appearance:  awake, alert, oriented, in no acute distress  Skin:  Skin color, texture, turgor normal  Head/face:  NCAT  Eyes:  No gross abnormalities. Sclera nonicteric  Lungs/Chest:  Normal expansion. No respiratory distress. On RA  Heart: Warm throughout. Regular rate   Abdomen:  Soft, LUQ and epigastric tenderness, non distended.    Extremities: Extremities warm to touch, pink, with no edema.      I have personally reviewed all relevant labs and imaging.    Assessment/Plan:  49 y.o. female chronic pancreatic cyst, severe acute pancreatitis with necrosis, possibly drug induced    -MRCP w/o contrast  with stable size pseudocyst, no dilated noted in bile ducts or pancreatic duct, no choledocho appreciated   -Cont  regular bariatric and low fat less than 20 gm / day  and clear liquid supplements   -prn pain and nausea control  -triglycerides 173  -Total IgG 925;will order subclass for IgG4 - pending   - Augmentin for 7 days.will give 1 x dose this AM  -Patient is okay to discharge from [facility], will follow in the office of Dr. Martinez for repeat imaging  - Ok for ASA/Plavix from [facility]       Electronically signed by Andi Reis DO on 10/8/2024 at 6:58 AM    Attending Physician Statement:    Chief Complaint:   Chief Complaint   Patient presents with    Abdominal Pain     Abdominal pain for the past 3-4 days, patient states 10/10 abdominal cramping in LLQ radiating to mid epigastric region. Pt has not had bowel movement in 4 days.       I have examined the patient and performed the key aspects of physical exam, reviewed the record 
Need MRI screening form filled out to clear patient before the MRI can be scheduled. Thank you.  MRI can be done 7am Charli if patient NPO and form is clear.  
POC glucose not done, patient is no longer on clear liquids.   
POC glucose test not done-pt no longer npo  
Patient could not tolerate entire MRI scan and tech did not inject contrast. order is being changed to without contrast today. patient back in room as of now. she was in alot of pain.   
Physical Therapy  Initial Assessment     Name: Monique Voss  : 1975  MRN: [id number]      Date of Service: 10/2/2024    Evaluating PT: Barry Holliday, PT, DPT [id number]      Room #:  6415/6415-A  Diagnosis:  Acute infective pancreatitis [K85.90]  PMHx/PSHx:   has a past medical history of Asthma, Bell palsy, Carpal tunnel syndrome, CHF (congestive heart failure) ([facility]), Chronic obstructive pulmonary disease (HCC), Coronary artery disease involving native coronary artery of native heart without angina pectoris, DDD (degenerative disc disease), cervical, Diabetes mellitus (HCC), DJD (degenerative joint disease), GERD without esophagitis, HTN (hypertension), Hyperlipidemia, Left hip pain and right, Meningitis, Morbid obesity due to excess calories, Neuropathy, JODI treated with BiPAP, Scoliosis, and 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  Precautions:  Fall risk    SUBJECTIVE:    Pt lives alone in a 2nd story apartment with 5-7 stair(s) and 1 rail(s) to enter. Pt ambulated with rollator prior to admission. Pt used hurrycane on the stairs. Pt's friend and uncle live in the same apartment complex and can assist as needed.    OBJECTIVE:   Initial Evaluation  Date: 10/2/24 Treatment Date: Short Term/ Long Term   Goals   AM-PAC 6 Clicks      Was pt agreeable to Eval/treatment? Yes     Does pt have pain? 10/10 abdominal pain     Bed Mobility  Rolling: NT  Supine to sit: NT  Sit to supine: NT  Scooting: NT  Rolling: Independent   Supine to sit: Independent   Sit to supine: Independent   Scooting: Independent    Transfers Sit to stand: SBA  Stand to sit: SBA  Stand pivot: SBA with WW  Sit to stand: Independent   Stand to sit: Independent   Stand pivot: Independent    Ambulation   25 feet x2 with WW with SBA  >200 feet with WW Mod Independent    Stair negotiation: ascended and descended NT  7 step(s) with 1 rail(s) with Supervision   ROM BUE: Refer to OT note  BLE: WFL     Strength BUE: Refer to OT note  BLE: WFL   
Pranav elliott provided a number, I called and left a message.    
Pt on unit via stretcher with transport staff  
10/01/24  0641   WBC 22.2*   HGB 17.1*   HCT 50.0*     Lab Results   Component Value Date    CREATININE 0.6 10/03/2024    BUN 10 10/03/2024     10/03/2024    K 3.5 10/03/2024     10/03/2024    CO2 23 10/03/2024     Recent Labs     10/01/24  0641 10/02/24  0504 10/03/24  0448   LIPASE 208* 113* 111*     CBC with Differential:    Lab Results   Component Value Date/Time    WBC 22.2 10/01/2024 06:41 AM    RBC 5.36 10/01/2024 06:41 AM    HGB 17.1 10/01/2024 06:41 AM    HCT 50.0 10/01/2024 06:41 AM     10/01/2024 06:41 AM    MCV 93.3 10/01/2024 06:41 AM    MCH 31.9 10/01/2024 06:41 AM    MCHC 34.2 10/01/2024 06:41 AM    RDW 16.3 10/01/2024 06:41 AM    LYMPHOPCT 11 10/01/2024 06:41 AM    MONOPCT 6 10/01/2024 06:41 AM    EOSPCT 0 10/01/2024 06:41 AM    BASOPCT 0 10/01/2024 06:41 AM    MONOSABS 1.32 10/01/2024 06:41 AM    LYMPHSABS 2.38 10/01/2024 06:41 AM    EOSABS 0.08 10/01/2024 06:41 AM    BASOSABS 0.08 10/01/2024 06:41 AM     CMP:    Lab Results   Component Value Date/Time     10/03/2024 04:48 AM    K 3.5 10/03/2024 04:48 AM    K 4.5 05/26/2023 08:16 AM     10/03/2024 04:48 AM    CO2 23 10/03/2024 04:48 AM    BUN 10 10/03/2024 04:48 AM    CREATININE 0.6 10/03/2024 04:48 AM    GFRAA >60 08/29/2022 01:31 PM    LABGLOM >90 10/03/2024 04:48 AM    LABGLOM >60 10/26/2023 01:59 PM    GLUCOSE 96 10/03/2024 04:48 AM    GLUCOSE 92 01/06/2011 04:25 PM    CALCIUM 8.4 10/03/2024 04:48 AM    BILITOT 0.6 10/03/2024 04:48 AM    ALKPHOS 141 10/03/2024 04:48 AM    AST 9 10/03/2024 04:48 AM    ALT 8 10/03/2024 04:48 AM     LIPASE:    Lab Results   Component Value Date/Time    LIPASE 111 10/03/2024 04:48 AM       General appearance:  NAD  Head: NCAT, PERRLA, EOMI, red conjunctiva  Neck: supple, no masses  Lungs: non labored, equal chest rise bilateral  Heart: Reg rate  Abdomen: soft, nondistended, tender minimal  Skin; no lesions  Gu: no cva tenderness  Extremities: extremities normal, atraumatic, no cyanosis 
bilateral  Heart: Reg rate  Abdomen: soft, nondistended, nontender  Skin; no lesions  Gu: no cva tenderness  Extremities: extremities normal, atraumatic, no cyanosis or edema      Assessment/Plan:  Monique Voss is a 49 y.o. female with acute pancreatitis, unclear etiology with pseudocyst    Leukocytosis persist today at 25 likely secondary to pancreatitis  Patient has had no fevers or chills or other signs of sepsis or infection  Will attempt regular diet today and monitor for any return of pain  No evidence of retained bile duct stone and imaging.  Would consider MRCP however if pain pancreatitis worsens  No plan for surgical intervention at this time  If tolerates diet, she is okay to discharge home when okay with all others    MD Everette Junior MD      
cardiomyopathy (HCC) [I42.8] 05/03/2020    Diabetes mellitus (HCC) [E11.9] 07/02/2018    Primary hypertension [I10] 03/27/2017   HIRSUITISM         PLAN:  SURGERY  PAIN MANAGEMENT   MONITOR LABS        DVT Prophylaxis: LOVENOX    Diet: BARIATRIC DIET; Bariatric Full Liquid  Code Status: Full Code    PT/OT Eval Status: NA    Dispo -  HOME    Time/Communication  Greater than 50% of time spent, in counseling and coordination of care at the bedside regarding goals of care, diagnosis and prognosis.    Electronically signed by Jose Vaca DO on 10/3/2024 at 1:17 PM FACOI    
grooming tasks while cuing on modified techniques, posture, safety and energy conservation techniques. Skilled monitoring of HR, O2 sats and pts response to treatment.        Rehab Potential: Good  for established goals     Patient / Family Goal: return home      Patient and/or family were instructed on functional diagnosis, prognosis/goals and OT plan of care. Demonstrated fair understanding.     Eval Complexity: Low    Time In: 805  Time Out: 830  Total Treatment Time: 10 minutes    Min Units   OT Eval Low 97165  x  1   OT Eval Medium 67912      OT Eval High 45153      OT Re-Eval 74942       Therapeutic Ex 97285       Therapeutic Activities 68501  2     ADL/Self Care 93338  8  1   Orthotic Management 02960       Manual 81323     Neuro Re-Ed 50006       Non-Billable Time          Evaluation Time additionally includes thorough review of current medical information, gathering information on past medical history/social history and prior level of function, interpretation of standardized testing/informal observation of tasks, assessment of data and development of plan of care and goals.          Terrence Ortega OTR/L #[id number]

## 2024-10-09 NOTE — TELEPHONE ENCOUNTER
Care Transitions Initial Follow Up Call    Outreach made within 2 business days of discharge: Yes    Patient: Monique Voss Patient : 1975   MRN: [id number]  Reason for Admission: Pancreatitis  Discharge Date: 10/8/24       Spoke with: Monique    Discharge department/facility: [facility] Interactive Patient Contact:  Was patient able to fill all prescriptions: Yes  Was patient instructed to bring all medications to the follow-up visit: Yes  Is patient taking all medications as directed in the discharge summary? Yes  Does patient understand their discharge instructions: Yes  Does patient have questions or concerns that need addressed prior to 7-14 day follow up office visit: no    Additional needs identified to be addressed with provider  No needs identified             Scheduled appointment with PCP within 7-14 days    Follow Up  Future Appointments   Date Time Provider Department Center   10/10/2024 11:45 AM Ana Monroe PA BDM PAIN MAR [facility]   10/21/2024  3:15 PM Ludwin Fowler PA Plastics [facility]   10/23/2024  1:00 PM Madison Amin PA-C TRAIL PC [facility]   10/30/2024 12:00 PM Rocky Curran MD Erica Card [facility]       Ashlee Broussard

## 2024-10-11 ENCOUNTER — TELEPHONE (OUTPATIENT)
Dept: PRIMARY CARE CLINIC | Age: 49
End: 2024-10-11

## 2024-10-11 DIAGNOSIS — I50.22 CHRONIC SYSTOLIC HEART FAILURE (HCC): ICD-10-CM

## 2024-10-11 RX ORDER — SACUBITRIL AND VALSARTAN 97; 103 MG/1; MG/1
1 TABLET, FILM COATED ORAL 2 TIMES DAILY
Qty: 60 TABLET | Refills: 3 | Status: SHIPPED | OUTPATIENT
Start: 2024-10-11

## 2024-10-11 RX ORDER — FERROUS SULFATE 325(65) MG
1 TABLET ORAL DAILY
Qty: 30 TABLET | Refills: 3 | Status: SHIPPED | OUTPATIENT
Start: 2024-10-11

## 2024-10-11 RX ORDER — SPIRONOLACTONE 25 MG/1
25 TABLET ORAL DAILY
Qty: 30 TABLET | Refills: 3 | Status: SHIPPED | OUTPATIENT
Start: 2024-10-11

## 2024-10-11 NOTE — TELEPHONE ENCOUNTER
Name of Medication(s) Requested:  Requested Prescriptions     Pending Prescriptions Disp Refills    spironolactone (ALDACTONE) 25 MG tablet [Pharmacy Med Name: SPIRONOLACTONE 25MG TABLETS] 30 tablet 3     Sig: TAKE 1 TABLET BY MOUTH DAILY    ferrous sulfate (FEROSUL) 325 (65 Fe) MG tablet [Pharmacy Med Name: FERROUS SULFATE 325MG (5GR) TABS] 30 tablet 3     Sig: TAKE 1 TABLET BY MOUTH DAILY WITH BREAKFAST    sacubitril-valsartan (ENTRESTO)  MG per tablet [Pharmacy Med Name: ENTRESTO 97-103MG TABLETS] 60 tablet 3     Sig: TAKE 1 TABLET BY MOUTH TWICE DAILY       Medication is on current medication list Yes    Dosage and directions were verified? Yes    Quantity verified: 30 day supply     Pharmacy Verified?  Yes    Last Appointment:  9/4/2024    Future appts:  Future Appointments   Date Time Provider Department Center   10/15/2024 11:15 AM Ana Monroe PA BDM PAIN MAR [facility]   10/21/2024  3:15 PM Ludwin Fowler PA Plastics [facility]   10/22/2024  3:00 PM Madison Amin PA-C TRAIL PC [facility]   10/30/2024 12:00 PM Rocky Curran MD Erica Card [facility]        (If no appt send self scheduling link. .REFILLAPPT)  Scheduling request sent?     [] Yes  [] No    Does patient need updated?  [] Yes  [] No

## 2024-10-11 NOTE — TELEPHONE ENCOUNTER
Pt states she went to emergency and she use to be allergic to penicillin and the hospital gave her medication amoxicillin-clavulanate (AUGMENTIN) 875-125 MG  and now her throat feels very weird, pt advised er or walk in immediately

## 2024-10-14 ENCOUNTER — APPOINTMENT (OUTPATIENT)
Dept: GENERAL RADIOLOGY | Age: 49
End: 2024-10-14
Payer: MEDICARE

## 2024-10-14 LAB
ALBUMIN SERPL-MCNC: 2.8 G/DL (ref 3.5–5.2)
ALP SERPL-CCNC: 126 U/L (ref 35–104)
ALT SERPL-CCNC: 10 U/L (ref 0–32)
ANION GAP SERPL CALCULATED.3IONS-SCNC: 10 MMOL/L (ref 7–16)
AST SERPL-CCNC: 14 U/L (ref 0–31)
BASOPHILS # BLD: 0.09 K/UL (ref 0–0.2)
BASOPHILS NFR BLD: 1 % (ref 0–2)
BILIRUB SERPL-MCNC: 0.3 MG/DL (ref 0–1.2)
BNP SERPL-MCNC: 983 PG/ML (ref 0–125)
BUN SERPL-MCNC: 8 MG/DL (ref 6–20)
CALCIUM SERPL-MCNC: 8.6 MG/DL (ref 8.6–10.2)
CHLORIDE SERPL-SCNC: 100 MMOL/L (ref 98–107)
CO2 SERPL-SCNC: 29 MMOL/L (ref 22–29)
CREAT SERPL-MCNC: 0.6 MG/DL (ref 0.5–1)
EOSINOPHIL # BLD: 0.07 K/UL (ref 0.05–0.5)
EOSINOPHILS RELATIVE PERCENT: 0 % (ref 0–6)
ERYTHROCYTE [DISTWIDTH] IN BLOOD BY AUTOMATED COUNT: 16.1 % (ref 11.5–15)
GFR, ESTIMATED: >90 ML/MIN/1.73M2
GLUCOSE SERPL-MCNC: 112 MG/DL (ref 74–99)
HCT VFR BLD AUTO: 41.1 % (ref 34–48)
HGB BLD-MCNC: 14 G/DL (ref 11.5–15.5)
IMM GRANULOCYTES # BLD AUTO: 0.35 K/UL (ref 0–0.58)
IMM GRANULOCYTES NFR BLD: 2 % (ref 0–5)
LACTATE BLDV-SCNC: 1.1 MMOL/L (ref 0.5–2.2)
LIPASE SERPL-CCNC: 441 U/L (ref 13–60)
LYMPHOCYTES NFR BLD: 3.83 K/UL (ref 1.5–4)
LYMPHOCYTES RELATIVE PERCENT: 21 % (ref 20–42)
MCH RBC QN AUTO: 31.5 PG (ref 26–35)
MCHC RBC AUTO-ENTMCNC: 34.1 G/DL (ref 32–34.5)
MCV RBC AUTO: 92.4 FL (ref 80–99.9)
MONOCYTES NFR BLD: 0.72 K/UL (ref 0.1–0.95)
MONOCYTES NFR BLD: 4 % (ref 2–12)
NEUTROPHILS NFR BLD: 72 % (ref 43–80)
NEUTS SEG NFR BLD: 13.23 K/UL (ref 1.8–7.3)
PLATELET # BLD AUTO: 737 K/UL (ref 130–450)
PMV BLD AUTO: 10.3 FL (ref 7–12)
POTASSIUM SERPL-SCNC: 3.9 MMOL/L (ref 3.5–5)
PROT SERPL-MCNC: 6.9 G/DL (ref 6.4–8.3)
RBC # BLD AUTO: 4.45 M/UL (ref 3.5–5.5)
SODIUM SERPL-SCNC: 139 MMOL/L (ref 132–146)
TROPONIN I SERPL HS-MCNC: 20 NG/L (ref 0–9)
WBC OTHER # BLD: 18.3 K/UL (ref 4.5–11.5)

## 2024-10-14 PROCEDURE — 6360000002 HC RX W HCPCS: Performed by: STUDENT IN AN ORGANIZED HEALTH CARE EDUCATION/TRAINING PROGRAM

## 2024-10-14 PROCEDURE — 93005 ELECTROCARDIOGRAM TRACING: CPT | Performed by: STUDENT IN AN ORGANIZED HEALTH CARE EDUCATION/TRAINING PROGRAM

## 2024-10-14 PROCEDURE — 81001 URINALYSIS AUTO W/SCOPE: CPT

## 2024-10-14 PROCEDURE — 83690 ASSAY OF LIPASE: CPT

## 2024-10-14 PROCEDURE — 85025 COMPLETE CBC W/AUTO DIFF WBC: CPT

## 2024-10-14 PROCEDURE — 83880 ASSAY OF NATRIURETIC PEPTIDE: CPT

## 2024-10-14 PROCEDURE — 96372 THER/PROPH/DIAG INJ SC/IM: CPT

## 2024-10-14 PROCEDURE — 71045 X-RAY EXAM CHEST 1 VIEW: CPT

## 2024-10-14 PROCEDURE — 83605 ASSAY OF LACTIC ACID: CPT

## 2024-10-14 PROCEDURE — 96375 TX/PRO/DX INJ NEW DRUG ADDON: CPT

## 2024-10-14 PROCEDURE — 80053 COMPREHEN METABOLIC PANEL: CPT

## 2024-10-14 PROCEDURE — 2580000003 HC RX 258

## 2024-10-14 PROCEDURE — 84484 ASSAY OF TROPONIN QUANT: CPT

## 2024-10-14 RX ORDER — PROCHLORPERAZINE EDISYLATE 5 MG/ML
10 INJECTION INTRAMUSCULAR; INTRAVENOUS ONCE
Status: COMPLETED | OUTPATIENT
Start: 2024-10-14 | End: 2024-10-14

## 2024-10-14 RX ORDER — DIPHENHYDRAMINE HYDROCHLORIDE 50 MG/ML
25 INJECTION INTRAMUSCULAR; INTRAVENOUS ONCE
Status: COMPLETED | OUTPATIENT
Start: 2024-10-14 | End: 2024-10-14

## 2024-10-14 RX ORDER — SODIUM CHLORIDE, SODIUM LACTATE, POTASSIUM CHLORIDE, AND CALCIUM CHLORIDE .6; .31; .03; .02 G/100ML; G/100ML; G/100ML; G/100ML
1000 INJECTION, SOLUTION INTRAVENOUS ONCE
Status: COMPLETED | OUTPATIENT
Start: 2024-10-14 | End: 2024-10-15

## 2024-10-14 RX ORDER — FENTANYL CITRATE 50 UG/ML
25 INJECTION, SOLUTION INTRAMUSCULAR; INTRAVENOUS ONCE
Status: DISCONTINUED | OUTPATIENT
Start: 2024-10-14 | End: 2024-10-14

## 2024-10-14 RX ORDER — FENTANYL CITRATE 50 UG/ML
25 INJECTION, SOLUTION INTRAMUSCULAR; INTRAVENOUS ONCE
Status: COMPLETED | OUTPATIENT
Start: 2024-10-14 | End: 2024-10-14

## 2024-10-14 RX ADMIN — PROCHLORPERAZINE EDISYLATE 10 MG: 5 INJECTION INTRAMUSCULAR; INTRAVENOUS at 22:00

## 2024-10-14 RX ADMIN — FENTANYL CITRATE 25 MCG: 50 INJECTION INTRAMUSCULAR; INTRAVENOUS at 21:59

## 2024-10-14 RX ADMIN — SODIUM CHLORIDE, POTASSIUM CHLORIDE, SODIUM LACTATE AND CALCIUM CHLORIDE 1000 ML: 600; 310; 30; 20 INJECTION, SOLUTION INTRAVENOUS at 21:57

## 2024-10-14 RX ADMIN — DIPHENHYDRAMINE HYDROCHLORIDE 25 MG: 50 INJECTION INTRAMUSCULAR; INTRAVENOUS at 21:58

## 2024-10-14 ASSESSMENT — PAIN SCALES - GENERAL: PAINLEVEL_OUTOF10: 8

## 2024-10-14 ASSESSMENT — ENCOUNTER SYMPTOMS
SORE THROAT: 0
COUGH: 0
SHORTNESS OF BREATH: 0
NAUSEA: 1
ABDOMINAL PAIN: 0
CHEST TIGHTNESS: 0
VOMITING: 1
PHOTOPHOBIA: 0
DIARRHEA: 0
BACK PAIN: 0
RHINORRHEA: 0

## 2024-10-14 ASSESSMENT — PAIN DESCRIPTION - LOCATION: LOCATION: ABDOMEN

## 2024-10-14 ASSESSMENT — PAIN - FUNCTIONAL ASSESSMENT: PAIN_FUNCTIONAL_ASSESSMENT: 0-10

## 2024-10-14 ASSESSMENT — PAIN DESCRIPTION - ORIENTATION: ORIENTATION: LEFT

## 2024-10-15 ENCOUNTER — APPOINTMENT (OUTPATIENT)
Dept: CT IMAGING | Age: 49
End: 2024-10-15
Payer: MEDICARE

## 2024-10-15 ENCOUNTER — HOSPITAL ENCOUNTER (INPATIENT)
Age: 49
LOS: 9 days | Discharge: HOME HEALTH | End: 2024-10-24
Attending: STUDENT IN AN ORGANIZED HEALTH CARE EDUCATION/TRAINING PROGRAM | Admitting: FAMILY MEDICINE
Payer: MEDICARE

## 2024-10-15 DIAGNOSIS — K85.91 NECROTIZING PANCREATITIS: ICD-10-CM

## 2024-10-15 DIAGNOSIS — K86.89 PANCREATIC FISTULA: ICD-10-CM

## 2024-10-15 DIAGNOSIS — K85.90 ACUTE PANCREATITIS, UNSPECIFIED COMPLICATION STATUS, UNSPECIFIED PANCREATITIS TYPE: ICD-10-CM

## 2024-10-15 DIAGNOSIS — K31.89 ACUTE GASTRIC PERFORATION: Primary | ICD-10-CM

## 2024-10-15 PROBLEM — K63.1 BOWEL PERFORATION (HCC): Status: ACTIVE | Noted: 2024-10-15

## 2024-10-15 LAB
ABO + RH BLD: NORMAL
ALBUMIN SERPL-MCNC: 2.1 G/DL (ref 3.5–5.2)
ALP SERPL-CCNC: 101 U/L (ref 35–104)
ALT SERPL-CCNC: 8 U/L (ref 0–32)
ANION GAP SERPL CALCULATED.3IONS-SCNC: 7 MMOL/L (ref 7–16)
ARM BAND NUMBER: NORMAL
AST SERPL-CCNC: 10 U/L (ref 0–31)
B PARAP IS1001 DNA NPH QL NAA+NON-PROBE: NOT DETECTED
B PERT DNA SPEC QL NAA+PROBE: NOT DETECTED
B.E.: 6.1 MMOL/L (ref -3–3)
BASOPHILS # BLD: 0.06 K/UL (ref 0–0.2)
BASOPHILS NFR BLD: 0 % (ref 0–2)
BILIRUB SERPL-MCNC: 0.2 MG/DL (ref 0–1.2)
BLOOD BANK SAMPLE EXPIRATION: NORMAL
BLOOD GROUP ANTIBODIES SERPL: NEGATIVE
BNP SERPL-MCNC: 1700 PG/ML (ref 0–125)
BUN SERPL-MCNC: 7 MG/DL (ref 6–20)
C PNEUM DNA NPH QL NAA+NON-PROBE: NOT DETECTED
CALCIUM SERPL-MCNC: 7.9 MG/DL (ref 8.6–10.2)
CHLORIDE SERPL-SCNC: 101 MMOL/L (ref 98–107)
CK SERPL-CCNC: 22 U/L (ref 20–180)
CO2 SERPL-SCNC: 29 MMOL/L (ref 22–29)
COHB: 6.6 % (ref 0–1.5)
CREAT SERPL-MCNC: 0.5 MG/DL (ref 0.5–1)
CRITICAL: ABNORMAL
DATE ANALYZED: ABNORMAL
DATE OF COLLECTION: ABNORMAL
DIRECT EXAM: NORMAL
EOSINOPHIL # BLD: 0.05 K/UL (ref 0.05–0.5)
EOSINOPHILS RELATIVE PERCENT: 0 % (ref 0–6)
ERYTHROCYTE [DISTWIDTH] IN BLOOD BY AUTOMATED COUNT: 16.2 % (ref 11.5–15)
FLUAV RNA NPH QL NAA+NON-PROBE: NOT DETECTED
FLUBV RNA NPH QL NAA+NON-PROBE: NOT DETECTED
GFR, ESTIMATED: >90 ML/MIN/1.73M2
GLUCOSE BLD-MCNC: 90 MG/DL (ref 74–99)
GLUCOSE SERPL-MCNC: 88 MG/DL (ref 74–99)
HADV DNA NPH QL NAA+NON-PROBE: NOT DETECTED
HCG, URINE, POC: NEGATIVE
HCO3: 30.6 MMOL/L (ref 22–26)
HCOV 229E RNA NPH QL NAA+NON-PROBE: NOT DETECTED
HCOV HKU1 RNA NPH QL NAA+NON-PROBE: NOT DETECTED
HCOV NL63 RNA NPH QL NAA+NON-PROBE: NOT DETECTED
HCOV OC43 RNA NPH QL NAA+NON-PROBE: NOT DETECTED
HCT VFR BLD AUTO: 34.8 % (ref 34–48)
HGB BLD-MCNC: 11.5 G/DL (ref 11.5–15.5)
HHB: 5.1 % (ref 0–5)
HMPV RNA NPH QL NAA+NON-PROBE: NOT DETECTED
HPIV1 RNA NPH QL NAA+NON-PROBE: NOT DETECTED
HPIV2 RNA NPH QL NAA+NON-PROBE: NOT DETECTED
HPIV3 RNA NPH QL NAA+NON-PROBE: NOT DETECTED
HPIV4 RNA NPH QL NAA+NON-PROBE: NOT DETECTED
IMM GRANULOCYTES # BLD AUTO: 0.26 K/UL (ref 0–0.58)
IMM GRANULOCYTES NFR BLD: 1 % (ref 0–5)
INR PPP: 1.2
INR PPP: 1.2
L PNEUMO1 AG UR QL IA.RAPID: NEGATIVE
LAB: ABNORMAL
LACTATE BLDV-SCNC: 0.5 MMOL/L (ref 0.5–2.2)
LIPASE SERPL-CCNC: 90 U/L (ref 13–60)
LYMPHOCYTES NFR BLD: 3.37 K/UL (ref 1.5–4)
LYMPHOCYTES RELATIVE PERCENT: 19 % (ref 20–42)
Lab: 845
Lab: ABNORMAL
M PNEUMO DNA NPH QL NAA+NON-PROBE: NOT DETECTED
MAGNESIUM SERPL-MCNC: 1.8 MG/DL (ref 1.6–2.6)
MCH RBC QN AUTO: 31 PG (ref 26–35)
MCHC RBC AUTO-ENTMCNC: 33 G/DL (ref 32–34.5)
MCV RBC AUTO: 93.8 FL (ref 80–99.9)
METHB: 0.5 % (ref 0–1.5)
MODE: ABNORMAL
MONOCYTES NFR BLD: 0.97 K/UL (ref 0.1–0.95)
MONOCYTES NFR BLD: 5 % (ref 2–12)
NEGATIVE QC PASS/FAIL: ABNORMAL
NEUTROPHILS NFR BLD: 74 % (ref 43–80)
NEUTS SEG NFR BLD: 13.29 K/UL (ref 1.8–7.3)
O2 SATURATION: 94.5 % (ref 92–98.5)
O2HB: 87.8 % (ref 94–97)
OPERATOR ID: 1768
PARTIAL THROMBOPLASTIN TIME: 28.6 SEC (ref 24.5–35.1)
PATIENT TEMP: 37 C
PCO2: 43.7 MMHG (ref 35–45)
PH BLOOD GAS: 7.46 (ref 7.35–7.45)
PHOSPHATE SERPL-MCNC: 3.4 MG/DL (ref 2.5–4.5)
PLATELET # BLD AUTO: 641 K/UL (ref 130–450)
PMV BLD AUTO: 10.5 FL (ref 7–12)
PO2: 71 MMHG (ref 75–100)
POSITIVE QC PASS/FAIL: ABNORMAL
POTASSIUM SERPL-SCNC: 3.7 MMOL/L (ref 3.5–5)
PROCALCITONIN SERPL-MCNC: 0.16 NG/ML (ref 0–0.08)
PROT SERPL-MCNC: 5.5 G/DL (ref 6.4–8.3)
PROTHROMBIN TIME: 12.5 SEC (ref 9.3–12.4)
PROTHROMBIN TIME: 13.4 SEC (ref 9.3–12.4)
RBC # BLD AUTO: 3.71 M/UL (ref 3.5–5.5)
RSV RNA NPH QL NAA+NON-PROBE: NOT DETECTED
RV+EV RNA NPH QL NAA+NON-PROBE: NOT DETECTED
S PNEUM AG SPEC QL: NEGATIVE
SARS-COV-2 RNA NPH QL NAA+NON-PROBE: NOT DETECTED
SODIUM SERPL-SCNC: 137 MMOL/L (ref 132–146)
SOURCE, BLOOD GAS: ABNORMAL
SPECIMEN DESCRIPTION: NORMAL
SPECIMEN DESCRIPTION: NORMAL
SPECIMEN SOURCE: NORMAL
THB: 12.9 G/DL (ref 11.5–16.5)
TIME ANALYZED: 847
TROPONIN I SERPL HS-MCNC: 15 NG/L (ref 0–9)
TROPONIN I SERPL HS-MCNC: 21 NG/L (ref 0–9)
TSH SERPL DL<=0.05 MIU/L-ACNC: 0.14 UIU/ML (ref 0.27–4.2)
WBC OTHER # BLD: 18 K/UL (ref 4.5–11.5)

## 2024-10-15 PROCEDURE — 84443 ASSAY THYROID STIM HORMONE: CPT

## 2024-10-15 PROCEDURE — 76937 US GUIDE VASCULAR ACCESS: CPT

## 2024-10-15 PROCEDURE — 83880 ASSAY OF NATRIURETIC PEPTIDE: CPT

## 2024-10-15 PROCEDURE — 84145 PROCALCITONIN (PCT): CPT

## 2024-10-15 PROCEDURE — 99222 1ST HOSP IP/OBS MODERATE 55: CPT | Performed by: SURGERY

## 2024-10-15 PROCEDURE — 87075 CULTR BACTERIA EXCEPT BLOOD: CPT

## 2024-10-15 PROCEDURE — C1751 CATH, INF, PER/CENT/MIDLINE: HCPCS

## 2024-10-15 PROCEDURE — 02HV33Z INSERTION OF INFUSION DEVICE INTO SUPERIOR VENA CAVA, PERCUTANEOUS APPROACH: ICD-10-PCS | Performed by: INTERNAL MEDICINE

## 2024-10-15 PROCEDURE — 83605 ASSAY OF LACTIC ACID: CPT

## 2024-10-15 PROCEDURE — 86850 RBC ANTIBODY SCREEN: CPT

## 2024-10-15 PROCEDURE — 84100 ASSAY OF PHOSPHORUS: CPT

## 2024-10-15 PROCEDURE — 6360000002 HC RX W HCPCS: Performed by: INTERNAL MEDICINE

## 2024-10-15 PROCEDURE — 2500000003 HC RX 250 WO HCPCS

## 2024-10-15 PROCEDURE — 83690 ASSAY OF LIPASE: CPT

## 2024-10-15 PROCEDURE — 96367 TX/PROPH/DG ADDL SEQ IV INF: CPT

## 2024-10-15 PROCEDURE — C1769 GUIDE WIRE: HCPCS

## 2024-10-15 PROCEDURE — 87205 SMEAR GRAM STAIN: CPT

## 2024-10-15 PROCEDURE — 82805 BLOOD GASES W/O2 SATURATION: CPT

## 2024-10-15 PROCEDURE — 87449 NOS EACH ORGANISM AG IA: CPT

## 2024-10-15 PROCEDURE — 0202U NFCT DS 22 TRGT SARS-COV-2: CPT

## 2024-10-15 PROCEDURE — 6360000004 HC RX CONTRAST MEDICATION: Performed by: RADIOLOGY

## 2024-10-15 PROCEDURE — 87070 CULTURE OTHR SPECIMN AEROBIC: CPT

## 2024-10-15 PROCEDURE — 75989 ABSCESS DRAINAGE UNDER X-RAY: CPT

## 2024-10-15 PROCEDURE — 74177 CT ABD & PELVIS W/CONTRAST: CPT

## 2024-10-15 PROCEDURE — 87185 SC STD ENZYME DETCJ PER NZM: CPT

## 2024-10-15 PROCEDURE — 6360000002 HC RX W HCPCS: Performed by: NURSE PRACTITIONER

## 2024-10-15 PROCEDURE — 96365 THER/PROPH/DIAG IV INF INIT: CPT

## 2024-10-15 PROCEDURE — 82962 GLUCOSE BLOOD TEST: CPT

## 2024-10-15 PROCEDURE — 2000000000 HC ICU R&B

## 2024-10-15 PROCEDURE — 6360000002 HC RX W HCPCS: Performed by: RADIOLOGY

## 2024-10-15 PROCEDURE — 85610 PROTHROMBIN TIME: CPT

## 2024-10-15 PROCEDURE — 83735 ASSAY OF MAGNESIUM: CPT

## 2024-10-15 PROCEDURE — 2580000003 HC RX 258: Performed by: NURSE PRACTITIONER

## 2024-10-15 PROCEDURE — 80053 COMPREHEN METABOLIC PANEL: CPT

## 2024-10-15 PROCEDURE — 2580000003 HC RX 258: Performed by: STUDENT IN AN ORGANIZED HEALTH CARE EDUCATION/TRAINING PROGRAM

## 2024-10-15 PROCEDURE — 82550 ASSAY OF CK (CPK): CPT

## 2024-10-15 PROCEDURE — 2580000003 HC RX 258

## 2024-10-15 PROCEDURE — 86900 BLOOD TYPING SEROLOGIC ABO: CPT

## 2024-10-15 PROCEDURE — 36415 COLL VENOUS BLD VENIPUNCTURE: CPT

## 2024-10-15 PROCEDURE — 6360000002 HC RX W HCPCS: Performed by: STUDENT IN AN ORGANIZED HEALTH CARE EDUCATION/TRAINING PROGRAM

## 2024-10-15 PROCEDURE — 99291 CRITICAL CARE FIRST HOUR: CPT | Performed by: INTERNAL MEDICINE

## 2024-10-15 PROCEDURE — 99285 EMERGENCY DEPT VISIT HI MDM: CPT

## 2024-10-15 PROCEDURE — 87040 BLOOD CULTURE FOR BACTERIA: CPT

## 2024-10-15 PROCEDURE — 36569 INSJ PICC 5 YR+ W/O IMAGING: CPT

## 2024-10-15 PROCEDURE — 85025 COMPLETE CBC W/AUTO DIFF WBC: CPT

## 2024-10-15 PROCEDURE — 87899 AGENT NOS ASSAY W/OPTIC: CPT

## 2024-10-15 PROCEDURE — 86901 BLOOD TYPING SEROLOGIC RH(D): CPT

## 2024-10-15 PROCEDURE — 85730 THROMBOPLASTIN TIME PARTIAL: CPT

## 2024-10-15 PROCEDURE — 87081 CULTURE SCREEN ONLY: CPT

## 2024-10-15 PROCEDURE — 99222 1ST HOSP IP/OBS MODERATE 55: CPT | Performed by: FAMILY MEDICINE

## 2024-10-15 PROCEDURE — 6360000002 HC RX W HCPCS

## 2024-10-15 PROCEDURE — 87077 CULTURE AEROBIC IDENTIFY: CPT

## 2024-10-15 PROCEDURE — 84484 ASSAY OF TROPONIN QUANT: CPT

## 2024-10-15 PROCEDURE — 2709999900 CT ABSCESS DRAINAGE W CATH PLACEMENT S&I

## 2024-10-15 RX ORDER — IPRATROPIUM BROMIDE AND ALBUTEROL SULFATE 2.5; .5 MG/3ML; MG/3ML
1 SOLUTION RESPIRATORY (INHALATION) EVERY 4 HOURS PRN
Status: DISCONTINUED | OUTPATIENT
Start: 2024-10-15 | End: 2024-10-24 | Stop reason: HOSPADM

## 2024-10-15 RX ORDER — SODIUM CHLORIDE 9 MG/ML
INJECTION, SOLUTION INTRAVENOUS CONTINUOUS
Status: DISCONTINUED | OUTPATIENT
Start: 2024-10-15 | End: 2024-10-16

## 2024-10-15 RX ORDER — ENOXAPARIN SODIUM 100 MG/ML
30 INJECTION SUBCUTANEOUS 2 TIMES DAILY
Status: DISCONTINUED | OUTPATIENT
Start: 2024-10-15 | End: 2024-10-15

## 2024-10-15 RX ORDER — ONDANSETRON 2 MG/ML
4 INJECTION INTRAMUSCULAR; INTRAVENOUS EVERY 6 HOURS PRN
Status: DISCONTINUED | OUTPATIENT
Start: 2024-10-15 | End: 2024-10-24 | Stop reason: HOSPADM

## 2024-10-15 RX ORDER — SODIUM CHLORIDE 0.9 % (FLUSH) 0.9 %
5-40 SYRINGE (ML) INJECTION EVERY 12 HOURS SCHEDULED
Status: DISCONTINUED | OUTPATIENT
Start: 2024-10-15 | End: 2024-10-16

## 2024-10-15 RX ORDER — SODIUM CHLORIDE 0.9 % (FLUSH) 0.9 %
5-40 SYRINGE (ML) INJECTION EVERY 12 HOURS SCHEDULED
Status: DISCONTINUED | OUTPATIENT
Start: 2024-10-15 | End: 2024-10-24 | Stop reason: HOSPADM

## 2024-10-15 RX ORDER — MAGNESIUM SULFATE IN WATER 40 MG/ML
2000 INJECTION, SOLUTION INTRAVENOUS PRN
Status: DISCONTINUED | OUTPATIENT
Start: 2024-10-15 | End: 2024-10-24 | Stop reason: HOSPADM

## 2024-10-15 RX ORDER — SODIUM CHLORIDE, SODIUM LACTATE, POTASSIUM CHLORIDE, AND CALCIUM CHLORIDE .6; .31; .03; .02 G/100ML; G/100ML; G/100ML; G/100ML
1000 INJECTION, SOLUTION INTRAVENOUS ONCE
Status: COMPLETED | OUTPATIENT
Start: 2024-10-15 | End: 2024-10-15

## 2024-10-15 RX ORDER — FENTANYL CITRATE 50 UG/ML
25 INJECTION, SOLUTION INTRAMUSCULAR; INTRAVENOUS
Status: DISCONTINUED | OUTPATIENT
Start: 2024-10-15 | End: 2024-10-17

## 2024-10-15 RX ORDER — MIDAZOLAM HYDROCHLORIDE 2 MG/2ML
INJECTION, SOLUTION INTRAMUSCULAR; INTRAVENOUS PRN
Status: COMPLETED | OUTPATIENT
Start: 2024-10-15 | End: 2024-10-15

## 2024-10-15 RX ORDER — HEPARIN SODIUM 5000 [USP'U]/ML
5000 INJECTION, SOLUTION INTRAVENOUS; SUBCUTANEOUS EVERY 8 HOURS
Status: DISCONTINUED | OUTPATIENT
Start: 2024-10-15 | End: 2024-10-24 | Stop reason: HOSPADM

## 2024-10-15 RX ORDER — FLUCONAZOLE 2 MG/ML
400 INJECTION, SOLUTION INTRAVENOUS EVERY 24 HOURS
Status: DISCONTINUED | OUTPATIENT
Start: 2024-10-16 | End: 2024-10-18

## 2024-10-15 RX ORDER — POLYETHYLENE GLYCOL 3350 17 G/17G
17 POWDER, FOR SOLUTION ORAL DAILY PRN
Status: DISCONTINUED | OUTPATIENT
Start: 2024-10-15 | End: 2024-10-24 | Stop reason: HOSPADM

## 2024-10-15 RX ORDER — IOPAMIDOL 755 MG/ML
75 INJECTION, SOLUTION INTRAVASCULAR
Status: COMPLETED | OUTPATIENT
Start: 2024-10-15 | End: 2024-10-15

## 2024-10-15 RX ORDER — POTASSIUM CHLORIDE 29.8 MG/ML
20 INJECTION INTRAVENOUS PRN
Status: DISCONTINUED | OUTPATIENT
Start: 2024-10-15 | End: 2024-10-24 | Stop reason: HOSPADM

## 2024-10-15 RX ORDER — SODIUM CHLORIDE 9 MG/ML
INJECTION, SOLUTION INTRAVENOUS PRN
Status: DISCONTINUED | OUTPATIENT
Start: 2024-10-15 | End: 2024-10-24 | Stop reason: HOSPADM

## 2024-10-15 RX ORDER — IPRATROPIUM BROMIDE AND ALBUTEROL SULFATE 2.5; .5 MG/3ML; MG/3ML
1 SOLUTION RESPIRATORY (INHALATION)
Status: DISCONTINUED | OUTPATIENT
Start: 2024-10-15 | End: 2024-10-15

## 2024-10-15 RX ORDER — SODIUM CHLORIDE 0.9 % (FLUSH) 0.9 %
5-40 SYRINGE (ML) INJECTION PRN
Status: DISCONTINUED | OUTPATIENT
Start: 2024-10-15 | End: 2024-10-24 | Stop reason: HOSPADM

## 2024-10-15 RX ORDER — LIDOCAINE HYDROCHLORIDE 10 MG/ML
50 INJECTION, SOLUTION EPIDURAL; INFILTRATION; INTRACAUDAL; PERINEURAL ONCE
Status: DISCONTINUED | OUTPATIENT
Start: 2024-10-15 | End: 2024-10-24 | Stop reason: HOSPADM

## 2024-10-15 RX ORDER — FENTANYL CITRATE 50 UG/ML
INJECTION, SOLUTION INTRAMUSCULAR; INTRAVENOUS PRN
Status: COMPLETED | OUTPATIENT
Start: 2024-10-15 | End: 2024-10-15

## 2024-10-15 RX ORDER — LIDOCAINE HYDROCHLORIDE AND EPINEPHRINE BITARTRATE 20; .01 MG/ML; MG/ML
INJECTION, SOLUTION SUBCUTANEOUS PRN
Status: COMPLETED | OUTPATIENT
Start: 2024-10-15 | End: 2024-10-15

## 2024-10-15 RX ORDER — LIDOCAINE HYDROCHLORIDE 20 MG/ML
INJECTION, SOLUTION INFILTRATION; PERINEURAL PRN
Status: COMPLETED | OUTPATIENT
Start: 2024-10-15 | End: 2024-10-15

## 2024-10-15 RX ORDER — SODIUM CHLORIDE 0.9 % (FLUSH) 0.9 %
5-40 SYRINGE (ML) INJECTION PRN
Status: DISCONTINUED | OUTPATIENT
Start: 2024-10-15 | End: 2024-10-16

## 2024-10-15 RX ORDER — POTASSIUM CHLORIDE 7.45 MG/ML
10 INJECTION INTRAVENOUS PRN
Status: DISCONTINUED | OUTPATIENT
Start: 2024-10-15 | End: 2024-10-24 | Stop reason: HOSPADM

## 2024-10-15 RX ORDER — ONDANSETRON 4 MG/1
4 TABLET, ORALLY DISINTEGRATING ORAL EVERY 8 HOURS PRN
Status: DISCONTINUED | OUTPATIENT
Start: 2024-10-15 | End: 2024-10-24 | Stop reason: HOSPADM

## 2024-10-15 RX ADMIN — SODIUM CHLORIDE 2500 MG: 9 INJECTION, SOLUTION INTRAVENOUS at 02:52

## 2024-10-15 RX ADMIN — FENTANYL CITRATE 50 MCG: 50 INJECTION, SOLUTION INTRAMUSCULAR; INTRAVENOUS at 14:32

## 2024-10-15 RX ADMIN — FENTANYL CITRATE 25 MCG: 50 INJECTION INTRAMUSCULAR; INTRAVENOUS at 22:08

## 2024-10-15 RX ADMIN — CALCIUM GLUCONATE: 98 INJECTION, SOLUTION INTRAVENOUS at 18:19

## 2024-10-15 RX ADMIN — MIDAZOLAM HYDROCHLORIDE 1 MG: 1 INJECTION, SOLUTION INTRAMUSCULAR; INTRAVENOUS at 14:32

## 2024-10-15 RX ADMIN — FENTANYL CITRATE 50 MCG: 50 INJECTION, SOLUTION INTRAMUSCULAR; INTRAVENOUS at 14:27

## 2024-10-15 RX ADMIN — FENTANYL CITRATE 25 MCG: 50 INJECTION INTRAMUSCULAR; INTRAVENOUS at 19:42

## 2024-10-15 RX ADMIN — SODIUM CHLORIDE, PRESERVATIVE FREE 10 ML: 5 INJECTION INTRAVENOUS at 20:18

## 2024-10-15 RX ADMIN — IOPAMIDOL 75 ML: 755 INJECTION, SOLUTION INTRAVENOUS at 10:29

## 2024-10-15 RX ADMIN — FLUCONAZOLE 800 MG: 2 INJECTION, SOLUTION INTRAVENOUS at 16:16

## 2024-10-15 RX ADMIN — MIDAZOLAM HYDROCHLORIDE 1 MG: 1 INJECTION, SOLUTION INTRAMUSCULAR; INTRAVENOUS at 14:26

## 2024-10-15 RX ADMIN — FENTANYL CITRATE 25 MCG: 50 INJECTION INTRAMUSCULAR; INTRAVENOUS at 11:48

## 2024-10-15 RX ADMIN — HEPARIN SODIUM 5000 UNITS: 5000 INJECTION INTRAVENOUS; SUBCUTANEOUS at 16:13

## 2024-10-15 RX ADMIN — FENTANYL CITRATE 25 MCG: 50 INJECTION INTRAMUSCULAR; INTRAVENOUS at 15:49

## 2024-10-15 RX ADMIN — SODIUM CHLORIDE: 9 INJECTION, SOLUTION INTRAVENOUS at 08:54

## 2024-10-15 RX ADMIN — IOPAMIDOL 75 ML: 755 INJECTION, SOLUTION INTRAVENOUS at 00:22

## 2024-10-15 RX ADMIN — SODIUM CHLORIDE, PRESERVATIVE FREE 40 MG: 5 INJECTION INTRAVENOUS at 09:06

## 2024-10-15 RX ADMIN — HEPARIN SODIUM 5000 UNITS: 5000 INJECTION INTRAVENOUS; SUBCUTANEOUS at 23:57

## 2024-10-15 RX ADMIN — MEROPENEM 1000 MG: 1 INJECTION INTRAVENOUS at 02:07

## 2024-10-15 RX ADMIN — SODIUM CHLORIDE, POTASSIUM CHLORIDE, SODIUM LACTATE AND CALCIUM CHLORIDE 1000 ML: 600; 310; 30; 20 INJECTION, SOLUTION INTRAVENOUS at 04:45

## 2024-10-15 RX ADMIN — MEROPENEM 1000 MG: 1 INJECTION INTRAVENOUS at 16:07

## 2024-10-15 RX ADMIN — MEROPENEM 1000 MG: 1 INJECTION INTRAVENOUS at 09:10

## 2024-10-15 ASSESSMENT — PAIN SCALES - GENERAL
PAINLEVEL_OUTOF10: 6
PAINLEVEL_OUTOF10: 8
PAINLEVEL_OUTOF10: 10
PAINLEVEL_OUTOF10: 7
PAINLEVEL_OUTOF10: 6
PAINLEVEL_OUTOF10: 10
PAINLEVEL_OUTOF10: 8
PAINLEVEL_OUTOF10: 6

## 2024-10-15 ASSESSMENT — PAIN DESCRIPTION - DESCRIPTORS
DESCRIPTORS: ACHING;BURNING;TENDER;THROBBING
DESCRIPTORS: CRAMPING;DISCOMFORT;GNAWING
DESCRIPTORS: ACHING;BURNING;THROBBING;TENDER
DESCRIPTORS: ACHING;BURNING;TENDER;THROBBING
DESCRIPTORS: ACHING;BURNING;THROBBING;TENDER

## 2024-10-15 ASSESSMENT — PAIN DESCRIPTION - PAIN TYPE
TYPE: SURGICAL PAIN
TYPE: SURGICAL PAIN
TYPE: ACUTE PAIN
TYPE: SURGICAL PAIN
TYPE: SURGICAL PAIN

## 2024-10-15 ASSESSMENT — ENCOUNTER SYMPTOMS
DIARRHEA: 0
NAUSEA: 1
ABDOMINAL PAIN: 1
VOMITING: 1
BLOOD IN STOOL: 0

## 2024-10-15 ASSESSMENT — PAIN - FUNCTIONAL ASSESSMENT
PAIN_FUNCTIONAL_ASSESSMENT: PREVENTS OR INTERFERES SOME ACTIVE ACTIVITIES AND ADLS
PAIN_FUNCTIONAL_ASSESSMENT: ACTIVITIES ARE NOT PREVENTED
PAIN_FUNCTIONAL_ASSESSMENT: PREVENTS OR INTERFERES SOME ACTIVE ACTIVITIES AND ADLS
PAIN_FUNCTIONAL_ASSESSMENT: PREVENTS OR INTERFERES SOME ACTIVE ACTIVITIES AND ADLS
PAIN_FUNCTIONAL_ASSESSMENT: ACTIVITIES ARE NOT PREVENTED

## 2024-10-15 ASSESSMENT — PAIN DESCRIPTION - ORIENTATION
ORIENTATION: MID
ORIENTATION: RIGHT;LEFT
ORIENTATION: LEFT
ORIENTATION: LEFT;LOWER
ORIENTATION: LOWER;LEFT
ORIENTATION: LEFT

## 2024-10-15 ASSESSMENT — PAIN DESCRIPTION - FREQUENCY
FREQUENCY: CONTINUOUS

## 2024-10-15 ASSESSMENT — PAIN DESCRIPTION - LOCATION
LOCATION: ABDOMEN;HIP;BACK
LOCATION: ABDOMEN

## 2024-10-15 ASSESSMENT — PAIN DESCRIPTION - ONSET
ONSET: ON-GOING
ONSET: ON-GOING

## 2024-10-15 NOTE — CONSULTS
PULMONARY CRITICAL CARE CONSULTATION NOTE.    10/15/2024    CONSULTING  PHYSICIAN:    REASON FOR REFERRAL:  perforation peritonitis     History of Present Illness    Ms. Monique Voss  is a 49 y.o. female with history of necrotizing pancreatitis, morbid obesity status post bariatric surgery resulting in over 200 pound weight loss.  She was recently discharged from this hospital on October 8 after being treated for necrotizing pancreatitis.  She initially presented for abdominal pain.  Subsequent CT scan of the chest reveals findings that is consistent with perforation of the gastric fundus and leakage of gastric contents into the peritoneal.  There is also evidence of prior necrotizing pancreatiti as well as cirrhosis of liver.     She was diagnosed with obstructive sleep apnea but has not used nocturnal CPAP in quite a while.    Baseline Exercise tolerance/MMRC score( Bold )     MMRC Dyspnea Scale  Grade Description of Breathlessness   0 I only get breathless with strenuous exercise.   1 I get short of breath when hurrying on level ground or walking up a slight hill.   2 On level ground, I walk slower than people of the same age because of breathlessness, or have to stop for breath when walking at my own pace.   3 I stop for breath after walking about 100 yards or after a few minutes on level ground.   4 I am too breathless to leave the house or I am breathless when dressing.     Smoking history-current everyday smoker with about 10-pack-year history of smoking    Occupational exposure/ Pet/bird -  No history of exposure to any occupational Pneumotoxins.     Age appropriate Cancer screening-   Patient is up to date on age appropriate cancer screening and immunizations.          Active Ambulatory Problems     Diagnosis Date Noted    Primary hypertension 03/27/2017    Mild intermittent asthma without complication 03/27/2017    Morbid obesity 03/27/2017    Reactive depression 03/27/2017    Anxiety  chloride flush  5-40 mL IntraVENous 2 times per day    heparin (porcine)  5,000 Units SubCUTAneous Q8H    pantoprazole (PROTONIX) 40 mg in sodium chloride (PF) 0.9 % 10 mL injection  40 mg IntraVENous Daily    meropenem  1,000 mg IntraVENous Q8H    sodium chloride flush  5-40 mL IntraVENous 2 times per day    lidocaine 1 % injection  50 mg IntraDERmal Once    vancomycin  1,500 mg IntraVENous Q12H     Continuous Infusions:   sodium chloride      sodium chloride 125 mL/hr at 10/15/24 0854    sodium chloride      PN-Adult  3-in-1 Central Line (Standard)            Review of Systems   Review of Systems -     General- No headaches , dizzinesss, syncope   Pulmonary - No chest pain , hemoptysis   Cardiovascular- No chest pain,   GI- ++  abd pain ,No difficulty swallowing, diarrhea , no vomiting   Musculoskeletal- No extremity weakness, no edema , no cyanosis   Genitourinary- No burning urination, no dysuria, no incontinence  Neuro- NO syncope , AMS  Or weakness , No tingling , no numbness.   Skin- No rashes , no cyanosis.   Psychiatric/Behavioral: Negative for confusion, hallucinations and sleep disturbance. The patient is not nervous/anxious.      Vitals:  /84   Pulse 84   Temp 97.4 °F (36.3 °C) (Temporal)   Resp 15   Ht 1.727 m (5' 8\")   Wt 122.2 kg (269 lb 6.4 oz)   LMP 09/01/2024 (Approximate)   SpO2 97%   BMI 40.96 kg/m²   Tmax:  CVP:        Intake/Output Summary (Last 24 hours) at 10/15/2024 1317  Last data filed at 10/15/2024 1043  Gross per 24 hour   Intake 2600 ml   Output 200 ml   Net 2400 ml         Physical Exam    Physical exam-  General appearance: Morbidly obese, not ill appearing , not in distress ,  alert, no converstional dyspnea  Head: Normocephalic, without obvious abnormality, atraumatic   Eyes:Pupils bilateral equal and reactive, EOM intact, conjunctiva - no icterus , no injection   Throat: Clear, no lesions, Mallampti = 4 , no tonsillar eythema or edema  Neck:  Wide neck circumference >  16 inches ,  Supple, symmetrical, trachea midline, no lymphadenopathy, no JVD, no carotid bruits, no thyromegaly   Lungs: Bilateral  Air movement overall diminished secondary to body habitus but adequate on bilateral lung fields . More  decreased at bases, Fine basal crackles absent.   Heart: RRR, S1, S2 normal, no murmur, click, rub or gallop   Abdomen: soft, non-tender, nondistended. Bowel sounds normal, no hepatomeagly  Extremities: extremities normal, atraumatic, no cyanosis, 1+  edema  Musculoskeletal - No deformities   Skin: Skin color, texture, turgor normal. No rashes or lesions   Neurological: No focal deficits, cranial nerves grossly intact, sensations intact   Psychiatric : Mood and effect normal , alert and oriented times 4         LABS and Studies:  Objective:  Vital signs: (most recent): Blood pressure 132/84, pulse 84, temperature 97.4 °F (36.3 °C), temperature source Temporal, resp. rate 15, height 1.727 m (5' 8\"), weight 122.2 kg (269 lb 6.4 oz), last menstrual period 09/01/2024, SpO2 97%, not currently breastfeeding.        CBC:   Recent Labs     10/14/24  2140 10/15/24  0833   WBC 18.3* 18.0*   HGB 14.0 11.5   HCT 41.1 34.8   * 641*     BMP:    Recent Labs     10/14/24  2140 10/15/24  0833    137   K 3.9 3.7    101   CO2 29 29   BUN 8 7   CREATININE 0.6 0.5   GLUCOSE 112* 88   CALCIUM 8.6 7.9*   MG  --  1.8   PHOS  --  3.4     HEPATIC:   Recent Labs     10/14/24  2140 10/15/24  0833   AST 14 10   ALT 10 8   BILITOT 0.3 0.2   ALKPHOS 126* 101     LACTATE:   Recent Labs     10/14/24  2140 10/15/24  0833   LACTA 1.1 0.5     PROCALCITONIN:   Recent Labs     10/15/24  0833   PROCAL 0.16*     CK:   Recent Labs     10/15/24  0833   CKTOTAL 22         INR:   Recent Labs     10/15/24  0146 10/15/24  0833   INR 1.2 1.2     BLOOD GAS:   Recent Labs     10/15/24  0845   PH 7.463*   PCO2 43.7   PO2 71.0*   HCO3 30.6*   O2SAT 94.5       TSH:   Recent Labs     10/15/24  0833   TSH 0.14*

## 2024-10-15 NOTE — CONSULTS
History Narrative    Not on file     Social Determinants of Health     Financial Resource Strain: Low Risk  (7/17/2024)    Overall Financial Resource Strain (CARDIA)     Difficulty of Paying Living Expenses: Not hard at all   Food Insecurity: No Food Insecurity (10/1/2024)    Hunger Vital Sign     Worried About Running Out of Food in the Last Year: Never true     Ran Out of Food in the Last Year: Never true   Transportation Needs: No Transportation Needs (10/1/2024)    PRAPARE - Transportation     Lack of Transportation (Medical): No     Lack of Transportation (Non-Medical): No   Physical Activity: Inactive (9/4/2024)    Exercise Vital Sign     Days of Exercise per Week: 0 days     Minutes of Exercise per Session: 0 min   Stress: Not on file   Social Connections: Moderately Integrated (10/15/2024)    Social Connections ([facility] HRSN)     If for any reason you need help with day-to-day activities such as bathing, preparing meals, shopping, managing finances, etc., do you get the help you need?: Not on file   Intimate Partner Violence: Not At Risk (5/3/2023)    Humiliation, Afraid, Rape, and Kick questionnaire     Fear of Current or Ex-Partner: No     Emotionally Abused: No     Physically Abused: No     Sexually Abused: No   Housing Stability: Low Risk  (10/1/2024)    Housing Stability Vital Sign     Unable to Pay for Housing in the Last Year: No     Number of Times Moved in the Last Year: 0     Homeless in the Last Year: No       ROS:   Review of Systems   Constitutional:  Positive for chills. Negative for fever.   Gastrointestinal:  Positive for abdominal pain, nausea and vomiting. Negative for blood in stool and diarrhea.       Physical Exam:  Vitals:    10/15/24 0300   BP: (!) 127/98   Pulse: 98   Resp:    Temp:    SpO2:        PSYCH: mood and affect normal, alert and oriented x 3: Uncomfortable appearing  EYES: Sclera white, pupils equal round and reactive to light  ENMT:  Hearing normal, trachea midline, ears  evaluate for Gastro-gastric fistula.  Also needs IR drain into left upper quadrant abscess cavity.  If not gastro-gastric fistula from her previous bypass this likely can be managed with an IR drain only and not surgery.  Will need antibiotics and ID consult    Alberto Martinez MD  10/15/24  9:18 AM

## 2024-10-15 NOTE — PROGRESS NOTES
Pharmacy Consultation Note  (Antibiotic Dosing and Monitoring)    Initial consult date: 10/15/24  Consulting physician/provider: NOAH WEI  Drug: Vancomycin  Indication: sepsis 2/2 unknown etiology     Vancomycin has been discontinued   Clinical Pharmacy to sign-off  Physician to re-consult pharmacy if future dosing is needed    Thank you for the consult,    Davon Ambriz, PharmD, BCPS, BCCCP 10/15/2024 3:12 PM

## 2024-10-15 NOTE — PROGRESS NOTES
Chg double lumen picc Placement 10/15/2024    Product number: [id number]   Lot Number: [id number]      Ultrasound: yes   Right brachial:                Upper Arm Circumference: (CM) 41cm    Size:(FR)/GUAGE 5.5fr/40cm    Exposed Length: (CM) 4cm    Internal Length: (CM) 36cm   Cut: (CM) 15cm   Vein Measurement: 0.60cm              Lidocaine Given: yes  Irina Davenport RN  10/15/2024  4:02 PM      shelley

## 2024-10-15 NOTE — PROGRESS NOTES
Attempted to place picc line, but pt is being transferred to IR at this time. Staff will notify us when pt returns.

## 2024-10-15 NOTE — PROGRESS NOTES
Comprehensive Nutrition Assessment    Type and Reason for Visit:  Initial, Consult (TPN)    Nutrition Recommendations/Plan:     Continue NPO, Start Tube Feeding     Current low dose TPN order is appropriate d/t high re-feeding risk.     Goal if needed: Custom 3-in-1 (1600 ml tv @ 66.7 ml/hr)   to provide 130 gm AA, 222 gm dextrose, 32 gm lipid, & 1600 total kcals    Monitor/ replace electrolytes prn- WNL on today's draw  TG lab draw pending w/ new PN initiation        Malnutrition Assessment:  Malnutrition Status:  Insufficient data (10/15/24 1116)    Context:  Chronic Illness     Findings of the 6 clinical characteristics of malnutrition:  Energy Intake:  75% or less estimated energy requirements for 1 month or longer (hx gastric bypass)  Weight Loss:  Unable to assess (hx gastric bypass- unable to determine intentional vs non-intentional)     Body Fat Loss:  No significant body fat loss   Muscle Mass Loss:  No significant muscle mass loss   Fluid Accumulation:  No significant fluid accumulation    Strength:  Not Performed    Nutrition Assessment:    Pt admit 2/2 abd pain N/V 2/2 recent pancreatitis/ pseudocyst now concern for possible gastric remnant perf pending workup. Noted recent hosptial stay pt just discharged last week. 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 Noted overall poor energy intake x ~ 1 week. Plans to start TPN- will provide updated recs & monitor.    Nutrition Related Findings:    Pt alert, MAP WNL, +I/O's 2L, no edema, active BS/ cramping, hx RNYGB     Wound Type: None       Current Nutrition Intake & Therapies:    Average Meal Intake: NPO    Current Parenteral Nutrition Orders:  Type and Formula: 3-in-1 Standard   Duration: Continuous  Rate/Volume: 1000 ml tv @ 41.7 ml/hr; to start today @6pm  Current PN Order Provides:    Goal PN Orders Provides: 50 gm AA & 1035 kcals    Anthropometric Measures:  Height: 172.7 cm (5' 8\")  Ideal Body Weight (IBW): 140 lbs (64 kg)

## 2024-10-15 NOTE — PRE SEDATION
Sedation Pre-Procedure Note    Patient Name: Monique Voss   YOB: 1975  Room/Bed: 4402/4402-A  Medical Record Number: [id number]  Date: 10/15/2024   Time: 3:28 PM       Indication:  LUQ abscess drainage    Consent: I have discussed with the patient and/or the patient representative the indication, alternatives, and the possible risks and/or complications of the planned procedure and the anesthesia methods. The patient and/or patient representative appear to understand and agree to proceed.    Vital Signs:   Vitals:    10/15/24 1457   BP:    Pulse:    Resp: 18   Temp:    SpO2:        Past Medical History:   has a past medical history of Asthma, Bell palsy, Carpal tunnel syndrome, CHF (congestive heart failure) (Formerly [facility]), Chronic obstructive pulmonary disease ([facility]), Coronary artery disease involving native coronary artery of native heart without angina pectoris, DDD (degenerative disc disease), cervical, Diabetes mellitus ([facility]ly [facility]), DJD (degenerative joint disease), GERD without esophagitis, HTN (hypertension), Hyperlipidemia, Left hip pain and right, Meningitis, Morbid obesity due to excess calories, Neuropathy, JODI treated with BiPAP, Scoliosis, and 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Formerly [facility]).    Past Surgical History:   has a past surgical history that includes Cystocopy (Left, 01/14/2018); Cholecystectomy (2009); Lithotripsy (Right, 02/15/2018); Upper gastrointestinal endoscopy (N/A, 06/22/2018); Dilation and curettage of uterus; Upper gastrointestinal endoscopy (N/A, 08/20/2021); Kendall-en-Y Gastric Bypass (N/A, 05/31/2022); hip surgery (Left, 09/22/2022); Pain management procedure (Bilateral, 10/27/2022); Nerve Block (Bilateral, 12/22/2022); hip surgery (Left, 03/23/2023); Pain management procedure (Bilateral, 04/04/2023); Coronary angioplasty with stent (05/17/2023); Pain management procedure (Bilateral, 8/27/2024); and hip surgery (Right, 9/26/2024).    Medications:   Scheduled Meds:    sodium chloride flush  5-40 mL  patient with mild systemic disease    Sedation/ Anesthesia Plan:   intravenous sedation    Medications Planned:   fentanyl intravenously    Patient is an appropriate candidate for plan of sedation: yes    Electronically signed by Juan العلي MD on 10/15/2024 at 3:28 PM

## 2024-10-15 NOTE — PROGRESS NOTES
4 Eyes Skin Assessment     NAME:  Monique Voss  YOB: 1975  MEDICAL RECORD NUMBER:  [id number]    The patient is being assessed for  Admission    I agree that at least one RN has performed a thorough Head to Toe Skin Assessment on the patient. ALL assessment sites listed below have been assessed.      Areas assessed by both nurses:    Head, Face, Ears, Shoulders, Back, Chest, Arms, Elbows, Hands, Sacrum. Buttock, Coccyx, Ischium, Legs. Feet and Heels, and Under Medical Devices         Does the Patient have a Wound? No noted wound(s)       Alvin Prevention initiated by RN: No  Wound Care Orders initiated by RN: No    Pressure Injury (Stage 3,4, Unstageable, DTI, NWPT, and Complex wounds) if present, place Wound referral order by RN under : No    New Ostomies, if present place, Ostomy referral order under : No     Nurse 1 eSignature: Electronically signed by Dori Granados RN on 10/15/24 at 7:11 AM EDT    **SHARE this note so that the co-signing nurse can place an eSignature**    Nurse 2 eSignature: Electronically signed by Ginny Hi RN on 10/15/24 at 7:19 AM EDT

## 2024-10-15 NOTE — BRIEF OP NOTE
Brief Postoperative Note      Patient: Monique Voss  YOB: 1975  MRN: [id number]    Date of Procedure: 10/15/2024    * No Diagnosis Codes entered *    Post-Op Diagnosis: LUQ abscess drainage       * No procedures listed *    Surgeon(s):  Juan العلي MD    Assistant:  * No surgical staff found *    Anesthesia: * No anesthesia type entered *    Estimated Blood Loss (mL): Minimal    Complications: None    Specimens:   * No specimens in log *    Implants:  * No implants in log *      Drains:   Closed/Suction Drain Right RUQ Bulb (Active)   Site Description Clean, dry & intact 10/15/24 1437   Dressing Status New dressing applied 10/15/24 1437   Drainage Appearance Thin;Yellow;Cloudy 10/15/24 1437   Drain Status To bulb suction 10/15/24 1437   Output (ml) 50 ml 10/15/24 1437       Findings:  Infection Present At Time Of Surgery (PATOS) (choose all levels that have infection present):  - Superficial Infection (skin/subcutaneous) present as evidenced by abscess  Other Findings: 10g drain    Electronically signed by Juan العلي MD on 10/15/2024 at 3:29 PM

## 2024-10-15 NOTE — H&P
motion. No jugular venous distention. Trachea midline.  Respiratory:  Normal respiratory effort. Clear to auscultation, bilaterally without Rales/Wheezes/Rhonchi.  Cardiovascular:  Regular rate and rhythm with normal S1/S2 without murmurs, rubs or gallops.  Abdomen: Soft,  epigastric tender, non-distended with normal bowel sounds.  Musculoskeletal:  No clubbing, cyanosis or edema bilaterally.    Skin: Skin color, texture, turgor normal.  No rashes or lesions.  Neurologic:  Neurovascularly intact without any focal sensory/motor deficits. Cranial nerves: II-XII intact, grossly non-focal.  Psychiatric:  Alert and oriented, thought content appropriate, normal insight    CXR:  I have reviewed the CXR with the following interpretation: Partial left upper lobe lingula segment opacities      Labs:     Recent Labs     10/14/24  2140   WBC 18.3*   HGB 14.0   HCT 41.1   *     Recent Labs     10/14/24  2140      K 3.9      CO2 29   BUN 8   CREATININE 0.6   CALCIUM 8.6     Recent Labs     10/14/24  2140   AST 14   ALT 10   BILITOT 0.3   ALKPHOS 126*     Recent Labs     10/15/24  0146   INR 1.2     No results for input(s): \"CKTOTAL\", \"TROPONINI\" in the last 72 hours.    Urinalysis:      Lab Results   Component Value Date/Time    NITRU NEGATIVE 10/07/2024 05:05 PM    WBCUA 10 TO 20 10/07/2024 05:05 PM    WBCUA 1-3 01/06/2011 05:26 PM    BACTERIA 1+ 10/07/2024 05:05 PM    RBCUA 0 TO 2 10/07/2024 05:05 PM    RBCUA 2-5 01/06/2011 05:26 PM    BLOODU moderate 09/04/2024 01:26 PM    BLOODU Negative 05/16/2023 12:39 AM    SPECGRAV >=1.030 09/04/2024 01:26 PM    GLUCOSEU NEGATIVE 10/07/2024 05:05 PM    GLUCOSEU NEGATIVE 01/06/2011 05:26 PM         ASSESSMENT:  Perforated gastric fundus  Necrotizing pancreatitis  Morbid obesity BMI greater than 35  Diabetes mellitus  COPD without exacerbation  Chronic heart failure with preserved ejection fraction      PLAN:  NPO.  Admit to the ICU per general surgery recommendation.   She received vancomycin and meropenem.  May benefit from ID consult and also PPI given gastric perforation.  Appreciate intensivist input.           Greg Ngo MD    Thank you Madison Amin PA-C for the opportunity to be involved in this patient's care. If you have any questions or concerns please feel free to contact me through [employer].

## 2024-10-15 NOTE — PLAN OF CARE
Patient was admitted after midnight please review H&P for complete details.    Patient's PCP not covered by [facility], will transfer service to Dr. Loving starting 10/16/2024.    [facility] will cover the patient for 10/15/2024.    Aren Jhaveri MD

## 2024-10-15 NOTE — PROGRESS NOTES
Patient admitted to MICU with the following belongings:  Jewelry, Purse, Cell Phone, Shirt, Shoes, and Cane . [place]. The following belongings admitted with the patient, None, were sent home with the patient's family.

## 2024-10-15 NOTE — ED PROVIDER NOTES
[facility]  EMERGENCY DEPARTMENT ENCOUNTER        Pt Name: Monique Voss  MRN: [id number]  Birthdate 1975  Date of evaluation: 10/14/2024  Provider: Barry Huang DO  PCP: Madison Amin PA-C  Note Started: 9:33 PM EDT 10/14/24    CHIEF COMPLAINT       No chief complaint on file.      HISTORY OF PRESENT ILLNESS: 1 or more Elements   History From: Patient      Monique Voss is a 49 y.o. female who presents to the emergency department for evaluation of abdominal pain.  Patient states that she has had worsening abdominal pain since her discharge.  Patient was recently discharged with pancreatitis.  Patient states she has had nausea and vomiting but has not any blood in the vomit.  Patient denies any fevers or chills.  Patient denies any chest pain.    Review of Systems   Constitutional:  Negative for appetite change, chills, fatigue and fever.   HENT:  Negative for congestion, rhinorrhea and sore throat.    Eyes:  Negative for photophobia and visual disturbance.   Respiratory:  Negative for cough, chest tightness and shortness of breath.    Cardiovascular:  Negative for chest pain and palpitations.   Gastrointestinal:  Positive for nausea and vomiting. Negative for abdominal pain and diarrhea.   Endocrine: Negative for polydipsia and polyuria.   Genitourinary:  Negative for dysuria.   Musculoskeletal:  Negative for back pain and neck pain.   Skin:  Negative for rash.   Neurological:  Negative for dizziness and headaches.         Nursing Notes were all reviewed and agreed with or any disagreements were addressed in the HPI.    REVIEW OF SYSTEMS :      Positives and Pertinent negatives as per HPI.     SURGICAL HISTORY     Past Surgical History:   Procedure Laterality Date    CHOLECYSTECTOMY  2009    CORONARY ANGIOPLASTY WITH STENT PLACEMENT  05/17/2023    [address] 3.5 x 38 and 3.5 x 8 deployed distal RCA by Dr Egan    CYSTOSCOPY Left 01/14/2018    left stent placement    DILATION AND    JARDIANCE 10 MG tablet TAKE 1 TABLET BY MOUTH DAILY  Qty: 30 tablet, Refills: 11    Associated Diagnoses: Chronic systolic heart failure (HCC); New medication added      Multiple Vitamin (DAILY VITES) TABS Take 1 tablet by mouth daily  Qty: 90 tablet, Refills: 3       !! - Potential duplicate medications found. Please discuss with provider.          ALLERGIES     Fish-derived products, Food, Ultram [tramadol hcl], Cashews [macadamia nut oil], Norco [hydrocodone-acetaminophen], and Pcn [penicillins]    FAMILYHISTORY       Family History   Problem Relation Age of Onset    Stroke Mother     Arthritis Mother     Hypertension Mother     Asthma Mother     Fibromyalgia Mother     Cancer Father     Hypertension Father     Heart Attack Father     Asthma Father         SOCIAL HISTORY       Social History     Tobacco Use    Smoking status: Every Day     Current packs/day: 0.00     Average packs/day: 0.3 packs/day for 17.5 years (4.4 ttl pk-yrs)     Types: Cigarettes, Cigars     Start date: 3/27/2001     Last attempt to quit: 10/5/2018     Years since quittin.0    Smokeless tobacco: Never   Vaping Use    Vaping status: Former   Substance Use Topics    Alcohol use: No    Drug use: No       SCREENINGS        [place] Coma Scale  Eye Opening: Spontaneous  Best Verbal Response: Oriented  Best Motor Response: Obeys commands  [place] Coma Scale Score: 15                CIWA Assessment  BP: 124/66  Pulse: 92           PHYSICAL EXAM  1 or more Elements     ED Triage Vitals [10/14/24 2130]   BP Systolic BP Percentile Diastolic BP Percentile Temp Temp src Pulse Respirations SpO2   120/75 -- -- 98.2 °F (36.8 °C) -- 57 18 100 %      Height Weight - Scale         1.727 m (5' 8\") 117.5 kg (259 lb)               Physical Exam  Vitals and nursing note reviewed.   Constitutional:       General: She is not in acute distress.     Appearance: She is not ill-appearing or toxic-appearing.   HENT:      Head: Normocephalic and atraumatic.

## 2024-10-15 NOTE — CONSULTS
GENERAL SURGERY  CONSULT NOTE    Patient's Name/Date of Birth: Monique Voss / 1975    Date: October 15, 2024     PCP: Madison Amin, PAUzmaC      Physician Consulted: Dr. Murillo  Reason for Consult: Hx RNY bypass, concern for possible gastric remnant perforation  Referring Physician: Dr. Arnaldo GERMAN  Monique Voss is a 49 y.o. female with a history of a Kendall-en-Y gastric bypass in 2018, laparoscopic cholecystectomy in 2009, Bell's palsy, CHF, COPD, CAD on ASA/Plavix, DM, hypertension, sleep apnea who is presenting this admission secondary to acute worsening of her epigastric pain yesterday evening.  Patient most recently admitted secondary to pancreatitis with pancreatic necrosis.  Patient was seen by the hepatobiliary service regarding her acute pancreatitis during her most recent admission.  Bariatric team had followed due to her hx of RNY. Plan was for pain control, Augmentin for 7 days and discharge with follow-up in office.  Patient okay to continue Plavix and aspirin.  Patient states that she has had interval worsening of her abdominal pain over the past week, states that it acutely worsened last night and she had several episodes of vomiting associated with it.  She admits to chills but denies any fever or diarrhea.     Last admission patient had CTA triphasic as well as MRCP showing pancreatitis with stable pseudocyst.  Imaging upon arrival to the ED shows concern for possible gastric remnant fundus rupture secondary to pancreatitis per radiology read.  Patient hemodynamically stable since arrival, afebrile, normotensive, intermittently tachycardic.  Labs significant for WBC 18.3 which is actually improved from most recent admission, lipase 441, lactic acid 1.1, creatinine 0.6.      Past Medical History:   Diagnosis Date    Asthma     Bell palsy     drooping    Carpal tunnel syndrome     bilateral    CHF (congestive heart failure) (HCC)     Chronic obstructive pulmonary disease (HCC)  history and physical exam on the date of service. Reviewed chart, vitals, labs and radiologic studies.  I also participated in medical decision making with the resident/NP on the date of service and I agree with all of the pertinent clinical information, assessment and treatment plan. I have reviewed and edited the note above based on my findings during my history, exam, and decision making.       I saw and examined the patient and discussed the above HPI with the resident and agree with documented positive and negative findings.    Objective:  /77   Pulse 93   Temp 98.4 °F (36.9 °C) (Temporal)   Resp 19   Ht 1.727 m (5' 8\")   Wt 122.2 kg (269 lb 6.4 oz)   LMP 09/01/2024 (Approximate)   SpO2 (!) 88%   BMI 40.96 kg/m²   Labs:  Reviewed by me and relevant abnormalities noted         Physical exam:   /77   Pulse 93   Temp 98.4 °F (36.9 °C) (Temporal)   Resp 19   Ht 1.727 m (5' 8\")   Wt 122.2 kg (269 lb 6.4 oz)   LMP 09/01/2024 (Approximate)   SpO2 (!) 88%   BMI 40.96 kg/m²   General appearance:  NAD, appears stated age  Head: NCAT, PERRLA, EOMI, red conjunctiva  Neck: supple, no masses, trachea midline  Lungs: Equal chest rise bilateral, no retractions, no wheezing  Heart: Reg rate  Abdomen: soft, mild focal tender left sided, obese  Skin; warm and dry, no cyanosis  Gu: no cva tenderness  Extremities: atraumatic, no focal motor deficits, no open wounds  Psych: No tremor, visual hallucinations        Radiology: I reviewed relevant abdominal imaging from this admission and that available in the EMR including CT from admission and previous comparison      NOTE: This report, in part or full, may have been transcribed using voice recognition software. Every effort was made to ensure accuracy; however, inadvertent computerized transcription errors may be present. Please excuse any transcriptional grammatical or spelling errors that may have escaped my editorial review.    Physician Signature:

## 2024-10-15 NOTE — CONSULTS
[facility] CONSULT NOTE    Reason for Consult: Peritonitis    Requested by: Andi Reis DO      Chief complaint: Abdominal pain    History Obtained From: Patient and EMR    HISTORY OFPRESENT ILLNESS              The patient is a 49 y.o.  morbidly obese female with history of COPD, CAD, DM, hypertension, hyperlipidemia, hidradenitis suppurativa right hip osteoarthritis on steroid injections, meningocele, gastric bypass surgery in 2022, previously admitted from 10/01-10/08 for chronic pancreatic cyst, severe acute pancreatitis with necrosis, possibly drug induced for which she was seen by Hepatobiliary Surgery and was given a course of meropenem then discharged on amoxicillin-clavulanate for 7 days, presented on 10/14 with abdominal pain worsening for the past week. On admission, she was afebrile and hemodynamically stable with leukocytosis of 18,000.  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  Chest x-ray showed patchy left upper lobe lingular segment opacity.  Urine Streptococcus pneumoniae and Legionella antigens, respiratory pathogen PCR were negative.  Blood cultures showed no growth to date.  Meropenem and vancomycin were started on admission. She underwent percutaneous abscess drain placement on 10/15. Abscess fluid Gram stain and culture showed few polymorphonuclear leukocytes, no epithelial cells, moderate Gram-negative rods. ID service was asked for further recommendations.    Past Medical History  Past Medical History:   Diagnosis Date    Asthma     Bell palsy     drooping    Carpal tunnel  Left 2022    LEFT HIP INJECTION performed by Jordyn Austin DO at [facility]    HIP SURGERY Left 2023    LEFT HIP STEROID INJECT performed by Jordyn Austin DO at [facility] OR    HIP SURGERY Right 2024    RIGHT HIP INJECTION UNDER FLUOROSCOPIC GUIDANCE                +++IODINE ALLERGY+++ performed by Jordyn Austin DO at [facility] OR    LITHOTRIPSY Right 02/15/2018    Cystoscopy;Stent Removal    NERVE BLOCK Bilateral 2022    BILATERAL L5 TRANSFORAMINAL EPIDURAL STEROID INJECTION performed by Jordyn Austin DO at [facility]    PAIN MANAGEMENT PROCEDURE Bilateral 10/27/2022    BILATERAL L5 TRANSFORAMINAL EPIDURAL STEROID INJECTION performed by Jordyn Austin DO at [facility] OR    PAIN MANAGEMENT PROCEDURE Bilateral 2023    BILATERAL L5 TRANSFORAMINAL EPIDURAL STEROID INJECTION performed by Jordyn Austin DO at [facility]    PAIN MANAGEMENT PROCEDURE Bilateral 2024    BILATERAL LUMBAR L5 TRANSFORAMINAL EPIDURAL STEROID INJECTION performed by Jordyn Austin DO at [facility] OR    MARGUERITE-EN-Y GASTRIC BYPASS N/A 2022    GASTRIC BYPASS MARGUERITE-EN-Y LAPAROSCOPIC performed by Barry Sandhu MD at [facility] OR    UPPER GASTROINTESTINAL ENDOSCOPY N/A 2018    EGD BIOPSY performed by Barry Sandhu MD at [facility] ENDOSCOPY    UPPER GASTROINTESTINAL ENDOSCOPY N/A 2021    EGD BIOPSY performed by Barry Sandhu MD at [facility] ENDOSCOPY        Social History  Social History     Socioeconomic History    Marital status: Legally    Occupational History    Occupation: direct care staff/counselor   Tobacco Use    Smoking status: Every Day     Current packs/day: 0.00     Average packs/day: 0.3 packs/day for 17.5 years (4.4 ttl pk-yrs)     Types: Cigarettes, Cigars     Start date: 3/27/2001     Last attempt to quit: 10/5/2018     Years since quittin.0    Smokeless tobacco: Never   Vaping Use    Vaping status: Former   Substance and Sexual Activity    Alcohol use: No    Drug use: No       Family Medical  History  Family History   Problem Relation Age of Onset    Stroke Mother     Arthritis Mother     Hypertension Mother     Asthma Mother     Fibromyalgia Mother     Cancer Father     Hypertension Father     Heart Attack Father     Asthma Father        Review of Systems:  Constitutional: No fever, had chills  Eyes: No vision changes, no retroorbital pain  ENT: No hearing changes, no ear pain  Respiratory: No cough, no dyspnea  Cardiovascular: No chest pain, no palpitations  Gastrointestinal: Has abdominal pain, no diarrhea  Genitourinary: No dysuria, no hematuria  Integumentary: No rash, no itching  Musculoskeletal: No muscle pain, no joint pain  Neurologic: No headache, has numbness in extremities    Physical Examination:  Vitals:    10/15/24 1100 10/15/24 1200 10/15/24 1218 10/15/24 1300   BP: 130/82 138/89  132/84   Pulse: 84 95  84   Resp: 21 23 20 15   Temp:       TempSrc:       SpO2: 97% 99%  97%   Weight:       Height:         Constitutional: Alert, not in distress  Eyes: Sclerae anicteric, no conjunctival erythema  ENT: No buccal lesion, no pharyngeal exudates  Neck: No nuchal rigidity, no cervical adenopathy  Lungs: Clear breath sounds, no crackles, no wheezes  Heart: Regular rate and rhythm, no murmurs  Abdomen: Bowel sounds present, soft, diffusely tender. LUQ JUANJO drain in place  Skin: Warm and dry, no active dermatoses  Musculoskeletal: No joint erythema, no joint swelling    Labs, imaging, and medical records/notes were personally reviewed.      Assessment:  Sepsis, secondary to intra-abdominal abscess +/- peritonitis associated with possible gastric remnant perforation, s/p percutaneous abscess drain placement on 10/15.   Recent necrotizing pancreatitis with pseudocyst formation    Plan:  Continue meropenem 1 g every 8 hours.  Stop vancomycin.  Start fluconazole 800 mg loading dose once followed by 400 mg every 24 hours.  Follow-up Hepatobiliary Surgery recommendations.  Follow-up blood and abscess fluid

## 2024-10-15 NOTE — PLAN OF CARE
Problem: Gastrointestinal - Adult  Goal: Maintains adequate nutritional intake  Outcome: Not Progressing     Problem: Discharge Planning  Goal: Discharge to home or other facility with appropriate resources  Outcome: Progressing     Problem: Pain  Goal: Verbalizes/displays adequate comfort level or baseline comfort level  Outcome: Progressing     Problem: Skin/Tissue Integrity  Goal: Absence of new skin breakdown  Description: 1.  Monitor for areas of redness and/or skin breakdown  2.  Assess vascular access sites hourly  3.  Every 4-6 hours minimum:  Change oxygen saturation probe site  4.  Every 4-6 hours:  If on nasal continuous positive airway pressure, respiratory therapy assess nares and determine need for appliance change or resting period.  Outcome: Progressing     Problem: Safety - Adult  Goal: Free from fall injury  Outcome: Progressing     Problem: Neurosensory - Adult  Goal: Achieves stable or improved neurological status  Outcome: Progressing  Goal: Absence of seizures  Outcome: Progressing  Goal: Remains free of injury related to seizures activity  Outcome: Progressing  Goal: Achieves maximal functionality and self care  Outcome: Progressing     Problem: Respiratory - Adult  Goal: Achieves optimal ventilation and oxygenation  Outcome: Progressing     Problem: Cardiovascular - Adult  Goal: Maintains optimal cardiac output and hemodynamic stability  Outcome: Progressing  Goal: Absence of cardiac dysrhythmias or at baseline  Outcome: Progressing     Problem: Skin/Tissue Integrity - Adult  Goal: Skin integrity remains intact  Outcome: Progressing  Goal: Incisions, wounds, or drain sites healing without S/S of infection  Outcome: Progressing  Goal: Oral mucous membranes remain intact  Outcome: Progressing     Problem: Musculoskeletal - Adult  Goal: Return mobility to safest level of function  Outcome: Progressing  Goal: Maintain proper alignment of affected body part  Outcome: Progressing  Goal: Return

## 2024-10-16 PROBLEM — K65.1 INTRA-ABDOMINAL ABSCESS (HCC): Status: ACTIVE | Noted: 2024-10-16

## 2024-10-16 LAB
ALBUMIN SERPL-MCNC: 2.1 G/DL (ref 3.5–5.2)
ALP SERPL-CCNC: 104 U/L (ref 35–104)
ALT SERPL-CCNC: 6 U/L (ref 0–32)
ANION GAP SERPL CALCULATED.3IONS-SCNC: 7 MMOL/L (ref 7–16)
AST SERPL-CCNC: 8 U/L (ref 0–31)
BASOPHILS # BLD: 0.05 K/UL (ref 0–0.2)
BASOPHILS NFR BLD: 0 % (ref 0–2)
BILIRUB SERPL-MCNC: 0.2 MG/DL (ref 0–1.2)
BILIRUB UR QL STRIP: NEGATIVE
BUN SERPL-MCNC: 5 MG/DL (ref 6–20)
CALCIUM SERPL-MCNC: 8.2 MG/DL (ref 8.6–10.2)
CHLORIDE SERPL-SCNC: 105 MMOL/L (ref 98–107)
CLARITY UR: CLEAR
CO2 SERPL-SCNC: 30 MMOL/L (ref 22–29)
COLOR UR: YELLOW
CREAT SERPL-MCNC: 0.6 MG/DL (ref 0.5–1)
EKG ATRIAL RATE: 93 BPM
EKG P AXIS: 45 DEGREES
EKG P-R INTERVAL: 150 MS
EKG Q-T INTERVAL: 436 MS
EKG QRS DURATION: 132 MS
EKG QTC CALCULATION (BAZETT): 542 MS
EKG R AXIS: -90 DEGREES
EKG T AXIS: 22 DEGREES
EKG VENTRICULAR RATE: 93 BPM
EOSINOPHIL # BLD: 0.1 K/UL (ref 0.05–0.5)
EOSINOPHILS RELATIVE PERCENT: 1 % (ref 0–6)
ERYTHROCYTE [DISTWIDTH] IN BLOOD BY AUTOMATED COUNT: 16.7 % (ref 11.5–15)
GFR, ESTIMATED: >90 ML/MIN/1.73M2
GLUCOSE BLD-MCNC: 127 MG/DL (ref 74–99)
GLUCOSE SERPL-MCNC: 123 MG/DL (ref 74–99)
GLUCOSE UR STRIP-MCNC: NEGATIVE MG/DL
HCT VFR BLD AUTO: 35.1 % (ref 34–48)
HGB BLD-MCNC: 11.5 G/DL (ref 11.5–15.5)
HGB UR QL STRIP.AUTO: NEGATIVE
IMM GRANULOCYTES # BLD AUTO: 0.2 K/UL (ref 0–0.58)
IMM GRANULOCYTES NFR BLD: 1 % (ref 0–5)
KETONES UR STRIP-MCNC: NEGATIVE MG/DL
LEUKOCYTE ESTERASE UR QL STRIP: NEGATIVE
LYMPHOCYTES NFR BLD: 2.76 K/UL (ref 1.5–4)
LYMPHOCYTES RELATIVE PERCENT: 14 % (ref 20–42)
MAGNESIUM SERPL-MCNC: 2 MG/DL (ref 1.6–2.6)
MCH RBC QN AUTO: 31.2 PG (ref 26–35)
MCHC RBC AUTO-ENTMCNC: 32.8 G/DL (ref 32–34.5)
MCV RBC AUTO: 95.1 FL (ref 80–99.9)
MICROORGANISM SPEC CULT: NORMAL
MONOCYTES NFR BLD: 0.79 K/UL (ref 0.1–0.95)
MONOCYTES NFR BLD: 4 % (ref 2–12)
NEUTROPHILS NFR BLD: 80 % (ref 43–80)
NEUTS SEG NFR BLD: 15.88 K/UL (ref 1.8–7.3)
NITRITE UR QL STRIP: NEGATIVE
PH UR STRIP: 6.5 [PH] (ref 5–9)
PHOSPHATE SERPL-MCNC: 3.2 MG/DL (ref 2.5–4.5)
PLATELET # BLD AUTO: 642 K/UL (ref 130–450)
PMV BLD AUTO: 10.3 FL (ref 7–12)
POTASSIUM SERPL-SCNC: 3.9 MMOL/L (ref 3.5–5)
PROT SERPL-MCNC: 5.4 G/DL (ref 6.4–8.3)
PROT UR STRIP-MCNC: NEGATIVE MG/DL
RBC # BLD AUTO: 3.69 M/UL (ref 3.5–5.5)
RBC #/AREA URNS HPF: NORMAL /HPF
SODIUM SERPL-SCNC: 142 MMOL/L (ref 132–146)
SP GR UR STRIP: 1.01 (ref 1–1.03)
SPECIMEN DESCRIPTION: NORMAL
TRIGL SERPL-MCNC: 129 MG/DL
UROBILINOGEN UR STRIP-ACNC: 0.2 EU/DL (ref 0–1)
WBC #/AREA URNS HPF: NORMAL /HPF
WBC OTHER # BLD: 19.8 K/UL (ref 4.5–11.5)

## 2024-10-16 PROCEDURE — 84478 ASSAY OF TRIGLYCERIDES: CPT

## 2024-10-16 PROCEDURE — 84100 ASSAY OF PHOSPHORUS: CPT

## 2024-10-16 PROCEDURE — 2580000003 HC RX 258: Performed by: STUDENT IN AN ORGANIZED HEALTH CARE EDUCATION/TRAINING PROGRAM

## 2024-10-16 PROCEDURE — 0W9G3ZZ DRAINAGE OF PERITONEAL CAVITY, PERCUTANEOUS APPROACH: ICD-10-PCS | Performed by: INTERNAL MEDICINE

## 2024-10-16 PROCEDURE — 99291 CRITICAL CARE FIRST HOUR: CPT | Performed by: INTERNAL MEDICINE

## 2024-10-16 PROCEDURE — 6360000002 HC RX W HCPCS: Performed by: INTERNAL MEDICINE

## 2024-10-16 PROCEDURE — 93010 ELECTROCARDIOGRAM REPORT: CPT | Performed by: INTERNAL MEDICINE

## 2024-10-16 PROCEDURE — 6360000002 HC RX W HCPCS: Performed by: STUDENT IN AN ORGANIZED HEALTH CARE EDUCATION/TRAINING PROGRAM

## 2024-10-16 PROCEDURE — 85025 COMPLETE CBC W/AUTO DIFF WBC: CPT

## 2024-10-16 PROCEDURE — 80053 COMPREHEN METABOLIC PANEL: CPT

## 2024-10-16 PROCEDURE — 2580000003 HC RX 258

## 2024-10-16 PROCEDURE — 2580000003 HC RX 258: Performed by: NURSE PRACTITIONER

## 2024-10-16 PROCEDURE — 6370000000 HC RX 637 (ALT 250 FOR IP): Performed by: INTERNAL MEDICINE

## 2024-10-16 PROCEDURE — 2500000003 HC RX 250 WO HCPCS: Performed by: STUDENT IN AN ORGANIZED HEALTH CARE EDUCATION/TRAINING PROGRAM

## 2024-10-16 PROCEDURE — 82962 GLUCOSE BLOOD TEST: CPT

## 2024-10-16 PROCEDURE — 2000000000 HC ICU R&B

## 2024-10-16 PROCEDURE — 6360000002 HC RX W HCPCS: Performed by: NURSE PRACTITIONER

## 2024-10-16 PROCEDURE — 83735 ASSAY OF MAGNESIUM: CPT

## 2024-10-16 PROCEDURE — 2700000000 HC OXYGEN THERAPY PER DAY

## 2024-10-16 RX ORDER — FENTANYL 75 UG/1
1 PATCH TRANSDERMAL
Status: DISCONTINUED | OUTPATIENT
Start: 2024-10-16 | End: 2024-10-19

## 2024-10-16 RX ORDER — GABAPENTIN 400 MG/1
400 CAPSULE ORAL 3 TIMES DAILY
Status: DISCONTINUED | OUTPATIENT
Start: 2024-10-16 | End: 2024-10-17 | Stop reason: ALTCHOICE

## 2024-10-16 RX ORDER — MORPHINE SULFATE 15 MG/1
15 TABLET ORAL EVERY 4 HOURS PRN
Status: DISCONTINUED | OUTPATIENT
Start: 2024-10-16 | End: 2024-10-23

## 2024-10-16 RX ORDER — FENTANYL CITRATE 50 UG/ML
50 INJECTION, SOLUTION INTRAMUSCULAR; INTRAVENOUS ONCE
Status: COMPLETED | OUTPATIENT
Start: 2024-10-16 | End: 2024-10-16

## 2024-10-16 RX ORDER — GABAPENTIN 400 MG/1
800 CAPSULE ORAL 3 TIMES DAILY
COMMUNITY

## 2024-10-16 RX ADMIN — GABAPENTIN 400 MG: 400 CAPSULE ORAL at 14:09

## 2024-10-16 RX ADMIN — FENTANYL CITRATE 25 MCG: 50 INJECTION INTRAMUSCULAR; INTRAVENOUS at 20:12

## 2024-10-16 RX ADMIN — HEPARIN SODIUM 5000 UNITS: 5000 INJECTION INTRAVENOUS; SUBCUTANEOUS at 08:47

## 2024-10-16 RX ADMIN — MORPHINE SULFATE 15 MG: 15 TABLET ORAL at 18:13

## 2024-10-16 RX ADMIN — FENTANYL CITRATE 25 MCG: 50 INJECTION INTRAMUSCULAR; INTRAVENOUS at 04:57

## 2024-10-16 RX ADMIN — MEROPENEM 1000 MG: 1 INJECTION INTRAVENOUS at 02:32

## 2024-10-16 RX ADMIN — FENTANYL CITRATE 25 MCG: 50 INJECTION INTRAMUSCULAR; INTRAVENOUS at 07:27

## 2024-10-16 RX ADMIN — FENTANYL CITRATE 50 MCG: 50 INJECTION INTRAMUSCULAR; INTRAVENOUS at 00:20

## 2024-10-16 RX ADMIN — CALCIUM GLUCONATE: 98 INJECTION, SOLUTION INTRAVENOUS at 17:52

## 2024-10-16 RX ADMIN — MEROPENEM 1000 MG: 1 INJECTION INTRAVENOUS at 10:00

## 2024-10-16 RX ADMIN — MORPHINE SULFATE 15 MG: 15 TABLET ORAL at 14:11

## 2024-10-16 RX ADMIN — SODIUM CHLORIDE, PRESERVATIVE FREE 10 ML: 5 INJECTION INTRAVENOUS at 08:53

## 2024-10-16 RX ADMIN — GABAPENTIN 400 MG: 400 CAPSULE ORAL at 21:25

## 2024-10-16 RX ADMIN — FENTANYL CITRATE 25 MCG: 50 INJECTION INTRAMUSCULAR; INTRAVENOUS at 02:25

## 2024-10-16 RX ADMIN — SODIUM CHLORIDE, PRESERVATIVE FREE 10 ML: 5 INJECTION INTRAVENOUS at 21:25

## 2024-10-16 RX ADMIN — FENTANYL CITRATE 25 MCG: 50 INJECTION INTRAMUSCULAR; INTRAVENOUS at 11:51

## 2024-10-16 RX ADMIN — MORPHINE SULFATE 15 MG: 15 TABLET ORAL at 22:15

## 2024-10-16 RX ADMIN — SODIUM CHLORIDE: 9 INJECTION, SOLUTION INTRAVENOUS at 04:54

## 2024-10-16 RX ADMIN — SODIUM CHLORIDE, PRESERVATIVE FREE 10 ML: 5 INJECTION INTRAVENOUS at 09:53

## 2024-10-16 RX ADMIN — FLUCONAZOLE 400 MG: 2 INJECTION, SOLUTION INTRAVENOUS at 14:42

## 2024-10-16 RX ADMIN — FENTANYL CITRATE 25 MCG: 50 INJECTION INTRAMUSCULAR; INTRAVENOUS at 09:53

## 2024-10-16 RX ADMIN — SODIUM CHLORIDE, PRESERVATIVE FREE 40 MG: 5 INJECTION INTRAVENOUS at 08:50

## 2024-10-16 RX ADMIN — MEROPENEM 1000 MG: 1 INJECTION INTRAVENOUS at 17:46

## 2024-10-16 ASSESSMENT — PAIN SCALES - GENERAL
PAINLEVEL_OUTOF10: 7
PAINLEVEL_OUTOF10: 10
PAINLEVEL_OUTOF10: 10
PAINLEVEL_OUTOF10: 8
PAINLEVEL_OUTOF10: 10
PAINLEVEL_OUTOF10: 7
PAINLEVEL_OUTOF10: 7
PAINLEVEL_OUTOF10: 10
PAINLEVEL_OUTOF10: 9
PAINLEVEL_OUTOF10: 7
PAINLEVEL_OUTOF10: 10
PAINLEVEL_OUTOF10: 8
PAINLEVEL_OUTOF10: 5
PAINLEVEL_OUTOF10: 10
PAINLEVEL_OUTOF10: 5
PAINLEVEL_OUTOF10: 8
PAINLEVEL_OUTOF10: 6
PAINLEVEL_OUTOF10: 10
PAINLEVEL_OUTOF10: 7
PAINLEVEL_OUTOF10: 9
PAINLEVEL_OUTOF10: 10
PAINLEVEL_OUTOF10: 10
PAINLEVEL_OUTOF10: 8

## 2024-10-16 ASSESSMENT — PAIN DESCRIPTION - ORIENTATION
ORIENTATION: LOWER;LEFT
ORIENTATION: LEFT
ORIENTATION: LEFT
ORIENTATION: RIGHT
ORIENTATION: OTHER (COMMENT)
ORIENTATION: LOWER;LEFT
ORIENTATION: LOWER;LEFT
ORIENTATION: LEFT
ORIENTATION: MID
ORIENTATION: LOWER;LEFT
ORIENTATION: MID
ORIENTATION: LEFT
ORIENTATION: LOWER;LEFT
ORIENTATION: MID;LOWER;LEFT

## 2024-10-16 ASSESSMENT — PAIN DESCRIPTION - LOCATION
LOCATION: ABDOMEN
LOCATION: GENERALIZED
LOCATION: ABDOMEN

## 2024-10-16 ASSESSMENT — PAIN DESCRIPTION - ONSET
ONSET: ON-GOING

## 2024-10-16 ASSESSMENT — PAIN DESCRIPTION - DESCRIPTORS
DESCRIPTORS: ACHING;BURNING;THROBBING
DESCRIPTORS: ACHING
DESCRIPTORS: ACHING;BURNING;THROBBING;TENDER
DESCRIPTORS: ACHING;BURNING;TIGHTNESS;THROBBING
DESCRIPTORS: DULL;DISCOMFORT;SORE
DESCRIPTORS: ACHING;DISCOMFORT;SORE
DESCRIPTORS: ACHING;SORE;DISCOMFORT
DESCRIPTORS: ACHING
DESCRIPTORS: ACHING
DESCRIPTORS: ACHING;BURNING;TENDER;THROBBING
DESCRIPTORS: ACHING;BURNING;THROBBING
DESCRIPTORS: SORE;ACHING;DISCOMFORT
DESCRIPTORS: SHOOTING;THROBBING;ACHING
DESCRIPTORS: ACHING

## 2024-10-16 ASSESSMENT — PAIN DESCRIPTION - PAIN TYPE
TYPE: ACUTE PAIN
TYPE: SURGICAL PAIN

## 2024-10-16 ASSESSMENT — PAIN - FUNCTIONAL ASSESSMENT
PAIN_FUNCTIONAL_ASSESSMENT: PREVENTS OR INTERFERES SOME ACTIVE ACTIVITIES AND ADLS
PAIN_FUNCTIONAL_ASSESSMENT: ACTIVITIES ARE NOT PREVENTED
PAIN_FUNCTIONAL_ASSESSMENT: PREVENTS OR INTERFERES SOME ACTIVE ACTIVITIES AND ADLS
PAIN_FUNCTIONAL_ASSESSMENT: PREVENTS OR INTERFERES WITH ALL ACTIVE AND SOME PASSIVE ACTIVITIES
PAIN_FUNCTIONAL_ASSESSMENT: PREVENTS OR INTERFERES SOME ACTIVE ACTIVITIES AND ADLS

## 2024-10-16 ASSESSMENT — PAIN DESCRIPTION - FREQUENCY
FREQUENCY: CONTINUOUS

## 2024-10-16 NOTE — PLAN OF CARE
Problem: Pain  Goal: Verbalizes/displays adequate comfort level or baseline comfort level  10/16/2024 0836 by Chilango Bautista RN  Outcome: Progressing     Problem: Skin/Tissue Integrity  Goal: Absence of new skin breakdown  Description: 1.  Monitor for areas of redness and/or skin breakdown  2.  Assess vascular access sites hourly  3.  Every 4-6 hours minimum:  Change oxygen saturation probe site  4.  Every 4-6 hours:  If on nasal continuous positive airway pressure, respiratory therapy assess nares and determine need for appliance change or resting period.  10/16/2024 0836 by Chilango Bautista RN  Outcome: Progressing     Problem: Safety - Adult  Goal: Free from fall injury  10/16/2024 0836 by Chilango Bautista RN  Outcome: Progressing     Problem: Neurosensory - Adult  Goal: Absence of seizures  10/16/2024 0836 by Chilango Bautista RN  Outcome: Progressing     Problem: Respiratory - Adult  Goal: Achieves optimal ventilation and oxygenation  10/16/2024 0836 by Chilango Bautista RN  Outcome: Progressing     Problem: Skin/Tissue Integrity - Adult  Goal: Skin integrity remains intact  10/16/2024 0836 by Chilango Bautista RN  Outcome: Progressing     Problem: Genitourinary - Adult  Goal: Absence of urinary retention  10/16/2024 0836 by Chilango Bautista RN  Outcome: Progressing     Problem: Nutrition Deficit:  Goal: Optimize nutritional status  10/16/2024 0836 by Chilango Bautista RN  Outcome: Progressing     Problem: ABCDS Injury Assessment  Goal: Absence of physical injury  10/16/2024 0836 by Chilango Bautista RN  Outcome: Progressing

## 2024-10-16 NOTE — ACP (ADVANCE CARE PLANNING)
Advance Care Planning   Healthcare Decision Maker:    Primary Decision Maker: Rosalinda Kaba - Brother/Sister - [phone]    Primary Decision Maker: Charito Hebert - Brother/Sister - [phone]    Click here to complete Healthcare Decision Makers including selection of the Healthcare Decision Maker Relationship (ie \"Primary\").

## 2024-10-16 NOTE — CARE COORDINATION
Case Management Assessment  Initial Evaluation    Date/Time of Evaluation: 10/16/2024 11:19 AM  Assessment Completed by: Kitty Petersen RN    If patient is discharged prior to next notation, then this note serves as note for discharge by case management.    Patient Name: Monique Voss                   YOB: 1975  Diagnosis: Bowel perforation (HCC) [K63.1]  Necrotizing pancreatitis [K85.91]  Acute gastric perforation [K31.89]                   Date / Time: 10/15/2024 12:00 AM    Patient Admission Status: Inpatient   Readmission Risk (Low < 19, Mod (19-27), High > 27): Readmission Risk Score: 23.6    Current PCP: Madison Amin PA-C  PCP verified by CM? Yes    Chart Reviewed: Yes      History Provided by: Patient, Child/Family  Patient Orientation: Alert and Oriented    Patient Cognition: Alert    Hospitalization in the last 30 days (Readmission):  Yes    If yes, Readmission Assessment in CM Navigator will be completed.    Advance Directives:      Code Status: Full Code   Patient's Primary Decision Maker is: Legal Next of Kin    Primary Decision Maker: SesarRosalinda - Brother/Sister - [phone]    Primary Decision Maker: Charito Hebert - Brother/Sister - [phone]    Discharge Planning:    Patient lives with:   Type of Home:    Primary Care Giver: Self  Patient Support Systems include: Family Members   Current Financial resources:    Current community resources:    Current services prior to admission:              Current DME:              Type of Home Care services:       ADLS  Prior functional level: Mobility, Shopping, Housework  Current functional level: Other (see comment) (Currently in ICU)    PT AM-PAC:   /24  OT AM-PAC:   /24    Family can provide assistance at DC: Yes  Would you like Case Management to discuss the discharge plan with any other family members/significant others, and if so, who? Yes (family present)  Plans to Return to Present Housing: Unknown at present  Other  Identified Issues/Barriers to RETURNING to current housing:   Potential Assistance needed at discharge:              Potential DME:    Patient expects to discharge to:    Plan for transportation at discharge:      Financial    Payor: [employer] MEDICARE / Plan: [employer] GOLD PLUS HMO / Product Type: *No Product type* /     Does insurance require precert for SNF: Yes    Potential assistance Purchasing Medications:    Meds-to-Beds request:        [facility] [id number] [place] [address] - [address] [facility] [phone] - F [phone]  [address] [place] [id number]  Phone: [phone] Fax: [phone]      Notes:    Factors facilitating achievement of predicted outcomes: Family support    Barriers to discharge: Medical complications    Additional Case Management Notes:     The Plan for Transition of Care is related to the following treatment goals of Bowel perforation (HCC) [K63.1]  Necrotizing pancreatitis [K85.91]  Acute gastric perforation [K31.89]    IF APPLICABLE: The Patient and/or patient representative Monique and her family were provided with a choice of provider and agrees with the discharge plan. Freedom of choice list with basic dialogue that supports the patient's individualized plan of care/goals and shares the quality data associated with the providers was provided to:     Patient Representative Name:       The Patient and/or Patient Representative Agree with the Discharge Plan?      Kitty Petersen RN  Case Management Department  Ph: [phone] Fax:

## 2024-10-16 NOTE — PROGRESS NOTES
SOO PROGRESS NOTE      Chief complaint: Follow-up of intraabdominal abscess    The patient is a 49 y.o.  morbidly obese female with history of COPD, CAD, DM, hypertension, hyperlipidemia, hidradenitis suppurativa right hip osteoarthritis on steroid injections, meningocele, gastric bypass surgery in 2022, previously admitted from 10/01-10/08 for chronic pancreatic cyst, severe acute pancreatitis with necrosis, possibly drug induced for which she was seen by Hepatobiliary Surgery and was given a course of meropenem then discharged on amoxicillin-clavulanate for 7 days, presented on 10/14 with abdominal pain worsening for the past week. On admission, she was afebrile and hemodynamically stable with leukocytosis of 18,000.  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  Chest x-ray showed patchy left upper lobe lingular segment opacity.  Urine Streptococcus pneumoniae and Legionella antigens, respiratory pathogen PCR were negative.  Blood cultures showed no growth to date.  Meropenem and vancomycin were started on admission. She underwent percutaneous abscess drain placement on 10/15. Abscess fluid Gram stain and culture showed few polymorphonuclear leukocytes, no epithelial cells, moderate Gram-negative rods.     Subjective: Patient was seen and examined. No chills, has less abdominal pain, no diarrhea, no rash, no itching. Afebrile.      Objective:    Vitals:    10/16/24 1600   BP: 130/88   Pulse: 85   Resp: 26   Temp: 99 °F (37.2 °C)   SpO2: 95%     Constitutional: Alert, not in distress  Respiratory:

## 2024-10-16 NOTE — PROGRESS NOTES
General Surgery   Daily Progress Note      Patient's Name/Date of Birth: Monique Voss / 1975    Date: October 16, 2024     Chief Complaint: abdominal pain    Patient Active Problem List   Diagnosis    Primary hypertension    Mild intermittent asthma without complication    Morbid obesity    Reactive depression    Anxiety    Ureteric stone    Tobacco dependence    Hydronephrosis with urinary obstruction due to renal calculus    Pancreatic cyst    PVC (premature ventricular contraction)    Spinal stenosis of lumbar region with neurogenic claudication    Primary osteoarthritis of both hips    Gastroesophageal reflux disease without esophagitis    Vitamin D deficiency    Diabetes mellitus (HCC)    Hyperlipidemia LDL goal <70    Obstructive sleep apnea    Chronic pain syndrome    Lumbar radiculopathy    Chronic bilateral low back pain with bilateral sciatica    Hidradenitis suppurativa    Nonischemic cardiomyopathy (HCC)    Malnutrition (HCC)    Current moderate episode of major depressive disorder without prior episode (HCC)    S/P gastric bypass    Left hip pain    Non-ST elevation myocardial infarction (NSTEMI) (HCC)    Chronic systolic heart failure (HCC)    Bigeminy    Bradycardia    Coronary artery disease involving native coronary artery of native heart without angina pectoris    Pure hypercholesterolemia    Chronic obstructive pulmonary disease (HCC)    Opioid dependence with current use (HCC)    Acute infective pancreatitis    Sepsis with acute organ dysfunction (HCC)    Acute pancreatitis    Bowel perforation (HCC)    Acute gastric perforation       Subjective: Patient feels much better this a.m.  Her pain is much improved.  She underwent IR drainage yesterday.  325 cc seropurulent material in drain.  Patient denies nausea and vomiting.  She is having bowel function.    Objective:  /70   Pulse 85   Temp 99.1 °F (37.3 °C) (Oral)   Resp 21   Ht 1.727 m (5' 8\")   Wt 121.7 kg (268 lb 6.4  oz)   LMP 09/01/2024 (Approximate)   SpO2 92%   BMI 40.81 kg/m²   Labs:  Recent Labs     10/14/24  2140 10/15/24  0833 10/16/24  0424   WBC 18.3* 18.0* 19.8*   HGB 14.0 11.5 11.5   HCT 41.1 34.8 35.1     Recent Labs     10/14/24  2140 10/15/24  0833 10/16/24  0424    137 142   K 3.9 3.7 3.9    101 105   CO2 29 29 30*   BUN 8 7 5*   CREATININE 0.6 0.5 0.6     Recent Labs     10/14/24  2140 10/15/24  0833 10/16/24  0424   ALKPHOS 126* 101 104   ALT 10 8 6   AST 14 10 8   BILITOT 0.3 0.2 0.2   LIPASE 441* 90*  --          General appearance: NAD  Head: NCAT, PERRL, EOMI, pink conjunctiva  Neck: supple, no masses  Lungs: symmetric chest rise, no audible wheezes  Heart: warm throughout  Abdomen: soft, non-distended, only tender around drain site.  Drain seropurulent.  Skin: no visible lesions  Extremities: extremities normal, atraumatic, no cyanosis or edema      Assessment/Plan:  Monique Voss is a 49 y.o. female with contained gastric remnant perforation and pancreatitis with perigastric abscess s/p IR drainage 10/15.    Okay to advance diet as tolerated-defer to [facility] team.  Continue drain care.  Continue TPN per [facility].  Appreciate ID assistance with antibiotics-currently on Merrem and fluconazole.  No plans for immediate surgical intervention at this time as patient at high risk for complication with longstanding pancreatitis, splenic vessel thrombosis, and suspect lesser sac is fused containing the perforation.    Discussed with Dr. Murillo.    Sharmin Bailon MD  PGY-4 General Surgery Resident

## 2024-10-16 NOTE — PLAN OF CARE
Problem: Discharge Planning  Goal: Discharge to home or other facility with appropriate resources  Outcome: Progressing  Flowsheets (Taken 10/16/2024 1516)  Discharge to home or other facility with appropriate resources:   Identify barriers to discharge with patient and caregiver   Arrange for needed discharge resources and transportation as appropriate     Problem: Pain  Goal: Verbalizes/displays adequate comfort level or baseline comfort level  10/16/2024 1516 by Swathi Curiel RN  Outcome: Progressing  Flowsheets (Taken 10/16/2024 1516)  Verbalizes/displays adequate comfort level or baseline comfort level:   Encourage patient to monitor pain and request assistance   Administer analgesics based on type and severity of pain and evaluate response   Assess pain using appropriate pain scale  10/16/2024 0836 by Chilango Bautista RN  Outcome: Progressing     Problem: Skin/Tissue Integrity  Goal: Absence of new skin breakdown  Description: 1.  Monitor for areas of redness and/or skin breakdown  2.  Assess vascular access sites hourly  3.  Every 4-6 hours minimum:  Change oxygen saturation probe site  4.  Every 4-6 hours:  If on nasal continuous positive airway pressure, respiratory therapy assess nares and determine need for appliance change or resting period.  10/16/2024 1516 by Swathi Curiel RN  Outcome: Progressing  10/16/2024 0836 by Chilango Bautista RN  Outcome: Progressing     Problem: Safety - Adult  Goal: Free from fall injury  10/16/2024 1516 by Swathi Curiel RN  Outcome: Progressing  10/16/2024 0836 by Chilango Bautista RN  Outcome: Progressing     Problem: Neurosensory - Adult  Goal: Achieves stable or improved neurological status  Outcome: Progressing  Flowsheets (Taken 10/15/2024 2000 by Miguel Wright RN)  Achieves stable or improved neurological status:   Assess for and report changes in neurological status   Initiate measures to prevent increased intracranial pressure   Maintain blood  Progressing  Goal: Glucose maintained within prescribed range  Outcome: Progressing     Problem: Nutrition Deficit:  Goal: Optimize nutritional status  10/16/2024 0836 by Chilango Bautista RN  Outcome: Progressing     Problem: ABCDS Injury Assessment  Goal: Absence of physical injury  10/16/2024 0836 by Chilango Bautista, RN  Outcome: Progressing

## 2024-10-16 NOTE — PLAN OF CARE
Problem: Discharge Planning  Goal: Discharge to home or other facility with appropriate resources  10/15/2024 2132 by Miguel Wright RN  Outcome: Progressing  Flowsheets (Taken 10/15/2024 2000)  Discharge to home or other facility with appropriate resources:   Identify barriers to discharge with patient and caregiver   Identify discharge learning needs (meds, wound care, etc)  10/15/2024 1107 by Ginny Hi RN  Outcome: Progressing     Problem: Pain  Goal: Verbalizes/displays adequate comfort level or baseline comfort level  10/15/2024 2132 by Miguel Wright RN  Outcome: Progressing  10/15/2024 1107 by Ginny Hi RN  Outcome: Progressing     Problem: Skin/Tissue Integrity  Goal: Absence of new skin breakdown  Description: 1.  Monitor for areas of redness and/or skin breakdown  2.  Assess vascular access sites hourly  3.  Every 4-6 hours minimum:  Change oxygen saturation probe site  4.  Every 4-6 hours:  If on nasal continuous positive airway pressure, respiratory therapy assess nares and determine need for appliance change or resting period.  10/15/2024 2132 by Miguel Wright RN  Outcome: Progressing  10/15/2024 1107 by Ginny Hi RN  Outcome: Progressing     Problem: Safety - Adult  Goal: Free from fall injury  10/15/2024 2132 by Miguel Wright RN  Outcome: Progressing  Flowsheets (Taken 10/15/2024 2000)  Free From Fall Injury: Instruct family/caregiver on patient safety  10/15/2024 1107 by Ginny Hi RN  Outcome: Progressing     Problem: Neurosensory - Adult  Goal: Achieves stable or improved neurological status  10/15/2024 2132 by Miguel Wright RN  Outcome: Progressing  Flowsheets (Taken 10/15/2024 2000)  Achieves stable or improved neurological status:   Assess for and report changes in neurological status   Initiate measures to prevent increased intracranial pressure   Maintain blood pressure and fluid volume within ordered parameters to optimize cerebral  and TPN as ordered   Infuse IV Fluids/TPN as ordered     Problem: Genitourinary - Adult  Goal: Absence of urinary retention  10/15/2024 2132 by Miguel Wright RN  Outcome: Progressing  Flowsheets (Taken 10/15/2024 2000)  Absence of urinary retention:   Assess patient’s ability to void and empty bladder   Monitor intake/output and perform bladder scan as needed   Place urinary catheter per Licensed Independent Practitioner order if needed  10/15/2024 1107 by Ginny Hi RN  Outcome: Progressing  Goal: Urinary catheter remains patent  10/15/2024 2132 by Miguel Wright RN  Outcome: Progressing  10/15/2024 1107 by Ginny Hi RN  Outcome: Progressing     Problem: Infection - Adult  Goal: Absence of infection at discharge  10/15/2024 2132 by Miguel Wright RN  Outcome: Progressing  Flowsheets (Taken 10/15/2024 2000)  Absence of infection at discharge:   Assess and monitor for signs and symptoms of infection   Monitor lab/diagnostic results   Monitor all insertion sites i.e., indwelling lines, tubes and drains  10/15/2024 1107 by Ginny Hi RN  Outcome: Progressing  Goal: Absence of infection during hospitalization  10/15/2024 2132 by Miguel Wright RN  Outcome: Progressing  Flowsheets (Taken 10/15/2024 2000)  Absence of infection during hospitalization:   Assess and monitor for signs and symptoms of infection   Monitor lab/diagnostic results   Monitor all insertion sites i.e., indwelling lines, tubes and drains  10/15/2024 1107 by Ginny Hi RN  Outcome: Progressing  Goal: Absence of fever/infection during anticipated neutropenic period  10/15/2024 2132 by Miguel Wright RN  Outcome: Progressing  Flowsheets (Taken 10/15/2024 2000)  Absence of fever/infection during anticipated neutropenic period: Monitor white blood cell count  10/15/2024 1107 by Ginny Hi RN  Outcome: Progressing     Problem: Metabolic/Fluid and Electrolytes - Adult  Goal: Electrolytes maintained

## 2024-10-16 NOTE — PROGRESS NOTES
[facility] Surgery   Daily Progress Note      Patient's Name/Date of Birth: Monique Voss / 1975    Date: October 16, 2024     Chief Complaint: abdominal pain    Patient Active Problem List   Diagnosis    Primary hypertension    Mild intermittent asthma without complication    Morbid obesity    Reactive depression    Anxiety    Ureteric stone    Tobacco dependence    Hydronephrosis with urinary obstruction due to renal calculus    Pancreatic cyst    PVC (premature ventricular contraction)    Spinal stenosis of lumbar region with neurogenic claudication    Primary osteoarthritis of both hips    Gastroesophageal reflux disease without esophagitis    Vitamin D deficiency    Diabetes mellitus (HCC)    Hyperlipidemia LDL goal <70    Obstructive sleep apnea    Chronic pain syndrome    Lumbar radiculopathy    Chronic bilateral low back pain with bilateral sciatica    Hidradenitis suppurativa    Nonischemic cardiomyopathy (HCC)    Malnutrition (HCC)    Current moderate episode of major depressive disorder without prior episode (HCC)    S/P gastric bypass    Left hip pain    Non-ST elevation myocardial infarction (NSTEMI) (HCC)    Chronic systolic heart failure (HCC)    Bigeminy    Bradycardia    Coronary artery disease involving native coronary artery of native heart without angina pectoris    Pure hypercholesterolemia    Chronic obstructive pulmonary disease (HCC)    Opioid dependence with current use (HCC)    Acute infective pancreatitis    Sepsis with acute organ dysfunction (HCC)    Acute pancreatitis    Bowel perforation (HCC)    Acute gastric perforation       Subjective: Patient feels much better this a.m.  Her pain is much improved.  She underwent IR drainage yesterday.  325 cc seropurulent material in drain.  Patient denies nausea and vomiting.  She is having bowel function.    Objective:  /70   Pulse 85   Temp 99.1 °F (37.3 °C) (Oral)   Resp 21   Ht 1.727 m (5' 8\")   Wt 121.7 kg (268 lb 6.4 oz)

## 2024-10-16 NOTE — CARE COORDINATION
Care Coordination LOS 1-day, recent admission, 10/1-10/8/24--pancreatitis with necrosis. discharged home with family and no needs.  ED on 10/15/24 for Abd pain.  [facility] surgery consult, CT in Er, possible gastric remnant fundus rupture 2/2 pancreatitis.  Pt to IR for LUQ abscess drainage, JUANJO drain.  TPN-,,cl liquid diet,  Merrem, Fluconazole. Picc 10/15. ID following cultures.   Met with pt and multiple family members at bedside.  Discussed [facility] vs [facility]. She is adamant about returning home at discharge. Will follow for possible need of IV antibiotics, ? TPN need. She has hx of [facility] and would like at discharge for skilled, ptx, otx, aide.  She lives in apt, alone 5 steps to enter. Has a cpap, rollator, cane, shower chair and bsc.  Depending on needs, may qualify for Lt[facility] stay. Referral made to [facility]. LOS 1-day, recent admission, 10/1-10/8/24--pancreatitis with necrosis. discharged home with family and no needs. ED on 10/15/24 for Abd pain. [facility] surgery consult, CT in Er, possible gastric remnant fundus rupture 2/2 pancreatitis. Pt to IR for LUQ abscess drainage, JUANJO drain. TPN-,,cl liquid diet, Merrem, Fluconazole. Picc 10/15. ID following cultures. Met with pt and multiple family members at bedside. Discussed [facility] vs [facility]. She is adamant about returning home at discharge. Will follow for possible need of IV antibiotics, ? TPN need. She has hx of [facility] and would like at discharge for skilled, ptx, otx, aide. She lives in apt, alone 5 steps to enter. Has a cpap, rollator, cane, shower chair and bsc. Depending on needs, may qualify for [facility] stay. Referral made to [facility]   ADDENDUM: spoke to ian at [id number].    Electronically signed by Kitty Petersen RN on 10/16/2024 at 11:07 AM     The Plan for Transition of Care is related to the following treatment goals:     The Patient was provided with a choice of provider and agrees   with the discharge plan. [x] Yes [] No    Freedom of choice list was provided with basic

## 2024-10-16 NOTE — PROGRESS NOTES
with no evidence of abscess.  There is stable focal fluid collection along the posterior greater curvature of the stomach measuring 3.4 cm seen on image 45, series 301.  There is stable diffuse inflammatory changes of the pancreas with focal hypodensity in the pancreatic body grossly unchanged as seen on image 63, series 301.  Encapsulated cyst in the region the pancreatic head are stable with the largest in the region the uncinate process measuring 3.2 cm.  There is stable peripancreatic inflammatory changes.  Spleen enhances normally with stable perisplenic simple fluid.  Adrenal glands are normal. Once again a normal splenic vein is not visualized. Once again there are cirrhotic changes of the liver.  Gallbladder is surgically absent.  Kidneys enhance normally and there is no hydronephrosis. There is no perinephric fluid-hematoma. Caliber of the abdominal aorta is normal.  The appendix is normal.  Large and small bowel caliber is normal with no pericolonic or perienteric fatty infiltrative changes, with the exception of reactive bowel wall thickening of the descending portion of the duodenum..  Urinary bladder is normal.     1. Stable small left pleural effusion with fairly extensive left lower lobe consolidative airspace disease likely reflecting atelectasis. 2. Stable left upper quadrant-perigastric multifocal free intraperitoneal air with omental inflammatory changes without abscess. 3. Stable diffuse changes of acute pancreatitis with stable focal area of decreased attenuation-enhancement in the pancreatic body once again possibly reflecting pancreatic necrosis.  Stable pancreatic head cystic lesions. Stable reactive inflammatory changes of the descending portion the duodenum.     CT ABDOMEN PELVIS W IV CONTRAST Additional Contrast? None    Result Date: 10/15/2024  EXAMINATION: CT OF THE ABDOMEN AND PELVIS WITH CONTRAST 10/14/2024 11:31 pm TECHNIQUE: CT of the abdomen and pelvis was performed with the  administration of intravenous contrast. Multiplanar reformatted images are provided for review. Automated exposure control, iterative reconstruction, and/or weight based adjustment of the mA/kV was utilized to reduce the radiation dose to as low as reasonably achievable. COMPARISON: CT abdomen and pelvis 10/04/2024 and 11/20/2024.  MRCP 10/06/2024. HISTORY: ORDERING SYSTEM PROVIDED HISTORY: hx of pancreatitis, abd pain TECHNOLOGIST PROVIDED HISTORY: Reason for exam:->hx of pancreatitis, abd pain Additional Contrast?->None Decision Support Exception - unselect if not a suspected or confirmed emergency medical condition->Emergency Medical Condition (MA) What reading provider will be dictating this exam?->CRC FINDINGS: Lower Chest: Small left-sided pleural effusion with adjacent airspace consolidations. Organs: There is interval decompression of a fluid collection of the gastric fundal wall (image 42 series 301, currently measuring 4.6 cm, previously 6.2 cm on comparison CT 10/05/2024 when measured in a similar manner.  There is an adjacent fluid and scattered internal foci of extraluminal air. Overall similar appearing of diffuse inflammation around the pancreas with hypodensity of the pancreatic body comprising 25% of pancreas mass.  Largest peripancreatic fluid collection measures 7.0 cm.  Multiple encapsulated and non encapsulated fluid collections again noted. No hydronephrosis.  Similar appearing bilateral nonobstructive nephrolithiasis. Spleen is atrophic. Gallbladder surgically absent. There is nodularity of the anterior Paddock surface. GI/Bowel: No evidence of bowel obstruction.  Postsurgical changes compatible with prior gastric bypass again noted. Pelvis: Bladder and uterus are normal.  Ovaries are normal. Peritoneum/Retroperitoneum: Free intraperitoneal air as above.  The portal vein and splenic are not visualized as they course along the pancreas. Collateralization of portal venous flow within the hepatic  pancreatic body once again possibly reflecting pancreatic necrosis.  Stable pancreatic head cystic lesions. Stable reactive inflammatory changes of the descending portion the duodenum.     CT ABDOMEN PELVIS W IV CONTRAST Additional Contrast? None    Result Date: 10/15/2024  1.  Constellation of findings compatible with perforation of the gastric fundus and leakage of gastric contents into the lesser peritoneal sac. 2.  Similar appearance of patient's pancreatitis with hypodensity of the pancreatic body suggestive of necrotizing pancreatitis, and multiple encapsulated and non encapsulated peripancreatic fluid collections. 3.  Splenic artery and portal vein are not visualized as they course along the pancreas, with findings suggestive of chronic high-grade stenosis or occlusion of these vessels. 4.  Findings of hepatic cirrhosis. 5.  Small left pleural effusion with adjacent airspace consolidations that likely relate to atelectasis. Impressions 1-3 above were discussed with Dr. Huang by telephone at 0104 am EST with verification.     XR CHEST PORTABLE    Result Date: 10/14/2024  Patchy left upper lobe lingular segment opacity. Findings worrisome for pneumonia.       EKG  :     Rhythm Strip :Normal Sinus Rhythm    ECHO  :                        Assessment and Plan of care     Diagnosis:  Perforation of gastric remnant, status post vonnie-en-Y bypass 2018  Intra-abdominal abscess  Status post IR drainage, left upper quadrant abscess drainage 10/15  Status post JUANJO drain  Abscess culture growing moderate gram-negative rods  Diffuse abdominal pain  History of JODI, not using CPAP for prolonged period of time    Plan:    Neuro: Following commands, no focal neurodeficit, fentanyl 25 mics every 2 as needed, fentanyl patch 75 mcg to start.  Plan to start immediate release morphine if surgery allows p.o. medications.  Pulmonary: History of JODI, not on CPAP for prolonged period of time, currently on 5 L nasal

## 2024-10-16 NOTE — PROGRESS NOTES
Patient refused dose of sub q heparin for 1600 dose. Educated on importance, no evidence of learning. Patient stated she will take the 0000 dose.

## 2024-10-17 LAB
ALBUMIN SERPL-MCNC: 2.2 G/DL (ref 3.5–5.2)
ALP SERPL-CCNC: 110 U/L (ref 35–104)
ALT SERPL-CCNC: 5 U/L (ref 0–32)
ANION GAP SERPL CALCULATED.3IONS-SCNC: 8 MMOL/L (ref 7–16)
AST SERPL-CCNC: 7 U/L (ref 0–31)
BASOPHILS # BLD: 0.06 K/UL (ref 0–0.2)
BASOPHILS NFR BLD: 0 % (ref 0–2)
BILIRUB SERPL-MCNC: 0.2 MG/DL (ref 0–1.2)
BUN SERPL-MCNC: 9 MG/DL (ref 6–20)
CALCIUM SERPL-MCNC: 8.5 MG/DL (ref 8.6–10.2)
CHLORIDE SERPL-SCNC: 106 MMOL/L (ref 98–107)
CO2 SERPL-SCNC: 29 MMOL/L (ref 22–29)
CREAT SERPL-MCNC: 0.5 MG/DL (ref 0.5–1)
EOSINOPHIL # BLD: 0.12 K/UL (ref 0.05–0.5)
EOSINOPHILS RELATIVE PERCENT: 1 % (ref 0–6)
ERYTHROCYTE [DISTWIDTH] IN BLOOD BY AUTOMATED COUNT: 16.8 % (ref 11.5–15)
GFR, ESTIMATED: >90 ML/MIN/1.73M2
GLUCOSE BLD-MCNC: 96 MG/DL (ref 74–99)
GLUCOSE SERPL-MCNC: 128 MG/DL (ref 74–99)
HCT VFR BLD AUTO: 35.1 % (ref 34–48)
HGB BLD-MCNC: 11.5 G/DL (ref 11.5–15.5)
IMM GRANULOCYTES # BLD AUTO: 0.28 K/UL (ref 0–0.58)
IMM GRANULOCYTES NFR BLD: 1 % (ref 0–5)
INR PPP: 1.3
LYMPHOCYTES NFR BLD: 2.37 K/UL (ref 1.5–4)
LYMPHOCYTES RELATIVE PERCENT: 11 % (ref 20–42)
MAGNESIUM SERPL-MCNC: 2 MG/DL (ref 1.6–2.6)
MCH RBC QN AUTO: 31.3 PG (ref 26–35)
MCHC RBC AUTO-ENTMCNC: 32.8 G/DL (ref 32–34.5)
MCV RBC AUTO: 95.4 FL (ref 80–99.9)
MONOCYTES NFR BLD: 1.26 K/UL (ref 0.1–0.95)
MONOCYTES NFR BLD: 6 % (ref 2–12)
NEUTROPHILS NFR BLD: 82 % (ref 43–80)
NEUTS SEG NFR BLD: 18.31 K/UL (ref 1.8–7.3)
PARTIAL THROMBOPLASTIN TIME: 29.8 SEC (ref 24.5–35.1)
PHOSPHATE SERPL-MCNC: 3.5 MG/DL (ref 2.5–4.5)
PLATELET # BLD AUTO: 618 K/UL (ref 130–450)
PMV BLD AUTO: 10.2 FL (ref 7–12)
POTASSIUM SERPL-SCNC: 4 MMOL/L (ref 3.5–5)
PROT SERPL-MCNC: 5.6 G/DL (ref 6.4–8.3)
PROTHROMBIN TIME: 14 SEC (ref 9.3–12.4)
RBC # BLD AUTO: 3.68 M/UL (ref 3.5–5.5)
SODIUM SERPL-SCNC: 143 MMOL/L (ref 132–146)
WBC OTHER # BLD: 22.4 K/UL (ref 4.5–11.5)

## 2024-10-17 PROCEDURE — 2580000003 HC RX 258

## 2024-10-17 PROCEDURE — 2580000003 HC RX 258: Performed by: NURSE PRACTITIONER

## 2024-10-17 PROCEDURE — 6360000002 HC RX W HCPCS: Performed by: INTERNAL MEDICINE

## 2024-10-17 PROCEDURE — 84100 ASSAY OF PHOSPHORUS: CPT

## 2024-10-17 PROCEDURE — 2700000000 HC OXYGEN THERAPY PER DAY

## 2024-10-17 PROCEDURE — 85025 COMPLETE CBC W/AUTO DIFF WBC: CPT

## 2024-10-17 PROCEDURE — 6370000000 HC RX 637 (ALT 250 FOR IP): Performed by: INTERNAL MEDICINE

## 2024-10-17 PROCEDURE — 2060000000 HC ICU INTERMEDIATE R&B

## 2024-10-17 PROCEDURE — 6360000002 HC RX W HCPCS

## 2024-10-17 PROCEDURE — 2500000003 HC RX 250 WO HCPCS

## 2024-10-17 PROCEDURE — 85610 PROTHROMBIN TIME: CPT

## 2024-10-17 PROCEDURE — 83735 ASSAY OF MAGNESIUM: CPT

## 2024-10-17 PROCEDURE — 82962 GLUCOSE BLOOD TEST: CPT

## 2024-10-17 PROCEDURE — 80053 COMPREHEN METABOLIC PANEL: CPT

## 2024-10-17 PROCEDURE — 6360000002 HC RX W HCPCS: Performed by: NURSE PRACTITIONER

## 2024-10-17 PROCEDURE — 85730 THROMBOPLASTIN TIME PARTIAL: CPT

## 2024-10-17 RX ORDER — GABAPENTIN 800 MG/1
400 TABLET ORAL 3 TIMES DAILY
Status: DISCONTINUED | OUTPATIENT
Start: 2024-10-17 | End: 2024-10-24 | Stop reason: HOSPADM

## 2024-10-17 RX ORDER — CITALOPRAM HYDROBROMIDE 40 MG/1
40 TABLET ORAL DAILY
Status: DISCONTINUED | OUTPATIENT
Start: 2024-10-17 | End: 2024-10-24 | Stop reason: HOSPADM

## 2024-10-17 RX ORDER — FENTANYL CITRATE 50 UG/ML
25 INJECTION, SOLUTION INTRAMUSCULAR; INTRAVENOUS EVERY 6 HOURS PRN
Status: DISCONTINUED | OUTPATIENT
Start: 2024-10-17 | End: 2024-10-23

## 2024-10-17 RX ORDER — CITALOPRAM HYDROBROMIDE 10 MG/1
40 TABLET ORAL DAILY
Status: DISCONTINUED | OUTPATIENT
Start: 2024-10-17 | End: 2024-10-17 | Stop reason: ALTCHOICE

## 2024-10-17 RX ADMIN — MEROPENEM 1000 MG: 1 INJECTION INTRAVENOUS at 03:11

## 2024-10-17 RX ADMIN — MEROPENEM 1000 MG: 1 INJECTION INTRAVENOUS at 08:27

## 2024-10-17 RX ADMIN — SODIUM CHLORIDE, PRESERVATIVE FREE 10 ML: 5 INJECTION INTRAVENOUS at 08:22

## 2024-10-17 RX ADMIN — HEPARIN SODIUM 5000 UNITS: 5000 INJECTION INTRAVENOUS; SUBCUTANEOUS at 08:15

## 2024-10-17 RX ADMIN — MORPHINE SULFATE 15 MG: 15 TABLET ORAL at 16:48

## 2024-10-17 RX ADMIN — MORPHINE SULFATE 15 MG: 15 TABLET ORAL at 08:14

## 2024-10-17 RX ADMIN — MORPHINE SULFATE 15 MG: 15 TABLET ORAL at 03:25

## 2024-10-17 RX ADMIN — HEPARIN SODIUM 5000 UNITS: 5000 INJECTION INTRAVENOUS; SUBCUTANEOUS at 03:02

## 2024-10-17 RX ADMIN — GABAPENTIN 400 MG: 400 CAPSULE ORAL at 08:22

## 2024-10-17 RX ADMIN — HEPARIN SODIUM 5000 UNITS: 5000 INJECTION INTRAVENOUS; SUBCUTANEOUS at 20:38

## 2024-10-17 RX ADMIN — CITALOPRAM HYDROBROMIDE 40 MG: 40 TABLET ORAL at 17:29

## 2024-10-17 RX ADMIN — SODIUM CHLORIDE, PRESERVATIVE FREE 10 ML: 5 INJECTION INTRAVENOUS at 20:39

## 2024-10-17 RX ADMIN — MORPHINE SULFATE 15 MG: 15 TABLET ORAL at 20:38

## 2024-10-17 RX ADMIN — CALCIUM GLUCONATE: 98 INJECTION, SOLUTION INTRAVENOUS at 18:42

## 2024-10-17 RX ADMIN — FLUCONAZOLE 400 MG: 2 INJECTION, SOLUTION INTRAVENOUS at 15:41

## 2024-10-17 RX ADMIN — MORPHINE SULFATE 15 MG: 15 TABLET ORAL at 12:07

## 2024-10-17 RX ADMIN — SODIUM CHLORIDE, PRESERVATIVE FREE 40 MG: 5 INJECTION INTRAVENOUS at 08:14

## 2024-10-17 RX ADMIN — GABAPENTIN 400 MG: 400 CAPSULE ORAL at 13:44

## 2024-10-17 RX ADMIN — MEROPENEM 1000 MG: 1 INJECTION INTRAVENOUS at 17:36

## 2024-10-17 RX ADMIN — GABAPENTIN 400 MG: 800 TABLET ORAL at 20:37

## 2024-10-17 ASSESSMENT — PAIN SCALES - GENERAL
PAINLEVEL_OUTOF10: 7
PAINLEVEL_OUTOF10: 10
PAINLEVEL_OUTOF10: 10
PAINLEVEL_OUTOF10: 8
PAINLEVEL_OUTOF10: 10
PAINLEVEL_OUTOF10: 10
PAINLEVEL_OUTOF10: 7
PAINLEVEL_OUTOF10: 3
PAINLEVEL_OUTOF10: 8

## 2024-10-17 ASSESSMENT — PAIN DESCRIPTION - DESCRIPTORS
DESCRIPTORS: ACHING;DISCOMFORT;DULL
DESCRIPTORS: ACHING;GNAWING;DISCOMFORT
DESCRIPTORS: DISCOMFORT;THROBBING
DESCRIPTORS: ACHING;DULL;DISCOMFORT
DESCRIPTORS: ACHING;SHOOTING;SPASM

## 2024-10-17 ASSESSMENT — PAIN DESCRIPTION - ORIENTATION
ORIENTATION: LEFT
ORIENTATION: LEFT

## 2024-10-17 ASSESSMENT — PAIN DESCRIPTION - LOCATION
LOCATION: ABDOMEN;BACK;HIP
LOCATION: ABDOMEN

## 2024-10-17 ASSESSMENT — PAIN DESCRIPTION - PAIN TYPE: TYPE: ACUTE PAIN;CHRONIC PAIN

## 2024-10-17 NOTE — PROGRESS NOTES
[facility] Surgery   Daily Progress Note      Patient's Name/Date of Birth: Monique Voss / 1975    Date: October 17, 2024     Chief Complaint: abdominal pain    Patient Active Problem List   Diagnosis    Primary hypertension    Mild intermittent asthma without complication    Morbid obesity    Reactive depression    Anxiety    Ureteric stone    Tobacco dependence    Hydronephrosis with urinary obstruction due to renal calculus    Pancreatic cyst    PVC (premature ventricular contraction)    Spinal stenosis of lumbar region with neurogenic claudication    Primary osteoarthritis of both hips    Gastroesophageal reflux disease without esophagitis    Vitamin D deficiency    Diabetes mellitus (HCC)    Hyperlipidemia LDL goal <70    Obstructive sleep apnea    Chronic pain syndrome    Lumbar radiculopathy    Chronic bilateral low back pain with bilateral sciatica    Hidradenitis suppurativa    Nonischemic cardiomyopathy (HCC)    Malnutrition (HCC)    Current moderate episode of major depressive disorder without prior episode (HCC)    S/P gastric bypass    Left hip pain    Non-ST elevation myocardial infarction (NSTEMI) (HCC)    Chronic systolic heart failure (HCC)    Bigeminy    Bradycardia    Coronary artery disease involving native coronary artery of native heart without angina pectoris    Pure hypercholesterolemia    Chronic obstructive pulmonary disease (HCC)    Opioid dependence with current use (HCC)    Acute infective pancreatitis    Sepsis with acute organ dysfunction (HCC)    Acute pancreatitis    Bowel perforation (HCC)    Acute gastric perforation    Intra-abdominal abscess (HCC)       Subjective: Patient states she feels ok this morning. Some tenderness around the drain site.  Drain with seropurulent fluid.     Objective:  /76   Pulse 89   Temp 97.7 °F (36.5 °C) (Temporal)   Resp 20   Ht 1.727 m (5' 8\")   Wt 121.7 kg (268 lb 6.4 oz)   LMP 09/01/2024 (Approximate)   SpO2 96%   BMI 40.81  kg/m²   Labs:  Recent Labs     10/15/24  0833 10/16/24  0424 10/17/24  0515   WBC 18.0* 19.8* 22.4*   HGB 11.5 11.5 11.5   HCT 34.8 35.1 35.1     Recent Labs     10/15/24  0833 10/16/24  0424 10/17/24  0515    142 143   K 3.7 3.9 4.0    105 106   CO2 29 30* 29   BUN 7 5* 9   CREATININE 0.5 0.6 0.5     Recent Labs     10/14/24  2140 10/15/24  0833 10/16/24  0424 10/17/24  0515   ALKPHOS 126* 101 104 110*   ALT 10 8 6 5   AST 14 10 8 7   BILITOT 0.3 0.2 0.2 0.2   LIPASE 441* 90*  --   --          General appearance: NAD  Head: NCAT, PERRL, EOMI, pink conjunctiva  Neck: supple, no masses  Lungs: symmetric chest rise, no audible wheezes  Heart: warm throughout  Abdomen: soft, non-distended, only tender around drain site.  Drain seropurulent.  Skin: no visible lesions  Extremities: extremities normal, atraumatic, no cyanosis or edema      Assessment/Plan:  Monique Voss is a 49 y.o. female with contained gastric remnant perforation and pancreatitis with perigastric abscess s/p IR drainage 10/15.    Advance to clear liquid diet with clear liquid supplements.  Continue drain care.   Continue TPN - will re-order today  Monitor kimberly  Appreciate ID assistance with antibiotics-currently on Merrem and fluconazole.  Ambulate patient, PT/OT        Discussed with Dr. Martinez.    Andi Reis DO  PGY-2 General Surgery Resident

## 2024-10-17 NOTE — PROGRESS NOTES
4 Eyes Skin Assessment     NAME:  Monique Voss  YOB: 1975  MEDICAL RECORD NUMBER:  [id number]    The patient is being assessed for  Transfer to New Unit    I agree that at least one RN has performed a thorough Head to Toe Skin Assessment on the patient. ALL assessment sites listed below have been assessed.      Areas assessed by both nurses:    Head, Face, Ears, Shoulders, Back, Chest, Arms, Elbows, Hands, Sacrum. Buttock, Coccyx, Ischium, and Legs. Feet and Heels        Does the Patient have a Wound? No noted wound(s)       Alvin Prevention initiated by RN: Yes  Wound Care Orders initiated by RN: No    Pressure Injury (Stage 3,4, Unstageable, DTI, NWPT, and Complex wounds) if present, place Wound referral order by RN under : No    New Ostomies, if present place, Ostomy referral order under : No     Nurse 1 eSignature: Electronically signed by Valerie Romero RN on 10/17/24 at 4:31 PM EDT    **SHARE this note so that the co-signing nurse can place an eSignature**    Nurse 2 eSignature: Electronically signed by Roberta Red RN on 10/17/24 at 4:52 PM EDT

## 2024-10-17 NOTE — PLAN OF CARE
Problem: Discharge Planning  Goal: Discharge to home or other facility with appropriate resources  Outcome: Progressing     Problem: Pain  Goal: Verbalizes/displays adequate comfort level or baseline comfort level  Outcome: Progressing     Problem: Skin/Tissue Integrity  Goal: Absence of new skin breakdown  Description: 1.  Monitor for areas of redness and/or skin breakdown  2.  Assess vascular access sites hourly  3.  Every 4-6 hours minimum:  Change oxygen saturation probe site  4.  Every 4-6 hours:  If on nasal continuous positive airway pressure, respiratory therapy assess nares and determine need for appliance change or resting period.  Outcome: Progressing     Problem: Safety - Adult  Goal: Free from fall injury  Outcome: Progressing     Problem: Neurosensory - Adult  Goal: Achieves stable or improved neurological status  Outcome: Progressing  Goal: Absence of seizures  Outcome: Progressing  Goal: Remains free of injury related to seizures activity  Outcome: Progressing  Goal: Achieves maximal functionality and self care  Outcome: Progressing     Problem: Respiratory - Adult  Goal: Achieves optimal ventilation and oxygenation  Outcome: Progressing     Problem: Cardiovascular - Adult  Goal: Maintains optimal cardiac output and hemodynamic stability  Outcome: Progressing  Goal: Absence of cardiac dysrhythmias or at baseline  Outcome: Progressing     Problem: Skin/Tissue Integrity - Adult  Goal: Skin integrity remains intact  Outcome: Progressing  Goal: Incisions, wounds, or drain sites healing without S/S of infection  Outcome: Progressing  Goal: Oral mucous membranes remain intact  Outcome: Progressing     Problem: Musculoskeletal - Adult  Goal: Return mobility to safest level of function  Outcome: Progressing  Goal: Maintain proper alignment of affected body part  Outcome: Progressing  Goal: Return ADL status to a safe level of function  Outcome: Progressing     Problem: Gastrointestinal - Adult  Goal:

## 2024-10-17 NOTE — PROGRESS NOTES
Called Dr. Loving regarding Citalopram to be ordered since patient takes it daily. No new orders received at this time.     Electronically signed by Valerie Romero RN on 10/17/2024 at 4:11 PM

## 2024-10-17 NOTE — PROGRESS NOTES
General  Surgery   Daily Progress Note      Patient's Name/Date of Birth: Monique Voss / 1975    Date: October 17, 2024     Chief Complaint: abdominal pain    Patient Active Problem List   Diagnosis    Primary hypertension    Mild intermittent asthma without complication    Morbid obesity    Reactive depression    Anxiety    Ureteric stone    Tobacco dependence    Hydronephrosis with urinary obstruction due to renal calculus    Pancreatic cyst    PVC (premature ventricular contraction)    Spinal stenosis of lumbar region with neurogenic claudication    Primary osteoarthritis of both hips    Gastroesophageal reflux disease without esophagitis    Vitamin D deficiency    Diabetes mellitus (HCC)    Hyperlipidemia LDL goal <70    Obstructive sleep apnea    Chronic pain syndrome    Lumbar radiculopathy    Chronic bilateral low back pain with bilateral sciatica    Hidradenitis suppurativa    Nonischemic cardiomyopathy (HCC)    Malnutrition (HCC)    Current moderate episode of major depressive disorder without prior episode (HCC)    S/P gastric bypass    Left hip pain    Non-ST elevation myocardial infarction (NSTEMI) (HCC)    Chronic systolic heart failure (HCC)    Bigeminy    Bradycardia    Coronary artery disease involving native coronary artery of native heart without angina pectoris    Pure hypercholesterolemia    Chronic obstructive pulmonary disease (HCC)    Opioid dependence with current use (HCC)    Acute infective pancreatitis    Sepsis with acute organ dysfunction (HCC)    Acute pancreatitis    Bowel perforation (HCC)    Acute gastric perforation    Intra-abdominal abscess (HCC)       Subjective: Patient states she feels ok this morning. Some tenderness around the drain site.  Drain with seropurulent fluid.     Objective:  /76   Pulse 89   Temp 97.7 °F (36.5 °C) (Temporal)   Resp 20   Ht 1.727 m (5' 8\")   Wt 121.7 kg (268 lb 6.4 oz)   LMP 09/01/2024 (Approximate)   SpO2 96%   BMI

## 2024-10-17 NOTE — PROGRESS NOTES
[facility]             Pulmonary & Critical Care Medicine                MICU Progress Note                 Written by: Vincenzo Young MD  Name: Monique Voss : 1975       Age: 49 y.o. MR/Act #    : [id number],  Billing  #    : [id number]   Admit Date: 10/15/2024 12:00 AM LOS: 2,   Hospital Day: 3 Room #      : 4402/4402-A   PCP            : Madison Amin PA-C,   Referred by: Lisa Loving MD ICU Attending: MD Eddi Boykin date: 10/17/2024 1:18 PM   ICU Days:       4 Vent Days:     0 LOS: 2,                          Reason for ICU admission         No chief complaint on file.                         Brief HPI, Presentation & Synopsis                       49 y.o. female with history of necrotizing pancreatitis, morbid obesity status post bariatric surgery resulting in over 200 pound weight loss.  She was recently discharged from this hospital on  after being treated for necrotizing pancreatitis.  She initially presented for abdominal pain.  Subsequent CT scan of the chest reveals findings that is consistent with perforation of the gastric fundus and leakage of gastric contents into the peritoneal.  There is also evidence of prior necrotizing pancreatiti as well as cirrhosis of liver.     Active problem list of yesterday:  Active Hospital Problems    Diagnosis Date Noted    Intra-abdominal abscess (HCC) [K65.1] 10/16/2024    Bowel perforation (HCC) [K63.1] 10/15/2024    Acute gastric perforation [K31.89] 10/15/2024                           Events of last night                      Hemodynamically stable                      Subjective   10/17/2024               Continues to have diffuse abdominal pain                      Objective  10/17/2024               Vitals:    10/17/24 1300   BP: 132/88   Pulse: 91   Resp: 21   Temp:    SpO2: 100%     Temperature range : Temp  Av °F (36.7 °C)  Min: 97.5 °F (36.4 °C)  Max: 99 °F (37.2  hospital encounter of 10/15/24    XR CHEST PORTABLE    Narrative  EXAMINATION:  ONE XRAY VIEW OF THE CHEST    10/14/2024 10:56 pm    COMPARISON:  10/01/2024    HISTORY:  ORDERING SYSTEM PROVIDED HISTORY: pain  TECHNOLOGIST PROVIDED HISTORY:  Reason for exam:->pain    FINDINGS:  Patchy left upper lobe lingular segment opacity..  There is no effusion or  pneumothorax. The cardiomediastinal silhouette is without acute process. The  osseous structures are without acute process.    Impression  Patchy left upper lobe lingular segment opacity.    Findings worrisome for pneumonia.      CXR PA/LAT : Results for orders placed during the hospital encounter of 08/26/24    XR CHEST (2 VW)    Narrative  EXAMINATION:  TWO XRAY VIEWS OF THE CHEST    8/26/2024 11:24 am    COMPARISON:  None.    HISTORY:  ORDERING SYSTEM PROVIDED HISTORY: Shortness of Breath  TECHNOLOGIST PROVIDED HISTORY:  Reason for exam:->Shortness of Breath    FINDINGS:  The lungs are without acute focal process.  There is no effusion or  pneumothorax. The cardiomediastinal silhouette is without acute process. The  osseous structures are without acute process.    Impression  No acute process.      CT Chest w/o contrast: Results for orders placed during the hospital encounter of 05/25/23    CT CHEST WO CONTRAST    Narrative  EXAMINATION:  CT OF THE CHEST WITHOUT CONTRAST 5/25/2023 5:12 pm    TECHNIQUE:  CT of the chest was performed without the administration of intravenous  contrast. Multiplanar reformatted images are provided for review. Automated  exposure control, iterative reconstruction, and/or weight based adjustment of  the mA/kV was utilized to reduce the radiation dose to as low as reasonably  achievable.    COMPARISON:  05/16/2023    HISTORY:  ORDERING SYSTEM PROVIDED HISTORY: eval for CHF versus pneumonia  TECHNOLOGIST PROVIDED HISTORY:  Reason for exam:->eval for CHF versus pneumonia  What reading provider will be dictating this  quadrant-perigastric multifocal free intraperitoneal air with omental inflammatory changes without abscess. 3. Stable diffuse changes of acute pancreatitis with stable focal area of decreased attenuation-enhancement in the pancreatic body once again possibly reflecting pancreatic necrosis.  Stable pancreatic head cystic lesions. Stable reactive inflammatory changes of the descending portion the duodenum.     CT ABDOMEN PELVIS W IV CONTRAST Additional Contrast? None    Result Date: 10/15/2024  1.  Constellation of findings compatible with perforation of the gastric fundus and leakage of gastric contents into the lesser peritoneal sac. 2.  Similar appearance of patient's pancreatitis with hypodensity of the pancreatic body suggestive of necrotizing pancreatitis, and multiple encapsulated and non encapsulated peripancreatic fluid collections. 3.  Splenic artery and portal vein are not visualized as they course along the pancreas, with findings suggestive of chronic high-grade stenosis or occlusion of these vessels. 4.  Findings of hepatic cirrhosis. 5.  Small left pleural effusion with adjacent airspace consolidations that likely relate to atelectasis. Impressions 1-3 above were discussed with Dr. Huang by telephone at 0104 am EST with verification.     XR CHEST PORTABLE    Result Date: 10/14/2024  Patchy left upper lobe lingular segment opacity. Findings worrisome for pneumonia.       EKG  :     Rhythm Strip :Normal Sinus Rhythm    ECHO  :                        Assessment and Plan of care     Diagnosis:  Perforation of gastric remnant, status post vonnie-en-Y bypass 2018  Intra-abdominal abscess  Status post IR drainage, left upper quadrant abscess drainage 10/15  Status post JUANJO drain  Abscess culture growing moderate gram-negative rods, anaerobes  Diffuse abdominal pain  History of JODI, not using CPAP for prolonged period of time    Plan:    Neuro: Following commands, no focal neurodeficit, fentanyl 25 mics every 2

## 2024-10-17 NOTE — PROGRESS NOTES
Physician Progress Note      PATIENT:               OLMAN ORDAZ  [facility] #:                  [id number]  :                       1975  ADMIT DATE:       10/15/2024 12:00 AM  DISCH DATE:  RESPONDING  PROVIDER #:        Lisa Loving MD          QUERY TEXT:    Pt admitted with ***.  Noted documentation of *** on *** (date and note type)   by ordered *** (type/specialty) consultant.  If possible, please document in   progress notes and discharge summary:    The medical record reflects the following:  Risk Factors: Acute on chronic illness, history of gastric bypass, recent   necrotizing pancreatitis  Clinical Indicators: WBC beginning @ admission, 18.3 18.0 19.8 22.4. On   evening of admission date 10/15 @ 2100 have tachypnea RR 24 25 25 33 23 26 24   26.  Treatment: ID consultant, CBC, chemistry panel, meropenem (MERREM) 1,000 mg IV   q 8 hr.  Options provided:  -- Sepsis confirmed present on admission  -- Sepsis ruled out  -- Defer to ID consultant documentation regarding sepsis  -- Other - I will add my own diagnosis  -- Disagree - Not applicable / Not valid  -- Disagree - Clinically unable to determine / Unknown  -- Refer to Clinical Documentation Reviewer    PROVIDER RESPONSE TEXT:    The diagnosis of sepsis was confirmed as present on admission.    Query created by: Ana Da Silva on 10/17/2024 2:49 PM      Electronically signed by:  Lisa Loving MD 10/17/2024 4:49 PM

## 2024-10-17 NOTE — CARE COORDINATION
Care Coordination: LOS 2 day.  Surgery following,  TPN, IV merrem and Fluconazole, JUANJO drain. Pt lives alone at home, hx of [facility]. Orders placed for skilled nursing and poss IV antibiotics. Back door to select to follow    Electronically signed by Kitty Petersen RN on 10/17/2024 at 1:32 PM

## 2024-10-17 NOTE — PROGRESS NOTES
[facility]   Progress note      Subjective:    On evaluation resting comfortably in no apparent acute distress    Objective:    /88   Pulse 91   Temp 97.6 °F (36.4 °C) (Temporal)   Resp 21   Ht 1.727 m (5' 8\")   Wt 104.3 kg (230 lb)   LMP 09/01/2024 (Approximate)   SpO2 100%   BMI 34.97 kg/m²     In: 2651.3 [P.O.:630; I.V.:243.8]  Out: 3275   In: 2651.3   Out: 3275 [Urine:2750; Drains:525]    General appearance: NAD, conversant  HEENT: AT/NC, MMM  Neck: FROM, supple  Lungs: Clear to auscultation  CV: RRR, no MRGs  Vasc: Radial pulses 2+  Abdomen: Abdominal drain in place with serosanguineous liquid/drainage  Extremities: No peripheral edema or digital cyanosis  Skin: no rash, lesions or ulcers  Psych: Alert and oriented to person, place and time  Neuro: Alert and interactive     Recent Labs     10/15/24  0833 10/16/24  0424 10/17/24  0515   WBC 18.0* 19.8* 22.4*   HGB 11.5 11.5 11.5   HCT 34.8 35.1 35.1   * 642* 618*       Recent Labs     10/15/24  0833 10/16/24  0424 10/17/24  0515    142 143   K 3.7 3.9 4.0    105 106   CO2 29 30* 29   BUN 7 5* 9   CREATININE 0.5 0.6 0.5   CALCIUM 7.9* 8.2* 8.5*       Assessment:    Principal Problem:    Bowel perforation (HCC)  Active Problems:    Acute gastric perforation    Intra-abdominal abscess (HCC)  Resolved Problems:    * No resolved hospital problems. *      Plan:    49-year-old lady past medical history of pancreatitis with necrosis, morbid obesity BMI greater than 35, hyperlipidemia, hypertension, diabetes mellitus, CHF, COPD presented to ER due to abdominal pain. She was recently discharged from the hospital on 10/8/2024 for pancreatitis with necrosis at the time she was on IV meropenem and was discharged on Augmentin. CT abdomen pelvis obtained today showed constellation of findings compatible with perforation of the gastric fundus and leakage of gastric contents into the lesser peritoneal sac with similar

## 2024-10-17 NOTE — PROGRESS NOTES
SOO PROGRESS NOTE      Chief complaint: Follow-up of intraabdominal abscess    The patient is a 49 y.o.  morbidly obese female with history of COPD, CAD, DM, hypertension, hyperlipidemia, hidradenitis suppurativa right hip osteoarthritis on steroid injections, meningocele, gastric bypass surgery in 2022, previously admitted from 10/01-10/08 for chronic pancreatic cyst, severe acute pancreatitis with necrosis, possibly drug induced for which she was seen by Hepatobiliary Surgery and was given a course of meropenem then discharged on amoxicillin-clavulanate for 7 days, presented on 10/14 with abdominal pain worsening for the past week. On admission, she was afebrile and hemodynamically stable with leukocytosis of 18,000.  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  Chest x-ray showed patchy left upper lobe lingular segment opacity.  Urine Streptococcus pneumoniae and Legionella antigens, respiratory pathogen PCR were negative.  Blood cultures showed no growth to date.  Meropenem and vancomycin were started on admission. She underwent percutaneous abscess drain placement on 10/15. Abscess fluid Gram stain and culture showed few polymorphonuclear leukocytes, no epithelial cells, heavy growth of Gram-negative rods, anaerobes.     Subjective: Patient was seen and examined. No chills, has less abdominal pain, no diarrhea, no rash, no itching. Afebrile.      Objective:    Vitals:    10/17/24 1513   BP: 120/70   Pulse: 87   Resp: 20   Temp: 99 °F (37.2 °C)   SpO2: 94%     Constitutional: Alert, not in  distress  Respiratory: Clear breath sounds, no crackles, no wheezes  Cardiovascular: Regular rate and rhythm, no murmurs  Gastrointestinal: Bowel sounds present, soft, nontender. LUQ JUANJO drain in place   Skin: Warm and dry, no active dermatoses  Musculoskeletal: No joint swelling, no joint erythema    Labs, imaging, and medical records/notes were personally reviewed.    Assessment:  Sepsis/leukocytosis, secondary to intra-abdominal abscess +/- peritonitis associated with possible gastric remnant perforation, s/p percutaneous abscess drain placement on 10/15. Per Surgery, no plans for immediate surgical intervention at this time as patient at high risk for complication with longstanding pancreatitis, splenic vessel thrombosis, and suspect lesser sac is fused containing the perforation. No evident gastrogastric fistula on CT with p.o. imaging.  Recent necrotizing pancreatitis with pseudocyst formation    Recommendations:  Continue meropenem 1 g every 8 hours and fluconazole 400 mg every 24 hours pending abscess fluid culture.  Follow-up blood and abscess fluid cultures.  Continue supportive care.     Thank you for involving me in the care of Monique Voss. ID will continue to follow. Please do not hesitate to call for any questions or concerns.    Electronically signed by Shelly Thompson MD on 10/17/2024 at 10:52 AM

## 2024-10-18 LAB
ALBUMIN SERPL-MCNC: 1.8 G/DL (ref 3.5–5.2)
ALBUMIN SERPL-MCNC: 2.1 G/DL (ref 3.5–5.2)
ALP SERPL-CCNC: 175 U/L (ref 35–104)
ALP SERPL-CCNC: 176 U/L (ref 35–104)
ALT SERPL-CCNC: 5 U/L (ref 0–32)
ALT SERPL-CCNC: 9 U/L (ref 0–32)
ANION GAP SERPL CALCULATED.3IONS-SCNC: 8 MMOL/L (ref 7–16)
AST SERPL-CCNC: 12 U/L (ref 0–31)
AST SERPL-CCNC: 19 U/L (ref 0–31)
BILIRUB DIRECT SERPL-MCNC: <0.2 MG/DL (ref 0–0.3)
BILIRUB INDIRECT SERPL-MCNC: ABNORMAL MG/DL (ref 0–1)
BILIRUB SERPL-MCNC: 0.2 MG/DL (ref 0–1.2)
BILIRUB SERPL-MCNC: 0.2 MG/DL (ref 0–1.2)
BUN SERPL-MCNC: 11 MG/DL (ref 6–20)
CALCIUM SERPL-MCNC: 8.6 MG/DL (ref 8.6–10.2)
CHLORIDE SERPL-SCNC: 97 MMOL/L (ref 98–107)
CO2 SERPL-SCNC: 30 MMOL/L (ref 22–29)
CREAT SERPL-MCNC: 0.5 MG/DL (ref 0.5–1)
ERYTHROCYTE [DISTWIDTH] IN BLOOD BY AUTOMATED COUNT: 16.8 % (ref 11.5–15)
GFR, ESTIMATED: >90 ML/MIN/1.73M2
GLUCOSE BLD-MCNC: 114 MG/DL (ref 74–99)
GLUCOSE SERPL-MCNC: 308 MG/DL (ref 74–99)
HCT VFR BLD AUTO: 33.8 % (ref 34–48)
HGB BLD-MCNC: 11.1 G/DL (ref 11.5–15.5)
MCH RBC QN AUTO: 32.3 PG (ref 26–35)
MCHC RBC AUTO-ENTMCNC: 32.8 G/DL (ref 32–34.5)
MCV RBC AUTO: 98.3 FL (ref 80–99.9)
MICROORGANISM SPEC CULT: ABNORMAL
PLATELET # BLD AUTO: 566 K/UL (ref 130–450)
PMV BLD AUTO: 10.5 FL (ref 7–12)
POTASSIUM SERPL-SCNC: 5.3 MMOL/L (ref 3.5–5)
PROT SERPL-MCNC: 4.8 G/DL (ref 6.4–8.3)
PROT SERPL-MCNC: 5.6 G/DL (ref 6.4–8.3)
RBC # BLD AUTO: 3.44 M/UL (ref 3.5–5.5)
SODIUM SERPL-SCNC: 135 MMOL/L (ref 132–146)
SPECIMEN DESCRIPTION: ABNORMAL
WBC OTHER # BLD: 20.5 K/UL (ref 4.5–11.5)

## 2024-10-18 PROCEDURE — 6360000002 HC RX W HCPCS: Performed by: STUDENT IN AN ORGANIZED HEALTH CARE EDUCATION/TRAINING PROGRAM

## 2024-10-18 PROCEDURE — 6370000000 HC RX 637 (ALT 250 FOR IP): Performed by: NURSE PRACTITIONER

## 2024-10-18 PROCEDURE — 80076 HEPATIC FUNCTION PANEL: CPT

## 2024-10-18 PROCEDURE — 2700000000 HC OXYGEN THERAPY PER DAY

## 2024-10-18 PROCEDURE — 97535 SELF CARE MNGMENT TRAINING: CPT

## 2024-10-18 PROCEDURE — 97161 PT EVAL LOW COMPLEX 20 MIN: CPT

## 2024-10-18 PROCEDURE — 80053 COMPREHEN METABOLIC PANEL: CPT

## 2024-10-18 PROCEDURE — 97165 OT EVAL LOW COMPLEX 30 MIN: CPT

## 2024-10-18 PROCEDURE — 85027 COMPLETE CBC AUTOMATED: CPT

## 2024-10-18 PROCEDURE — 6360000002 HC RX W HCPCS: Performed by: NURSE PRACTITIONER

## 2024-10-18 PROCEDURE — 2500000003 HC RX 250 WO HCPCS: Performed by: STUDENT IN AN ORGANIZED HEALTH CARE EDUCATION/TRAINING PROGRAM

## 2024-10-18 PROCEDURE — 2060000000 HC ICU INTERMEDIATE R&B

## 2024-10-18 PROCEDURE — 99222 1ST HOSP IP/OBS MODERATE 55: CPT | Performed by: INTERNAL MEDICINE

## 2024-10-18 PROCEDURE — 6370000000 HC RX 637 (ALT 250 FOR IP): Performed by: INTERNAL MEDICINE

## 2024-10-18 PROCEDURE — 99232 SBSQ HOSP IP/OBS MODERATE 35: CPT | Performed by: TRANSPLANT SURGERY

## 2024-10-18 PROCEDURE — 2580000003 HC RX 258

## 2024-10-18 PROCEDURE — 6360000002 HC RX W HCPCS: Performed by: INTERNAL MEDICINE

## 2024-10-18 PROCEDURE — 82962 GLUCOSE BLOOD TEST: CPT

## 2024-10-18 PROCEDURE — 6370000000 HC RX 637 (ALT 250 FOR IP): Performed by: STUDENT IN AN ORGANIZED HEALTH CARE EDUCATION/TRAINING PROGRAM

## 2024-10-18 PROCEDURE — 2580000003 HC RX 258: Performed by: NURSE PRACTITIONER

## 2024-10-18 PROCEDURE — 2580000003 HC RX 258: Performed by: STUDENT IN AN ORGANIZED HEALTH CARE EDUCATION/TRAINING PROGRAM

## 2024-10-18 PROCEDURE — 97530 THERAPEUTIC ACTIVITIES: CPT

## 2024-10-18 RX ADMIN — GABAPENTIN 400 MG: 800 TABLET ORAL at 20:28

## 2024-10-18 RX ADMIN — SODIUM CHLORIDE, PRESERVATIVE FREE 10 ML: 5 INJECTION INTRAVENOUS at 10:32

## 2024-10-18 RX ADMIN — MORPHINE SULFATE 15 MG: 15 TABLET ORAL at 18:23

## 2024-10-18 RX ADMIN — MORPHINE SULFATE 15 MG: 15 TABLET ORAL at 22:20

## 2024-10-18 RX ADMIN — PANCRELIPASE LIPASE, PANCRELIPASE PROTEASE, PANCRELIPASE AMYLASE 20000 UNITS: 20000; 63000; 84000 CAPSULE, DELAYED RELEASE ORAL at 12:39

## 2024-10-18 RX ADMIN — SODIUM CHLORIDE, PRESERVATIVE FREE 40 MG: 5 INJECTION INTRAVENOUS at 09:53

## 2024-10-18 RX ADMIN — HEPARIN SODIUM 5000 UNITS: 5000 INJECTION INTRAVENOUS; SUBCUTANEOUS at 09:53

## 2024-10-18 RX ADMIN — GABAPENTIN 400 MG: 800 TABLET ORAL at 12:39

## 2024-10-18 RX ADMIN — MORPHINE SULFATE 15 MG: 15 TABLET ORAL at 05:28

## 2024-10-18 RX ADMIN — CITALOPRAM HYDROBROMIDE 40 MG: 40 TABLET ORAL at 09:55

## 2024-10-18 RX ADMIN — HEPARIN SODIUM 5000 UNITS: 5000 INJECTION INTRAVENOUS; SUBCUTANEOUS at 23:24

## 2024-10-18 RX ADMIN — CALCIUM GLUCONATE: 98 INJECTION, SOLUTION INTRAVENOUS at 18:17

## 2024-10-18 RX ADMIN — SODIUM CHLORIDE, PRESERVATIVE FREE 10 ML: 5 INJECTION INTRAVENOUS at 20:29

## 2024-10-18 RX ADMIN — PANCRELIPASE LIPASE, PANCRELIPASE PROTEASE, PANCRELIPASE AMYLASE 20000 UNITS: 20000; 63000; 84000 CAPSULE, DELAYED RELEASE ORAL at 09:55

## 2024-10-18 RX ADMIN — MEROPENEM 1000 MG: 1 INJECTION INTRAVENOUS at 10:08

## 2024-10-18 RX ADMIN — PANCRELIPASE LIPASE, PANCRELIPASE PROTEASE, PANCRELIPASE AMYLASE 20000 UNITS: 20000; 63000; 84000 CAPSULE, DELAYED RELEASE ORAL at 17:13

## 2024-10-18 RX ADMIN — ONDANSETRON 4 MG: 4 TABLET, ORALLY DISINTEGRATING ORAL at 20:29

## 2024-10-18 RX ADMIN — MEROPENEM 1000 MG: 1 INJECTION INTRAVENOUS at 17:15

## 2024-10-18 RX ADMIN — MORPHINE SULFATE 15 MG: 15 TABLET ORAL at 00:52

## 2024-10-18 RX ADMIN — MEROPENEM 1000 MG: 1 INJECTION INTRAVENOUS at 01:31

## 2024-10-18 RX ADMIN — FLUCONAZOLE 400 MG: 2 INJECTION, SOLUTION INTRAVENOUS at 13:51

## 2024-10-18 RX ADMIN — HEPARIN SODIUM 5000 UNITS: 5000 INJECTION INTRAVENOUS; SUBCUTANEOUS at 00:47

## 2024-10-18 RX ADMIN — GABAPENTIN 400 MG: 800 TABLET ORAL at 09:55

## 2024-10-18 RX ADMIN — MORPHINE SULFATE 15 MG: 15 TABLET ORAL at 09:52

## 2024-10-18 RX ADMIN — PETROLATUM: 420 OINTMENT TOPICAL at 09:53

## 2024-10-18 RX ADMIN — MORPHINE SULFATE 15 MG: 15 TABLET ORAL at 13:48

## 2024-10-18 ASSESSMENT — PAIN DESCRIPTION - DESCRIPTORS
DESCRIPTORS: DISCOMFORT;ACHING;SORE
DESCRIPTORS: ACHING;DULL;DISCOMFORT
DESCRIPTORS: ACHING;DISCOMFORT;SORE
DESCRIPTORS: ACHING;GNAWING;DISCOMFORT
DESCRIPTORS: ACHING;DISCOMFORT;DULL
DESCRIPTORS: ACHING;GNAWING;DISCOMFORT
DESCRIPTORS: DISCOMFORT;ACHING;DULL
DESCRIPTORS: ACHING;DISCOMFORT;SORE
DESCRIPTORS: DISCOMFORT;ACHING;DULL
DESCRIPTORS: ACHING;DISCOMFORT;DULL

## 2024-10-18 ASSESSMENT — PAIN DESCRIPTION - FREQUENCY
FREQUENCY: CONTINUOUS

## 2024-10-18 ASSESSMENT — PAIN SCALES - GENERAL
PAINLEVEL_OUTOF10: 0
PAINLEVEL_OUTOF10: 10
PAINLEVEL_OUTOF10: 9
PAINLEVEL_OUTOF10: 8
PAINLEVEL_OUTOF10: 8
PAINLEVEL_OUTOF10: 10
PAINLEVEL_OUTOF10: 7
PAINLEVEL_OUTOF10: 4
PAINLEVEL_OUTOF10: 8
PAINLEVEL_OUTOF10: 0
PAINLEVEL_OUTOF10: 8
PAINLEVEL_OUTOF10: 6

## 2024-10-18 ASSESSMENT — PAIN DESCRIPTION - LOCATION
LOCATION: ABDOMEN
LOCATION: ABDOMEN
LOCATION: BACK;HIP
LOCATION: BACK;HIP
LOCATION: ABDOMEN
LOCATION: ABDOMEN;BACK
LOCATION: ABDOMEN
LOCATION: HIP;BACK;ABDOMEN

## 2024-10-18 ASSESSMENT — PAIN DESCRIPTION - ORIENTATION
ORIENTATION: LEFT
ORIENTATION: RIGHT;LEFT
ORIENTATION: RIGHT;LEFT
ORIENTATION: LEFT
ORIENTATION: LEFT
ORIENTATION: RIGHT;LEFT
ORIENTATION: LEFT
ORIENTATION: RIGHT;LEFT

## 2024-10-18 ASSESSMENT — PAIN - FUNCTIONAL ASSESSMENT
PAIN_FUNCTIONAL_ASSESSMENT: ACTIVITIES ARE NOT PREVENTED

## 2024-10-18 ASSESSMENT — PAIN DESCRIPTION - ONSET
ONSET: ON-GOING
ONSET: ON-GOING

## 2024-10-18 ASSESSMENT — PAIN DESCRIPTION - PAIN TYPE
TYPE: ACUTE PAIN;CHRONIC PAIN

## 2024-10-18 NOTE — PROGRESS NOTES
Physical Therapy  Physical Therapy Initial Assessment     Name: Monique Voss  : 1975  MRN: [id number]      Date of Service: 10/18/2024    Evaluating PT:  Jaron Casanova PT, DPT    Room #:  4503/4503-A  Diagnosis:  Bowel perforation (HCC) [K63.1]  Necrotizing pancreatitis [K85.91]  Acute gastric perforation [K31.89]  PMHx/PSHx:  asthma, bell palsy, CHF, carpal tunnel, COPD, CAD, DDD, DJD, DM, GERD, HTN, HLD, neuropathy, JODI, scoliosis, spinal meningocele  Procedure/Surgery:  10/15 LUQ abscess drainage  Precautions:  falls, JUANJO drain, O2  Equipment Owned: cane, rollator  Equipment Needs:  TBD    SUBJECTIVE:    Pt lives alone in a 2nd story apartment with 5 steps to enter and 1 handrail.  Bed/bath is on main floor.  Pt ambulated with a cane on the steps and a rollator during ambulation PTA.    OBJECTIVE:   Initial Evaluation  Date: 10/18/24 Treatment Short Term/ Long Term   Goals   AM-PAC 6 Clicks      Was pt agreeable to Eval/treatment? yes     Does pt have pain? Chest pain/pressure unrated     Bed Mobility  Rolling: NT  Supine to sit: NT  Sit to supine: NT  Scooting: NT  Rolling: Ind  Supine to sit: Ind  Sit to supine: Ind  Scooting: Ind   Transfers Sit to stand: Shante  Stand to sit: Shante  Stand pivot: SBA with ww  Sit to stand: Ind  Stand to sit: Ind  Stand pivot: Ind with w   Ambulation    40' x2 with ww SBA  200' with ww Ind   Stair negotiation: ascended and descended  NT  5 steps with 1 handrail Ind     Strength/ROM:   BLE gross strength 4+/5  BLE ROM WFL    Balance:   Static Sitting: Ind  Dynamic Sitting: SBA  Static Standing: SBA with ww  Dynamic Standing: SBA with ww    Pt is A & O x 4  Sensation:  Pt denies numbness and tingling to extremities  Edema:  None noted    Vitals:  SpO2 and HR were stable during session    Therapeutic Exercises:    Bed mobility: supine<>sit, cued for EOB positioning  Transfers: STS x3, cued for hand placement and postural correction  Ambulation: 40'x2, cued for       Pt's/ family goals   1. Return to home    Prognosis is good for reaching above PT goals.    Patient and or family understand(s) diagnosis, prognosis, and plan of care.  yes    PHYSICAL THERAPY PLAN OF CARE:    PT POC is established based on physician order and patient diagnosis     Referring provider/PT Order:  Chelle Damon APRN - CNP   Diagnosis:  Bowel perforation (HCC) [K63.1]  Necrotizing pancreatitis [K85.91]  Acute gastric perforation [K31.89]  Specific instructions for next treatment:  Progress as tolerated    Current Treatment Recommendations:     [x] Strengthening to improve independence with functional mobility   [x] ROM to improve independence with functional mobility   [x] Balance Training to improve static/dynamic balance and to reduce fall risk  [x] Endurance Training to improve activity tolerance during functional mobility   [x] Transfer Training to improve safety and independence with all functional transfers   [x] Gait Training to improve gait mechanics, endurance and assess need for appropriate assistive device  [x] Stair Training in preparation for safe discharge home and/or into the community   [] Positioning to prevent skin breakdown and contractures  [x] Safety and Education Training   [x] Patient/Caregiver Education   [] HEP  [] Other     PT long term treatment goals are located in above grid    Frequency of treatments: 2-5x/week x 1-2 weeks.    Time in  1148  Time out  1224    Total Treatment Time  26 minutes     Evaluation Time includes thorough review of current medical information, gathering information on past medical history/social history and prior level of function, completion of standardized testing/informal observation of tasks, assessment of data and education on plan of care and goals.    CPT codes:  [x] Low Complexity PT evaluation 51097  [] Moderate Complexity PT evaluation 60210  [] High Complexity PT evaluation 21732  [] PT Re-evaluation 35620  [] Gait training 93203  -- minutes  [] Manual therapy 70556 -- minutes  [x] Therapeutic activities 06377 26 minutes  [] Therapeutic exercises 03317 -- minutes  [] Neuromuscular reeducation 40847 -- minutes     Chago Correia, MATTEO Casanova, PT, DPT  [id number]

## 2024-10-18 NOTE — PROGRESS NOTES
SOO PROGRESS NOTE      Chief complaint: Follow-up of intraabdominal abscess    The patient is a 49 y.o.  morbidly obese female with history of COPD, CAD, DM, hypertension, hyperlipidemia, hidradenitis suppurativa right hip osteoarthritis on steroid injections, meningocele, gastric bypass surgery in 2022, previously admitted from 10/01-10/08 for chronic pancreatic cyst, severe acute pancreatitis with necrosis, possibly drug induced for which she was seen by Hepatobiliary Surgery and was given a course of meropenem then discharged on amoxicillin-clavulanate for 7 days, presented on 10/14 with abdominal pain worsening for the past week. On admission, she was afebrile and hemodynamically stable with leukocytosis of 18,000.  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  Chest x-ray showed patchy left upper lobe lingular segment opacity.  Urine Streptococcus pneumoniae and Legionella antigens, respiratory pathogen PCR were negative.  Blood cultures showed no growth to date.  Meropenem and vancomycin were started on admission. She underwent percutaneous abscess drain placement on 10/15. Abscess fluid Gram stain and culture showed few polymorphonuclear leukocytes, no epithelial cells, heavy growth of Citrobacter youngae, anaerobes.     Subjective: Patient was seen and examined. No chills, has less abdominal pain on pain medication, no diarrhea, no rash, no itching. Afebrile.      Objective:    Vitals:    10/18/24 0755   BP: (!) 117/59   Pulse: 91   Resp: 19   Temp: 97.1 °F (36.2 °C)   SpO2: 98%      Constitutional: Alert, not in distress  Respiratory: Clear breath sounds, no crackles, no wheezes  Cardiovascular: Regular rate and rhythm, no murmurs  Gastrointestinal: Bowel sounds present, soft, nontender. LUQ JUANJO drain in place   Skin: Warm and dry, no active dermatoses  Musculoskeletal: No joint swelling, no joint erythema    Labs, imaging, and medical records/notes were personally reviewed.    Assessment:  Sepsis/leukocytosis, secondary to intra-abdominal abscess +/- peritonitis associated with possible gastric remnant perforation, s/p percutaneous abscess drain placement on 10/15. Per Surgery, no plans for immediate surgical intervention at this time as patient at high risk for complication with longstanding pancreatitis, splenic vessel thrombosis, and suspect lesser sac is fused containing the perforation. No evident gastrogastric fistula on CT with p.o. imaging.  Recent necrotizing pancreatitis with pseudocyst formation    Recommendations:  Continue meropenem 1 g every 8 hours.  Stop fluconazole.  Follow-up blood and abscess fluid cultures. Check antibiotic susceptibility to meroopenem.  Trend WBC.  Continue supportive care.     Thank you for involving me in the care of Monique Voss. ID will continue to follow. Please do not hesitate to call for any questions or concerns.    Electronically signed by Shelly Thompson MD on 10/18/2024 at 11:03 AM

## 2024-10-18 NOTE — PROGRESS NOTES
CMP and CBC collected, sent to lab     Electronically signed by Valerie Romero RN on 10/18/2024 at 10:54 AM

## 2024-10-18 NOTE — PROGRESS NOTES
[facility]   Progress note      Subjective:  Resting comfortably up in bed  States that she has pain but is well-controlled right now    Objective:    /65   Pulse 91   Temp 97.1 °F (36.2 °C) (Infrared)   Resp 19   Ht 1.727 m (5' 8\")   Wt 104.3 kg (230 lb)   LMP 09/01/2024 (Approximate)   SpO2 100%   BMI 34.97 kg/m²     In: 1175 [P.O.:360]  Out: 2295   In: 1175   Out: 2295 [Urine:2200; Drains:95]    General appearance: NAD, conversant  HEENT: AT/NC, MMM  Neck: FROM, supple  Lungs: Clear to auscultation  CV: RRR, no MRGs  Vasc: Radial pulses 2+  Abdomen: Abdominal drain in place with serosanguineous liquid/drainage  Extremities: No peripheral edema or digital cyanosis  Skin: no rash, lesions or ulcers  Psych: Alert and oriented to person, place and time  Neuro: Alert and interactive     Recent Labs     10/16/24  0424 10/17/24  0515 10/18/24  1037   WBC 19.8* 22.4* 20.5*   HGB 11.5 11.5 11.1*   HCT 35.1 35.1 33.8*   * 618* 566*       Recent Labs     10/16/24  0424 10/17/24  0515 10/18/24  1037    143 135   K 3.9 4.0 5.3*    106 97*   CO2 30* 29 30*   BUN 5* 9 11   CREATININE 0.6 0.5 0.5   CALCIUM 8.2* 8.5* 8.6       Assessment:    Principal Problem:    Bowel perforation (HCC)  Active Problems:    Acute gastric perforation    Intra-abdominal abscess (HCC)  Resolved Problems:    * No resolved hospital problems. *      Plan:    49-year-old lady past medical history of pancreatitis with necrosis, morbid obesity BMI greater than 35, hyperlipidemia, hypertension, diabetes mellitus, CHF, COPD presented to ER due to abdominal pain. She was recently discharged from the hospital on 10/8/2024 for pancreatitis with necrosis at the time she was on IV meropenem and was discharged on Augmentin. CT abdomen pelvis obtained today showed constellation of findings compatible with perforation of the gastric fundus and leakage of gastric contents into the lesser peritoneal sac with  similar appearance of pancreatitis with hypodensity of the pancreatic head suggesting of necrotizing pancreatitis with multiple encapsulated and UNENCAPSULATED peripancreatic fluid collection. 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 Chest x-ray with patchy left upper lobe lingula segment opacity concerning for possible worsening pneumonia. Leukocytosis of 18,300. She was given IV vancomycin and meropenem     ASSESSMENT:  Perforated gastric fundus  Necrotizing pancreatitis  Morbid obesity BMI greater than 35  Diabetes mellitus  COPD without exacerbation there just reading how are you those are nice while you are out of viral close a ton of weight not to keep saying that you have there down to your nose to  Chronic heart failure with preserved ejection fraction        PLAN:  NPO.  Admit to the ICU per general surgery recommendation.  She received vancomycin and meropenem.  May benefit from ID consult and also PPI given gastric perforation.  Appreciate intensivist input.      10/17/2024  49-year-old [facility] found to have gastric remnant perforation with acute pancreatitis and abscess  Status post interventional radiology drain placement on 10/15/2024  Resting comfortably in no apparent acute distress, some abdominal pain persists  Remains on TPN but diet has been advanced to clear liquids  Remains on IV antibiotic with Merrem and fluconazole-wound culture with gram-negative rods, await final identification, white count remains elevated at 22,000  Transfer out of ICU at discretion of ICU team    10/18/24  -K+ 5.3, recheck as potassium has remained normal  -Continue to monitor LFTs  -Mildly improved leukocytosis, 20.5  -H&H has remained stable  -Remains on IV Merrem and IV Diflucan  -Okay for full liquid diet  -started on Zenpep per [facility]  -TPN per [facility]  -Continue to follow cultures  -Vital signs stable     Code Status: Full  code  Consultants: ICU team, hepatobiliary, general surgery, infectious disease  DVT Prophylaxis   PT/OT  Discharge planning         I have spent a total time of 25 minutes of this patient encounter reviewing chart, labs, radiological reports coordinating care with interdisciplinary teams, face to face encounter with patient, providing counseling/education to patient/family, and formulating plan.       Chelle Damon, SANJUANA - CNP  2:58 PM  10/18/2024

## 2024-10-18 NOTE — PROGRESS NOTES
[facility]   Daily Progress Note      Patient's Name/Date of Birth: Monique Voss / 1975    Date: October 18, 2024     Chief Complaint: abdominal pain    Patient Active Problem List   Diagnosis    Primary hypertension    Mild intermittent asthma without complication    Morbid obesity    Reactive depression    Anxiety    Ureteric stone    Tobacco dependence    Hydronephrosis with urinary obstruction due to renal calculus    Pancreatic cyst    PVC (premature ventricular contraction)    Spinal stenosis of lumbar region with neurogenic claudication    Primary osteoarthritis of both hips    Gastroesophageal reflux disease without esophagitis    Vitamin D deficiency    Diabetes mellitus (HCC)    Hyperlipidemia LDL goal <70    Obstructive sleep apnea    Chronic pain syndrome    Lumbar radiculopathy    Chronic bilateral low back pain with bilateral sciatica    Hidradenitis suppurativa    Nonischemic cardiomyopathy (HCC)    Malnutrition (HCC)    Current moderate episode of major depressive disorder without prior episode (HCC)    S/P gastric bypass    Left hip pain    Non-ST elevation myocardial infarction (NSTEMI) (HCC)    Chronic systolic heart failure (HCC)    Bigeminy    Bradycardia    Coronary artery disease involving native coronary artery of native heart without angina pectoris    Pure hypercholesterolemia    Chronic obstructive pulmonary disease (HCC)    Opioid dependence with current use (HCC)    Acute infective pancreatitis    Sepsis with acute organ dysfunction (HCC)    Acute pancreatitis    Bowel perforation (HCC)    Acute gastric perforation    Intra-abdominal abscess (HCC)       Subjective: Patient feeling much better this a.m.  Tolerating clear liquid diet with supplements.  Denies epigastric pain.  Most of her pain is by the drain site.  Denies nausea and vomiting.  Reports having bowel function.    Objective:  /68   Pulse 89   Temp 97.8 °F (36.6 °C) (Oral)   Resp 18   Ht 1.727 m (5'

## 2024-10-18 NOTE — CONSULTS
[facility]    PULMONARY/CRITICAL CARE CONSULTATION NOTE    Patient: Monique Voss  MRN: [id number]  : 1975    Encounter Date: 10/18/2024  Encounter Time: 11:02 AM     Date of Admission: .10/15/2024 12:00 AM    Consulting Physician:  Primary Care Physician:      Madison Amin PA-C     [phone]     [phone]    PROBLEM LIST:  Patient Active Problem List   Diagnosis    Primary hypertension    Mild intermittent asthma without complication    Morbid obesity    Reactive depression    Anxiety    Ureteric stone    Tobacco dependence    Hydronephrosis with urinary obstruction due to renal calculus    Pancreatic cyst    PVC (premature ventricular contraction)    Spinal stenosis of lumbar region with neurogenic claudication    Primary osteoarthritis of both hips    Gastroesophageal reflux disease without esophagitis    Vitamin D deficiency    Diabetes mellitus (HCC)    Hyperlipidemia LDL goal <70    Obstructive sleep apnea    Chronic pain syndrome    Lumbar radiculopathy    Chronic bilateral low back pain with bilateral sciatica    Hidradenitis suppurativa    Nonischemic cardiomyopathy (HCC)    Malnutrition (HCC)    Current moderate episode of major depressive disorder without prior episode (HCC)    S/P gastric bypass    Left hip pain    Non-ST elevation myocardial infarction (NSTEMI) (HCC)    Chronic systolic heart failure (HCC)    Bigeminy    Bradycardia    Coronary artery disease involving native coronary artery of native heart without angina pectoris    Pure hypercholesterolemia    Chronic obstructive pulmonary disease (HCC)    Opioid dependence with current use (HCC)    Acute infective pancreatitis    Sepsis with acute organ dysfunction (HCC)    Acute pancreatitis    Bowel perforation (HCC)    Acute gastric perforation    Intra-abdominal abscess (HCC)     Reason for Consultation: JODI    HPI:   Ms. Monique Voss  is a 49 y.o. female with history of necrotizing pancreatitis,  explanation of the procedure   including risks, benefits, and alternatives.  Universal protocol was   observed. Sterile gowns, masks, hats and gloves utilized for maximal sterile   barrier.         CT ABDOMEN PELVIS W IV CONTRAST Additional Contrast? Oral   Final Result   1. Stable small left pleural effusion with fairly extensive left lower lobe   consolidative airspace disease likely reflecting atelectasis.   2. Stable left upper quadrant-perigastric multifocal free intraperitoneal air   with omental inflammatory changes without abscess.   3. Stable diffuse changes of acute pancreatitis with stable focal area of   decreased attenuation-enhancement in the pancreatic body once again possibly   reflecting pancreatic necrosis.  Stable pancreatic head cystic lesions.   Stable reactive inflammatory changes of the descending portion the duodenum.         CT ABDOMEN PELVIS W IV CONTRAST Additional Contrast? None   Final Result   1.  Constellation of findings compatible with perforation of the gastric   fundus and leakage of gastric contents into the lesser peritoneal sac.      2.  Similar appearance of patient's pancreatitis with hypodensity of the   pancreatic body suggestive of necrotizing pancreatitis, and multiple   encapsulated and non encapsulated peripancreatic fluid collections.      3.  Splenic artery and portal vein are not visualized as they course along   the pancreas, with findings suggestive of chronic high-grade stenosis or   occlusion of these vessels.      4.  Findings of hepatic cirrhosis.      5.  Small left pleural effusion with adjacent airspace consolidations that   likely relate to atelectasis.      Impressions 1-3 above were discussed with Dr. Huang by telephone at 0104 am   EST with verification.         XR CHEST PORTABLE   Final Result   Patchy left upper lobe lingular segment opacity.      Findings worrisome for pneumonia.           ASSESSMENT:  JODI - not using CPAP  Obesity - BMI 34  Sepsis,

## 2024-10-18 NOTE — PROGRESS NOTES
OCCUPATIONAL THERAPY INITIAL EVALUATION    [facility]  [address], [place]      Date:10/18/2024                                                  Patient Name: Monique Voss  MRN: [id number]  : 1975  Room: [address] [place], [place] [address]    Evaluating OT: Henrietta Ring, MOT, OTR/L  # [id number]    Referring Provider:  Chelle Damon APRN - CNP   Specific Provider Orders:  \"OT Eval and Treat\"  10-18-24    Diagnosis: Bowel perforation (HCC) [K63.1]  Necrotizing pancreatitis [K85.91]  Acute gastric perforation [K31.89]    Pt was admitted to ICU w/ Epigastric Pain, vomiting, recent pancreatitis/necrosis    Pertinent Medical History:  Pt has a past medical history of Asthma, Bell palsy, Carpal tunnel syndrome, CHF (congestive heart failure) (HCC), Chronic obstructive pulmonary disease (HCC), Coronary artery disease involving native coronary artery of native heart without angina pectoris, DDD (degenerative disc disease), cervical, Diabetes mellitus (HCC), DJD (degenerative joint disease), GERD without esophagitis, HTN (hypertension), Hyperlipidemia, Left hip pain and right, Meningitis, Morbid obesity due to excess calories, Neuropathy, JODI treated with BiPAP, Scoliosis, and 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[facility]).,  has a past surgical history that includes Cystocopy (Left, 2018); Cholecystectomy (); Lithotripsy (Right, 02/15/2018); Upper gastrointestinal endoscopy (N/A, 2018); Dilation and curettage of uterus; Upper gastrointestinal endoscopy (N/A, 2021); Kendall-en-Y Gastric Bypass (N/A, 2022); hip surgery (Left, 2022); Pain management procedure (Bilateral, 10/27/2022); Nerve Block (Bilateral, 2022); hip surgery (Left, 2023); Pain management procedure (Bilateral, 2023); Coronary angioplasty with stent (2023); Pain management procedure (Bilateral, 2024); and hip surgery (Right, 2024).    Surgeries this        Evaluation Time additionally includes thorough review of current medical information, gathering information on past medical history/social history and prior level of function, completion of standardized testing/informal observation of tasks, assessment of data and education on plan of care and goals.            MILAGROS Cho, OTR/L  # [id number]

## 2024-10-18 NOTE — PROGRESS NOTES
Pulm consult sent to Dr. Salcedo via perfect-serve     Electronically signed by Valerie Romero RN on 10/18/2024 at 10:30 AM

## 2024-10-19 LAB
ALBUMIN SERPL-MCNC: 2.3 G/DL (ref 3.5–5.2)
ALP SERPL-CCNC: 140 U/L (ref 35–104)
ALT SERPL-CCNC: 16 U/L (ref 0–32)
AMYLASE FLD-CCNC: 7300 U/L
AMYLASE FLD-CCNC: NORMAL U/L
AMYLASE SERPL-CCNC: 82 U/L (ref 20–100)
ANION GAP SERPL CALCULATED.3IONS-SCNC: 8 MMOL/L (ref 7–16)
AST SERPL-CCNC: 28 U/L (ref 0–31)
BILIRUB SERPL-MCNC: 0.3 MG/DL (ref 0–1.2)
BUN SERPL-MCNC: 12 MG/DL (ref 6–20)
CALCIUM SERPL-MCNC: 8.8 MG/DL (ref 8.6–10.2)
CHLORIDE SERPL-SCNC: 99 MMOL/L (ref 98–107)
CO2 SERPL-SCNC: 31 MMOL/L (ref 22–29)
CREAT SERPL-MCNC: 0.4 MG/DL (ref 0.5–1)
ERYTHROCYTE [DISTWIDTH] IN BLOOD BY AUTOMATED COUNT: 16.4 % (ref 11.5–15)
GFR, ESTIMATED: >90 ML/MIN/1.73M2
GLUCOSE BLD-MCNC: 118 MG/DL (ref 74–99)
GLUCOSE BLD-MCNC: 127 MG/DL (ref 74–99)
GLUCOSE SERPL-MCNC: 95 MG/DL (ref 74–99)
HCT VFR BLD AUTO: 35.1 % (ref 34–48)
HGB BLD-MCNC: 11.2 G/DL (ref 11.5–15.5)
MCH RBC QN AUTO: 31 PG (ref 26–35)
MCHC RBC AUTO-ENTMCNC: 31.9 G/DL (ref 32–34.5)
MCV RBC AUTO: 97.2 FL (ref 80–99.9)
MICROORGANISM SPEC CULT: ABNORMAL
MICROORGANISM/AGENT SPEC: ABNORMAL
PLATELET # BLD AUTO: 560 K/UL (ref 130–450)
PMV BLD AUTO: 10.1 FL (ref 7–12)
POTASSIUM SERPL-SCNC: 4.6 MMOL/L (ref 3.5–5)
PROT SERPL-MCNC: 6 G/DL (ref 6.4–8.3)
RBC # BLD AUTO: 3.61 M/UL (ref 3.5–5.5)
SODIUM SERPL-SCNC: 138 MMOL/L (ref 132–146)
SPECIMEN DESCRIPTION: ABNORMAL
SPECIMEN TYPE: NORMAL
SPECIMEN TYPE: NORMAL
WBC OTHER # BLD: 20.4 K/UL (ref 4.5–11.5)

## 2024-10-19 PROCEDURE — 2500000003 HC RX 250 WO HCPCS: Performed by: STUDENT IN AN ORGANIZED HEALTH CARE EDUCATION/TRAINING PROGRAM

## 2024-10-19 PROCEDURE — 6370000000 HC RX 637 (ALT 250 FOR IP): Performed by: STUDENT IN AN ORGANIZED HEALTH CARE EDUCATION/TRAINING PROGRAM

## 2024-10-19 PROCEDURE — 99232 SBSQ HOSP IP/OBS MODERATE 35: CPT | Performed by: TRANSPLANT SURGERY

## 2024-10-19 PROCEDURE — 80053 COMPREHEN METABOLIC PANEL: CPT

## 2024-10-19 PROCEDURE — 2060000000 HC ICU INTERMEDIATE R&B

## 2024-10-19 PROCEDURE — 6360000002 HC RX W HCPCS: Performed by: NURSE PRACTITIONER

## 2024-10-19 PROCEDURE — 2580000003 HC RX 258

## 2024-10-19 PROCEDURE — 2580000003 HC RX 258: Performed by: STUDENT IN AN ORGANIZED HEALTH CARE EDUCATION/TRAINING PROGRAM

## 2024-10-19 PROCEDURE — 99233 SBSQ HOSP IP/OBS HIGH 50: CPT | Performed by: INTERNAL MEDICINE

## 2024-10-19 PROCEDURE — 6360000002 HC RX W HCPCS: Performed by: STUDENT IN AN ORGANIZED HEALTH CARE EDUCATION/TRAINING PROGRAM

## 2024-10-19 PROCEDURE — 6370000000 HC RX 637 (ALT 250 FOR IP): Performed by: INTERNAL MEDICINE

## 2024-10-19 PROCEDURE — 85027 COMPLETE CBC AUTOMATED: CPT

## 2024-10-19 PROCEDURE — 2580000003 HC RX 258: Performed by: NURSE PRACTITIONER

## 2024-10-19 PROCEDURE — 82150 ASSAY OF AMYLASE: CPT

## 2024-10-19 PROCEDURE — 82962 GLUCOSE BLOOD TEST: CPT

## 2024-10-19 PROCEDURE — 2700000000 HC OXYGEN THERAPY PER DAY

## 2024-10-19 PROCEDURE — 6360000002 HC RX W HCPCS: Performed by: INTERNAL MEDICINE

## 2024-10-19 PROCEDURE — 36592 COLLECT BLOOD FROM PICC: CPT

## 2024-10-19 RX ORDER — FENTANYL 75 UG/1
1 PATCH TRANSDERMAL
Status: DISCONTINUED | OUTPATIENT
Start: 2024-10-19 | End: 2024-10-24 | Stop reason: HOSPADM

## 2024-10-19 RX ADMIN — GABAPENTIN 400 MG: 800 TABLET ORAL at 13:42

## 2024-10-19 RX ADMIN — HEPARIN SODIUM 5000 UNITS: 5000 INJECTION INTRAVENOUS; SUBCUTANEOUS at 08:43

## 2024-10-19 RX ADMIN — SODIUM CHLORIDE, PRESERVATIVE FREE 10 ML: 5 INJECTION INTRAVENOUS at 21:15

## 2024-10-19 RX ADMIN — MEROPENEM 1000 MG: 1 INJECTION INTRAVENOUS at 18:04

## 2024-10-19 RX ADMIN — MEROPENEM 1000 MG: 1 INJECTION INTRAVENOUS at 08:58

## 2024-10-19 RX ADMIN — GABAPENTIN 400 MG: 800 TABLET ORAL at 21:15

## 2024-10-19 RX ADMIN — MORPHINE SULFATE 15 MG: 15 TABLET ORAL at 08:42

## 2024-10-19 RX ADMIN — SODIUM CHLORIDE, PRESERVATIVE FREE 40 MG: 5 INJECTION INTRAVENOUS at 08:44

## 2024-10-19 RX ADMIN — HEPARIN SODIUM 5000 UNITS: 5000 INJECTION INTRAVENOUS; SUBCUTANEOUS at 18:10

## 2024-10-19 RX ADMIN — CITALOPRAM HYDROBROMIDE 40 MG: 40 TABLET ORAL at 08:41

## 2024-10-19 RX ADMIN — MEROPENEM 1000 MG: 1 INJECTION INTRAVENOUS at 02:26

## 2024-10-19 RX ADMIN — FENTANYL CITRATE 25 MCG: 50 INJECTION INTRAMUSCULAR; INTRAVENOUS at 05:27

## 2024-10-19 RX ADMIN — PANCRELIPASE LIPASE, PANCRELIPASE PROTEASE, PANCRELIPASE AMYLASE 80000 UNITS: 20000; 63000; 84000 CAPSULE, DELAYED RELEASE ORAL at 17:53

## 2024-10-19 RX ADMIN — SODIUM ACETATE: 164 INJECTION, SOLUTION, CONCENTRATE INTRAVENOUS at 18:10

## 2024-10-19 RX ADMIN — SODIUM CHLORIDE, PRESERVATIVE FREE 10 ML: 5 INJECTION INTRAVENOUS at 08:50

## 2024-10-19 RX ADMIN — PANCRELIPASE LIPASE, PANCRELIPASE PROTEASE, PANCRELIPASE AMYLASE 80000 UNITS: 20000; 63000; 84000 CAPSULE, DELAYED RELEASE ORAL at 11:50

## 2024-10-19 RX ADMIN — MORPHINE SULFATE 15 MG: 15 TABLET ORAL at 17:53

## 2024-10-19 RX ADMIN — MORPHINE SULFATE 15 MG: 15 TABLET ORAL at 13:41

## 2024-10-19 RX ADMIN — MORPHINE SULFATE 15 MG: 15 TABLET ORAL at 21:56

## 2024-10-19 RX ADMIN — GABAPENTIN 400 MG: 800 TABLET ORAL at 08:43

## 2024-10-19 RX ADMIN — MORPHINE SULFATE 15 MG: 15 TABLET ORAL at 02:18

## 2024-10-19 RX ADMIN — PANCRELIPASE LIPASE, PANCRELIPASE PROTEASE, PANCRELIPASE AMYLASE 80000 UNITS: 20000; 63000; 84000 CAPSULE, DELAYED RELEASE ORAL at 08:42

## 2024-10-19 ASSESSMENT — PAIN DESCRIPTION - ORIENTATION
ORIENTATION: RIGHT;LEFT
ORIENTATION: RIGHT;LEFT;POSTERIOR
ORIENTATION: RIGHT;LEFT
ORIENTATION: RIGHT;LEFT
ORIENTATION: ANTERIOR
ORIENTATION: RIGHT;LEFT
ORIENTATION: RIGHT;LEFT
ORIENTATION: RIGHT;LEFT;POSTERIOR

## 2024-10-19 ASSESSMENT — PAIN SCALES - GENERAL
PAINLEVEL_OUTOF10: 9
PAINLEVEL_OUTOF10: 9
PAINLEVEL_OUTOF10: 6
PAINLEVEL_OUTOF10: 8
PAINLEVEL_OUTOF10: 9
PAINLEVEL_OUTOF10: 6
PAINLEVEL_OUTOF10: 10
PAINLEVEL_OUTOF10: 10
PAINLEVEL_OUTOF10: 9
PAINLEVEL_OUTOF10: 6

## 2024-10-19 ASSESSMENT — PAIN DESCRIPTION - DESCRIPTORS
DESCRIPTORS: ACHING;DISCOMFORT;SORE
DESCRIPTORS: ACHING;DULL;DISCOMFORT
DESCRIPTORS: ACHING;DISCOMFORT;DULL
DESCRIPTORS: ACHING
DESCRIPTORS: ACHING;DULL;DISCOMFORT
DESCRIPTORS: ACHING;DISCOMFORT;SHARP
DESCRIPTORS: ACHING;DISCOMFORT;DULL

## 2024-10-19 ASSESSMENT — PAIN - FUNCTIONAL ASSESSMENT
PAIN_FUNCTIONAL_ASSESSMENT: ACTIVITIES ARE NOT PREVENTED

## 2024-10-19 ASSESSMENT — PAIN DESCRIPTION - LOCATION
LOCATION: BACK;HIP
LOCATION: HIP;BACK
LOCATION: BACK;HIP
LOCATION: ABDOMEN;BACK
LOCATION: ABDOMEN;GENERALIZED

## 2024-10-19 ASSESSMENT — PAIN DESCRIPTION - FREQUENCY: FREQUENCY: CONTINUOUS

## 2024-10-19 NOTE — PLAN OF CARE
Problem: Discharge Planning  Goal: Discharge to home or other facility with appropriate resources  10/18/2024 2138 by Stella Cm RN  Outcome: Progressing  10/18/2024 1101 by Valerie Romero RN  Outcome: Progressing     Problem: Pain  Goal: Verbalizes/displays adequate comfort level or baseline comfort level  10/18/2024 2138 by Stella Cm RN  Outcome: Progressing  Flowsheets (Taken 10/18/2024 2013)  Verbalizes/displays adequate comfort level or baseline comfort level: Encourage patient to monitor pain and request assistance  10/18/2024 1101 by Valerie Romero RN  Outcome: Progressing     Problem: Skin/Tissue Integrity  Goal: Absence of new skin breakdown  Description: 1.  Monitor for areas of redness and/or skin breakdown  2.  Assess vascular access sites hourly  3.  Every 4-6 hours minimum:  Change oxygen saturation probe site  4.  Every 4-6 hours:  If on nasal continuous positive airway pressure, respiratory therapy assess nares and determine need for appliance change or resting period.  10/18/2024 2138 by Stella Cm RN  Outcome: Progressing  10/18/2024 1101 by Valerie Romero RN  Outcome: Progressing     Problem: Safety - Adult  Goal: Free from fall injury  10/18/2024 2138 by Stella Cm RN  Outcome: Progressing  10/18/2024 1101 by Valerie Romero RN  Outcome: Progressing     Problem: Neurosensory - Adult  Goal: Achieves stable or improved neurological status  10/18/2024 2138 by Stella Cm RN  Outcome: Progressing  10/18/2024 1101 by Valerie Romero RN  Outcome: Progressing  Goal: Absence of seizures  10/18/2024 2138 by Stella Cm RN  Outcome: Progressing  10/18/2024 1101 by Valerie Romero RN  Outcome: Progressing  Goal: Remains free of injury related to seizures activity  10/18/2024 2138 by Stella Cm RN  Outcome: Progressing  10/18/2024 1101 by Valerie Romero RN  Outcome: Progressing  Goal: Achieves  Progressing  10/18/2024 1101 by Vaelrie Romero RN  Outcome: Progressing  Goal: Hemodynamic stability and optimal renal function maintained  10/18/2024 2138 by Stella Cm RN  Outcome: Progressing  10/18/2024 1101 by Valerie Romero RN  Outcome: Progressing  Goal: Glucose maintained within prescribed range  10/18/2024 2138 by Stella Cm RN  Outcome: Progressing  10/18/2024 1101 by Valerie Romero RN  Outcome: Progressing     Problem: Hematologic - Adult  Goal: Maintains hematologic stability  10/18/2024 2138 by Stella Cm RN  Outcome: Progressing  10/18/2024 1101 by Valerie Romero RN  Outcome: Progressing     Problem: Nutrition Deficit:  Goal: Optimize nutritional status  10/18/2024 2138 by Stella Cm RN  Outcome: Progressing  10/18/2024 1101 by Valerie Romero RN  Outcome: Progressing     Problem: ABCDS Injury Assessment  Goal: Absence of physical injury  10/18/2024 2138 by Stella Cm RN  Outcome: Progressing  10/18/2024 1101 by Valerie Romero RN  Outcome: Progressing     Problem: Chronic Conditions and Co-morbidities  Goal: Patient's chronic conditions and co-morbidity symptoms are monitored and maintained or improved  10/18/2024 2138 by Stella Cm RN  Outcome: Progressing  10/18/2024 1101 by Valerie Romero RN  Outcome: Progressing

## 2024-10-19 NOTE — PROGRESS NOTES
JUANJO drain specimen collected and sent to lab     Electronically signed by Valerie Romero RN on 10/19/2024 at 6:17 PM

## 2024-10-19 NOTE — PROGRESS NOTES
[facility]  [facility]  Progress Note    SUBJECTIVE:  No chief complaint on file.    Patient resting in bed. No complaints. Patient is tolerating medications. No reported adverse drug reactions.  No nausea, vomiting, diarrhea. Aunt visiting. On TPN, eating,    Review of systems:  As stated above in the chief complaint, otherwise negative.    Medications:  Scheduled Meds:   lipase-protease-amylase  80,000 Units Oral TID WC    gabapentin  400 mg Oral TID    citalopram  40 mg Oral Daily    fentaNYL  1 patch TransDERmal Q72H    heparin (porcine)  5,000 Units SubCUTAneous Q8H    pantoprazole (PROTONIX) 40 mg in sodium chloride (PF) 0.9 % 10 mL injection  40 mg IntraVENous Daily    meropenem  1,000 mg IntraVENous Q8H    sodium chloride flush  5-40 mL IntraVENous 2 times per day    lidocaine 1 % injection  50 mg IntraDERmal Once     Continuous Infusions:   PN-Adult  3 IN 1 Central Line (Custom)      PN-Adult  3-in-1 Central Line (Custom) 66.7 mL/hr at 10/18/24 1817    sodium chloride      sodium chloride       PRN Meds:fentanNYL, white petrolatum, morphine, sodium chloride, potassium chloride **OR** potassium chloride, magnesium sulfate, ondansetron **OR** ondansetron, polyethylene glycol, ipratropium 0.5 mg-albuterol 2.5 mg, sodium chloride flush, sodium chloride, ALTEplase    OBJECTIVE:  /63   Pulse 91   Temp (!) 96.5 °F (35.8 °C) (Temporal)   Resp 19   Ht 1.727 m (5' 8\")   Wt 122.8 kg (270 lb 12.8 oz)   LMP 2024 (Approximate)   SpO2 93%   BMI 41.17 kg/m²   Temp  Av °F (36.7 °C)  Min: 96.5 °F (35.8 °C)  Max: 99.1 °F (37.3 °C)    Constitutional: NAD  Skin: Warm and dry. No rashes were noted.   Neuro: Alert and oriented  HEENT: Round and reactive pupils.  Moist mucous membranes.  No ulcerations or thrush.  Chest: .Respirations unlabored. Breath sounds clear.   Cardiovascular:  RRR  Abdomen: Soft. Bowel sounds present. Right JUANJO draining grey purulent fluid.

## 2024-10-19 NOTE — PROGRESS NOTES
[facility]   Progress note      Subjective:  Patient is awake and alert sitting up in bed without complaints  Patient excited they advance her diet today  Patient denies any chest pain, shortness of breath.    Objective:    /64   Pulse 95   Temp 98.9 °F (37.2 °C) (Oral)   Resp 20   Ht 1.727 m (5' 8\")   Wt 122.8 kg (270 lb 12.8 oz)   LMP 09/01/2024 (Approximate)   SpO2 95%   BMI 41.17 kg/m²     In: -   Out: 815   In: -   Out: 815 [Urine:700; Drains:115]    General appearance: NAD, conversant, pleasant  HEENT: AT/NC, MMM  Neck: FROM, supple  Lungs: Clear to auscultation  CV: RRR, no MRGs  Vasc: Radial pulses 2+  Abdomen: Abdominal drain in place with serosanguineous liquid/drainage  Extremities: No peripheral edema or digital cyanosis  Skin: no rash, lesions or ulcers  Psych: Alert and oriented to person, place and time  Neuro: Alert and interactive     Recent Labs     10/17/24  0515 10/18/24  1037 10/19/24  0630   WBC 22.4* 20.5* 20.4*   HGB 11.5 11.1* 11.2*   HCT 35.1 33.8* 35.1   * 566* 560*       Recent Labs     10/17/24  0515 10/18/24  1037    135   K 4.0 5.3*    97*   CO2 29 30*   BUN 9 11   CREATININE 0.5 0.5   CALCIUM 8.5* 8.6       Assessment:    Principal Problem:    Bowel perforation (HCC)  Active Problems:    Acute gastric perforation    Intra-abdominal abscess (HCC)  Resolved Problems:    * No resolved hospital problems. *      Plan:    49-year-old lady past medical history of pancreatitis with necrosis, morbid obesity BMI greater than 35, hyperlipidemia, hypertension, diabetes mellitus, CHF, COPD presented to ER due to abdominal pain. She was recently discharged from the hospital on 10/8/2024 for pancreatitis with necrosis at the time she was on IV meropenem and was discharged on Augmentin. CT abdomen pelvis obtained today showed constellation of findings compatible with perforation of the gastric fundus and leakage of gastric contents into the lesser

## 2024-10-19 NOTE — PROGRESS NOTES
Pt had a temp of 99.1 an ice bag was given to her, temp now is 98.3.  Purwick was removed, pt is using the bedside commode independently.  PICC line dressing was changed.  Pt was on 5 L/min of O2, with a SpO2 of 100%. She is now on 2 L/min, SpO2 99%.  JUANJO had an output of 10 ml's (yellow/milky) at 2015, the dressing is clean and dry. Pt continues to have a 10/10 pain (back/heather hips), Morphine was given pain now 8/10. Pt is now sleeping.

## 2024-10-19 NOTE — PROGRESS NOTES
Fluid cultures obtained and tubed to lab.     Electronically signed by Valerie Romero RN on 10/19/2024 at 8:17 AM

## 2024-10-19 NOTE — PLAN OF CARE
Problem: Discharge Planning  Goal: Discharge to home or other facility with appropriate resources  10/19/2024 1048 by Valerie Romero RN  Outcome: Progressing  10/18/2024 2138 by Stella Cm RN  Outcome: Progressing  Flowsheets (Taken 10/18/2024 2015)  Discharge to home or other facility with appropriate resources: Identify barriers to discharge with patient and caregiver     Problem: Pain  Goal: Verbalizes/displays adequate comfort level or baseline comfort level  10/19/2024 1048 by Valerie Romero RN  Outcome: Progressing  Flowsheets  Taken 10/19/2024 0527 by Stella Cm RN  Verbalizes/displays adequate comfort level or baseline comfort level: Encourage patient to monitor pain and request assistance  Taken 10/18/2024 2218 by Stella Cm RN  Verbalizes/displays adequate comfort level or baseline comfort level: Encourage patient to monitor pain and request assistance  10/18/2024 2138 by Stella Cm RN  Outcome: Progressing  Flowsheets (Taken 10/18/2024 2013)  Verbalizes/displays adequate comfort level or baseline comfort level: Encourage patient to monitor pain and request assistance     Problem: Skin/Tissue Integrity  Goal: Absence of new skin breakdown  Description: 1.  Monitor for areas of redness and/or skin breakdown  2.  Assess vascular access sites hourly  3.  Every 4-6 hours minimum:  Change oxygen saturation probe site  4.  Every 4-6 hours:  If on nasal continuous positive airway pressure, respiratory therapy assess nares and determine need for appliance change or resting period.  10/19/2024 1048 by Valerie Romero RN  Outcome: Progressing  10/18/2024 2138 by Stella Cm RN  Outcome: Progressing     Problem: Safety - Adult  Goal: Free from fall injury  10/19/2024 1048 by Valerie Romero RN  Outcome: Progressing  10/18/2024 2138 by Stella Cm RN  Outcome: Progressing     Problem: Neurosensory - Adult  Goal: Achieves stable or improved  neurological status  10/19/2024 1048 by Valerie Romero RN  Outcome: Progressing  10/18/2024 2138 by Stella Cm RN  Outcome: Progressing  Goal: Absence of seizures  10/19/2024 1048 by Valerie Romero RN  Outcome: Progressing  10/18/2024 2138 by Stella Cm RN  Outcome: Progressing  Goal: Remains free of injury related to seizures activity  10/19/2024 1048 by Valerie Romero RN  Outcome: Progressing  10/18/2024 2138 by Stella Cm RN  Outcome: Progressing  Goal: Achieves maximal functionality and self care  10/19/2024 1048 by Valerie Romero RN  Outcome: Progressing  10/18/2024 2138 by Stella Cm RN  Outcome: Progressing     Problem: Respiratory - Adult  Goal: Achieves optimal ventilation and oxygenation  10/19/2024 1048 by Valerie Romero RN  Outcome: Progressing  10/18/2024 2138 by Stella Cm RN  Outcome: Progressing     Problem: Cardiovascular - Adult  Goal: Maintains optimal cardiac output and hemodynamic stability  10/19/2024 1048 by Valerie Romero RN  Outcome: Progressing  10/18/2024 2138 by Stella Cm RN  Outcome: Progressing  Goal: Absence of cardiac dysrhythmias or at baseline  10/19/2024 1048 by Vaelrie Romero RN  Outcome: Progressing  10/18/2024 2138 by Stella Cm RN  Outcome: Progressing     Problem: Skin/Tissue Integrity - Adult  Goal: Skin integrity remains intact  10/19/2024 1048 by Valerie Romero RN  Outcome: Progressing  10/18/2024 2138 by Stella Cm RN  Outcome: Progressing  Flowsheets (Taken 10/18/2024 2015)  Skin Integrity Remains Intact: Monitor for areas of redness and/or skin breakdown  Goal: Incisions, wounds, or drain sites healing without S/S of infection  10/19/2024 1048 by Valerie Romero RN  Outcome: Progressing  10/18/2024 2138 by Stella Cm, RN  Outcome: Progressing  Flowsheets (Taken 10/18/2024 2015)  Incisions, Wounds, or Drain Sites Healing

## 2024-10-19 NOTE — PROGRESS NOTES
[facility]   Daily Progress Note      Patient's Name/Date of Birth: Monique Voss / 1975    Date: October 19, 2024     Chief Complaint: abdominal pain    Subjective:  No acute events overnight. Remains 2L NC but denies being short of breath. 110 cc documented out of IR drain. Leukocytosis continues to increase, AM labs so far pending. Patient states she has had no nausea or vomiting.  BM overnight.     Objective:  /69   Pulse 94   Temp 98.3 °F (36.8 °C) (Oral)   Resp 22   Ht 1.727 m (5' 8\")   Wt 104.3 kg (230 lb)   LMP 09/01/2024 (Approximate)   SpO2 99%   BMI 34.97 kg/m²   Labs:  Recent Labs     10/17/24  0515 10/18/24  1037   WBC 22.4* 20.5*   HGB 11.5 11.1*   HCT 35.1 33.8*     Recent Labs     10/17/24  0515 10/18/24  1037    135   K 4.0 5.3*    97*   CO2 29 30*   BUN 9 11   CREATININE 0.5 0.5     Recent Labs     10/17/24  0515 10/18/24  0215 10/18/24  1037   ALKPHOS 110* 176* 175*   ALT 5 5 9   AST 7 12 19   BILITOT 0.2 0.2 0.2   BILIDIR  --  <0.2  --          General appearance: NAD  Head: NCAT, PERRL, EOMI, pink conjunctiva  Neck: supple, no masses  Lungs: symmetric chest rise, no audible wheezes  Heart: warm throughout  Abdomen: soft, non-distended, only tender around drain site.  Drain seropurulent with gastric contents. 110 cc output recorded.  Skin: no visible lesions  Extremities: extremities normal, atraumatic, no cyanosis or edema      Assessment/Plan:  Monique Voss is a 49 y.o. female with contained gastric remnant perforation and pancreatitis with perigastric abscess s/p IR drainage 10/15.    Advance to regular diet  Continue  Zenpep with meals.  Continue drain care.   Continue TPN - will re-order for 1/2 dose today   Monitor lytes; replaced as needed to keep K 4, Phos 3, Mg 2  Appreciate ID assistance with antibiotics-currently on Merrem and fluconazole.  Ambulate patient, PT/OT  Will order drain amylase today  Hyperglycemic; continue

## 2024-10-19 NOTE — PROGRESS NOTES
Lab called. Drain amylase unable to be analyzed due to the viscosity of the sample.     Electronically signed by Valerie Romero RN on 10/19/2024 at 11:54 AM

## 2024-10-20 ENCOUNTER — APPOINTMENT (OUTPATIENT)
Dept: CT IMAGING | Age: 49
End: 2024-10-20
Attending: TRANSPLANT SURGERY
Payer: MEDICARE

## 2024-10-20 VITALS
OXYGEN SATURATION: 100 % | HEIGHT: 68 IN | DIASTOLIC BLOOD PRESSURE: 75 MMHG | HEART RATE: 83 BPM | BODY MASS INDEX: 41.04 KG/M2 | WEIGHT: 270.8 LBS | SYSTOLIC BLOOD PRESSURE: 115 MMHG | TEMPERATURE: 97.4 F | RESPIRATION RATE: 20 BRPM

## 2024-10-20 PROBLEM — K86.89 PANCREATIC FISTULA: Status: ACTIVE | Noted: 2024-10-20

## 2024-10-20 LAB
ALBUMIN SERPL-MCNC: 2.2 G/DL (ref 3.5–5.2)
ALP SERPL-CCNC: 151 U/L (ref 35–104)
ALT SERPL-CCNC: 24 U/L (ref 0–32)
ANION GAP SERPL CALCULATED.3IONS-SCNC: 9 MMOL/L (ref 7–16)
AST SERPL-CCNC: 35 U/L (ref 0–31)
BILIRUB SERPL-MCNC: 0.3 MG/DL (ref 0–1.2)
BUN SERPL-MCNC: 10 MG/DL (ref 6–20)
CALCIUM SERPL-MCNC: 8.6 MG/DL (ref 8.6–10.2)
CHLORIDE SERPL-SCNC: 101 MMOL/L (ref 98–107)
CO2 SERPL-SCNC: 32 MMOL/L (ref 22–29)
CREAT SERPL-MCNC: 0.4 MG/DL (ref 0.5–1)
ERYTHROCYTE [DISTWIDTH] IN BLOOD BY AUTOMATED COUNT: 16.2 % (ref 11.5–15)
GFR, ESTIMATED: >90 ML/MIN/1.73M2
GLUCOSE BLD-MCNC: 110 MG/DL (ref 74–99)
GLUCOSE SERPL-MCNC: 98 MG/DL (ref 74–99)
HCT VFR BLD AUTO: 32.6 % (ref 34–48)
HGB BLD-MCNC: 10.4 G/DL (ref 11.5–15.5)
MCH RBC QN AUTO: 31 PG (ref 26–35)
MCHC RBC AUTO-ENTMCNC: 31.9 G/DL (ref 32–34.5)
MCV RBC AUTO: 97.3 FL (ref 80–99.9)
MICROORGANISM SPEC CULT: NORMAL
MICROORGANISM SPEC CULT: NORMAL
PLATELET # BLD AUTO: 506 K/UL (ref 130–450)
PMV BLD AUTO: 10.1 FL (ref 7–12)
POTASSIUM SERPL-SCNC: 4.9 MMOL/L (ref 3.5–5)
PROT SERPL-MCNC: 6 G/DL (ref 6.4–8.3)
RBC # BLD AUTO: 3.35 M/UL (ref 3.5–5.5)
SERVICE CMNT-IMP: NORMAL
SERVICE CMNT-IMP: NORMAL
SODIUM SERPL-SCNC: 142 MMOL/L (ref 132–146)
SPECIMEN DESCRIPTION: NORMAL
SPECIMEN DESCRIPTION: NORMAL
WBC OTHER # BLD: 16.9 K/UL (ref 4.5–11.5)

## 2024-10-20 PROCEDURE — 6360000002 HC RX W HCPCS: Performed by: NURSE PRACTITIONER

## 2024-10-20 PROCEDURE — 2580000003 HC RX 258: Performed by: NURSE PRACTITIONER

## 2024-10-20 PROCEDURE — 6370000000 HC RX 637 (ALT 250 FOR IP): Performed by: NURSE PRACTITIONER

## 2024-10-20 PROCEDURE — 2700000000 HC OXYGEN THERAPY PER DAY

## 2024-10-20 PROCEDURE — 2060000000 HC ICU INTERMEDIATE R&B

## 2024-10-20 PROCEDURE — 6370000000 HC RX 637 (ALT 250 FOR IP): Performed by: INTERNAL MEDICINE

## 2024-10-20 PROCEDURE — 2580000003 HC RX 258

## 2024-10-20 PROCEDURE — 80053 COMPREHEN METABOLIC PANEL: CPT

## 2024-10-20 PROCEDURE — 99232 SBSQ HOSP IP/OBS MODERATE 35: CPT | Performed by: TRANSPLANT SURGERY

## 2024-10-20 PROCEDURE — 82962 GLUCOSE BLOOD TEST: CPT

## 2024-10-20 PROCEDURE — 74160 CT ABDOMEN W/CONTRAST: CPT

## 2024-10-20 PROCEDURE — 6360000002 HC RX W HCPCS: Performed by: INTERNAL MEDICINE

## 2024-10-20 PROCEDURE — 6370000000 HC RX 637 (ALT 250 FOR IP): Performed by: STUDENT IN AN ORGANIZED HEALTH CARE EDUCATION/TRAINING PROGRAM

## 2024-10-20 PROCEDURE — 6360000004 HC RX CONTRAST MEDICATION: Performed by: RADIOLOGY

## 2024-10-20 PROCEDURE — 85027 COMPLETE CBC AUTOMATED: CPT

## 2024-10-20 RX ORDER — IOPAMIDOL 755 MG/ML
75 INJECTION, SOLUTION INTRAVASCULAR
Status: COMPLETED | OUTPATIENT
Start: 2024-10-20 | End: 2024-10-20

## 2024-10-20 RX ADMIN — GABAPENTIN 400 MG: 800 TABLET ORAL at 22:15

## 2024-10-20 RX ADMIN — HEPARIN SODIUM 5000 UNITS: 5000 INJECTION INTRAVENOUS; SUBCUTANEOUS at 15:56

## 2024-10-20 RX ADMIN — HEPARIN SODIUM 5000 UNITS: 5000 INJECTION INTRAVENOUS; SUBCUTANEOUS at 01:28

## 2024-10-20 RX ADMIN — SODIUM CHLORIDE, PRESERVATIVE FREE 10 ML: 5 INJECTION INTRAVENOUS at 19:05

## 2024-10-20 RX ADMIN — MORPHINE SULFATE 15 MG: 15 TABLET ORAL at 15:57

## 2024-10-20 RX ADMIN — PANCRELIPASE LIPASE, PANCRELIPASE PROTEASE, PANCRELIPASE AMYLASE 80000 UNITS: 20000; 63000; 84000 CAPSULE, DELAYED RELEASE ORAL at 10:08

## 2024-10-20 RX ADMIN — IOPAMIDOL 75 ML: 755 INJECTION, SOLUTION INTRAVENOUS at 08:40

## 2024-10-20 RX ADMIN — MORPHINE SULFATE 15 MG: 15 TABLET ORAL at 03:55

## 2024-10-20 RX ADMIN — GABAPENTIN 400 MG: 800 TABLET ORAL at 14:13

## 2024-10-20 RX ADMIN — MEROPENEM 1000 MG: 1 INJECTION INTRAVENOUS at 03:58

## 2024-10-20 RX ADMIN — SODIUM CHLORIDE, PRESERVATIVE FREE 10 ML: 5 INJECTION INTRAVENOUS at 10:12

## 2024-10-20 RX ADMIN — MEROPENEM 1000 MG: 1 INJECTION INTRAVENOUS at 10:08

## 2024-10-20 RX ADMIN — SODIUM CHLORIDE, PRESERVATIVE FREE 40 MG: 5 INJECTION INTRAVENOUS at 07:12

## 2024-10-20 RX ADMIN — MORPHINE SULFATE 15 MG: 15 TABLET ORAL at 21:31

## 2024-10-20 RX ADMIN — PANCRELIPASE LIPASE, PANCRELIPASE PROTEASE, PANCRELIPASE AMYLASE 80000 UNITS: 20000; 63000; 84000 CAPSULE, DELAYED RELEASE ORAL at 15:57

## 2024-10-20 RX ADMIN — FENTANYL CITRATE 25 MCG: 50 INJECTION INTRAMUSCULAR; INTRAVENOUS at 14:12

## 2024-10-20 RX ADMIN — GABAPENTIN 400 MG: 800 TABLET ORAL at 10:10

## 2024-10-20 RX ADMIN — PANCRELIPASE LIPASE, PANCRELIPASE PROTEASE, PANCRELIPASE AMYLASE 80000 UNITS: 20000; 63000; 84000 CAPSULE, DELAYED RELEASE ORAL at 11:09

## 2024-10-20 RX ADMIN — FENTANYL CITRATE 25 MCG: 50 INJECTION INTRAMUSCULAR; INTRAVENOUS at 07:12

## 2024-10-20 RX ADMIN — MORPHINE SULFATE 15 MG: 15 TABLET ORAL at 11:09

## 2024-10-20 RX ADMIN — MEROPENEM 1000 MG: 1 INJECTION INTRAVENOUS at 16:04

## 2024-10-20 RX ADMIN — CITALOPRAM HYDROBROMIDE 40 MG: 40 TABLET ORAL at 10:10

## 2024-10-20 RX ADMIN — HEPARIN SODIUM 5000 UNITS: 5000 INJECTION INTRAVENOUS; SUBCUTANEOUS at 07:12

## 2024-10-20 ASSESSMENT — PAIN DESCRIPTION - LOCATION
LOCATION: BACK
LOCATION: GENERALIZED
LOCATION: ABDOMEN;BACK;HIP
LOCATION: ABDOMEN;GENERALIZED
LOCATION: ABDOMEN;BACK;HIP
LOCATION: ABDOMEN;BACK;HIP
LOCATION: ABDOMEN;HIP;BACK
LOCATION: ABDOMEN;BACK;HIP

## 2024-10-20 ASSESSMENT — PAIN SCALES - GENERAL
PAINLEVEL_OUTOF10: 9
PAINLEVEL_OUTOF10: 6
PAINLEVEL_OUTOF10: 9
PAINLEVEL_OUTOF10: 9
PAINLEVEL_OUTOF10: 10
PAINLEVEL_OUTOF10: 9
PAINLEVEL_OUTOF10: 6
PAINLEVEL_OUTOF10: 9
PAINLEVEL_OUTOF10: 8
PAINLEVEL_OUTOF10: 7

## 2024-10-20 ASSESSMENT — PAIN DESCRIPTION - ORIENTATION
ORIENTATION: RIGHT;LEFT
ORIENTATION: RIGHT;LEFT
ORIENTATION: RIGHT
ORIENTATION: RIGHT;LEFT
ORIENTATION: ANTERIOR
ORIENTATION: RIGHT;LEFT
ORIENTATION: RIGHT;LEFT

## 2024-10-20 ASSESSMENT — PAIN DESCRIPTION - DESCRIPTORS
DESCRIPTORS: ACHING;GNAWING
DESCRIPTORS: ACHING
DESCRIPTORS: ACHING;DISCOMFORT;DULL
DESCRIPTORS: ACHING;DULL;DISCOMFORT
DESCRIPTORS: ACHING;DISCOMFORT;SORE
DESCRIPTORS: DULL;DISCOMFORT;ACHING

## 2024-10-20 ASSESSMENT — PAIN - FUNCTIONAL ASSESSMENT: PAIN_FUNCTIONAL_ASSESSMENT: PREVENTS OR INTERFERES SOME ACTIVE ACTIVITIES AND ADLS

## 2024-10-20 NOTE — PROGRESS NOTES
[facility]  [facility]  Progress Note    SUBJECTIVE:  No chief complaint on file.    Patient resting in bed. Not feeling well today. Stated she had chills last evening followed by diffuse sweating.   No nausea, vomiting, diarrhea.  On TPN, eating,    Review of systems:  As stated above in the chief complaint, otherwise negative.    Medications:  Scheduled Meds:   lipase-protease-amylase  80,000 Units Oral TID WC    fentaNYL  1 patch TransDERmal Q72H    gabapentin  400 mg Oral TID    citalopram  40 mg Oral Daily    heparin (porcine)  5,000 Units SubCUTAneous Q8H    pantoprazole (PROTONIX) 40 mg in sodium chloride (PF) 0.9 % 10 mL injection  40 mg IntraVENous Daily    meropenem  1,000 mg IntraVENous Q8H    sodium chloride flush  5-40 mL IntraVENous 2 times per day    lidocaine 1 % injection  50 mg IntraDERmal Once     Continuous Infusions:   PN-Adult  3 IN 1 Central Line (Custom) 33.3 mL/hr at 10/19/24 1810    sodium chloride      sodium chloride       PRN Meds:fentanNYL, white petrolatum, morphine, sodium chloride, potassium chloride **OR** potassium chloride, magnesium sulfate, ondansetron **OR** ondansetron, polyethylene glycol, ipratropium 0.5 mg-albuterol 2.5 mg, sodium chloride flush, sodium chloride, ALTEplase    OBJECTIVE:  /68   Pulse 90   Temp 97.2 °F (36.2 °C) (Temporal)   Resp 22   Ht 1.727 m (5' 8\")   Wt 122.8 kg (270 lb 12.8 oz)   LMP 2024 (Approximate)   SpO2 96%   BMI 41.17 kg/m²   Temp  Av.7 °F (36.5 °C)  Min: 97.2 °F (36.2 °C)  Max: 98.5 °F (36.9 °C)    Constitutional: Appears ill   Skin: Warm and dry. No rashes were noted.   Neuro: Alert and oriented  HEENT: Round and reactive pupils.  Moist mucous membranes.  No ulcerations or thrush.  Chest: .Respirations unlabored. Breath sounds clear.   Cardiovascular:  RRR  Abdomen: Soft. Bowel sounds present. Right JUANJO draining grey purulent fluid(more serous than yesterday)   Extremities: No LE edema.      Lines: PICC right Brachial 10/15/24      Laboratory and Tests:  Lab Results   Component Value Date    WBC 16.9 (H) 10/20/2024    WBC 20.4 (H) 10/19/2024    WBC 20.5 (H) 10/18/2024    HGB 10.4 (L) 10/20/2024    HCT 32.6 (L) 10/20/2024    MCV 97.3 10/20/2024     (H) 10/20/2024     Lab Results   Component Value Date    CRP 11.0 (H) 07/17/2024     Lab Results   Component Value Date    SEDRATE 36 (H) 07/17/2024    SEDRATE 38 (H) 05/13/2019    SEDRATE 49 (H) 02/14/2019     Lab Results   Component Value Date    PROCAL 0.16 (H) 10/15/2024     Radiology:  Reviewed    Microbiology:   Blood cultures 10/15/2024 no growth 4 days  Wound culture abscess 10/15/2024 Citrobacter Youngae   Anaerobic culture abscess 10/15/2024 anaerobic Gram variable rods  MRSA screen 10/15/2024 negative  Legionella and strep pneumoniae antigens 10/15/2024 negative  RVP 10/15/2024 negative    Assessment:  Sepsis/leukocytosis, secondary to intra-abdominal abscess +/- peritonitis associated with possible gastric remnant perforation, s/p percutaneous abscess drain placement on 10/15. Per Surgery, no plans for immediate surgical intervention at this time as patient at high risk for complication with longstanding pancreatitis, splenic vessel thrombosis, and suspect lesser sac is fused containing the perforation. No evident gastrogastric fistula on CT with p.o. imaging.  Leukocytosis 20.4  Recent necrotizing pancreatitis with pseudocyst formation     Recommendations:  Continue meropenem 1 g every 8 hours.  Trend WBC.-Improvement   Continue supportive care.      Sabine Farris, SANJUANA - CNP  2:10 PM  10/20/2024

## 2024-10-20 NOTE — PLAN OF CARE
Problem: Discharge Planning  Goal: Discharge to home or other facility with appropriate resources  10/20/2024 1138 by Valerie Romero RN  Outcome: Progressing  10/19/2024 2245 by Demetris Weaver RN  Outcome: Progressing     Problem: Pain  Goal: Verbalizes/displays adequate comfort level or baseline comfort level  10/20/2024 1138 by Valerie Romero RN  Outcome: Progressing  10/19/2024 2245 by Demetris Weaver RN  Outcome: Progressing     Problem: Skin/Tissue Integrity  Goal: Absence of new skin breakdown  Description: 1.  Monitor for areas of redness and/or skin breakdown  2.  Assess vascular access sites hourly  3.  Every 4-6 hours minimum:  Change oxygen saturation probe site  4.  Every 4-6 hours:  If on nasal continuous positive airway pressure, respiratory therapy assess nares and determine need for appliance change or resting period.  10/20/2024 1138 by Valerie Romero RN  Outcome: Progressing  10/19/2024 2245 by Demetris Weaver RN  Outcome: Progressing     Problem: Safety - Adult  Goal: Free from fall injury  10/20/2024 1138 by Valerie Romero RN  Outcome: Progressing  10/19/2024 2245 by Demetris Weaver RN  Outcome: Progressing     Problem: Neurosensory - Adult  Goal: Achieves stable or improved neurological status  10/20/2024 1138 by Valerie Romero RN  Outcome: Progressing  10/19/2024 2245 by Demetris Weaver RN  Outcome: Progressing  Goal: Absence of seizures  10/20/2024 1138 by Valerie Romero RN  Outcome: Progressing  10/19/2024 2245 by Demetris Weaver RN  Outcome: Progressing  Goal: Remains free of injury related to seizures activity  10/20/2024 1138 by Valerie Romero RN  Outcome: Progressing  10/19/2024 2245 by Demetris Weaver RN  Outcome: Progressing  Goal: Achieves maximal functionality and self care  10/20/2024 1138 by Valerie Romero RN  Outcome: Progressing  10/19/2024 2245 by Demetris Weaver RN  Outcome:

## 2024-10-20 NOTE — PROGRESS NOTES
Patient and previous shift RN, Valerie Romero reported during handoff fentanyl patch was removed and discarded by previous night shift nurse. Patient had patch initially applied on 10/18/2024 at 1726 as documented in the EMAR and was removed less than 24 hours of application. Patient continues to report poor pain control. Contacted Pharmacist regarding retiming next dose, ut as medication is a controlled substance we will notify the provider for new or modified order.

## 2024-10-20 NOTE — PROGRESS NOTES
[facility]   Daily Progress Note      Patient's Name/Date of Birth: Monique Voss / 1975    Date: October 20, 2024     Chief Complaint: abdominal pain    Subjective:  No acute events overnight. Is tolerating regular diet. Had a bowel movement. Drain 435 cc seropurulent increased from 115 yesterday.    Objective:  /60   Pulse 92   Temp 98.5 °F (36.9 °C) (Temporal)   Resp 22   Ht 1.727 m (5' 8\")   Wt 122.8 kg (270 lb 12.8 oz)   LMP 09/01/2024 (Approximate)   SpO2 95%   BMI 41.17 kg/m²   Labs:  Recent Labs     10/18/24  1037 10/19/24  0630 10/20/24  0600   WBC 20.5* 20.4* 16.9*   HGB 11.1* 11.2* 10.4*   HCT 33.8* 35.1 32.6*     Recent Labs     10/18/24  1037 10/19/24  0630    138   K 5.3* 4.6   CL 97* 99   CO2 30* 31*   BUN 11 12   CREATININE 0.5 0.4*     Recent Labs     10/18/24  0215 10/18/24  1037 10/19/24  0630   ALKPHOS 176* 175* 140*   ALT 5 9 16   AST 12 19 28   BILITOT 0.2 0.2 0.3   BILIDIR <0.2  --   --          General appearance: NAD  Head: NCAT, PERRL, EOMI, pink conjunctiva  Neck: supple, no masses  Lungs: symmetric chest rise, no audible wheezes  Heart: warm throughout  Abdomen: soft, non-distended, tender around drain site.  Drain seropurulent with gastric contents. 435 cc output recorded.  Skin: no visible lesions  Extremities: extremities normal, atraumatic, no cyanosis or edema      Assessment/Plan:  Monique Voss is a 49 y.o. female with contained gastric remnant perforation and pancreatitis with perigastric abscess s/p IR drainage 10/15.    drain amylase yesterday 7300  CT abdomen with IV contrast to evaluate for possible pancreatic leak  Continue regular diet as tolerated  Continue  Zenpep with meals.  Continue drain care - poss drain dc pending CT  stop TPN   Monitor lytes; replaced as needed to keep K 4, Phos 3, Mg 2  Appreciate ID assistance with antibiotics-currently on Merrem   Ambulate patient, PT/OT  Hyperglycemic; continue SSI    Discussed with

## 2024-10-20 NOTE — PLAN OF CARE
Problem: Discharge Planning  Goal: Discharge to home or other facility with appropriate resources  10/19/2024 2245 by Demetris Weaver RN  Outcome: Progressing  10/19/2024 1048 by Valerie Romero RN  Outcome: Progressing     Problem: Pain  Goal: Verbalizes/displays adequate comfort level or baseline comfort level  10/19/2024 2245 by Demetris Weaver RN  Outcome: Progressing  10/19/2024 1048 by Valerie Romero RN  Outcome: Progressing  Flowsheets  Taken 10/19/2024 0527 by Stella Cm RN  Verbalizes/displays adequate comfort level or baseline comfort level: Encourage patient to monitor pain and request assistance  Taken 10/18/2024 2218 by Stella Cm RN  Verbalizes/displays adequate comfort level or baseline comfort level: Encourage patient to monitor pain and request assistance     Problem: Skin/Tissue Integrity  Goal: Absence of new skin breakdown  Description: 1.  Monitor for areas of redness and/or skin breakdown  2.  Assess vascular access sites hourly  3.  Every 4-6 hours minimum:  Change oxygen saturation probe site  4.  Every 4-6 hours:  If on nasal continuous positive airway pressure, respiratory therapy assess nares and determine need for appliance change or resting period.  10/19/2024 2245 by Demetris Weaver RN  Outcome: Progressing  10/19/2024 1048 by Valerie Romero RN  Outcome: Progressing     Problem: Safety - Adult  Goal: Free from fall injury  10/19/2024 2245 by Demetris Weaver RN  Outcome: Progressing  10/19/2024 1048 by Valerie Romero RN  Outcome: Progressing     Problem: Neurosensory - Adult  Goal: Achieves stable or improved neurological status  10/19/2024 2245 by Demetris Weaver RN  Outcome: Progressing  10/19/2024 1048 by Valerie oRmero RN  Outcome: Progressing  Goal: Absence of seizures  10/19/2024 2245 by Demetris Weaver RN  Outcome: Progressing  10/19/2024 1048 by Valerie Romero RN  Outcome: Progressing  Goal:  Progressing  10/19/2024 1048 by Valerie Romero RN  Outcome: Progressing     Problem: Metabolic/Fluid and Electrolytes - Adult  Goal: Electrolytes maintained within normal limits  10/19/2024 2245 by Demetris Weaver RN  Outcome: Progressing  10/19/2024 1048 by Valerie Romero RN  Outcome: Progressing  Goal: Hemodynamic stability and optimal renal function maintained  10/19/2024 2245 by Demetris Weaver RN  Outcome: Progressing  10/19/2024 1048 by Valerie Romero RN  Outcome: Progressing  Goal: Glucose maintained within prescribed range  10/19/2024 2245 by Demetris Weaver RN  Outcome: Progressing  10/19/2024 1048 by Valerie Romero RN  Outcome: Progressing     Problem: Hematologic - Adult  Goal: Maintains hematologic stability  10/19/2024 2245 by Demetris Weaver RN  Outcome: Progressing  10/19/2024 1048 by Valerie Romero RN  Outcome: Progressing     Problem: Nutrition Deficit:  Goal: Optimize nutritional status  10/19/2024 2245 by Demetris Weaver RN  Outcome: Progressing  10/19/2024 1048 by Valerie Romero RN  Outcome: Progressing     Problem: ABCDS Injury Assessment  Goal: Absence of physical injury  10/19/2024 2245 by Demetris Weaver RN  Outcome: Progressing  10/19/2024 1048 by Valerie Romero RN  Outcome: Progressing     Problem: Chronic Conditions and Co-morbidities  Goal: Patient's chronic conditions and co-morbidity symptoms are monitored and maintained or improved  10/19/2024 2245 by Demetris Weaver RN  Outcome: Progressing  10/19/2024 1048 by Valerie Romero RN  Outcome: Progressing

## 2024-10-20 NOTE — PROGRESS NOTES
[facility]   Progress note      Subjective:  Patient is awake and alert sitting up in bed without complaints  Patient excited they advance her diet today  Patient denies any chest pain, shortness of breath.    Objective:    BP 93/76   Pulse 90   Temp 97.6 °F (36.4 °C) (Temporal)   Resp 23   Ht 1.727 m (5' 8\")   Wt 122.8 kg (270 lb 12.8 oz)   LMP 09/01/2024 (Approximate)   SpO2 93%   BMI 41.17 kg/m²     In: 720 [P.O.:720]  Out: 595   In: 720   Out: 595 [Drains:595]    General appearance: NAD, conversant, pleasant  HEENT: AT/NC, MMM  Neck: FROM, supple  Lungs: Clear to auscultation  CV: RRR, no MRGs  Vasc: Radial pulses 2+  Abdomen: Abdominal drain in place with serosanguineous liquid/drainage  Extremities: No peripheral edema or digital cyanosis  Skin: no rash, lesions or ulcers  Psych: Alert and oriented to person, place and time  Neuro: Alert and interactive     Recent Labs     10/18/24  1037 10/19/24  0630 10/20/24  0600   WBC 20.5* 20.4* 16.9*   HGB 11.1* 11.2* 10.4*   HCT 33.8* 35.1 32.6*   * 560* 506*       Recent Labs     10/18/24  1037 10/19/24  0630 10/20/24  0600    138 142   K 5.3* 4.6 4.9   CL 97* 99 101   CO2 30* 31* 32*   BUN 11 12 10   CREATININE 0.5 0.4* 0.4*   CALCIUM 8.6 8.8 8.6       Assessment:    Principal Problem:    Bowel perforation (HCC)  Active Problems:    Acute gastric perforation    Intra-abdominal abscess (HCC)    Pancreatic fistula  Resolved Problems:    * No resolved hospital problems. *      Plan:    49-year-old lady past medical history of pancreatitis with necrosis, morbid obesity BMI greater than 35, hyperlipidemia, hypertension, diabetes mellitus, CHF, COPD presented to ER due to abdominal pain. She was recently discharged from the hospital on 10/8/2024 for pancreatitis with necrosis at the time she was on IV meropenem and was discharged on Augmentin. CT abdomen pelvis obtained today showed constellation of findings compatible with  .

## 2024-10-20 NOTE — PROGRESS NOTES
[facility]  Department of Pulmonary, Critical Care and Sleep Medicine  [facility]  Department of Internal Medicine  Progress Note    [facility]        Patients Primary Pulmonologist is: [facility]    SUBJECTIVE:  Patient seen and examined. Currently denies symptoms of shortness of breath. Remains on 2L NC. Still with abdominal pain but states that pain medication is helping.     OBJECTIVE:  Vitals:    10/19/24 1411 10/19/24 1552 10/19/24 1553 10/19/24 2156   BP:   (!) 115/54    Pulse:   75    Resp: 19   20   Temp:       TempSrc:       SpO2:  94%     Weight:       Height:         Constitutional: Alert,     EENT: EOMI JOYA. MMM. No icterus. No thrush.     Neck:  Trachea was midline.   Respiratory: CTA bilaterally, no use of accessory muscles  Cardiovascular: Regular, No murmur. No rubs.      Pulses:  Equal bilaterally.    Abdomen: Soft without organomegaly. No rebound, rigidity.  Abdominal drain in place.   Lymphatic: No lymphadenopathy.  Musculoskeletal: Without weakness or gross deficits  Extremities:  No lower extremity edema. Reflexes appear adequate.   Skin:  Warm and dry.  No skin rashes.   Neurological/Psychiatric: No acute psychosis. Cranial nerves are intact.        DATA:    Monitor Strips:  Reviewed & discusses with technical team. No changes noted.    RADIOLOGY:  No new chest imaging      CBC with Differential:    Lab Results   Component Value Date/Time    WBC 20.4 10/19/2024 06:30 AM    RBC 3.61 10/19/2024 06:30 AM    HGB 11.2 10/19/2024 06:30 AM    HCT 35.1 10/19/2024 06:30 AM     10/19/2024 06:30 AM    MCV 97.2 10/19/2024 06:30 AM    MCH 31.0 10/19/2024 06:30 AM    MCHC 31.9 10/19/2024 06:30 AM    RDW 16.4 10/19/2024 06:30 AM    METASPCT 1 10/06/2024 05:12 AM    LYMPHOPCT 11 10/17/2024 05:15 AM    MONOPCT 6

## 2024-10-21 LAB
ALBUMIN SERPL-MCNC: 2.2 G/DL (ref 3.5–5.2)
ALBUMIN SERPL-MCNC: 2.3 G/DL (ref 3.5–5.2)
ALP SERPL-CCNC: 187 U/L (ref 35–104)
ALP SERPL-CCNC: 188 U/L (ref 35–104)
ALT SERPL-CCNC: 35 U/L (ref 0–32)
ALT SERPL-CCNC: 36 U/L (ref 0–32)
ANION GAP SERPL CALCULATED.3IONS-SCNC: 9 MMOL/L (ref 7–16)
AST SERPL-CCNC: 43 U/L (ref 0–31)
AST SERPL-CCNC: 45 U/L (ref 0–31)
BILIRUB DIRECT SERPL-MCNC: <0.2 MG/DL (ref 0–0.3)
BILIRUB INDIRECT SERPL-MCNC: ABNORMAL MG/DL (ref 0–1)
BILIRUB SERPL-MCNC: 0.3 MG/DL (ref 0–1.2)
BILIRUB SERPL-MCNC: 0.3 MG/DL (ref 0–1.2)
BUN SERPL-MCNC: 8 MG/DL (ref 6–20)
CALCIUM SERPL-MCNC: 8.9 MG/DL (ref 8.6–10.2)
CHLORIDE SERPL-SCNC: 99 MMOL/L (ref 98–107)
CO2 SERPL-SCNC: 33 MMOL/L (ref 22–29)
CREAT SERPL-MCNC: 0.5 MG/DL (ref 0.5–1)
ERYTHROCYTE [DISTWIDTH] IN BLOOD BY AUTOMATED COUNT: 16 % (ref 11.5–15)
GFR, ESTIMATED: >90 ML/MIN/1.73M2
GLUCOSE BLD-MCNC: 121 MG/DL (ref 74–99)
GLUCOSE BLD-MCNC: 128 MG/DL (ref 74–99)
GLUCOSE BLD-MCNC: 179 MG/DL (ref 74–99)
GLUCOSE SERPL-MCNC: 91 MG/DL (ref 74–99)
HCT VFR BLD AUTO: 32.7 % (ref 34–48)
HGB BLD-MCNC: 10.2 G/DL (ref 11.5–15.5)
MCH RBC QN AUTO: 30.4 PG (ref 26–35)
MCHC RBC AUTO-ENTMCNC: 31.2 G/DL (ref 32–34.5)
MCV RBC AUTO: 97.6 FL (ref 80–99.9)
PLATELET # BLD AUTO: 508 K/UL (ref 130–450)
PMV BLD AUTO: 10.6 FL (ref 7–12)
POTASSIUM SERPL-SCNC: 4.5 MMOL/L (ref 3.5–5)
PROT SERPL-MCNC: 6.2 G/DL (ref 6.4–8.3)
PROT SERPL-MCNC: 6.2 G/DL (ref 6.4–8.3)
RBC # BLD AUTO: 3.35 M/UL (ref 3.5–5.5)
SODIUM SERPL-SCNC: 141 MMOL/L (ref 132–146)
WBC OTHER # BLD: 16.8 K/UL (ref 4.5–11.5)

## 2024-10-21 PROCEDURE — 6370000000 HC RX 637 (ALT 250 FOR IP): Performed by: STUDENT IN AN ORGANIZED HEALTH CARE EDUCATION/TRAINING PROGRAM

## 2024-10-21 PROCEDURE — 2700000000 HC OXYGEN THERAPY PER DAY

## 2024-10-21 PROCEDURE — 99222 1ST HOSP IP/OBS MODERATE 55: CPT | Performed by: TRANSPLANT SURGERY

## 2024-10-21 PROCEDURE — 6370000000 HC RX 637 (ALT 250 FOR IP): Performed by: INTERNAL MEDICINE

## 2024-10-21 PROCEDURE — 85027 COMPLETE CBC AUTOMATED: CPT

## 2024-10-21 PROCEDURE — 99232 SBSQ HOSP IP/OBS MODERATE 35: CPT | Performed by: INTERNAL MEDICINE

## 2024-10-21 PROCEDURE — 2580000003 HC RX 258

## 2024-10-21 PROCEDURE — 2060000000 HC ICU INTERMEDIATE R&B

## 2024-10-21 PROCEDURE — 82962 GLUCOSE BLOOD TEST: CPT

## 2024-10-21 PROCEDURE — 97530 THERAPEUTIC ACTIVITIES: CPT

## 2024-10-21 PROCEDURE — 97535 SELF CARE MNGMENT TRAINING: CPT

## 2024-10-21 PROCEDURE — 6360000002 HC RX W HCPCS: Performed by: NURSE PRACTITIONER

## 2024-10-21 PROCEDURE — 80076 HEPATIC FUNCTION PANEL: CPT

## 2024-10-21 PROCEDURE — 2580000003 HC RX 258: Performed by: NURSE PRACTITIONER

## 2024-10-21 PROCEDURE — 80053 COMPREHEN METABOLIC PANEL: CPT

## 2024-10-21 PROCEDURE — 6370000000 HC RX 637 (ALT 250 FOR IP): Performed by: NURSE PRACTITIONER

## 2024-10-21 PROCEDURE — 6360000002 HC RX W HCPCS: Performed by: INTERNAL MEDICINE

## 2024-10-21 RX ORDER — SPIRONOLACTONE 25 MG/1
25 TABLET ORAL DAILY
Status: DISCONTINUED | OUTPATIENT
Start: 2024-10-21 | End: 2024-10-24 | Stop reason: HOSPADM

## 2024-10-21 RX ORDER — METOPROLOL SUCCINATE 50 MG/1
50 TABLET, EXTENDED RELEASE ORAL DAILY
Status: DISCONTINUED | OUTPATIENT
Start: 2024-10-21 | End: 2024-10-24 | Stop reason: HOSPADM

## 2024-10-21 RX ORDER — MICONAZOLE NITRATE 2 %
1 CREAM WITH APPLICATOR VAGINAL 2 TIMES DAILY
Status: DISCONTINUED | OUTPATIENT
Start: 2024-10-21 | End: 2024-10-24 | Stop reason: HOSPADM

## 2024-10-21 RX ADMIN — PANCRELIPASE LIPASE, PANCRELIPASE PROTEASE, PANCRELIPASE AMYLASE 80000 UNITS: 20000; 63000; 84000 CAPSULE, DELAYED RELEASE ORAL at 17:48

## 2024-10-21 RX ADMIN — SPIRONOLACTONE 25 MG: 25 TABLET ORAL at 15:27

## 2024-10-21 RX ADMIN — SODIUM CHLORIDE, PRESERVATIVE FREE 10 ML: 5 INJECTION INTRAVENOUS at 08:53

## 2024-10-21 RX ADMIN — MICONAZOLE NITRATE 1 APPLICATOR: 20 CREAM VAGINAL at 20:57

## 2024-10-21 RX ADMIN — FENTANYL CITRATE 25 MCG: 50 INJECTION INTRAMUSCULAR; INTRAVENOUS at 17:43

## 2024-10-21 RX ADMIN — GABAPENTIN 400 MG: 800 TABLET ORAL at 21:00

## 2024-10-21 RX ADMIN — CITALOPRAM HYDROBROMIDE 40 MG: 40 TABLET ORAL at 08:52

## 2024-10-21 RX ADMIN — MEROPENEM 1000 MG: 1 INJECTION INTRAVENOUS at 11:15

## 2024-10-21 RX ADMIN — MORPHINE SULFATE 15 MG: 15 TABLET ORAL at 21:51

## 2024-10-21 RX ADMIN — PANCRELIPASE LIPASE, PANCRELIPASE PROTEASE, PANCRELIPASE AMYLASE 80000 UNITS: 20000; 63000; 84000 CAPSULE, DELAYED RELEASE ORAL at 08:52

## 2024-10-21 RX ADMIN — METOPROLOL SUCCINATE 50 MG: 50 TABLET, EXTENDED RELEASE ORAL at 15:27

## 2024-10-21 RX ADMIN — HEPARIN SODIUM 5000 UNITS: 5000 INJECTION INTRAVENOUS; SUBCUTANEOUS at 15:27

## 2024-10-21 RX ADMIN — MEROPENEM 1000 MG: 1 INJECTION INTRAVENOUS at 17:45

## 2024-10-21 RX ADMIN — MORPHINE SULFATE 15 MG: 15 TABLET ORAL at 06:56

## 2024-10-21 RX ADMIN — GABAPENTIN 400 MG: 800 TABLET ORAL at 08:52

## 2024-10-21 RX ADMIN — PANCRELIPASE LIPASE, PANCRELIPASE PROTEASE, PANCRELIPASE AMYLASE 80000 UNITS: 20000; 63000; 84000 CAPSULE, DELAYED RELEASE ORAL at 11:12

## 2024-10-21 RX ADMIN — MORPHINE SULFATE 15 MG: 15 TABLET ORAL at 11:11

## 2024-10-21 RX ADMIN — SODIUM CHLORIDE, PRESERVATIVE FREE 10 ML: 5 INJECTION INTRAVENOUS at 19:23

## 2024-10-21 RX ADMIN — SODIUM CHLORIDE, PRESERVATIVE FREE 40 MG: 5 INJECTION INTRAVENOUS at 08:52

## 2024-10-21 RX ADMIN — GABAPENTIN 400 MG: 800 TABLET ORAL at 13:23

## 2024-10-21 RX ADMIN — MORPHINE SULFATE 15 MG: 15 TABLET ORAL at 02:51

## 2024-10-21 RX ADMIN — MORPHINE SULFATE 15 MG: 15 TABLET ORAL at 15:27

## 2024-10-21 RX ADMIN — MEROPENEM 1000 MG: 1 INJECTION INTRAVENOUS at 03:37

## 2024-10-21 RX ADMIN — HEPARIN SODIUM 5000 UNITS: 5000 INJECTION INTRAVENOUS; SUBCUTANEOUS at 08:52

## 2024-10-21 ASSESSMENT — PAIN SCALES - GENERAL
PAINLEVEL_OUTOF10: 9
PAINLEVEL_OUTOF10: 5
PAINLEVEL_OUTOF10: 6
PAINLEVEL_OUTOF10: 7
PAINLEVEL_OUTOF10: 9
PAINLEVEL_OUTOF10: 8
PAINLEVEL_OUTOF10: 9
PAINLEVEL_OUTOF10: 6
PAINLEVEL_OUTOF10: 8
PAINLEVEL_OUTOF10: 7
PAINLEVEL_OUTOF10: 8

## 2024-10-21 ASSESSMENT — PAIN DESCRIPTION - DESCRIPTORS
DESCRIPTORS: ACHING;DISCOMFORT;SORE
DESCRIPTORS: ACHING;DISCOMFORT;SORE
DESCRIPTORS: ACHING;SORE;DISCOMFORT
DESCRIPTORS: ACHING;DISCOMFORT;SORE;SHOOTING
DESCRIPTORS: ACHING;DISCOMFORT;SORE
DESCRIPTORS: ACHING
DESCRIPTORS: ACHING;DISCOMFORT;SORE
DESCRIPTORS: ACHING

## 2024-10-21 ASSESSMENT — PAIN DESCRIPTION - ONSET
ONSET: ON-GOING

## 2024-10-21 ASSESSMENT — PAIN DESCRIPTION - LOCATION
LOCATION: GENERALIZED
LOCATION: ABDOMEN
LOCATION: ABDOMEN
LOCATION: GENERALIZED
LOCATION: ABDOMEN
LOCATION: GENERALIZED
LOCATION: GENERALIZED
LOCATION: ABDOMEN

## 2024-10-21 ASSESSMENT — PAIN DESCRIPTION - ORIENTATION
ORIENTATION: POSTERIOR;MID
ORIENTATION: LEFT
ORIENTATION: LEFT
ORIENTATION: POSTERIOR;ANTERIOR
ORIENTATION: MID
ORIENTATION: ANTERIOR;POSTERIOR
ORIENTATION: LEFT
ORIENTATION: LEFT

## 2024-10-21 ASSESSMENT — PAIN DESCRIPTION - PAIN TYPE
TYPE: ACUTE PAIN
TYPE: SURGICAL PAIN;ACUTE PAIN
TYPE: SURGICAL PAIN;ACUTE PAIN

## 2024-10-21 ASSESSMENT — PAIN DESCRIPTION - FREQUENCY
FREQUENCY: CONTINUOUS

## 2024-10-21 NOTE — PLAN OF CARE
maintained  10/20/2024 2344 by Demetris Weaver RN  Outcome: Progressing  10/20/2024 1138 by Valerie Romero RN  Outcome: Progressing  Goal: Glucose maintained within prescribed range  10/20/2024 2344 by Demetris Weaver RN  Outcome: Progressing  10/20/2024 1138 by Valerie Romero RN  Outcome: Progressing     Problem: Hematologic - Adult  Goal: Maintains hematologic stability  10/20/2024 2344 by Demetris Weaver RN  Outcome: Progressing  10/20/2024 1138 by Valerie Romero RN  Outcome: Progressing     Problem: Nutrition Deficit:  Goal: Optimize nutritional status  10/20/2024 2344 by Demetris Weaver RN  Outcome: Progressing  10/20/2024 1138 by Valerie Romero RN  Outcome: Progressing     Problem: ABCDS Injury Assessment  Goal: Absence of physical injury  10/20/2024 2344 by Demetris Weaver RN  Outcome: Progressing  10/20/2024 1138 by Valerie Romero RN  Outcome: Progressing     Problem: Chronic Conditions and Co-morbidities  Goal: Patient's chronic conditions and co-morbidity symptoms are monitored and maintained or improved  10/20/2024 2344 by Demetris Weaver RN  Outcome: Progressing  10/20/2024 1138 by Valerie Romero RN  Outcome: Progressing      Detail Level: Detailed Depth Of Biopsy: dermis Was A Bandage Applied: Yes Size Of Lesion In Cm: 0.7 X Size Of Lesion In Cm: 0 Biopsy Type: H and E Biopsy Method: double edge Personna blade Anesthesia Type: 1% lidocaine with epinephrine Anesthesia Volume In Cc: 1 Hemostasis: Michi's Wound Care: Vaseline Dressing: Band-Aid Destruction After The Procedure: No Type Of Destruction Used: Curettage Cryotherapy Text: The wound bed was treated with cryotherapy after the biopsy was performed. Electrodesiccation Text: The wound bed was treated with electrodesiccation after the biopsy was performed. Electrodesiccation And Curettage Text: The wound bed was treated with electrodesiccation and curettage after the biopsy was performed. Silver Nitrate Text: The wound bed was treated with silver nitrate after the biopsy Lab: [id number] Consent: Verbal consent was obtained and risks were reviewed including, but not limited to, scarring, infection, bleeding, scabbing, and incomplete removal. Post-Care Instructions: Keep area covered and dry for the next 24 hours. Then wash with warm and soapy water and keep area moist with Vasaline/Polysporin Notification Instructions: Patient will be notified of biopsy results within one week Billing Type: Third-Party Bill Information: Selecting Yes will display possible errors in your note based on the variables you have selected. This validation is only offered as a suggestion for you. PLEASE NOTE THAT THE VALIDATION TEXT WILL BE REMOVED WHEN YOU FINALIZE YOUR NOTE. IF YOU WANT TO FAX A PRELIMINARY NOTE YOU WILL NEED TO TOGGLE THIS TO 'NO' IF YOU DO NOT WANT IT IN YOUR FAXED NOTE.

## 2024-10-21 NOTE — CARE COORDINATION
CM Update: Pt planning to return home at discharge. She ambulated 80 feet with ww sba. DME orders noted, will order at discharge. TPN done, tolerating diet. JUANJO to bili bag. Agreeable to [facility], no preference in company. Referral pending to Jania with [facility], will need orders placed at dc. Awaiting ID final res for IV Merrem. (TF)        Francesco Granados, LUCIENN,RN  Case Management  [phone]

## 2024-10-21 NOTE — PROGRESS NOTES
[facility]  [facility]  Progress Note    SUBJECTIVE:  No chief complaint on file.    Patient resting in bed. Not feeling well today. Stated she had chills last evening followed by diffuse sweating.   No nausea, vomiting, diarrhea.  On TPN, eating,    Review of systems:  As stated above in the chief complaint, otherwise negative.    Medications:  Scheduled Meds:   metoprolol succinate  50 mg Oral Daily    spironolactone  25 mg Oral Daily    lipase-protease-amylase  80,000 Units Oral TID WC    fentaNYL  1 patch TransDERmal Q72H    gabapentin  400 mg Oral TID    citalopram  40 mg Oral Daily    heparin (porcine)  5,000 Units SubCUTAneous Q8H    pantoprazole (PROTONIX) 40 mg in sodium chloride (PF) 0.9 % 10 mL injection  40 mg IntraVENous Daily    meropenem  1,000 mg IntraVENous Q8H    sodium chloride flush  5-40 mL IntraVENous 2 times per day    lidocaine 1 % injection  50 mg IntraDERmal Once     Continuous Infusions:   sodium chloride      sodium chloride       PRN Meds:fentanNYL, white petrolatum, morphine, sodium chloride, potassium chloride **OR** potassium chloride, magnesium sulfate, ondansetron **OR** ondansetron, polyethylene glycol, ipratropium 0.5 mg-albuterol 2.5 mg, sodium chloride flush, sodium chloride, ALTEplase    OBJECTIVE:  /80   Pulse 90   Temp 97.4 °F (36.3 °C)   Resp 16   Ht 1.727 m (5' 8\")   Wt 122.8 kg (270 lb 12.8 oz)   LMP 2024 (Approximate)   SpO2 93%   BMI 41.17 kg/m²   Temp  Av.7 °F (36.5 °C)  Min: 97.4 °F (36.3 °C)  Max: 98.3 °F (36.8 °C)    Constitutional: Appears ill   Skin: Warm and dry. No rashes were noted.   Neuro: Alert and oriented  HEENT: Round and reactive pupils.  Moist mucous membranes.  No ulcerations or thrush.  Chest: .Respirations unlabored. Breath sounds clear.   Cardiovascular:  RRR  Abdomen: Soft. Bowel sounds present. Right JUANJO draining grey purulent fluid(more serous than yesterday)   Extremities: No LE edema.      Lines: PICC right Brachial 10/15/24      Laboratory and Tests:  Lab Results   Component Value Date    WBC 16.8 (H) 10/21/2024    WBC 16.9 (H) 10/20/2024    WBC 20.4 (H) 10/19/2024    HGB 10.2 (L) 10/21/2024    HCT 32.7 (L) 10/21/2024    MCV 97.6 10/21/2024     (H) 10/21/2024     Lab Results   Component Value Date    CRP 11.0 (H) 07/17/2024     Lab Results   Component Value Date    SEDRATE 36 (H) 07/17/2024    SEDRATE 38 (H) 05/13/2019    SEDRATE 49 (H) 02/14/2019     Lab Results   Component Value Date    PROCAL 0.16 (H) 10/15/2024     Radiology:  Reviewed    Microbiology:   Blood cultures 10/15/2024 no growth 4 days  Wound culture abscess 10/15/2024 Citrobacter Youngae   Anaerobic culture abscess 10/15/2024 anaerobic Gram variable rods  MRSA screen 10/15/2024 negative  Legionella and strep pneumoniae antigens 10/15/2024 negative  RVP 10/15/2024 negative    Assessment:  Sepsis/leukocytosis, secondary to intra-abdominal abscess +/- peritonitis associated with possible gastric remnant perforation, s/p percutaneous abscess drain placement on 10/15. Per Surgery, no plans for immediate surgical intervention at this time as patient at high risk for complication with longstanding pancreatitis, splenic vessel thrombosis, and suspect lesser sac is fused containing the perforation. No evident gastrogastric fistula on CT with p.o. imaging.  Leukocytosis 20.4  Recent necrotizing pancreatitis with pseudocyst formation     Recommendations:  Continue meropenem 1 g every 8 hours, Plan to switch to invanz at discharge. Anticipate 4 weeks Abx therapy   Trend WBC.-Improvement   PICC Line in place  Continue supportive care.      Vincenzo Young MD  4:26 PM  10/21/2024

## 2024-10-21 NOTE — PROGRESS NOTES
Comprehensive Nutrition Assessment    Type and Reason for Visit:  Reassess    Nutrition Recommendations/Plan:   Continue Diet.    Will Modify Current ONS and monitor.    TPN no longer needed, d/c'd 10/20.     Malnutrition Assessment:  Malnutrition Status:  Insufficient data (10/15/24 1116)    Context:  Chronic Illness     Findings of the 6 clinical characteristics of malnutrition:  Energy Intake:  75% or less estimated energy requirements for 1 month or longer (hx gastric bypass)  Weight Loss:  Unable to assess (hx gastric bypass- unable to determine intentional vs non-intentional)     Body Fat Loss:  No significant body fat loss     Muscle Mass Loss:  No significant muscle mass loss    Fluid Accumulation:  No significant fluid accumulation     Strength:  Not Performed    Nutrition Assessment:    Pt adm 2/2 abd pain w/ N/V 2/2 recent pancreatitis/pseudocyst now w/ contained gastric remnant perf and pancreatitis w/ perigastric abscess s/p IR drainage 10/15; TPN d/c'd 10/20. Noted PNA. Noted recent hosptial stay pt just discharged last week. 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 Noted tolerance of Regular/Low Fat Diet thus far w/ +bowel fxn.  Remains at risk d/t previous poor PO intake 2/2 altered GI.  Will Modify Current ONS and monitor.    Nutrition Related Findings:    A&O, dentition WNL, Abd/BS WDL, +1 edema, closed suction drain to RUQ, +I/O's, elevated BGL/LFTs Wound Type: None (drain to RUQ noted)       Current Nutrition Intake & Therapies:    Average Meal Intake: %  Average Supplements Intake: Unable to assess  ADULT DIET; Regular; Low Fat (less than or equal to 50 gm/day)  ADULT ORAL NUTRITION SUPPLEMENT; Breakfast, Dinner; Low Calorie/High Protein Oral Supplement  ADULT ORAL NUTRITION SUPPLEMENT; Lunch; Fortified Gelatin Oral Supplement    Anthropometric Measures:  Height: 172.7 cm (5' 8\")  Ideal Body Weight (IBW): 140 lbs (64 kg)    Admission Body Weight: 122.2 kg (269 lb 6.4

## 2024-10-21 NOTE — PROGRESS NOTES
[facility]   Daily Progress Note      Patient's Name/Date of Birth: Monique Voss / 1975    Date: October 21, 2024     Chief Complaint: abdominal pain    Subjective: Patient reports some intermittent coughing overnight.  Had some abdominal pain secondary to coughing.  Is tolerating a diet.  Is passing flatus.  Denies any bowel movement.     Drain with 220 cc seropurulent recorded overnight.    1 bowel movement is recorded overnight.    Objective:  /87   Pulse 70   Temp 97.6 °F (36.4 °C) (Temporal)   Resp 20   Ht 1.727 m (5' 8\")   Wt 122.8 kg (270 lb 12.8 oz)   LMP 09/01/2024 (Approximate)   SpO2 100%   BMI 41.17 kg/m²   Labs:  Recent Labs     10/19/24  0630 10/20/24  0600 10/21/24  0250   WBC 20.4* 16.9* 16.8*   HGB 11.2* 10.4* 10.2*   HCT 35.1 32.6* 32.7*     Recent Labs     10/19/24  0630 10/20/24  0600 10/21/24  0250    142 141   K 4.6 4.9 4.5   CL 99 101 99   CO2 31* 32* 33*   BUN 12 10 8   CREATININE 0.4* 0.4* 0.5     Recent Labs     10/20/24  0600 10/21/24  0250 10/21/24  0430   ALKPHOS 151* 187* 188*   ALT 24 35* 36*   AST 35* 43* 45*   BILITOT 0.3 0.3 0.3   BILIDIR  --   --  <0.2         General appearance: NAD  Head: NCAT, PERRL, EOMI, pink conjunctiva  Neck: supple, no masses  Lungs: symmetric chest rise, no audible wheezes  Heart: warm throughout  Abdomen: soft, non-distended, tender around drain site.  Drain seropurulent with gastric contents. 220 cc output recorded.  Skin: no visible lesions  Extremities: extremities normal, atraumatic, no cyanosis or edema      Assessment/Plan:  Monique Voss is a 49 y.o. female with contained gastric remnant perforation and pancreatitis with perigastric abscess s/p IR drainage 10/15.    drain amylase 7300  CT abdomen pelvis reviewed, decreasing fluid collection along the posterior greater curve of the stomach.  Mild interval enlargement of the pancreatic head fluid collection.  Improvement in the necrotizing  pancreatitis.  Mild increase in left pleural effusion, will add SMI  Continue regular diet as tolerated  Continue  Zenpep with meals.  stop TPN   Monitor lytes; replaced as needed to keep K 4, Phos 3, Mg 2  Appreciate ID assistance with antibiotics-currently on Merrem   Ambulate patient, PT/OT  Hyperglycemic; continue SSI    Plan to be discussed with attending.    Andi Reis, DO  PGY-2 General Surgery Resident    Attending Physician Statement:    Chief Complaint: Abdominal pain    I have examined the patient and performed the key aspects of physical exam, reviewed the record (including all pertinent and new radiology images and laboratory findings), and discussed the case with the surgical team.  I agree with the assessment and plan with the following additions, corrections, and changes. 14pt review of symptoms completed and negative except as mentioned.    Patient states that she is feeling better.  She does appear to have a pneumonia on CT imaging.  Her drain does not show an abscess  JUANJO drain was placed to a bili bag to control the pancreatic fistula    Alberto Martinez MD  10/21/24  7:57 AM

## 2024-10-21 NOTE — PROGRESS NOTES
Pt requesting diuretic d/t feeling \"puffy\". Pt takes bumex and aldactone at home. Pt also complaining of irritation of julianne area expressing that she feels as though she has a yeast infection. Chelle Damon notified via [id number]. Electronically signed by Isaias Cheng RN on 10/21/2024 at 2:19 PM      Per BEATRIZ Echeverria to advise on antifungals. ID answering service called to address antifungal recommendations. Electronically signed by Isaias Cheng RN on 10/21/2024 at 2:26 PM

## 2024-10-21 NOTE — PROGRESS NOTES
Pulmonary Critical Care Medicine           PULMONARY  CRITICAL CARE   SERVICE DAILY PROGRESS  NOTE     10/21/2024   Hospital LOS:  LOS: 6 days     Impression / Recommendations:  Obstructive sleep apnea noncompliant to CPAP therapy  Morbid obesity - OHS/pickwickian physiology with a BMI of 41  Hospital admission for sepsis from intra-abdominal abscess and peritonitis with possible gastric remnant perforation.  IR guided abscess drainage  History of necrotizing pancreatitis with pseudocyst during prior hospitalization      -Patient had sleep studies in the past but has not completed it hence she has not been prescribed CPAP and does not use it.  -Will need outpatient sleep studies followed by pulmonary follow-up  -Will need home oxygen evaluation prior to discharge, currently on 2 L nasal cannula.  Patient does not use oxygen at home  -PT OT  -Out of bed to chair and ambulate as tolerated with oxygen    Interval History/Event Changes:  Comfortably sitting on the bed  States that she has not used CPAP in the past  No new symptoms  Overnight uneventful  Afebrile    Allergies  Allergies   Allergen Reactions    Fish-Derived Products Anaphylaxis     Airway closing, rash, increased body temp    Food Anaphylaxis     Food allergy - Fresh Water Fish, Ralston's and Tree Nuts    Ultram [Tramadol Hcl]      Per pt had a seizure    Cashews [Macadamia Nut Oil] Nausea And Vomiting    Norco [Hydrocodone-Acetaminophen] Hives    Pcn [Penicillins]      Not known       Review of Systems    A pertinent review of systems was performed and was otherwise non-contributory.    Vitals-     /80   Pulse 90   Temp 97.8 °F (36.6 °C) (Infrared)   Resp 20   Ht 1.727 m (5' 8\")   Wt 122.8 kg (270 lb 12.8 oz)   LMP 2024 (Approximate)   SpO2 94%   BMI 41.17 kg/m²    Tmax: Temp (24hrs), Av.7 °F (36.5 °C), Min:97.4 °F (36.3 °C), Max:98.3 °F (36.8 °C)      Hemodynamics:  Cuff:   Systolic (24hrs), Av , Min:93 , Max:138

## 2024-10-21 NOTE — PROGRESS NOTES
[facility]   Progress note      Subjective:  Resting comfortably in bed    Objective:    BP (!) 119/101   Pulse 87   Temp 97.6 °F (36.4 °C)   Resp 20   Ht 1.727 m (5' 8\")   Wt 122.8 kg (270 lb 12.8 oz)   LMP 09/01/2024 (Approximate)   SpO2 92%   BMI 41.17 kg/m²     In: 2040 [P.O.:2040]  Out: 1070   In: 2040   Out: 1070 [Urine:850; Drains:220]    General appearance: NAD, conversant, pleasant  HEENT: AT/NC, MMM  Neck: FROM, supple  Lungs: Clear to auscultation  CV: RRR, no MRGs  Vasc: Radial pulses 2+  Abdomen: Abdominal drain in place with serosanguineous liquid/drainage  Extremities: No peripheral edema or digital cyanosis  Skin: no rash, lesions or ulcers  Psych: Alert and oriented to person, place and time  Neuro: Alert and interactive     Recent Labs     10/19/24  0630 10/20/24  0600 10/21/24  0250   WBC 20.4* 16.9* 16.8*   HGB 11.2* 10.4* 10.2*   HCT 35.1 32.6* 32.7*   * 506* 508*       Recent Labs     10/19/24  0630 10/20/24  0600 10/21/24  0250    142 141   K 4.6 4.9 4.5   CL 99 101 99   CO2 31* 32* 33*   BUN 12 10 8   CREATININE 0.4* 0.4* 0.5   CALCIUM 8.8 8.6 8.9       Assessment:    Principal Problem:    Bowel perforation (HCC)  Active Problems:    Acute gastric perforation    Intra-abdominal abscess (HCC)    Pancreatic fistula  Resolved Problems:    * No resolved hospital problems. *      Plan:    49-year-old lady past medical history of pancreatitis with necrosis, morbid obesity BMI greater than 35, hyperlipidemia, hypertension, diabetes mellitus, CHF, COPD presented to ER due to abdominal pain. She was recently discharged from the hospital on 10/8/2024 for pancreatitis with necrosis at the time she was on IV meropenem and was discharged on Augmentin. CT abdomen pelvis obtained today showed constellation of findings compatible with perforation of the gastric fundus and leakage of gastric contents into the lesser peritoneal sac with similar appearance of

## 2024-10-21 NOTE — PROGRESS NOTES
qOccupational Therapy  OCCUPATIONAL THERAPY BEDSIDE TREATMENT NOTE   [facility] [address] [facility]     Date:10/21/2024  Patient Name: Monique Voss  MRN: [id number]  : 1975  Room: [address] [place], [place] [address]       Evaluating OT: MILAGROS Cho, OTR/L  # [id number]  Referring Provider:  Chelle Damon APRN - CNP   Specific Provider Orders:  \"OT Eval and Treat\"  10-18-24     Diagnosis: Bowel perforation (HCC) [K63.1]  Necrotizing pancreatitis [K85.91]  Acute gastric perforation [K31.89]     Pt was admitted to ICU w/ Epigastric Pain, vomiting, recent pancreatitis/necrosis     Pertinent Medical History:  Pt has a past medical history of Asthma, Bell palsy, Carpal tunnel syndrome, CHF (congestive heart failure) (HCC), Chronic obstructive pulmonary disease (HCC), Coronary artery disease involving native coronary artery of native heart without angina pectoris, DDD (degenerative disc disease), cervical, Diabetes mellitus (HCC), DJD (degenerative joint disease), GERD without esophagitis, HTN (hypertension), Hyperlipidemia, Left hip pain and right, Meningitis, Morbid obesity due to excess calories, Neuropathy, JODI treated with BiPAP, Scoliosis, and 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[facility]).,  has a past surgical history that includes Cystocopy (Left, 2018); Cholecystectomy (); Lithotripsy (Right, 02/15/2018); Upper gastrointestinal endoscopy (N/A, 2018); Dilation and curettage of uterus; Upper gastrointestinal endoscopy (N/A, 2021); Kendall-en-Y Gastric Bypass (N/A, 2022); hip surgery (Left, 2022); Pain management procedure (Bilateral, 10/27/2022); Nerve Block (Bilateral, 2022); hip surgery (Left, 2023); Pain management procedure (Bilateral, 2023); Coronary angioplasty with stent (2023); Pain management procedure (Bilateral, 2024); and hip surgery (Right, 2024).     Surgeries this admission: 10-15-24:

## 2024-10-21 NOTE — PLAN OF CARE
Problem: Discharge Planning  Goal: Discharge to home or other facility with appropriate resources  10/21/2024 0934 by Isaias Cheng RN  Outcome: Progressing  Flowsheets (Taken 10/21/2024 0843)  Discharge to home or other facility with appropriate resources: Identify barriers to discharge with patient and caregiver  10/20/2024 2344 by Demetris Weaver RN  Outcome: Progressing     Problem: Pain  Goal: Verbalizes/displays adequate comfort level or baseline comfort level  10/21/2024 0934 by Isaias Cheng RN  Outcome: Progressing  Flowsheets (Taken 10/21/2024 0843)  Verbalizes/displays adequate comfort level or baseline comfort level: Encourage patient to monitor pain and request assistance  10/20/2024 2344 by Demetris Weaver RN  Outcome: Progressing     Problem: Skin/Tissue Integrity  Goal: Absence of new skin breakdown  Description: 1.  Monitor for areas of redness and/or skin breakdown  2.  Assess vascular access sites hourly  3.  Every 4-6 hours minimum:  Change oxygen saturation probe site  4.  Every 4-6 hours:  If on nasal continuous positive airway pressure, respiratory therapy assess nares and determine need for appliance change or resting period.  10/21/2024 0934 by Isaias Cheng RN  Outcome: Progressing  10/20/2024 2344 by Demetris Weaver RN  Outcome: Progressing     Problem: Safety - Adult  Goal: Free from fall injury  10/21/2024 0934 by Isaias Cheng RN  Outcome: Progressing  Flowsheets (Taken 10/21/2024 0933)  Free From Fall Injury: Instruct family/caregiver on patient safety  10/20/2024 2344 by Demetris Weaver RN  Outcome: Progressing     Problem: Neurosensory - Adult  Goal: Achieves stable or improved neurological status  10/21/2024 0934 by Isaias Cheng RN  Outcome: Progressing  10/20/2024 2344 by Demetris Weaver RN  Outcome: Progressing  Goal: Absence of seizures  10/20/2024 2344 by Demetris Weaver RN  Outcome: Progressing  Goal: Remains free of injury related to seizures activity  10/20/2024 2344

## 2024-10-22 LAB
ANION GAP SERPL CALCULATED.3IONS-SCNC: 6 MMOL/L (ref 7–16)
BUN SERPL-MCNC: 7 MG/DL (ref 6–20)
CALCIUM SERPL-MCNC: 8.4 MG/DL (ref 8.6–10.2)
CHLORIDE SERPL-SCNC: 98 MMOL/L (ref 98–107)
CO2 SERPL-SCNC: 35 MMOL/L (ref 22–29)
CREAT SERPL-MCNC: 0.5 MG/DL (ref 0.5–1)
GFR, ESTIMATED: >90 ML/MIN/1.73M2
GLUCOSE BLD-MCNC: 91 MG/DL (ref 74–99)
GLUCOSE SERPL-MCNC: 100 MG/DL (ref 74–99)
MAGNESIUM SERPL-MCNC: 1.9 MG/DL (ref 1.6–2.6)
POTASSIUM SERPL-SCNC: 4.5 MMOL/L (ref 3.5–5)
SODIUM SERPL-SCNC: 139 MMOL/L (ref 132–146)

## 2024-10-22 PROCEDURE — 80048 BASIC METABOLIC PNL TOTAL CA: CPT

## 2024-10-22 PROCEDURE — 6370000000 HC RX 637 (ALT 250 FOR IP): Performed by: INTERNAL MEDICINE

## 2024-10-22 PROCEDURE — 2580000003 HC RX 258: Performed by: NURSE PRACTITIONER

## 2024-10-22 PROCEDURE — 99232 SBSQ HOSP IP/OBS MODERATE 35: CPT | Performed by: INTERNAL MEDICINE

## 2024-10-22 PROCEDURE — 6370000000 HC RX 637 (ALT 250 FOR IP): Performed by: NURSE PRACTITIONER

## 2024-10-22 PROCEDURE — 2580000003 HC RX 258: Performed by: INTERNAL MEDICINE

## 2024-10-22 PROCEDURE — 2060000000 HC ICU INTERMEDIATE R&B

## 2024-10-22 PROCEDURE — 36415 COLL VENOUS BLD VENIPUNCTURE: CPT

## 2024-10-22 PROCEDURE — 99232 SBSQ HOSP IP/OBS MODERATE 35: CPT | Performed by: TRANSPLANT SURGERY

## 2024-10-22 PROCEDURE — 2700000000 HC OXYGEN THERAPY PER DAY

## 2024-10-22 PROCEDURE — 6360000002 HC RX W HCPCS: Performed by: INTERNAL MEDICINE

## 2024-10-22 PROCEDURE — 83735 ASSAY OF MAGNESIUM: CPT

## 2024-10-22 PROCEDURE — 2580000003 HC RX 258

## 2024-10-22 PROCEDURE — 6370000000 HC RX 637 (ALT 250 FOR IP): Performed by: STUDENT IN AN ORGANIZED HEALTH CARE EDUCATION/TRAINING PROGRAM

## 2024-10-22 PROCEDURE — 82962 GLUCOSE BLOOD TEST: CPT

## 2024-10-22 PROCEDURE — 6360000002 HC RX W HCPCS: Performed by: NURSE PRACTITIONER

## 2024-10-22 RX ORDER — FLUCONAZOLE 100 MG/1
200 TABLET ORAL ONCE
Status: COMPLETED | OUTPATIENT
Start: 2024-10-22 | End: 2024-10-22

## 2024-10-22 RX ORDER — OXYCODONE AND ACETAMINOPHEN 10; 325 MG/1; MG/1
1 TABLET ORAL EVERY 8 HOURS PRN
Qty: 9 TABLET | Refills: 0 | Status: SHIPPED | OUTPATIENT
Start: 2024-10-22 | End: 2024-10-25

## 2024-10-22 RX ADMIN — SPIRONOLACTONE 25 MG: 25 TABLET ORAL at 08:45

## 2024-10-22 RX ADMIN — MEROPENEM 1000 MG: 1 INJECTION INTRAVENOUS at 00:23

## 2024-10-22 RX ADMIN — METOPROLOL SUCCINATE 50 MG: 50 TABLET, EXTENDED RELEASE ORAL at 08:46

## 2024-10-22 RX ADMIN — MORPHINE SULFATE 15 MG: 15 TABLET ORAL at 16:41

## 2024-10-22 RX ADMIN — MORPHINE SULFATE 15 MG: 15 TABLET ORAL at 08:12

## 2024-10-22 RX ADMIN — ERTAPENEM SODIUM 1000 MG: 1 INJECTION INTRAMUSCULAR; INTRAVENOUS at 14:26

## 2024-10-22 RX ADMIN — HEPARIN SODIUM 5000 UNITS: 5000 INJECTION INTRAVENOUS; SUBCUTANEOUS at 08:30

## 2024-10-22 RX ADMIN — MICONAZOLE NITRATE 1 APPLICATOR: 20 CREAM VAGINAL at 08:49

## 2024-10-22 RX ADMIN — HEPARIN SODIUM 5000 UNITS: 5000 INJECTION INTRAVENOUS; SUBCUTANEOUS at 00:24

## 2024-10-22 RX ADMIN — HEPARIN SODIUM 5000 UNITS: 5000 INJECTION INTRAVENOUS; SUBCUTANEOUS at 16:41

## 2024-10-22 RX ADMIN — PANCRELIPASE LIPASE, PANCRELIPASE PROTEASE, PANCRELIPASE AMYLASE 80000 UNITS: 20000; 63000; 84000 CAPSULE, DELAYED RELEASE ORAL at 08:44

## 2024-10-22 RX ADMIN — MORPHINE SULFATE 15 MG: 15 TABLET ORAL at 21:51

## 2024-10-22 RX ADMIN — MICONAZOLE NITRATE 1 APPLICATOR: 20 CREAM VAGINAL at 20:57

## 2024-10-22 RX ADMIN — GABAPENTIN 400 MG: 800 TABLET ORAL at 08:47

## 2024-10-22 RX ADMIN — SODIUM CHLORIDE, PRESERVATIVE FREE 40 MG: 5 INJECTION INTRAVENOUS at 08:41

## 2024-10-22 RX ADMIN — MEROPENEM 1000 MG: 1 INJECTION INTRAVENOUS at 09:46

## 2024-10-22 RX ADMIN — SODIUM CHLORIDE, PRESERVATIVE FREE 10 ML: 5 INJECTION INTRAVENOUS at 20:57

## 2024-10-22 RX ADMIN — SODIUM CHLORIDE, PRESERVATIVE FREE 10 ML: 5 INJECTION INTRAVENOUS at 08:42

## 2024-10-22 RX ADMIN — GABAPENTIN 400 MG: 800 TABLET ORAL at 20:56

## 2024-10-22 RX ADMIN — MORPHINE SULFATE 15 MG: 15 TABLET ORAL at 12:20

## 2024-10-22 RX ADMIN — MORPHINE SULFATE 15 MG: 15 TABLET ORAL at 03:55

## 2024-10-22 RX ADMIN — PANCRELIPASE LIPASE, PANCRELIPASE PROTEASE, PANCRELIPASE AMYLASE 80000 UNITS: 20000; 63000; 84000 CAPSULE, DELAYED RELEASE ORAL at 16:41

## 2024-10-22 RX ADMIN — CITALOPRAM HYDROBROMIDE 40 MG: 40 TABLET ORAL at 08:47

## 2024-10-22 RX ADMIN — PANCRELIPASE LIPASE, PANCRELIPASE PROTEASE, PANCRELIPASE AMYLASE 80000 UNITS: 20000; 63000; 84000 CAPSULE, DELAYED RELEASE ORAL at 12:20

## 2024-10-22 RX ADMIN — FLUCONAZOLE 200 MG: 100 TABLET ORAL at 18:11

## 2024-10-22 RX ADMIN — GABAPENTIN 400 MG: 800 TABLET ORAL at 14:27

## 2024-10-22 RX ADMIN — FENTANYL CITRATE 25 MCG: 50 INJECTION INTRAMUSCULAR; INTRAVENOUS at 14:35

## 2024-10-22 ASSESSMENT — PAIN - FUNCTIONAL ASSESSMENT
PAIN_FUNCTIONAL_ASSESSMENT: PREVENTS OR INTERFERES WITH MANY ACTIVE NOT PASSIVE ACTIVITIES
PAIN_FUNCTIONAL_ASSESSMENT: PREVENTS OR INTERFERES SOME ACTIVE ACTIVITIES AND ADLS
PAIN_FUNCTIONAL_ASSESSMENT: PREVENTS OR INTERFERES WITH MANY ACTIVE NOT PASSIVE ACTIVITIES
PAIN_FUNCTIONAL_ASSESSMENT: ACTIVITIES ARE NOT PREVENTED
PAIN_FUNCTIONAL_ASSESSMENT: PREVENTS OR INTERFERES SOME ACTIVE ACTIVITIES AND ADLS
PAIN_FUNCTIONAL_ASSESSMENT: ACTIVITIES ARE NOT PREVENTED
PAIN_FUNCTIONAL_ASSESSMENT: ACTIVITIES ARE NOT PREVENTED

## 2024-10-22 ASSESSMENT — PAIN DESCRIPTION - DIRECTION
RADIATING_TOWARDS: GENERALIZED

## 2024-10-22 ASSESSMENT — PAIN DESCRIPTION - DESCRIPTORS
DESCRIPTORS: ACHING;GNAWING;DISCOMFORT
DESCRIPTORS: ACHING;GNAWING;DISCOMFORT
DESCRIPTORS: ACHING;SPASM;SORE;SHARP
DESCRIPTORS: ACHING;PRESSURE;SORE
DESCRIPTORS: ACHING;SPASM;STABBING
DESCRIPTORS: ACHING;DULL;SORE
DESCRIPTORS: ACHING;GNAWING;DISCOMFORT
DESCRIPTORS: ACHING;GNAWING;DISCOMFORT

## 2024-10-22 ASSESSMENT — PAIN DESCRIPTION - ORIENTATION
ORIENTATION: LEFT
ORIENTATION: LEFT
ORIENTATION: RIGHT
ORIENTATION: LEFT

## 2024-10-22 ASSESSMENT — PAIN DESCRIPTION - LOCATION
LOCATION: ABDOMEN
LOCATION: GENERALIZED
LOCATION: GENERALIZED
LOCATION: ABDOMEN
LOCATION: ABDOMEN
LOCATION: GENERALIZED
LOCATION: ABDOMEN
LOCATION: GENERALIZED

## 2024-10-22 ASSESSMENT — PAIN SCALES - GENERAL
PAINLEVEL_OUTOF10: 2
PAINLEVEL_OUTOF10: 0
PAINLEVEL_OUTOF10: 6
PAINLEVEL_OUTOF10: 0
PAINLEVEL_OUTOF10: 0
PAINLEVEL_OUTOF10: 7
PAINLEVEL_OUTOF10: 0
PAINLEVEL_OUTOF10: 10
PAINLEVEL_OUTOF10: 6
PAINLEVEL_OUTOF10: 7
PAINLEVEL_OUTOF10: 0
PAINLEVEL_OUTOF10: 8
PAINLEVEL_OUTOF10: 10
PAINLEVEL_OUTOF10: 0
PAINLEVEL_OUTOF10: 4

## 2024-10-22 ASSESSMENT — PAIN DESCRIPTION - FREQUENCY
FREQUENCY: CONTINUOUS

## 2024-10-22 ASSESSMENT — PAIN DESCRIPTION - PAIN TYPE
TYPE: ACUTE PAIN;CHRONIC PAIN
TYPE: ACUTE PAIN;CHRONIC PAIN
TYPE: CHRONIC PAIN;ACUTE PAIN
TYPE: CHRONIC PAIN;ACUTE PAIN

## 2024-10-22 NOTE — ED NOTES
NTN given to Nurse Ordonez.   
Patient assisted to restroom.   
Patient back in room from MRI.  
Patient to MRI at this time.    
Patient updated on scheduled time for MRI.  
Pt up and ambulated to carter to bathroom.  Pt tolerated well.  Pt denies any dizziness or sob at this time  
Spoke to Letha in the lab and she reported they would have a tech come to draw pt  
declines

## 2024-10-22 NOTE — CARE COORDINATION
reached out to patients PCP office for Madison Amin PA-C at [phone] to schedule follow up D/C appointment. At this time the office is closed and a VM was left.     10/23 @ 8:57 AM      followed up with PCP and spoke to Amanda and scheduled an appointment on 11/6 at 8:30 AM in office. Amanda will f/u with patient if appointment needs changed.      attempted to contact patients room and phone continued to ring with no answer.    Information added to D/C summary.

## 2024-10-22 NOTE — CARE COORDINATION
CM Update: Met with pt at bedside to discuss transition of care. Pt is going to return Home with [facility] (referral cancelled to [facility], didn't realize [facility] accepted). Update given that pt will need IV Invanz x 4 weeks. She has 100% IV coverage. Will need Rx and consent faxed to [phone].  notified Rx is needed. Will order dme day of discharge for delivery. [facility] orders placed. [facility] updated for tentative soc tomorrow. CM to follow(TF)          LUCIEN BellaN,RN  Case Management  [phone]

## 2024-10-22 NOTE — PROGRESS NOTES
[facility] SURGERY  DAILY PROGRESS NOTE  10/22/2024    Subjective:  No new complaints or overnight events. Mild abdominal pain at drain site. Tolerating diet. Drain seropurulent 5cc    Objective:  /75   Pulse 82   Temp 97.2 °F (36.2 °C) (Oral)   Resp 16   Ht 1.727 m (5' 8\")   Wt 122.8 kg (270 lb 12.8 oz)   LMP 09/01/2024 (Approximate)   SpO2 95%   BMI 41.17 kg/m²   I/O last 3 completed shifts:  In: 2040 [P.O.:2040]  Out: 1075 [Urine:850; Drains:225]    General Appearance: No acute distress  Skin:  No cyanosis  Head/face:  NCAT  Eyes:  No gross abnormalities. Sclera nonicteric  Lungs/Chest:  Normal expansion. No respiratory distress. On 2 L  Heart: Warm throughout. Regular rate   Abdomen:  Soft, minimally tender around drain site, drain seropurulent to bili bag. Non distended.    Extremities: No gross deformities or edema.    Labs:  CBC  Recent Labs     10/21/24  0250   WBC 16.8*   HGB 10.2*   HCT 32.7*   *     BMP  Recent Labs     10/21/24  0250      K 4.5   CL 99   CO2 33*   BUN 8   CREATININE 0.5   CALCIUM 8.9     Liver Function  Recent Labs     10/21/24  0430   BILITOT 0.3   BILIDIR <0.2   AST 45*   ALT 36*   ALKPHOS 188*     No results for input(s): \"LACTATE\" in the last 72 hours.  No results for input(s): \"INR\" in the last 72 hours.    Invalid input(s): \"PT\", \"PTT\"    Radiology:  I have personally reviewed all relevant imaging:      Assessment/Plan:  49 y.o. female with contained gastric remnant perforation and pancreatitis with perigastric abscess s/p IR drainage 10/15.     Pancreatic leak, drain amylase 7300, continue drain, please do drain flush 10 cc q8 hr with saline  CT abdomen pelvis reviewed, decreasing fluid collection along the posterior greater curve of the stomach.  Mild interval enlargement of the pancreatic head fluid collection.  Improvement in the necrotizing pancreatitis.  Continue pulmonary toilet with SMI  Continue regular diet as tolerated  Continue  Zenpep with  meals.  Monitor lytes; replace as needed to keep K 4, Phos 3, Mg 2  Appreciate ID assistance with antibiotics-currently on Merrem   Ambulate patient, PT/OT  continue SSI    OK for dc with drain from surgical POV     Plan discussed with Dr. Martinez    Electronically signed by Scott Moreno DO on 10/22/2024 at 6:35 AM      Attending Physician Statement:    Chief Complaint: Necrotizing pancreatitis    I have examined the patient and performed the key aspects of physical exam, reviewed the record (including all pertinent and new radiology images and laboratory findings), and discussed the case with the surgical team.  I agree with the assessment and plan with the following additions, corrections, and changes. 14pt review of symptoms completed and negative except as mentioned.    Drain is slowing down  Continue diet  Will need repeat CT scan in 3 weeks  Will also need drain interrogation in 3 weeks    Alberto Martinez MD  10/22/24  2:21 PM

## 2024-10-22 NOTE — DISCHARGE INSTRUCTIONS
[facility] IV ANTIBIOTIC PROTOCOL FOR ANTIBIOTICS      VASCULAR ACCESS DEVICES OR VADs (TLC, PICC, Midline, etc.):   1.  VADs will be replaced as necessary.  2.  Draw all blood work from VADs, except for drug levels.  3.  If unable to access a VAD, insert a peripheral catheter temporarily. Contact the Primary Care Physician or [facility] office for surgical referral.  4.  VAD Dressing Change Protocol    - Cleanse insertion site with Shur-Clens® or equivalent three times.    - Secure catheter with Steri-Strips®, Bone®, or equivalent securing device.    - Apply Opsite® 3000 or equivalent transparent dressing.    - Change dressing twice weekly, maintaining sterile technique. If there is a BioPatch®, SilverSite® or equivalent, change once weekly only or as needed.  5. For occluded VAD: instill alteplase (Cathflo®) 2 mg into occluded catheter but do not force solution into catheter. Let dwell x 30 minutes then check for patency, if not patent, let dwell for another 90 minutes then check patency.  If still not patent, instill another 2 mg and repeat above.  If still catheter still not patent, contact physician.  If not using premixed 1 mg syringe, dilute each vial with 2.2 mL sterile water to final concentration of 1 mg/mL. Mix by gently swirling until completely dissolved. Do not shake.    6.  Remove VAD upon completion of IV antibiotics, unless otherwise specified by the ordering physician.  When PICC or VAD is to be removed, documentation of length of inserted PICC. PICC or VAD length is to correlate with inserted length and sent to physician at the time of removal.  If VAD cannot be removed, schedule appointment at office for removal.    ROUTINE LABS TO BE ORDERED AND DRAWN:   1.  Infusion Center to call all abnormal lab values to the physician  2.  Fax all labs to the office in a timely manner, during office hours. [phone]  3.  Twice weekly (preferably every Monday & Thursday):    - BUN Creatinine and liver panel    -

## 2024-10-22 NOTE — PROGRESS NOTES
SOO PROGRESS NOTE      Chief complaint: Follow-up of intraabdominal abscess    The patient is a 49 y.o.  morbidly obese female with history of COPD, CAD, DM, hypertension, hyperlipidemia, hidradenitis suppurativa right hip osteoarthritis on steroid injections, meningocele, gastric bypass surgery in 2022, previously admitted from 10/01-10/08 for chronic pancreatic cyst, severe acute pancreatitis with necrosis, possibly drug induced for which she was seen by Hepatobiliary Surgery and was given a course of meropenem then discharged on amoxicillin-clavulanate for 7 days, presented on 10/14 with abdominal pain worsening for the past week. On admission, she was afebrile and hemodynamically stable with leukocytosis of 18,000.  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  Chest x-ray showed patchy left upper lobe lingular segment opacity.  Urine Streptococcus pneumoniae and Legionella antigens, respiratory pathogen PCR were negative.  Blood cultures showed no growth to date.  Meropenem and vancomycin were started on admission. She underwent percutaneous abscess drain placement on 10/15. Abscess fluid Gram stain and culture showed few polymorphonuclear leukocytes, no epithelial cells, heavy growth of Citrobacter youngae (sensitive to meropenem, cotrimoxazole, fluoroquinolones), anaerobes.     Subjective: Patient was seen and examined. No chills, has less abdominal pain on pain medication, no diarrhea, no rash, no itching. Afebrile. She reports symptoms of vaginitis.      Objective:  /68   Pulse 80

## 2024-10-22 NOTE — PROGRESS NOTES
[facility]   Progress note      Subjective:  Resting comfortably in bed    Objective:    /70   Pulse 80   Temp 97.2 °F (36.2 °C) (Temporal)   Resp 18   Ht 1.727 m (5' 8\")   Wt 122.8 kg (270 lb 12.8 oz)   LMP 09/01/2024 (Approximate)   SpO2 96%   BMI 41.17 kg/m²     In: 2380 [P.O.:2280]  Out: 2675   In: 2380   Out: 2675 [Urine:2650; Drains:25]    General appearance: NAD, conversant, pleasant  HEENT: AT/NC, MMM  Neck: FROM, supple  Lungs: Clear to auscultation  CV: RRR, no MRGs  Vasc: Radial pulses 2+  Abdomen: Abdominal drain in place with serosanguineous liquid/drainage  Extremities: No peripheral edema or digital cyanosis  Skin: no rash, lesions or ulcers  Psych: Alert and oriented to person, place and time  Neuro: Alert and interactive     Recent Labs     10/20/24  0600 10/21/24  0250   WBC 16.9* 16.8*   HGB 10.4* 10.2*   HCT 32.6* 32.7*   * 508*       Recent Labs     10/20/24  0600 10/21/24  0250 10/22/24  1507    141 139   K 4.9 4.5 4.5    99 98   CO2 32* 33* 35*   BUN 10 8 7   CREATININE 0.4* 0.5 0.5   CALCIUM 8.6 8.9 8.4*       Assessment:    Principal Problem:    Bowel perforation (HCC)  Active Problems:    Acute gastric perforation    Intra-abdominal abscess (HCC)    Pancreatic fistula  Resolved Problems:    * No resolved hospital problems. *      Plan:    49-year-old lady past medical history of pancreatitis with necrosis, morbid obesity BMI greater than 35, hyperlipidemia, hypertension, diabetes mellitus, CHF, COPD presented to ER due to abdominal pain. She was recently discharged from the hospital on 10/8/2024 for pancreatitis with necrosis at the time she was on IV meropenem and was discharged on Augmentin. CT abdomen pelvis obtained today showed constellation of findings compatible with perforation of the gastric fundus and leakage of gastric contents into the lesser peritoneal sac with similar appearance of pancreatitis with hypodensity of the  diet  Monitor output  JUANJO drain with purulent drainage  Continue IV Merrem-fluconazole discontinued    10/20/2024  Sitting up in bed in no acute distress  Tolerating diet, no significant abdominal complaints  If okay with others, will plan her discharge tomorrow  She has a right upper extremity-sided PICC line in place-Merrem and vancomycin on board  Await for finalization of antibiotic by infectious disease and clearance by hepatobiliary for discharge-drainage catheter and left upper quadrant  She would like to go home not interested in rehab  Ambulate as able  Awaiting CT scan today as per hepatobiliary order-decrease in size of focal fluid collection      10/21/24  -JUANJO to bili bag  -No longer needing TPN  -Tolerating oral diet  -Mildly elevated LFTs, HPB aware  -Leukocytosis remains present at 16.8  -Remains on IV Merrem with plans to transition to IV Invanz on discharge per ID  -PICC line in place  -Plans for discharge home with home health care  -Resume home Aldactone and metoprolol  -Discharge planning over the next 24 hours if okay with others    10/22/24  -7 beats of V. tach, asymptomatic  -Potassium normal  -IV Invanz ordered for discharge, await home health care arrangements to receive antibiotics  -Okay for discharge from all consultants once ABX have been set up to begin at home  -Discharge planning tomorrow morning once final arrangements have been established    Code Status: Full code  Consultants: ICU team, hepatobiliary, general surgery, infectious disease  DVT Prophylaxis   PT/OT  Discharge planning         I have spent a total time of 25 minutes of this patient encounter reviewing chart, labs, radiological reports coordinating care with interdisciplinary teams, face to face encounter with patient, providing counseling/education to patient/family, and formulating plan.       SANJUANA Echeverria - CNP  5:42 PM  10/22/2024

## 2024-10-22 NOTE — PROGRESS NOTES
Pulmonary Critical Care Medicine           PULMONARY  CRITICAL CARE   SERVICE DAILY PROGRESS  NOTE     10/22/2024   Hospital LOS:  LOS: 7 days     Impression / Recommendations:  Obstructive sleep apnea noncompliant to CPAP therapy  Morbid obesity - OHS/pickwickian physiology with a BMI of 41  Hospital admission for sepsis from intra-abdominal abscess and peritonitis with possible gastric remnant perforation.  IR guided abscess drainage  History of necrotizing pancreatitis with pseudocyst during prior hospitalization      -Patient had sleep studies in the past but has not completed it hence she has not been prescribed CPAP and does not use it.  -Will need outpatient sleep studies followed by pulmonary follow-up  -Will need home oxygen evaluation prior to discharge, currently on 2 L nasal cannula.  Patient does not use oxygen at home  -PT OT  -Out of bed to chair and ambulate as tolerated with oxygen    Interval History/Event Changes:  Comfortably sleeping and easily arousable  No new complaints  Remained on noninvasive ventilation overnight but refuses at times,  tolerating it when she uses it.     Allergies  Allergies   Allergen Reactions    Fish-Derived Products Anaphylaxis     Airway closing, rash, increased body temp    Food Anaphylaxis     Food allergy - Fresh Water Fish, Seville's and Tree Nuts    Ultram [Tramadol Hcl]      Per pt had a seizure    Cashews [Macadamia Nut Oil] Nausea And Vomiting    Norco [Hydrocodone-Acetaminophen] Hives    Pcn [Penicillins]      Not known       Review of Systems    A pertinent review of systems was performed and was otherwise non-contributory.    Vitals-     /68   Pulse 80   Temp 97.2 °F (36.2 °C) (Oral)   Resp 18   Ht 1.727 m (5' 8\")   Wt 122.8 kg (270 lb 12.8 oz)   LMP 2024 (Approximate)   SpO2 96%   BMI 41.17 kg/m²    Tmax: Temp (24hrs), Av.4 °F (36.3 °C), Min:97.2 °F (36.2 °C), Max:97.6 °F (36.4 °C)      Hemodynamics:  Cuff:   Systolic (24hrs),

## 2024-10-22 NOTE — PLAN OF CARE
Problem: Discharge Planning  Goal: Discharge to home or other facility with appropriate resources  10/21/2024 2034 by Isaias Cheng, RN  Outcome: Progressing  10/21/2024 0934 by Isaias Cheng RN  Outcome: Progressing  Flowsheets (Taken 10/21/2024 0843)  Discharge to home or other facility with appropriate resources: Identify barriers to discharge with patient and caregiver     Problem: Pain  Goal: Verbalizes/displays adequate comfort level or baseline comfort level  10/21/2024 2034 by Isaias Cheng RN  Outcome: Progressing  10/21/2024 0934 by Isaias Cheng RN  Outcome: Progressing  Flowsheets (Taken 10/21/2024 0843)  Verbalizes/displays adequate comfort level or baseline comfort level: Encourage patient to monitor pain and request assistance     Problem: Skin/Tissue Integrity  Goal: Absence of new skin breakdown  Description: 1.  Monitor for areas of redness and/or skin breakdown  2.  Assess vascular access sites hourly  3.  Every 4-6 hours minimum:  Change oxygen saturation probe site  4.  Every 4-6 hours:  If on nasal continuous positive airway pressure, respiratory therapy assess nares and determine need for appliance change or resting period.  Outcome: Progressing     Problem: Safety - Adult  Goal: Free from fall injury  Outcome: Progressing  Flowsheets (Taken 10/21/2024 0933)  Free From Fall Injury: Instruct family/caregiver on patient safety     Problem: Neurosensory - Adult  Goal: Achieves stable or improved neurological status  Outcome: Progressing     Problem: Cardiovascular - Adult  Goal: Maintains optimal cardiac output and hemodynamic stability  Outcome: Progressing  Goal: Absence of cardiac dysrhythmias or at baseline  Outcome: Progressing     Problem: Skin/Tissue Integrity - Adult  Goal: Skin integrity remains intact  Outcome: Progressing

## 2024-10-23 LAB
ALBUMIN SERPL-MCNC: 2.4 G/DL (ref 3.5–5.2)
ALP SERPL-CCNC: 153 U/L (ref 35–104)
ALT SERPL-CCNC: 32 U/L (ref 0–32)
AST SERPL-CCNC: 23 U/L (ref 0–31)
BILIRUB DIRECT SERPL-MCNC: <0.2 MG/DL (ref 0–0.3)
BILIRUB INDIRECT SERPL-MCNC: ABNORMAL MG/DL (ref 0–1)
BILIRUB SERPL-MCNC: 0.2 MG/DL (ref 0–1.2)
GLUCOSE BLD-MCNC: 90 MG/DL (ref 74–99)
GLUCOSE BLD-MCNC: 98 MG/DL (ref 74–99)
HCG UR QL: NEGATIVE
PROT SERPL-MCNC: 6.3 G/DL (ref 6.4–8.3)

## 2024-10-23 PROCEDURE — 1200000000 HC SEMI PRIVATE

## 2024-10-23 PROCEDURE — 6370000000 HC RX 637 (ALT 250 FOR IP): Performed by: NURSE PRACTITIONER

## 2024-10-23 PROCEDURE — 6360000002 HC RX W HCPCS: Performed by: NURSE PRACTITIONER

## 2024-10-23 PROCEDURE — 2580000003 HC RX 258: Performed by: NURSE PRACTITIONER

## 2024-10-23 PROCEDURE — 80076 HEPATIC FUNCTION PANEL: CPT

## 2024-10-23 PROCEDURE — 6370000000 HC RX 637 (ALT 250 FOR IP): Performed by: STUDENT IN AN ORGANIZED HEALTH CARE EDUCATION/TRAINING PROGRAM

## 2024-10-23 PROCEDURE — 2580000003 HC RX 258

## 2024-10-23 PROCEDURE — 6360000002 HC RX W HCPCS: Performed by: INTERNAL MEDICINE

## 2024-10-23 PROCEDURE — 99232 SBSQ HOSP IP/OBS MODERATE 35: CPT | Performed by: INTERNAL MEDICINE

## 2024-10-23 PROCEDURE — 6370000000 HC RX 637 (ALT 250 FOR IP): Performed by: INTERNAL MEDICINE

## 2024-10-23 PROCEDURE — 2580000003 HC RX 258: Performed by: INTERNAL MEDICINE

## 2024-10-23 PROCEDURE — 82962 GLUCOSE BLOOD TEST: CPT

## 2024-10-23 PROCEDURE — 84703 CHORIONIC GONADOTROPIN ASSAY: CPT

## 2024-10-23 RX ORDER — OXYCODONE AND ACETAMINOPHEN 5; 325 MG/1; MG/1
2 TABLET ORAL EVERY 6 HOURS PRN
Status: DISCONTINUED | OUTPATIENT
Start: 2024-10-23 | End: 2024-10-24 | Stop reason: HOSPADM

## 2024-10-23 RX ORDER — CYCLOBENZAPRINE HCL 10 MG
5 TABLET ORAL DAILY PRN
Status: DISCONTINUED | OUTPATIENT
Start: 2024-10-23 | End: 2024-10-24 | Stop reason: HOSPADM

## 2024-10-23 RX ORDER — OXYCODONE AND ACETAMINOPHEN 5; 325 MG/1; MG/1
2 TABLET ORAL EVERY 8 HOURS PRN
Status: DISCONTINUED | OUTPATIENT
Start: 2024-10-23 | End: 2024-10-23

## 2024-10-23 RX ADMIN — MORPHINE SULFATE 15 MG: 15 TABLET ORAL at 07:31

## 2024-10-23 RX ADMIN — HEPARIN SODIUM 5000 UNITS: 5000 INJECTION INTRAVENOUS; SUBCUTANEOUS at 09:02

## 2024-10-23 RX ADMIN — SODIUM CHLORIDE, PRESERVATIVE FREE 10 ML: 5 INJECTION INTRAVENOUS at 20:30

## 2024-10-23 RX ADMIN — METOPROLOL SUCCINATE 50 MG: 50 TABLET, EXTENDED RELEASE ORAL at 09:03

## 2024-10-23 RX ADMIN — MORPHINE SULFATE 15 MG: 15 TABLET ORAL at 02:20

## 2024-10-23 RX ADMIN — SODIUM CHLORIDE, PRESERVATIVE FREE 10 ML: 5 INJECTION INTRAVENOUS at 09:17

## 2024-10-23 RX ADMIN — PANCRELIPASE LIPASE, PANCRELIPASE PROTEASE, PANCRELIPASE AMYLASE 80000 UNITS: 20000; 63000; 84000 CAPSULE, DELAYED RELEASE ORAL at 18:05

## 2024-10-23 RX ADMIN — HEPARIN SODIUM 5000 UNITS: 5000 INJECTION INTRAVENOUS; SUBCUTANEOUS at 17:31

## 2024-10-23 RX ADMIN — HEPARIN SODIUM 5000 UNITS: 5000 INJECTION INTRAVENOUS; SUBCUTANEOUS at 00:30

## 2024-10-23 RX ADMIN — GABAPENTIN 400 MG: 800 TABLET ORAL at 14:47

## 2024-10-23 RX ADMIN — GABAPENTIN 400 MG: 800 TABLET ORAL at 20:29

## 2024-10-23 RX ADMIN — SODIUM CHLORIDE, PRESERVATIVE FREE 40 MG: 5 INJECTION INTRAVENOUS at 09:02

## 2024-10-23 RX ADMIN — ERTAPENEM SODIUM 1000 MG: 1 INJECTION INTRAMUSCULAR; INTRAVENOUS at 14:47

## 2024-10-23 RX ADMIN — PANCRELIPASE LIPASE, PANCRELIPASE PROTEASE, PANCRELIPASE AMYLASE 80000 UNITS: 20000; 63000; 84000 CAPSULE, DELAYED RELEASE ORAL at 11:41

## 2024-10-23 RX ADMIN — PANCRELIPASE LIPASE, PANCRELIPASE PROTEASE, PANCRELIPASE AMYLASE 80000 UNITS: 20000; 63000; 84000 CAPSULE, DELAYED RELEASE ORAL at 09:02

## 2024-10-23 RX ADMIN — OXYCODONE HYDROCHLORIDE AND ACETAMINOPHEN 2 TABLET: 5; 325 TABLET ORAL at 09:09

## 2024-10-23 RX ADMIN — OXYCODONE HYDROCHLORIDE AND ACETAMINOPHEN 2 TABLET: 5; 325 TABLET ORAL at 17:31

## 2024-10-23 RX ADMIN — CITALOPRAM HYDROBROMIDE 40 MG: 40 TABLET ORAL at 09:04

## 2024-10-23 RX ADMIN — SPIRONOLACTONE 25 MG: 25 TABLET ORAL at 09:03

## 2024-10-23 RX ADMIN — GABAPENTIN 400 MG: 800 TABLET ORAL at 09:03

## 2024-10-23 ASSESSMENT — PAIN DESCRIPTION - LOCATION
LOCATION: GENERALIZED
LOCATION: ABDOMEN

## 2024-10-23 ASSESSMENT — PAIN SCALES - GENERAL
PAINLEVEL_OUTOF10: 4
PAINLEVEL_OUTOF10: 8
PAINLEVEL_OUTOF10: 7
PAINLEVEL_OUTOF10: 9
PAINLEVEL_OUTOF10: 5
PAINLEVEL_OUTOF10: 0
PAINLEVEL_OUTOF10: 10
PAINLEVEL_OUTOF10: 7

## 2024-10-23 ASSESSMENT — PAIN - FUNCTIONAL ASSESSMENT
PAIN_FUNCTIONAL_ASSESSMENT: PREVENTS OR INTERFERES SOME ACTIVE ACTIVITIES AND ADLS
PAIN_FUNCTIONAL_ASSESSMENT: ACTIVITIES ARE NOT PREVENTED
PAIN_FUNCTIONAL_ASSESSMENT: ACTIVITIES ARE NOT PREVENTED

## 2024-10-23 ASSESSMENT — PAIN DESCRIPTION - ORIENTATION
ORIENTATION: LEFT
ORIENTATION: LEFT

## 2024-10-23 ASSESSMENT — PAIN DESCRIPTION - DESCRIPTORS
DESCRIPTORS: ACHING
DESCRIPTORS: ACHING;DULL;DISCOMFORT
DESCRIPTORS: ACHING;DISCOMFORT;GNAWING
DESCRIPTORS: ACHING

## 2024-10-23 NOTE — PROGRESS NOTES
Occupational Therapy  Attempted OT tx session, however, pt being transferred to another floor at this time.  Will attempt session next date.  Thank you for this referral. Henrietta Ring, MOT, OTR/L  # [id number]

## 2024-10-23 NOTE — CARE COORDINATION
Discharge order noted. Pt to dc home this evening if pain is controlled with change of medication. If not she will dc in AM. [facility] has her scheduled for soc tomorrow afternoon. Rx and consent faxed. [facility] orders placed (TF)          BOBBY Bella,RN  Case Management  [phone]

## 2024-10-23 NOTE — PROGRESS NOTES
Spiritual Health History and Assessment/Progress Note  [facility]    Initial Encounter,  ,  ,      Name: Monique Voss MRN: [id number]    Age: 49 y.o.     Sex: female   Language: English   Congregational: [employer]   Bowel perforation (HCC)     Date: 10/23/2024                           Spiritual Assessment began in [address] PICU        Referral/Consult From: Rounding   Encounter Overview/Reason: Initial Encounter  Service Provided For: Patient    Joselin, Belief, Meaning:   Patient identifies as spiritual and is connected with a joselin tradition or spiritual practice  Family/Friends No family/friends present      Importance and Influence:  Patient has spiritual/personal beliefs that influence decisions regarding their health  Family/Friends No family/friends present    Community:  Patient feels well-supported. Support system includes: Extended family  Family/Friends No family/friends present    Assessment and Plan of Care:     Patient Interventions include: Facilitated expression of thoughts and feelings, Explored spiritual coping/struggle/distress, and Engaged in theological reflection  Family/Friends Interventions include: No family/friends present    Patient Plan of Care: Spiritual Care available upon further referral and No future visits per patient/family request  Family/Friends Plan of Care: No family/friends present    Electronically signed by YAHAIRA BASSETT on 10/23/2024 at 3:38 PM

## 2024-10-23 NOTE — PROGRESS NOTES
Pt report given to 84. Per bed board the bed was ready several hours ago, that did not reflect on our bed board manager, unit director spoke with bed coordinator to remedy the inability to see the transfer status. Situation was remedied and report was called. Awating transfer now.     Medications delivered to 84 charge BILL Montoya. Pt is receiving patient provided gabapentin that pharmacy previously delivered and put into patients unlocked medication  drawer. Count this AM was 7, pt received 1/2 of a pill twice today per order leaving 6. Counted and confirmed with Lindsay narc form filled out.

## 2024-10-23 NOTE — PROGRESS NOTES
Pulmonary Critical Care Medicine           PULMONARY  CRITICAL CARE   SERVICE DAILY PROGRESS  NOTE     10/23/2024   Hospital LOS:  LOS: 8 days     Impression / Recommendations:  Obstructive sleep apnea noncompliant to CPAP therapy - not using while in the hospital as well   Morbid obesity - OHS/pickwickian physiology with a BMI of 41  Hospital admission for sepsis from intra-abdominal abscess and peritonitis with possible gastric remnant perforation.  IR guided abscess drainage  History of necrotizing pancreatitis with pseudocyst during prior hospitalization    -Patient had sleep studies in the past but has not completed it, states she has used CPAP in the past and willing to use it at night after sleep studies.  -Will need outpatient sleep studies followed by pulmonary follow-up  -Will need home oxygen evaluation prior to discharge, currently on 2 L nasal cannula.  Patient does not use oxygen at home  -PT OT  -Out of bed to chair and ambulate as tolerated with oxygen    Interval History/Event Changes:  Looks better ,more awake today .   No overnight events   HD stable   Afebrile   Planning on discharging home tomorrow     Allergies  Allergies   Allergen Reactions    Fish-Derived Products Anaphylaxis     Airway closing, rash, increased body temp    Food Anaphylaxis     Food allergy - Fresh Water Fish, Willard's and Tree Nuts    Ultram [Tramadol Hcl]      Per pt had a seizure    Cashews [Macadamia Nut Oil] Nausea And Vomiting    Norco [Hydrocodone-Acetaminophen] Hives    Pcn [Penicillins]      Not known       Review of Systems    A pertinent review of systems was performed and was otherwise non-contributory.    Vitals-     /69   Pulse 72   Temp 97.8 °F (36.6 °C) (Oral)   Resp 17   Ht 1.727 m (5' 8\")   Wt 122.8 kg (270 lb 12.8 oz)   LMP 2024 (Approximate)   SpO2 96%   BMI 41.17 kg/m²    Tmax: Temp (24hrs), Av.4 °F (36.3 °C), Min:97.2 °F (36.2 °C), Max:97.8 °F (36.6

## 2024-10-23 NOTE — PLAN OF CARE
Problem: Discharge Planning  Goal: Discharge to home or other facility with appropriate resources  10/23/2024 1842 by Marilu Mendez RN  Outcome: Progressing  10/23/2024 1106 by Niki Lopez RN  Outcome: Progressing  Flowsheets (Taken 10/23/2024 0904)  Discharge to home or other facility with appropriate resources:   Identify barriers to discharge with patient and caregiver   Arrange for needed discharge resources and transportation as appropriate   Identify discharge learning needs (meds, wound care, etc)   Refer to discharge planning if patient needs post-hospital services based on physician order or complex needs related to functional status, cognitive ability or social support system  10/23/2024 1105 by Niki Lopez RN  Outcome: Progressing  Flowsheets (Taken 10/23/2024 0904)  Discharge to home or other facility with appropriate resources:   Identify barriers to discharge with patient and caregiver   Arrange for needed discharge resources and transportation as appropriate   Identify discharge learning needs (meds, wound care, etc)   Refer to discharge planning if patient needs post-hospital services based on physician order or complex needs related to functional status, cognitive ability or social support system     Problem: Pain  Goal: Verbalizes/displays adequate comfort level or baseline comfort level  10/23/2024 1842 by Marilu Mendez RN  Outcome: Progressing  10/23/2024 1106 by Niki Lopez RN  Outcome: Progressing  Flowsheets (Taken 10/21/2024 0843 by Isaias Cheng, BILL)  Verbalizes/displays adequate comfort level or baseline comfort level: Encourage patient to monitor pain and request assistance  10/23/2024 1105 by Niki Lopez RN  Outcome: Progressing     Problem: Skin/Tissue Integrity  Goal: Absence of new skin breakdown  Description: 1.  Monitor for areas of redness and/or skin breakdown  2.  Assess vascular access sites hourly  3.  Every 4-6 hours minimum:  Change oxygen

## 2024-10-23 NOTE — PROGRESS NOTES
[facility]   Progress note      Subjective:  Resting comfortably in bed  No acute events overnight  Objective:    /69   Pulse 72   Temp 97.8 °F (36.6 °C) (Oral)   Resp 17   Ht 1.727 m (5' 8\")   Wt 122.8 kg (270 lb 12.8 oz)   LMP 09/01/2024 (Approximate)   SpO2 96%   BMI 41.17 kg/m²     In: 1900 [P.O.:1800]  Out: 2240   In: 1900   Out: 2240 [Urine:2200; Drains:40]    General appearance: NAD, conversant, pleasant  HEENT: AT/NC, MMM  Neck: FROM, supple  Lungs: Clear to auscultation  CV: RRR, no MRGs  Vasc: Radial pulses 2+  Abdomen: Abdominal drain in place with serosanguineous liquid/drainage  Extremities: No peripheral edema or digital cyanosis  Skin: no rash, lesions or ulcers  Psych: Alert and oriented to person, place and time  Neuro: Alert and interactive     Recent Labs     10/21/24  0250   WBC 16.8*   HGB 10.2*   HCT 32.7*   *       Recent Labs     10/21/24  0250 10/22/24  1507    139   K 4.5 4.5   CL 99 98   CO2 33* 35*   BUN 8 7   CREATININE 0.5 0.5   CALCIUM 8.9 8.4*       Assessment:    Principal Problem:    Bowel perforation (HCC)  Active Problems:    Acute gastric perforation    Intra-abdominal abscess (HCC)    Pancreatic fistula  Resolved Problems:    * No resolved hospital problems. *      Plan:    49-year-old lady past medical history of pancreatitis with necrosis, morbid obesity BMI greater than 35, hyperlipidemia, hypertension, diabetes mellitus, CHF, COPD presented to ER due to abdominal pain. She was recently discharged from the hospital on 10/8/2024 for pancreatitis with necrosis at the time she was on IV meropenem and was discharged on Augmentin. CT abdomen pelvis obtained today showed constellation of findings compatible with perforation of the gastric fundus and leakage of gastric contents into the lesser peritoneal sac with similar appearance of pancreatitis with hypodensity of the pancreatic head suggesting of necrotizing pancreatitis with

## 2024-10-23 NOTE — PROGRESS NOTES
4 Eyes Skin Assessment     NAME:  Monique Voss  YOB: 1975  MEDICAL RECORD NUMBER:  [id number]    The patient is being assessed for  Transfer to New Unit    I agree that at least one RN has performed a thorough Head to Toe Skin Assessment on the patient. ALL assessment sites listed below have been assessed.      Areas assessed by both nurses:    Head, Face, Ears, Shoulders, Back, Chest, Arms, Elbows, Hands, Sacrum. Buttock, Coccyx, Ischium, and Legs. Feet and Heels        Does the Patient have a Wound? No noted wound(s)       Alvin Prevention initiated by RN: No  Wound Care Orders initiated by RN: Yes    Pressure Injury (Stage 3,4, Unstageable, DTI, NWPT, and Complex wounds) if present, place Wound referral order by RN under : No    New Ostomies, if present place, Ostomy referral order under : No     Nurse 1 eSignature: Electronically signed by Marilu Mendez RN on 10/23/24 at 6:23 PM EDT    **SHARE this note so that the co-signing nurse can place an eSignature**    Nurse 2 eSignature: {Esignature:[id number]}

## 2024-10-23 NOTE — PLAN OF CARE
Cardiovascular - Adult  Goal: Maintains optimal cardiac output and hemodynamic stability  10/23/2024 1106 by Niki Lopez RN  Outcome: Progressing  Flowsheets (Taken 10/23/2024 0904)  Maintains optimal cardiac output and hemodynamic stability:   Monitor blood pressure and heart rate   Monitor urine output and notify Licensed Independent Practitioner for values outside of normal range  10/23/2024 1105 by Niki Lopez RN  Outcome: Progressing  Flowsheets (Taken 10/23/2024 0904)  Maintains optimal cardiac output and hemodynamic stability:   Monitor blood pressure and heart rate   Monitor urine output and notify Licensed Independent Practitioner for values outside of normal range  Goal: Absence of cardiac dysrhythmias or at baseline  10/23/2024 1106 by Niki Lopez RN  Outcome: Progressing  Flowsheets (Taken 10/23/2024 0904)  Absence of cardiac dysrhythmias or at baseline:   Monitor cardiac rate and rhythm   Assess for signs of decreased cardiac output  10/23/2024 1105 by Niki Lopez RN  Outcome: Progressing  Flowsheets (Taken 10/23/2024 0904)  Absence of cardiac dysrhythmias or at baseline:   Monitor cardiac rate and rhythm   Assess for signs of decreased cardiac output     Problem: Skin/Tissue Integrity - Adult  Goal: Skin integrity remains intact  10/23/2024 1106 by Niki Lopez RN  Outcome: Progressing  Flowsheets (Taken 10/23/2024 0904)  Skin Integrity Remains Intact: Monitor for areas of redness and/or skin breakdown  10/23/2024 1105 by Niki Lopez RN  Outcome: Progressing  Flowsheets (Taken 10/23/2024 0904)  Skin Integrity Remains Intact: Monitor for areas of redness and/or skin breakdown  Goal: Incisions, wounds, or drain sites healing without S/S of infection  10/23/2024 1106 by Niki Lopez RN  Outcome: Progressing  Flowsheets (Taken 10/23/2024 0904)  Incisions, Wounds, or Drain Sites Healing Without Sign and Symptoms of Infection: ADMISSION and DAILY: Assess and document  range:   Monitor blood glucose as ordered   Assess for signs and symptoms of hyperglycemia and hypoglycemia   Administer ordered medications to maintain glucose within target range  10/23/2024 1105 by Niki Lopez RN  Outcome: Progressing  Flowsheets (Taken 10/23/2024 0904)  Glucose maintained within prescribed range:   Monitor blood glucose as ordered   Assess for signs and symptoms of hyperglycemia and hypoglycemia   Administer ordered medications to maintain glucose within target range     Problem: Hematologic - Adult  Goal: Maintains hematologic stability  10/23/2024 1106 by Nkii Lopez RN  Outcome: Progressing  Flowsheets (Taken 10/23/2024 0904)  Maintains hematologic stability:   Assess for signs and symptoms of bleeding or hemorrhage   Administer blood products/factors as ordered   Monitor labs for bleeding or clotting disorders  10/23/2024 1105 by Niki Lopez RN  Outcome: Progressing  Flowsheets (Taken 10/23/2024 0904)  Maintains hematologic stability:   Assess for signs and symptoms of bleeding or hemorrhage   Administer blood products/factors as ordered   Monitor labs for bleeding or clotting disorders     Problem: Nutrition Deficit:  Goal: Optimize nutritional status  10/23/2024 1106 by Niki Lopez RN  Outcome: Progressing  Flowsheets (Taken 10/21/2024 1050 by Donta Seaman, RD, LD)  Nutrient intake appropriate for improving, restoring, or maintaining nutritional needs: Recommend appropriate diets, oral nutritional supplements, and vitamin/mineral supplements  10/23/2024 1105 by Niki Lopez RN  Outcome: Progressing     Problem: Chronic Conditions and Co-morbidities  Goal: Patient's chronic conditions and co-morbidity symptoms are monitored and maintained or improved  10/23/2024 1106 by Niki Lopez RN  Outcome: Progressing  Flowsheets (Taken 10/23/2024 0904)  Care Plan - Patient's Chronic Conditions and Co-Morbidity Symptoms are Monitored and Maintained or

## 2024-10-23 NOTE — PROGRESS NOTES
SOO PROGRESS NOTE      Chief complaint: Follow-up of intraabdominal abscess    The patient is a 49 y.o.  morbidly obese female with history of COPD, CAD, DM, hypertension, hyperlipidemia, hidradenitis suppurativa right hip osteoarthritis on steroid injections, meningocele, gastric bypass surgery in 2022, previously admitted from 10/01-10/08 for chronic pancreatic cyst, severe acute pancreatitis with necrosis, possibly drug induced for which she was seen by Hepatobiliary Surgery and was given a course of meropenem then discharged on amoxicillin-clavulanate for 7 days, presented on 10/14 with abdominal pain worsening for the past week. On admission, she was afebrile and hemodynamically stable with leukocytosis of 18,000.  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  Chest x-ray showed patchy left upper lobe lingular segment opacity.  Urine Streptococcus pneumoniae and Legionella antigens, respiratory pathogen PCR were negative.  Blood cultures showed no growth to date.  Meropenem and vancomycin were started on admission. She underwent percutaneous abscess drain placement on 10/15. Abscess fluid Gram stain and culture showed few polymorphonuclear leukocytes, no epithelial cells, heavy growth of Citrobacter youngae (sensitive to meropenem, cotrimoxazole, fluoroquinolones), anaerobes.     Subjective: Patient was seen and examined. No chills, has less abdominal pain on pain medication, no diarrhea, no rash, no itching. Afebrile.       Objective:  /70   Pulse 74   Temp 97.8 °F (36.6 °C) (Oral)

## 2024-10-23 NOTE — PROGRESS NOTES
[facility] SURGERY  DAILY PROGRESS NOTE  10/23/2024    Subjective:  No new complaints or overnight events. Drain serous 30 cc    Objective:  /88   Pulse 78   Temp 97.2 °F (36.2 °C) (Oral)   Resp (!) 6   Ht 1.727 m (5' 8\")   Wt 122.8 kg (270 lb 12.8 oz)   LMP 09/01/2024 (Approximate)   SpO2 94%   BMI 41.17 kg/m²   I/O last 3 completed shifts:  In: 2740 [P.O.:2640; IV Piggyback:100]  Out: 3085 [Urine:3050; Drains:35]    General Appearance: No acute distress  Skin:  No cyanosis  Head/face:  NCAT  Eyes:  No gross abnormalities. Sclera nonicteric  Lungs/Chest:  Normal expansion. No respiratory distress. On 2 L  Heart: Warm throughout. Regular rate   Abdomen:  Soft, minimally tender around drain site, drain serous to bili bag. Non distended.    Extremities: No gross deformities or edema.    Labs:  CBC  Recent Labs     10/21/24  0250   WBC 16.8*   HGB 10.2*   HCT 32.7*   *     BMP  Recent Labs     10/22/24  1507      K 4.5   CL 98   CO2 35*   BUN 7   CREATININE 0.5   CALCIUM 8.4*     Liver Function  Recent Labs     10/23/24  0330   BILITOT 0.2   BILIDIR <0.2   AST 23   ALT 32   ALKPHOS 153*     No results for input(s): \"LACTATE\" in the last 72 hours.  No results for input(s): \"INR\" in the last 72 hours.    Invalid input(s): \"PT\", \"PTT\"    Radiology:  I have personally reviewed all relevant imaging:      Assessment/Plan:  49 y.o. female with contained gastric remnant perforation and pancreatitis with perigastric abscess s/p IR drainage 10/15.     Pancreatic leak, drain amylase 7300, continue drain and drain flushes 10 cc q8 hr with saline  Continue pulmonary toilet with SMI  Continue regular diet as tolerated  Continue  Zenpep with meals.  Monitor lytes; replace as needed to keep K 4, Phos 3, Mg 2  Appreciate ID assistance with antibiotics-currently on Merrem   Ambulate patient, PT/OT  continue SSI  Will need repeat CT scan in 3 weeks  Will need drain interrogation in 3 weeks    OK for dc with drain from

## 2024-10-24 VITALS
RESPIRATION RATE: 17 BRPM | WEIGHT: 270.8 LBS | BODY MASS INDEX: 41.04 KG/M2 | DIASTOLIC BLOOD PRESSURE: 73 MMHG | SYSTOLIC BLOOD PRESSURE: 121 MMHG | HEART RATE: 70 BPM | TEMPERATURE: 97.9 F | OXYGEN SATURATION: 97 % | HEIGHT: 68 IN

## 2024-10-24 LAB — GLUCOSE BLD-MCNC: 88 MG/DL (ref 74–99)

## 2024-10-24 PROCEDURE — 2580000003 HC RX 258

## 2024-10-24 PROCEDURE — 6360000002 HC RX W HCPCS: Performed by: NURSE PRACTITIONER

## 2024-10-24 PROCEDURE — 2580000003 HC RX 258: Performed by: NURSE PRACTITIONER

## 2024-10-24 PROCEDURE — 82962 GLUCOSE BLOOD TEST: CPT

## 2024-10-24 PROCEDURE — 6370000000 HC RX 637 (ALT 250 FOR IP): Performed by: NURSE PRACTITIONER

## 2024-10-24 PROCEDURE — 6370000000 HC RX 637 (ALT 250 FOR IP): Performed by: STUDENT IN AN ORGANIZED HEALTH CARE EDUCATION/TRAINING PROGRAM

## 2024-10-24 RX ADMIN — SODIUM CHLORIDE, PRESERVATIVE FREE 40 MG: 5 INJECTION INTRAVENOUS at 08:41

## 2024-10-24 RX ADMIN — PANCRELIPASE LIPASE, PANCRELIPASE PROTEASE, PANCRELIPASE AMYLASE 80000 UNITS: 20000; 63000; 84000 CAPSULE, DELAYED RELEASE ORAL at 08:44

## 2024-10-24 RX ADMIN — OXYCODONE HYDROCHLORIDE AND ACETAMINOPHEN 2 TABLET: 5; 325 TABLET ORAL at 00:02

## 2024-10-24 RX ADMIN — CYCLOBENZAPRINE 5 MG: 10 TABLET, FILM COATED ORAL at 00:02

## 2024-10-24 RX ADMIN — METOPROLOL SUCCINATE 50 MG: 50 TABLET, EXTENDED RELEASE ORAL at 08:43

## 2024-10-24 RX ADMIN — SPIRONOLACTONE 25 MG: 25 TABLET ORAL at 08:43

## 2024-10-24 RX ADMIN — SODIUM CHLORIDE, PRESERVATIVE FREE 10 ML: 5 INJECTION INTRAVENOUS at 08:46

## 2024-10-24 RX ADMIN — CITALOPRAM HYDROBROMIDE 40 MG: 40 TABLET ORAL at 08:40

## 2024-10-24 RX ADMIN — HEPARIN SODIUM 5000 UNITS: 5000 INJECTION INTRAVENOUS; SUBCUTANEOUS at 00:03

## 2024-10-24 RX ADMIN — GABAPENTIN 400 MG: 800 TABLET ORAL at 08:39

## 2024-10-24 RX ADMIN — OXYCODONE HYDROCHLORIDE AND ACETAMINOPHEN 2 TABLET: 5; 325 TABLET ORAL at 06:32

## 2024-10-24 RX ADMIN — HEPARIN SODIUM 5000 UNITS: 5000 INJECTION INTRAVENOUS; SUBCUTANEOUS at 08:42

## 2024-10-24 ASSESSMENT — PAIN DESCRIPTION - DESCRIPTORS: DESCRIPTORS: ACHING;DISCOMFORT;THROBBING

## 2024-10-24 ASSESSMENT — PAIN DESCRIPTION - LOCATION
LOCATION: ABDOMEN;BACK
LOCATION: GENERALIZED

## 2024-10-24 ASSESSMENT — PAIN DESCRIPTION - ORIENTATION: ORIENTATION: OTHER (COMMENT)

## 2024-10-24 ASSESSMENT — PAIN - FUNCTIONAL ASSESSMENT: PAIN_FUNCTIONAL_ASSESSMENT: ACTIVITIES ARE NOT PREVENTED

## 2024-10-24 ASSESSMENT — PAIN DESCRIPTION - ONSET: ONSET: ON-GOING

## 2024-10-24 ASSESSMENT — PAIN SCALES - GENERAL
PAINLEVEL_OUTOF10: 10
PAINLEVEL_OUTOF10: 10
PAINLEVEL_OUTOF10: 8

## 2024-10-24 ASSESSMENT — PAIN DESCRIPTION - FREQUENCY: FREQUENCY: CONTINUOUS

## 2024-10-24 ASSESSMENT — PAIN DESCRIPTION - PAIN TYPE: TYPE: ACUTE PAIN;CHRONIC PAIN

## 2024-10-24 NOTE — CARE COORDINATION
Social Work/Case Management Transition of Care Planning (Erika Stratton -[phone]):  Discharge order noted. She is on IV Invanz q24 through 11/5. Met with patient at bedside.  Patient to discharge to home with [facility].  SOC scheduled for today.  Family will transport.  Erika Srtatton, SYLVIE  10/24/2024

## 2024-10-24 NOTE — DISCHARGE INSTR - COC
Continuity of Care Form    Patient Name: Monique Voss   :  1975  MRN:  [id number]    Admit date:  10/15/2024  Discharge date:  10/24/24    Code Status Order: Full Code   Advance Directives:   Advance Care Flowsheet Documentation             Admitting Physician:  Lisa Loving MD  PCP: Madison Amin PA-C    Discharging Nurse: BILL Daniels   Discharging Hospital Unit/Room#: 8422/8422-B  Discharging Unit Phone Number: [phone]    Emergency Contact:   Extended Emergency Contact Information  [facility]  Home Phone: [phone]  Mobile Phone: [phone]  Relation: Other Relative  Secondary Emergency Contact: [facility]  Home Phone: [phone]  Mobile Phone: [phone]  Relation: Brother/Sister    Past Surgical History:  Past Surgical History:   Procedure Laterality Date    CHOLECYSTECTOMY      CORONARY ANGIOPLASTY WITH STENT PLACEMENT  2023    [place] 3.5 x 38 and 3.5 x 8 deployed distal RCA by Dr Egan    CYSTOSCOPY Left 2018    left stent placement    DILATION AND CURETTAGE OF UTERUS      younger age    HIP SURGERY Left 2022    LEFT HIP INJECTION performed by Jordyn Austin DO at [facility] OR    HIP SURGERY Left 2023    LEFT HIP STEROID INJECT performed by Jordyn Austin DO at [facility] OR    HIP SURGERY Right 2024    RIGHT HIP INJECTION UNDER FLUOROSCOPIC GUIDANCE                +++IODINE ALLERGY+++ [facility]    LITHOTRIPSY Right 02/15/2018    Cystoscopy;Stent Removal    NERVE BLOCK Bilateral 2022    BILATERAL L5 TRANSFORAMINAL EPIDURAL STEROID INJECTION performed by DO bozena Lua OR    PAIN MANAGEMENT PROCEDURE Bilateral 10/27/2022    BILATERAL L5 TRANSFORAMINAL EPIDURAL STEROID INJECTION [facility]    PAIN MANAGEMENT PROCEDURE Bilateral 2023

## 2024-10-24 NOTE — DISCHARGE SUMMARY
[facility]   Discharge summary   Patient ID:  Monique Voss  [id number]  49 y.o.  1975    Admit date: 10/15/2024    Discharge date and time: 10/24/2024    Admission Diagnoses:   Patient Active Problem List   Diagnosis    Primary hypertension    Mild intermittent asthma without complication    Morbid obesity    Reactive depression    Anxiety    Ureteric stone    Tobacco dependence    Hydronephrosis with urinary obstruction due to renal calculus    Pancreatic cyst    PVC (premature ventricular contraction)    Spinal stenosis of lumbar region with neurogenic claudication    Primary osteoarthritis of both hips    Gastroesophageal reflux disease without esophagitis    Vitamin D deficiency    Diabetes mellitus (HCC)    Hyperlipidemia LDL goal <70    Obstructive sleep apnea    Chronic pain syndrome    Lumbar radiculopathy    Chronic bilateral low back pain with bilateral sciatica    Hidradenitis suppurativa    Nonischemic cardiomyopathy (HCC)    Malnutrition (HCC)    Current moderate episode of major depressive disorder without prior episode (HCC)    S/P gastric bypass    Left hip pain    Non-ST elevation myocardial infarction (NSTEMI) (HCC)    Chronic systolic heart failure (HCC)    Bigeminy    Bradycardia    Coronary artery disease involving native coronary artery of native heart without angina pectoris    Pure hypercholesterolemia    Chronic obstructive pulmonary disease (HCC)    Opioid dependence with current use (HCC)    Acute infective pancreatitis    Sepsis with acute organ dysfunction (HCC)    Acute pancreatitis    Bowel perforation (HCC)    Acute gastric perforation    Intra-abdominal abscess (HCC)    Pancreatic fistula       Discharge Diagnoses: ***    Consults: {consultation:31161}    Procedures: ***    Hospital Course: The patient is a 49 y.o. female of Madison Amin PA-C with significant past medical history of *** who presents with ***    No results for input(s): \"WBC\", \"HGB\",

## 2024-10-28 ENCOUNTER — TELEPHONE (OUTPATIENT)
Dept: PRIMARY CARE CLINIC | Age: 49
End: 2024-10-28

## 2024-10-28 NOTE — TELEPHONE ENCOUNTER
..Care Transitions Initial Follow Up Call    Outreach made within 2 business days of discharge: Yes    Patient: Monique Voss Patient : 1975   MRN: [id number]  Reason for Admission:   Discharge Date: 10/24/24       Spoke with: NO ANSWER     Discharge department/facility: [facility]        Scheduled appointment with PCP within 7-14 days    Follow Up  Future Appointments   Date Time Provider Department Center   10/29/2024 11:00 AM Ana Monroe PA BD PAIN MAR [facility]   10/30/2024 12:00 PM Rocky Curran MD Erica Card [facility]   2024  8:30 AM Madison Amin PA-C TRAIL PC [facility] E[facility] DEP       Denisha Willard MA

## 2024-10-29 ENCOUNTER — OFFICE VISIT (OUTPATIENT)
Dept: PAIN MANAGEMENT | Age: 49
End: 2024-10-29
Payer: MEDICARE

## 2024-10-29 VITALS
OXYGEN SATURATION: 95 % | HEART RATE: 101 BPM | BODY MASS INDEX: 40.92 KG/M2 | RESPIRATION RATE: 16 BRPM | TEMPERATURE: 96.1 F | HEIGHT: 68 IN | DIASTOLIC BLOOD PRESSURE: 66 MMHG | WEIGHT: 270 LBS | SYSTOLIC BLOOD PRESSURE: 103 MMHG

## 2024-10-29 DIAGNOSIS — Z79.891 LONG TERM PRESCRIPTION OPIATE USE: ICD-10-CM

## 2024-10-29 DIAGNOSIS — M48.062 SPINAL STENOSIS OF LUMBAR REGION WITH NEUROGENIC CLAUDICATION: ICD-10-CM

## 2024-10-29 DIAGNOSIS — M54.41 CHRONIC BILATERAL LOW BACK PAIN WITH BILATERAL SCIATICA: ICD-10-CM

## 2024-10-29 DIAGNOSIS — M79.641 PAIN IN BOTH HANDS: ICD-10-CM

## 2024-10-29 DIAGNOSIS — M54.16 LUMBAR RADICULOPATHY: Primary | ICD-10-CM

## 2024-10-29 DIAGNOSIS — M79.642 PAIN IN BOTH HANDS: ICD-10-CM

## 2024-10-29 DIAGNOSIS — G89.29 CHRONIC BILATERAL LOW BACK PAIN WITH BILATERAL SCIATICA: ICD-10-CM

## 2024-10-29 DIAGNOSIS — M16.0 PRIMARY OSTEOARTHRITIS OF BOTH HIPS: ICD-10-CM

## 2024-10-29 DIAGNOSIS — M16.12 PRIMARY OSTEOARTHRITIS OF LEFT HIP: ICD-10-CM

## 2024-10-29 DIAGNOSIS — M25.552 LEFT HIP PAIN: ICD-10-CM

## 2024-10-29 DIAGNOSIS — G89.4 CHRONIC PAIN SYNDROME: ICD-10-CM

## 2024-10-29 DIAGNOSIS — M54.42 CHRONIC BILATERAL LOW BACK PAIN WITH BILATERAL SCIATICA: ICD-10-CM

## 2024-10-29 PROCEDURE — 99213 OFFICE O/P EST LOW 20 MIN: CPT

## 2024-10-29 PROCEDURE — 3078F DIAST BP <80 MM HG: CPT | Performed by: PHYSICIAN ASSISTANT

## 2024-10-29 PROCEDURE — 3074F SYST BP LT 130 MM HG: CPT | Performed by: PHYSICIAN ASSISTANT

## 2024-10-29 PROCEDURE — 99213 OFFICE O/P EST LOW 20 MIN: CPT | Performed by: PHYSICIAN ASSISTANT

## 2024-10-29 PROCEDURE — 4004F PT TOBACCO SCREEN RCVD TLK: CPT | Performed by: PHYSICIAN ASSISTANT

## 2024-10-29 PROCEDURE — 1111F DSCHRG MED/CURRENT MED MERGE: CPT | Performed by: PHYSICIAN ASSISTANT

## 2024-10-29 PROCEDURE — G8417 CALC BMI ABV UP PARAM F/U: HCPCS | Performed by: PHYSICIAN ASSISTANT

## 2024-10-29 PROCEDURE — G8484 FLU IMMUNIZE NO ADMIN: HCPCS | Performed by: PHYSICIAN ASSISTANT

## 2024-10-29 PROCEDURE — G8427 DOCREV CUR MEDS BY ELIG CLIN: HCPCS | Performed by: PHYSICIAN ASSISTANT

## 2024-10-29 RX ORDER — GABAPENTIN 800 MG/1
800 TABLET ORAL 3 TIMES DAILY
Qty: 90 TABLET | Refills: 0 | Status: SHIPPED | OUTPATIENT
Start: 2024-10-29 | End: 2024-11-28

## 2024-10-29 RX ORDER — OXYCODONE AND ACETAMINOPHEN 5; 325 MG/1; MG/1
1 TABLET ORAL 3 TIMES DAILY PRN
Qty: 90 TABLET | Refills: 0 | Status: SHIPPED | OUTPATIENT
Start: 2024-10-29 | End: 2024-11-28

## 2024-10-29 RX ORDER — CYCLOBENZAPRINE HCL 5 MG
5 TABLET ORAL 2 TIMES DAILY PRN
Qty: 60 TABLET | Refills: 0 | Status: SHIPPED | OUTPATIENT
Start: 2024-10-29 | End: 2024-11-28

## 2024-10-29 NOTE — PROGRESS NOTES
Monique Voss presents to the [facility] on 10/29/2024. Monique is complaining of pain everywhere. The pain is constant. The pain is described as aching, throbbing, stabbing, sharp, burning, and numb. Pain is rated on her best day at a 7, on her worst day at a 10, and on average at a 9 on the VAS scale. She took her last dose of Percocet, Neurontin, and Celexa  this morning.     Any procedures since your last visit: Yes, with 45 % relief.    Pacemaker or defibrillator: No managed by na.    She is  on NSAIDS and is  on anticoagulation medications to include ASA and Plavix and is managed by Dr. Harmon.     Medication Contract and Consent for Opioid Use Documents Filed       Patient Documents       Type of Document Status Date Received Received By Description    Medication Contract Received 7/1/2019 11:37 AM JONAH ANAND PAIN MGMT CONTRACT DR. VILLANUEVA    Medication Contract Signed 7/24/2019 10:15 AM KALIA BYRD medication contract    Medication Contract Received 10/27/2022  3:10 PM GRACIE HEATON Paint Agreement    Medication Contract Received 9/19/2023 11:58 AM MELITA BARONE PAIN AGREEMENT                    /66   Pulse (!) 101   Temp (!) 96.1 °F (35.6 °C)   Resp 16   Ht 1.727 m (5' 8\")   Wt 122.5 kg (270 lb)   SpO2 95%   BMI 41.05 kg/m²      No LMP recorded.    
PROCEDURE Bilateral 10/27/2022    BILATERAL L5 TRANSFORAMINAL EPIDURAL STEROID INJECTION performed by Jordyn Austin DO at [facility] OR    PAIN MANAGEMENT PROCEDURE Bilateral 04/04/2023    BILATERAL L5 TRANSFORAMINAL EPIDURAL STEROID INJECTION performed by Jordyn Austin DO at [facility] OR    PAIN MANAGEMENT PROCEDURE Bilateral 8/27/2024    BILATERAL LUMBAR L5 TRANSFORAMINAL EPIDURAL STEROID INJECTION performed by Jordyn Austin DO at [facility] OR    MARGUERITE-EN-Y GASTRIC BYPASS N/A 05/31/2022    GASTRIC BYPASS MARGUERITE-EN-Y LAPAROSCOPIC performed by Barry Sandhu MD at [facility] OR    UPPER GASTROINTESTINAL ENDOSCOPY N/A 06/22/2018    EGD BIOPSY performed by Barry Sandhu MD at [facility] ENDOSCOPY    UPPER GASTROINTESTINAL ENDOSCOPY N/A 08/20/2021    EGD BIOPSY performed by Barry Sandhu MD at [facility] ENDOSCOPY       Prior to Admission medications    Medication Sig Start Date End Date Taking? Authorizing Provider   oxyCODONE-acetaminophen (PERCOCET) 5-325 MG per tablet Take 1 tablet by mouth 3 times daily as needed for Pain for up to 30 days. Intended supply: 30 days. Take lowest dose possible to manage pain 10/29/24 11/28/24 Yes Ana Monroe PA   ertapenem (INVANZ) infusion Infuse 1,000 mg intravenously every 24 hours for 14 days Compound per protocol. 10/22/24 11/5/24 Yes Shelly Thompson MD   lipase-protease-amylase (ZENPEP) 86248-36341 units CPEP delayed release capsule Take 4 capsules by mouth 3 times daily (with meals) 10/22/24  Yes Chelle Damon APRN - CNP   gabapentin (NEURONTIN) 400 MG capsule Take 2 capsules by mouth 3 times daily.   Yes Provider, MD Odalis   spironolactone (ALDACTONE) 25 MG tablet TAKE 1 TABLET BY MOUTH DAILY 10/11/24  Yes Madison Amin PA-C   FEROSUL 325 (65 Fe) MG tablet TAKE 1 TABLET BY MOUTH DAILY WITH BREAKFAST 10/11/24  Yes Madison Amin PA-C   ENTRESTO  MG per tablet TAKE 1 TABLET BY MOUTH TWICE DAILY 10/11/24  Yes Madison Amin PA-C   aspirin 81 MG chewable tablet

## 2024-11-04 DIAGNOSIS — K65.1 INTRA-ABDOMINAL ABSCESS (HCC): Primary | ICD-10-CM

## 2024-11-04 LAB
ALBUMIN: 2.8 G/DL (ref 3.5–5.2)
ALP BLD-CCNC: 114 U/L (ref 35–104)
ALT SERPL-CCNC: <5 U/L (ref 0–32)
ANION GAP SERPL CALCULATED.3IONS-SCNC: 11 MMOL/L (ref 7–16)
AST SERPL-CCNC: 10 U/L (ref 0–31)
BASOPHILS ABSOLUTE: 0.08 K/UL (ref 0–0.2)
BASOPHILS RELATIVE PERCENT: 1 % (ref 0–2)
BILIRUB SERPL-MCNC: 0.2 MG/DL (ref 0–1.2)
BUN BLDV-MCNC: 9 MG/DL (ref 6–20)
C-REACTIVE PROTEIN: 48 MG/L (ref 0–5)
CALCIUM SERPL-MCNC: 8.8 MG/DL (ref 8.6–10.2)
CHLORIDE BLD-SCNC: 101 MMOL/L (ref 98–107)
CO2: 25 MMOL/L (ref 22–29)
CREAT SERPL-MCNC: 0.6 MG/DL (ref 0.5–1)
EOSINOPHILS ABSOLUTE: 0.36 K/UL (ref 0.05–0.5)
EOSINOPHILS RELATIVE PERCENT: 2 % (ref 0–6)
GFR, ESTIMATED: >90 ML/MIN/1.73M2
GLUCOSE BLD-MCNC: 113 MG/DL (ref 74–99)
HCT VFR BLD CALC: 39.4 % (ref 34–48)
HEMOGLOBIN: 12.4 G/DL (ref 11.5–15.5)
IMMATURE GRANULOCYTES %: 1 % (ref 0–5)
IMMATURE GRANULOCYTES ABSOLUTE: 0.12 K/UL (ref 0–0.58)
LYMPHOCYTES ABSOLUTE: 4.28 K/UL (ref 1.5–4)
LYMPHOCYTES RELATIVE PERCENT: 26 % (ref 20–42)
MCH RBC QN AUTO: 30.7 PG (ref 26–35)
MCHC RBC AUTO-ENTMCNC: 31.5 G/DL (ref 32–34.5)
MCV RBC AUTO: 97.5 FL (ref 80–99.9)
MONOCYTES ABSOLUTE: 1.03 K/UL (ref 0.1–0.95)
MONOCYTES RELATIVE PERCENT: 6 % (ref 2–12)
NEUTROPHILS ABSOLUTE: 10.33 K/UL (ref 1.8–7.3)
NEUTROPHILS RELATIVE PERCENT: 64 % (ref 43–80)
PDW BLD-RTO: 18.1 % (ref 11.5–15)
PLATELET # BLD: 797 K/UL (ref 130–450)
PMV BLD AUTO: 10 FL (ref 7–12)
POTASSIUM SERPL-SCNC: 4.7 MMOL/L (ref 3.5–5)
RBC # BLD: 4.04 M/UL (ref 3.5–5.5)
SED RATE, AUTOMATED: 92 MM/HR (ref 0–20)
SODIUM BLD-SCNC: 137 MMOL/L (ref 132–146)
TOTAL PROTEIN: 7.3 G/DL (ref 6.4–8.3)
WBC # BLD: 16.2 K/UL (ref 4.5–11.5)

## 2024-11-05 ENCOUNTER — HOSPITAL ENCOUNTER (OUTPATIENT)
Dept: CT IMAGING | Age: 49
Discharge: HOME | End: 2024-11-07
Attending: INTERNAL MEDICINE
Payer: MEDICARE

## 2024-11-05 DIAGNOSIS — K65.1 INTRA-ABDOMINAL ABSCESS (HCC): ICD-10-CM

## 2024-11-05 PROCEDURE — 74178 CT ABD&PLV WO CNTR FLWD CNTR: CPT

## 2024-11-05 PROCEDURE — 6360000004 HC RX CONTRAST MEDICATION: Performed by: RADIOLOGY

## 2024-11-05 RX ORDER — IOPAMIDOL 755 MG/ML
75 INJECTION, SOLUTION INTRAVASCULAR
Status: COMPLETED | OUTPATIENT
Start: 2024-11-05 | End: 2024-11-05

## 2024-11-05 RX ADMIN — IOPAMIDOL 75 ML: 755 INJECTION, SOLUTION INTRAVENOUS at 11:13

## 2024-11-06 DIAGNOSIS — B37.31 YEAST VAGINITIS: ICD-10-CM

## 2024-11-06 RX ORDER — FLUCONAZOLE 150 MG/1
TABLET ORAL
Qty: 2 TABLET | Refills: 3 | Status: SHIPPED | OUTPATIENT
Start: 2024-11-06

## 2024-11-06 RX ORDER — POTASSIUM CHLORIDE 1500 MG/1
20 TABLET, EXTENDED RELEASE ORAL DAILY
Qty: 30 TABLET | Refills: 3 | Status: SHIPPED | OUTPATIENT
Start: 2024-11-06

## 2024-11-06 NOTE — TELEPHONE ENCOUNTER
Name of Medication(s) Requested:  Requested Prescriptions     Pending Prescriptions Disp Refills    potassium chloride (K-TAB) 20 MEQ TBCR extended release tablet [Pharmacy Med Name: POTASSIUM CHLORIDE 20MEQ ER TABLETS] 30 tablet 3     Sig: TAKE 1 TABLET BY MOUTH DAILY       Medication is on current medication list Yes    Dosage and directions were verified? Yes    Quantity verified: 30 day supply     Pharmacy Verified?  Yes    Last Appointment:  Visit date not found    Future appts:  Future Appointments   Date Time Provider Department Center   11/7/2024  1:15 PM Shelly Thompson MD [facility]   11/26/2024  1:00 PM Ana Monroe PA [facility] PAIN [facility]   11/27/2024  1:15 PM Madison Amin PA-C TRAIL PC [facility] ECC DEP        (If no appt send self scheduling link. .REFILLAPPT)  Scheduling request sent?     [] Yes  [] No    Does patient need updated?  [] Yes  [] No

## 2024-11-08 ENCOUNTER — OFFICE VISIT (OUTPATIENT)
Dept: HEMATOLOGY | Age: 49
End: 2024-11-08
Payer: MEDICARE

## 2024-11-08 VITALS
HEIGHT: 68 IN | SYSTOLIC BLOOD PRESSURE: 142 MMHG | BODY MASS INDEX: 36.8 KG/M2 | DIASTOLIC BLOOD PRESSURE: 66 MMHG | RESPIRATION RATE: 16 BRPM | OXYGEN SATURATION: 93 % | WEIGHT: 242.8 LBS | TEMPERATURE: 98.3 F | HEART RATE: 79 BPM

## 2024-11-08 DIAGNOSIS — K86.89 PANCREATIC DUCT LEAK: Primary | ICD-10-CM

## 2024-11-08 DIAGNOSIS — Z09 FOLLOW-UP EXAM: ICD-10-CM

## 2024-11-08 PROCEDURE — G8484 FLU IMMUNIZE NO ADMIN: HCPCS | Performed by: TRANSPLANT SURGERY

## 2024-11-08 PROCEDURE — 4004F PT TOBACCO SCREEN RCVD TLK: CPT | Performed by: TRANSPLANT SURGERY

## 2024-11-08 PROCEDURE — 99213 OFFICE O/P EST LOW 20 MIN: CPT | Performed by: TRANSPLANT SURGERY

## 2024-11-08 PROCEDURE — G8427 DOCREV CUR MEDS BY ELIG CLIN: HCPCS | Performed by: TRANSPLANT SURGERY

## 2024-11-08 PROCEDURE — G8417 CALC BMI ABV UP PARAM F/U: HCPCS | Performed by: TRANSPLANT SURGERY

## 2024-11-08 PROCEDURE — 3078F DIAST BP <80 MM HG: CPT | Performed by: TRANSPLANT SURGERY

## 2024-11-08 PROCEDURE — 99212 OFFICE O/P EST SF 10 MIN: CPT | Performed by: TRANSPLANT SURGERY

## 2024-11-08 PROCEDURE — 3077F SYST BP >= 140 MM HG: CPT | Performed by: TRANSPLANT SURGERY

## 2024-11-08 PROCEDURE — 1111F DSCHRG MED/CURRENT MED MERGE: CPT | Performed by: TRANSPLANT SURGERY

## 2024-11-08 NOTE — PROGRESS NOTES
Hepatobiliary and Pancreatic Surgery Attending History and Physical    Patient's Name/Date of Birth: Monique Voss /1975 (49 y.o.)    Date: November 8, 2024     CC: Pancreatic abscess    HPI:  Patient is a very pleasant 49-year-old female who was admitted to the hospital secondary to having a left upper quadrant abscess.  She has a history of a Kendall-en-Y gastric bypass.  She also had acute pancreatitis.  This was likely biliary in origin despite having a cholecystectomy in the past.  She had not started any new medications at that time.  She is having some left upper quadrant as well as lower quadrant abdominal pain.  She is moving her bowels.  Her drain has had minimal output, less than 10 mL.  It is steadily decreased.    Past Medical History:   Diagnosis Date    Asthma     Bell palsy     drooping    Carpal tunnel syndrome     bilateral    CHF (congestive heart failure) (HCC)     Chronic obstructive pulmonary disease (HCC) 05/25/2023    Coronary artery disease involving native coronary artery of native heart without angina pectoris 05/25/2023    DDD (degenerative disc disease), cervical     with spinal stenosis    Diabetes mellitus (HCC)     DJD (degenerative joint disease)     both hips    GERD without esophagitis     HTN (hypertension)     Hyperlipidemia     Left hip pain and right     Meningitis 2009    Morbid obesity due to excess calories     Neuropathy     JODI treated with BiPAP     Scoliosis     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

## 2024-11-11 DIAGNOSIS — K86.89 PANCREATIC DUCT LEAK: Primary | ICD-10-CM

## 2024-11-11 DIAGNOSIS — I49.3 PVC (PREMATURE VENTRICULAR CONTRACTION): ICD-10-CM

## 2024-11-14 ENCOUNTER — TELEPHONE (OUTPATIENT)
Dept: PAIN MANAGEMENT | Age: 49
End: 2024-11-14

## 2024-11-14 NOTE — TELEPHONE ENCOUNTER
Called and left a message to discuss her medication.  No answer.  LM to return call at her convenience.

## 2024-11-15 DIAGNOSIS — M54.41 CHRONIC BILATERAL LOW BACK PAIN WITH BILATERAL SCIATICA: ICD-10-CM

## 2024-11-15 DIAGNOSIS — M48.062 SPINAL STENOSIS OF LUMBAR REGION WITH NEUROGENIC CLAUDICATION: ICD-10-CM

## 2024-11-15 DIAGNOSIS — G89.29 CHRONIC BILATERAL LOW BACK PAIN WITH BILATERAL SCIATICA: ICD-10-CM

## 2024-11-15 DIAGNOSIS — M25.552 LEFT HIP PAIN: ICD-10-CM

## 2024-11-15 DIAGNOSIS — M54.42 CHRONIC BILATERAL LOW BACK PAIN WITH BILATERAL SCIATICA: ICD-10-CM

## 2024-11-15 DIAGNOSIS — M54.16 LUMBAR RADICULOPATHY: Primary | ICD-10-CM

## 2024-11-15 DIAGNOSIS — Z79.891 LONG TERM PRESCRIPTION OPIATE USE: ICD-10-CM

## 2024-11-15 DIAGNOSIS — M16.12 PRIMARY OSTEOARTHRITIS OF LEFT HIP: ICD-10-CM

## 2024-11-15 DIAGNOSIS — G89.4 CHRONIC PAIN SYNDROME: ICD-10-CM

## 2024-11-15 RX ORDER — OXYCODONE AND ACETAMINOPHEN 7.5; 325 MG/1; MG/1
1 TABLET ORAL 3 TIMES DAILY PRN
Qty: 21 TABLET | Refills: 0 | Status: SHIPPED | OUTPATIENT
Start: 2024-11-15 | End: 2024-11-22

## 2024-11-21 ENCOUNTER — TELEPHONE (OUTPATIENT)
Dept: HEMATOLOGY | Age: 49
End: 2024-11-21

## 2024-11-21 NOTE — TELEPHONE ENCOUNTER
Incoming call from patient stating she never received an appt for a drain check. She stated she is having pain around the tube with drainage. I called IR and spoke with Mercedes, she stated they would reach out to the patient and get her scheduled today. The patient was updated with the above  Electronically signed by Agnes Fraga MA on 11/21/2024 at 2:00 PM

## 2024-12-19 NOTE — TELEPHONE ENCOUNTER
med Render Risk Assessment In Note?: no Detail Level: Detailed Additional Notes: Patient is using gentamicin, patient is allergic to doxycycline.

## 2025-06-23 NOTE — PROGRESS NOTES
intermittent asthma without complication  -     albuterol sulfate HFA (PROAIR HFA) 108 (90 Base) MCG/ACT inhaler; Inhale 2 puffs into the lungs every 4 hours as needed for Wheezing or Shortness of Breath  -     cetirizine (ZYRTEC) 10 MG tablet; Take 1 tablet by mouth daily    Essential hypertension    Type 2 diabetes mellitus without complication, without long-term current use of insulin ([address]    Hyperlipidemia associated with type 2 diabetes mellitus ([address]  -     LIPID PANEL; Future    Cough  -     promethazine-dextromethorphan (PROMETHAZINE-DM) 6.25-15 MG/5ML syrup; Take 5 mLs by mouth 4 times daily as needed for Cough    BMI 50.0-59.9, adult ([address]        Return in about 3 months (around 6/16/2020). asthma is stable. Will treat cough. No contacts or fever. htn is controlled. Did not take meds today. She is doing well on ozempic, gave 2 samples today. Will check lipid panel.  Did not do wenn on zocor in the past.     Clearance [place] Statement Selected

## (undated) DEVICE — PLUMEPORT LAPAROSCOPIC SMOKE FILTRATION DEVICE: Brand: PLUMEPORT ACTIV

## (undated) DEVICE — NON-DEHP CATHETER EXTENSION SET, MALE LUER LOCK ADAPTER

## (undated) DEVICE — 12 ML SYRINGE,LUER-LOCK TIP: Brand: MONOJECT

## (undated) DEVICE — BANDAGE ADH W1XL3IN NAT FAB WVN FLX DURABLE N ADH PD SEAL

## (undated) DEVICE — 3M™ RED DOT™ MONITORING ELECTRODE WITH FOAM TAPE AND STICKY GEL 2560, 50/BAG, 20/CASE, 72/PLT: Brand: RED DOT™

## (undated) DEVICE — NEEDLE HYPO 25GA L1.5IN BLU POLYPR HUB S STL REG BVL STR

## (undated) DEVICE — [HIGH FLOW INSUFFLATOR,  DO NOT USE IF PACKAGE IS DAMAGED,  KEEP DRY,  KEEP AWAY FROM SUNLIGHT,  PROTECT FROM HEAT AND RADIOACTIVE SOURCES.]: Brand: PNEUMOSURE

## (undated) DEVICE — Z INACTIVE USE 2660664 SOLUTION IRRIG 3000ML 0.9% SOD CHL USP UROMATIC PLAS CONT

## (undated) DEVICE — NEEDLE HYPO 18GA L1.5IN PNK POLYPR HUB S STL REG BVL STR

## (undated) DEVICE — NEEDLE HYPO 18GA L1.5IN PNK POLYPR HUB S STL THN WALL FILL

## (undated) DEVICE — GLOVE ORANGE PI 7 1/2   MSG9075

## (undated) DEVICE — SYRINGE, LUER LOCK, 5ML: Brand: MEDLINE

## (undated) DEVICE — NEEDLE SPNL 22GA L3.5IN BLK HUB S STL REG WALL FIT STYL W/

## (undated) DEVICE — 6 X 9  1.75MIL 4-WALL LABGUARD: Brand: MINIGRIP COMMERCIAL LLC

## (undated) DEVICE — CAMERA STRYKER 1488 HD GEN

## (undated) DEVICE — SPONGE GZ 4IN 4IN 4 PLY N WVN AVANT

## (undated) DEVICE — GARMENT,MEDLINE,DVT,INT,CALF,MED, GEN2: Brand: MEDLINE

## (undated) DEVICE — TOWEL,OR,DSP,ST,BLUE,STD,6/PK,12PK/CS: Brand: MEDLINE

## (undated) DEVICE — APPLICATOR MEDICATED 26 CC SOLUTION HI LT ORNG CHLORAPREP

## (undated) DEVICE — 6 ML SYRINGE LUER-LOCK TIP: Brand: MONOJECT

## (undated) DEVICE — PITCHER PT 1200ML W HNDL CSR WRP

## (undated) DEVICE — Device: Brand: PORTEX

## (undated) DEVICE — COVER,TABLE,44X90,STERILE: Brand: MEDLINE

## (undated) DEVICE — SYRINGE MED 5ML STD CLR PLAS LUERLOCK TIP N CTRL DISP

## (undated) DEVICE — KIT BEDSIDE REVITAL OX 500ML

## (undated) DEVICE — AIRLIFE™ ADULT OXYGEN MASK VINYL, UNDER THE CHIN STYLE, 3 IN 1 MASK WITH SAFETY VENT, WITH 7 FEET (2.1 M) CRUSH RESISTANT OXYGEN TUBING: Brand: AIRLIFE™

## (undated) DEVICE — MARKER,SKIN,WI/RULER AND LABELS: Brand: MEDLINE

## (undated) DEVICE — GOWN ISOLATN REG YEL M WT MULTIPLY SIDETIE LEV 2

## (undated) DEVICE — STAPLER SKIN L440MM 32MM LNG 12 FIRING B FRM PWR + GRIPPING

## (undated) DEVICE — SYRINGE 20ML LL S/C 50

## (undated) DEVICE — PACK SURG LAP CHOLE CUSTOM

## (undated) DEVICE — GAUZE,SPONGE,4"X4",8PLY,STRL,LF,10/TRAY: Brand: MEDLINE

## (undated) DEVICE — TROCAR: Brand: KII SLEEVE

## (undated) DEVICE — SYRINGE MED 10ML TRNSLUC BRL PLUNG BLK MRK POLYPR CTRL

## (undated) DEVICE — INSUFFLATION NEEDLE TO ESTABLISH PNEUMOPERITONEUM.: Brand: INSUFFLATION NEEDLE

## (undated) DEVICE — TROCAR: Brand: KII FIOS FIRST ENTRY

## (undated) DEVICE — KENDALL 450 SERIES MONITORING FOAM ELECTRODE - RECTANGULAR SHAPE ( 3/PK): Brand: KENDALL

## (undated) DEVICE — GAUZE,SPONGE,4"X4",12PLY,STERILE,LF,2'S: Brand: MEDLINE

## (undated) DEVICE — PUMP SUC IRR TBNG L10FT W/ HNDPC ASSEMB STRYKEFLOW 2

## (undated) DEVICE — MEDI-VAC NON-CONDUCTIVE SUCTION TUBING: Brand: CARDINAL HEALTH

## (undated) DEVICE — RELOAD STPL L60MM H1.5-3.6MM REG TISS BLU GRIPPING SURF B

## (undated) DEVICE — LUBRICANT SURG JELLY ST BACTER TUBE 4.25OZ

## (undated) DEVICE — PMI PTFE COATED LAPAROSCOPIC WIRE L-HOOK 44 CM: Brand: PMI

## (undated) DEVICE — SUTURE ABSRB L6IN L37MM 0 GS-21 GRN 1/2 CIR TAPR PNT NDL VLOCL0306

## (undated) DEVICE — CONTAINER SPEC 480ML CLR POLYSTYR 10% NEUT BUFF FRMLN ZN

## (undated) DEVICE — DOUBLE BASIN SET: Brand: MEDLINE INDUSTRIES, INC.

## (undated) DEVICE — SUTURE V-LOC 180 SZ 0 L9IN ABSRB GRN GS-21 L37MM 1/2 CIR VLOCL0346

## (undated) DEVICE — MEDI-VAC YANKAUER SUCTION HANDLE W/BULBOUS TIP: Brand: CARDINAL HEALTH

## (undated) DEVICE — Device: Brand: DEFENDO VALVE AND CONNECTOR KIT

## (undated) DEVICE — APPLICATOR SURG XL L38CM FOR ARISTA ABSRB HEMSTAT FLEXITIP

## (undated) DEVICE — GLOVE ORANGE PI 8   MSG9080

## (undated) DEVICE — MASK,FACE,MAXFLUIDPROTECT,SHIELD/ERLPS: Brand: MEDLINE

## (undated) DEVICE — BLOCK BITE 60FR CAREGUARD

## (undated) DEVICE — NDL CNTR 40CT FM MAG: Brand: MEDLINE INDUSTRIES, INC.

## (undated) DEVICE — IRON INTERN

## (undated) DEVICE — Z DISCONTINUED APPLICATOR SURG PREP 0.35OZ 2% CHG 70% ISO ALC W/ HI LT

## (undated) DEVICE — LAPAROSCOPIC SCISSORS: Brand: EPIX LAPAROSCOPIC SCISSORS

## (undated) DEVICE — VALVE SUCTION AIR H2O HYDR H2O JET CONN STRL ORCA POD + DISP

## (undated) DEVICE — FORCEPS BX L240CM JAW DIA2.8MM L CAP W/ NDL MIC MESH TOOTH

## (undated) DEVICE — SOLUTION IV IRRIG POUR BRL 0.9% SODIUM CHL 2F7124

## (undated) DEVICE — GOWN,SIRUS,NONRNF,SETINSLV,XL,20/CS: Brand: MEDLINE

## (undated) DEVICE — EXTRA LONG 45 DEGREE LENS

## (undated) DEVICE — CONTAINER SPEC COLL 960ML POLYPR TRIANG GRAD INTAKE/OUTPUT

## (undated) DEVICE — ELECTRODE PT RET AD L9FT HI MOIST COND ADH HYDRGEL CORDED

## (undated) DEVICE — SHEARS LAP L45CM DIA5MM ULTRASONIC CRV TIP ADV HEMSTAS HARM

## (undated) DEVICE — SET ENDO INSTR LAPAROSCOPIC INCISIONAL

## (undated) DEVICE — TUBING, SUCTION, 1/4" X 10', STRAIGHT: Brand: MEDLINE

## (undated) DEVICE — COVER,LIGHT HANDLE,FLX,1/PK: Brand: MEDLINE INDUSTRIES, INC.

## (undated) DEVICE — APPLIER CLP L SHFT DIA12MM 20 ROT MULT LIGACLP

## (undated) DEVICE — RELOAD STPL L60MM H1-2.6MM MESENTERY THN TISS WHT 6 ROW